# Patient Record
Sex: MALE | Race: WHITE | NOT HISPANIC OR LATINO | Employment: OTHER | URBAN - METROPOLITAN AREA
[De-identification: names, ages, dates, MRNs, and addresses within clinical notes are randomized per-mention and may not be internally consistent; named-entity substitution may affect disease eponyms.]

---

## 2017-01-06 RX ORDER — ERGOCALCIFEROL 1.25 MG/1
50000 CAPSULE ORAL 2 TIMES WEEKLY
Qty: 30 CAPSULE | Refills: 0 | Status: CANCELLED | OUTPATIENT
Start: 2017-01-09

## 2017-01-06 NOTE — TELEPHONE ENCOUNTER
I spoke with patient , I received an automatic refill request on his vitamin D from the pharmacy.  He said he still has some vitamin D that he is taking and is due to have a new level drawn next week hold off on any refills right now to see his new level.

## 2017-01-09 ENCOUNTER — APPOINTMENT (OUTPATIENT)
Dept: CT IMAGING | Facility: HOSPITAL | Age: 77
End: 2017-01-09

## 2017-01-09 ENCOUNTER — HOSPITAL ENCOUNTER (EMERGENCY)
Facility: HOSPITAL | Age: 77
Discharge: HOME | End: 2017-01-09
Attending: EMERGENCY MEDICINE | Admitting: EMERGENCY MEDICINE

## 2017-01-09 VITALS
TEMPERATURE: 97.2 F | DIASTOLIC BLOOD PRESSURE: 98 MMHG | BODY MASS INDEX: 27.92 KG/M2 | WEIGHT: 195 LBS | RESPIRATION RATE: 18 BRPM | OXYGEN SATURATION: 94 % | HEIGHT: 70 IN | SYSTOLIC BLOOD PRESSURE: 192 MMHG | HEART RATE: 88 BPM

## 2017-01-09 DIAGNOSIS — S09.90XA HEAD INJURY, INITIAL ENCOUNTER: Primary | ICD-10-CM

## 2017-01-09 DIAGNOSIS — S02.92XA: ICD-10-CM

## 2017-01-09 PROCEDURE — 70486 CT MAXILLOFACIAL W/O DYE: CPT

## 2017-01-09 PROCEDURE — 70450 CT HEAD/BRAIN W/O DYE: CPT

## 2017-01-09 PROCEDURE — 99284 EMERGENCY DEPT VISIT MOD MDM: CPT

## 2017-01-09 RX ORDER — HYDROCODONE BITARTRATE AND ACETAMINOPHEN 7.5; 325 MG/1; MG/1
1 TABLET ORAL ONCE
Status: COMPLETED | OUTPATIENT
Start: 2017-01-09 | End: 2017-01-09

## 2017-01-09 RX ORDER — HYDROCODONE BITARTRATE AND ACETAMINOPHEN 5; 325 MG/1; MG/1
1 TABLET ORAL EVERY 6 HOURS PRN
Qty: 15 TABLET | Refills: 0 | Status: SHIPPED | OUTPATIENT
Start: 2017-01-09 | End: 2017-01-12

## 2017-01-09 RX ADMIN — HYDROCODONE BITARTRATE AND ACETAMINOPHEN 1 TABLET: 7.5; 325 TABLET ORAL at 19:15

## 2017-01-09 NOTE — DISCHARGE INSTRUCTIONS
You have an appointment at [facility] Wednesday at 8am.  [address]  [phone]

## 2017-01-09 NOTE — ED PROVIDER NOTES
" EMERGENCY DEPARTMENT ENCOUNTER    CHIEF COMPLAINT  Chief Complaint: Fall  History given by: Patient, Spouse, Daughter  History limited by: N/A  Room Number: 19/19  PMD: Adeline Onofre MD      HPI:  Pt reports that while exercising today, he lost his balance and fell, sustaining blow to the nose against the pavement of his driveway. Pt is unsure if he lost consciousness, but denies CP, bilateral hip pain, dyspnea, abd pain, N/V, focal weakness/numbness, and neck pain. Pt is currently on Eliquis due to prior h/o PEs. There are no other complaints at this time.     Pain Location: Nose  Radiation: None  Quality: \"painful\"  Intensity/Severity: Moderate  Duration: Onset earlier today  Onset quality: Pain was gradual  Timing: Intermittent   Progression: Waxing and waning  Aggravating Factors: Nothing  Alleviating Factors: Nothing  Previous Episodes: None  Treatment before arrival: None  Associated Symptoms: None      PAST MEDICAL HISTORY  Active Ambulatory Problems     Diagnosis Date Noted   • Quadriceps weakness 04/08/2016   • ACL tear 04/08/2016   • Knee pain, right 04/08/2016   • S/P CABG x 3 04/18/2016   • Essential hypertension 04/18/2016   • Hyperlipemia 04/18/2016   • Hallux rigidus 07/11/2016   • Fracture, stress, metatarsal 07/11/2016   • Osteopenia determined by x-ray 07/11/2016   • Metatarsal stress fracture with nonunion 08/10/2016   • Left hip pain 08/26/2016   • Bursitis of left hip 08/26/2016   • Pulmonary embolism 10/12/2016   • DALILA (acute kidney injury) 10/12/2016   • Diabetes mellitus type 2, controlled 10/12/2016   • Ascending aorta dilatation 10/12/2016   • Pulmonary nodule, left 10/12/2016   • Right leg DVT 10/13/2016     Resolved Ambulatory Problems     Diagnosis Date Noted   • No Resolved Ambulatory Problems     Past Medical History   Diagnosis Date   • Arthritis    • Asthma    • Brain abscess    • Cardiac disease    • Coronary artery disease    • Diabetes mellitus    • Hearing aid worn    • " Hypertension    • Joint pain          PAST SURGICAL HISTORY  Past Surgical History   Procedure Laterality Date   • Coronary artery bypass graft     • Brain surgery       BRAIN ABCESS 30 YR AGO   • Septoplasty     • Orif foot fracture Right 9/9/2016     Procedure: RIGHT SECOND METATARSAL OPEN REDUCTION INTERNAL FIXATION WITh GRAFT FROM HEEL ;  Surgeon: Man Jenkins MD;  [facility]: [facility] OR [facility];  Service:          FAMILY HISTORY  Family History   Problem Relation Age of Onset   • Heart disease Mother    • Hypertension Mother    • Heart disease Father    • Hypertension Father          SOCIAL HISTORY  Social History     Social History   • Marital status:      Spouse name: N/A   • Number of children: N/A   • Years of education: N/A     Occupational History   • Not on file.     Social History Main Topics   • Smoking status: Former Smoker   • Smokeless tobacco: Never Used      Comment: HISTORY OF 6 YRS USE IN EARLY 20'S   • Alcohol use No   • Drug use: No   • Sexual activity: Defer     Other Topics Concern   • Not on file     Social History Narrative         ALLERGIES  Other        REVIEW OF SYSTEMS  Review of Systems   Constitutional: Negative.    Eyes: Negative for visual disturbance.   Respiratory: Negative for shortness of breath.    Cardiovascular: Negative for chest pain.   Gastrointestinal: Negative for abdominal pain, nausea and vomiting.   Musculoskeletal: Negative for neck pain.   Skin:        Nasal injury   Neurological: Negative for syncope, weakness, numbness and headaches.   Psychiatric/Behavioral: Negative.    All other systems reviewed and are negative.           PHYSICAL EXAM  ED Triage Vitals   Temp Heart Rate Resp BP SpO2   01/09/17 1615 01/09/17 1612 01/09/17 1612 01/09/17 1615 01/09/17 1612   98.5 °F (36.9 °C) 90 20 208/105 95 % WNL      Temp src Heart Rate Source Patient Position BP Location FiO2 (%)   -- 01/09/17 1626 01/09/17 1626 01/09/17 1626 --    Monitor Lying Left arm         Physical Exam   Constitutional: He is oriented to person, place, and time and well-developed, well-nourished, and in no distress.   HENT:   No septal hematoma. There is dried blood from bilateral nares (right > left).    Eyes: EOM are normal.   Neck: Normal range of motion. Neck supple.   Cardiovascular: Normal rate and regular rhythm.    Pulmonary/Chest: Effort normal and breath sounds normal. No respiratory distress. He exhibits no tenderness.   Abdominal: Soft. There is no tenderness.   Neurological: He is alert and oriented to person, place, and time. He has normal sensation and normal strength.   Skin: Skin is warm and dry. Abrasion (to the nose; to the hands bilaterally) noted.   Psychiatric: Affect normal.   Nursing note and vitals reviewed.              RADIOLOGY  CT Head Without Contrast - Negative acute. Interpreted by radiologist. Discussed with radiologist. Independently viewed by me.      CT Facial Bones Without Contrast - Multiple nasal bone fractures (left, right, and nasal septum). There are fractures to the right orbital floor, right orbital wall, and right zygomatic arch. Multiple maxillary sinus fractures. Interpreted by radiologist. Discussed with radiologist. Independently viewed by me.             Ordered the above noted radiological studies. Reviewed by me in PACS.       PROCEDURES  Procedures        PROGRESS AND CONSULTS  ED Course   Comment By Time   6:39 PM  Patient with fall.  Has CT head that is negative but face CT shows multiple facial fractures.  Vision normal and no pressure or bleeding behind right eye.  Discussed with Dr. Gregg.  They will see him at [facility] at 8am on Wednesday.  Will discharge with pain meds.  Hold eliquis tomorrow. Sarthak Willams MD 01/09 4381     4:57 PM: CT Head and CT Facial Bones ordered for further evaluation.     6:00 PM: Rechecked pt. Pt is resting comfortably and appears in no acute distress. Informed pt that his CT Head is  negative acute. CT Facial Bones suggests that pt has multiple nasal bone fractures (nasal septum, left, and right); multiple maxillary sinus fractures; and fractures of the right orbital wall, right zygomatic arch, and right orbital floor. Will discuss further course of care with oculo-plastic surgeon. Pt agrees with plan.     6:03 PM: Placed call to Dr. Massey, oculo-plastic surgeon.     6:10 PM: Discussed case with Dr. Qiu, oculo-plastic surgeon on-call for Dr. Massey. He will f/u with pt in the office at the [facility] ([address]) on 01/11/17.     6:20 PM: Rechecked pt. Informed pt and family of my d/w Dr. Qiu, oculo-plastic surgeon. Pt advised to f/u with him at the [facility] ([address]) on 01/11/17. Will prescribe rx for small amount of pain medicine for discomfort. RTER warnings given. Pt and family agree with plan for discharge.           MEDICAL DECISION MAKING    MDM  Number of Diagnoses or Management Options     Amount and/or Complexity of Data Reviewed  Tests in the radiology section of CPT®: ordered and reviewed (CT Head is negative acute. )  Discussion of test results with the performing providers: yes (CT Head and CT Facial Bones results d/w radiologist.   )  Discuss the patient with other providers: yes (Case d/w Dr. Qiu, oculo-plastic surgeon, who will f/u with pt in the office. )    Patient Progress  Patient progress: stable             DIAGNOSIS  Final diagnoses:   Head injury, initial encounter   Extensive facial fractures, closed, initial encounter         DISPOSITION  Pt discharged.    DISCHARGE    Patient discharged in stable condition.    Reviewed implications of results, diagnosis, meds, responsibility to follow up, warning signs and symptoms of possible worsening, potential complications and reasons to return to ER.    Patient/Family voiced understanding of above instructions.    Discussed plan for discharge, as there  is no emergent indication for admission.  Pt/family is agreeable and understands need for follow up and repeat testing.  Pt is aware that discharge does not mean that nothing is wrong but it indicates no emergency is present that requires admission and they must continue care with follow-up as given below or physician of their choice.     FOLLOW-UP  Dr. Qiu, oculo-plastic surgeon.     Current Discharge Medication List      START taking these medications    Details   HYDROcodone-acetaminophen (NORCO) 5-325 MG per tablet Take 1 tablet by mouth Every 6 (Six) Hours As Needed for moderate pain (4-6).  Qty: 15 tablet, Refills: 0             Latest Documented Vital Signs:  As of 6:47 PM  BP- (!) 190/97 HR- 75 Temp- 98.5 °F (36.9 °C) O2 sat- 95%        --  Documentation assistance provided by laura Schroeder for Dr. Imer MD.  Information recorded by the scribe was done at my direction and has been verified and validated by me.              Sandeep Schroeder  01/09/17 7038       Sarthak Willams MD  01/09/17 2024

## 2017-01-09 NOTE — ED NOTES
Pt c/o a headache and bilateral jaw pain after falling today. Pt denies LOC but family states bystanders said he did. Pt is on eliquis. Pt also has a right sided nose bleed and abrasion to forehead and right hand.     Nae Martell RN  01/09/17 3350

## 2017-01-12 ENCOUNTER — HOSPITAL ENCOUNTER (OUTPATIENT)
Dept: GENERAL RADIOLOGY | Facility: HOSPITAL | Age: 77
Discharge: HOME | End: 2017-01-12
Attending: OPHTHALMOLOGY | Admitting: OPHTHALMOLOGY

## 2017-01-12 ENCOUNTER — APPOINTMENT (OUTPATIENT)
Dept: PREADMISSION TESTING | Facility: HOSPITAL | Age: 77
End: 2017-01-12

## 2017-01-12 VITALS
WEIGHT: 203.7 LBS | SYSTOLIC BLOOD PRESSURE: 138 MMHG | RESPIRATION RATE: 16 BRPM | DIASTOLIC BLOOD PRESSURE: 75 MMHG | HEIGHT: 70 IN | BODY MASS INDEX: 29.16 KG/M2 | OXYGEN SATURATION: 94 % | HEART RATE: 81 BPM | TEMPERATURE: 97.2 F

## 2017-01-12 DIAGNOSIS — M85.80 OSTEOPENIA DETERMINED BY X-RAY: ICD-10-CM

## 2017-01-12 DIAGNOSIS — S02.401G: ICD-10-CM

## 2017-01-12 LAB
25(OH)D3 SERPL-MCNC: 53.3 NG/ML (ref 30–100)
ANION GAP SERPL CALCULATED.3IONS-SCNC: 16.1 MMOL/L
BUN BLD-MCNC: 20 MG/DL (ref 8–23)
BUN/CREAT SERPL: 16.4 (ref 7–25)
CALCIUM SPEC-SCNC: 9.8 MG/DL (ref 8.6–10.5)
CHLORIDE SERPL-SCNC: 100 MMOL/L (ref 98–107)
CO2 SERPL-SCNC: 23.9 MMOL/L (ref 22–29)
CREAT BLD-MCNC: 1.22 MG/DL (ref 0.76–1.27)
DEPRECATED RDW RBC AUTO: 47.7 FL (ref 37–54)
ERYTHROCYTE [DISTWIDTH] IN BLOOD BY AUTOMATED COUNT: 13.1 % (ref 11.5–14.5)
GFR SERPL CREATININE-BSD FRML MDRD: 58 ML/MIN/1.73
GLUCOSE BLD-MCNC: 152 MG/DL (ref 65–99)
HCT VFR BLD AUTO: 43.9 % (ref 40.4–52.2)
HGB BLD-MCNC: 14.2 G/DL (ref 13.7–17.6)
MCH RBC QN AUTO: 32.3 PG (ref 27–32.7)
MCHC RBC AUTO-ENTMCNC: 32.3 G/DL (ref 32.6–36.4)
MCV RBC AUTO: 99.8 FL (ref 79.8–96.2)
PLATELET # BLD AUTO: 205 10*3/MM3 (ref 140–500)
PMV BLD AUTO: 10.6 FL (ref 6–12)
POTASSIUM BLD-SCNC: 3.9 MMOL/L (ref 3.5–5.2)
RBC # BLD AUTO: 4.4 10*6/MM3 (ref 4.6–6)
SODIUM BLD-SCNC: 140 MMOL/L (ref 136–145)
WBC NRBC COR # BLD: 9.46 10*3/MM3 (ref 4.5–10.7)

## 2017-01-12 PROCEDURE — 85027 COMPLETE CBC AUTOMATED: CPT | Performed by: OPHTHALMOLOGY

## 2017-01-12 PROCEDURE — 36415 COLL VENOUS BLD VENIPUNCTURE: CPT

## 2017-01-12 PROCEDURE — 82306 VITAMIN D 25 HYDROXY: CPT | Performed by: OPHTHALMOLOGY

## 2017-01-12 PROCEDURE — 80048 BASIC METABOLIC PNL TOTAL CA: CPT | Performed by: OPHTHALMOLOGY

## 2017-01-12 RX ORDER — HYDROCODONE BITARTRATE AND ACETAMINOPHEN 5; 325 MG/1; MG/1
1 TABLET ORAL EVERY 6 HOURS PRN
Status: ON HOLD | COMMUNITY
End: 2017-03-09 | Stop reason: ALTCHOICE

## 2017-01-12 RX ORDER — ERGOCALCIFEROL 1.25 MG/1
50000 CAPSULE ORAL TAKE AS DIRECTED
COMMUNITY
End: 2017-01-16 | Stop reason: SDUPTHER

## 2017-01-12 NOTE — DISCHARGE INSTRUCTIONS
Take the following medications the morning of surgery with a small sip of water.    NONE.    ARRIVE AT 10:30     General Instructions:  • Do not eat or drink after midnight: includes water, mints, or gum. You may brush your teeth.  • Do not smoke, chew tobacco, or drink alcohol.  • Bring medications in original bottles, any inhalers and if applicable your C-PAP/ BI-PAP machine.  • Bring any papers given to you in the doctor’s office.  • Wear clean comfortable clothes and socks.  • Do not wear contact lenses or make-up.  Bring a case for your glasses if applicable.   • Bring crutches or walker if applicable.  • Leave all other valuables and jewelry at home.    If you were given a blood bank ID arm band remember to bring it with you the day of surgery.    Preventing a Surgical Site Infection:  Shower on the morning of surgery using a fresh bar of anti-bacterial soap (such as Dial) and clean washcloth.  Dry with a clean towel and dress in clean clothing.  For 2 to 3 days before surgery, avoid shaving with a razor near where you will have surgery because the razor can irritate skin and make it easier to develop an infection  Ask your surgeon if you will be receiving antibiotics prior to surgery  Make sure you, your family, and all healthcare providers clean their hands with soap and water or an alcohol based hand  before caring for you or your wound  If at all possible, quit smoking as many days before surgery as you can.    Day of surgery:  Upon arrival, a Pre-op nurse and Anesthesiologist will review your health history, obtain vital signs, and answer questions you may have.  The only belongings needed at this time will be your home medications and if applicable your C-PAP/BI-PAP machine.  If you are staying overnight your family can leave the rest of your belongings in the car and bring them to your room later.  A Pre-op nurse will start an IV and you may receive medication in preparation for surgery,  including something to help you relax.  Your family will be able to see you in the Pre-op area.  While you are in surgery your family should notify the waiting room  if they leave the waiting room area and provide a contact phone number.    Please be aware that surgery does come with discomfort.  We want to make every effort to control your discomfort so please discuss any uncontrolled symptoms with your nurse.   Your doctor will most likely have prescribed pain medications.      If you are going home after surgery you will receive individualized written care instructions before being discharged.  A responsible adult must drive you to and from the hospital on the day of your surgery and stay with you for 24 hours.    If you are staying overnight following surgery, you will be transported to your hospital room following the recovery period.  [facility] has all private rooms.    If you have any questions please call Pre-Admission Testing at [phone].  Deductibles and co-payments are collected on the day of service. Please be prepared to pay the required co-pay, deductible or deposit on the day of service as defined by your plan.

## 2017-01-12 NOTE — MR AVS SNAPSHOT
Eugenio Joe   1/12/2017 7:15 AM   Appointment    Provider:  ANETTE Franks   Department:  [facility] PREADMISSION T   Dept Phone:  [phone]                Your Full Care Plan           To Do List     1/12/2017 8:15 AM     Appointment with GAYLA Reed at [facility] XRAY ([phone])   Bring along any outside films relating to this procedure. Arrive 15 minutes before your scheduled time.   [address] [facility] [address]       1/19/2017 1:30 PM     Appointment with Man Jenkins MD at [facility] ([phone])   Arrive 15 minutes prior to appointment.   [address] [facility] [address]  [facility] [address]       4/10/2017 9:20 AM     Appointment with Jean Ford MD at [facility] CARDIOLOGY ([phone])   Arrive 15 minutes prior to appointment.   [address] [facility]  [facility] [address]            Your Updated Medication List          This list is accurate as of: 1/12/17  7:43 AM.  Always use your most recent med list.                allopurinol 300 MG tablet   Commonly known as:  ZYLOPRIM       apixaban 5 MG tablet tablet   Commonly known as:  ELIQUIS       fluticasone-salmeterol 250-50 MCG/DOSE DISKUS   Commonly known as:  ADVAIR       glipiZIDE 5 MG tablet   Commonly known as:  GLUCOTROL       lisinopril 10 MG tablet   Commonly known as:  PRINIVIL,ZESTRIL       loratadine 10 MG tablet   Commonly known as:  CLARITIN       metFORMIN 500 MG tablet   Commonly known as:  GLUCOPHAGE       MULTI-VITAMIN tablet       NORCO 5-325 MG per tablet   Generic drug:  HYDROcodone-acetaminophen       PROAIR  (90 BASE) MCG/ACT inhaler   Generic drug:  albuterol       simvastatin 40 MG tablet   Commonly known as:  ZOCOR       vitamin D 84860 UNITS capsule capsule   Commonly known as:  ERGOCALCIFEROL               MyChart Signup     Our records indicate that you  "have an active [employer] account.    You can view your After Visit Summary by going to [employer] and logging in with your [facility] username and password.  If you don't have a [facility] username and password but a parent or guardian has access to your record, the parent or guardian should login with their own [facility] username and password and access your record to view the After Visit Summary.    If you have questions, you can email [email or web address] or call [phone] to talk to our [facility] staff.  Remember, [facility] is NOT to be used for urgent needs.  For medical emergencies, dial 911.               Other Info from Your Visit           Allergies     Other Mental Status Change    Oxy drugs      Vital Signs     Blood Pressure Pulse Temperature Respirations Height Weight    138/75 (BP Location: Right arm, Patient Position: Sitting) 81 97.2 °F (36.2 °C) (Oral) 16 70\" (177.8 cm) 203 lb 11.2 oz (92.4 kg)    Oxygen Saturation Body Mass Index Smoking Status             94% 29.23 kg/m2 Former Smoker           Discharge Instructions       Take the following medications the morning of surgery with a small sip of water.    NONE.    ARRIVE AT 10:30     General Instructions:  • Do not eat or drink after midnight: includes water, mints, or gum. You may brush your teeth.  • Do not smoke, chew tobacco, or drink alcohol.  • Bring medications in original bottles, any inhalers and if applicable your C-PAP/ BI-PAP machine.  • Bring any papers given to you in the doctor’s office.  • Wear clean comfortable clothes and socks.  • Do not wear contact lenses or make-up.  Bring a case for your glasses if applicable.   • Bring crutches or walker if applicable.  • Leave all other valuables and jewelry at home.    If you were given a blood bank ID arm band remember to bring it with you the day of surgery.    Preventing a Surgical Site Infection:  Shower on the morning of surgery using a fresh bar of " anti-bacterial soap (such as Dial) and clean washcloth.  Dry with a clean towel and dress in clean clothing.  For 2 to 3 days before surgery, avoid shaving with a razor near where you will have surgery because the razor can irritate skin and make it easier to develop an infection  Ask your surgeon if you will be receiving antibiotics prior to surgery  Make sure you, your family, and all healthcare providers clean their hands with soap and water or an alcohol based hand  before caring for you or your wound  If at all possible, quit smoking as many days before surgery as you can.    Day of surgery:  Upon arrival, a Pre-op nurse and Anesthesiologist will review your health history, obtain vital signs, and answer questions you may have.  The only belongings needed at this time will be your home medications and if applicable your C-PAP/BI-PAP machine.  If you are staying overnight your family can leave the rest of your belongings in the car and bring them to your room later.  A Pre-op nurse will start an IV and you may receive medication in preparation for surgery, including something to help you relax.  Your family will be able to see you in the Pre-op area.  While you are in surgery your family should notify the waiting room  if they leave the waiting room area and provide a contact phone number.    Please be aware that surgery does come with discomfort.  We want to make every effort to control your discomfort so please discuss any uncontrolled symptoms with your nurse.   Your doctor will most likely have prescribed pain medications.      If you are going home after surgery you will receive individualized written care instructions before being discharged.  A responsible adult must drive you to and from the hospital on the day of your surgery and stay with you for 24 hours.    If you are staying overnight following surgery, you will be transported to your hospital room following the recovery period.   [facility] has all private rooms.    If you have any questions please call Pre-Admission Testing at [phone].  Deductibles and co-payments are collected on the day of service. Please be prepared to pay the required co-pay, deductible or deposit on the day of service as defined by your plan.       SYMPTOMS OF A STROKE    Call 911 or have someone take you to the Emergency Department if you have any of the following:    · Sudden numbness or weakness of your face, arm or leg especially on one side of the body  · Sudden confusion, diffiiculty speaking or trouble understanding   · Changes in your vision or loss of sight in one eye  · Sudden severe headache with no known cause  · sudden dizziness, trouble walking, loss of balance or coordination    It is important to seek emergency care right away if you have further stroke symptoms. If you get emergency help quickly, the powerful clot-dissolving medicines can reduce the disabilities caused by a stroke.     For more information:    American Stroke Association  8-004-6-STROKE  www.strokeassociation.org           IF YOU SMOKE OR USE TOBACCO PLEASE READ THE FOLLOWING:    Why is smoking bad for me?  Smoking increases the risk of heart disease, lung disease, vascular disease, stroke, and cancer.     If you smoke, STOP!    If you would like more information on quitting smoking, please visit the [employer] website: [email or web address]   This link will provide additional resources including the QUIT line and the [employer] the [employer] support groups.     For more information:    American Cancer Society  (460) 267-6681    American Heart Association  1-360.703.2332

## 2017-01-13 ENCOUNTER — ANESTHESIA EVENT (OUTPATIENT)
Dept: PERIOP | Facility: HOSPITAL | Age: 77
End: 2017-01-13

## 2017-01-13 ENCOUNTER — HOSPITAL ENCOUNTER (OUTPATIENT)
Facility: HOSPITAL | Age: 77
Setting detail: HOSPITAL OUTPATIENT SURGERY
Discharge: HOME | End: 2017-01-13
Attending: OPHTHALMOLOGY | Admitting: OPHTHALMOLOGY

## 2017-01-13 ENCOUNTER — ANESTHESIA (OUTPATIENT)
Dept: PERIOP | Facility: HOSPITAL | Age: 77
End: 2017-01-13

## 2017-01-13 VITALS
SYSTOLIC BLOOD PRESSURE: 175 MMHG | RESPIRATION RATE: 18 BRPM | WEIGHT: 204 LBS | OXYGEN SATURATION: 94 % | DIASTOLIC BLOOD PRESSURE: 75 MMHG | HEART RATE: 80 BPM | TEMPERATURE: 98 F | BODY MASS INDEX: 29.27 KG/M2

## 2017-01-13 LAB — GLUCOSE BLDC GLUCOMTR-MCNC: 145 MG/DL (ref 70–130)

## 2017-01-13 PROCEDURE — 25010000002 HYDRALAZINE PER 20 MG: Performed by: NURSE ANESTHETIST, CERTIFIED REGISTERED

## 2017-01-13 PROCEDURE — 25010000002 PHENYLEPHRINE PER 1 ML: Performed by: NURSE ANESTHETIST, CERTIFIED REGISTERED

## 2017-01-13 PROCEDURE — 25010000002 FENTANYL CITRATE (PF) 100 MCG/2ML SOLUTION: Performed by: ANESTHESIOLOGY

## 2017-01-13 PROCEDURE — 25010000002 PROPOFOL 10 MG/ML EMULSION: Performed by: NURSE ANESTHETIST, CERTIFIED REGISTERED

## 2017-01-13 PROCEDURE — 25010000002 ONDANSETRON PER 1 MG: Performed by: NURSE ANESTHETIST, CERTIFIED REGISTERED

## 2017-01-13 PROCEDURE — 25010000002 NEOSTIGMINE 10 MG/10ML SOLUTION: Performed by: NURSE ANESTHETIST, CERTIFIED REGISTERED

## 2017-01-13 PROCEDURE — C1713 ANCHOR/SCREW BN/BN,TIS/BN: HCPCS | Performed by: OPHTHALMOLOGY

## 2017-01-13 PROCEDURE — 82962 GLUCOSE BLOOD TEST: CPT

## 2017-01-13 PROCEDURE — 25010000003 CEFAZOLIN IN DEXTROSE 2-4 GM/100ML-% SOLUTION: Performed by: OPHTHALMOLOGY

## 2017-01-13 PROCEDURE — 25010000002 DEXAMETHASONE PER 1 MG: Performed by: NURSE ANESTHETIST, CERTIFIED REGISTERED

## 2017-01-13 PROCEDURE — 25010000002 FENTANYL CITRATE (PF) 100 MCG/2ML SOLUTION: Performed by: NURSE ANESTHETIST, CERTIFIED REGISTERED

## 2017-01-13 DEVICE — SCRW MAXDRIVE MICRO ER 1.8X5MM: Type: IMPLANTABLE DEVICE | Status: FUNCTIONAL

## 2017-01-13 DEVICE — IMPLANTABLE DEVICE: Type: IMPLANTABLE DEVICE | Status: FUNCTIONAL

## 2017-01-13 DEVICE — PLT MIC LEVELONE CRV W/1.5MM/SCRW 10HL 38MM: Type: IMPLANTABLE DEVICE | Status: FUNCTIONAL

## 2017-01-13 DEVICE — SCRW MAXDRIVE DRL FREE MICRO 1.5X4MM: Type: IMPLANTABLE DEVICE | Status: FUNCTIONAL

## 2017-01-13 DEVICE — FOIL ORBT/FLR SUPRAFOIL NLY 0.35MM 4X4CM: Type: IMPLANTABLE DEVICE | Status: FUNCTIONAL

## 2017-01-13 RX ORDER — CHLORHEXIDINE GLUCONATE 0.12 MG/ML
15 RINSE ORAL 3 TIMES DAILY
Qty: 473 ML | Refills: 0 | Status: ON HOLD | OUTPATIENT
Start: 2017-01-13 | End: 2017-03-09 | Stop reason: ALTCHOICE

## 2017-01-13 RX ORDER — FAMOTIDINE 10 MG/ML
20 INJECTION, SOLUTION INTRAVENOUS ONCE
Status: COMPLETED | OUTPATIENT
Start: 2017-01-13 | End: 2017-01-13

## 2017-01-13 RX ORDER — OXYCODONE AND ACETAMINOPHEN 7.5; 325 MG/1; MG/1
1 TABLET ORAL ONCE AS NEEDED
Status: DISCONTINUED | OUTPATIENT
Start: 2017-01-13 | End: 2017-01-13 | Stop reason: HOSPADM

## 2017-01-13 RX ORDER — DIPHENHYDRAMINE HYDROCHLORIDE 50 MG/ML
12.5 INJECTION INTRAMUSCULAR; INTRAVENOUS
Status: DISCONTINUED | OUTPATIENT
Start: 2017-01-13 | End: 2017-01-13 | Stop reason: HOSPADM

## 2017-01-13 RX ORDER — FLUMAZENIL 0.1 MG/ML
0.2 INJECTION INTRAVENOUS AS NEEDED
Status: DISCONTINUED | OUTPATIENT
Start: 2017-01-13 | End: 2017-01-13 | Stop reason: HOSPADM

## 2017-01-13 RX ORDER — MIDAZOLAM HYDROCHLORIDE 1 MG/ML
2 INJECTION INTRAMUSCULAR; INTRAVENOUS
Status: DISCONTINUED | OUTPATIENT
Start: 2017-01-13 | End: 2017-01-13 | Stop reason: HOSPADM

## 2017-01-13 RX ORDER — ONDANSETRON 2 MG/ML
INJECTION INTRAMUSCULAR; INTRAVENOUS AS NEEDED
Status: DISCONTINUED | OUTPATIENT
Start: 2017-01-13 | End: 2017-01-13 | Stop reason: SURG

## 2017-01-13 RX ORDER — FENTANYL CITRATE 50 UG/ML
50 INJECTION, SOLUTION INTRAMUSCULAR; INTRAVENOUS
Status: DISCONTINUED | OUTPATIENT
Start: 2017-01-13 | End: 2017-01-13 | Stop reason: HOSPADM

## 2017-01-13 RX ORDER — ROCURONIUM BROMIDE 10 MG/ML
INJECTION, SOLUTION INTRAVENOUS AS NEEDED
Status: DISCONTINUED | OUTPATIENT
Start: 2017-01-13 | End: 2017-01-13 | Stop reason: SURG

## 2017-01-13 RX ORDER — SODIUM CHLORIDE 9 MG/ML
INJECTION, SOLUTION INTRAVENOUS CONTINUOUS PRN
Status: DISCONTINUED | OUTPATIENT
Start: 2017-01-13 | End: 2017-01-13 | Stop reason: SURG

## 2017-01-13 RX ORDER — GLYCOPYRROLATE 0.2 MG/ML
INJECTION INTRAMUSCULAR; INTRAVENOUS AS NEEDED
Status: DISCONTINUED | OUTPATIENT
Start: 2017-01-13 | End: 2017-01-13 | Stop reason: SURG

## 2017-01-13 RX ORDER — NEOSTIGMINE METHYLSULFATE 1 MG/ML
INJECTION, SOLUTION INTRAVENOUS AS NEEDED
Status: DISCONTINUED | OUTPATIENT
Start: 2017-01-13 | End: 2017-01-13 | Stop reason: SURG

## 2017-01-13 RX ORDER — CEFAZOLIN SODIUM 2 G/100ML
2 INJECTION, SOLUTION INTRAVENOUS ONCE
Status: COMPLETED | OUTPATIENT
Start: 2017-01-13 | End: 2017-01-13

## 2017-01-13 RX ORDER — PROMETHAZINE HYDROCHLORIDE 25 MG/1
12.5 TABLET ORAL ONCE AS NEEDED
Status: DISCONTINUED | OUTPATIENT
Start: 2017-01-13 | End: 2017-01-13 | Stop reason: HOSPADM

## 2017-01-13 RX ORDER — HYDRALAZINE HYDROCHLORIDE 20 MG/ML
5 INJECTION INTRAMUSCULAR; INTRAVENOUS
Status: DISCONTINUED | OUTPATIENT
Start: 2017-01-13 | End: 2017-01-13 | Stop reason: HOSPADM

## 2017-01-13 RX ORDER — ERYTHROMYCIN 5 MG/G
OINTMENT OPHTHALMIC
Qty: 3.5 G | Refills: 1 | Status: SHIPPED | OUTPATIENT
Start: 2017-01-13 | End: 2017-02-08

## 2017-01-13 RX ORDER — HYDROCODONE BITARTRATE AND ACETAMINOPHEN 7.5; 325 MG/1; MG/1
1 TABLET ORAL ONCE AS NEEDED
Status: DISCONTINUED | OUTPATIENT
Start: 2017-01-13 | End: 2017-01-13 | Stop reason: HOSPADM

## 2017-01-13 RX ORDER — NALOXONE HCL 0.4 MG/ML
0.2 VIAL (ML) INJECTION AS NEEDED
Status: DISCONTINUED | OUTPATIENT
Start: 2017-01-13 | End: 2017-01-13 | Stop reason: HOSPADM

## 2017-01-13 RX ORDER — BACITRACIN ZINC 500 [USP'U]/G
OINTMENT TOPICAL AS NEEDED
Status: DISCONTINUED | OUTPATIENT
Start: 2017-01-13 | End: 2017-01-13 | Stop reason: HOSPADM

## 2017-01-13 RX ORDER — FAMOTIDINE 10 MG/ML
INJECTION, SOLUTION INTRAVENOUS
Status: COMPLETED
Start: 2017-01-13 | End: 2017-01-13

## 2017-01-13 RX ORDER — MIDAZOLAM HYDROCHLORIDE 1 MG/ML
1 INJECTION INTRAMUSCULAR; INTRAVENOUS
Status: DISCONTINUED | OUTPATIENT
Start: 2017-01-13 | End: 2017-01-13 | Stop reason: HOSPADM

## 2017-01-13 RX ORDER — PROMETHAZINE HYDROCHLORIDE 25 MG/1
25 TABLET ORAL ONCE AS NEEDED
Status: DISCONTINUED | OUTPATIENT
Start: 2017-01-13 | End: 2017-01-13 | Stop reason: HOSPADM

## 2017-01-13 RX ORDER — HYDROMORPHONE HYDROCHLORIDE 1 MG/ML
0.25 INJECTION, SOLUTION INTRAMUSCULAR; INTRAVENOUS; SUBCUTANEOUS
Status: DISCONTINUED | OUTPATIENT
Start: 2017-01-13 | End: 2017-01-13 | Stop reason: HOSPADM

## 2017-01-13 RX ORDER — SODIUM CHLORIDE, SODIUM LACTATE, POTASSIUM CHLORIDE, CALCIUM CHLORIDE 600; 310; 30; 20 MG/100ML; MG/100ML; MG/100ML; MG/100ML
9 INJECTION, SOLUTION INTRAVENOUS CONTINUOUS
Status: DISCONTINUED | OUTPATIENT
Start: 2017-01-13 | End: 2017-01-13 | Stop reason: HOSPADM

## 2017-01-13 RX ORDER — PROMETHAZINE HYDROCHLORIDE 25 MG/1
25 SUPPOSITORY RECTAL ONCE AS NEEDED
Status: DISCONTINUED | OUTPATIENT
Start: 2017-01-13 | End: 2017-01-13 | Stop reason: HOSPADM

## 2017-01-13 RX ORDER — ERYTHROMYCIN 5 MG/G
OINTMENT OPHTHALMIC AS NEEDED
Status: DISCONTINUED | OUTPATIENT
Start: 2017-01-13 | End: 2017-01-13 | Stop reason: HOSPADM

## 2017-01-13 RX ORDER — LABETALOL HYDROCHLORIDE 5 MG/ML
5 INJECTION, SOLUTION INTRAVENOUS
Status: DISCONTINUED | OUTPATIENT
Start: 2017-01-13 | End: 2017-01-13 | Stop reason: HOSPADM

## 2017-01-13 RX ORDER — MAGNESIUM HYDROXIDE 1200 MG/15ML
LIQUID ORAL AS NEEDED
Status: DISCONTINUED | OUTPATIENT
Start: 2017-01-13 | End: 2017-01-13 | Stop reason: HOSPADM

## 2017-01-13 RX ORDER — DEXAMETHASONE SODIUM PHOSPHATE 10 MG/ML
INJECTION INTRAMUSCULAR; INTRAVENOUS AS NEEDED
Status: DISCONTINUED | OUTPATIENT
Start: 2017-01-13 | End: 2017-01-13 | Stop reason: SURG

## 2017-01-13 RX ORDER — ONDANSETRON 2 MG/ML
4 INJECTION INTRAMUSCULAR; INTRAVENOUS ONCE AS NEEDED
Status: DISCONTINUED | OUTPATIENT
Start: 2017-01-13 | End: 2017-01-13 | Stop reason: HOSPADM

## 2017-01-13 RX ORDER — SODIUM CHLORIDE 0.9 % (FLUSH) 0.9 %
1-10 SYRINGE (ML) INJECTION AS NEEDED
Status: DISCONTINUED | OUTPATIENT
Start: 2017-01-13 | End: 2017-01-13 | Stop reason: HOSPADM

## 2017-01-13 RX ORDER — HYDROCODONE BITARTRATE AND ACETAMINOPHEN 7.5; 325 MG/1; MG/1
1 TABLET ORAL EVERY 6 HOURS PRN
Qty: 15 TABLET | Refills: 0 | Status: SHIPPED | OUTPATIENT
Start: 2017-01-13 | End: 2017-01-21 | Stop reason: HOSPADM

## 2017-01-13 RX ORDER — PROPOFOL 10 MG/ML
VIAL (ML) INTRAVENOUS AS NEEDED
Status: DISCONTINUED | OUTPATIENT
Start: 2017-01-13 | End: 2017-01-13 | Stop reason: SURG

## 2017-01-13 RX ORDER — PROMETHAZINE HYDROCHLORIDE 25 MG/ML
12.5 INJECTION, SOLUTION INTRAMUSCULAR; INTRAVENOUS ONCE AS NEEDED
Status: DISCONTINUED | OUTPATIENT
Start: 2017-01-13 | End: 2017-01-13 | Stop reason: HOSPADM

## 2017-01-13 RX ADMIN — SODIUM CHLORIDE, POTASSIUM CHLORIDE, SODIUM LACTATE AND CALCIUM CHLORIDE: 600; 310; 30; 20 INJECTION, SOLUTION INTRAVENOUS at 12:25

## 2017-01-13 RX ADMIN — PHENYLEPHRINE HYDROCHLORIDE 50 MCG: 10 INJECTION INTRAVENOUS at 14:45

## 2017-01-13 RX ADMIN — SODIUM CHLORIDE, POTASSIUM CHLORIDE, SODIUM LACTATE AND CALCIUM CHLORIDE: 600; 310; 30; 20 INJECTION, SOLUTION INTRAVENOUS at 15:00

## 2017-01-13 RX ADMIN — PHENYLEPHRINE HYDROCHLORIDE 50 MCG: 10 INJECTION INTRAVENOUS at 14:10

## 2017-01-13 RX ADMIN — FENTANYL CITRATE 50 MCG: 50 INJECTION INTRAMUSCULAR; INTRAVENOUS at 16:28

## 2017-01-13 RX ADMIN — NEOSTIGMINE METHYLSULFATE 2 MG: 1 INJECTION INTRAVENOUS at 15:36

## 2017-01-13 RX ADMIN — PROPOFOL 200 MG: 10 INJECTION, EMULSION INTRAVENOUS at 12:34

## 2017-01-13 RX ADMIN — PHENYLEPHRINE HYDROCHLORIDE 100 MCG: 10 INJECTION INTRAVENOUS at 15:00

## 2017-01-13 RX ADMIN — SODIUM CHLORIDE, POTASSIUM CHLORIDE, SODIUM LACTATE AND CALCIUM CHLORIDE: 600; 310; 30; 20 INJECTION, SOLUTION INTRAVENOUS at 12:49

## 2017-01-13 RX ADMIN — SODIUM CHLORIDE: 9 INJECTION, SOLUTION INTRAVENOUS at 15:15

## 2017-01-13 RX ADMIN — GLYCOPYRROLATE 0.2 MG: 0.2 INJECTION INTRAMUSCULAR; INTRAVENOUS at 15:36

## 2017-01-13 RX ADMIN — FAMOTIDINE 20 MG: 10 INJECTION, SOLUTION INTRAVENOUS at 09:59

## 2017-01-13 RX ADMIN — PHENYLEPHRINE HYDROCHLORIDE 200 MCG: 10 INJECTION INTRAVENOUS at 13:13

## 2017-01-13 RX ADMIN — HYDRALAZINE HYDROCHLORIDE 5 MG: 20 INJECTION INTRAMUSCULAR; INTRAVENOUS at 16:28

## 2017-01-13 RX ADMIN — FENTANYL CITRATE 50 MCG: 50 INJECTION INTRAMUSCULAR; INTRAVENOUS at 12:38

## 2017-01-13 RX ADMIN — ROCURONIUM BROMIDE 40 MG: 10 INJECTION INTRAVENOUS at 12:34

## 2017-01-13 RX ADMIN — PHENYLEPHRINE HYDROCHLORIDE 50 MCG: 10 INJECTION INTRAVENOUS at 13:37

## 2017-01-13 RX ADMIN — PHENYLEPHRINE HYDROCHLORIDE 100 MCG: 10 INJECTION INTRAVENOUS at 15:28

## 2017-01-13 RX ADMIN — HYDROCODONE BITARTRATE AND ACETAMINOPHEN 1 TABLET: 7.5; 325 TABLET ORAL at 16:28

## 2017-01-13 RX ADMIN — FENTANYL CITRATE 50 MCG: 50 INJECTION INTRAMUSCULAR; INTRAVENOUS at 12:33

## 2017-01-13 RX ADMIN — PHENYLEPHRINE HYDROCHLORIDE 50 MCG: 10 INJECTION INTRAVENOUS at 13:52

## 2017-01-13 RX ADMIN — CEFAZOLIN SODIUM 2 G: 2 INJECTION, SOLUTION INTRAVENOUS at 12:38

## 2017-01-13 RX ADMIN — DEXAMETHASONE SODIUM PHOSPHATE 6 MG: 10 INJECTION INTRAMUSCULAR; INTRAVENOUS at 12:50

## 2017-01-13 RX ADMIN — FENTANYL CITRATE 50 MCG: 50 INJECTION INTRAMUSCULAR; INTRAVENOUS at 15:39

## 2017-01-13 RX ADMIN — PHENYLEPHRINE HYDROCHLORIDE 200 MCG: 10 INJECTION INTRAVENOUS at 12:53

## 2017-01-13 RX ADMIN — PHENYLEPHRINE HYDROCHLORIDE 100 MCG: 10 INJECTION INTRAVENOUS at 13:00

## 2017-01-13 RX ADMIN — PHENYLEPHRINE HYDROCHLORIDE 100 MCG: 10 INJECTION INTRAVENOUS at 15:19

## 2017-01-13 RX ADMIN — ONDANSETRON 4 MG: 2 INJECTION INTRAMUSCULAR; INTRAVENOUS at 12:50

## 2017-01-13 RX ADMIN — PHENYLEPHRINE HYDROCHLORIDE 50 MCG: 10 INJECTION INTRAVENOUS at 14:48

## 2017-01-13 RX ADMIN — FENTANYL CITRATE 50 MCG: 50 INJECTION INTRAMUSCULAR; INTRAVENOUS at 15:45

## 2017-01-13 NOTE — ANESTHESIA POSTPROCEDURE EVALUATION
Patient: Eugenio Joe    Procedure Summary     Date Anesthesia Start Anesthesia Stop Room / Location    01/13/17 1225 1548  [facility]  /  [facility]       Procedure Diagnosis Surgeon Provider    RT ORBIT FLOOR FRACTURE REPAIR RIGHT [facility] FRACTURE REPAIR, RIGHT NASAL BONE FRACTURE CLOSED REDUCTION (Right Face) No diagnosis on file. MD Marleny Christine MD          Anesthesia Type: general  Last vitals  /74 (01/13/17 1656)    Temp 36.7 °C (98 °F) (01/13/17 1645)    Pulse 85 (01/13/17 1656)   Resp 20 (01/13/17 1656)    SpO2 93 % (01/13/17 1656)      Post Anesthesia Care and Evaluation    Patient location during evaluation: bedside  Patient participation: complete - patient participated  Level of consciousness: awake  Pain score: 1  Pain management: adequate  Airway patency: patent  Anesthetic complications: No anesthetic complications    Cardiovascular status: acceptable  Respiratory status: acceptable  Hydration status: acceptable

## 2017-01-13 NOTE — ANESTHESIA PROCEDURE NOTES
Airway  Urgency: elective    Date/Time: 1/13/2017 12:36 PM  Airway not difficult    General Information and Staff    Patient location during procedure: OR  Anesthesiologist: LUIS RUIZ  CRNA: JURGEN OVERTON    Indications and Patient Condition  Indications for airway management: airway protection    Preoxygenated: yes  Mask difficulty assessment: 1 - vent by mask    Final Airway Details  Final airway type: endotracheal airway      Successful airway: ETT  Cuffed: yes   Successful intubation technique: direct laryngoscopy  Endotracheal tube insertion site: oral  Blade: Doug  Blade size: #4  ETT size: 7.5 mm  Cormack-Lehane Classification: grade I - full view of glottis  Placement verified by: chest auscultation and capnometry   Cuff volume (mL): 5  Measured from: lips  ETT to lips (cm): 21  Number of attempts at approach: 1    Additional Comments  Smooth IV induction. Trachea intubated. Cuff up. Dentition intact without injury.

## 2017-01-16 RX ORDER — ERGOCALCIFEROL 1.25 MG/1
50000 CAPSULE ORAL TAKE AS DIRECTED
Qty: 12 CAPSULE | Refills: 0 | Status: SHIPPED | OUTPATIENT
Start: 2017-01-16 | End: 2017-06-05

## 2017-01-18 ENCOUNTER — APPOINTMENT (OUTPATIENT)
Dept: ULTRASOUND IMAGING | Facility: HOSPITAL | Age: 77
End: 2017-01-18

## 2017-01-18 ENCOUNTER — HOSPITAL ENCOUNTER (INPATIENT)
Facility: HOSPITAL | Age: 77
LOS: 3 days | Discharge: HOME | End: 2017-01-21
Attending: EMERGENCY MEDICINE | Admitting: INTERNAL MEDICINE

## 2017-01-18 ENCOUNTER — APPOINTMENT (OUTPATIENT)
Dept: CARDIOLOGY | Facility: HOSPITAL | Age: 77
End: 2017-01-18
Attending: INTERNAL MEDICINE

## 2017-01-18 ENCOUNTER — APPOINTMENT (OUTPATIENT)
Dept: CT IMAGING | Facility: HOSPITAL | Age: 77
End: 2017-01-18

## 2017-01-18 ENCOUNTER — APPOINTMENT (OUTPATIENT)
Dept: GENERAL RADIOLOGY | Facility: HOSPITAL | Age: 77
End: 2017-01-18

## 2017-01-18 DIAGNOSIS — M85.80 OSTEOPENIA DETERMINED BY X-RAY: ICD-10-CM

## 2017-01-18 DIAGNOSIS — M62.81 QUADRICEPS WEAKNESS: ICD-10-CM

## 2017-01-18 DIAGNOSIS — I10 ESSENTIAL HYPERTENSION: ICD-10-CM

## 2017-01-18 DIAGNOSIS — R91.1 PULMONARY NODULE, LEFT: ICD-10-CM

## 2017-01-18 DIAGNOSIS — E86.0 DEHYDRATION: ICD-10-CM

## 2017-01-18 DIAGNOSIS — M84.374K: ICD-10-CM

## 2017-01-18 DIAGNOSIS — J69.0 ASPIRATION PNEUMONIA OF LEFT LOWER LOBE, UNSPECIFIED ASPIRATION PNEUMONIA TYPE (HCC): ICD-10-CM

## 2017-01-18 DIAGNOSIS — M25.552 LEFT HIP PAIN: ICD-10-CM

## 2017-01-18 DIAGNOSIS — R13.14 PHARYNGOESOPHAGEAL DYSPHAGIA: ICD-10-CM

## 2017-01-18 DIAGNOSIS — M70.72 BURSITIS OF LEFT HIP: ICD-10-CM

## 2017-01-18 DIAGNOSIS — Z95.1 S/P CABG X 3: ICD-10-CM

## 2017-01-18 DIAGNOSIS — E83.52 HYPERCALCEMIA: ICD-10-CM

## 2017-01-18 DIAGNOSIS — N17.9 AKI (ACUTE KIDNEY INJURY) (HCC): ICD-10-CM

## 2017-01-18 DIAGNOSIS — I95.2 HYPOTENSION DUE TO DRUGS: ICD-10-CM

## 2017-01-18 DIAGNOSIS — I77.810 ASCENDING AORTA DILATATION (HCC): ICD-10-CM

## 2017-01-18 DIAGNOSIS — N17.9 ACUTE RENAL FAILURE, UNSPECIFIED ACUTE RENAL FAILURE TYPE (HCC): Primary | ICD-10-CM

## 2017-01-18 PROBLEM — I95.9 HYPOTENSION: Status: ACTIVE | Noted: 2017-01-18

## 2017-01-18 LAB
ALBUMIN SERPL-MCNC: 4.4 G/DL (ref 3.5–5.2)
ALBUMIN/GLOB SERPL: 1.2 G/DL
ALP SERPL-CCNC: 74 U/L (ref 39–117)
ALT SERPL W P-5'-P-CCNC: 15 U/L (ref 1–41)
ANION GAP SERPL CALCULATED.3IONS-SCNC: 17.3 MMOL/L
AST SERPL-CCNC: 16 U/L (ref 1–40)
BASOPHILS # BLD AUTO: 0.01 10*3/MM3 (ref 0–0.2)
BASOPHILS NFR BLD AUTO: 0.1 % (ref 0–1.5)
BILIRUB SERPL-MCNC: 1.1 MG/DL (ref 0.1–1.2)
BILIRUB UR QL STRIP: NEGATIVE
BUN BLD-MCNC: 59 MG/DL (ref 8–23)
BUN/CREAT SERPL: 16.6 (ref 7–25)
CALCIUM SPEC-SCNC: 10.9 MG/DL (ref 8.6–10.5)
CHLORIDE SERPL-SCNC: 96 MMOL/L (ref 98–107)
CK SERPL-CCNC: 37 U/L (ref 20–200)
CK SERPL-CCNC: 60 U/L (ref 20–200)
CLARITY UR: ABNORMAL
CO2 SERPL-SCNC: 23.7 MMOL/L (ref 22–29)
COLOR UR: YELLOW
CREAT BLD-MCNC: 3.56 MG/DL (ref 0.76–1.27)
D-LACTATE SERPL-SCNC: 2.1 MMOL/L (ref 0.5–2)
D-LACTATE SERPL-SCNC: 2.5 MMOL/L (ref 0.5–2)
DEPRECATED RDW RBC AUTO: 46.4 FL (ref 37–54)
EOSINOPHIL # BLD AUTO: 0.04 10*3/MM3 (ref 0–0.7)
EOSINOPHIL NFR BLD AUTO: 0.3 % (ref 0.3–6.2)
ERYTHROCYTE [DISTWIDTH] IN BLOOD BY AUTOMATED COUNT: 13 % (ref 11.5–14.5)
GFR SERPL CREATININE-BSD FRML MDRD: 17 ML/MIN/1.73
GLOBULIN UR ELPH-MCNC: 3.7 GM/DL
GLUCOSE BLD-MCNC: 170 MG/DL (ref 65–99)
GLUCOSE UR STRIP-MCNC: NEGATIVE MG/DL
HCT VFR BLD AUTO: 45.5 % (ref 40.4–52.2)
HGB BLD-MCNC: 15.2 G/DL (ref 13.7–17.6)
HGB UR QL STRIP.AUTO: NEGATIVE
IMM GRANULOCYTES # BLD: 0.03 10*3/MM3 (ref 0–0.03)
IMM GRANULOCYTES NFR BLD: 0.2 % (ref 0–0.5)
INR PPP: 1.11 (ref 0.9–1.1)
KETONES UR QL STRIP: NEGATIVE
LEUKOCYTE ESTERASE UR QL STRIP.AUTO: NEGATIVE
LYMPHOCYTES # BLD AUTO: 1.66 10*3/MM3 (ref 0.9–4.8)
LYMPHOCYTES NFR BLD AUTO: 11.8 % (ref 19.6–45.3)
MAGNESIUM SERPL-MCNC: 1.9 MG/DL (ref 1.6–2.4)
MCH RBC QN AUTO: 32.7 PG (ref 27–32.7)
MCHC RBC AUTO-ENTMCNC: 33.4 G/DL (ref 32.6–36.4)
MCV RBC AUTO: 97.8 FL (ref 79.8–96.2)
MONOCYTES # BLD AUTO: 1.12 10*3/MM3 (ref 0.2–1.2)
MONOCYTES NFR BLD AUTO: 7.9 % (ref 5–12)
NEUTROPHILS # BLD AUTO: 11.25 10*3/MM3 (ref 1.9–8.1)
NEUTROPHILS NFR BLD AUTO: 79.7 % (ref 42.7–76)
NITRITE UR QL STRIP: NEGATIVE
PH UR STRIP.AUTO: <=5 [PH] (ref 5–8)
PLATELET # BLD AUTO: 263 10*3/MM3 (ref 140–500)
PMV BLD AUTO: 11 FL (ref 6–12)
POTASSIUM BLD-SCNC: 3.7 MMOL/L (ref 3.5–5.2)
PROT SERPL-MCNC: 8.1 G/DL (ref 6–8.5)
PROT UR QL STRIP: NEGATIVE
PROTHROMBIN TIME: 13.8 SECONDS (ref 11.7–14.2)
RBC # BLD AUTO: 4.65 10*6/MM3 (ref 4.6–6)
SODIUM BLD-SCNC: 137 MMOL/L (ref 136–145)
SP GR UR STRIP: 1.02 (ref 1–1.03)
TROPONIN T SERPL-MCNC: <0.01 NG/ML (ref 0–0.03)
UROBILINOGEN UR QL STRIP: ABNORMAL
WBC NRBC COR # BLD: 14.11 10*3/MM3 (ref 4.5–10.7)

## 2017-01-18 PROCEDURE — 93005 ELECTROCARDIOGRAM TRACING: CPT | Performed by: EMERGENCY MEDICINE

## 2017-01-18 PROCEDURE — 82570 ASSAY OF URINE CREATININE: CPT | Performed by: INTERNAL MEDICINE

## 2017-01-18 PROCEDURE — 84300 ASSAY OF URINE SODIUM: CPT | Performed by: INTERNAL MEDICINE

## 2017-01-18 PROCEDURE — 93010 ELECTROCARDIOGRAM REPORT: CPT | Performed by: INTERNAL MEDICINE

## 2017-01-18 PROCEDURE — 82550 ASSAY OF CK (CPK): CPT | Performed by: EMERGENCY MEDICINE

## 2017-01-18 PROCEDURE — 81003 URINALYSIS AUTO W/O SCOPE: CPT | Performed by: EMERGENCY MEDICINE

## 2017-01-18 PROCEDURE — 87205 SMEAR GRAM STAIN: CPT | Performed by: INTERNAL MEDICINE

## 2017-01-18 PROCEDURE — 84156 ASSAY OF PROTEIN URINE: CPT | Performed by: INTERNAL MEDICINE

## 2017-01-18 PROCEDURE — 80053 COMPREHEN METABOLIC PANEL: CPT | Performed by: EMERGENCY MEDICINE

## 2017-01-18 PROCEDURE — 99285 EMERGENCY DEPT VISIT HI MDM: CPT

## 2017-01-18 PROCEDURE — 85610 PROTHROMBIN TIME: CPT | Performed by: EMERGENCY MEDICINE

## 2017-01-18 PROCEDURE — 94640 AIRWAY INHALATION TREATMENT: CPT

## 2017-01-18 PROCEDURE — 93306 TTE W/DOPPLER COMPLETE: CPT

## 2017-01-18 PROCEDURE — 83735 ASSAY OF MAGNESIUM: CPT | Performed by: INTERNAL MEDICINE

## 2017-01-18 PROCEDURE — 82436 ASSAY OF URINE CHLORIDE: CPT | Performed by: INTERNAL MEDICINE

## 2017-01-18 PROCEDURE — 25010000002 PIPERACILLIN SOD-TAZOBACTAM PER 1 G: Performed by: EMERGENCY MEDICINE

## 2017-01-18 PROCEDURE — 83605 ASSAY OF LACTIC ACID: CPT | Performed by: EMERGENCY MEDICINE

## 2017-01-18 PROCEDURE — 70450 CT HEAD/BRAIN W/O DYE: CPT

## 2017-01-18 PROCEDURE — 25010000002 ENOXAPARIN PER 10 MG: Performed by: INTERNAL MEDICINE

## 2017-01-18 PROCEDURE — 84484 ASSAY OF TROPONIN QUANT: CPT | Performed by: EMERGENCY MEDICINE

## 2017-01-18 PROCEDURE — 36415 COLL VENOUS BLD VENIPUNCTURE: CPT | Performed by: EMERGENCY MEDICINE

## 2017-01-18 PROCEDURE — 94799 UNLISTED PULMONARY SVC/PX: CPT

## 2017-01-18 PROCEDURE — 87040 BLOOD CULTURE FOR BACTERIA: CPT | Performed by: EMERGENCY MEDICINE

## 2017-01-18 PROCEDURE — 36415 COLL VENOUS BLD VENIPUNCTURE: CPT

## 2017-01-18 PROCEDURE — 82550 ASSAY OF CK (CPK): CPT | Performed by: INTERNAL MEDICINE

## 2017-01-18 PROCEDURE — 71020 HC CHEST PA AND LATERAL: CPT

## 2017-01-18 PROCEDURE — 93306 TTE W/DOPPLER COMPLETE: CPT | Performed by: INTERNAL MEDICINE

## 2017-01-18 PROCEDURE — 85025 COMPLETE CBC W/AUTO DIFF WBC: CPT | Performed by: EMERGENCY MEDICINE

## 2017-01-18 PROCEDURE — 76775 US EXAM ABDO BACK WALL LIM: CPT

## 2017-01-18 RX ORDER — HYDROCODONE BITARTRATE AND ACETAMINOPHEN 7.5; 325 MG/1; MG/1
1 TABLET ORAL EVERY 6 HOURS PRN
Status: DISCONTINUED | OUTPATIENT
Start: 2017-01-18 | End: 2017-01-21 | Stop reason: HOSPADM

## 2017-01-18 RX ORDER — SODIUM CHLORIDE 0.9 % (FLUSH) 0.9 %
1-10 SYRINGE (ML) INJECTION AS NEEDED
Status: DISCONTINUED | OUTPATIENT
Start: 2017-01-18 | End: 2017-01-21 | Stop reason: HOSPADM

## 2017-01-18 RX ORDER — SODIUM CHLORIDE 9 MG/ML
125 INJECTION, SOLUTION INTRAVENOUS CONTINUOUS
Status: DISCONTINUED | OUTPATIENT
Start: 2017-01-18 | End: 2017-01-18

## 2017-01-18 RX ORDER — SODIUM CHLORIDE 0.9 % (FLUSH) 0.9 %
10 SYRINGE (ML) INJECTION AS NEEDED
Status: DISCONTINUED | OUTPATIENT
Start: 2017-01-18 | End: 2017-01-21 | Stop reason: HOSPADM

## 2017-01-18 RX ORDER — ERYTHROMYCIN 5 MG/G
OINTMENT OPHTHALMIC 2 TIMES DAILY
Status: DISCONTINUED | OUTPATIENT
Start: 2017-01-18 | End: 2017-01-21 | Stop reason: HOSPADM

## 2017-01-18 RX ORDER — ERGOCALCIFEROL 1.25 MG/1
50000 CAPSULE ORAL 2 TIMES WEEKLY
Status: DISCONTINUED | OUTPATIENT
Start: 2017-01-19 | End: 2017-01-21 | Stop reason: HOSPADM

## 2017-01-18 RX ORDER — CHLORHEXIDINE GLUCONATE 0.12 MG/ML
15 RINSE ORAL 3 TIMES DAILY
Status: DISCONTINUED | OUTPATIENT
Start: 2017-01-18 | End: 2017-01-21 | Stop reason: HOSPADM

## 2017-01-18 RX ORDER — ACETAMINOPHEN 325 MG/1
650 TABLET ORAL EVERY 4 HOURS PRN
Status: DISCONTINUED | OUTPATIENT
Start: 2017-01-18 | End: 2017-01-21 | Stop reason: HOSPADM

## 2017-01-18 RX ORDER — SODIUM CHLORIDE 9 MG/ML
100 INJECTION, SOLUTION INTRAVENOUS CONTINUOUS
Status: DISCONTINUED | OUTPATIENT
Start: 2017-01-18 | End: 2017-01-20

## 2017-01-18 RX ORDER — BUDESONIDE AND FORMOTEROL FUMARATE DIHYDRATE 160; 4.5 UG/1; UG/1
2 AEROSOL RESPIRATORY (INHALATION)
Status: DISCONTINUED | OUTPATIENT
Start: 2017-01-18 | End: 2017-01-21 | Stop reason: HOSPADM

## 2017-01-18 RX ORDER — ALBUTEROL SULFATE 2.5 MG/3ML
2.5 SOLUTION RESPIRATORY (INHALATION) EVERY 4 HOURS PRN
Status: DISCONTINUED | OUTPATIENT
Start: 2017-01-18 | End: 2017-01-21 | Stop reason: HOSPADM

## 2017-01-18 RX ORDER — ALLOPURINOL 100 MG/1
100 TABLET ORAL DAILY
Status: DISCONTINUED | OUTPATIENT
Start: 2017-01-18 | End: 2017-01-20

## 2017-01-18 RX ADMIN — BUDESONIDE AND FORMOTEROL FUMARATE DIHYDRATE 2 PUFF: 160; 4.5 AEROSOL RESPIRATORY (INHALATION) at 21:12

## 2017-01-18 RX ADMIN — ENOXAPARIN SODIUM 30 MG: 60 INJECTION SUBCUTANEOUS at 20:57

## 2017-01-18 RX ADMIN — ALLOPURINOL 100 MG: 100 TABLET ORAL at 20:58

## 2017-01-18 RX ADMIN — ERYTHROMYCIN: 5 OINTMENT OPHTHALMIC at 20:58

## 2017-01-18 RX ADMIN — SODIUM CHLORIDE 1000 ML: 9 INJECTION, SOLUTION INTRAVENOUS at 11:49

## 2017-01-18 RX ADMIN — SODIUM CHLORIDE 100 ML/HR: 9 INJECTION, SOLUTION INTRAVENOUS at 16:45

## 2017-01-18 RX ADMIN — TAZOBACTAM SODIUM AND PIPERACILLIN SODIUM 4.5 G: 500; 4 INJECTION, SOLUTION INTRAVENOUS at 13:44

## 2017-01-18 RX ADMIN — SODIUM CHLORIDE 2655 ML: 9 INJECTION, SOLUTION INTRAVENOUS at 13:22

## 2017-01-18 RX ADMIN — CHLORHEXIDINE GLUCONATE 15 ML: 1.2 RINSE ORAL at 22:15

## 2017-01-18 NOTE — H&P
CHIEF COMPLAINT:  Weakness and malaise.     HISTORY OF PRESENT ILLNESS:  This is a 76-year-old man whom we saw last year. On 01/09/2017, he sustained facial fractures. He had reconstructive surgery with Dr. Lester 01/13/2017. He has not been doing well since then. He has been in bed and has been extremely weak. He has not been able to eat. He has had very low energy. No fever or chills. Minimal cough. He is not thirsty. No polyuria or polydipsia. No sore throat. No earache. He does not have a headache right now, but his family says he has complained of one. No abdominal pain. He did receive a dose of Milk of Magnesia on 01/14/2017, and had 1 or 2 loose bowel movements every day after that. Blood sugars have been in the 140s. His wife checks them about once a day. No dizziness or lightheadedness. Regarding the fall 01/09/2017, he was seen in the emergency room. He had multiple facial fractures. He does not remember the fall itself. He remembers walking on incline outside and then the  told him the ambulance was on way. There was someone who witnessed the fall, a neighbor. She apparently remarked that it looked like the patient had fainted. No other associated symptoms, or exacerbating or alleviating factors.     PAST MEDICAL HISTORY:  1.  Multiple facial fractures: A right maxillary fracture, right orbital floor fracture, right nasal fracture 01/09/2017.  2.  DVT right leg 10/2016, following foot surgery.  3.  PE 10/2016.  4.  Diabetes mellitus type 2.  5.  Coronary artery disease.  6.  Asthma.  7.   Hypertension.   8.  DJD.   9.  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   10.  Patient was on Eliquis for the PE. This was discontinued with his fall on 01/09/2017.     MEDICATIONS:  1.  Allopurinol 300 mg daily.   2.  Peridex oral solution t.i.d.   3.  Erythromycin eye ointment to the right eye b.i.d.   4.  Advair 250/50; take 1 puff daily.   5.  Glucotrol 5 mg daily.    6.  Norco 7.5 q.6 h. p.r.n.   7.  Prinivil 10 mg b.i.d.   8.  Claritin 10 mg p.r.n.   9.  Metformin 500 mg b.i.d.   10.  Multivitamin daily.   11.  ProAir inhaler 2 puffs q.4 h. p.r.n.   12.  Zocor 40 mg at bedtime.   13.  Vitamin D 50,000 units 2 times a week.     ALLERGIES:  No reported allergies.     SOCIAL HISTORY:  Patient is  and retired. He quit smoking in the 1960s. He has alcohol, less than 1 drink per month. He lives at home with his wife.     FAMILY HISTORY:  Is positive for heart disease and hypertension. No diabetes.     REVIEW OF SYSTEMS:  As noted above. Weight loss of about 15 or 16 pounds, although time frame is not certain. Daughter has noticed some difficulty swallowing, although the wife said she had not noticed that. Poor appetite. Accu-Cheks in the 140s during the day. No polydipsia. No polyuria. No dysuria. No chest pain. No palpitations. No shortness of breath. No wheezing. No PND. No orthopnea. No ankle swelling. No paresthesias. Minimal arthritis aches and pains. He does have some bruising. No constipation. No heartburn. No nausea or vomiting. No fever or chills. One to 2 loose bowel movements since the Milk of Magnesia several days ago. Patient has had poor balance for months. He is currently having hiccups. He is having minimal pain related to his fractures. He has used a little bit of the pain medicine but not much. The rest of the 10-point review of systems is negative.     PHYSICAL EXAMINATION:  GENERAL: No acute distress. He looks about his stated age. He is pleasant, alert, cooperative, and appropriate. Neatly groomed, well-developed. No acute distress.   VITAL SIGNS: Temperature 97.8, pulse 78, respirations 18, blood pressure 147/53. Weight is 195 pounds. In Dr. Onofre's office this morning his blood pressure was 80/50, with a heart rate of 115. Initially in the ER, he was 103/38.   HEENT: He has evidence of the recent facial trauma, with some bruising noted on the right  side of the face. He has a bandage over the nose. He has extensive ecchymoses at the neck, anteriorly. Pupils are equal, round, reactive to light and accommodation. No nystagmus. Lids/conjunctivae without icterus. No ptosis. Oropharynx clear; no thrush; no masses or lesions; dry mucosa. He is missing a few teeth. External ears and nose normal on inspection, and hearing is intact.   NECK: Supple, without lymphadenopathy, thyromegaly, JVD, or bruit. Bruising as noted.   HEART: Is currently regular. No murmur, rub, or gallop. Normal S1, S2.   LUNGS: Breath sounds are decreased. He has a few crackles at the bases. No usage of accessory muscles of respiration.   ABDOMEN: Is soft, nontender. Positive bowel sounds. No hepatosplenomegaly, rebound, guarding, or mass.   EXTREMITIES: No clubbing, cyanosis, or edema. Small area of ecchymoses at the dorsal aspect of the right foot and just over the right knee. No effusions over the upper or lower extremity joints.   SKIN: Changes as noted above. He also has some faint ecchymoses at the left flank.   NEUROLOGIC: Cranial nerves intact. Sensation decreased with the plastic filament distally.     DIAGNOSTIC DATA:  Urinalysis with specific gravity 1.023; cloudy. Lactic acid is 2.5, glucose 170, BUN 59, creatinine 3.6, sodium 137, potassium 3.7, chloride 96, CO2 is 24, calcium high at 10.9. LFTs are normal. CK 60. Troponin T less than 0.01. INR is 1.1. White count 14.1, hemoglobin 15.2, platelets 263,000; 80 segs, 12 lymphs, 8 monos. On 01/12/2017, BUN 20, creatinine 1.2. White count was 9.5. Chest x-ray shows no infiltrate or effusion. Low lung volumes. I have reviewed the plain film. CT of the head shows no acute intracranial process. Recent facial fractures and surgical repair noted. Please see the report for complete details. EKG on my review: Sinus, with a rate of 88. Borderline QT prolongation.     IMPRESSION AND PLAN:  1.  Hypotension and malaise. He does not really have any  symptoms of pneumonia or other infection. He has acute kidney injury with markedly elevated creatinine. IV fluids as ordered. Discontinue metformin and lisinopril. He did have a dose or two of Milk of Magnesia at home. We will not use any more of that. Decrease allopurinol dosage. He received antibiotics in the ER. I will not continue those at this time. Recheck procalcitonin and lactic acid in the a.m. I have ordered an ultrasound of the kidneys. We will check postvoid residual. We will ask Dr. Beal to see him again.    2.  Dysphagia noted per patient's daughter. We will ask speech therapist to evaluate and proceed as indicated.   3.  Facial fractures, status post surgery.   4.  Diabetes mellitus type 2 with neuropathy. We will hold his oral agents for now. Sliding scale as needed.   5.  Syncope, resulting in facial fractures. He has had issues with balance. Echocardiogram has been ordered. We will also check for orthostatics when he is a little bit more mobile. ACE inhibitor has been stopped.   6.  Hypertension as above; will follow.   7.  Coronary artery disease, quiescent.   8.  Asthma, quiescent. Continue with inhaler/mini nebs.     I have discussed above with the patient, his wife, and daughter at bedside. I have discussed with the nurse. I discussed earlier with Dr. Onofre.      Jami Pantoja M.D.  JH:ms  D:   01/18/2017 16:30:50  T:   01/18/2017 17:20:43  Job ID:   [id number]  Document ID:   [id number]  cc:

## 2017-01-18 NOTE — IP AVS SNAPSHOT
AFTER VISIT SUMMARY             Eugenio Joe           About your hospitalization     You were admitted on:  January 18, 2017 You last received care in the:  [address]       Procedures & Surgeries         Medications    If you or your caregiver advised us that you are currently taking a medication and that medication is marked below as “Resume”, this simply indicates that we have reviewed those medications to make sure our new therapy recommendations do not interfere.  If you have concerns about medications other than those new ones which we are prescribing today, please consult the physician who prescribed them (or your primary physician).  Our review of your home medications is not meant to indicate that we are directing their use.             Your Medications      START taking these medications     acetaminophen 325 MG tablet   Take 2 tablets by mouth Every 6 (Six) Hours As Needed for mild pain (1-3).   Commonly known as:  TYLENOL             CHANGE how you take these medications     NORCO 5-325 MG per tablet   Take 1 tablet by mouth Every 6 (Six) Hours As Needed.   Generic drug:  HYDROcodone-acetaminophen   What changed:  Another medication with the same name was removed. Continue taking this medication, and follow the directions you see here.             CONTINUE taking these medications     allopurinol 300 MG tablet   Take 300 mg by mouth daily.   Last time this was given:  1/21/2017  9:12 AM   Commonly known as:  ZYLOPRIM           chlorhexidine 0.12 % solution   Apply 15 mL to the mouth or throat 3 (Three) Times a Day.   Last time this was given:  1/21/2017  9:12 AM   Commonly known as:  PERIDEX           erythromycin 5 MG/GM ophthalmic ointment   Place a 1/2 inch ribbon of ointment on the sutures of right eyelid two times daily   Last time this was given:  1/21/2017  9:13 AM   Commonly known as:  ROMYCIN           fluticasone-salmeterol 250-50 MCG/DOSE DISKUS   Inhale 1 puff  Every Evening. Advair Diskus 250-50 MCG/DOSE Inhalation Aerosol Powder Breath Activated; Patient Sig: Advair Diskus 250-50 MCG/DOSE Inhalation Aerosol Powder Breath Activated INHALE 1 PUFF BID; 60; 0; 15-Oct-2012; Active   Commonly known as:  ADVAIR           MULTIVITAMIN ADULT PO   Take  by mouth Daily.           PROAIR  (90 BASE) MCG/ACT inhaler   2 puffs Every 4 (Four) Hours As Needed.   Generic drug:  albuterol           simvastatin 40 MG tablet   Take 40 mg by mouth every night.   Commonly known as:  ZOCOR           vitamin D 23005 UNITS capsule capsule   Take 1 capsule by mouth Take As Directed. TAKES ON MONDAYS AND THURSDAYS   Last time this was given:  1/19/2017  8:59 AM   Commonly known as:  ERGOCALCIFEROL             STOP taking these medications     glipiZIDE 5 MG tablet   Commonly known as:  GLUCOTROL           lisinopril 10 MG tablet   Commonly known as:  PRINIVIL,ZESTRIL           loratadine 10 MG tablet   Commonly known as:  CLARITIN           metFORMIN 500 MG tablet   Commonly known as:  GLUCOPHAGE                Where to Get Your Medications      Information about where to get these medications is not yet available     ! Ask your nurse or doctor about these medications     acetaminophen 325 MG tablet                  Your Medications      Your Medication List           Morning Noon Evening Bedtime As Needed    acetaminophen 325 MG tablet   Take 2 tablets by mouth Every 6 (Six) Hours As Needed for mild pain (1-3).   Commonly known as:  TYLENOL                            As needed for pain every 6 hours        allopurinol 300 MG tablet   Take 300 mg by mouth daily.   Commonly known as:  ZYLOPRIM                                   chlorhexidine 0.12 % solution   Apply 15 mL to the mouth or throat 3 (Three) Times a Day.   Commonly known as:  PERIDEX                                         erythromycin 5 MG/GM ophthalmic ointment   Place a 1/2 inch ribbon of ointment on the sutures of right eyelid  two times daily   Commonly known as:  ROMYCIN                                   fluticasone-salmeterol 250-50 MCG/DOSE DISKUS   Inhale 1 puff Every Evening. Advair Diskus 250-50 MCG/DOSE Inhalation Aerosol Powder Breath Activated; Patient Sig: Advair Diskus 250-50 MCG/DOSE Inhalation Aerosol Powder Breath Activated INHALE 1 PUFF BID; 60; 0; 15-Oct-2012; Active   Commonly known as:  ADVAIR                                      MULTIVITAMIN ADULT PO   Take  by mouth Daily.                                   NORCO 5-325 MG per tablet   Take 1 tablet by mouth Every 6 (Six) Hours As Needed.   Generic drug:  HYDROcodone-acetaminophen                            As needed for pain every 6 hours       PROAIR  (90 BASE) MCG/ACT inhaler   2 puffs Every 4 (Four) Hours As Needed.   Generic drug:  albuterol                            As needed every 4 hours        simvastatin 40 MG tablet   Take 40 mg by mouth every night.   Commonly known as:  ZOCOR                                   vitamin D 00603 UNITS capsule capsule   Take 1 capsule by mouth Take As Directed. TAKES ON MONDAYS AND THURSDAYS   Commonly known as:  ERGOCALCIFEROL            Twice a week - on Monday and Thursday                                 Instructions for After Discharge        Activity Instructions     Activity as Tolerated                 Diet Instructions     Diet: Consistent Carbohydrate; Honey Thick Liquids       Discharge Diet:  Consistent Carbohydrate   Fluid Consistency:  Honey Thick Liquids             Discharge References/Attachments     HYPOTENSION, EASY-TO-READ (ENGLISH)    DEHYDRATION, ADULT, EASY-TO-READ (ENGLISH)    HYPERCALCEMIA (ENGLISH)    KIDNEY FAILURE, EASY-TO-READ (ENGLISH)    THICKENING LIQUIDS FOR DYSPHAGIA DIET (ENGLISH)       Follow-ups for After Discharge        Referrals and Follow-ups to Schedule     Ambulatory Referral to Physical Therapy Evaluate and treat    As directed    Specialty modality needed?:  Evaluate and treat            Additional information on Labs and Follow-ups:      Given his current renal failure, ACE inhibitor may not be the best blood pressure medication choice  Recommend to hold this at discharge  Follow up with Dr. Beal  in 2-4 weeks -with recheck labs and blood pressure check  HOLD metformin and simvastatin , okay to resume Allopurinol 300mg as same as home dose    Dr. Beal (Kidney MD)   [facility], [facility]   [address] [place] [address]   P: [phone]   F: [phone]      Dr. Walker (GI MD)   Make follow up appt. In 1-2 weeks   [address]  [address]  [place] [address]   P: [phone]   F: [phone]                Scheduled Appointments     Feb 16, 2017  1:20 PM EST   Follow Up with Man Jenkins MD   [facility] (--)    [address]  [facility] [address]   [phone]           Arrive 15 minutes prior to appointment.            Apr 10, 2017  9:20 AM EDT   Follow Up with Jean Ford MD   [facility] (--)    [address]  [facility] [id number] [phone]           Arrive 15 minutes prior to appointment.              [employer] Signup     Our records indicate that you have an active [employer] account.    You can view your After Visit Summary by going to [employer] and logging in with your [employer] username and password.  If you don't have a [employer] username and password but a parent or guardian has access to your record, the parent or guardian should login with their own [employer] username and password and access your record to view the After Visit Summary.    If you have questions, you can email [email or web address] or call [phone] to talk to our [employer] staff.  Remember, [employer] is NOT to be used for urgent needs.  For medical emergencies, dial 911.           Summary of Your Hospitalization         Reason for Hospitalization     Your primary diagnosis was:  Not on File    Your diagnoses also included:  Acute Kidney Failure, Low Blood Pressure, Dehydration, Serum Calcium Elevated, Difficulty Swallowing      Care Providers     Provider Service Role Specialty    Jami Pantoja MD Internal Medicine Attending Provider Internal Medicine    Cristopher Beal MD Nephrology Consulting Physician  Nephrology    Rigoberto Walker MD -- Consulting Physician  Gastroenterology      Your Allergies  Date Reviewed: 1/20/2017    Allergen Reactions    Other Mental Status Change    Oxy drugs         Oxycodone Mental Status Change      Pending Labs     Order Current Status    Blood Culture Preliminary result    Blood Culture Preliminary result      Patient Belongings Returned     Document Return of Belongings Flowsheet     Were the patient bedside belongings sent home?   Yes   Belongings Retrieved from Security & Sent Home   Yes    Belongings Sent to Safe   --   Medications Retrieved from Pharmacy & Sent Home   No (Comment)              More Information      Hypotension  As your heart beats, it forces blood through your body. This force is called blood pressure. If you have hypotension, you have low blood pressure. When your blood pressure is too low, you may not get enough blood to your brain. You may feel weak, feel lightheaded, have a fast heartbeat, or even pass out (faint).  HOME CARE  · Drink enough fluids to keep your pee (urine) clear or pale yellow.  · Take all medicines as told by your doctor.  · Get up slowly after sitting or lying down.  · Wear support stockings as told by your doctor.  · Maintain a healthy diet by including foods such as fruits, vegetables, nuts, whole grains, and lean meats.  GET HELP IF:  · You are throwing up (vomiting) or have watery poop (diarrhea).  · You have a fever for more than 2-3 days.  · You feel more thirsty than usual.  · You feel weak and tired.  GET HELP RIGHT AWAY IF:    · You pass out (faint).  · You have chest pain or a fast or irregular heartbeat.  · You lose feeling in part of your body.  · You cannot move your arms or legs.  · You have trouble speaking.  · You get sweaty or feel lightheaded.  MAKE SURE YOU:   · Understand these instructions.  · Will watch your condition.  · Will get help right away if you are not doing well or get worse.     This information is not intended to replace advice given to you by your health care provider. Make sure you discuss any questions you have with your health care provider.     Document Released: 03/14/2011 Document Revised: 08/20/2014 Document Reviewed: 06/20/2014  [employer] Interactive Patient Education ©2016 Elsevier Inc.          Dehydration, Adult  Dehydration means your body does not have as much fluid or water as it needs. It happens when you take in less fluid than you lose. Your kidneys, brain, and heart will not work properly without the right amount of fluids.   Dehydration can range from mild to severe. It should be treated right away to help prevent it from becoming severe.  HOME CARE  · Drink enough fluid to keep your pee (urine) clear or pale yellow.  · Drink water or fluid slowly by taking small sips. You can also try sucking on ice cubes.  · Have food or drinks that contain electrolytes. Examples include bananas and sports drinks.  · Take over-the-counter and prescription medicines only as told by your doctor.  · Prepare oral rehydration solution (ORS) according to the instructions that came with it. Take sips of ORS every 5 minutes until your pee returns to normal.  · If you are throwing up (vomiting) or have watery poop (diarrhea), keep trying to drink water, ORS, or both.  · If you have watery poop, avoid:  ¨ Drinks with caffeine.  ¨ Fruit juice.  ¨ Milk.  ¨ Carbonated soft drinks.  · Do not take salt tablets. This can lead to having too much sodium in your body (hypernatremia).  GET HELP IF:  · You cannot eat or drink  without throwing up.  · You have had mild watery poop for longer than 24 hours.  · You have a fever.  GET HELP RIGHT AWAY IF:   · You have very strong thirst.  · You have very bad watery poop.  · You have not peed in 6-8 hours, or you have peed only a small amount of very dark pee.  · You have shriveled skin.  · You are dizzy, confused, or both.     This information is not intended to replace advice given to you by your health care provider. Make sure you discuss any questions you have with your health care provider.     Document Released: 10/14/2010 Document Revised: 09/07/2016 Document Reviewed: 05/04/2016  [employer] Interactive Patient Education ©2016 [employer]          Hypercalcemia  Hypercalcemia is having too much calcium in the blood. The body needs calcium to make bones and keep them strong. Calcium also helps the muscles, nerves, brain, and heart work the way they should.  Most of the calcium in the body is in the bones. There is also some calcium in the blood. Hypercalcemia can happen when calcium comes out of the bones, or when the kidneys are not able to remove calcium from the blood. Hypercalcemia can be mild or severe.  CAUSES  There are many possible causes of hypercalcemia. Common causes include:  · Hyperparathyroidism. This is a condition in which the body produces too much parathyroid hormone. There are four parathyroid glands in your neck. These glands produce a chemical messenger (hormone) that helps the body absorb calcium from foods and helps your bones release calcium.  · Certain kinds of cancer, such as lung cancer, breast cancer, or myeloma.  Less common causes of hypercalcemia include:   · Getting too much calcium or vitamin D from your diet.  · Kidney failure.  · Hyperthyroidism.  · Being on bed rest for a long time.  · Certain medicines.  · Infections.  · Sarcoidosis.  RISK FACTORS  This condition is more likely to develop in:  · Women.  · People who are 60 years or older.  · People  who have a family history of hypercalcemia.  SYMPTOMS   Mild hypercalcemia that starts slowly may not cause symptoms. Severe, sudden hypercalcemia is more likely to cause symptoms, such as:  · Loss of appetite.  · Increased thirst and frequent urination.  · Fatigue.  · Nausea and vomiting.  · Headache.  · Abdominal pain.  · Muscle pain, twitching, or weakness.  · Constipation.  · Blood in the urine.  · Pain in the side of the back (flank pain).  · Anxiety, confusion, or depression.  · Irregular heartbeat (arrhythmia).  · Loss of consciousness.  DIAGNOSIS   This condition may be diagnosed based on:   · Your symptoms.  · Blood tests.  · Urine tests.  · X-rays.  · Ultrasound.  · MRI.  · CT scan.  TREATMENT   Treatment for hypercalcemia depends on the cause. Treatment may include:  · Receiving fluids through an IV tube.  · Medicines that keep calcium levels steady after receiving fluids (loop diuretics).  · Medicines that keep calcium in your bones (bisphosphonates).  · Medicines that lower the calcium level in your blood.  · Surgery to remove overactive parathyroid glands.  HOME CARE INSTRUCTIONS  · Take over-the-counter and prescription medicines only as told by your health care provider.  · Follow instructions from your health care provider about eating or drinking restrictions.  · Drink enough fluid to keep your urine clear or pale yellow.  · Stay active. Weight-bearing exercise helps to keep calcium in your bones. Follow instructions from your health care provider about what type and level of exercise is safe for you.  · Keep all follow-up visits as told by your health care provider. This is important.  SEEK MEDICAL CARE IF:  · You have a fever.  · You have flank or abdominal pain that is getting worse.  SEEK IMMEDIATE MEDICAL CARE IF:   · You have severe abdominal or flank pain.  · You have chest pain.  · You have trouble breathing.  · You become very confused and sleepy.  · You lose consciousness.     This  information is not intended to replace advice given to you by your health care provider. Make sure you discuss any questions you have with your health care provider.     Document Released: 03/03/2006 Document Revised: 09/07/2016 Document Reviewed: 05/04/2016  [employer] Interactive Patient Education ©2016 [employer] Inc.          Chronic Kidney Disease  Chronic kidney disease happens when the kidneys are damaged over a long period. The kidneys are two organs that do many important jobs in the body. These jobs include:  · Removing wastes and extra fluids from the blood.  · Making hormones that help to keep the body healthy.  · Making sure that the body has the right amount of fluids and chemicals.  Chronic kidney disease may be caused by many things. The kidney damage occurs slowly. If too much damage occurs, the kidneys may stop working the way that they should. This is dangerous. Treatment can help to slow down the damage and keep it from getting worse.  HOME CARE  · Follow your diet as told by your doctor. You may need to limit the amount of salt (sodium) and protein that you eat each day.  · Take medicines only as told by your doctor. Do not take any new medicines unless your doctor approves it.  · Quit smoking if you smoke. Talk to your doctor about programs that may help you quit smoking.  · Have your blood pressure checked regularly and keep track of the results.  · Start or keep doing an exercise plan.  · Get shots (immunizations) as told by your doctor.  · Take vitamins and minerals as told by your doctor.  · Keep all follow-up visits as told by your doctor. This is important.  GET HELP RIGHT AWAY IF:   · Your symptoms get worse.  · You have new symptoms.  · You have symptoms of end-stage kidney disease. These include:    Headaches.    Skin that is darker or lighter than normal.    Numbness in the hands or feet.    Easy bruising.    Frequent hiccups.    Stopping of menstrual periods in women.  · You have a  fever.  · You are making very little pee (urine).  · You have pain or bleeding when you pee.     This information is not intended to replace advice given to you by your health care provider. Make sure you discuss any questions you have with your health care provider.     Document Released: 03/14/2011 Document Revised: 09/07/2016 Document Reviewed: 08/16/2013  [employer] Interactive Patient Education ©2016 Elsevier Inc.          Thickening Liquids for Dysphagia Diet  If you are on the dysphagia diet, you may need to thicken drinks, soups, foods that melt at room temperature, and other liquids before you drink or eat them. Thickening liquids makes them easier to swallow. It also reduces the risk of liquid traveling to your lungs.  To make a thickened liquid you will need to add a commercial thickening product or a soft food to the liquid until it reaches the consistency it needs to be. Your health care provider or dietitian will explain to you the consistency you need to aim for. Liquid consistencies include:  · Thin. Thin liquids include most drinks (such as water, milk, tea, soda, juice, carbonated drinks), as well as ice cream, sherbet, sorbet, ice pops, and broth-based soups.  · Nectar-like. Nectar-like liquids include maple syrup and creamy soup.  · Honey-like. Honey-like liquids are made to be runny but are thick like honey. They cannot be sipped through a straw.  · Spoon-thick. Spoon-thick liquids are thick, like pudding.  MY PLAN  I should thicken my liquids to a _______________ consistency.  DIET GUIDELINES  · Thicken liquids to the consistency your health care provider recommends.  · Follow your dietitian's or health care provider's recommendation on how to thicken your liquids.  · See your dietitian or health care provider regularly for help with your dietary changes.  HOW CAN I THICKEN MY LIQUIDS?  Liquids can be thickened with a commercial food and beverage thickener or with a soft food.  Commercial Food  and Beverage Thickeners  A food and beverage thickener is a powder or gel that makes a food or beverage thicker. Thickeners are sold at pharmacies, medical supply stores, some grocery stores, and online. They can be added to both hot and cold liquids and do not change the taste of the liquid. Ask your health care provider or dietitian for a complete list of commercial thickeners.  Each thickening product is different. Some need to be blended into a liquid with a  while others can be stirred into a liquid with a fork or spoon. Follow the instructions on the product label.  Soft Foods  Some foods such as soups, casseroles, and gravies can be thickened with soft foods. Soft foods include:  · Baby cereal.  · Gravy powder.  · Mashed potato.  · Pureed baby food.  · Instant potato flakes.  · Powdered sauce mixes (such as cheese mixes).  · Flour.  To use one of these soft food items, stir or mix them into the thin liquid until it reaches the desired thickness. Start with a small amount and adjust soft food and liquid as necessary.  Note: Flour works best with warm liquids, such as broth. To thicken a liquid with flour, make a paste out of flour and water. Cook or warm your liquid and add the paste to it. Stir until the mixture thickens.  WHAT ARE SOME TIPS TO MAKE THICKENING LIQUIDS EASIER?  · Take thickeners with you when eating out or traveling.  · If a liquid gets too thick, add more of the thinner liquid until the desired consistency is reached.  · Consider purchasing pre-made thickened drinks.  · Consider using a thickening product to make your own frozen desserts.     This information is not intended to replace advice given to you by your health care provider. Make sure you discuss any questions you have with your health care provider.     Document Released: 06/18/2013 Document Revised: 12/23/2014 Document Reviewed: 12/01/2014  [employer]vier Interactive Patient Education ©2016 Elsevier Inc.            SYMPTOMS OF A  STROKE    Call 911 or have someone take you to the Emergency Department if you have any of the following:    · Sudden numbness or weakness of your face, arm or leg especially on one side of the body  · Sudden confusion, diffiiculty speaking or trouble understanding   · Changes in your vision or loss of sight in one eye  · Sudden severe headache with no known cause  · sudden dizziness, trouble walking, loss of balance or coordination    It is important to seek emergency care right away if you have further stroke symptoms. If you get emergency help quickly, the powerful clot-dissolving medicines can reduce the disabilities caused by a stroke.     For more information:    American Stroke Association  9-087-2-STROKE  www.strokeassociation.org           IF YOU SMOKE OR USE TOBACCO PLEASE READ THE FOLLOWING:    Why is smoking bad for me?  Smoking increases the risk of heart disease, lung disease, vascular disease, stroke, and cancer.     If you smoke, STOP!    If you would like more information on quitting smoking, please visit the [employer] website: [email or web address]   This link will provide additional resources including the QUIT line and the Beat the Pack support groups.     For more information:    American Cancer Society  ([phone]    American Heart Association  1-818.672.9209               YOU ARE THE MOST IMPORTANT FACTOR IN YOUR RECOVERY.     Follow all instructions carefully.     I have reviewed my discharge instructions with my nurse, including the following information, if applicable:     Information about my illness and diagnosis   Follow up appointments (including lab draws)   Wound Care   Equipment Needs   Medications (new and continuing) along with side effects   Preventative information such as vaccines and smoking cessations   Diet   Pain   I know when to contact my Doctor's office or seek emergency care      I want my nurse to describe the side  effects of my medications: YES NO   If the answer is no, I understand the side effects of my medications: YES NO   My nurse described the side effects of my medications in a way that I could understand: YES NO   I have taken my personal belongings and my own medications with me at discharge: YES NO            I have received this information and my questions have been answered. I have discussed any concerns I see with this plan with the nurse or physician. I understand these instructions.    Signature of Patient or Responsible Person: _____________________________________    Date: _________________  Time: __________________    Signature of Healthcare Provider: _______________________________________  Date: _________________  Time: __________________

## 2017-01-18 NOTE — H&P
Dictated    1. Weakness, hypotension in pcp office  DALILA with creat 3.6. No sx pneumonia. cxr without infiltrate    2. Facial fractures 1/9/17. OR 1/13/17  3. Dysphagia per rajat  4. DM2 with neuropathy  5. Syncope resulting in facial fractures. No w/u done recently  6. HTN  7. CAD  8. Asthma     [id number]

## 2017-01-18 NOTE — IP AVS SNAPSHOT
CHRISTOPHER BROWN   Facility: [facility] ([place])   SA: [facility]    MRN:  [id number]   PT#:     Report:  [id number] - Summary of Care Document           Basic Information     Date Of Birth Sex Race Ethnicity Preferred Language Preferred Written Language    1940 Male White or  Not  or  English English      Allergies as of 1/21/2017  Reviewed On: 1/20/2017 By: JULISA Lr       Noted Reaction Type Reactions    DELETED:  No Known Drug Allergy       Other 09/09/2016    Mental Status Change    Oxy drugs    DELETED:  Oxy [Benzoyl Peroxide] 07/11/2016        Per pt anything with the word oxy in it makes him not well    Oxycodone 01/20/2017    Mental Status Change      Current Medications Are     acetaminophen (TYLENOL) 325 MG tablet Take 2 tablets by mouth Every 6 (Six) Hours As Needed for mild pain (1-3).    allopurinol (ZYLOPRIM) 300 MG tablet Take 300 mg by mouth daily.    chlorhexidine (PERIDEX) 0.12 % solution Apply 15 mL to the mouth or throat 3 (Three) Times a Day.    erythromycin (ROMYCIN) 5 MG/GM ophthalmic ointment Place a 1/2 inch ribbon of ointment on the sutures of right eyelid two times daily    fluticasone-salmeterol (ADVAIR) 250-50 MCG/DOSE DISKUS Inhale 1 puff Every Evening. Advair Diskus 250-50 MCG/DOSE Inhalation Aerosol Powder Breath Activated; Patient Sig: Advair Diskus 250-50 MCG/DOSE Inhalation Aerosol Powder Breath Activated INHALE 1 PUFF BID; 60; 0; 15-Oct-2012; Active    glipiZIDE (GLUCOTROL) 5 MG tablet Take 5 mg by mouth every evening. TK 1 T PO D BEFORE THE EVENING MEAL    HYDROcodone-acetaminophen (NORCO) 5-325 MG per tablet Take 1 tablet by mouth Every 6 (Six) Hours As Needed.    HYDROcodone-acetaminophen (NORCO) 7.5-325 MG per tablet Take 1 tablet by mouth Every 6 (Six) Hours As Needed for moderate pain (4-6) (pain).    lisinopril (PRINIVIL,ZESTRIL) 10 MG tablet Take 10 mg by mouth 2 (two) times a day.    loratadine  (CLARITIN) 10 MG tablet Take 10 mg by mouth Daily As Needed.    metFORMIN (GLUCOPHAGE) 500 MG tablet Take 500 mg by mouth 2 (two) times a day with meals.    Multiple Vitamins-Minerals (MULTIVITAMIN ADULT PO) Take  by mouth Daily.    PROAIR  (90 BASE) MCG/ACT inhaler 2 puffs Every 4 (Four) Hours As Needed.    simvastatin (ZOCOR) 40 MG tablet Take 40 mg by mouth every night.    vitamin D (ERGOCALCIFEROL) 22146 UNITS capsule capsule Take 1 capsule by mouth Take As Directed. TAKES ON MONDAYS AND THURSDAYS      Current Immunizations     No immunizations on file.      Problem List as of 1/21/2017  Date Reviewed: 1/21/2017             Codes Priority Class Noted - Resolved    Quadriceps weakness ICD-10-CM: M62.81  ICD-9-CM: 728.87   4/8/2016 - Present    ACL tear ICD-10-CM: S83.519A  ICD-9-CM: 844.2   4/8/2016 - Present    Knee pain, right ICD-10-CM: M25.561  ICD-9-CM: 719.46   4/8/2016 - Present    S/P CABG x 3 ICD-10-CM: Z95.1  ICD-9-CM: V45.81   4/18/2016 - Present    Overview Signed 4/18/2016 11:54 AM by Jean Ford MD     Description: 9/10:with a LIMA to his LAD, a vein to a ramus, and a vein to the OM-2         Essential hypertension ICD-10-CM: I10  ICD-9-CM: 401.9   4/18/2016 - Present    Hyperlipemia ICD-10-CM: E78.5  ICD-9-CM: 272.4   4/18/2016 - Present    Hallux rigidus ICD-10-CM: M20.20  ICD-9-CM: 735.2   7/11/2016 - Present    Fracture, stress, metatarsal ICD-10-CM: M84.376A  ICD-9-CM: 733.94   7/11/2016 - Present    Osteopenia determined by x-ray ICD-10-CM: M85.80  ICD-9-CM: 733.90   7/11/2016 - Present    Metatarsal stress fracture with nonunion ICD-10-CM: M84.376K  ICD-9-CM: 733.82   8/10/2016 - Present    Left hip pain ICD-10-CM: M25.552  ICD-9-CM: 719.45   8/26/2016 - Present    Bursitis of left hip ICD-10-CM: M70.72  ICD-9-CM: 726.5   8/26/2016 - Present    Pulmonary embolism ICD-10-CM: I26.99  ICD-9-CM: 415.19   10/12/2016 - Present    DALILA (acute kidney injury) ICD-10-CM: N17.9  ICD-9-CM:  584.9   10/12/2016 - Present    Diabetes mellitus type 2, controlled ICD-10-CM: E11.9  ICD-9-CM: 250.00   10/12/2016 - Present    Ascending aorta dilatation ICD-10-CM: I77.810  ICD-9-CM: 447.71   10/12/2016 - Present    Pulmonary nodule, left ICD-10-CM: R91.1  ICD-9-CM: 793.11   10/12/2016 - Present    Right leg DVT ICD-10-CM: I82.401  ICD-9-CM: 453.40   10/13/2016 - Present    Acute renal failure ICD-10-CM: N17.9  ICD-9-CM: 584.9   1/18/2017 - Present    Hypotension ICD-10-CM: I95.9  ICD-9-CM: 458.9   1/18/2017 - Present    Dehydration ICD-10-CM: E86.0  ICD-9-CM: 276.51   1/18/2017 - Present    Hypercalcemia ICD-10-CM: E83.52  ICD-9-CM: 275.42   1/18/2017 - Present    Dysphagia ICD-10-CM: R13.10  ICD-9-CM: 787.20   1/21/2017 - Present      Diagnoses        Codes Comments    Acute renal failure, unspecified acute renal failure type    -  Primary ICD-10-CM: N17.9  ICD-9-CM: 584.9     Aspiration pneumonia of left lower lobe, unspecified aspiration pneumonia type     ICD-10-CM: J69.0  ICD-9-CM: 507.0     Hypotension due to drugs     ICD-10-CM: I95.2  ICD-9-CM: 458.8, E980.5     Dehydration     ICD-10-CM: E86.0  ICD-9-CM: 276.51     Hypercalcemia     ICD-10-CM: E83.52  ICD-9-CM: 275.42     Quadriceps weakness     ICD-10-CM: M62.81  ICD-9-CM: 728.87     S/P CABG x 3     ICD-10-CM: Z95.1  ICD-9-CM: V45.81     Essential hypertension     ICD-10-CM: I10  ICD-9-CM: 401.9     Fracture, stress, metatarsal, right, with nonunion, subsequent encounter     ICD-10-CM: M84.374K  ICD-9-CM: 733.82     Osteopenia determined by x-ray     ICD-10-CM: M85.80  ICD-9-CM: 733.90     Left hip pain     ICD-10-CM: M25.552  ICD-9-CM: 719.45     Bursitis of left hip     ICD-10-CM: M70.72  ICD-9-CM: 726.5     DALILA (acute kidney injury)     ICD-10-CM: N17.9  ICD-9-CM: 584.9     Ascending aorta dilatation     ICD-10-CM: I77.810  ICD-9-CM: 447.71     Pulmonary nodule, left     ICD-10-CM: R91.1  ICD-9-CM: 793.11     Pharyngoesophageal dysphagia      ICD-10-CM: R13.14  ICD-9-CM: 787.24       Lab and Imaging Results     Procedure Component Value Units Date/Time    FL Esophagram Complete [[id number]] Collected:  01/20/17 1635     Updated:  01/21/17 1230    Narrative:       CLINICAL HISTORY: 76-year-old male with dysphagia. Recent orbital  fracture open reduction and internal fixation surgery.     EXAM: BARIUM SWALLOW ESOPHAGRAM DATED 01/20/2017     FINDINGS: The preliminary AP chest radiograph is compared to CT chest  sections and  image of 10/11/2016 and AP and lateral chest  radiographs of 01/18/2017. Calcified lymphadenopathy at the left hilum  is again demonstrated. Sternal wire sutures and CABG markers are again  demonstrated. The heart is within normal limits of caliber. Coarse  markings in both lungs are present and lung volumes are slightly  diminished on the current exam. No segmental or lobar collapse or  consolidation is seen. No pneumothorax is demonstrated. No acute change  in bony thorax is seen with some degenerative changes in the spine again  noted. Aortic calcification and uncoiling are again demonstrated.     The patient ingested gas-forming crystals, water, and both thick and  thin barium liquid mixtures for the current exam. Due to the aspiration  and other findings, the patient was not given a barium tablet at this  time. Rapid sequence imaging of the neck in lateral and AP projection  with the patient standing upright and lateral projection rapid sequence  images with the patient horizontal on the x-ray table in right lateral  decubitus position demonstrate initially, and largely, silent deep  laryngeal penetration followed gradually but essentially immediately by  aspiration of traces of barium into the trachea. The patient was  instructed to clear his throat and cough and then to perform secondary  and tertiary swallows but this was not successful in completely clearing  the barium. The patient did not seem to have significant  perception of  the aspirations and manifest only a trace of coughing other than when  asked specifically to cough. No abnormal prominence of cricopharyngeus  was seen. No Zenker diverticulum is demonstrated. The cervical esophagus  has apparently normal distensibility. Radiographs of thoracic esophagus  also demonstrate normal distensibility with esophagogastric junction  diameter of at least 1.5 cm diameter with surrounding Schatzki ring,  located above a moderate-sized partially reducible sliding hiatal  herniation of the upper stomach. With the patient recumbent, there was  florid gastroesophageal reflux. There appears to be rather marked  thickening of esophageal mucosal folds on some images, suggesting  esophagitis but not excluding other abnormalities of esophageal wall  versus varix. Incidentally noted was prompt emptying of some gastric  barium into the duodenum with prompt propulsion into the jejunum but  with apparently markedly irregular and abnormal mucosal contours of the  second portion of duodenum and some suspected mucosal fold prominence  and irregularity in the remainder of the duodenum that may reflect  significant inflammatory or infiltrative abnormalities of the duodenum.  This could be on the basis of inflammation or on the basis of neoplasm  or other extrinsic or intrinsic abnormality of the small bowel involving  the duodenum. Upper endoscopy is suggested when clinically appropriate.     A total of 3 minutes 10 seconds fluoroscopy was used. A single digital  overhead radiograph and 97 digital fluoroscopic spot image radiographs  were acquired.     CONCLUSION: Aspiration of thick and thin barium whether upright or  horizontal on the x-ray table. There was very little evidence of  perception by the patient of this penetration and aspiration. Thick  esophageal mucosal folds with demonstrated gastroesophageal reflux and  mild stenosis, not entirely specific but possibly due to stricture, at  the  esophagogastric junction, with residual luminal diameter apparently  at least 1.5 cm diameter on some images. Along with abnormalities of  mucosal contour of the esophagus and possibly involving the upper  stomach, there is abnormal contour and mucosal fold irregular prominence  in the duodenum as discussed above. Upper endoscopy is recommended when  clinically appropriate following the recent facial surgery.     This report was finalized on 1/21/2017 12:26 PM by Dr. Chato Harper MD.       POC Glucose Fingerstick [[id number]]  (Abnormal) Collected:  01/21/17 1138    Specimen:  Blood Updated:  01/21/17 1140     Glucose 156 (H) mg/dL     Narrative:       Meter: [id number] : [id number] Usman Shankar    POC Glucose Fingerstick [[id number]]  (Normal) Collected:  01/21/17 0730    Specimen:  Blood Updated:  01/21/17 0731     Glucose 130 mg/dL     Narrative:       Meter: [id number] : [id number] Usman Shankar    Renal Function Panel [[id number]]  (Abnormal) Collected:  01/21/17 0552    Specimen:  Blood Updated:  01/21/17 0645     Glucose 149 (H) mg/dL      BUN 18 mg/dL      Creatinine 1.31 (H) mg/dL      Sodium 142 mmol/L      Potassium 3.5 mmol/L      Chloride 106 mmol/L      CO2 22.7 mmol/L      Calcium 9.1 mg/dL      Albumin 3.00 (L) g/dL      Phosphorus 2.6 mg/dL      Anion Gap 13.3 mmol/L      BUN/Creatinine Ratio 13.7      eGFR Non African Amer 53 (L) mL/min/1.73     Narrative:       The MDRD GFR formula is only valid for adults with stable renal function between ages 18 and 70.    POC Glucose Fingerstick [[id number]]  (Normal) Collected:  01/20/17 2119    Specimen:  Blood Updated:  01/20/17 2120     Glucose 124 mg/dL     Narrative:       RN Notified Meter: [id number] : [id number] Daniele Arnett    POC Glucose Fingerstick [[id number]]  (Normal) Collected:  01/20/17 1704    Specimen:  Blood Updated:  01/20/17 1708     Glucose 126 mg/dL     Narrative:       Meter: [id number] : [id number] [facility]  Culture [[id number]]  (Normal) Collected:  01/18/17 1323    Specimen:  Blood from Arm, Left Updated:  01/20/17 1401     Blood Culture No growth at 2 days     Blood Culture [[id number]]  (Normal) Collected:  01/18/17 1332    Specimen:  Blood from Arm, Right Updated:  01/20/17 1401     Blood Culture No growth at 2 days     Renal Function Panel [[id number]]  (Abnormal) Collected:  01/20/17 0625    Specimen:  Blood Updated:  01/20/17 0729     Glucose 150 (H) mg/dL      BUN 26 (H) mg/dL      Creatinine 1.30 (H) mg/dL      Sodium 140 mmol/L      Potassium 3.8 mmol/L      Chloride 104 mmol/L      CO2 22.0 mmol/L      Calcium 8.9 mg/dL      Albumin 3.10 (L) g/dL      Phosphorus 2.2 (L) mg/dL      Anion Gap 14.0 mmol/L      BUN/Creatinine Ratio 20.0      eGFR Non African Amer 54 (L) mL/min/1.73     Narrative:       The MDRD GFR formula is only valid for adults with stable renal function between ages 18 and 70.    CBC (No Diff) [[id number]]  (Abnormal) Collected:  01/20/17 0625    Specimen:  Blood Updated:  01/20/17 0700     WBC 7.94 10*3/mm3      RBC 3.79 (L) 10*6/mm3      Hemoglobin 12.2 (L) g/dL      Hematocrit 36.5 (L) %      MCV 96.3 (H) fL      MCH 32.2 pg      MCHC 33.4 g/dL      RDW 12.6 %      RDW-SD 44.4 fl      MPV 10.8 fL      Platelets 212 10*3/mm3     Hemoglobin A1c [[id number]]  (Abnormal) Collected:  01/19/17 0720    Specimen:  Blood Updated:  01/19/17 2342     Hemoglobin A1C 6.29 (H) %     Narrative:       Hemoglobin A1C Ranges:    Increased Risk for Diabetes  5.7% to 6.4%  Diabetes                     >= 6.5%  Diabetic Goal                < 7.0%    Procalcitonin [[id number]]  (Normal) Collected:  01/19/17 0720    Specimen:  Blood Updated:  01/19/17 0830     Procalcitonin 0.13 ng/mL     Narrative:       As a Marker for Sepsis (Non-Neonates):   1. <0.5 ng/mL represents a low risk of severe sepsis and/or septic shock.  1. >2 ng/mL represents a high risk of severe sepsis and/or septic shock.    As a Marker for Lower  "Respiratory Tract Infections that require antibiotic therapy:  PCT on Admission     Antibiotic Therapy             6-12 Hrs later  > 0.5                Strongly Recommended            >0.25 - <0.5         Recommended  0.1 - 0.25           Discouraged                   Remeasure/reassess PCT  <0.1                 Strongly Discouraged          Remeasure/reassess PCT      As 28 day mortality risk marker: \"Change in Procalcitonin Result\" (> 80 % or <=80 %) if Day 0 (or Day 1) and Day 4 values are available. Refer to [email or web address]   Change in PCT <=80 %   A decrease of PCT levels below or equal to 80 % defines a positive change in PCT test result representing a higher risk for 28-day all-cause mortality of patients diagnosed with severe sepsis or septic shock.  Change in PCT > 80 %   A decrease of PCT levels of more than 80 % defines a negative change in PCT result representing a lower risk for 28-day all-cause mortality of patients diagnosed with severe sepsis or septic shock.                Renal Function Panel [[id number]]  (Abnormal) Collected:  01/19/17 0720    Specimen:  Blood Updated:  01/19/17 0821     Glucose 134 (H) mg/dL      BUN 42 (H) mg/dL      Creatinine 1.99 (H) mg/dL      Sodium 138 mmol/L      Potassium 3.7 mmol/L      Chloride 102 mmol/L      CO2 18.8 (L) mmol/L      Calcium 9.3 mg/dL      Albumin 3.30 (L) g/dL      Phosphorus 2.5 mg/dL      Anion Gap 17.2 mmol/L      BUN/Creatinine Ratio 21.1      eGFR Non African Amer 33 (L) mL/min/1.73     Narrative:       The MDRD GFR formula is only valid for adults with stable renal function between ages 18 and 70.    Uric Acid [[id number]]  (Normal) Collected:  01/19/17 0720    Specimen:  Blood Updated:  01/19/17 0820     Uric Acid 6.3 mg/dL     CBC (No Diff) [[id number]]  (Abnormal) Collected:  01/19/17 0720    Specimen:  Blood Updated:  01/19/17 0750     WBC 9.03 10*3/mm3      RBC 4.10 (L) 10*6/mm3      Hemoglobin 13.1 (L) g/dL      " Hematocrit 40.4 %      MCV 98.5 (H) fL      MCH 32.0 pg      MCHC 32.4 (L) g/dL      RDW 12.7 %      RDW-SD 46.4 fl      MPV 10.7 fL      Platelets 218 10*3/mm3     Lactic Acid, Plasma [[id number]]  (Normal) Collected:  01/19/17 0720    Specimen:  Blood Updated:  01/19/17 0742     Lactate 1.3 mmol/L     Chloride, Urine, Random [[id number]] Collected:  01/18/17 2229    Specimen:  Urine Updated:  01/19/17 0353     Chloride, Urine 70 mmol/L     Narrative:       Reference intervals for random urine have not been established.  Clinical usage is dependent upon physician's interpretation in combination with other laboratory tests.     Creatinine, Urine, Random [[id number]] Collected:  01/18/17 2229    Specimen:  Urine Updated:  01/19/17 0353     Creatinine, Urine 58.0 mg/dL     Narrative:       Reference intervals for random urine have not been established.  Clinical usage is dependent upon physician's interpretation in combination with other laboratory tests.     Sodium, Urine, Random [[id number]] Collected:  01/18/17 2229    Specimen:  Urine Updated:  01/19/17 0353     Sodium, Urine 93 mmol/L     Narrative:       Reference intervals for random urine have not been established.  Clinical usage is dependent upon physician's interpretation in combination with other laboratory tests.     Protein, Urine, Random [[id number]] Collected:  01/18/17 2229    Specimen:  Urine Updated:  01/19/17 0353     Total Protein, Urine 6.0 mg/dL     Narrative:       Reference intervals for random urine have not been established.  Clinical usage is dependent upon physician's interpretation in combination with other laboratory tests.     Eosinophil Smear [[id number]]  (Normal) Collected:  01/18/17 2228    Specimen:  Urine from Urine, Clean Catch Updated:  01/19/17 0112     Eosinophil Smear 0 % EOS/100 Cells     CK [[id number]]  (Normal) Collected:  01/18/17 2048    Specimen:  Blood Updated:  01/18/17 2147     Creatine Kinase 37 U/L     Magnesium [[id number]]   (Normal) Collected:  01/18/17 1149    Specimen:  Blood Updated:  01/18/17 1756     Magnesium 1.9 mg/dL     Lactate Acid, Reflex [[id number]]  (Abnormal) Collected:  01/18/17 1629    Specimen:  Blood Updated:  01/18/17 1704     Lactate 2.1 (C) mmol/L     CT Head Without Contrast [[id number]] Collected:  01/18/17 1409     Updated:  01/18/17 1554    Narrative:       CT OF THE BRAIN WITHOUT CONTRAST 01/18/2017     HISTORY: Headache, weakness.     Axial images were obtained through the brain without intravenous  contrast.      FINDINGS: There is mild to moderate diffuse atrophy. There is decreased  attenuation of the periventricular white matter bilaterally consistent  with periventricular small vessel ischemic disease. More confluent area  of low density seen in the right superior parietal lobe on image 42  consistent with encephalomalacia possibly from an old infarction or  hemorrhage. There is a carl hole in the left posterior parietal bone.     There is no evidence of acute infarction, hemorrhage, midline shift or  mass effect. Small caliber compression plate is seen in the anterior  right maxillary sinus. There is right maxillary sinusitis and/or  hemorrhage. Anterior right maxillary sinus fracture is seen. These were  better seen on the facial bone CT of 01/09/2017. Tiny amount of fluid is  seen in the dependent portion of the left maxillary sinus.       Impression:       1. No acute intracranial process identified.  2. Evidence of recent facial fractures and surgical repair. Please see  additional dictation for the CT of the facial bones from 01/09/2017     This report was finalized on 1/18/2017 3:51 PM by Dr. Jb Aly MD.       Urinalysis With / Culture If Indicated [[id number]]  (Abnormal) Collected:  01/18/17 1305    Specimen:  Urine from Urine, Clean Catch Updated:  01/18/17 1413     Color, UA Yellow      Appearance, UA Cloudy (A)      pH, UA <=5.0      Specific Gravity, UA 1.023      Glucose, UA Negative       Ketones, UA Negative      Bilirubin, UA Negative      Blood, UA Negative      Protein, UA Negative      Leuk Esterase, UA Negative      Nitrite, UA Negative      Urobilinogen, UA 0.2 E.U./dL     Narrative:       Urine microscopic not indicated.    XR Chest 2 View [[id number]] Collected:  01/18/17 1331     Updated:  01/18/17 1343    Narrative:       XR CHEST 2 VW-     HISTORY: Male who is 76 years-old,  short of breath     TECHNIQUE: Short of breath     COMPARISON: None available     FINDINGS: Heart, mediastinum and pulmonary vasculature are unremarkable.  Sternotomy wires are present. Low lung volume contributes to vascular  crowding. No focal pulmonary consolidation, pleural effusion, or  pneumothorax. No acute osseous process.       Impression:       No focal pulmonary consolidation. Follow-up as clinical  indications persist.     This report was finalized on 1/18/2017 1:40 PM by Dr. Prabhjot Mcarthur MD.       Lactic Acid, Plasma [[id number]]  (Abnormal) Collected:  01/18/17 1227    Specimen:  Blood Updated:  01/18/17 1254     Lactate 2.5 (C) mmol/L     Comprehensive Metabolic Panel [[id number]]  (Abnormal) Collected:  01/18/17 1149    Specimen:  Blood Updated:  01/18/17 1226     Glucose 170 (H) mg/dL      BUN 59 (H) mg/dL      Creatinine 3.56 (H) mg/dL      Sodium 137 mmol/L      Potassium 3.7 mmol/L      Chloride 96 (L) mmol/L      CO2 23.7 mmol/L      Calcium 10.9 (H) mg/dL      Total Protein 8.1 g/dL      Albumin 4.40 g/dL      ALT (SGPT) 15 U/L      AST (SGOT) 16 U/L      Alkaline Phosphatase 74 U/L      Total Bilirubin 1.1 mg/dL      eGFR Non African Amer 17 (L) mL/min/1.73      Globulin 3.7 gm/dL      A/G Ratio 1.2 g/dL      BUN/Creatinine Ratio 16.6      Anion Gap 17.3 mmol/L     Narrative:       The MDRD GFR formula is only valid for adults with stable renal function between ages 18 and 70.    CK [[id number]]  (Normal) Collected:  01/18/17 1149    Specimen:  Blood Updated:  01/18/17 1223     Creatine  "Kinase 60 U/L     Troponin [[id number]]  (Normal) Collected:  01/18/17 1149    Specimen:  Blood Updated:  01/18/17 1223     Troponin T <0.010 ng/mL     Narrative:       Troponin T Reference Ranges:  Less than 0.03 ng/mL:    Negative for AMI  0.03 to 0.09 ng/mL:      Indeterminant for AMI  Greater than 0.09 ng/mL: Positive for AMI    Protime-INR [[id number]]  (Abnormal) Collected:  01/18/17 1149    Specimen:  Blood Updated:  01/18/17 1214     Protime 13.8 Seconds      INR 1.11 (H)     CBC & Differential [[id number]] Collected:  01/18/17 1149    Specimen:  Blood Updated:  01/18/17 1201    Narrative:       The following orders were created for panel order CBC & Differential.  Procedure                               Abnormality         Status                     ---------                               -----------         ------                     CBC Auto Differential[[id number]]         Abnormal            Final result                 Please view results for these tests on the individual orders.    CBC Auto Differential [[id number]]  (Abnormal) Collected:  01/18/17 1149    Specimen:  Blood Updated:  01/18/17 1201     WBC 14.11 (H) 10*3/mm3      RBC 4.65 10*6/mm3      Hemoglobin 15.2 g/dL      Hematocrit 45.5 %      MCV 97.8 (H) fL      MCH 32.7 pg      MCHC 33.4 g/dL      RDW 13.0 %      RDW-SD 46.4 fl      MPV 11.0 fL      Platelets 263 10*3/mm3      Neutrophil % 79.7 (H) %      Lymphocyte % 11.8 (L) %      Monocyte % 7.9 %      Eosinophil % 0.3 %      Basophil % 0.1 %      Immature Grans % 0.2 %      Neutrophils, Absolute 11.25 (H) 10*3/mm3      Lymphocytes, Absolute 1.66 10*3/mm3      Monocytes, Absolute 1.12 10*3/mm3      Eosinophils, Absolute 0.04 10*3/mm3      Basophils, Absolute 0.01 10*3/mm3      Immature Grans, Absolute 0.03 10*3/mm3       Vitals     BP Pulse Temp Resp Ht Wt    132/71 (BP Location: Right arm, Patient Position: Sitting) 77 97.8 °F (36.6 °C) (Oral) 20 70\" (177.8 cm) 195 lb (88.5 kg)    SpO2 BMI          "    93% 27.98 kg/m2       Vitals History      Social History     Category History    Smoking Tobacco Use Former Smoker; Start date: ; Types: Cigarettes    Smokeless Tobacco Use Never Used    Tobacco Comment HISTORY OF 6 YRS USE IN EARLY 20'S    Alcohol Use No    Drug Use No    Sexual Activity Defer    ADL Not Asked      Patient Care Team        Relationship Specialty Notifications Start End    Adeline Onofre MD PCP - General   11/24/15     Adeline Onofre MD PCP - Family Medicine   1/12/16     Jean Ford MD Consulting Physician Cardiology  9/2/16        Instructions for After Discharge        Follow-up Information     Follow up with Rigoberto Walker MD. Schedule an appointment as soon as possible for a visit in 4 week(s).    Specialties:  Gastroenterology, Internal Medicine    Contact information:    [address] [facility]  SE[address]D FLOOR  [place] [address]  [phone]          Follow up with Adeline Onofre MD Follow up in 2 week(s).    Specialty:  Internal Medicine    Why:  bp, dm    Contact information:    [address] [facility] [address]  [place] [address]  [phone]        Activity Instructions     Activity as Tolerated                 Diet Instructions     Diet: Consistent Carbohydrate; Honey Thick Liquids       Discharge Diet:  Consistent Carbohydrate   Fluid Consistency:  Honey Thick Liquids             Referrals and Follow-ups to Schedule     Ambulatory Referral to Physical Therapy Evaluate and treat    As directed    Specialty modality needed?:  Evaluate and treat           Additional information on Labs and Follow-ups:      Given his current renal failure, ACE inhibitor may not be the best blood pressure medication choice  Recommend to hold this at discharge  Follow up with Dr. Beal  in 2-4 weeks -with recheck labs and blood pressure check  HOLD metformin and simvastatin , okay to resume Allopurinol 300mg as same as home dose    Dr. Beal (Kidney MD)   [facility], [address] [place] [address]   P: [phone]   F: [phone]      Dr. Walker ( MD)   Make follow up appt. In 1-2 weeks   [address] [address]   P: [phone]   F: [phone]

## 2017-01-18 NOTE — CONSULTS
[facility]                                                                                             Nephrology Initial Consult Note    Patient Identification:  Name: Eugenio Joe MRN: [id number]  Age: 76 y.o. : 1940  Sex: male  Date:2017    Requesting Physician: As per consult order.  Reason for Consultation: Acute renal failure, chronic kidney disease  Information from:patient/ family/ chart      History of Present Illness: This is a 76 y.o. year old male  who is well-known to me from a prior hospitalization and subsequent outpatient follow-up for chronic kidney disease.  He was initially seen by me in 2016 for acute renal failure.  Creatinine improved from 2.1 to 1.2 by discharge and creatinine is been stable 1.2-1.4 since then.  He was recently here for outpatient surgery after sustaining a fall at home secondary to reported episode of lightheadedness and dizziness.  He sustained multiple facial fractures status post operative repair on 17.  His creatinine was 1.2 during that hospital visit.  Since discharge he states he's not been feeling well, very low oral intake, poor appetite but no nausea, vomiting, diarrhea.  He was seen by his primary care physician earlier today for follow-up and in addition to being found to be ill-appearing he was hypotensive and was referred to the emergency department.  Systolic blood pressures have been 80s to 100s but improved after IV fluid bolus in the emergency department and is currently on IV fluid infusion with normal saline.  He does have chronic hypertension for which she takes lisinopril and he states he's had all of his blood pressure medicines earlier today.  No NSAIDs, he is on hydrocodone for postop pain. 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]   • Hallux rigidus [M20.20]   • Fracture, stress, metatarsal [M84.376A]   • Osteopenia determined by x-ray [M85.80]   • 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]     Overview Note:     Description: 9/10:with a LIMA to his LAD, a vein to a ramus, and a vein to the OM-2     • Essential hypertension [I10]   • Hyperlipemia [E78.5]   • Quadriceps weakness [M62.81]   • ACL tear [S83.519A]   • Knee pain, right [M25.561]       Past Medical History:  Past Medical History   Diagnosis Date   • Arthritis    • Asthma    • Brain abscess      at about age 44   • Bruise of face    • Cardiac disease    • Coronary artery disease      CABG 2012   • Diabetes mellitus    • Hearing aid worn      bilateral   • Hypertension    • Joint pain      or swelling   • Orbit fracture    • Pulmonary embolism      OCT 2016       Past Surgical History:  Past Surgical History   Procedure Laterality Date   • Coronary artery bypass graft     • Brain surgery       BRAIN ABCESS 30 YR AGO   • Septoplasty     • Orif foot fracture Right 9/9/2016     Procedure: RIGHT SECOND METATARSAL OPEN REDUCTION INTERNAL FIXATION WITh GRAFT FROM HEEL ;  Surgeon: Man Jenkins MD;  [place] [facility];  Service:    • Tonsillectomy     • Skin cancer excision     • Orbital fracture open reduction internal fixation Right 1/13/2017     Procedure: RT ORBIT FLOOR FRACTURE REPAIR RIGHT ZMC FRACTURE REPAIR, RIGHT NASAL BONE FRACTURE CLOSED REDUCTION;  Surgeon: Edil Lester MD;  Location: [facility] [place];  Service:         Home Meds:   Prescriptions Prior to Admission   Medication Sig  Dispense Refill Last Dose   • allopurinol (ZYLOPRIM) 300 MG tablet Take 300 mg by mouth daily.   1/12/2017 at Unknown time   • chlorhexidine (PERIDEX) 0.12 % solution Apply 15 mL to the mouth or throat 3 (Three) Times a Day. 473 mL 0    • erythromycin (ROMYCIN) 5 MG/GM ophthalmic ointment Place a 1/2 inch ribbon of ointment on the sutures of right eyelid two times daily 3.5 g 1    • fluticasone-salmeterol (ADVAIR) 250-50 MCG/DOSE DISKUS Inhale 1 puff Every Evening. Advair Diskus 250-50 MCG/DOSE Inhalation Aerosol Powder Breath Activated; Patient Sig: Advair Diskus 250-50 MCG/DOSE Inhalation Aerosol Powder Breath Activated INHALE 1 PUFF BID; 60; 0; 15-Oct-2012; Active   1/13/2017 at Unknown time   • glipiZIDE (GLUCOTROL) 5 MG tablet Take 5 mg by mouth every evening. TK 1 T PO D BEFORE THE EVENING MEAL  12 1/12/2017 at Unknown time   • HYDROcodone-acetaminophen (NORCO) 7.5-325 MG per tablet Take 1 tablet by mouth Every 6 (Six) Hours As Needed for moderate pain (4-6) (pain). 15 tablet 0    • lisinopril (PRINIVIL,ZESTRIL) 10 MG tablet Take 10 mg by mouth 2 (two) times a day.   1/12/2017 at Unknown time   • loratadine (CLARITIN) 10 MG tablet Take 10 mg by mouth Daily As Needed.   1/12/2017 at Unknown time   • metFORMIN (GLUCOPHAGE) 500 MG tablet Take 500 mg by mouth 2 (two) times a day with meals.   1/12/2017 at Unknown time   • Multiple Vitamins-Minerals (MULTIVITAMIN ADULT PO) Take  by mouth Daily.      • PROAIR  (90 BASE) MCG/ACT inhaler 2 puffs Every 4 (Four) Hours As Needed.   1/13/2017 at Unknown time   • simvastatin (ZOCOR) 40 MG tablet Take 40 mg by mouth every night.   1/12/2017 at Unknown time   • vitamin D (ERGOCALCIFEROL) 73982 UNITS capsule capsule Take 1 capsule by mouth Take As Directed. TAKES ON MONDAYS AND THURSDAYS 12 capsule 0    • HYDROcodone-acetaminophen (NORCO) 5-325 MG per tablet Take 1 tablet by mouth Every 6 (Six) Hours As Needed.   1/12/2017 at Unknown time       Current Meds:   Current  Facility-Administered Medications   Medication Dose Route Frequency Provider Last Rate Last Dose   • acetaminophen (TYLENOL) tablet 650 mg  650 mg Oral Q4H PRN Jami Pantoja MD       • albuterol (PROVENTIL) nebulizer solution 2.5 mg  2.5 mg Nebulization Q4H PRN Jami Pantoja MD       • allopurinol (ZYLOPRIM) tablet 100 mg  100 mg Oral Daily Jami Pantoja MD       • budesonide-formoterol (SYMBICORT) 160-4.5 MCG/ACT inhaler 2 puff  2 puff Inhalation BID - RT Jami Pantoja MD       • chlorhexidine (PERIDEX) 0.12 % solution 15 mL  15 mL Mouth/Throat TID Jami Pantoja MD       • enoxaparin (LOVENOX) syringe 30 mg  30 mg Subcutaneous Daily Jami Pantoja MD       • erythromycin (ROMYCIN) ophthalmic ointment   Right Eye BID Jami Pantoja MD       • HYDROcodone-acetaminophen (NORCO) 7.5-325 MG per tablet 1 tablet  1 tablet Oral Q6H PRN Jami Pantoja MD       • sodium chloride 0.9 % flush 1-10 mL  1-10 mL Intravenous PRN Jami Pantoja MD       • sodium chloride 0.9 % flush 10 mL  10 mL Intravenous PRN Casa Moran MD       • sodium chloride 0.9 % infusion  100 mL/hr Intravenous Continuous Jami Pantoja MD       • [START ON 1/19/2017] vitamin D (ERGOCALCIFEROL) capsule 50,000 Units  50,000 Units Oral Once per day on Mon Thu Jami Pantoja MD           Allergies:  Allergies   Allergen Reactions   • Other Mental Status Change     Oxy drugs       Social History:   Social History     Social History   • Marital status:      Spouse name: N/A   • Number of children: N/A   • Years of education: N/A     Social History Main Topics   • Smoking status: Former Smoker     Types: Cigarettes   • Smokeless tobacco: Never Used      Comment: HISTORY OF 6 YRS USE IN EARLY 20'S   • Alcohol use No   • Drug use: No   • Sexual activity: Defer     Other Topics Concern   • None     Social History Narrative        Family History:  Family History   Problem Relation Age of Onset   • Heart disease Mother    • Hypertension  "Mother    • Heart disease Father    • Hypertension Father         Review of Systems: as per HPI, in addition:    General:       + weakness / fatigue,                       No fevers / chills                       no weight loss  HEENT:       no dysphagia / odynophagia, + facial pain  Neck:           normal range of motion, no swelling  Respiratory: no cough or congestion                      No shortness of air                       No wheezing  CV:              No chest pain                       No palpitations  Abdomen/GI: no nausea or vomiting                      No diarrhea or constipation                      No abdominal pain  :             no dysuria or urinary frequency                       No urgency, normal output  Endocrine:   no polyuria or polydipsia,                      No heat or cold intolerance  Skin:           no rashes or skin breakdown   Vascular:   No edema                     No claudication  Psych:        no depression/ anxiety  Neuro:        no focal weakness, no seizures  Musculoskeletal: no joint pain or deformities      Physical Exam:  Vitals:   Temp (24hrs), Av.8 °F (36.6 °C), Min:97.7 °F (36.5 °C), Max:97.9 °F (36.6 °C)    Visit Vitals   • /53 (BP Location: Right arm, Patient Position: Lying)   • Pulse 78   • Temp 97.8 °F (36.6 °C) (Oral)   • Resp 18   • Ht 70\" (177.8 cm)   • Wt 195 lb (88.5 kg)   • SpO2 100%   • BMI 27.98 kg/m2     Intake/Output:     Intake/Output Summary (Last 24 hours) at 17 1646  Last data filed at 17 1514   Gross per 24 hour   Intake   1100 ml   Output    150 ml   Net    950 ml        Wt Readings from Last 1 Encounters:   17 1106 195 lb (88.5 kg)     Exam:    General Appearance:  Awake, alert, oriented x3, no acute distress  Well-appearing   Head and Face:   facial swelling, nasal brace in place    Eyes:  No icterus, pupils equal round and reactive to light, extraocular movements intact , periorbital edema    ENMT: Moist mucosa, " tongue symmetric    Neck: Supple  no jugular venous distention  no thyromegaly   Pulmonary:  Respiratory effort: Normal  Auscultation of lungs: Clear bilaterally  No wheezes  No rhonchi  Good air movement, good expansion   Chest wall:  No tenderness or deformity   Cardiovascular:  Auscultation of the heart: Normal rhythm, no murmurs  No edema of extremities    Abdomen:  Abdomen: soft, non-tender, normal bowel sounds all four quadrants, no masses   Liver and spleen: no hepatosplenomegaly   Musculoskeletal: Digits and nails: normal  Normal range of motion  No joint swelling or gross deformities    Skin: Skin inspection: color normal, no visible rashes or lesions  Skin palpation: texture, turgor normal, no palpable lesions   Lymphatic:  no cervical lymphadenopathy    Psychiatric: Judgement and insight: normal  Orientation to person place and time: normal  Mood and affect: normal       DATA:  Radiology and Labs:  I have personally reviewed pertinent old records, labs, meds and pertinent data from the patient chart.  Discussed with Dr. Jami Pantoja regarding admission  Old records reviewed, office notes reviewed.  When he was seen in November his creatinine was stable at 1.2  The following labs personally reviewed by me   Recent Results (from the past 24 hour(s))   Comprehensive Metabolic Panel    Collection Time: 01/18/17 11:49 AM   Result Value Ref Range    Glucose 170 (H) 65 - 99 mg/dL    BUN 59 (H) 8 - 23 mg/dL    Creatinine 3.56 (H) 0.76 - 1.27 mg/dL    Sodium 137 136 - 145 mmol/L    Potassium 3.7 3.5 - 5.2 mmol/L    Chloride 96 (L) 98 - 107 mmol/L    CO2 23.7 22.0 - 29.0 mmol/L    Calcium 10.9 (H) 8.6 - 10.5 mg/dL    Total Protein 8.1 6.0 - 8.5 g/dL    Albumin 4.40 3.50 - 5.20 g/dL    ALT (SGPT) 15 1 - 41 U/L    AST (SGOT) 16 1 - 40 U/L    Alkaline Phosphatase 74 39 - 117 U/L    Total Bilirubin 1.1 0.1 - 1.2 mg/dL    eGFR Non African Amer 17 (L) >60 mL/min/1.73    Globulin 3.7 gm/dL    A/G Ratio 1.2 g/dL     BUN/Creatinine Ratio 16.6 7.0 - 25.0    Anion Gap 17.3 mmol/L   CK    Collection Time: 01/18/17 11:49 AM   Result Value Ref Range    Creatine Kinase 60 20 - 200 U/L   Troponin    Collection Time: 01/18/17 11:49 AM   Result Value Ref Range    Troponin T <0.010 0.000 - 0.030 ng/mL   Protime-INR    Collection Time: 01/18/17 11:49 AM   Result Value Ref Range    Protime 13.8 11.7 - 14.2 Seconds    INR 1.11 (H) 0.90 - 1.10   CBC Auto Differential    Collection Time: 01/18/17 11:49 AM   Result Value Ref Range    WBC 14.11 (H) 4.50 - 10.70 10*3/mm3    RBC 4.65 4.60 - 6.00 10*6/mm3    Hemoglobin 15.2 13.7 - 17.6 g/dL    Hematocrit 45.5 40.4 - 52.2 %    MCV 97.8 (H) 79.8 - 96.2 fL    MCH 32.7 27.0 - 32.7 pg    MCHC 33.4 32.6 - 36.4 g/dL    RDW 13.0 11.5 - 14.5 %    RDW-SD 46.4 37.0 - 54.0 fl    MPV 11.0 6.0 - 12.0 fL    Platelets 263 140 - 500 10*3/mm3    Neutrophil % 79.7 (H) 42.7 - 76.0 %    Lymphocyte % 11.8 (L) 19.6 - 45.3 %    Monocyte % 7.9 5.0 - 12.0 %    Eosinophil % 0.3 0.3 - 6.2 %    Basophil % 0.1 0.0 - 1.5 %    Immature Grans % 0.2 0.0 - 0.5 %    Neutrophils, Absolute 11.25 (H) 1.90 - 8.10 10*3/mm3    Lymphocytes, Absolute 1.66 0.90 - 4.80 10*3/mm3    Monocytes, Absolute 1.12 0.20 - 1.20 10*3/mm3    Eosinophils, Absolute 0.04 0.00 - 0.70 10*3/mm3    Basophils, Absolute 0.01 0.00 - 0.20 10*3/mm3    Immature Grans, Absolute 0.03 0.00 - 0.03 10*3/mm3   Lactic Acid, Plasma    Collection Time: 01/18/17 12:27 PM   Result Value Ref Range    Lactate 2.5 (C) 0.5 - 2.0 mmol/L   Urinalysis With / Culture If Indicated    Collection Time: 01/18/17  1:05 PM   Result Value Ref Range    Color, UA Yellow Yellow, Straw    Appearance, UA Cloudy (A) Clear    pH, UA <=5.0 5.0 - 8.0    Specific Gravity, UA 1.023 1.005 - 1.030    Glucose, UA Negative Negative    Ketones, UA Negative Negative    Bilirubin, UA Negative Negative    Blood, UA Negative Negative    Protein, UA Negative Negative    Leuk Esterase, UA Negative Negative     Nitrite, UA Negative Negative    Urobilinogen, UA 0.2 E.U./dL 0.2 - 1.0 E.U./dL         All radiology images personally reviewed by me. Results as follows:  Imaging Results (last 72 hours)     Procedure Component Value Units Date/Time    XR Chest 2 View [[id number]] Collected:  01/18/17 1331     Updated:  01/18/17 1343    Narrative:       XR CHEST 2 VW-     HISTORY: Male who is 76 years-old,  short of breath     TECHNIQUE: Short of breath     COMPARISON: None available     FINDINGS: Heart, mediastinum and pulmonary vasculature are unremarkable.  Sternotomy wires are present. Low lung volume contributes to vascular  crowding. No focal pulmonary consolidation, pleural effusion, or  pneumothorax. No acute osseous process.       Impression:       No focal pulmonary consolidation. Follow-up as clinical  indications persist.     This report was finalized on 1/18/2017 1:40 PM by Dr. Prabhjot Mcarthur MD.       CT Head Without Contrast [[id number]] Collected:  01/18/17 1409     Updated:  01/18/17 1554    Narrative:       CT OF THE BRAIN WITHOUT CONTRAST 01/18/2017     HISTORY: Headache, weakness.     Axial images were obtained through the brain without intravenous  contrast.      FINDINGS: There is mild to moderate diffuse atrophy. There is decreased  attenuation of the periventricular white matter bilaterally consistent  with periventricular small vessel ischemic disease. More confluent area  of low density seen in the right superior parietal lobe on image 42  consistent with encephalomalacia possibly from an old infarction or  hemorrhage. There is a carl hole in the left posterior parietal bone.     There is no evidence of acute infarction, hemorrhage, midline shift or  mass effect. Small caliber compression plate is seen in the anterior  right maxillary sinus. There is right maxillary sinusitis and/or  hemorrhage. Anterior right maxillary sinus fracture is seen. These were  better seen on the facial bone CT of 01/09/2017.  Tiny amount of fluid is  seen in the dependent portion of the left maxillary sinus.       Impression:       1. No acute intracranial process identified.  2. Evidence of recent facial fractures and surgical repair. Please see  additional dictation for the CT of the facial bones from 01/09/2017     This report was finalized on 1/18/2017 3:51 PM by Dr. Jb Aly MD.                 Assessment:     New acute renal failure, on chronic kidney disease stage III.   high calcium and high serum albumin in the setting of poor oral intake suggest volume depletion  Hypotension, secondary to dehydration and blood pressure medications  History of hypertension, medicines on hold  Dehydration  Recent fall with multiple facial fractures and subsequent surgical repair   Hypercalcemia, likely related dehydration   Chronic kidney disease stage III  Leukocytosis, may be reactive.  Recheck in a.m. after hydration           Plaagree with current plans for IV fluids.  He did receive some initial fluid resuscitation in the emergency room as well  Check renal ultrasound, post void residual, urine electrolytes  Hold ACE inhibitor and blood pressure medications for now, monitor blood pressure trends   urinalysis was unremarkable    hold metformin, simvastatin  Renally dose allopurinol    Continue to monitor electrolytes and volume closely   Avoid IV contrast and nephrotoxic medications     Thank you very much for the referral.    I appreciate the opportunity to participate in this patient's care.  Please call with any questions or concerns.         Cristopher Beal M.D  [facility]  [phone]  1/18/2017        EMR Dragon/Transcription disclaimer:    Much of this encounter note is an electronic transcription/translation of spoken language to printed text. The electronic translation of spoken language may permit erroneous, or at times, nonsensical words or phrases to be inadvertently transcribed; Although I have reviewed  the note for such errors, some may still exist.

## 2017-01-18 NOTE — ED PROVIDER NOTES
"EMERGENCY DEPARTMENT ENCOUNTER    CHIEF COMPLAINT  Chief Complaint: generalized weakness  History given by: patient and spouse  History limited by: nothing  Room Number: 14/14  PMD: Adeline Onofre MD      HPI:  Pt is a 76 y.o. male who presents complaining generalized weakness, low BP, decreased PO intake, and darker-colored urine since he underwent outpatient surgery for facial fracture by Dr. Lester on 1/13/17 s/p fall four days prior. Pt takes 7.5 mg hydrocodone PRN for pain, with most recent dose 1/2 pill this morning. Pt states he visited PMD earlier today for symptoms, and she attempted to direct admit pt due to PNA (his lungs sounded \"rattly\"), hypotension (systolic in 80s), and dehydration. However, pt states there were no hospital beds available, so Dr. Deal sent pt to ED. Pt denies new SOA, new CP, abdominal pain, dysuria, vision changes, and diplopia. Pt uses walker for ambulation. He states he is weak and fatigued. Has been drinking and eating very little since surgery 5 days ago.     Duration:  Five days  Onset: gradual  Timing: constant  Location: generalized  Radiation: none  Quality: \"weak\"  Intensity/Severity: moderate  Progression: unchanged  Associated Symptoms: hypotension, decreased PO intake, darker-colored urine  Aggravating Factors: recent surgery  Alleviating Factors: none  Previous Episodes: none mentioned  Treatment before arrival: takes 7.5 mg Norco PRN for pain, most recent dose 1/2 pill this morning    PAST MEDICAL HISTORY  Active Ambulatory Problems     Diagnosis Date Noted   • Quadriceps weakness 04/08/2016   • ACL tear 04/08/2016   • Knee pain, right 04/08/2016   • S/P CABG x 3 04/18/2016   • Essential hypertension 04/18/2016   • Hyperlipemia 04/18/2016   • Hallux rigidus 07/11/2016   • Fracture, stress, metatarsal 07/11/2016   • Osteopenia determined by x-ray 07/11/2016   • Metatarsal stress fracture with nonunion 08/10/2016   • Left hip pain 08/26/2016   • Bursitis of left hip " 08/26/2016   • Pulmonary embolism 10/12/2016   • DALILA (acute kidney injury) 10/12/2016   • Diabetes mellitus type 2, controlled 10/12/2016   • Ascending aorta dilatation 10/12/2016   • Pulmonary nodule, left 10/12/2016   • Right leg DVT 10/13/2016     Resolved Ambulatory Problems     Diagnosis Date Noted   • No Resolved Ambulatory Problems     Past Medical History   Diagnosis Date   • Arthritis    • Asthma    • Brain abscess    • Bruise of face    • Cardiac disease    • Coronary artery disease    • Diabetes mellitus    • Hearing aid worn    • Hypertension    • Joint pain    • Orbit fracture        PAST SURGICAL HISTORY  Past Surgical History   Procedure Laterality Date   • Coronary artery bypass graft     • Brain surgery       BRAIN ABCESS 30 YR AGO   • Septoplasty     • Orif foot fracture Right 9/9/2016     Procedure: RIGHT SECOND METATARSAL OPEN REDUCTION INTERNAL FIXATION WITh GRAFT FROM HEEL ;  Surgeon: Man Jenkins MD;  [place] [facility] [place] [facility] [place];  Service:    • Tonsillectomy     • Skin cancer excision     • Orbital fracture open reduction internal fixation Right 1/13/2017     Procedure: RT ORBIT FLOOR FRACTURE REPAIR RIGHT ZMC FRACTURE REPAIR, RIGHT NASAL BONE FRACTURE CLOSED REDUCTION;  Surgeon: Edil Lester MD;  Location[place] [facility] [place];  Service:        FAMILY HISTORY  Family History   Problem Relation Age of Onset   • Heart disease Mother    • Hypertension Mother    • Heart disease Father    • Hypertension Father        SOCIAL HISTORY  Social History     Social History   • Marital status:      Spouse name: N/A   • Number of children: N/A   • Years of education: N/A     Occupational History   • Not on file.     Social History Main Topics   • Smoking status: Former Smoker     Types: Cigarettes   • Smokeless tobacco: Never Used      Comment: HISTORY OF 6 YRS USE IN EARLY 20'S   • Alcohol use No   • Drug use: No   • Sexual activity: Defer     Other Topics Concern   • Not on file      Social History Narrative       ALLERGIES  Other    REVIEW OF SYSTEMS  Review of Systems   Constitutional: Positive for appetite change (decreased PO intake). Negative for activity change and fever.   HENT: Negative for congestion and sore throat.    Eyes: Negative.  Negative for visual disturbance.   Respiratory: Negative for cough and shortness of breath.    Cardiovascular: Negative for chest pain and leg swelling.   Gastrointestinal: Negative for abdominal pain, diarrhea and vomiting.   Endocrine: Negative.    Genitourinary: Negative for decreased urine volume and dysuria.        Darker-colored urine   Musculoskeletal: Negative for neck pain.   Skin: Negative for rash and wound.   Allergic/Immunologic: Negative.    Neurological: Positive for weakness (generalized). Negative for numbness and headaches.   Hematological: Negative.    Psychiatric/Behavioral: Negative.    All other systems reviewed and are negative.      PHYSICAL EXAM  ED Triage Vitals   Temp Heart Rate Resp BP SpO2   01/18/17 1050 01/18/17 1050 -- -- 01/18/17 1050   97.7 °F (36.5 °C) 97   96 %      Temp src Heart Rate Source Patient Position BP Location FiO2 (%)   -- -- -- -- --              Physical Exam   Constitutional: He is oriented to person, place, and time and well-developed, well-nourished, and in no distress.   Appears tired and weak. Not septic or toxic.   HENT:   Head: Normocephalic and atraumatic.   Hard splint over nasal bridge to end of nose s/p surgery. Pt instructed to keep nasal bridge in place until reevaluation.     Bruising and swelling to bilateral infraorbital regions, consistent with typical post-op changes.    Swelling of bilateral maxilla (R>L).    There is a suture in place to superolateral aspect of mucosal surface on the right.    Eyes: EOM are normal. Pupils are equal, round, and reactive to light.   Neck: Normal range of motion. Neck supple.   Cardiovascular: Normal rate, regular rhythm and normal heart sounds.    No  murmur heard.  Pulmonary/Chest: Effort normal. No respiratory distress. He has rales in the right lower field.   BP is 111/59.   Abdominal: Soft. There is no tenderness. There is no rebound and no guarding.   Musculoskeletal: Normal range of motion. He exhibits no edema.   Neurological: He is alert and oriented to person, place, and time. He has normal sensation and normal strength.   Skin: Skin is warm and dry.   Psychiatric: Mood and affect normal.   Nursing note and vitals reviewed.      LAB RESULTS  Lab Results (last 24 hours)     Procedure Component Value Units Date/Time    CBC & Differential [[id number]] Collected:  01/18/17 1149    Specimen:  Blood Updated:  01/18/17 1201    Narrative:       The following orders were created for panel order CBC & Differential.  Procedure                               Abnormality         Status                     ---------                               -----------         ------                     CBC Auto Differential[[id number]]         Abnormal            Final result                 Please view results for these tests on the individual orders.    Comprehensive Metabolic Panel [[id number]]  (Abnormal) Collected:  01/18/17 1149    Specimen:  Blood Updated:  01/18/17 1226     Glucose 170 (H) mg/dL      BUN 59 (H) mg/dL      Creatinine 3.56 (H) mg/dL      Sodium 137 mmol/L      Potassium 3.7 mmol/L      Chloride 96 (L) mmol/L      CO2 23.7 mmol/L      Calcium 10.9 (H) mg/dL      Total Protein 8.1 g/dL      Albumin 4.40 g/dL      ALT (SGPT) 15 U/L      AST (SGOT) 16 U/L      Alkaline Phosphatase 74 U/L      Total Bilirubin 1.1 mg/dL      eGFR Non African Amer 17 (L) mL/min/1.73      Globulin 3.7 gm/dL      A/G Ratio 1.2 g/dL      BUN/Creatinine Ratio 16.6      Anion Gap 17.3 mmol/L     Narrative:       The MDRD GFR formula is only valid for adults with stable renal function between ages 18 and 70.    CK [[id number]]  (Normal) Collected:  01/18/17 1149    Specimen:  Blood Updated:   01/18/17 1223     Creatine Kinase 60 U/L     Troponin [[id number]]  (Normal) Collected:  01/18/17 1149    Specimen:  Blood Updated:  01/18/17 1223     Troponin T <0.010 ng/mL     Narrative:       Troponin T Reference Ranges:  Less than 0.03 ng/mL:    Negative for AMI  0.03 to 0.09 ng/mL:      Indeterminant for AMI  Greater than 0.09 ng/mL: Positive for AMI    Protime-INR [[id number]]  (Abnormal) Collected:  01/18/17 1149    Specimen:  Blood Updated:  01/18/17 1214     Protime 13.8 Seconds      INR 1.11 (H)     CBC Auto Differential [[id number]]  (Abnormal) Collected:  01/18/17 1149    Specimen:  Blood Updated:  01/18/17 1201     WBC 14.11 (H) 10*3/mm3      RBC 4.65 10*6/mm3      Hemoglobin 15.2 g/dL      Hematocrit 45.5 %      MCV 97.8 (H) fL      MCH 32.7 pg      MCHC 33.4 g/dL      RDW 13.0 %      RDW-SD 46.4 fl      MPV 11.0 fL      Platelets 263 10*3/mm3      Neutrophil % 79.7 (H) %      Lymphocyte % 11.8 (L) %      Monocyte % 7.9 %      Eosinophil % 0.3 %      Basophil % 0.1 %      Immature Grans % 0.2 %      Neutrophils, Absolute 11.25 (H) 10*3/mm3      Lymphocytes, Absolute 1.66 10*3/mm3      Monocytes, Absolute 1.12 10*3/mm3      Eosinophils, Absolute 0.04 10*3/mm3      Basophils, Absolute 0.01 10*3/mm3      Immature Grans, Absolute 0.03 10*3/mm3     Lactic Acid, Plasma [[id number]]  (Abnormal) Collected:  01/18/17 1227    Specimen:  Blood Updated:  01/18/17 1254     Lactate 2.5 (C) mmol/L     Urinalysis With / Culture If Indicated [[id number]]  (Abnormal) Collected:  01/18/17 1305    Specimen:  Urine from Urine, Clean Catch Updated:  01/18/17 1413     Color, UA Yellow      Appearance, UA Cloudy (A)      pH, UA <=5.0      Specific Gravity, UA 1.023      Glucose, UA Negative      Ketones, UA Negative      Bilirubin, UA Negative      Blood, UA Negative      Protein, UA Negative      Leuk Esterase, UA Negative      Nitrite, UA Negative      Urobilinogen, UA 0.2 E.U./dL     Narrative:       Urine microscopic not  indicated.    Blood Culture [[id number]] Collected:  01/18/17 1323    Specimen:  Blood from Arm, Left Updated:  01/18/17 1338    Blood Culture [[id number]] Collected:  01/18/17 1332    Specimen:  Blood from Arm, Right Updated:  01/18/17 1338          I ordered the above labs and reviewed the results    RADIOLOGY  CT Head Without Contrast   Preliminary Result   1. No acute intracranial process identified.   2. Evidence of recent facial fractures and surgical repair. Please see   additional dictation for the CT of the facial bones from 01/09/2017              XR Chest 2 View   Final Result   No focal pulmonary consolidation. Follow-up as clinical   indications persist.       This report was finalized on 1/18/2017 1:40 PM by Dr. Prabhjot Mcarthur MD.           CT head - negative  CXR - negative    I ordered the above noted radiological studies. Interpreted by radiologist. Discussed with radiologist. Reviewed by me in PACS.       PROCEDURES  Procedures    EKG  EKG time: 1133   Rhythm/Rate: NSR, 88 BPM  P waves and IA: nml  QRS, axis: narrow QRS, Q waves in lead 3 and aVF  ST and T waves: nonspecific ST/T wave changes   Borderline prolonged QT    Interpreted Contemporaneously by me, independently viewed  Longer QT compared to prior (10/12/16)    PROGRESS AND CONSULTS  ED Course     11:25 AM: Ordered UA, PT-INR, troponin, CK, bloodwork, CXR, and EKG for further evaluation. Placed call to Dr. Onofre    11:38 AM:  Discussed pt's case with Dr. Onofre, who requests CT head and that pt be started on antibiotic for pneumonia from likely aspiration. .    11:39 AM: Placed call to A for admission. Added lactate and CT head to workup    11:41 AM:  Discussed pt's case with Dr. Pantoja, who agrees to admit pt to telemetry. He will watch labs closely.     12:39 PM: Ordered blood culture for further evaluation. Ordered zosyn for infection    1:11 PM:  BP: 151/80 HR: 82 Temp: 97.7 °F (36.5 °C) O2 sat: 96% on RA  Rechecked pt. Pt is  resting comfortably and appears in NAD. Looking and feeling better.  Notified pt that Dr. Onofre requests that pt be started on abx for aspiration pneumonia. D/w pt plan for admission. Pt understands and agrees with the plan, all questions answered.     MEDICAL DECISION MAKING  Results were reviewed/discussed with the patient and they were also made aware of online access. Pt also made aware that some labs, such as cultures, will not be resulted during ER visit and follow up with PMD is necessary.     MDM  Number of Diagnoses or Management Options  Acute renal failure, unspecified acute renal failure type:   Aspiration pneumonia of left lower lobe, unspecified aspiration pneumonia type:      Amount and/or Complexity of Data Reviewed  Clinical lab tests: reviewed and ordered (WBC=14.11, BUN=59, Creatinine=3.56)  Tests in the radiology section of CPT®: reviewed and ordered (CXR, CT head)  Tests in the medicine section of CPT®: reviewed and ordered (See EKG procedure note)  Decide to obtain previous medical records or to obtain history from someone other than the patient: yes  Review and summarize past medical records: yes (On 1/13/17, pt had surgery by Dr. Lester to repair R orbit floor fx, R ZMC fx, and R nasal nasal bone fx. )  Discuss the patient with other providers: yes (Dr. Onofre (PMD), Dr. Pantoja (Hospitalist))  Independent visualization of images, tracings, or specimens: yes           DIAGNOSIS  Final diagnoses:   Acute renal failure, unspecified acute renal failure type   Aspiration pneumonia of left lower lobe, unspecified aspiration pneumonia type       DISPOSITION  ADMISSION    Discussed treatment plan and reason for admission with pt/family and admitting physician.  Pt/family voiced understanding of the plan for admission for further testing/treatment as needed.       Latest Documented Vital Signs:  As of 2:54 PM  BP- 153/72 HR- 80 Temp- 97.9 °F (36.6 °C) (Tympanic) O2 sat- 99%    --  Documentation  assistance provided by laura Perez for Dr. Moran. Information recorded by the scribe was done at my direction and has been verified and validated by me.     Manuel Perez  01/18/17 6081       Casa Moran MD  01/18/17 6721

## 2017-01-18 NOTE — PLAN OF CARE
Problem: Patient Care Overview (Adult)  Goal: Plan of Care Review  Outcome: Ongoing (interventions implemented as appropriate)    01/18/17 1632   Coping/Psychosocial Response Interventions   Plan Of Care Reviewed With patient   Patient Care Overview   Progress no change   Outcome Evaluation   Outcome Summary/Follow up Plan New Admit. High falls risk.        Goal: Adult Individualization and Mutuality  Outcome: Ongoing (interventions implemented as appropriate)  Goal: Discharge Needs Assessment  Outcome: Ongoing (interventions implemented as appropriate)

## 2017-01-19 LAB
ALBUMIN SERPL-MCNC: 3.3 G/DL (ref 3.5–5.2)
ANION GAP SERPL CALCULATED.3IONS-SCNC: 17.2 MMOL/L
BH CV ECHO MEAS - ACS: 1.2 CM
BH CV ECHO MEAS - AI DEC SLOPE: 295 CM/SEC^2
BH CV ECHO MEAS - AI MAX PG: 65.3 MMHG
BH CV ECHO MEAS - AI MAX VEL: 404 CM/SEC
BH CV ECHO MEAS - AI P1/2T: 401.1 MSEC
BH CV ECHO MEAS - AO MAX PG (FULL): 17 MMHG
BH CV ECHO MEAS - AO MEAN PG (FULL): 6 MMHG
BH CV ECHO MEAS - AO MEAN PG: 9 MMHG
BH CV ECHO MEAS - AO ROOT AREA (BSA CORRECTED): 1.6
BH CV ECHO MEAS - AO ROOT AREA: 9.1 CM^2
BH CV ECHO MEAS - AO ROOT DIAM: 3.4 CM
BH CV ECHO MEAS - AO V2 MAX: 204 CM/SEC
BH CV ECHO MEAS - AO V2 MEAN: 142 CM/SEC
BH CV ECHO MEAS - AO V2 VTI: 37.3 CM
BH CV ECHO MEAS - ASC AORTA: 4.1 CM
BH CV ECHO MEAS - AVA(I,A): 2.2 CM^2
BH CV ECHO MEAS - AVA(I,D): 2.2 CM^2
BH CV ECHO MEAS - BSA(HAYCOCK): 2.1 M^2
BH CV ECHO MEAS - BSA: 2.1 M^2
BH CV ECHO MEAS - BZI_BMI: 28 KILOGRAMS/M^2
BH CV ECHO MEAS - BZI_METRIC_HEIGHT: 177.8 CM
BH CV ECHO MEAS - BZI_METRIC_WEIGHT: 88.5 KG
BH CV ECHO MEAS - CONTRAST EF (2CH): 48.6 ML/M^2
BH CV ECHO MEAS - CONTRAST EF 4CH: 56.8 ML/M^2
BH CV ECHO MEAS - EDV(CUBED): 97.3 ML
BH CV ECHO MEAS - EDV(MOD-SP2): 109 ML
BH CV ECHO MEAS - EDV(MOD-SP4): 146 ML
BH CV ECHO MEAS - EDV(TEICH): 97.3 ML
BH CV ECHO MEAS - EF(CUBED): 79.8 %
BH CV ECHO MEAS - EF(MOD-SP2): 48.6 %
BH CV ECHO MEAS - EF(MOD-SP4): 56.8 %
BH CV ECHO MEAS - EF(TEICH): 72.2 %
BH CV ECHO MEAS - ESV(CUBED): 19.7 ML
BH CV ECHO MEAS - ESV(MOD-SP2): 56 ML
BH CV ECHO MEAS - ESV(MOD-SP4): 63 ML
BH CV ECHO MEAS - ESV(TEICH): 27 ML
BH CV ECHO MEAS - FS: 41.3 %
BH CV ECHO MEAS - IVS/LVPW: 1.1
BH CV ECHO MEAS - IVSD: 1 CM
BH CV ECHO MEAS - LA DIMENSION: 5 CM
BH CV ECHO MEAS - LA/AO: 1.5
BH CV ECHO MEAS - LAT PEAK E' VEL: 9 CM/SEC
BH CV ECHO MEAS - LV DIASTOLIC VOL/BSA (35-75): 70.7 ML/M^2
BH CV ECHO MEAS - LV MASS(C)D: 148.1 GRAMS
BH CV ECHO MEAS - LV MASS(C)DI: 71.7 GRAMS/M^2
BH CV ECHO MEAS - LV MEAN PG: 3 MMHG
BH CV ECHO MEAS - LV SYSTOLIC VOL/BSA (12-30): 30.5 ML/M^2
BH CV ECHO MEAS - LV V1 MAX: 121 CM/SEC
BH CV ECHO MEAS - LV V1 MEAN: 79.7 CM/SEC
BH CV ECHO MEAS - LV V1 VTI: 22 CM
BH CV ECHO MEAS - LVIDD: 4.6 CM
BH CV ECHO MEAS - LVIDS: 2.7 CM
BH CV ECHO MEAS - LVLD AP2: 7.8 CM
BH CV ECHO MEAS - LVLD AP4: 7.7 CM
BH CV ECHO MEAS - LVLS AP2: 6.9 CM
BH CV ECHO MEAS - LVLS AP4: 6.5 CM
BH CV ECHO MEAS - LVOT AREA (M): 3.8 CM^2
BH CV ECHO MEAS - LVOT AREA: 3.8 CM^2
BH CV ECHO MEAS - LVOT DIAM: 2.2 CM
BH CV ECHO MEAS - LVPWD: 0.9 CM
BH CV ECHO MEAS - MED PEAK E' VEL: 6 CM/SEC
BH CV ECHO MEAS - MR MAX PG: 101.2 MMHG
BH CV ECHO MEAS - MR MAX VEL: 503 CM/SEC
BH CV ECHO MEAS - MR MEAN PG: 57 MMHG
BH CV ECHO MEAS - MR MEAN VEL: 346 CM/SEC
BH CV ECHO MEAS - MR VTI: 130 CM
BH CV ECHO MEAS - MV A DUR: 0.16 SEC
BH CV ECHO MEAS - MV A MAX VEL: 127 CM/SEC
BH CV ECHO MEAS - MV DEC SLOPE: 217 CM/SEC^2
BH CV ECHO MEAS - MV DEC TIME: 0.26 SEC
BH CV ECHO MEAS - MV E MAX VEL: 77.9 CM/SEC
BH CV ECHO MEAS - MV E/A: 0.61
BH CV ECHO MEAS - MV MEAN PG: 2 MMHG
BH CV ECHO MEAS - MV P1/2T MAX VEL: 74.3 CM/SEC
BH CV ECHO MEAS - MV P1/2T: 100.3 MSEC
BH CV ECHO MEAS - MV V2 MEAN: 67.4 CM/SEC
BH CV ECHO MEAS - MV V2 VTI: 27.5 CM
BH CV ECHO MEAS - MVA P1/2T LCG: 3 CM^2
BH CV ECHO MEAS - MVA(P1/2T): 2.2 CM^2
BH CV ECHO MEAS - MVA(VTI): 3 CM^2
BH CV ECHO MEAS - PA ACC SLOPE: 31.8 CM/SEC^2
BH CV ECHO MEAS - PA ACC TIME: 0.1 SEC
BH CV ECHO MEAS - PA MAX PG (FULL): 1.1 MMHG
BH CV ECHO MEAS - PA MAX PG: 4.3 MMHG
BH CV ECHO MEAS - PA PR(ACCEL): 34.9 MMHG
BH CV ECHO MEAS - PA V2 MAX: 104 CM/SEC
BH CV ECHO MEAS - PULM A REVS DUR: 0.1 SEC
BH CV ECHO MEAS - PULM A REVS VEL: 23.9 CM/SEC
BH CV ECHO MEAS - PULM DIAS VEL: 44.7 CM/SEC
BH CV ECHO MEAS - PULM S/D: 1.1
BH CV ECHO MEAS - PULM SYS VEL: 48.8 CM/SEC
BH CV ECHO MEAS - RAP SYSTOLE: 3 MMHG
BH CV ECHO MEAS - RV MAX PG: 3.3 MMHG
BH CV ECHO MEAS - RV MEAN PG: 2 MMHG
BH CV ECHO MEAS - RV V1 MAX: 90.2 CM/SEC
BH CV ECHO MEAS - RV V1 MEAN: 59.2 CM/SEC
BH CV ECHO MEAS - RV V1 VTI: 15.7 CM
BH CV ECHO MEAS - RVSP: 32 MMHG
BH CV ECHO MEAS - SI(AO): 164 ML/M^2
BH CV ECHO MEAS - SI(CUBED): 37.6 ML/M^2
BH CV ECHO MEAS - SI(LVOT): 40.5 ML/M^2
BH CV ECHO MEAS - SI(MOD-SP2): 25.7 ML/M^2
BH CV ECHO MEAS - SI(MOD-SP4): 40.2 ML/M^2
BH CV ECHO MEAS - SI(TEICH): 34.1 ML/M^2
BH CV ECHO MEAS - SV(AO): 338.7 ML
BH CV ECHO MEAS - SV(CUBED): 77.7 ML
BH CV ECHO MEAS - SV(LVOT): 83.6 ML
BH CV ECHO MEAS - SV(MOD-SP2): 53 ML
BH CV ECHO MEAS - SV(MOD-SP4): 83 ML
BH CV ECHO MEAS - SV(TEICH): 70.3 ML
BH CV ECHO MEAS - TAPSE (>1.6): 1.5 CM2
BH CV ECHO MEAS - TR MAX VEL: 267 CM/SEC
BH CV XLRA - RV BASE: 4.1 CM
BH CV XLRA - TDI S': 12 CM/SEC
BUN BLD-MCNC: 42 MG/DL (ref 8–23)
BUN/CREAT SERPL: 21.1 (ref 7–25)
CALCIUM SPEC-SCNC: 9.3 MG/DL (ref 8.6–10.5)
CHLORIDE SERPL-SCNC: 102 MMOL/L (ref 98–107)
CHLORIDE UR-SCNC: 70 MMOL/L
CO2 SERPL-SCNC: 18.8 MMOL/L (ref 22–29)
CREAT BLD-MCNC: 1.99 MG/DL (ref 0.76–1.27)
CREAT UR-MCNC: 58 MG/DL
D-LACTATE SERPL-SCNC: 1.3 MMOL/L (ref 0.5–2)
DEPRECATED RDW RBC AUTO: 46.4 FL (ref 37–54)
E/E' RATIO: 6
EOSINOPHIL SPEC QL MICRO: 0 % EOS/100 CELLS (ref 0–0)
ERYTHROCYTE [DISTWIDTH] IN BLOOD BY AUTOMATED COUNT: 12.7 % (ref 11.5–14.5)
GFR SERPL CREATININE-BSD FRML MDRD: 33 ML/MIN/1.73
GLUCOSE BLD-MCNC: 134 MG/DL (ref 65–99)
HBA1C MFR BLD: 6.29 % (ref 4.8–5.6)
HCT VFR BLD AUTO: 40.4 % (ref 40.4–52.2)
HGB BLD-MCNC: 13.1 G/DL (ref 13.7–17.6)
LEFT ATRIUM VOLUME INDEX: 34 ML/M2
MCH RBC QN AUTO: 32 PG (ref 27–32.7)
MCHC RBC AUTO-ENTMCNC: 32.4 G/DL (ref 32.6–36.4)
MCV RBC AUTO: 98.5 FL (ref 79.8–96.2)
PHOSPHATE SERPL-MCNC: 2.5 MG/DL (ref 2.5–4.5)
PLATELET # BLD AUTO: 218 10*3/MM3 (ref 140–500)
PMV BLD AUTO: 10.7 FL (ref 6–12)
POTASSIUM BLD-SCNC: 3.7 MMOL/L (ref 3.5–5.2)
PROCALCITONIN SERPL-MCNC: 0.13 NG/ML (ref 0.1–0.25)
PROT UR-MCNC: 6 MG/DL
RBC # BLD AUTO: 4.1 10*6/MM3 (ref 4.6–6)
SODIUM BLD-SCNC: 138 MMOL/L (ref 136–145)
SODIUM UR-SCNC: 93 MMOL/L
URATE SERPL-MCNC: 6.3 MG/DL (ref 3.4–7)
WBC NRBC COR # BLD: 9.03 10*3/MM3 (ref 4.5–10.7)

## 2017-01-19 PROCEDURE — 97110 THERAPEUTIC EXERCISES: CPT

## 2017-01-19 PROCEDURE — 25010000002 ENOXAPARIN PER 10 MG: Performed by: INTERNAL MEDICINE

## 2017-01-19 PROCEDURE — 92610 EVALUATE SWALLOWING FUNCTION: CPT

## 2017-01-19 PROCEDURE — 97162 PT EVAL MOD COMPLEX 30 MIN: CPT

## 2017-01-19 PROCEDURE — 99221 1ST HOSP IP/OBS SF/LOW 40: CPT | Performed by: INTERNAL MEDICINE

## 2017-01-19 PROCEDURE — 83605 ASSAY OF LACTIC ACID: CPT | Performed by: INTERNAL MEDICINE

## 2017-01-19 PROCEDURE — 84145 PROCALCITONIN (PCT): CPT | Performed by: INTERNAL MEDICINE

## 2017-01-19 PROCEDURE — 94640 AIRWAY INHALATION TREATMENT: CPT

## 2017-01-19 PROCEDURE — 84550 ASSAY OF BLOOD/URIC ACID: CPT | Performed by: INTERNAL MEDICINE

## 2017-01-19 PROCEDURE — 83036 HEMOGLOBIN GLYCOSYLATED A1C: CPT | Performed by: INTERNAL MEDICINE

## 2017-01-19 PROCEDURE — 94799 UNLISTED PULMONARY SVC/PX: CPT

## 2017-01-19 PROCEDURE — 85027 COMPLETE CBC AUTOMATED: CPT | Performed by: INTERNAL MEDICINE

## 2017-01-19 PROCEDURE — 80069 RENAL FUNCTION PANEL: CPT | Performed by: INTERNAL MEDICINE

## 2017-01-19 RX ORDER — NICOTINE POLACRILEX 4 MG
15 LOZENGE BUCCAL
Status: DISCONTINUED | OUTPATIENT
Start: 2017-01-19 | End: 2017-01-21 | Stop reason: HOSPADM

## 2017-01-19 RX ORDER — DEXTROSE MONOHYDRATE 25 G/50ML
25 INJECTION, SOLUTION INTRAVENOUS
Status: DISCONTINUED | OUTPATIENT
Start: 2017-01-19 | End: 2017-01-21 | Stop reason: HOSPADM

## 2017-01-19 RX ADMIN — ERGOCALCIFEROL 50000 UNITS: 1.25 CAPSULE ORAL at 08:59

## 2017-01-19 RX ADMIN — ENOXAPARIN SODIUM 30 MG: 60 INJECTION SUBCUTANEOUS at 08:58

## 2017-01-19 RX ADMIN — CHLORHEXIDINE GLUCONATE 15 ML: 1.2 RINSE ORAL at 15:41

## 2017-01-19 RX ADMIN — ERYTHROMYCIN: 5 OINTMENT OPHTHALMIC at 17:30

## 2017-01-19 RX ADMIN — BUDESONIDE AND FORMOTEROL FUMARATE DIHYDRATE 2 PUFF: 160; 4.5 AEROSOL RESPIRATORY (INHALATION) at 21:36

## 2017-01-19 RX ADMIN — CHLORHEXIDINE GLUCONATE 15 ML: 1.2 RINSE ORAL at 20:30

## 2017-01-19 RX ADMIN — SODIUM CHLORIDE 100 ML/HR: 9 INJECTION, SOLUTION INTRAVENOUS at 17:55

## 2017-01-19 RX ADMIN — ERYTHROMYCIN: 5 OINTMENT OPHTHALMIC at 08:59

## 2017-01-19 RX ADMIN — BUDESONIDE AND FORMOTEROL FUMARATE DIHYDRATE 2 PUFF: 160; 4.5 AEROSOL RESPIRATORY (INHALATION) at 08:46

## 2017-01-19 RX ADMIN — ALLOPURINOL 100 MG: 100 TABLET ORAL at 08:59

## 2017-01-19 RX ADMIN — CHLORHEXIDINE GLUCONATE 15 ML: 1.2 RINSE ORAL at 08:59

## 2017-01-19 NOTE — PLAN OF CARE
Problem: Patient Care Overview (Adult)  Goal: Plan of Care Review  Outcome: Ongoing (interventions implemented as appropriate)    01/19/17 9960   Coping/Psychosocial Response Interventions   Plan Of Care Reviewed With patient   Patient Care Overview   Progress progress towards functional goals is fair   Outcome Evaluation   Outcome Summary/Follow up Plan GI consult for swallowing issues/ Strict I and O/ No c/o of pain or SOB       Goal: Adult Individualization and Mutuality  Outcome: Ongoing (interventions implemented as appropriate)  Goal: Discharge Needs Assessment  Outcome: Ongoing (interventions implemented as appropriate)    Problem: Renal Failure/Kidney Injury, Acute (Adult)  Goal: Signs and Symptoms of Listed Potential Problems Will be Absent or Manageable (Renal Failure/Kidney Injury, Acute)  Outcome: Ongoing (interventions implemented as appropriate)

## 2017-01-19 NOTE — PLAN OF CARE
Problem: Patient Care Overview (Adult)  Goal: Plan of Care Review  Outcome: Ongoing (interventions implemented as appropriate)    01/18/17 1955   Coping/Psychosocial Response Interventions   Plan Of Care Reviewed With patient;spouse   Patient Care Overview   Progress no change   Outcome Evaluation   Outcome Summary/Follow up Plan New Admit       Goal: Adult Individualization and Mutuality  Outcome: Ongoing (interventions implemented as appropriate)  Goal: Discharge Needs Assessment  Outcome: Ongoing (interventions implemented as appropriate)

## 2017-01-19 NOTE — PROGRESS NOTES
Acute Care - Physical Therapy Initial Evaluation  [facility]     Patient Name: Eugenio Joe  : 1940  MRN: [id number]  Today's Date: 2017   Onset of Illness/Injury or Date of Surgery Date: 17            Admit Date: 2017     Visit Dx:    ICD-10-CM ICD-9-CM   1. Acute renal failure, unspecified acute renal failure type N17.9 584.9   2. Aspiration pneumonia of left lower lobe, unspecified aspiration pneumonia type J69.0 507.0     Patient Active Problem List   Diagnosis   • Quadriceps weakness   • ACL tear   • Knee pain, right   • S/P CABG x 3   • Essential hypertension   • Hyperlipemia   • Hallux rigidus   • Fracture, stress, metatarsal   • Osteopenia determined by x-ray   • Metatarsal stress fracture with nonunion   • Left hip pain   • Bursitis of left hip   • Pulmonary embolism   • DALILA (acute kidney injury)   • Diabetes mellitus type 2, controlled   • Ascending aorta dilatation   • Pulmonary nodule, left   • Right leg DVT   • Acute renal failure   • Hypotension   • Dehydration   • Hypercalcemia     Past Medical History   Diagnosis Date   • Arthritis    • Asthma    • Brain abscess      at about age 44   • Bruise of face    • Cardiac disease    • Coronary artery disease      CABG    • Diabetes mellitus    • Hearing aid worn      bilateral   • Hypertension    • Joint pain      or swelling   • Orbit fracture    • Pulmonary embolism      OCT 2016     Past Surgical History   Procedure Laterality Date   • Coronary artery bypass graft     • Brain surgery       BRAIN ABCESS 30 YR AGO   • Septoplasty     • Orif foot fracture Right 2016     Procedure: RIGHT SECOND METATARSAL OPEN REDUCTION INTERNAL FIXATION WITh GRAFT FROM HEEL ;  Surgeon: Mna Jenkins MD;  Location: [facility] OR [facility] [place];  Service:    • Tonsillectomy     • Skin cancer excision     • Orbital fracture open reduction internal fixation Right 2017     Procedure: RT ORBIT FLOOR FRACTURE REPAIR RIGHT ZMC FRACTURE REPAIR,  RIGHT NASAL BONE FRACTURE CLOSED REDUCTION;  Surgeon: Edil Lester MD;  Location:  [facility];  Service:           PT ASSESSMENT (last 72 hours)      PT Evaluation       01/19/17 0953 01/18/17 1557    Rehab Evaluation    Document Type evaluation  -AR     Subjective Information agree to therapy  -AR     Patient Effort, Rehab Treatment good  -AR     General Information    Patient Profile Review yes  -AR     Onset of Illness/Injury or Date of Surgery Date 01/18/17  -AR     Pertinent History Of Current Problem h/o foot MD josr recommends not walking barefoot anymore  -AR     Precautions/Limitations fall precautions  -AR     Prior Level of Function min assist:  -AR     Equipment Currently Used at Home walker, rolling  -AR     Barriers to Rehab none identified  -AR     Living Environment    Lives With spouse  -AR spouse  -JW    Living Arrangements house  -AR house  -JW    Home Accessibility stairs to enter home;stairs within home  -AR no concerns  -JW    Number of Stairs to Enter Home 3  -AR     Number of Stairs Within Home 15  -AR     Stair Railings at Home inside, present on left side  -AR none  -JW    Type of Financial/Environmental Concern  none  -JW    Transportation Available  car  -JW    Clinical Impression    Patient/Family Goals Statement DC home  -AR     Criteria for Skilled Therapeutic Interventions Met yes  -AR     Pathology/Pathophysiology Noted (Describe Specifically for Each System) musculoskeletal  -AR     Impairments Found (describe specific impairments) aerobic capacity/endurance;gait, locomotion, and balance  -AR     Rehab Potential good, to achieve stated therapy goals  -AR     Pain Assessment    Pain Assessment No/denies pain  -AR     Cognitive Assessment/Intervention    Current Cognitive/Communication Assessment functional  -AR     Orientation Status oriented x 4  -AR     Follows Commands/Answers Questions 100% of the time  -AR     Personal Safety WNL/WFL  -AR     Personal Safety  Interventions gait belt;fall prevention program maintained;supervised activity  -AR     ROM (Range of Motion)    General ROM --   B LE WFL  -AR     MMT (Manual Muscle Testing)    General MMT Assessment --   B LE 4/5  -AR     Bed Mobility, Assessment/Treatment    Bed Mobility, Assistive Device head of bed elevated  -AR     Bed Mob, Supine to Sit, Darke supervision required  -AR     Bed Mob, Sit to Supine, [place] not tested  -AR     Transfer Assessment/Treatment    Transfers, Sit-Stand [place] contact guard assist  -AR     Transfers, Stand-Sit [place] contact guard assist  -AR     Transfers, Sit-Stand-Sit, Assist Device rolling walker  -AR     Transfer, Comment verbal cues for UE placement  -AR     Gait Assessment/Treatment    Gait, [place] Level contact guard assist;stand by assist  -AR     Gait, Assistive Device rolling walker  -AR     Gait, Distance (Feet) 300  -AR     Gait, Gait Deviations nenita decreased;decreased heel strike;forward flexed posture  -AR     Gait, Safety Issues step length decreased;weight-shifting ability decreased  -AR     Gait, Impairments impaired balance;strength decreased  -AR     Gait, Comment slow shuffling steps.  CGA improved to SBA  -AR     Positioning and Restraints    Pre-Treatment Position in bed  -AR     Post Treatment Position chair  -AR     In Chair sitting;call light within reach;encouraged to call for assist;with family/caregiver  -AR       01/18/17 7158       General Information    Equipment Currently Used at Home walker, rolling;glucometer;respiratory supplies;grab bar  -       User Key  (r) = Recorded By, (t) = Taken By, (c) = Cosigned By    Initials Name Provider Type    LIZ Galindo PT Physical Therapist    ALVERTO Hernandez, RN Registered Nurse          Physical Therapy Education     Title: PT OT SLP Therapies (Done)     Topic: Physical Therapy (Done)     Point: Mobility training (Done)    Learning Progress Summary    Learner Readiness  Method Response Comment Documented by Status   Patient Acceptance E VU  AR 01/19/17 0957 Done   Family Acceptance E VU  AR 01/19/17 0957 Done               Point: Home exercise program (Done)    Learning Progress Summary    Learner Readiness Method Response Comment Documented by Status   Patient Acceptance E VU  AR 01/19/17 0957 Done   Family Acceptance E VU  AR 01/19/17 0957 Done               Point: Body mechanics (Done)    Learning Progress Summary    Learner Readiness Method Response Comment Documented by Status   Patient Acceptance E VU  AR 01/19/17 0957 Done   Family Acceptance E VU  AR 01/19/17 0957 Done               Point: Precautions (Done)    Learning Progress Summary    Learner Readiness Method Response Comment Documented by Status   Patient Acceptance E VU  AR 01/19/17 0957 Done   Family Acceptance E VU  AR 01/19/17 0957 Done                      User Key     Initials Effective Dates Name Provider Type Discipline    AR 06/27/16 -  Ana Luisa Galindo PT Physical Therapist PT                PT Recommendation and Plan  Anticipated Discharge Disposition: home with assist, home with home health  Planned Therapy Interventions: balance training, bed mobility training, gait training, home exercise program, patient/family education, ROM (Range of Motion), stair training, strengthening  PT Frequency: 5 times/wk  Plan of Care Review  Plan Of Care Reviewed With: patient  Progress: no change  Outcome Summary/Follow up Plan: Pt demonstrates impaired strength, balance, gait pattern, and activity tolerance; PT to maximize independence.  Discussed walking/activity recommendations while in hospital.           IP PT Goals       01/19/17 0956          Transfer Training PT LTG    Transfer Training PT LTG, Date Established 01/19/17  -AR      Transfer Training PT LTG, Time to Achieve 1 wk  -AR      Transfer Training PT LTG, Activity Type sit to stand/stand to sit  -AR      Transfer Training PT LTG, [place] Level  conditional independence  -AR      Transfer Training PT LTG, Assist Device walker, rolling  -AR      Gait Training PT LTG    Gait Training Goal PT LTG, Date Established 01/19/17  -AR      Gait Training Goal PT LTG, Time to Achieve 1 wk  -AR      Gait Training Goal PT LTG, [place] Level supervision required  -AR      Gait Training Goal PT LTG, Assist Device walker, rolling  -AR      Gait Training Goal PT LTG, Distance to Achieve 300  -AR      Stair Training PT LTG    Stair Training Goal PT LTG, Date Established 01/19/17  -AR      Stair Training Goal PT LTG, Time to Achieve 1 wk  -AR      Stair Training Goal PT LTG, Number of Steps 3  -AR      Stair Training Goal PT LTG, [place] Level contact guard assist  -AR      Stair Training Goal PT LTG, Assist Device 1 handrail  -AR        User Key  (r) = Recorded By, (t) = Taken By, (c) = Cosigned By    Initials Name Provider Type    LIZ Galindo PT Physical Therapist                Outcome Measures       01/19/17 0900          How much help from another person do you currently need...    Turning from your back to your side while in flat bed without using bedrails? 4  -AR      Moving from lying on back to sitting on the side of a flat bed without bedrails? 4  -AR      Moving to and from a bed to a chair (including a wheelchair)? 3  -AR      Standing up from a chair using your arms (e.g., wheelchair, bedside chair)? 3  -AR      Climbing 3-5 steps with a railing? 3  -AR      To walk in hospital room? 3  -AR      AM-PAC 6 Clicks Score 20  -AR      Functional Assessment    Outcome Measure Options AM-PAC 6 Clicks Basic Mobility (PT)  -AR        User Key  (r) = Recorded By, (t) = Taken By, (c) = Cosigned By    Initials Name Provider Type    LIZ Galindo PT Physical Therapist           Time Calculation:         PT Charges       01/19/17 0957          Time Calculation    Start Time 0929  -AR      Stop Time 0957  -AR      Time Calculation (min) 28 min  -AR       PT Received On 01/19/17  -AR      PT - Next Appointment 01/20/17  -AR      PT Goal Re-Cert Due Date 01/26/17  -AR        User Key  (r) = Recorded By, (t) = Taken By, (c) = Cosigned By    Initials Name Provider Type    LIZ Galindo PT Physical Therapist          Therapy Charges for Today     Code Description Service Date Service Provider Modifiers Qty    31494501115 HC PT EVAL MOD COMPLEXITY 2 1/19/2017 Ana Luisa Galindo, PT GP 1    79334918618 HC PT THER PROC EA 15 MIN 1/19/2017 Ana Luisa Galindo, PT GP 1          PT G-Codes  Outcome Measure Options: AM-PAC 6 Clicks Basic Mobility (PT)      Ana Luisa Galindo PT  1/19/2017

## 2017-01-19 NOTE — PLAN OF CARE
Problem: Patient Care Overview (Adult)  Goal: Plan of Care Review    01/19/17 0956   Coping/Psychosocial Response Interventions   Plan Of Care Reviewed With patient   Outcome Evaluation   Outcome Summary/Follow up Plan Pt demonstrates impaired strength, balance, gait pattern, and activity tolerance; PT to maximize independence. Discussed walking/activity recommendations while in hospital.          Problem: Inpatient Physical Therapy  Goal: Transfer Training Goal 1 LTG- PT    01/19/17 0956   Transfer Training PT LTG   Transfer Training PT LTG, Date Established 01/19/17   Transfer Training PT LTG, Time to Achieve 1 wk   Transfer Training PT LTG, Activity Type sit to stand/stand to sit   Transfer Training PT LTG, [place] Level conditional independence   Transfer Training PT LTG, Assist Device walker, rolling       Goal: Gait Training Goal LTG- PT    01/19/17 0956   Gait Training PT LTG   Gait Training Goal PT LTG, Date Established 01/19/17   Gait Training Goal PT LTG, Time to Achieve 1 wk   Gait Training Goal PT LTG, [place] Level supervision required   Gait Training Goal PT LTG, Assist Device walker, rolling   Gait Training Goal PT LTG, Distance to Achieve 300       Goal: Stair Training Goal LTG- PT    01/19/17 0956   Stair Training PT LTG   Stair Training Goal PT LTG, Date Established 01/19/17   Stair Training Goal PT LTG, Time to Achieve 1 wk   Stair Training Goal PT LTG, Number of Steps 3   Stair Training Goal PT LTG, [place] Level contact guard assist   Stair Training Goal PT LTG, Assist Device 1 handrail

## 2017-01-19 NOTE — PROGRESS NOTES
"   LOS: 1 day   Patient Care Team:  Adeline Onofre MD as PCP - General  Adeline Onofre MD as PCP - Family Medicine  Jean Ford MD as Consulting Physician (Cardiology)    Chief Complaint/ Reason for encounter: Acute renal failure  Chief Complaint   Patient presents with   • Hypotension     Patient seen at PCP office today, sent to ER for pna, hypotension, and dehydration         Subjective     History of Present Illness    Subjective:  Symptoms:  Improved.  No shortness of breath or chest pain.  (No Complaints today  He feels better, appetite improved  No edema or shortness of breath).    Diet:  Adequate intake.  No nausea.    Activity level: Normal.    Pain:  He reports no pain.          History taken from: Patient and chart    Objective     Vital Signs  Temp:  [97.1 °F (36.2 °C)-98.1 °F (36.7 °C)] 97.6 °F (36.4 °C)  Heart Rate:  [78-88] 85  Resp:  [18] 18  BP: (137-149)/(67-76) 137/67       Wt Readings from Last 1 Encounters:   01/19/17 1325 195 lb (88.5 kg)   01/18/17 1604 195 lb (88.5 kg)   01/18/17 1106 195 lb (88.5 kg)       Objective:  General Appearance:  Comfortable, well-appearing, in no acute distress and not in pain.    Vital signs: (most recent): Blood pressure 137/67, pulse 85, temperature 97.6 °F (36.4 °C), temperature source Oral, resp. rate 18, height 70\" (177.8 cm), weight 195 lb (88.5 kg), SpO2 96 %.  Vital signs are normal.  No fever.    Output: Producing urine.    HEENT: Normal HEENT exam.  (Right-sided facial swelling, perorbital edema  Nasal brace in place)    Lungs:  Normal respiratory rate and normal effort.  He is not in respiratory distress.  There are decreased breath sounds.  No wheezes or rales.    Heart: Normal rate.  Regular rhythm.  No murmur.   Abdomen: Abdomen is soft and non-distended.    Extremities: Normal range of motion.  There is no deformity or dependent edema.    Pulses: Distal pulses are intact.    Neurological: Patient is alert and oriented to person, place and " time.    Skin:  Warm and dry.  No rash or cyanosis.             Results Review:    Past Medical History: reviewed and updated  Past Medical History   Diagnosis Date   • Arthritis    • Asthma    • Brain abscess      at about age 44   • Bruise of face    • Cardiac disease    • Coronary artery disease      CABG 2012   • Diabetes mellitus    • Hearing aid worn      bilateral   • Hypertension    • Joint pain      or swelling   • Orbit fracture    • Pulmonary embolism      OCT 2016         Allergies:  Allergies   Allergen Reactions   • Other Mental Status Change     Oxy drugs       Intake/Output:     Intake/Output Summary (Last 24 hours) at 01/19/17 1616  Last data filed at 01/19/17 1550   Gross per 24 hour   Intake   1330 ml   Output   2525 ml   Net  -1195 ml         DATA:  Interval chart, labs and notes reviewed.    Labs:   Recent Results (from the past 24 hour(s))   Lactate Acid, Reflex    Collection Time: 01/18/17  4:29 PM   Result Value Ref Range    Lactate 2.1 (C) 0.5 - 2.0 mmol/L   Adult Transthoracic Echo Complete    Collection Time: 01/18/17  8:46 PM   Result Value Ref Range    BSA 2.1 m^2    IVSd 1.0 cm    LVIDd 4.6 cm    LVIDs 2.7 cm    LVPWd 0.9 cm    IVS/LVPW 1.1     FS 41.3 %    EDV(Teich) 97.3 ml    ESV(Teich) 27.0 ml    EF(Teich) 72.2 %    EDV(cubed) 97.3 ml    ESV(cubed) 19.7 ml    EF(cubed) 79.8 %    LV mass(C)d 148.1 grams    LV mass(C)dI 71.7 grams/m^2    SV(Teich) 70.3 ml    SI(Teich) 34.1 ml/m^2    SV(cubed) 77.7 ml    SI(cubed) 37.6 ml/m^2    Ao root diam 3.4 cm    Ao root area 9.1 cm^2    ACS 1.2 cm    LA dimension 5.0 cm    asc Aorta Diam 4.1 cm    LA/Ao 1.5     LVOT diam 2.2 cm    LVOT area 3.8 cm^2    LVOT area(traced) 3.8 cm^2    LVLd ap4 7.7 cm    EDV(MOD-sp4) 146.0 ml    LVLs ap4 6.5 cm    ESV(MOD-sp4) 63.0 ml    EF(MOD-sp4) 56.8 %    LVLd ap2 7.8 cm    EDV(MOD-sp2) 109.0 ml    LVLs ap2 6.9 cm    ESV(MOD-sp2) 56.0 ml    EF(MOD-sp2) 48.6 %    SV(MOD-sp4) 83.0 ml    SI(MOD-sp4) 40.2 ml/m^2     SV(MOD-sp2) 53.0 ml    SI(MOD-sp2) 25.7 ml/m^2    Ao root area (BSA corrected) 1.6     Ao root area (BSA corrected) 48.6 ml/m^2    CONTRAST EF 4CH 56.8 ml/m^2    LV Diastolic corrected for BSA 70.7 ml/m^2    LV Systolic corrected for BSA 30.5 ml/m^2    MV A dur 0.16 sec    MV E max collin 77.9 cm/sec    MV A max collin 127.0 cm/sec    MV E/A 0.61     MV V2 mean 67.4 cm/sec    MV mean PG 2.0 mmHg    MV V2 VTI 27.5 cm    MVA(VTI) 3.0 cm^2    MV P1/2t max collin 74.3 cm/sec    MV P1/2t 100.3 msec    MVA(P1/2t) 2.2 cm^2    MV dec slope 217.0 cm/sec^2    MV dec time 0.26 sec    Ao V2 mean 142.0 cm/sec    Ao mean PG 9.0 mmHg    Ao mean PG (full) 6.0 mmHg    Ao V2 VTI 37.3 cm    ANAND(I,A) 2.2 cm^2    ANAND(I,D) 2.2 cm^2    AI max collin 404.0 cm/sec    AI max PG 65.3 mmHg    AI dec slope 295.0 cm/sec^2    AI P1/2t 401.1 msec    LV V1 mean PG 3.0 mmHg    LV V1 mean 79.7 cm/sec    LV V1 VTI 22.0 cm    MR max collin 503.0 cm/sec    MR max .2 mmHg    MR mean collin 346.0 cm/sec    MR mean PG 57.0 mmHg    MR .0 cm    SV(Ao) 338.7 ml    SI(Ao) 164.0 ml/m^2    SV(LVOT) 83.6 ml    SI(LVOT) 40.5 ml/m^2    PA V2 max 104.0 cm/sec    PA max PG 4.3 mmHg    PA max PG (full) 1.1 mmHg    PA acc slope 31.8 cm/sec^2    PA acc time 0.1 sec    RV V1 max PG 3.3 mmHg    RV V1 mean PG 2.0 mmHg    RV V1 max 90.2 cm/sec    RV V1 mean 59.2 cm/sec    RV V1 VTI 15.7 cm    TR max collin 267.0 cm/sec    PA pr(Accel) 34.9 mmHg    Pulm Sys Collin 48.8 cm/sec    Pulm Teran Collin 44.7 cm/sec    Pulm S/D 1.1     Pulm A Revs Dur 0.1 sec    Pulm A Revs Collin 23.9 cm/sec    MVA P1/2T LCG 3.0 cm^2     CV ECHO WILL - BZI_BMI 28.0 kilograms/m^2     CV ECHO WILL - BSA(HAYCOCK) 2.1 m^2     CV ECHO WILL - BZI_METRIC_WEIGHT 88.5 kg     CV ECHO WILL - BZI_METRIC_HEIGHT 177.8 cm    TDI S' 12.00 cm/sec    RV Base 4.10 cm    E/E' ratio 6.0     LA Volume Index 34.0 mL/m2    Ao max PG (full) 17.0 mmHg    Ao pk collin 204.0 cm/sec    Lat Peak E' Collin 9.0 cm/sec    LV V1 max 121.0 cm/sec     Med Peak E' Collin 6.00 cm/sec    RAP systole 3 mmHg    RVSP(TR) 32 mmHg    TAPSE (>1.6) 1.50 cm2   CK    Collection Time: 01/18/17  8:48 PM   Result Value Ref Range    Creatine Kinase 37 20 - 200 U/L   Eosinophil Smear    Collection Time: 01/18/17 10:28 PM   Result Value Ref Range    Eosinophil Smear 0 0 - 0 % EOS/100 Cells   Chloride, Urine, Random    Collection Time: 01/18/17 10:29 PM   Result Value Ref Range    Chloride, Urine 70 mmol/L   Creatinine, Urine, Random    Collection Time: 01/18/17 10:29 PM   Result Value Ref Range    Creatinine, Urine 58.0 mg/dL   Sodium, Urine, Random    Collection Time: 01/18/17 10:29 PM   Result Value Ref Range    Sodium, Urine 93 mmol/L   Protein, Urine, Random    Collection Time: 01/18/17 10:29 PM   Result Value Ref Range    Total Protein, Urine 6.0 mg/dL   CBC (No Diff)    Collection Time: 01/19/17  7:20 AM   Result Value Ref Range    WBC 9.03 4.50 - 10.70 10*3/mm3    RBC 4.10 (L) 4.60 - 6.00 10*6/mm3    Hemoglobin 13.1 (L) 13.7 - 17.6 g/dL    Hematocrit 40.4 40.4 - 52.2 %    MCV 98.5 (H) 79.8 - 96.2 fL    MCH 32.0 27.0 - 32.7 pg    MCHC 32.4 (L) 32.6 - 36.4 g/dL    RDW 12.7 11.5 - 14.5 %    RDW-SD 46.4 37.0 - 54.0 fl    MPV 10.7 6.0 - 12.0 fL    Platelets 218 140 - 500 10*3/mm3   Procalcitonin    Collection Time: 01/19/17  7:20 AM   Result Value Ref Range    Procalcitonin 0.13 0.10 - 0.25 ng/mL   Lactic Acid, Plasma    Collection Time: 01/19/17  7:20 AM   Result Value Ref Range    Lactate 1.3 0.5 - 2.0 mmol/L   Uric Acid    Collection Time: 01/19/17  7:20 AM   Result Value Ref Range    Uric Acid 6.3 3.4 - 7.0 mg/dL   Renal Function Panel    Collection Time: 01/19/17  7:20 AM   Result Value Ref Range    Glucose 134 (H) 65 - 99 mg/dL    BUN 42 (H) 8 - 23 mg/dL    Creatinine 1.99 (H) 0.76 - 1.27 mg/dL    Sodium 138 136 - 145 mmol/L    Potassium 3.7 3.5 - 5.2 mmol/L    Chloride 102 98 - 107 mmol/L    CO2 18.8 (L) 22.0 - 29.0 mmol/L    Calcium 9.3 8.6 - 10.5 mg/dL    Albumin 3.30  (L) 3.50 - 5.20 g/dL    Phosphorus 2.5 2.5 - 4.5 mg/dL    Anion Gap 17.2 mmol/L    BUN/Creatinine Ratio 21.1 7.0 - 25.0    eGFR Non African Amer 33 (L) >60 mL/min/1.73       Radiology:       Medications have been reviewed:  Current Facility-Administered Medications   Medication Dose Route Frequency Provider Last Rate Last Dose   • acetaminophen (TYLENOL) tablet 650 mg  650 mg Oral Q4H PRN Jami Pantoja MD       • albuterol (PROVENTIL) nebulizer solution 2.5 mg  2.5 mg Nebulization Q4H PRN Jami Pantoja MD       • allopurinol (ZYLOPRIM) tablet 100 mg  100 mg Oral Daily Jami Pantoja MD   100 mg at 01/19/17 0859   • budesonide-formoterol (SYMBICORT) 160-4.5 MCG/ACT inhaler 2 puff  2 puff Inhalation BID - RT Jami Pantoja MD   2 puff at 01/19/17 0846   • chlorhexidine (PERIDEX) 0.12 % solution 15 mL  15 mL Mouth/Throat TID Jami Pantoja MD   15 mL at 01/19/17 1541   • enoxaparin (LOVENOX) syringe 30 mg  30 mg Subcutaneous Daily Jami Pantoja MD   30 mg at 01/19/17 0858   • erythromycin (ROMYCIN) ophthalmic ointment   Right Eye BID Jami Pantoja MD       • HYDROcodone-acetaminophen (NORCO) 7.5-325 MG per tablet 1 tablet  1 tablet Oral Q6H PRN Jami Pantoja MD       • sodium chloride 0.9 % flush 1-10 mL  1-10 mL Intravenous PRN Jami Pantoja MD       • sodium chloride 0.9 % flush 10 mL  10 mL Intravenous PRN Casa Moran MD       • sodium chloride 0.9 % infusion  100 mL/hr Intravenous Continuous Jami Pantoja  mL/hr at 01/18/17 1645 100 mL/hr at 01/18/17 1645   • vitamin D (ERGOCALCIFEROL) capsule 50,000 Units  50,000 Units Oral Once per day on Mon Thu Jami Pantoja MD   50,000 Units at 01/19/17 0859       Assessment/Plan     Active Problems:    Acute renal failure    Hypotension    Dehydration    Hypercalcemia      Assessment:  (Acute renal failure, on chronic kidney disease stage III. high calcium and high serum albumin in the setting of poor oral intake suggest volume depletion,  improved  Hypotension, secondary to dehydration and blood pressure medications, improved today  History of hypertension, antihypertensive medications on hold  Dehydration, improving with fluids  Recent fall with multiple facial fractures and subsequent surgical repair   Hypercalcemia, likely related dehydration , better today  Chronic kidney disease stage III  Leukocytosis, reactive.  Improved after hydration         Plan:  Renal function continues to improve with IV fluids.  Appetite and oral fluid intake improved  Continue IV fluids until tomorrow morning then can likely discontinue  Continue to hold ACE inhibitor .  Blood pressure has been fairly stable  Renal ultrasound and urine studies unremarkable   hold metformin, simvastatin, Renally dose allopurinol     Continue to monitor electrolytes and volume closely   Avoid IV contrast and nephrotoxic medications    ? Home AM if he continues to improve).             Continue to monitor renal function, electroytes and volume closely   Please call me with any questions or concerns      Cristopher Beal MD   [facility]   [phone]    01/19/17  4:16 PM        EMR Dragon/Transcription disclaimer:    Much of this encounter note is an electronic transcription/translation of spoken language to printed text. The electronic translation of spoken language may permit erroneous, or at times, nonsensical words or phrases to be inadvertently transcribed; Although I have reviewed the note for such errors, some may still exist.

## 2017-01-19 NOTE — CONSULTS
[facility]  Initial Inpatient Consult Note    Referring Provider: Mirian Pantoja    Reason for Consultation: Dysphagia    Subjective     History of present illness:    This is a 76 y.o. year old male with a history of dysphagia for several years that has been increasing in frequency. Approximately 2 times per month or more he has trouble swallowing solids which gets stuck in his throat. He localizes the sensation to the sternal notch. The food rarely passes on its own and he has to forcefully cough the food back out.  The globus sensation generally occurs when eating meat or in the company of others requiring him to talk.  He has been evaluated by speech pathology and determined to have adequate swallowing capabilities. He occasionally has heartburn but is not on acid suppression medications. He denies dysphagia to liquids, odynophagia, weight loss, early satiety, chest pain, abdominal pain, nausea or vomiting. He was recently here for outpatient surgery after sustaining a fall at home secondary to reported episode of lightheadedness and dizziness. He sustained multiple facial fractures status post operative repair on 1/13/17. He has multiple sutures in nose as well as in mouth and long gums.  Since discharge he states he's not been feeling well with very low oral intake and poor appetite.  He has never had upper endoscopic evaluation.  His last colonoscopy was by Dr. Fer Wilkes in 2007 with diverticulosis only.    Past Medical History:  Past Medical History   Diagnosis Date   • Arthritis    • Asthma    • Brain abscess      at about age 44   • Bruise of face    • Cardiac disease    • Coronary artery disease      CABG 2012   • Diabetes mellitus    • Hearing aid worn      bilateral   • Hypertension    • Joint pain      or swelling   • Orbit fracture    • Pulmonary embolism      OCT 2016       Past Surgical History:  Past Surgical History   Procedure Laterality Date   • Coronary artery bypass  graft     • Brain surgery       BRAIN ABCESS 30 YR AGO   • Septoplasty     • Orif foot fracture Right 9/9/2016     Procedure: RIGHT SECOND METATARSAL OPEN REDUCTION INTERNAL FIXATION WITh GRAFT FROM HEEL ;  Surgeon: Man Jenkins MD;  Location: [facility];  Service:    • Tonsillectomy     • Skin cancer excision     • Orbital fracture open reduction internal fixation Right 1/13/2017     Procedure: RT ORBIT FLOOR FRACTURE REPAIR RIGHT ZMC FRACTURE REPAIR, RIGHT NASAL BONE FRACTURE CLOSED REDUCTION;  Surgeon: Edil Lester MD;  Location: [facility];  Service:         Social History:   Social History   Substance Use Topics   • Smoking status: Former Smoker     Types: Cigarettes   • Smokeless tobacco: Never Used      Comment: HISTORY OF 6 YRS USE IN EARLY 20'S   • Alcohol use No        Family History:  Family History   Problem Relation Age of Onset   • Heart disease Mother    • Hypertension Mother    • Heart disease Father    • Hypertension Father        Home Meds:  Prescriptions Prior to Admission   Medication Sig Dispense Refill Last Dose   • allopurinol (ZYLOPRIM) 300 MG tablet Take 300 mg by mouth daily.   1/12/2017 at Unknown time   • chlorhexidine (PERIDEX) 0.12 % solution Apply 15 mL to the mouth or throat 3 (Three) Times a Day. 473 mL 0    • erythromycin (ROMYCIN) 5 MG/GM ophthalmic ointment Place a 1/2 inch ribbon of ointment on the sutures of right eyelid two times daily 3.5 g 1    • fluticasone-salmeterol (ADVAIR) 250-50 MCG/DOSE DISKUS Inhale 1 puff Every Evening. Advair Diskus 250-50 MCG/DOSE Inhalation Aerosol Powder Breath Activated; Patient Sig: Advair Diskus 250-50 MCG/DOSE Inhalation Aerosol Powder Breath Activated INHALE 1 PUFF BID; 60; 0; 15-Oct-2012; Active   1/13/2017 at Unknown time   • glipiZIDE (GLUCOTROL) 5 MG tablet Take 5 mg by mouth every evening. TK 1 T PO D BEFORE THE EVENING MEAL  12 1/12/2017 at Unknown time   • HYDROcodone-acetaminophen (NORCO) 7.5-325 MG per  tablet Take 1 tablet by mouth Every 6 (Six) Hours As Needed for moderate pain (4-6) (pain). 15 tablet 0    • lisinopril (PRINIVIL,ZESTRIL) 10 MG tablet Take 10 mg by mouth 2 (two) times a day.   1/12/2017 at Unknown time   • loratadine (CLARITIN) 10 MG tablet Take 10 mg by mouth Daily As Needed.   1/12/2017 at Unknown time   • metFORMIN (GLUCOPHAGE) 500 MG tablet Take 500 mg by mouth 2 (two) times a day with meals.   1/12/2017 at Unknown time   • Multiple Vitamins-Minerals (MULTIVITAMIN ADULT PO) Take  by mouth Daily.      • PROAIR  (90 BASE) MCG/ACT inhaler 2 puffs Every 4 (Four) Hours As Needed.   1/13/2017 at Unknown time   • simvastatin (ZOCOR) 40 MG tablet Take 40 mg by mouth every night.   1/12/2017 at Unknown time   • vitamin D (ERGOCALCIFEROL) 90180 UNITS capsule capsule Take 1 capsule by mouth Take As Directed. TAKES ON MONDAYS AND THURSDAYS 12 capsule 0    • HYDROcodone-acetaminophen (NORCO) 5-325 MG per tablet Take 1 tablet by mouth Every 6 (Six) Hours As Needed.   1/12/2017 at Unknown time       Current Meds:     allopurinol 100 mg Oral Daily   budesonide-formoterol 2 puff Inhalation BID - RT   chlorhexidine 15 mL Mouth/Throat TID   enoxaparin 30 mg Subcutaneous Daily   erythromycin  Right Eye BID   vitamin D 50,000 Units Oral Once per day on Mon Thu       Allergies:  Allergies   Allergen Reactions   • Other Mental Status Change     Oxy drugs       Review of Systems  All systems were reviewed and negative except for:  Constitution:  positive for fatigue  Gastrointestinal: postitive for  difficulty / pain with swallowing     Objective     Vital Signs  Temp:  [97.1 °F (36.2 °C)-98.1 °F (36.7 °C)] 97.6 °F (36.4 °C)  Heart Rate:  [78-88] 85  Resp:  [18] 18  BP: (137-149)/(67-76) 137/67    Physical Exam:  General Appearance:    Alert, cooperative, in no acute distress   Head:    Normocephalic, facial swelling with nasal brace   Eyes:            Lids and lashes normal, conjunctivae and sclerae normal,  no   icterus   Throat:   No oral lesions, no thrush, oral mucosa moist   Neck:   Bruising under manbible   Lungs:     Clear to auscultation,respirations regular, even and                   unlabored    Heart:    Regular rhythm and normal rate, normal S1 and S2, no            murmur, no gallop, no rub, no click   Chest Wall:    No abnormalities observed   Abdomen:     Normal bowel sounds, no masses, no organomegaly, soft        non-tender, non-distended, no guarding, no rebound                 tenderness   Rectal:     Deferred   Extremities:   no edema, no cyanosis, no redness   Skin:   No bleeding, bruising or rash   Lymph nodes:   No palpable adenopathy   Psychiatric:  Judgement and insight: normal   Orientation to person place and time: normal   Mood and affect: normal     Results Review:   I reviewed the patient's new clinical results.      Results from last 7 days  Lab Units 01/19/17  0720 01/18/17  1149   WBC 10*3/mm3 9.03 14.11*   HEMOGLOBIN g/dL 13.1* 15.2   HEMATOCRIT % 40.4 45.5   PLATELETS 10*3/mm3 218 263         Results from last 7 days  Lab Units 01/19/17  0720 01/18/17  1149   SODIUM mmol/L 138 137   POTASSIUM mmol/L 3.7 3.7   CHLORIDE mmol/L 102 96*   TOTAL CO2 mmol/L 18.8* 23.7   BUN mg/dL 42* 59*   CREATININE mg/dL 1.99* 3.56*   CALCIUM mg/dL 9.3 10.9*   BILIRUBIN mg/dL  --  1.1   ALK PHOS U/L  --  74   ALT (SGPT) U/L  --  15   AST (SGOT) U/L  --  16   GLUCOSE mg/dL 134* 170*         Results from last 7 days  Lab Units 01/18/17  1149   INR  1.11*       No results found for: LIPASE    Radiology:  Imaging Results (last 72 hours)     Procedure Component Value Units Date/Time    XR Chest 2 View [[id number]] Collected:  01/18/17 1331     Updated:  01/18/17 1343    Narrative:       XR CHEST 2 VW-     HISTORY: Male who is 76 years-old,  short of breath     TECHNIQUE: Short of breath     COMPARISON: None available     FINDINGS: Heart, mediastinum and pulmonary vasculature are unremarkable.  Sternotomy wires  are present. Low lung volume contributes to vascular  crowding. No focal pulmonary consolidation, pleural effusion, or  pneumothorax. No acute osseous process.       Impression:       No focal pulmonary consolidation. Follow-up as clinical  indications persist.     This report was finalized on 1/18/2017 1:40 PM by Dr. Prabhjot Mcarthur MD.       CT Head Without Contrast [[id number]] Collected:  01/18/17 1409     Updated:  01/18/17 1554    Narrative:       CT OF THE BRAIN WITHOUT CONTRAST 01/18/2017     HISTORY: Headache, weakness.     Axial images were obtained through the brain without intravenous  contrast.      FINDINGS: There is mild to moderate diffuse atrophy. There is decreased  attenuation of the periventricular white matter bilaterally consistent  with periventricular small vessel ischemic disease. More confluent area  of low density seen in the right superior parietal lobe on image 42  consistent with encephalomalacia possibly from an old infarction or  hemorrhage. There is a carl hole in the left posterior parietal bone.     There is no evidence of acute infarction, hemorrhage, midline shift or  mass effect. Small caliber compression plate is seen in the anterior  right maxillary sinus. There is right maxillary sinusitis and/or  hemorrhage. Anterior right maxillary sinus fracture is seen. These were  better seen on the facial bone CT of 01/09/2017. Tiny amount of fluid is  seen in the dependent portion of the left maxillary sinus.       Impression:       1. No acute intracranial process identified.  2. Evidence of recent facial fractures and surgical repair. Please see  additional dictation for the CT of the facial bones from 01/09/2017     This report was finalized on 1/18/2017 3:51 PM by Dr. Jb Aly MD.             Assessment/Plan     Active Problems:    Acute renal failure    Hypotension    Dehydration    Hypercalcemia      1. Dysphagia with globus sensation - long standing and  intermittent  2. Decreased appetite with dehydration  3. Facial fractures - s/p surgery    Given recent facial injury and multiple sutures in mouth/gums, pt would like to hold on endoscopic evaluation for now.  This sounds very reasonable given chronicity of symptoms.  Instead, we will obtain barium esophagram with tablet, specifically to rule out possible pharyngeal or zenker diverticulum causing his symptoms.      I discussed the patients findings and my recommendations with patient    Rigoberto Walker MD  [facility], [place], operated by [facility]  01/19/17

## 2017-01-19 NOTE — PROGRESS NOTES
Acute Care - Speech Language Pathology   Swallow Initial Evaluation [facility]     Patient Name: Eugenio Joe  : 1940  MRN: [id number]  Today's Date: 2017  Onset of Illness/Injury or Date of Surgery Date: 17            Admit Date: 2017    SPEECH-LANGUAGE PATHOLOGY EVALUATION - SWALLOW  Subjective: The patient was seen on this date for a Clinical Swallow evaluation.  Patient was alert and cooperative.    The patient fell and suffered facial fractures 17, underwent surgery 17.  Went home & developed increased weakness & difficulty eating.  Dx: hypotension & malaise.   Objective: Textures given included thin liquid, nectar thick liquid, puree consistency, mechanical soft consistency and regular consistency.  Assessment: Inconsistent cough noted across trials of liquids &/or solids; per pt & wife, he coughs with or without PO for years.  No change in o2 or voicing across trials. Mastication appeared WFL. Pt's complains that food sticks, mostly meat, and he points to the sternal notch.  Pt reports he has had this issue for years, but has noticed it has started to get worse, occur more often & is embarrassing when with family or friends.  The pt states often times he will have to leave the table & bring up the food in the bathroom.  Suspect esophageal issues rather than oropharyngeal.   SLP Findings:  Patient presents with functional swallow, with possible esophageal component.   Recommendations: Diet Textures: thin liquid, regular consistency food.  Medications should be taken whole with thin liquids.   Recommended Strategies: Upright for PO and small bites and sips. Oral care before breakfast, after all meals and PRN.  Other Recommended Evaluations: Referral to GI based on pt's complaints    Dysphagia therapy is not recommended. Rationale: swallow appears functional.    Visit Dx:     ICD-10-CM ICD-9-CM   1. Acute renal failure, unspecified acute renal failure type N17.9 584.9   2.  Aspiration pneumonia of left lower lobe, unspecified aspiration pneumonia type J69.0 507.0     Patient Active Problem List   Diagnosis   • Quadriceps weakness   • ACL tear   • Knee pain, right   • S/P CABG x 3   • Essential hypertension   • Hyperlipemia   • Hallux rigidus   • Fracture, stress, metatarsal   • Osteopenia determined by x-ray   • Metatarsal stress fracture with nonunion   • Left hip pain   • Bursitis of left hip   • Pulmonary embolism   • DALILA (acute kidney injury)   • Diabetes mellitus type 2, controlled   • Ascending aorta dilatation   • Pulmonary nodule, left   • Right leg DVT   • Acute renal failure   • Hypotension   • Dehydration   • Hypercalcemia     Past Medical History   Diagnosis Date   • Arthritis    • Asthma    • Brain abscess      at about age 44   • Bruise of face    • Cardiac disease    • Coronary artery disease      CABG 2012   • Diabetes mellitus    • Hearing aid worn      bilateral   • Hypertension    • Joint pain      or swelling   • Orbit fracture    • Pulmonary embolism      OCT 2016     Past Surgical History   Procedure Laterality Date   • Coronary artery bypass graft     • Brain surgery       BRAIN ABCESS 30 YR AGO   • Septoplasty     • Orif foot fracture Right 9/9/2016     Procedure: RIGHT SECOND METATARSAL OPEN REDUCTION INTERNAL FIXATION WITh GRAFT FROM HEEL ;  Surgeon: Man Jenkins MD;  Location: [facility];  Service:    • Tonsillectomy     • Skin cancer excision     • Orbital fracture open reduction internal fixation Right 1/13/2017     Procedure: RT ORBIT FLOOR FRACTURE REPAIR RIGHT ZMC FRACTURE REPAIR, RIGHT NASAL BONE FRACTURE CLOSED REDUCTION;  Surgeon: Edil Lester MD;  Location: [facility];  Service:           SWALLOW EVALUATION (last 72 hours)      Swallow Evaluation       01/19/17 1329                Rehab Evaluation    Document Type evaluation  -NB        Symptoms Noted During/After Treatment none  -NB        General Information    Patient  Profile Review yes  -NB        Current Diet Limitations thin liquids;regular solid  -NB        Precautions/Limitations, Vision WFL  -NB        Precautions/Limitations, Hearing WFL  -NB        Prior Level of Function- Communication functional in all spheres  -NB        Prior Level of Function- Swallowing esophageal concerns  -NB        Plans/Goals Discussed With patient and family;agreed upon  -NB        Barriers to Rehab none identified  -NB        Clinical Impression    Patient's Goals For Discharge return home  -NB        SLP Swallowing Diagnosis other (see comments)   WFL  -NB        Criteria for Skilled Therapeutic Interventions Met no problems identified which require skilled intervention  -NB        Therapy Frequency evaluation only  -NB        SLP Diet Recommendation regular textures;thin liquids  -NB        Recommended Feeding/Eating Techniques small sips/bites  -NB        SLP Rec. for Method of Medication Administration meds whole with thin liquid  -NB        Monitor For Signs Of Aspiration gurgly voice;pneumonia  -NB        Anticipated Discharge Disposition home  -NB        Pain Assessment    Pain Assessment No/denies pain  -NB        Cognitive Assessment/Intervention    Current Cognitive/Communication Assessment functional  -NB        Orientation Status oriented x 4  -NB        Follows Commands/Answers Questions 100% of the time  -NB        Oral Motor Structure and Function    Oral Motor Anatomy and Physiology patient demonstrates anatomy that is WNL  -NB        Dentition Assessment present and adequate  -NB        Secretion Management WNL/WFL  -NB        Mucosal Quality moist, healthy  -NB        Velar Elevation WNL (within normal limits)  -NB        Volitional Swallow no difficulties initiating volitional swallow  -NB        Volitional Cough no difficulties initiating volitional cough  -NB        Oral Musculature General Assessment oral labial impairment;other (see comments)   right side droop post  surgery 1/13/17  -NB          User Key  (r) = Recorded By, (t) = Taken By, (c) = Cosigned By    Initials Name Effective Dates    NB Delisa Tony MS CHRISTIAN-SLP 04/13/15 -         EDUCATION  The patient has been educated in the following areas:   Dysphagia (Swallowing Impairment).    SLP Recommendation and Plan  SLP Swallowing Diagnosis: other (see comments) (WFL)  SLP Diet Recommendation: regular textures, thin liquids  Recommended Feeding/Eating Techniques: small sips/bites  SLP Rec. for Method of Medication Administration: meds whole with thin liquid  Monitor For Signs Of Aspiration: gurgly voice, pneumonia     Criteria for Skilled Therapeutic Interventions Met: no problems identified which require skilled intervention  Anticipated Discharge Disposition: home     Therapy Frequency: evaluation only             Plan of Care Review  Plan Of Care Reviewed With: patient, spouse  Progress: no change  Outcome Summary/Follow up Plan: BSE: safe for Regular & thin; based on complaints suggest GI consult.  SLP to s/o           SLP Outcome Measures (last 72 hours)      SLP Outcome Measures       01/19/17 1329          SLP Outcome Measures    Outcome Measure Used? Adult NOMS  -NB      FCM Scores    FCM Chosen Swallowing  -NB      Swallowing FCM Score 7  -NB        User Key  (r) = Recorded By, (t) = Taken By, (c) = Cosigned By    Initials Name Effective Dates    NB Delisa MS CHRISTIAN Jimenez-SLP 04/13/15 -            Time Calculation:         Time Calculation- SLP       01/19/17 1331          Time Calculation- SLP    SLP Received On 01/19/17  -NB        User Key  (r) = Recorded By, (t) = Taken By, (c) = Cosigned By    Initials Name Provider Type    IVAN Jimenez MS CCC-SLP Speech and Language Pathologist          Therapy Charges for Today     Code Description Service Date Service Provider Modifiers Qty    [id number]  ST EVAL ORAL PHARYNG SWALLOW 4 1/19/2017 Delisa Jimenez MS CCC-SLP GN 1               Delisa Jimenez MS  CCC-SLP  1/19/2017

## 2017-01-19 NOTE — PLAN OF CARE
Problem: Renal Failure/Kidney Injury, Acute (Adult)  Goal: Signs and Symptoms of Listed Potential Problems Will be Absent or Manageable (Renal Failure/Kidney Injury, Acute)  Outcome: Ongoing (interventions implemented as appropriate)    01/18/17 2006   Renal Failure/Kidney Injury, Acute   Problems Assessed (Acute Renal Failure/Kidney Injury) infection   Problems Present (Acute Renal Failure/Kidney Injury) situational response

## 2017-01-19 NOTE — PROGRESS NOTES
" LOS: 1 day   Primary Care Physician: Adeline Onofre MD     Subjective  Better but still weak.  Walked a little with physical therapy.  His wife came in during my interview and exam.  She is requesting subacute rehabilitation for the patient.    Vital Signs  Body mass index is 27.98 kg/(m^2).  Temp:  [97.1 °F (36.2 °C)-98.1 °F (36.7 °C)] 97.6 °F (36.4 °C)  Heart Rate:  [78-88] 85  Resp:  [18] 18  BP: (137-149)/(67-76) 137/67      Objective:  General Appearance:  In no acute distress.    Vital signs: (most recent): Blood pressure 137/67, pulse 85, temperature 97.6 °F (36.4 °C), temperature source Oral, resp. rate 18, height 70\" (177.8 cm), weight 195 lb (88.5 kg), SpO2 96 %.    Lungs:  There are decreased breath sounds.  No wheezes, rales or rhonchi.    Heart: Normal rate.  Regular rhythm.  No murmur.   Abdomen: Abdomen is soft and non-distended.  Bowel sounds are normal.   There is no abdominal tenderness.   There is no splenomegaly. There is no hepatomegaly.   Extremities: There is no dependent edema.    Neurological: Patient is alert.          Results Review:    I reviewed the patient's new clinical results.      Results from last 7 days  Lab Units 01/19/17  0720 01/18/17  1149   WBC 10*3/mm3 9.03 14.11*   HEMOGLOBIN g/dL 13.1* 15.2   PLATELETS 10*3/mm3 218 263       Results from last 7 days  Lab Units 01/19/17  0720 01/18/17  1149   SODIUM mmol/L 138 137   POTASSIUM mmol/L 3.7 3.7   CHLORIDE mmol/L 102 96*   TOTAL CO2 mmol/L 18.8* 23.7   BUN mg/dL 42* 59*   CREATININE mg/dL 1.99* 3.56*   CALCIUM mg/dL 9.3 10.9*   GLUCOSE mg/dL 134* 170*       Results from last 7 days  Lab Units 01/18/17  1149   INR  1.11*     Hemoglobin A1C:No results found for: HGBA1C    Glucose Range:No results found for: POCGLU    Medication Review: Yes    Physical Therapy: Noted    Assessment/Plan     Active Hospital Problems (** Indicates Principal Problem)    Diagnosis Date Noted   • Acute renal failure [N17.9] 01/18/2017   • Hypotension " [I95.9] 01/18/2017   • Dehydration [E86.0] 01/18/2017   • Hypercalcemia [E83.52] 01/18/2017      Resolved Hospital Problems    Diagnosis Date Noted Date Resolved   No resolved problems to display.       Assessment & Plan  1.  Acute renal failure secondary to dehydration and medications.  Much better.  Still on IV fluids.  Recheck labs in a.m.  Renal ultrasound without hydronephrosis.  2.  Weakness with recent fall and facial fractures.  Echocardiogram noted.  EF is good.  Consider outpatient Holter monitor and EEG.  Multiple medications have been stopped including metformin lisinopril and Glucotrol.  Blood pressures and blood sugars have been fine.  3.  Diabetes mellitus type 2 off medications  4.  History of hypertension.  Blood pressures okay for now off meds  5.  DVT and PE October 2016 after foot surgery.  Eliquis has been on hold.    Disposition: Possible rehabilitation    Jami Pantoja MD  01/19/17  6:40 PM

## 2017-01-19 NOTE — PLAN OF CARE
Problem: Patient Care Overview (Adult)  Goal: Plan of Care Review    01/19/17 1328   Coping/Psychosocial Response Interventions   Plan Of Care Reviewed With patient;spouse   Patient Care Overview   Progress no change   Outcome Evaluation   Outcome Summary/Follow up Plan BSE: safe for Regular & thin; based on complaints suggest GI consult. SLP to s/o

## 2017-01-20 ENCOUNTER — APPOINTMENT (OUTPATIENT)
Dept: GENERAL RADIOLOGY | Facility: HOSPITAL | Age: 77
End: 2017-01-20

## 2017-01-20 LAB
ALBUMIN SERPL-MCNC: 3.1 G/DL (ref 3.5–5.2)
ANION GAP SERPL CALCULATED.3IONS-SCNC: 14 MMOL/L
BUN BLD-MCNC: 26 MG/DL (ref 8–23)
BUN/CREAT SERPL: 20 (ref 7–25)
CALCIUM SPEC-SCNC: 8.9 MG/DL (ref 8.6–10.5)
CHLORIDE SERPL-SCNC: 104 MMOL/L (ref 98–107)
CO2 SERPL-SCNC: 22 MMOL/L (ref 22–29)
CREAT BLD-MCNC: 1.3 MG/DL (ref 0.76–1.27)
DEPRECATED RDW RBC AUTO: 44.4 FL (ref 37–54)
ERYTHROCYTE [DISTWIDTH] IN BLOOD BY AUTOMATED COUNT: 12.6 % (ref 11.5–14.5)
GFR SERPL CREATININE-BSD FRML MDRD: 54 ML/MIN/1.73
GLUCOSE BLD-MCNC: 150 MG/DL (ref 65–99)
GLUCOSE BLDC GLUCOMTR-MCNC: 124 MG/DL (ref 70–130)
GLUCOSE BLDC GLUCOMTR-MCNC: 126 MG/DL (ref 70–130)
HCT VFR BLD AUTO: 36.5 % (ref 40.4–52.2)
HGB BLD-MCNC: 12.2 G/DL (ref 13.7–17.6)
MCH RBC QN AUTO: 32.2 PG (ref 27–32.7)
MCHC RBC AUTO-ENTMCNC: 33.4 G/DL (ref 32.6–36.4)
MCV RBC AUTO: 96.3 FL (ref 79.8–96.2)
PHOSPHATE SERPL-MCNC: 2.2 MG/DL (ref 2.5–4.5)
PLATELET # BLD AUTO: 212 10*3/MM3 (ref 140–500)
PMV BLD AUTO: 10.8 FL (ref 6–12)
POTASSIUM BLD-SCNC: 3.8 MMOL/L (ref 3.5–5.2)
RBC # BLD AUTO: 3.79 10*6/MM3 (ref 4.6–6)
SODIUM BLD-SCNC: 140 MMOL/L (ref 136–145)
WBC NRBC COR # BLD: 7.94 10*3/MM3 (ref 4.5–10.7)

## 2017-01-20 PROCEDURE — 97110 THERAPEUTIC EXERCISES: CPT

## 2017-01-20 PROCEDURE — 94640 AIRWAY INHALATION TREATMENT: CPT

## 2017-01-20 PROCEDURE — 85027 COMPLETE CBC AUTOMATED: CPT | Performed by: INTERNAL MEDICINE

## 2017-01-20 PROCEDURE — 94799 UNLISTED PULMONARY SVC/PX: CPT

## 2017-01-20 PROCEDURE — 74220 X-RAY XM ESOPHAGUS 1CNTRST: CPT

## 2017-01-20 PROCEDURE — 80069 RENAL FUNCTION PANEL: CPT | Performed by: INTERNAL MEDICINE

## 2017-01-20 PROCEDURE — 99231 SBSQ HOSP IP/OBS SF/LOW 25: CPT | Performed by: INTERNAL MEDICINE

## 2017-01-20 PROCEDURE — 25010000002 ENOXAPARIN PER 10 MG: Performed by: INTERNAL MEDICINE

## 2017-01-20 PROCEDURE — 82962 GLUCOSE BLOOD TEST: CPT

## 2017-01-20 RX ORDER — ALLOPURINOL 300 MG/1
300 TABLET ORAL DAILY
Status: DISCONTINUED | OUTPATIENT
Start: 2017-01-21 | End: 2017-01-21 | Stop reason: HOSPADM

## 2017-01-20 RX ADMIN — ENOXAPARIN SODIUM 30 MG: 60 INJECTION SUBCUTANEOUS at 11:30

## 2017-01-20 RX ADMIN — CHLORHEXIDINE GLUCONATE 15 ML: 1.2 RINSE ORAL at 20:06

## 2017-01-20 RX ADMIN — ALLOPURINOL 100 MG: 100 TABLET ORAL at 11:30

## 2017-01-20 RX ADMIN — ERYTHROMYCIN: 5 OINTMENT OPHTHALMIC at 11:30

## 2017-01-20 RX ADMIN — BUDESONIDE AND FORMOTEROL FUMARATE DIHYDRATE 2 PUFF: 160; 4.5 AEROSOL RESPIRATORY (INHALATION) at 07:12

## 2017-01-20 RX ADMIN — CHLORHEXIDINE GLUCONATE 15 ML: 1.2 RINSE ORAL at 15:02

## 2017-01-20 RX ADMIN — ERYTHROMYCIN 1 APPLICATION: 5 OINTMENT OPHTHALMIC at 17:28

## 2017-01-20 RX ADMIN — CHLORHEXIDINE GLUCONATE 15 ML: 1.2 RINSE ORAL at 11:30

## 2017-01-20 RX ADMIN — BUDESONIDE AND FORMOTEROL FUMARATE DIHYDRATE 2 PUFF: 160; 4.5 AEROSOL RESPIRATORY (INHALATION) at 21:00

## 2017-01-20 NOTE — PROGRESS NOTES
Acute Care - Physical Therapy Treatment Note  [facility]     Patient Name: Eugenio Joe  : 1940  MRN: [id number]  Today's Date: 2017  Onset of Illness/Injury or Date of Surgery Date: 17          Admit Date: 2017    Visit Dx:    ICD-10-CM ICD-9-CM   1. Acute renal failure, unspecified acute renal failure type N17.9 584.9   2. Aspiration pneumonia of left lower lobe, unspecified aspiration pneumonia type J69.0 507.0     Patient Active Problem List   Diagnosis   • Quadriceps weakness   • ACL tear   • Knee pain, right   • S/P CABG x 3   • Essential hypertension   • Hyperlipemia   • Hallux rigidus   • Fracture, stress, metatarsal   • Osteopenia determined by x-ray   • Metatarsal stress fracture with nonunion   • Left hip pain   • Bursitis of left hip   • Pulmonary embolism   • DALILA (acute kidney injury)   • Diabetes mellitus type 2, controlled   • Ascending aorta dilatation   • Pulmonary nodule, left   • Right leg DVT   • Acute renal failure   • Hypotension   • Dehydration   • Hypercalcemia               Adult Rehabilitation Note       17 0950          Rehab Assessment/Intervention    Discipline physical therapy assistant  -DM      Document Type therapy note (daily note)  -DM      Subjective Information agree to therapy  -DM      Patient Effort, Rehab Treatment good  -DM      Precautions/Limitations fall precautions  -DM      Recorded by [DM] Neo Gallegos PTA      Pain Assessment    Pain Assessment No/denies pain  -DM      Recorded by [DM] Neo Gallegos PTA      Bed Mobility, Assessment/Treatment    Bed Mob, Supine to Sit, Heard supervision required  -DM      Bed Mob, Sit to Supine, Heard not tested  -DM      Recorded by [DM] Neo Gallegos PTA      Transfer Assessment/Treatment    Transfers, Sit-Stand [place] contact guard assist  -DM      Transfers, Stand-Sit [place]ard contact guard assist  -DM      Transfers, Sit-Stand-Sit, Assist Device rolling  walker  -DM      Recorded by [ABIGAIL] Neo Gallegos PTA      Gait Assessment/Treatment    Gait, [place] Level contact guard assist  -DM      Gait, Assistive Device rolling walker  -DM      Gait, Distance (Feet) 500  -DM      Gait, Gait Deviations nenita decreased;forward flexed posture;limb motion velocity decreased;step length decreased;stride length decreased  -DM      Gait, Safety Issues weight-shifting ability decreased;step length decreased  -DM      Recorded by [ABIGAIL] Neo Gallegos PTA      Therapy Exercises    Bilateral Lower Extremities 5 reps;AROM:  -DM      Recorded by [ABIGAIL] Neo Gallegos PTA      Positioning and Restraints    Pre-Treatment Position in bed  -DM      Post Treatment Position chair  -DM      In Chair sitting;call light within reach;encouraged to call for assist;with family/caregiver  -DM      Recorded by [ABIGAIL] Neo Gallegos PTA        User Key  (r) = Recorded By, (t) = Taken By, (c) = Cosigned By    Initials Name Effective Dates    DM Neo Gallegos PTA 05/18/15 -                 IP PT Goals       01/19/17 0956          Transfer Training PT LTG    Transfer Training PT LTG, Date Established 01/19/17  -AR      Transfer Training PT LTG, Time to Achieve 1 wk  -AR      Transfer Training PT LTG, Activity Type sit to stand/stand to sit  -AR      Transfer Training PT LTG, [place] Level conditional independence  -AR      Transfer Training PT LTG, Assist Device walker, rolling  -AR      Gait Training PT LTG    Gait Training Goal PT LTG, Date Established 01/19/17  -AR      Gait Training Goal PT LTG, Time to Achieve 1 wk  -AR      Gait Training Goal PT LTG, [place] Level supervision required  -AR      Gait Training Goal PT LTG, Assist Device walker, rolling  -AR      Gait Training Goal PT LTG, Distance to Achieve 300  -AR      Stair Training PT LTG    Stair Training Goal PT LTG, Date Established 01/19/17  -AR      Stair Training Goal PT LTG, Time to Achieve 1 wk  -AR       Stair Training Goal PT LTG, Number of Steps 3  -AR      Stair Training Goal PT LTG, [place] Level contact guard assist  -AR      Stair Training Goal PT LTG, Assist Device 1 handrail  -AR        User Key  (r) = Recorded By, (t) = Taken By, (c) = Cosigned By    Initials Name Provider Type    AR Ana Luisa Galindo PT Physical Therapist          Physical Therapy Education     Title: PT OT SLP Therapies (Done)     Topic: Physical Therapy (Done)     Point: Mobility training (Done)    Learning Progress Summary    Learner Readiness Method Response Comment Documented by Status   Patient Acceptance E,D VU  DM 01/20/17 0953 Done    Acceptance E VU  JW 01/19/17 2056 Done    Acceptance E VU  AR 01/19/17 0957 Done   Family Acceptance E VU  AR 01/19/17 0957 Done               Point: Home exercise program (Done)    Learning Progress Summary    Learner Readiness Method Response Comment Documented by Status   Patient Acceptance E,D VU  DM 01/20/17 0953 Done    Acceptance E VU  JW 01/19/17 2056 Done    Acceptance E VU  AR 01/19/17 0957 Done   Family Acceptance E VU  AR 01/19/17 0957 Done               Point: Body mechanics (Done)    Learning Progress Summary    Learner Readiness Method Response Comment Documented by Status   Patient Acceptance E,D VU  DM 01/20/17 0953 Done    Acceptance E VU  JW 01/19/17 2056 Done    Acceptance E VU  AR 01/19/17 0957 Done   Family Acceptance E VU  AR 01/19/17 0957 Done               Point: Precautions (Done)    Learning Progress Summary    Learner Readiness Method Response Comment Documented by Status   Patient Acceptance E,D VU  DM 01/20/17 0953 Done    Acceptance E VU  JW 01/19/17 2056 Done    Acceptance E VU  AR 01/19/17 0957 Done   Family Acceptance E VU  AR 01/19/17 0957 Done                      User Key     Initials Effective Dates Name Provider Type Discipline    DM 05/18/15 -  Neo Gallegos, PTA Physical Therapy Assistant PT    AR 06/27/16 -  Ana Luisa Galindo PT Physical Therapist PT     JW 07/06/16 -  Cristi Canales, RN Registered Nurse Nurse                    PT Recommendation and Plan  Anticipated Discharge Disposition: home with assist, home with home health  Planned Therapy Interventions: balance training, bed mobility training, gait training, home exercise program, patient/family education, ROM (Range of Motion), stair training, strengthening  PT Frequency: 5 times/wk  Plan of Care Review  Plan Of Care Reviewed With: patient  Progress: improving  Outcome Summary/Follow up Plan: Pt demonstrated improved tolerance of activity with increased gait distance          Outcome Measures       01/20/17 0900 01/19/17 0900       How much help from another person do you currently need...    Turning from your back to your side while in flat bed without using bedrails? 4  -DM 4  -AR     Moving from lying on back to sitting on the side of a flat bed without bedrails? 4  -DM 4  -AR     Moving to and from a bed to a chair (including a wheelchair)? 3  -DM 3  -AR     Standing up from a chair using your arms (e.g., wheelchair, bedside chair)? 3  -DM 3  -AR     Climbing 3-5 steps with a railing? 3  -DM 3  -AR     To walk in hospital room? 3  -DM 3  -AR     AM-PAC 6 Clicks Score 20  -DM 20  -AR     Functional Assessment    Outcome Measure Options AM-PAC 6 Clicks Basic Mobility (PT)  -DM AM-PAC 6 Clicks Basic Mobility (PT)  -AR       User Key  (r) = Recorded By, (t) = Taken By, (c) = Cosigned By    Initials Name Provider Type    ABIGAIL Gallegos PTA Physical Therapy Assistant    AR Ana Luisa Galindo, PT Physical Therapist           Time Calculation:         PT Charges       01/20/17 0953          Time Calculation    Start Time 0938  -DM      Stop Time 0953  -DM      Time Calculation (min) 15 min  -DM      PT Received On 01/20/17  -DM      PT - Next Appointment 01/21/17  -DM        User Key  (r) = Recorded By, (t) = Taken By, (c) = Cosigned By    Initials Name Provider Type    ABIGAIL Gallegos PTA Physical  Therapy Assistant          Therapy Charges for Today     Code Description Service Date Service Provider Modifiers Qty    [id number] HC PT THER PROC EA 15 MIN 1/20/2017 Neo Gallegos, PTA GP 1          PT G-Codes  Outcome Measure Options: AM-PAC 6 Clicks Basic Mobility (PT)    Neo Gallegos, PTA  1/20/2017

## 2017-01-20 NOTE — PLAN OF CARE
Problem: Patient Care Overview (Adult)  Goal: Plan of Care Review    01/20/17 0952   Coping/Psychosocial Response Interventions   Plan Of Care Reviewed With patient   Patient Care Overview   Progress improving   Outcome Evaluation   Outcome Summary/Follow up Plan Pt demonstrated improved tolerance of activity with increased gait distance

## 2017-01-20 NOTE — PROGRESS NOTES
"   LOS: 2 days   Patient Care Team:  Adeline Onofre MD as PCP - General  Adeline Onofre MD as PCP - Family Medicine  Jean Ford MD as Consulting Physician (Cardiology)    Chief Complaint/ Reason for encounter: Acute renal failure  Chief Complaint   Patient presents with   • Hypotension     Patient seen at PCP office today, sent to ER for pna, hypotension, and dehydration         Subjective     Hypotension   Pertinent negatives include no chest pain, fever or nausea.       Subjective:  Symptoms:  Improved.  No shortness of breath or chest pain.  (No Complaints today  He feels better, appetite improved  No edema or shortness of breath  Facial swelling much improved).    Diet:  Adequate intake.  No nausea.    Activity level: Normal.    Pain:  He reports no pain.          History taken from: Patient and chart    Objective     Vital Signs  Temp:  [97.4 °F (36.3 °C)-98.7 °F (37.1 °C)] 97.8 °F (36.6 °C)  Heart Rate:  [68-85] 75  Resp:  [16-18] 18  BP: (138-181)/(70-77) 138/70       Wt Readings from Last 1 Encounters:   01/19/17 1325 195 lb (88.5 kg)   01/18/17 1604 195 lb (88.5 kg)   01/18/17 1106 195 lb (88.5 kg)       Objective:  General Appearance:  Comfortable, well-appearing, in no acute distress and not in pain.    Vital signs: (most recent): Blood pressure 138/70, pulse 75, temperature 97.8 °F (36.6 °C), temperature source Oral, resp. rate 18, height 70\" (177.8 cm), weight 195 lb (88.5 kg), SpO2 97 %.  Vital signs are normal.  No fever.    Output: Producing urine.    HEENT: Normal HEENT exam.  (Right-sided facial swelling, perorbital edema  Nasal brace in place)    Lungs:  Normal respiratory rate and normal effort.  He is not in respiratory distress.  No wheezes, rales or decreased breath sounds.    Heart: Normal rate.  Regular rhythm.  No murmur.   Abdomen: Abdomen is soft and non-distended.    Extremities: Normal range of motion.  There is no deformity or dependent edema.    Pulses: Distal pulses are " intact.    Neurological: Patient is alert and oriented to person, place and time.    Skin:  Warm and dry.  No rash or cyanosis.             Results Review:    Past Medical History: reviewed and updated  Past Medical History   Diagnosis Date   • Arthritis    • Asthma    • Brain abscess      at about age 44   • Bruise of face    • Cardiac disease    • Coronary artery disease      CABG 2012   • Diabetes mellitus    • Hearing aid worn      bilateral   • Hypertension    • Joint pain      or swelling   • Orbit fracture    • Pulmonary embolism      OCT 2016         Allergies:  Allergies   Allergen Reactions   • Other Mental Status Change     Oxy drugs       Intake/Output:     Intake/Output Summary (Last 24 hours) at 01/20/17 1742  Last data filed at 01/20/17 0725   Gross per 24 hour   Intake      0 ml   Output   1400 ml   Net  -1400 ml         DATA:  Interval chart, labs and notes reviewed.    Labs:   Recent Results (from the past 24 hour(s))   Renal Function Panel    Collection Time: 01/20/17  6:25 AM   Result Value Ref Range    Glucose 150 (H) 65 - 99 mg/dL    BUN 26 (H) 8 - 23 mg/dL    Creatinine 1.30 (H) 0.76 - 1.27 mg/dL    Sodium 140 136 - 145 mmol/L    Potassium 3.8 3.5 - 5.2 mmol/L    Chloride 104 98 - 107 mmol/L    CO2 22.0 22.0 - 29.0 mmol/L    Calcium 8.9 8.6 - 10.5 mg/dL    Albumin 3.10 (L) 3.50 - 5.20 g/dL    Phosphorus 2.2 (L) 2.5 - 4.5 mg/dL    Anion Gap 14.0 mmol/L    BUN/Creatinine Ratio 20.0 7.0 - 25.0    eGFR Non African Amer 54 (L) >60 mL/min/1.73   CBC (No Diff)    Collection Time: 01/20/17  6:25 AM   Result Value Ref Range    WBC 7.94 4.50 - 10.70 10*3/mm3    RBC 3.79 (L) 4.60 - 6.00 10*6/mm3    Hemoglobin 12.2 (L) 13.7 - 17.6 g/dL    Hematocrit 36.5 (L) 40.4 - 52.2 %    MCV 96.3 (H) 79.8 - 96.2 fL    MCH 32.2 27.0 - 32.7 pg    MCHC 33.4 32.6 - 36.4 g/dL    RDW 12.6 11.5 - 14.5 %    RDW-SD 44.4 37.0 - 54.0 fl    MPV 10.8 6.0 - 12.0 fL    Platelets 212 140 - 500 10*3/mm3   POC Glucose Fingerstick     Collection Time: 01/20/17  5:04 PM   Result Value Ref Range    Glucose 126 70 - 130 mg/dL       Radiology:       Medications have been reviewed:  Current Facility-Administered Medications   Medication Dose Route Frequency Provider Last Rate Last Dose   • acetaminophen (TYLENOL) tablet 650 mg  650 mg Oral Q4H PRN Jami Pantoja MD       • albuterol (PROVENTIL) nebulizer solution 2.5 mg  2.5 mg Nebulization Q4H PRN Jami Pantoja MD       • allopurinol (ZYLOPRIM) tablet 100 mg  100 mg Oral Daily Jami Pantoja MD   100 mg at 01/20/17 1130   • budesonide-formoterol (SYMBICORT) 160-4.5 MCG/ACT inhaler 2 puff  2 puff Inhalation BID - RT Jami Pantoja MD   2 puff at 01/20/17 0712   • chlorhexidine (PERIDEX) 0.12 % solution 15 mL  15 mL Mouth/Throat TID Jami Pantoja MD   15 mL at 01/20/17 1502   • dextrose (D50W) solution 25 g  25 g Intravenous Q15 Min PRN Jami Pantoja MD       • dextrose (GLUTOSE) oral gel 15 g  15 g Oral Q15 Min PRN Jami Pantoja MD       • enoxaparin (LOVENOX) syringe 30 mg  30 mg Subcutaneous Daily Jami Pantoja MD   30 mg at 01/20/17 1130   • erythromycin (ROMYCIN) ophthalmic ointment   Right Eye BID Jami Pantoja MD   1 application at 01/20/17 1728   • glucagon (human recombinant) (GLUCAGEN DIAGNOSTIC) injection 1 mg  1 mg Subcutaneous Q15 Min PRN Jami Pantoja MD       • HYDROcodone-acetaminophen (NORCO) 7.5-325 MG per tablet 1 tablet  1 tablet Oral Q6H PRN Jami Pantoja MD       • sodium chloride 0.9 % flush 1-10 mL  1-10 mL Intravenous PRN Jami Pantoja MD       • sodium chloride 0.9 % flush 10 mL  10 mL Intravenous PRN Casa Moran MD       • vitamin D (ERGOCALCIFEROL) capsule 50,000 Units  50,000 Units Oral Once per day on Mon Thu Jami Patnoja MD   50,000 Units at 01/19/17 0859       Assessment/Plan     Active Problems:    Acute renal failure    Hypotension    Dehydration    Hypercalcemia      Assessment:  (Acute renal failure, on chronic kidney disease stage III.   Back to baseline   Hypotension, secondary to dehydration and blood pressure medications, improved today  History of hypertension, antihypertensive medications on hold  Dehydration, improving with fluids  Recent fall with multiple facial fractures and subsequent surgical repair   Hypercalcemia, likely related dehydration , better today  Chronic kidney disease stage III  Leukocytosis, reactive.  Improved after hydration         Plan:  Renal function much improved, back to baseline  Continue to encourage oral fluids  Discontinue IV fluids  Given his current renal failure, ACE inhibitor may not be the best blood pressure medication choice  Recommend to hold this at discharge  He can follow-up with me in the office in 2-4 weeks with recheck labs and blood pressure check  Okay to resume metformin and simvastatin and prior allopurinol dose at discharge  Stable for discharge at any time from a renal standpoint).             Continue to monitor renal function, electroytes and volume closely   Please call me with any questions or concerns      Cristopher Beal MD   [facility]   [phone]    01/20/17  5:42 PM        EMR Dragon/Transcription disclaimer:    Much of this encounter note is an electronic transcription/translation of spoken language to printed text. The electronic translation of spoken language may permit erroneous, or at times, nonsensical words or phrases to be inadvertently transcribed; Although I have reviewed the note for such errors, some may still exist.

## 2017-01-20 NOTE — PLAN OF CARE
Problem: Patient Care Overview (Adult)  Goal: Plan of Care Review  Outcome: Ongoing (interventions implemented as appropriate)    01/20/17 7659   Coping/Psychosocial Response Interventions   Plan Of Care Reviewed With patient;spouse   Patient Care Overview   Progress improving   Outcome Evaluation   Outcome Summary/Follow up Plan Pt with improved ambulation ,sitting up in chair with spouse present. no complaints of pain this shift. appetite good voiding without difficulty. surgeon removed nose brace, minimal ecchmosis and edema noted on face.patient awaiting for possible outpatient vs inpatient rehab.       Goal: Discharge Needs Assessment  Outcome: Ongoing (interventions implemented as appropriate)    Problem: Renal Failure/Kidney Injury, Acute (Adult)  Goal: Signs and Symptoms of Listed Potential Problems Will be Absent or Manageable (Renal Failure/Kidney Injury, Acute)  Outcome: Ongoing (interventions implemented as appropriate)

## 2017-01-20 NOTE — PROGRESS NOTES
" LOS: 2 days   Primary Care Physician: Adeline Onofre MD     Subjective  Feels better.  Walked 500 feet and said he could've done a couple more labs.  He feels steady now.  Wife at bedside.  She still concerned about fall risk.    Vital Signs  Body mass index is 27.98 kg/(m^2).  Temp:  [97.4 °F (36.3 °C)-97.6 °F (36.4 °C)] 97.4 °F (36.3 °C)  Heart Rate:  [68-85] 85  Resp:  [16-18] 18  BP: (137-181)/(67-77) 141/77      Objective:  General Appearance:  In no acute distress.    Vital signs: (most recent): Blood pressure 141/77, pulse 85, temperature 97.4 °F (36.3 °C), temperature source Oral, resp. rate 18, height 70\" (177.8 cm), weight 195 lb (88.5 kg), SpO2 97 %.    Lungs:  There are decreased breath sounds.  No wheezes, rales or rhonchi.    Heart: Normal rate.  Regular rhythm.  No murmur.   Abdomen: Abdomen is soft and non-distended.  Bowel sounds are normal.   There is no abdominal tenderness.   There is no splenomegaly. There is no hepatomegaly.   Extremities: There is no dependent edema.    Neurological: Patient is alert.          Results Review:    I reviewed the patient's new clinical results.      Results from last 7 days  Lab Units 01/20/17  0625 01/19/17  0720   WBC 10*3/mm3 7.94 9.03   HEMOGLOBIN g/dL 12.2* 13.1*   PLATELETS 10*3/mm3 212 218       Results from last 7 days  Lab Units 01/20/17  0625 01/19/17  0720   SODIUM mmol/L 140 138   POTASSIUM mmol/L 3.8 3.7   CHLORIDE mmol/L 104 102   TOTAL CO2 mmol/L 22.0 18.8*   BUN mg/dL 26* 42*   CREATININE mg/dL 1.30* 1.99*   CALCIUM mg/dL 8.9 9.3   GLUCOSE mg/dL 150* 134*       Results from last 7 days  Lab Units 01/18/17  1149   INR  1.11*     Hemoglobin A1C:  Lab Results   Component Value Date    HGBA1C 6.29 (H) 01/19/2017       Glucose Range:No results found for: POCGLU    Medication Review: Yes    Physical Therapy:    Assessment/Plan     Active Hospital Problems (** Indicates Principal Problem)    Diagnosis Date Noted   • Acute renal failure [N17.9] " 01/18/2017   • Hypotension [I95.9] 01/18/2017   • Dehydration [E86.0] 01/18/2017   • Hypercalcemia [E83.52] 01/18/2017      Resolved Hospital Problems    Diagnosis Date Noted Date Resolved   No resolved problems to display.       Assessment & Plan  1.  Acute kidney injury, much improved.  DC IV fluids.  2.  Weakness.  Doing fairly well with physical therapy and likely will not qualify for subacute rehabilitation.  Can plan outpatient PT.  Stay off lisinopril, metformin, Glucotrol.  Outpatient follow-up.  Consider Holter monitor.   3.  Diabetes mellitus type 2, off medications  4.  Hypertension.  One elevated reading at 181/73, rest of the systolic measurements have been 130s to 140s with 1 that was 152.  Stay off meds for now.  Outpatient follow-up  5.  PE and DVT after foot surgery October 2016.  Was on Eliquis  6.  Dysphagia.  Barium swallow today.    Disposition: Home soon    Jami Pantoja MD  01/20/17  11:54 AM

## 2017-01-20 NOTE — PROGRESS NOTES
BGA/GI Progress Note   Chief Complaint:  Intermittent dysphagia    Subjective     Interval History: Resting in bed, no co's.  Plans for barium swallow this am for co's.  Holding off on EGD in light of recent facial/oral surgery procedures.    History taken from: patient chart    Review of Systems:    All systems were reviewed and negative except for:  Gastrointestinal: postitive for  difficulty / pain with swallowing    Objective     Vital Signs  Temp:  [97.4 °F (36.3 °C)-97.6 °F (36.4 °C)] 97.4 °F (36.3 °C)  Heart Rate:  [68-85] 68  Resp:  [16-18] 16  BP: (137-181)/(67-76) 181/73  Body mass index is 27.98 kg/(m^2).    Intake/Output Summary (Last 24 hours) at 01/20/17 0813  Last data filed at 01/19/17 2000   Gross per 24 hour   Intake    550 ml   Output   1325 ml   Net   -775 ml          Physical Exam:   General: patient awake, alert and cooperative.  Resting in bed, no distress, brace to nasal bridge, mild facial swelling   Eyes: Normal lids and lashes, no scleral icterus, right eye weeping clear fluid   Neck: supple, normal ROM, no tracheal deviation   Skin: warm and dry, not jaundiced   Cardiovascular: regular rhythm and rate, no murmurs auscultated   Pulm: clear to auscultation bilaterally, regular and unlabored   Abdomen: soft, nontender, nondistended; normal bowel sounds   Rectal: deferred   Extremities: no rash or edema   Neurologic: Normal mood and behavior    All Medications Have Been Reviewed     Results Review:       Results from last 7 days  Lab Units 01/20/17  0625 01/19/17  0720 01/18/17  1149   WBC 10*3/mm3 7.94 9.03 14.11*   HEMOGLOBIN g/dL 12.2* 13.1* 15.2   HEMATOCRIT % 36.5* 40.4 45.5   PLATELETS 10*3/mm3 212 218 263         Results from last 7 days  Lab Units 01/20/17  0625 01/19/17  0720 01/18/17  1149   SODIUM mmol/L 140 138 137   POTASSIUM mmol/L 3.8 3.7 3.7   CHLORIDE mmol/L 104 102 96*   TOTAL CO2 mmol/L 22.0 18.8* 23.7   BUN mg/dL 26* 42* 59*   CREATININE mg/dL 1.30* 1.99* 3.56*    CALCIUM mg/dL 8.9 9.3 10.9*   BILIRUBIN mg/dL  --   --  1.1   ALK PHOS U/L  --   --  74   ALT (SGPT) U/L  --   --  15   AST (SGOT) U/L  --   --  16   GLUCOSE mg/dL 150* 134* 170*         Results from last 7 days  Lab Units 01/18/17  1149   INR  1.11*       RADIOLOGY:    Imaging Results (last 72 hours)     Procedure Component Value Units Date/Time    XR Chest 2 View [[id number]] Collected:  01/18/17 1331     Updated:  01/18/17 1343    Narrative:       XR CHEST 2 VW-     HISTORY: Male who is 76 years-old,  short of breath     TECHNIQUE: Short of breath     COMPARISON: None available     FINDINGS: Heart, mediastinum and pulmonary vasculature are unremarkable.  Sternotomy wires are present. Low lung volume contributes to vascular  crowding. No focal pulmonary consolidation, pleural effusion, or  pneumothorax. No acute osseous process.       Impression:       No focal pulmonary consolidation. Follow-up as clinical  indications persist.     This report was finalized on 1/18/2017 1:40 PM by Dr. Prabhjot Mcarthur MD.       CT Head Without Contrast [[id number]] Collected:  01/18/17 1409     Updated:  01/18/17 1554    Narrative:       CT OF THE BRAIN WITHOUT CONTRAST 01/18/2017     HISTORY: Headache, weakness.     Axial images were obtained through the brain without intravenous  contrast.      FINDINGS: There is mild to moderate diffuse atrophy. There is decreased  attenuation of the periventricular white matter bilaterally consistent  with periventricular small vessel ischemic disease. More confluent area  of low density seen in the right superior parietal lobe on image 42  consistent with encephalomalacia possibly from an old infarction or  hemorrhage. There is a carl hole in the left posterior parietal bone.     There is no evidence of acute infarction, hemorrhage, midline shift or  mass effect. Small caliber compression plate is seen in the anterior  right maxillary sinus. There is right maxillary sinusitis  and/or  hemorrhage. Anterior right maxillary sinus fracture is seen. These were  better seen on the facial bone CT of 01/09/2017. Tiny amount of fluid is  seen in the dependent portion of the left maxillary sinus.       Impression:       1. No acute intracranial process identified.  2. Evidence of recent facial fractures and surgical repair. Please see  additional dictation for the CT of the facial bones from 01/09/2017     This report was finalized on 1/18/2017 3:51 PM by Dr. Jb Aly MD.             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 Dysphagia with globus sensation- long standing and intermittent.  Plan for barium esophagram with tablet today  2. Decreased appetite with dehydration- in light of recent facial fractures/surgery, improving with IVF's  3. Facial fractures - s/p surgery    - Follow up on barium esophagram.  Hold on EGD for now due to recent facial fracture/multiple oral sutures.  OK for discharge from GI [facility] office f/u or outpt EGD based on esophagram findings.  Office number on chart and business card provided to patient.

## 2017-01-20 NOTE — PROGRESS NOTES
Continued Stay Note  [facility]     Patient Name: Eugenio Joe  MRN: [id number]  Today's Date: 1/20/2017    Admit Date: 1/18/2017          Discharge Plan       01/20/17 1253    Case Management/Social Work Plan    Plan home with wife and outpatient PT    Additional Comments Spoke with pt's wife who reports pt is interested in outpatient rehab at [facility]. Spoke with April at [facility] outpatient rehab [phone] she reports she needs the order and demographics faxed to her at [phone]. Call to June ARNOLD to see if MD can order outpatient PT before d/c so this can be set up prior to d/c. NIKA Buchanan               Discharge Codes     None            Pily Byrd

## 2017-01-20 NOTE — PLAN OF CARE
Problem: Patient Care Overview (Adult)  Goal: Plan of Care Review  Outcome: Ongoing (interventions implemented as appropriate)    01/19/17 2054   Coping/Psychosocial Response Interventions   Plan Of Care Reviewed With patient   Patient Care Overview   Progress progress toward functional goals as expected   Outcome Evaluation   Outcome Summary/Follow up Plan STRICT i/o NO C/O OF PAIN RIGHT EYE LOOKING BETTER. pT DESATS WHEN SLEEPING MAY NEED SLEEP STUDY.         Problem: Renal Failure/Kidney Injury, Acute (Adult)  Goal: Signs and Symptoms of Listed Potential Problems Will be Absent or Manageable (Renal Failure/Kidney Injury, Acute)  Outcome: Ongoing (interventions implemented as appropriate)

## 2017-01-20 NOTE — PROGRESS NOTES
Continued Stay Note  [facility]     Patient Name: Eugenio Joe  MRN: [id number]  Today's Date: 1/20/2017    Admit Date: 1/18/2017          Discharge Plan       01/20/17 1403    Case Management/Social Work Plan    Additional Comments Order and demographics faxed to outpatient PT at [facility] ( [phone]).        01/20/17 1253    Case Management/Social Work Plan    Plan home with wife and outpatient PT    Additional Comments Spoke with pt's wife who reports pt is interested in outpatient rehab at [facility]. Spoke with April at [facility] outpatient rehab [phone] she reports she needs the order and demographics faxed to her at [phone]. Call to June ARNOLD to see if MD can order outpatient PT before d/c so this can be set up prior to d/c. NIKA Buchanan               Discharge Codes     None            Joanna Zamora RN

## 2017-01-21 VITALS
HEIGHT: 70 IN | BODY MASS INDEX: 27.92 KG/M2 | RESPIRATION RATE: 20 BRPM | SYSTOLIC BLOOD PRESSURE: 143 MMHG | OXYGEN SATURATION: 93 % | TEMPERATURE: 97.8 F | WEIGHT: 195 LBS | DIASTOLIC BLOOD PRESSURE: 74 MMHG | HEART RATE: 82 BPM

## 2017-01-21 PROBLEM — R13.10 DYSPHAGIA: Status: ACTIVE | Noted: 2017-01-21

## 2017-01-21 LAB
ALBUMIN SERPL-MCNC: 3 G/DL (ref 3.5–5.2)
ANION GAP SERPL CALCULATED.3IONS-SCNC: 13.3 MMOL/L
BUN BLD-MCNC: 18 MG/DL (ref 8–23)
BUN/CREAT SERPL: 13.7 (ref 7–25)
CALCIUM SPEC-SCNC: 9.1 MG/DL (ref 8.6–10.5)
CHLORIDE SERPL-SCNC: 106 MMOL/L (ref 98–107)
CO2 SERPL-SCNC: 22.7 MMOL/L (ref 22–29)
CREAT BLD-MCNC: 1.31 MG/DL (ref 0.76–1.27)
GFR SERPL CREATININE-BSD FRML MDRD: 53 ML/MIN/1.73
GLUCOSE BLD-MCNC: 149 MG/DL (ref 65–99)
GLUCOSE BLDC GLUCOMTR-MCNC: 130 MG/DL (ref 70–130)
GLUCOSE BLDC GLUCOMTR-MCNC: 156 MG/DL (ref 70–130)
PHOSPHATE SERPL-MCNC: 2.6 MG/DL (ref 2.5–4.5)
POTASSIUM BLD-SCNC: 3.5 MMOL/L (ref 3.5–5.2)
SODIUM BLD-SCNC: 142 MMOL/L (ref 136–145)

## 2017-01-21 PROCEDURE — 92610 EVALUATE SWALLOWING FUNCTION: CPT

## 2017-01-21 PROCEDURE — 80069 RENAL FUNCTION PANEL: CPT | Performed by: INTERNAL MEDICINE

## 2017-01-21 PROCEDURE — 25010000002 ENOXAPARIN PER 10 MG: Performed by: INTERNAL MEDICINE

## 2017-01-21 PROCEDURE — 94640 AIRWAY INHALATION TREATMENT: CPT

## 2017-01-21 PROCEDURE — 82962 GLUCOSE BLOOD TEST: CPT

## 2017-01-21 RX ORDER — ACETAMINOPHEN 325 MG/1
650 TABLET ORAL EVERY 6 HOURS PRN
Refills: 0
Start: 2017-01-21 | End: 2019-04-25

## 2017-01-21 RX ADMIN — ALLOPURINOL 300 MG: 300 TABLET ORAL at 09:12

## 2017-01-21 RX ADMIN — CHLORHEXIDINE GLUCONATE 15 ML: 1.2 RINSE ORAL at 09:12

## 2017-01-21 RX ADMIN — BUDESONIDE AND FORMOTEROL FUMARATE DIHYDRATE 2 PUFF: 160; 4.5 AEROSOL RESPIRATORY (INHALATION) at 11:24

## 2017-01-21 RX ADMIN — ENOXAPARIN SODIUM 30 MG: 60 INJECTION SUBCUTANEOUS at 09:12

## 2017-01-21 RX ADMIN — ERYTHROMYCIN: 5 OINTMENT OPHTHALMIC at 09:13

## 2017-01-21 NOTE — PLAN OF CARE
Problem: Fall Risk (Adult)  Goal: Identify Related Risk Factors and Signs and Symptoms  Outcome: Outcome(s) achieved Date Met:  01/21/17 01/21/17 0230   Fall Risk   Fall Risk: Related Risk Factors age-related changes;history of falls;environment unfamiliar   Fall Risk: Signs and Symptoms presence of risk factors

## 2017-01-21 NOTE — PROGRESS NOTES
"   LOS: 3 days   Patient Care Team:  Adeline Onofre MD as PCP - General  Adeline Onofre MD as PCP - Family Medicine  Jean Ford MD as Consulting Physician (Cardiology)    Chief Complaint/ Reason for encounter: Acute renal failure  Chief Complaint   Patient presents with   • Hypotension     Patient seen at PCP office today, sent to ER for pna, hypotension, and dehydration         Subjective     Hypotension   Pertinent negatives include no chest pain, fever or nausea.       Subjective:  Symptoms:  Improved.  No shortness of breath or chest pain.  (No Complaints today  He feels better, appetite improved  No edema or shortness of breath  Facial swelling much improved).    Diet:  Adequate intake.  No nausea.    Activity level: Normal.    Pain:  He reports no pain.          History taken from: Patient and chart    Objective     Vital Signs  Temp:  [97.6 °F (36.4 °C)-98.7 °F (37.1 °C)] 97.8 °F (36.6 °C)  Heart Rate:  [72-78] 77  Resp:  [16-20] 20  BP: (132-148)/(70-76) 132/71       Wt Readings from Last 1 Encounters:   01/19/17 1325 195 lb (88.5 kg)   01/18/17 1604 195 lb (88.5 kg)   01/18/17 1106 195 lb (88.5 kg)       Objective:  General Appearance:  Comfortable, well-appearing, in no acute distress and not in pain.    Vital signs: (most recent): Blood pressure 132/71, pulse 77, temperature 97.8 °F (36.6 °C), temperature source Oral, resp. rate 20, height 70\" (177.8 cm), weight 195 lb (88.5 kg), SpO2 93 %.  Vital signs are normal.  No fever.    Output: Producing urine.    HEENT: Normal HEENT exam.  (Right-sided facial swelling, perorbital edema  Nasal brace in place)    Lungs:  Normal respiratory rate and normal effort.  He is not in respiratory distress.  No wheezes, rales or decreased breath sounds.    Heart: Normal rate.  Regular rhythm.  No murmur.   Abdomen: Abdomen is soft and non-distended.    Extremities: Normal range of motion.  There is no deformity or dependent edema.    Pulses: Distal pulses are " intact.    Neurological: Patient is alert and oriented to person, place and time.    Skin:  Warm and dry.  No rash or cyanosis.             Results Review:    Past Medical History: reviewed and updated  Past Medical History   Diagnosis Date   • Arthritis    • Asthma    • Brain abscess      at about age 44   • Bruise of face    • Cardiac disease    • Coronary artery disease      CABG 2012   • Diabetes mellitus    • Hearing aid worn      bilateral   • Hypertension    • Joint pain      or swelling   • Orbit fracture    • Pulmonary embolism      OCT 2016         Allergies:  Allergies   Allergen Reactions   • Other Mental Status Change     Oxy drugs   • Oxycodone Mental Status Change       Intake/Output:     Intake/Output Summary (Last 24 hours) at 01/21/17 1402  Last data filed at 01/21/17 0530   Gross per 24 hour   Intake      0 ml   Output    550 ml   Net   -550 ml         DATA:  Interval chart, labs and notes reviewed.    Labs:   Recent Results (from the past 24 hour(s))   POC Glucose Fingerstick    Collection Time: 01/20/17  5:04 PM   Result Value Ref Range    Glucose 126 70 - 130 mg/dL   POC Glucose Fingerstick    Collection Time: 01/20/17  9:19 PM   Result Value Ref Range    Glucose 124 70 - 130 mg/dL   Renal Function Panel    Collection Time: 01/21/17  5:52 AM   Result Value Ref Range    Glucose 149 (H) 65 - 99 mg/dL    BUN 18 8 - 23 mg/dL    Creatinine 1.31 (H) 0.76 - 1.27 mg/dL    Sodium 142 136 - 145 mmol/L    Potassium 3.5 3.5 - 5.2 mmol/L    Chloride 106 98 - 107 mmol/L    CO2 22.7 22.0 - 29.0 mmol/L    Calcium 9.1 8.6 - 10.5 mg/dL    Albumin 3.00 (L) 3.50 - 5.20 g/dL    Phosphorus 2.6 2.5 - 4.5 mg/dL    Anion Gap 13.3 mmol/L    BUN/Creatinine Ratio 13.7 7.0 - 25.0    eGFR Non African Amer 53 (L) >60 mL/min/1.73   POC Glucose Fingerstick    Collection Time: 01/21/17  7:30 AM   Result Value Ref Range    Glucose 130 70 - 130 mg/dL   POC Glucose Fingerstick    Collection Time: 01/21/17 11:38 AM   Result Value  Ref Range    Glucose 156 (H) 70 - 130 mg/dL       Radiology:       Medications have been reviewed:  Current Facility-Administered Medications   Medication Dose Route Frequency Provider Last Rate Last Dose   • acetaminophen (TYLENOL) tablet 650 mg  650 mg Oral Q4H PRN Jami Pantoja MD       • albuterol (PROVENTIL) nebulizer solution 2.5 mg  2.5 mg Nebulization Q4H PRN Jami Pantoja MD       • allopurinol (ZYLOPRIM) tablet 300 mg  300 mg Oral Daily Cristopher Beal MD   300 mg at 01/21/17 0912   • budesonide-formoterol (SYMBICORT) 160-4.5 MCG/ACT inhaler 2 puff  2 puff Inhalation BID - RT Jami Pantoja MD   2 puff at 01/21/17 1124   • chlorhexidine (PERIDEX) 0.12 % solution 15 mL  15 mL Mouth/Throat TID Jami Pantoja MD   15 mL at 01/21/17 0912   • dextrose (D50W) solution 25 g  25 g Intravenous Q15 Min PRN Jami Pantoja MD       • dextrose (GLUTOSE) oral gel 15 g  15 g Oral Q15 Min PRN Jami Pantoja MD       • enoxaparin (LOVENOX) syringe 30 mg  30 mg Subcutaneous Daily Jami Pantoja MD   30 mg at 01/21/17 0912   • erythromycin (ROMYCIN) ophthalmic ointment   Right Eye BID Jami Pantoja MD       • glucagon (human recombinant) (GLUCAGEN DIAGNOSTIC) injection 1 mg  1 mg Subcutaneous Q15 Min PRN Jami Pantoja MD       • HYDROcodone-acetaminophen (NORCO) 7.5-325 MG per tablet 1 tablet  1 tablet Oral Q6H PRN Jami Pantoja MD       • sodium chloride 0.9 % flush 1-10 mL  1-10 mL Intravenous PRN Jami Pantoja MD       • sodium chloride 0.9 % flush 10 mL  10 mL Intravenous PRN Casa Moran MD       • vitamin D (ERGOCALCIFEROL) capsule 50,000 Units  50,000 Units Oral Once per day on Mon Thu Jami Pantoja MD   50,000 Units at 01/19/17 0859       Assessment/Plan     Active Problems:    Acute renal failure    Hypotension    Dehydration    Hypercalcemia    Dysphagia      Assessment:  (Acute renal failure, on chronic kidney disease stage III.  Back to baseline   Hypotension, secondary to dehydration  and blood pressure medications, improved today  History of hypertension, antihypertensive medications on hold  Dehydration, improving with fluids  Recent fall with multiple facial fractures and subsequent surgical repair   Hypercalcemia, likely related dehydration , better today  Chronic kidney disease stage III  Leukocytosis, reactive.  Improved after hydration         Plan:  Renal function remains stable today, back to baseline  Continue to encourage oral fluids  Hold ACE inhibitor at discharge  He can follow-up with me in the office in 2-4 weeks with recheck labs and blood pressure check  Okay to resume metformin and simvastatin and prior allopurinol dose at discharge  Stable for discharge at any time from a renal standpoint).             Continue to monitor renal function, electroytes and volume closely   Please call me with any questions or concerns      Cristopher Beal MD   [facility]   [phone]    01/21/17  2:02 PM        EMR Dragon/Transcription disclaimer:    Much of this encounter note is an electronic transcription/translation of spoken language to printed text. The electronic translation of spoken language may permit erroneous, or at times, nonsensical words or phrases to be inadvertently transcribed; Although I have reviewed the note for such errors, some may still exist.

## 2017-01-21 NOTE — PROGRESS NOTES
" LOS: 3 days   Primary Care Physician: Adleine Onofre MD     Subjective  I saw the patient at about 1 PM this afternoon.  He was feeling much better and anxious to go home.  Wife is at bedside.  Barium swallow showed significant dysphagia.  His diet was changed to honey thickened liquids plus dysphagia.  GI would like to do an EGD in the near future.    Vital Signs  Body mass index is 27.98 kg/(m^2).  Temp:  [97.6 °F (36.4 °C)-97.9 °F (36.6 °C)] 97.8 °F (36.6 °C)  Heart Rate:  [72-82] 82  Resp:  [16-20] 20  BP: (132-148)/(70-76) 143/74      Objective:  General Appearance:  In no acute distress.    Vital signs: (most recent): Blood pressure 143/74, pulse 82, temperature 97.8 °F (36.6 °C), temperature source Oral, resp. rate 20, height 70\" (177.8 cm), weight 195 lb (88.5 kg), SpO2 93 %.    Lungs:  There are rales and decreased breath sounds.  No wheezes or rhonchi.    Heart: Normal rate.  Regular rhythm.  No murmur.   Abdomen: Abdomen is soft and non-distended.  There is no abdominal tenderness.   There is no mass.   Extremities: There is no dependent edema.    Neurological: Patient is alert.          Results Review:    I reviewed the patient's new clinical results.      Results from last 7 days  Lab Units 01/20/17  0625 01/19/17  0720   WBC 10*3/mm3 7.94 9.03   HEMOGLOBIN g/dL 12.2* 13.1*   PLATELETS 10*3/mm3 212 218       Results from last 7 days  Lab Units 01/21/17  0552 01/20/17  0625   SODIUM mmol/L 142 140   POTASSIUM mmol/L 3.5 3.8   CHLORIDE mmol/L 106 104   TOTAL CO2 mmol/L 22.7 22.0   BUN mg/dL 18 26*   CREATININE mg/dL 1.31* 1.30*   CALCIUM mg/dL 9.1 8.9   GLUCOSE mg/dL 149* 150*       Results from last 7 days  Lab Units 01/18/17  1149   INR  1.11*     Hemoglobin A1C:  Lab Results   Component Value Date    HGBA1C 6.29 (H) 01/19/2017       Glucose Range:  GLUCOSE   Date/Time Value Ref Range Status   01/21/2017 1138 156 (H) 70 - 130 mg/dL Final   01/21/2017 0730 130 70 - 130 mg/dL Final   01/20/2017 2119 " 124 70 - 130 mg/dL Final   01/20/2017 1704 126 70 - 130 mg/dL Final       Medication Review: Yes    Physical Therapy:    Assessment/Plan     Active Hospital Problems (** Indicates Principal Problem)    Diagnosis Date Noted   • Dysphagia [R13.10] 01/21/2017   • Acute renal failure [N17.9] 01/18/2017   • Hypotension [I95.9] 01/18/2017   • Dehydration [E86.0] 01/18/2017   • Hypercalcemia [E83.52] 01/18/2017      Resolved Hospital Problems    Diagnosis Date Noted Date Resolved   No resolved problems to display.       Assessment & Plan  1.  Acute kidney injury, resolved.  Off IV fluids.  Okay for discharge home.  2.  Severe dysphagia by barium swallow.  Continue with altered diet and honey thickened liquids.  Follow-up with GI for endoscopy in the near future.  3.  Diabetes mellitus type 2.  Sugars are fine off metformin and glipizide.  Discussed with patient and wife.  Call physician if Accu-Cheks consistently greater than 200  4.  Hypertension history.  He has been off blood pressure medications here.  I did not restart any medications.  Stay off lisinopril.  Outpatient follow-up.  5.  PE and DVT after foot surgery October 2016.  Okay to resume Eliquis after EGD next week.  He has completed several months of anticoagulation and the clots were provoked.  Typical duration for PE as 6 months.  I discussed pros and cons with patient and his wife.  6.  Weakness likely secondary to kidney injury, hypotension, hypoglycemia.  Discussed    Disposition: Home with outpatient follow-up  [id number]    Jami Pantoja MD  01/21/17  3:13 PM

## 2017-01-21 NOTE — PLAN OF CARE
Problem: Inpatient SLP  Goal: Dysphagia- Patient will safely consume diet as per recommendation with no signs/symptoms of aspiration    01/21/17 0901   Safely Consume Diet   Safely Consume Diet- SLP, Date Established 01/21/17   Safely Consume Diet- SLP, Time to Achieve by discharge   Safely Consume Diet- SLP, Activity Level Patient will improve closure at entrance to airway for safer swallow

## 2017-01-21 NOTE — PLAN OF CARE
Problem: Patient Care Overview (Adult)  Goal: Plan of Care Review  Outcome: Outcome(s) achieved Date Met:  01/21/17 01/21/17 1330   Outcome Evaluation   Outcome Summary/Follow up Plan D/C to home       Goal: Adult Individualization and Mutuality  Outcome: Outcome(s) achieved Date Met:  01/21/17  Goal: Discharge Needs Assessment  Outcome: Outcome(s) achieved Date Met:  01/21/17  No other needs identified with patient    01/21/17 1330   Discharge Needs Assessment   Concerns To Be Addressed no discharge needs identified   Readmission Within The Last 30 Days no previous admission in last 30 days   Equipment Needed After Discharge none   Current Health   Anticipated Changes Related to Illness none   Self-Care   Equipment Currently Used at Home walker, rolling   Living Environment   Transportation Available family or friend will provide         Problem: Renal Failure/Kidney Injury, Acute (Adult)  Goal: Signs and Symptoms of Listed Potential Problems Will be Absent or Manageable (Renal Failure/Kidney Injury, Acute)  Outcome: Outcome(s) achieved Date Met:  01/21/17 01/21/17 1330   Renal Failure/Kidney Injury, Acute   Problems Assessed (Acute Renal Failure/Kidney Injury) infection   Problems Present (Acute Renal Failure/Kidney Injury) situational response         Problem: Fall Risk (Adult)  Goal: Absence of Falls  Outcome: Outcome(s) achieved Date Met:  01/21/17 01/21/17 1330   Fall Risk (Adult)   Absence of Falls achieves outcome

## 2017-01-21 NOTE — DISCHARGE INSTR - LAB
Given his current renal failure, ACE inhibitor may not be the best blood pressure medication choice  Recommend to hold this at discharge  Follow up with Dr. Beal  in 2-4 weeks -with recheck labs and blood pressure check  HOLD metformin and simvastatin , okay to resume Allopurinol 300mg as same as home dose    Dr. Beal (Kidney MD)   [facility], [facility]   [address] [place] [address]   P: [phone]   F: [phone]      Dr. Walker (GI MD)   Make follow up appt. In 1-2 weeks   [address] [address]   P: [phone]   F: [phone]      appt to be made by patient  Dr. Castellanos and [facility] surgeon   in 6 weeks     Patient has appt with SIR on Monday 1/23/17 at 730 for PT

## 2017-01-21 NOTE — DISCHARGE SUMMARY
DATE OF ADMISSION: 01/18/2017  DATE OF DISCHARGE: 01/21/2017     DISCHARGE DIAGNOSES: Acute kidney injury.     SECONDARY DIAGNOSES:  1.  Dehydration.   2.   Weakness.   3.  History of hypertension with hypotension.    4.  Diabetes mellitus type 2 with hypoglycemia.    5.  Severe dysphagia.   6.  History of pulmonary embolus and deep veinous thrombosis in 10/2016.  7.  Hypercalcemia.   8.  Dehydration.   9.  History of coronary artery disease.    10.  History of facial fractures 01/09/2017     CONSULTANTS:   1.  Rigoberto Walker MD, Gastroenterology  2.  Cristopher Beal MD, Nephrology    PROCEDURES:   1.  CT of the head without contrast on 01/18/2017, which showed no acute intracranial process. Evidence of recent facial fractures and surgical repair noted.    2.  Barium swallow on 01/20/2017, which showed aspiration of thick and thin barium, whether upright or horizontal. Thick esophageal mucosal folds with gastroesophageal reflux and mild stenosis. Abnormalities of mucosal contour of the esophagus and possible upper stomach. Upper endoscopy is suggested. Please see the report for complete details.   3.  Ultrasound of the kidneys on 01/18/2017, which showed no hydronephrosis. Renal cyst. Possible nonobstructive stone, right kidney.   4.  Echocardiogram on 01/18/2017, which showed EF of 57%. Mild aortic regurgitation, mild mitral regurgitation.     HOSPITAL COURSE: The patient arrived from his primary care physician's office through the emergency room. Labs on arrival showed creatinine 3.6. Chest x-ray showed no pneumonia and he had no symptoms of pneumonia. He did receive empiric antibiotics in the ER but these were not continued. He was given IV fluids. Lisinopril, metformin, and Glucotrol were all stopped. Renal ultrasound was done as noted above. The patient was seen by Dr. Beal. Blood sugars and blood pressure have been fine off medication. The patients daughter stated that the patient seemed to have  difficulty swallowing. He was seen by speech therapy. Video swallow study was done, which he passed; however, there were some esophageal spasms noted. GI was consulted. Barium swallow was done. Because of his facial fractures and recent surgery, EGD was not done at this time. The barium swallow was significantly abnormal as detailed above. He will need outpatient followup with GI for endoscopy. The patient's wife tells me this is going to be done within the next week. Echocardiogram was done because of the syncopal episode. The patient has felt good. He has been up and about. He is ready for discharge home today.   He will stay off his blood pressure medication as well as the 2 medications for diabetes. Regarding the Eliquis, he can restart that after the EGD next week. He has been on it for a little over 3 months; however, the typical duration for a PE is 6 months. He will have outpatient physical therapy. Stable condition with good prognosis.     DIAGNOSTIC DATA: Labs from yesterday, white count 7.9, hemoglobin 12.2, platelets 212,000. Sodium 142, potassium 3.5, chloride 106, CO2 of 23, BUN 18, creatinine 1.3, glucose 189. A1c 6.3. Accu-Cheks 156, 130, 124, 126.     DISCHARGE MEDICATIONS:  1.  Tylenol 325 mg 2 p.o. q.6 h. p.r.n.   2.  Norco 5/325, take 1 q.6 h. p.r.n. (home medication).    3.  Allopurinol 300 mg daily.   4.  Peridex mouthwash t.i.d.   5.  Erythromycin ophthalmic ointment right  eye b.i.d.   6.  Advair Diskus 1 puff b.i.d.   7.  Multivitamin daily.   8.  Albuterol inhaler 2 puffs q.4 h. p.r.n.   9.  Zocor 40 mg at bedtime.   10.  Vitamin D 50,000 units daily.     DISCONTINUED MEDICATIONS:   1.  Glipizide.    2.  Lisinopril.    3.  Claritin.    4.  Metformin.     DISCHARGE INSTRUCTIONS:   1.  Diet: No concentrated sweets, dysphagia with honey thickened liquids.   2.  Continue swallow precautions.   3.  Activity as tolerated.   4.  Followup with Dr. Walker in 1-2 weeks.  5.  Follow up with   [place] in 2 weeks for blood pressure and diabetes.    6.  Follow up with Dr. Bray in 1 to 2 weeks.   7.  Follow up with Dr. Ojeda in 2-4 weeks.   8.  Outpatient followup with physical therapy in [place] has already been arranged.     I have discussed above at length with the patient and his wife, who is at the bedside. Discussed with patient's nurse, medical record reviewed. Total time including preparation for discharge was 40 minutes.       Jami Pantoja M.D.  JH:gilma  D:   01/21/2017 15:26:53  T:   01/21/2017 17:31:53  Job ID:   [id number]  Document ID:   [id number]  cc:

## 2017-01-21 NOTE — PROGRESS NOTES
Acute Care - Speech Language Pathology   Swallow Initial Evaluation [facility]     Patient Name: Eugenio Joe  : 1940  MRN: [id number]  Today's Date: 2017  Onset of Illness/Injury or Date of Surgery Date: 17      SPEECH-LANGUAGE PATHOLOGY EVALUATION - SWALLOW  Subjective: The patient was seen on this date for a Clinical Swallow evaluation.  Patient was alert and cooperative.    The patient's history is significant for acute renal failure unspecified.  Recent orbital fracture open reduction and internal fixation surgery.  Pt was previously seen for a swallow study 98.  At that time he was placed on a regular diet with thin liquids.   A barium swallow esophagram was conducted 2017. Results indicated  pt silently aspirated thick and thin barium whether upright or horizontal on the x-ray table.  Objective: Textures given included honey thick liquid and regular consistency.  Assessment: Difficulties were noted with none of the above consistencies.  SLP Findings:  Patient presents with moderate to severe swallow, with esophageal component.   Recommendations: Diet Textures: honey thick liquid, regular consistency food.  Medications should be taken whole or crushed with thickened liquids or puree.  Recommended Strategies: Upright for PO and small bites and sips. Oral care before breakfast, after all meals and PRN.  Other Recommended Evaluations: VFSS    Dysphagia therapy is recommended. Rationale: dysphagia.         Admit Date: 2017    Visit Dx:     ICD-10-CM ICD-9-CM   1. Acute renal failure, unspecified acute renal failure type N17.9 584.9   2. Aspiration pneumonia of left lower lobe, unspecified aspiration pneumonia type J69.0 507.0   3. Hypotension due to drugs I95.2 458.8     E980.5   4. Dehydration E86.0 276.51   5. Hypercalcemia E83.52 275.42   6. Quadriceps weakness M62.81 728.87   7. S/P CABG x 3 Z95.1 V45.81   8. Essential hypertension I10 401.9   9. Fracture, stress,  metatarsal, right, with nonunion, subsequent encounter M84.374K 733.82   10. Osteopenia determined by x-ray M85.80 733.90   11. Left hip pain M25.552 719.45   12. Bursitis of left hip M70.72 726.5   13. DALILA (acute kidney injury) N17.9 584.9   14. Ascending aorta dilatation I77.810 447.71   15. Pulmonary nodule, left R91.1 793.11   16. Pharyngoesophageal dysphagia R13.14 787.24     Patient Active Problem List   Diagnosis   • Quadriceps weakness   • ACL tear   • Knee pain, right   • S/P CABG x 3   • Essential hypertension   • Hyperlipemia   • Hallux rigidus   • Fracture, stress, metatarsal   • Osteopenia determined by x-ray   • Metatarsal stress fracture with nonunion   • Left hip pain   • Bursitis of left hip   • Pulmonary embolism   • DALILA (acute kidney injury)   • Diabetes mellitus type 2, controlled   • Ascending aorta dilatation   • Pulmonary nodule, left   • Right leg DVT   • Acute renal failure   • Hypotension   • Dehydration   • Hypercalcemia     Past Medical History   Diagnosis Date   • Arthritis    • Asthma    • Brain abscess      at about age 44   • Bruise of face    • Cardiac disease    • Coronary artery disease      CABG 2012   • Diabetes mellitus    • Hearing aid worn      bilateral   • Hypertension    • Joint pain      or swelling   • Orbit fracture    • Pulmonary embolism      OCT 2016     Past Surgical History   Procedure Laterality Date   • Coronary artery bypass graft     • Brain surgery       BRAIN ABCESS 30 YR AGO   • Septoplasty     • Orif foot fracture Right 9/9/2016     Procedure: RIGHT SECOND METATARSAL OPEN REDUCTION INTERNAL FIXATION WITh GRAFT FROM HEEL ;  Surgeon: Man Jenkins MD;  [place] [facility] [place] [facility] [place]:    • Tonsillectomy     • Skin cancer excision     • Orbital fracture open reduction internal fixation Right 1/13/2017     Procedure: RT ORBIT FLOOR FRACTURE REPAIR RIGHT ZMC FRACTURE REPAIR, RIGHT NASAL BONE FRACTURE CLOSED REDUCTION;  Surgeon: Edil Lester  MD;  [place]  [facility] [place] [facility] [place]  Service:           SWALLOW EVALUATION (last 72 hours)      Swallow Evaluation       01/21/17 0800 01/19/17 1329             Rehab Evaluation    Document Type evaluation  -LS evaluation  -NB       Symptoms Noted During/After Treatment  none  -NB       General Information    Patient Profile Review yes  -LS yes  -NB       Current Diet Limitations  thin liquids;regular solid  -NB       Precautions/Limitations, Vision WFL with corrective lenses  -LS WFL  -NB       Precautions/Limitations, Hearing WNL  -LS WFL  -NB       Prior Level of Function- Communication  functional in all spheres  -NB       Prior Level of Function- Swallowing  esophageal concerns  -NB       Plans/Goals Discussed With  patient and family;agreed upon  -NB       Barriers to Rehab none identified  -LS none identified  -NB       Clinical Impression    Patient's Goals For Discharge return home  -LS return home  -NB       SLP Swallowing Diagnosis pharyngeal dysfunction;esophageal dysfunction  -LS other (see comments)   WFL  -NB       Rehab Potential/Prognosis, Swallowing good, to achieve stated therapy goals  -LS        Criteria for Skilled Therapeutic Interventions Met skilled criteria for dysphagia intervention met  -LS no problems identified which require skilled intervention  -NB       Therapy Frequency PRN  -LS evaluation only  -NB       Predicted Duration Therapy Interv (days) until discharge  -LS        SLP Diet Recommendation  regular textures;thin liquids  -NB       Recommended Feeding/Eating Techniques  small sips/bites  -NB       SLP Rec. for Method of Medication Administration  meds whole with thin liquid  -NB       Monitor For Signs Of Aspiration  gurgly voice;pneumonia  -NB       Anticipated Discharge Disposition  home  -NB       Pain Assessment    Pain Assessment  No/denies pain  -NB       Cognitive Assessment/Intervention    Current Cognitive/Communication Assessment  functional  -NB       Orientation Status   oriented x 4  -NB       Follows Commands/Answers Questions  100% of the time  -NB       Oral Motor Structure and Function    Oral Motor Anatomy and Physiology  patient demonstrates anatomy that is WNL  -NB       Dentition Assessment  present and adequate  -NB       Secretion Management  WNL/WFL  -NB       Mucosal Quality  moist, healthy  -NB       Velar Elevation  WNL (within normal limits)  -NB       Volitional Swallow  no difficulties initiating volitional swallow  -NB       Volitional Cough  no difficulties initiating volitional cough  -NB       Oral Musculature General Assessment  oral labial impairment;other (see comments)   right side droop post surgery 1/13/17  -NB         User Key  (r) = Recorded By, (t) = Taken By, (c) = Cosigned By    Initials Name Effective Dates    NB Delisa Jimenez, MS CCC-SLP 04/13/15 -     LS Leeroy Jameson, MS CCC-SLP 04/13/15 -         EDUCATION  The patient has been educated in the following areas:   Dysphagia (Swallowing Impairment).    SLP Recommendation and Plan  SLP Swallowing Diagnosis: pharyngeal dysfunction, esophageal dysfunction  SLP Diet Recommendation: regular textures, thin liquids  Recommended Feeding/Eating Techniques: small sips/bites  SLP Rec. for Method of Medication Administration: meds whole with thin liquid  Monitor For Signs Of Aspiration: gurgly voice, pneumonia     Criteria for Skilled Therapeutic Interventions Met: skilled criteria for dysphagia intervention met  Anticipated Discharge Disposition: home  Rehab Potential/Prognosis, Swallowing: good, to achieve stated therapy goals  Therapy Frequency: PRN             Plan of Care Review  Plan Of Care Reviewed With: patient, spouse  Progress: improving  Outcome Summary/Follow up Plan: Pt with improved ambulation ,sitting up in chair with spouse present. no complaints of pain this shift. appetite good voiding without difficulty. surgeon removed nose brace, minimal ecchmosis and edema noted on face.patient awaiting  for possible outpatient vs inpatient rehab.          IP SLP Goals       01/21/17 0901          Safely Consume Diet    Safely Consume Diet- SLP, Date Established 01/21/17  -LS      Safely Consume Diet- SLP, Time to Achieve by discharge  -LS      Safely Consume Diet- SLP, Activity Level Patient will improve closure at entrance to airway for safer swallow  -LS        User Key  (r) = Recorded By, (t) = Taken By, (c) = Cosigned By    Initials Name Provider Type    CHINO Jameson MS CCC-SLP Speech and Language Pathologist             SLP Outcome Measures (last 72 hours)      SLP Outcome Measures       01/21/17 0800 01/19/17 1329       SLP Outcome Measures    Outcome Measure Used? Adult NOMS  -LS Adult NOMS  -NB     FCM Scores    FCM Chosen Swallowing  -LS Swallowing  -NB     Swallowing FCM Score 4  -LS 7  -NB       User Key  (r) = Recorded By, (t) = Taken By, (c) = Cosigned By    Initials Name Effective Dates    NB Delisa Jimenez MS CCC-VILMA 04/13/15 -     MS JOCELYN Rivera 04/13/15 -            Time Calculation:         Time Calculation- SLP       01/21/17 0904          Time Calculation- SLP    SLP Received On 01/21/17  -        User Key  (r) = Recorded By, (t) = Taken By, (c) = Cosigned By    Initials Name Provider Type    CHINO Jameson MS CCC-SLP Speech and Language Pathologist          Therapy Charges for Today     Code Description Service Date Service Provider Modifiers Qty    [id number] HC ST EVAL ORAL PHARYNG SWALLOW 4 1/21/2017 MS JOCELYN Ferrari GN 1               MS JOCELYN Ferrari  1/21/2017

## 2017-01-21 NOTE — PLAN OF CARE
Problem: Fall Risk (Adult)  Intervention: Monitor/Assist with Self Care    01/20/17 1430 01/20/17 1830 01/20/17 1940   Monitor/Assist with Self Care   Ambulation --  --  3-->assistive equipment and person   Transferring --  --  3-->assistive equipment and person   Toileting --  --  3-->assistive equipment and person   Bathing --  --  2-->assistive person   Dressing --  --  2-->assistive person   Eating --  --  0-->independent   Communication --  --  0-->understands/communicates without difficulty   Swallowing (if score 2 or more for any item, consult Rehab Services) 0-->swallows foods/liquids without difficulty --  --    Activity   Activity Type --  activity adjusted per tolerance --    Activity Level of Assistance --  assistance, 1 person --        Intervention: Reduce Risk/Promote Restraint Free Environment    01/21/17 0227   Safety Interventions   Safety/Security Measures bed alarm set   Environmental Safety Modification assistive device/personal items within reach   Restraint Interventions   Safety Promotion/Fall Prevention safety round/check completed;fall prevention program maintained;nonskid shoes/slippers when out of bed;supervised activity       Intervention: Review Medications/Identify Contributors to Fall Risk    01/20/17 1430   Safety Interventions   Medication Review/Management medications reviewed         Goal: Absence of Falls  Outcome: Ongoing (interventions implemented as appropriate)    01/21/17 0230   Fall Risk (Adult)   Absence of Falls making progress toward outcome

## 2017-01-21 NOTE — NURSING NOTE
Dr. Walker called RN and asked her to order speech therapy eval tomorrow due to the results on barium swallow study results , MD states there was possible aspiration noted on xray. MD spoke with wife on phone .

## 2017-01-23 LAB
BACTERIA SPEC AEROBE CULT: NORMAL
BACTERIA SPEC AEROBE CULT: NORMAL

## 2017-02-03 ENCOUNTER — LAB (OUTPATIENT)
Dept: LAB | Facility: HOSPITAL | Age: 77
End: 2017-02-03
Attending: INTERNAL MEDICINE

## 2017-02-03 ENCOUNTER — TRANSCRIBE ORDERS (OUTPATIENT)
Dept: LAB | Facility: HOSPITAL | Age: 77
End: 2017-02-03

## 2017-02-03 ENCOUNTER — TELEPHONE (OUTPATIENT)
Dept: ORTHOPEDIC SURGERY | Facility: CLINIC | Age: 77
End: 2017-02-03

## 2017-02-03 DIAGNOSIS — N18.30 CHRONIC KIDNEY DISEASE, STAGE III (MODERATE) (HCC): ICD-10-CM

## 2017-02-03 DIAGNOSIS — E55.9 VITAMIN D DEFICIENCY: Primary | ICD-10-CM

## 2017-02-03 DIAGNOSIS — N18.30 CHRONIC KIDNEY DISEASE, STAGE III (MODERATE) (HCC): Primary | ICD-10-CM

## 2017-02-03 LAB
ANION GAP SERPL CALCULATED.3IONS-SCNC: 17.4 MMOL/L
BACTERIA UR QL AUTO: ABNORMAL /HPF
BILIRUB UR QL STRIP: NEGATIVE
BUN BLD-MCNC: 34 MG/DL (ref 8–23)
BUN/CREAT SERPL: 18.2 (ref 7–25)
CALCIUM SPEC-SCNC: 10.4 MG/DL (ref 8.6–10.5)
CHLORIDE SERPL-SCNC: 94 MMOL/L (ref 98–107)
CLARITY UR: CLEAR
CO2 SERPL-SCNC: 23.6 MMOL/L (ref 22–29)
COLOR UR: YELLOW
CREAT BLD-MCNC: 1.87 MG/DL (ref 0.76–1.27)
DEPRECATED RDW RBC AUTO: 43.6 FL (ref 37–54)
ERYTHROCYTE [DISTWIDTH] IN BLOOD BY AUTOMATED COUNT: 12.9 % (ref 11.5–14.5)
GFR SERPL CREATININE-BSD FRML MDRD: 35 ML/MIN/1.73
GLUCOSE BLD-MCNC: 185 MG/DL (ref 65–99)
GLUCOSE UR STRIP-MCNC: NEGATIVE MG/DL
HCT VFR BLD AUTO: 44.6 % (ref 40.4–52.2)
HGB BLD-MCNC: 15.8 G/DL (ref 13.7–17.6)
HGB UR QL STRIP.AUTO: NEGATIVE
HYALINE CASTS UR QL AUTO: ABNORMAL /LPF
KETONES UR QL STRIP: NEGATIVE
LEUKOCYTE ESTERASE UR QL STRIP.AUTO: NEGATIVE
MCH RBC QN AUTO: 33.1 PG (ref 27–32.7)
MCHC RBC AUTO-ENTMCNC: 35.4 G/DL (ref 32.6–36.4)
MCV RBC AUTO: 93.3 FL (ref 79.8–96.2)
NITRITE UR QL STRIP: NEGATIVE
PH UR STRIP.AUTO: 5.5 [PH] (ref 5–8)
PLATELET # BLD AUTO: 305 10*3/MM3 (ref 140–500)
PMV BLD AUTO: 11.2 FL (ref 6–12)
POTASSIUM BLD-SCNC: 4.3 MMOL/L (ref 3.5–5.2)
PROT UR QL STRIP: NEGATIVE
RBC # BLD AUTO: 4.78 10*6/MM3 (ref 4.6–6)
RBC # UR: ABNORMAL /HPF
REF LAB TEST METHOD: ABNORMAL
SODIUM BLD-SCNC: 135 MMOL/L (ref 136–145)
SP GR UR STRIP: 1.02 (ref 1–1.03)
SQUAMOUS #/AREA URNS HPF: ABNORMAL /HPF
UROBILINOGEN UR QL STRIP: NORMAL
WBC NRBC COR # BLD: 9.97 10*3/MM3 (ref 4.5–10.7)
WBC UR QL AUTO: ABNORMAL /HPF

## 2017-02-03 PROCEDURE — 80048 BASIC METABOLIC PNL TOTAL CA: CPT

## 2017-02-03 PROCEDURE — 81001 URINALYSIS AUTO W/SCOPE: CPT

## 2017-02-03 PROCEDURE — 36415 COLL VENOUS BLD VENIPUNCTURE: CPT

## 2017-02-03 PROCEDURE — 85027 COMPLETE CBC AUTOMATED: CPT

## 2017-02-03 NOTE — TELEPHONE ENCOUNTER
Spoken with the patient's wife.  D level from January 12 was 53.3.  Patient fell about a week after he had this lab drawn and ended in the hospital with facial fractures and acute renal failure.  Is taken off all this medications however when he was discharged on January 21 was restarted back on the vitamin D.  A new prescription was called into our office on January 16 and she did not start that prescription until he got home from the hospital.  Leave him on the same dose of vitamin D which is the D2 50,000 units twice a week and will recheck his vitamin D level the week of February 27 per Dr. Jenkins

## 2017-02-03 NOTE — TELEPHONE ENCOUNTER
----- Message from Man Jenkins MD sent at 1/12/2017  8:43 AM EST -----   Please change him to D3 50 k weekly and recheck in 6 weeks, thanks    ----- Message -----     From: Lab, Background User     Sent: 1/12/2017   8:41 AM       To: Man Jenkins MD

## 2017-02-03 NOTE — TELEPHONE ENCOUNTER
Call returned to the patient.  Was unable to reach them, but I have left a message on the answering machine for them to contact me at the office.

## 2017-02-08 ENCOUNTER — OFFICE VISIT (OUTPATIENT)
Dept: GASTROENTEROLOGY | Facility: CLINIC | Age: 77
End: 2017-02-08

## 2017-02-08 VITALS
BODY MASS INDEX: 27.06 KG/M2 | SYSTOLIC BLOOD PRESSURE: 126 MMHG | HEIGHT: 70 IN | DIASTOLIC BLOOD PRESSURE: 82 MMHG | WEIGHT: 189 LBS

## 2017-02-08 DIAGNOSIS — R13.14 PHARYNGOESOPHAGEAL DYSPHAGIA: Primary | ICD-10-CM

## 2017-02-08 PROCEDURE — 99213 OFFICE O/P EST LOW 20 MIN: CPT | Performed by: INTERNAL MEDICINE

## 2017-02-08 RX ORDER — SODIUM CHLORIDE 0.9 % (FLUSH) 0.9 %
1-10 SYRINGE (ML) INJECTION AS NEEDED
Status: CANCELLED | OUTPATIENT
Start: 2017-02-08

## 2017-02-08 NOTE — PROGRESS NOTES
Chief Complaint   Patient presents with   • Follow up from hospital     Subjective     HPI     Eugenio Joe is a 76 y.o. male who presents for hospital follow-up.  Eugenio was recently hospitalized after a fall where he suffered some facial injuries.  In the hospital he endorsed some intermittent dysphagia and GI consultation was obtained.  We elected not to immediately proceed with EGD given his facial injuries and instead performed a barium esophagram.  Barium esophagram suggested a possible mild stenosis at the distal esophagus as well as some potential esophagitis and gastritis.  There is also a possible irregularity to the mucosa in the proximal duodenum.  He underwent a speech pathology evaluation which showed no obvious aspiration and no further workup was recommended from their standpoint.    He tells me that overall he is been doing well since hospital discharge.  His facial injuries are healing nicely.  His appetite is starting to return and is had little to no issues with dysphagia over the last week.  He has lost a moderate amount of weight but this is not surprising given his recent issues.    Past Medical History   Diagnosis Date   • Arthritis    • Asthma    • Brain abscess      at about age 44   • Bruise of face    • Cardiac disease    • Coronary artery disease      CABG 2012   • Diabetes mellitus    • Hearing aid worn      bilateral   • Hypertension    • Joint pain      or swelling   • Orbit fracture    • Pulmonary embolism      OCT 2016   • Sinus infection        Social History     Social History   • Marital status:      Spouse name: N/A   • Number of children: N/A   • Years of education: N/A     Social History Main Topics   • Smoking status: Former Smoker     Types: Cigarettes   • Smokeless tobacco: Never Used      Comment: HISTORY OF 6 YRS USE IN EARLY 20'S   • Alcohol use No   • Drug use: No   • Sexual activity: Defer     Other Topics Concern   • None     Social History Narrative          Current Outpatient Prescriptions:   •  acetaminophen (TYLENOL) 325 MG tablet, Take 2 tablets by mouth Every 6 (Six) Hours As Needed for mild pain (1-3)., Disp: , Rfl: 0  •  allopurinol (ZYLOPRIM) 300 MG tablet, Take 300 mg by mouth daily., Disp: , Rfl:   •  chlorhexidine (PERIDEX) 0.12 % solution, Apply 15 mL to the mouth or throat 3 (Three) Times a Day., Disp: 473 mL, Rfl: 0  •  fluticasone-salmeterol (ADVAIR) 250-50 MCG/DOSE DISKUS, Inhale 1 puff Every Evening. Advair Diskus 250-50 MCG/DOSE Inhalation Aerosol Powder Breath Activated; Patient Sig: Advair Diskus 250-50 MCG/DOSE Inhalation Aerosol Powder Breath Activated INHALE 1 PUFF BID; 60; 0; 15-Oct-2012; Active, Disp: , Rfl:   •  HYDROcodone-acetaminophen (NORCO) 5-325 MG per tablet, Take 1 tablet by mouth Every 6 (Six) Hours As Needed., Disp: , Rfl:   •  Multiple Vitamins-Minerals (MULTIVITAMIN ADULT PO), Take  by mouth Daily., Disp: , Rfl:   •  PROAIR  (90 BASE) MCG/ACT inhaler, 2 puffs Every 4 (Four) Hours As Needed., Disp: , Rfl:   •  simvastatin (ZOCOR) 40 MG tablet, Take 40 mg by mouth every night., Disp: , Rfl:   •  vitamin D (ERGOCALCIFEROL) 11847 UNITS capsule capsule, Take 1 capsule by mouth Take As Directed. TAKES ON MONDAYS AND THURSDAYS, Disp: 12 capsule, Rfl: 0    Review of Systems   Constitutional: Positive for fatigue and unexpected weight change.   HENT: Positive for trouble swallowing.    Respiratory: Negative.    Cardiovascular: Negative.    Gastrointestinal: Negative.        Objective   Vitals:    02/08/17 0926   BP: 126/82     Physical Exam   Constitutional: He is oriented to person, place, and time. He appears well-developed and well-nourished.   HENT:   Head: Normocephalic and atraumatic.   Neck: Normal range of motion. Neck supple.   Abdominal: Soft. Bowel sounds are normal. He exhibits no distension and no mass. There is no tenderness. No hernia.   Lymphadenopathy:     He has no cervical adenopathy.   Neurological: He is  alert and oriented to person, place, and time.   Skin: Skin is warm and dry.   Psychiatric: He has a normal mood and affect. His behavior is normal. Judgment and thought content normal.   Vitals reviewed.      Assessment/Plan      Eugenio was seen today for follow up from hospital.    Diagnoses and all orders for this visit:    Pharyngoesophageal dysphagia  -     Case Request; Standing  -     sodium chloride 0.9 % flush 1-10 mL; Infuse 1-10 mL into a venous catheter As Needed for line care.  -     Case Request    Other orders  -     Implement Anesthesia orders day of procedure.; Standing  -     Obtain informed consent; Standing  -     Insert Peripheral IV; Standing  -     Saline Lock & Maintain IV Access; Standing    This gentleman has a abnormal barium esophagram suggesting a possible mild stricture at the distal esophagus and some nonspecific mucosal irregularities in the stomach and small bowel.  This warrants further investigation with EGD.  He does be doing well from his facial injuries and I think he would tolerate the procedure at this point.  Case request has been submitted risk and benefits of the procedure discussed with patient and he is agreeable with proceeding

## 2017-02-16 ENCOUNTER — OFFICE VISIT (OUTPATIENT)
Dept: ORTHOPEDIC SURGERY | Facility: CLINIC | Age: 77
End: 2017-02-16

## 2017-02-16 VITALS — HEIGHT: 70 IN | WEIGHT: 189 LBS | BODY MASS INDEX: 27.06 KG/M2 | TEMPERATURE: 97.9 F

## 2017-02-16 DIAGNOSIS — M84.374K: ICD-10-CM

## 2017-02-16 DIAGNOSIS — Z98.890 S/P FOOT SURGERY, RIGHT: Primary | ICD-10-CM

## 2017-02-16 DIAGNOSIS — I82.591 CHRONIC DEEP VEIN THROMBOSIS (DVT) OF OTHER VEIN OF RIGHT LOWER EXTREMITY (HCC): ICD-10-CM

## 2017-02-16 PROCEDURE — 99213 OFFICE O/P EST LOW 20 MIN: CPT | Performed by: ORTHOPAEDIC SURGERY

## 2017-02-16 PROCEDURE — 73630 X-RAY EXAM OF FOOT: CPT | Performed by: ORTHOPAEDIC SURGERY

## 2017-02-16 RX ORDER — AMOXICILLIN AND CLAVULANATE POTASSIUM 875; 125 MG/1; MG/1
TABLET, FILM COATED ORAL
Refills: 0 | Status: ON HOLD | COMMUNITY
Start: 2017-01-27 | End: 2017-03-09

## 2017-02-16 RX ORDER — LISINOPRIL 10 MG/1
10 TABLET ORAL DAILY
Status: ON HOLD | COMMUNITY
End: 2017-03-09 | Stop reason: ALTCHOICE

## 2017-02-16 RX ORDER — GLIPIZIDE 10 MG/1
10 TABLET ORAL DAILY
Status: ON HOLD | COMMUNITY
End: 2017-03-09 | Stop reason: ALTCHOICE

## 2017-02-16 NOTE — PROGRESS NOTES
"Foot Follow Up      Patient: Eugenio Joe    YOB: 1940 76 y.o. male    Chief Complaints: Foot doing great    History of Present Illness: Is up ORIF right second metatarsal nonunion from 9/9/16.  Since our last visit on 12/8/16 he's been ambulating in  shoes been exercising regularly said he did fall and break some bones in his face but seems be doing well from that.  He continues to take vitamin D 50,000 units twice weekly.  He had his level rechecked on 1/12/17 and is due to have it checked again in a week or so.  Dr. Onofre continues to follow him for his DVT that he had postoperatively  HPI    ROS: No Foot pain  Past Medical History   Diagnosis Date   • Arthritis    • Asthma    • Brain abscess      at about age 44   • Bruise of face    • Cardiac disease    • Coronary artery disease      CABG 2012   • Diabetes mellitus    • Hearing aid worn      bilateral   • Hypertension    • Joint pain      or swelling   • Orbit fracture    • Pulmonary embolism      OCT 2016   • Sinus infection      Physical Exam:   Vitals:    02/16/17 1321   Temp: 97.9 °F (36.6 °C)   TempSrc: Temporal Artery    Weight: 189 lb (85.7 kg)   Height: 70\" (177.8 cm)     Well developed with normal mood.  Well-healed incision right foot with no focal tenderness to palpation nonantalgic gait.  Right calf nontender without sign of DVT.      Radiology: 3 views of the right foot were ordered to evaluate fracture alignment reviewed and compared with x-rays from her last visit.  Hardware remains intact fracture appears to be healed nicely with good callus formation.      Assessment/Plan:  1.  Right second metatarsal nonunion clinically and radiographically seems be doing quite well he'll continue with heel cord stretching exercises use of accommodative shoes.  Anything worsens he'll let me know.  2.  Right lower extremity DVT will continue with management per Dr. Onofre is not demonstrating any signs of it at this time.  3.  Vitamin " D was 53.3 on 1/12/17.  He can have his level rechecked next week will change dosages accordingly and he will probably need to follow-up with Dr. Urban for further monitoring and dosing subsequent to that.  We had a pleasant visit

## 2017-02-21 ENCOUNTER — LAB (OUTPATIENT)
Dept: LAB | Facility: HOSPITAL | Age: 77
End: 2017-02-21

## 2017-02-21 DIAGNOSIS — E55.9 VITAMIN D DEFICIENCY: ICD-10-CM

## 2017-02-21 DIAGNOSIS — N18.30 CHRONIC KIDNEY DISEASE, STAGE III (MODERATE) (HCC): Primary | ICD-10-CM

## 2017-02-21 LAB
25(OH)D3 SERPL-MCNC: 53.7 NG/ML (ref 30–100)
ANION GAP SERPL CALCULATED.3IONS-SCNC: 15.6 MMOL/L
BACTERIA UR QL AUTO: ABNORMAL /HPF
BILIRUB UR QL STRIP: NEGATIVE
BUN BLD-MCNC: 20 MG/DL (ref 8–23)
BUN/CREAT SERPL: 19 (ref 7–25)
CALCIUM SPEC-SCNC: 9.6 MG/DL (ref 8.6–10.5)
CHLORIDE SERPL-SCNC: 105 MMOL/L (ref 98–107)
CLARITY UR: CLEAR
CO2 SERPL-SCNC: 22.4 MMOL/L (ref 22–29)
COLOR UR: YELLOW
CREAT BLD-MCNC: 1.05 MG/DL (ref 0.76–1.27)
DEPRECATED RDW RBC AUTO: 51.8 FL (ref 37–54)
ERYTHROCYTE [DISTWIDTH] IN BLOOD BY AUTOMATED COUNT: 14.6 % (ref 11.5–14.5)
GFR SERPL CREATININE-BSD FRML MDRD: 68 ML/MIN/1.73
GLUCOSE BLD-MCNC: 141 MG/DL (ref 65–99)
GLUCOSE UR STRIP-MCNC: NEGATIVE MG/DL
HCT VFR BLD AUTO: 40 % (ref 40.4–52.2)
HGB BLD-MCNC: 13.4 G/DL (ref 13.7–17.6)
HGB UR QL STRIP.AUTO: NEGATIVE
HYALINE CASTS UR QL AUTO: ABNORMAL /LPF
KETONES UR QL STRIP: NEGATIVE
LEUKOCYTE ESTERASE UR QL STRIP.AUTO: NEGATIVE
MCH RBC QN AUTO: 32.9 PG (ref 27–32.7)
MCHC RBC AUTO-ENTMCNC: 33.5 G/DL (ref 32.6–36.4)
MCV RBC AUTO: 98.3 FL (ref 79.8–96.2)
NITRITE UR QL STRIP: NEGATIVE
PH UR STRIP.AUTO: <=5 [PH] (ref 5–8)
PLATELET # BLD AUTO: 156 10*3/MM3 (ref 140–500)
PMV BLD AUTO: 11.7 FL (ref 6–12)
POTASSIUM BLD-SCNC: 4.6 MMOL/L (ref 3.5–5.2)
PROT UR QL STRIP: NEGATIVE
RBC # BLD AUTO: 4.07 10*6/MM3 (ref 4.6–6)
RBC # UR: ABNORMAL /HPF
REF LAB TEST METHOD: ABNORMAL
SODIUM BLD-SCNC: 143 MMOL/L (ref 136–145)
SP GR UR STRIP: 1.02 (ref 1–1.03)
SQUAMOUS #/AREA URNS HPF: ABNORMAL /HPF
UROBILINOGEN UR QL STRIP: NORMAL
WBC NRBC COR # BLD: 5.88 10*3/MM3 (ref 4.5–10.7)
WBC UR QL AUTO: ABNORMAL /HPF

## 2017-02-21 PROCEDURE — 36415 COLL VENOUS BLD VENIPUNCTURE: CPT

## 2017-02-21 PROCEDURE — 82306 VITAMIN D 25 HYDROXY: CPT

## 2017-02-21 PROCEDURE — 80048 BASIC METABOLIC PNL TOTAL CA: CPT

## 2017-02-21 PROCEDURE — 81001 URINALYSIS AUTO W/SCOPE: CPT

## 2017-02-21 PROCEDURE — 85027 COMPLETE CBC AUTOMATED: CPT

## 2017-02-23 ENCOUNTER — TRANSCRIBE ORDERS (OUTPATIENT)
Dept: ADMINISTRATIVE | Facility: HOSPITAL | Age: 77
End: 2017-02-23

## 2017-02-23 DIAGNOSIS — R55 SYNCOPE AND COLLAPSE: Primary | ICD-10-CM

## 2017-02-24 DIAGNOSIS — R55 SYNCOPE AND COLLAPSE: Primary | ICD-10-CM

## 2017-03-01 ENCOUNTER — TELEPHONE (OUTPATIENT)
Dept: ORTHOPEDIC SURGERY | Facility: CLINIC | Age: 77
End: 2017-03-01

## 2017-03-01 NOTE — TELEPHONE ENCOUNTER
----- Message from Man Jenkins MD sent at 2/22/2017  6:55 AM EST -----  Please have him continue on current dosage and f/u with Dr Onofre on further monitoring and dosing, thanks  ----- Message -----     From: Lab, Background User     Sent: 2/21/2017   5:28 PM       To: Man Jenkins MD

## 2017-03-01 NOTE — TELEPHONE ENCOUNTER
Have spoken with the patient.  His serum vitamin D level from last week was 53.7.  Colon and recommended that he continue the same dose which is 50,000 units once a week and follow-up with Dr. Urban regarding further monitoring and dosing of his vitamin D.

## 2017-03-09 ENCOUNTER — HOSPITAL ENCOUNTER (OUTPATIENT)
Facility: HOSPITAL | Age: 77
Setting detail: HOSPITAL OUTPATIENT SURGERY
Discharge: HOME | End: 2017-03-09
Attending: INTERNAL MEDICINE | Admitting: INTERNAL MEDICINE

## 2017-03-09 ENCOUNTER — ANESTHESIA EVENT (OUTPATIENT)
Dept: GASTROENTEROLOGY | Facility: HOSPITAL | Age: 77
End: 2017-03-09

## 2017-03-09 ENCOUNTER — ANESTHESIA (OUTPATIENT)
Dept: GASTROENTEROLOGY | Facility: HOSPITAL | Age: 77
End: 2017-03-09

## 2017-03-09 VITALS
DIASTOLIC BLOOD PRESSURE: 75 MMHG | OXYGEN SATURATION: 98 % | TEMPERATURE: 98.2 F | WEIGHT: 193.31 LBS | RESPIRATION RATE: 20 BRPM | HEART RATE: 70 BPM | HEIGHT: 70 IN | SYSTOLIC BLOOD PRESSURE: 147 MMHG | BODY MASS INDEX: 27.67 KG/M2

## 2017-03-09 DIAGNOSIS — R13.14 PHARYNGOESOPHAGEAL DYSPHAGIA: ICD-10-CM

## 2017-03-09 LAB — GLUCOSE BLDC GLUCOMTR-MCNC: 166 MG/DL (ref 70–130)

## 2017-03-09 PROCEDURE — 88305 TISSUE EXAM BY PATHOLOGIST: CPT | Performed by: INTERNAL MEDICINE

## 2017-03-09 PROCEDURE — 88312 SPECIAL STAINS GROUP 1: CPT | Performed by: INTERNAL MEDICINE

## 2017-03-09 PROCEDURE — 82962 GLUCOSE BLOOD TEST: CPT

## 2017-03-09 PROCEDURE — 43239 EGD BIOPSY SINGLE/MULTIPLE: CPT | Performed by: INTERNAL MEDICINE

## 2017-03-09 PROCEDURE — 43249 ESOPH EGD DILATION <30 MM: CPT | Performed by: INTERNAL MEDICINE

## 2017-03-09 PROCEDURE — 25010000002 PROPOFOL 10 MG/ML EMULSION: Performed by: ANESTHESIOLOGY

## 2017-03-09 PROCEDURE — C1726 CATH, BAL DIL, NON-VASCULAR: HCPCS | Performed by: INTERNAL MEDICINE

## 2017-03-09 RX ORDER — LIDOCAINE HYDROCHLORIDE 10 MG/ML
0.5 INJECTION, SOLUTION INFILTRATION; PERINEURAL ONCE AS NEEDED
Status: DISCONTINUED | OUTPATIENT
Start: 2017-03-09 | End: 2017-03-09 | Stop reason: HOSPADM

## 2017-03-09 RX ORDER — SODIUM CHLORIDE 0.9 % (FLUSH) 0.9 %
3 SYRINGE (ML) INJECTION AS NEEDED
Status: DISCONTINUED | OUTPATIENT
Start: 2017-03-09 | End: 2017-03-09 | Stop reason: HOSPADM

## 2017-03-09 RX ORDER — LIDOCAINE HYDROCHLORIDE 20 MG/ML
INJECTION, SOLUTION INFILTRATION; PERINEURAL AS NEEDED
Status: DISCONTINUED | OUTPATIENT
Start: 2017-03-09 | End: 2017-03-09 | Stop reason: SURG

## 2017-03-09 RX ORDER — PROPOFOL 10 MG/ML
VIAL (ML) INTRAVENOUS CONTINUOUS PRN
Status: DISCONTINUED | OUTPATIENT
Start: 2017-03-09 | End: 2017-03-09 | Stop reason: SURG

## 2017-03-09 RX ORDER — SODIUM CHLORIDE 0.9 % (FLUSH) 0.9 %
1-10 SYRINGE (ML) INJECTION AS NEEDED
Status: DISCONTINUED | OUTPATIENT
Start: 2017-03-09 | End: 2017-03-09 | Stop reason: HOSPADM

## 2017-03-09 RX ORDER — PROPOFOL 10 MG/ML
VIAL (ML) INTRAVENOUS AS NEEDED
Status: DISCONTINUED | OUTPATIENT
Start: 2017-03-09 | End: 2017-03-09 | Stop reason: SURG

## 2017-03-09 RX ORDER — SODIUM CHLORIDE, SODIUM LACTATE, POTASSIUM CHLORIDE, CALCIUM CHLORIDE 600; 310; 30; 20 MG/100ML; MG/100ML; MG/100ML; MG/100ML
1000 INJECTION, SOLUTION INTRAVENOUS CONTINUOUS PRN
Status: DISCONTINUED | OUTPATIENT
Start: 2017-03-09 | End: 2017-03-09 | Stop reason: HOSPADM

## 2017-03-09 RX ORDER — ESOMEPRAZOLE MAGNESIUM 40 MG/1
40 CAPSULE, DELAYED RELEASE ORAL DAILY
Qty: 30 CAPSULE | Refills: 5 | Status: SHIPPED | OUTPATIENT
Start: 2017-03-09 | End: 2017-06-05 | Stop reason: SDUPTHER

## 2017-03-09 RX ADMIN — PROPOFOL 100 MG: 10 INJECTION, EMULSION INTRAVENOUS at 13:18

## 2017-03-09 RX ADMIN — SODIUM CHLORIDE, POTASSIUM CHLORIDE, SODIUM LACTATE AND CALCIUM CHLORIDE 1000 ML: 600; 310; 30; 20 INJECTION, SOLUTION INTRAVENOUS at 13:09

## 2017-03-09 RX ADMIN — PROPOFOL 180 MCG/KG/MIN: 10 INJECTION, EMULSION INTRAVENOUS at 13:18

## 2017-03-09 RX ADMIN — LIDOCAINE HYDROCHLORIDE 50 MG: 20 INJECTION, SOLUTION INFILTRATION; PERINEURAL at 13:18

## 2017-03-09 NOTE — ANESTHESIA PREPROCEDURE EVALUATION
Anesthesia Evaluation        Airway   Mallampati: III  no difficulty expected  Dental - normal exam     Pulmonary - normal exam   (+) pulmonary embolism, asthma,   Cardiovascular - normal exam    (+) hypertension, CAD,       Neuro/Psych  (+) syncope,    GI/Hepatic/Renal/Endo    (+)  chronic renal disease, diabetes mellitus,     Musculoskeletal     Abdominal    Substance History      OB/GYN          Other                                    Anesthesia Plan    ASA 3     MAC     intravenous induction   Anesthetic plan and risks discussed with patient.

## 2017-03-09 NOTE — ANESTHESIA POSTPROCEDURE EVALUATION
Patient: Eugenio Joe    Procedure Summary     Date Anesthesia Start Anesthesia Stop Room / Location    03/09/17 1316 1354  [facility] /  [facility]       Procedure Diagnosis Surgeon Provider    ESOPHAGOGASTRODUODENOSCOPY with biopsies, balloon dilatation (12mm, 15mm) of Schatzki's ring (N/A Esophagus) Pharyngoesophageal dysphagia  (Pharyngoesophageal dysphagia [R13.14]) MD Chayito Mitchell MD          Anesthesia Type: MAC  Last vitals  /88 (03/09/17 1302)    Temp 36.8 °C (98.2 °F) (03/09/17 1302)    Pulse 75 (03/09/17 1302)   Resp 16 (03/09/17 1302)    SpO2 98 % (03/09/17 1302)      Post Anesthesia Care and Evaluation    Patient location during evaluation: bedside  Patient participation: complete - patient participated  Level of consciousness: awake  Pain management: adequate  Airway patency: patent  Anesthetic complications: No anesthetic complications    Cardiovascular status: acceptable  Respiratory status: acceptable  Hydration status: acceptable

## 2017-03-09 NOTE — DISCHARGE INSTRUCTIONS
For the next 24 hours patient needs to be with a responsible adult.    For 24 hours DO NOT drive, operate machinery, appliances, drink alcohol, make important decisions or sign legal documents.    Start with a light or bland diet and advance to regular diet as tolerated.    Follow recommendations on procedure report provided by your doctor.    Call Dr Walker for problems [phone]. Call for pathology results in one week.    Problems may include but not limited to: large amounts of bleeding, trouble breathing, repeated vomiting, severe unrelieved pain, fever or chills.

## 2017-03-09 NOTE — H&P (VIEW-ONLY)
Chief Complaint   Patient presents with   • Follow up from hospital     Subjective     HPI     Eugenio Joe is a 76 y.o. male who presents for hospital follow-up.  Eugenio was recently hospitalized after a fall where he suffered some facial injuries.  In the hospital he endorsed some intermittent dysphagia and GI consultation was obtained.  We elected not to immediately proceed with EGD given his facial injuries and instead performed a barium esophagram.  Barium esophagram suggested a possible mild stenosis at the distal esophagus as well as some potential esophagitis and gastritis.  There is also a possible irregularity to the mucosa in the proximal duodenum.  He underwent a speech pathology evaluation which showed no obvious aspiration and no further workup was recommended from their standpoint.    He tells me that overall he is been doing well since hospital discharge.  His facial injuries are healing nicely.  His appetite is starting to return and is had little to no issues with dysphagia over the last week.  He has lost a moderate amount of weight but this is not surprising given his recent issues.    Past Medical History   Diagnosis Date   • Arthritis    • Asthma    • Brain abscess      at about age 44   • Bruise of face    • Cardiac disease    • Coronary artery disease      CABG 2012   • Diabetes mellitus    • Hearing aid worn      bilateral   • Hypertension    • Joint pain      or swelling   • Orbit fracture    • Pulmonary embolism      OCT 2016   • Sinus infection        Social History     Social History   • Marital status:      Spouse name: N/A   • Number of children: N/A   • Years of education: N/A     Social History Main Topics   • Smoking status: Former Smoker     Types: Cigarettes   • Smokeless tobacco: Never Used      Comment: HISTORY OF 6 YRS USE IN EARLY 20'S   • Alcohol use No   • Drug use: No   • Sexual activity: Defer     Other Topics Concern   • None     Social History Narrative          Current Outpatient Prescriptions:   •  acetaminophen (TYLENOL) 325 MG tablet, Take 2 tablets by mouth Every 6 (Six) Hours As Needed for mild pain (1-3)., Disp: , Rfl: 0  •  allopurinol (ZYLOPRIM) 300 MG tablet, Take 300 mg by mouth daily., Disp: , Rfl:   •  chlorhexidine (PERIDEX) 0.12 % solution, Apply 15 mL to the mouth or throat 3 (Three) Times a Day., Disp: 473 mL, Rfl: 0  •  fluticasone-salmeterol (ADVAIR) 250-50 MCG/DOSE DISKUS, Inhale 1 puff Every Evening. Advair Diskus 250-50 MCG/DOSE Inhalation Aerosol Powder Breath Activated; Patient Sig: Advair Diskus 250-50 MCG/DOSE Inhalation Aerosol Powder Breath Activated INHALE 1 PUFF BID; 60; 0; 15-Oct-2012; Active, Disp: , Rfl:   •  HYDROcodone-acetaminophen (NORCO) 5-325 MG per tablet, Take 1 tablet by mouth Every 6 (Six) Hours As Needed., Disp: , Rfl:   •  Multiple Vitamins-Minerals (MULTIVITAMIN ADULT PO), Take  by mouth Daily., Disp: , Rfl:   •  PROAIR  (90 BASE) MCG/ACT inhaler, 2 puffs Every 4 (Four) Hours As Needed., Disp: , Rfl:   •  simvastatin (ZOCOR) 40 MG tablet, Take 40 mg by mouth every night., Disp: , Rfl:   •  vitamin D (ERGOCALCIFEROL) 62194 UNITS capsule capsule, Take 1 capsule by mouth Take As Directed. TAKES ON MONDAYS AND THURSDAYS, Disp: 12 capsule, Rfl: 0    Review of Systems   Constitutional: Positive for fatigue and unexpected weight change.   HENT: Positive for trouble swallowing.    Respiratory: Negative.    Cardiovascular: Negative.    Gastrointestinal: Negative.        Objective   Vitals:    02/08/17 0926   BP: 126/82     Physical Exam   Constitutional: He is oriented to person, place, and time. He appears well-developed and well-nourished.   HENT:   Head: Normocephalic and atraumatic.   Neck: Normal range of motion. Neck supple.   Abdominal: Soft. Bowel sounds are normal. He exhibits no distension and no mass. There is no tenderness. No hernia.   Lymphadenopathy:     He has no cervical adenopathy.   Neurological: He is  alert and oriented to person, place, and time.   Skin: Skin is warm and dry.   Psychiatric: He has a normal mood and affect. His behavior is normal. Judgment and thought content normal.   Vitals reviewed.      Assessment/Plan      Eugenio was seen today for follow up from hospital.    Diagnoses and all orders for this visit:    Pharyngoesophageal dysphagia  -     Case Request; Standing  -     sodium chloride 0.9 % flush 1-10 mL; Infuse 1-10 mL into a venous catheter As Needed for line care.  -     Case Request    Other orders  -     Implement Anesthesia orders day of procedure.; Standing  -     Obtain informed consent; Standing  -     Insert Peripheral IV; Standing  -     Saline Lock & Maintain IV Access; Standing    This gentleman has a abnormal barium esophagram suggesting a possible mild stricture at the distal esophagus and some nonspecific mucosal irregularities in the stomach and small bowel.  This warrants further investigation with EGD.  He does be doing well from his facial injuries and I think he would tolerate the procedure at this point.  Case request has been submitted risk and benefits of the procedure discussed with patient and he is agreeable with proceeding

## 2017-03-10 LAB
CYTO UR: NORMAL
LAB AP CASE REPORT: NORMAL
Lab: NORMAL
PATH REPORT.FINAL DX SPEC: NORMAL
PATH REPORT.GROSS SPEC: NORMAL

## 2017-03-15 NOTE — PROGRESS NOTES
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  For this I would recommend a daily PPI such as Nexium 40 mg.  We should plan to repeat EGD in 3 years with regards to his Terrazas's.  Would like him to follow-up in the office in 3 months we will discuss whether we need to repeat EGD sooner with regards to his duodenal stricture.

## 2017-03-23 ENCOUNTER — TELEPHONE (OUTPATIENT)
Dept: GASTROENTEROLOGY | Facility: CLINIC | Age: 77
End: 2017-03-23

## 2017-03-23 NOTE — TELEPHONE ENCOUNTER
Patient called back, advised of Dr. Sage's note. Advised the biopsies of the duodenal stricture showed no evidence of cancer or precancerous changes.  Advised his biopsies of his esophageal stricture did show a small segment of Terrazas's without dyplasia. Advised Dr. Sage recommends he stay on his PPI.  Advised he will need to repeat his EGD in 3 years with regards to his Terrazas's.  F/u appt made for 6/5/17in regards to discuss an earlier EGD secondary to his duodenal stricture.  He verb understanding.

## 2017-03-23 NOTE — TELEPHONE ENCOUNTER
----- Message from Rigoberto Walker MD sent at 3/15/2017  6:38 PM EDT -----  Biopsies of the duodenal stricture showed no evidence of cancer or precancerous changes.  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  For this I would recommend a daily PPI such as Nexium 40 mg.  We should plan to repeat EGD in 3 years with regards to his Terrazas's.  Would like him to follow-up in the office in 3 months we will discuss whether we need to repeat EGD sooner with regards to his duodenal stricture.

## 2017-03-30 ENCOUNTER — TRANSCRIBE ORDERS (OUTPATIENT)
Dept: ADMINISTRATIVE | Facility: HOSPITAL | Age: 77
End: 2017-03-30

## 2017-03-30 DIAGNOSIS — R91.1 PULMONARY NODULE, LEFT: Primary | ICD-10-CM

## 2017-04-07 ENCOUNTER — HOSPITAL ENCOUNTER (OUTPATIENT)
Dept: CT IMAGING | Facility: HOSPITAL | Age: 77
Discharge: HOME | End: 2017-04-07
Attending: INTERNAL MEDICINE | Admitting: INTERNAL MEDICINE

## 2017-04-07 DIAGNOSIS — R91.1 PULMONARY NODULE, LEFT: ICD-10-CM

## 2017-04-07 PROCEDURE — 71250 CT THORAX DX C-: CPT

## 2017-04-10 ENCOUNTER — OFFICE VISIT (OUTPATIENT)
Dept: CARDIOLOGY | Facility: CLINIC | Age: 77
End: 2017-04-10

## 2017-04-10 VITALS
HEART RATE: 93 BPM | OXYGEN SATURATION: 98 % | BODY MASS INDEX: 27.86 KG/M2 | WEIGHT: 194.6 LBS | SYSTOLIC BLOOD PRESSURE: 128 MMHG | DIASTOLIC BLOOD PRESSURE: 80 MMHG | HEIGHT: 70 IN

## 2017-04-10 DIAGNOSIS — R55 SYNCOPE AND COLLAPSE: ICD-10-CM

## 2017-04-10 DIAGNOSIS — I25.10 CORONARY ARTERY DISEASE INVOLVING NATIVE CORONARY ARTERY OF NATIVE HEART WITHOUT ANGINA PECTORIS: ICD-10-CM

## 2017-04-10 DIAGNOSIS — Z95.1 S/P CABG X 3: ICD-10-CM

## 2017-04-10 DIAGNOSIS — I10 ESSENTIAL HYPERTENSION: Primary | ICD-10-CM

## 2017-04-10 PROCEDURE — 99214 OFFICE O/P EST MOD 30 MIN: CPT | Performed by: PHYSICIAN ASSISTANT

## 2017-04-10 PROCEDURE — 93000 ELECTROCARDIOGRAM COMPLETE: CPT | Performed by: PHYSICIAN ASSISTANT

## 2017-04-10 RX ORDER — BLOOD-GLUCOSE METER
KIT MISCELLANEOUS
Refills: 2 | Status: ON HOLD | COMMUNITY
Start: 2017-03-11 | End: 2017-11-21 | Stop reason: SDUPTHER

## 2017-04-10 NOTE — PROGRESS NOTES
Date of Office Visit: 04/10/2017  Encounter Provider: ALON Ureña  Place of Service: [facility]  Patient Name: Eugenio Joe  :1940    Chief Complaint   Patient presents with   • Coronary Artery Disease   :     HPI: Eugenio Joe is a 76 y.o. male, new to me, who presents today for follow-up.  Old records a been obtained and reviewed by me.  He is a patient of Dr. Ford's with a past medical history significant for hypertension, diabetes, and coronary artery disease.  He has a history of coronary artery bypass grafting.  He was last in our office to see Dr. Ford on 2016.  At that visit, he was doing well from a cardiac standpoint.  No changes were made to his medical regimen.  He is here today for his yearly follow-up.  Over the past year, it looks like he has had multiple hospitalizations.  In 2016 he had a pulmonary embolism secondary to a right lower extremity DVT after surgical intervention on that same foot.  He also had acute renal failure that resolved upon discharge.  It looks like at the beginning of 2017 he had a fall with a facial fracture and underwent outpatient surgical repair.     He then presented to the emergency department week later with complaints of generalized weakness, low blood pressure, decreased oral intake of food, and darker colored urine.  On that visit, his creatinine was elevated at 3.6.  It was felt at first that he might have pneumonia, however chest x-ray showed no pneumonia.  He was to intensive and given IV fluids.  He had been on Eliquis for his pulmonary embolism, and this was discontinued.  He was found to have dysphagia, and it was recommended that he have an EGD.  He was ultimately seen by GI and underwent an EGD.  He had an esophageal stricture and biopsies showed evidence of Terrazas's without dysplasia.  During that hospitalization, he did undergo an echocardiogram on 2017 which  showed an EF of 56.8%, normal LV contractility with mild to moderate left atrial dilation, mild aortic regurgitation, mild tricuspid regurgitation, mild mitral regurgitation, and no aortic stenosis.  2 evaluate his syncope, a Zio patch was placed.  Over 2 weeks he had 9 episodes of paroxysmal atrial tachycardia with the longest duration being 6 beats long.  He had no sustained bradycardia or tachycardia arrhythmias, and no atrial fibrillation.  There is nothing to suggest a cause of his syncope.   Since he's been home from the hospital, he's family starting to turn the corner.  He is feeling much better.  He is no longer on any blood pressure medications.  He lost about 35 pounds, and his wife thinks that that the reason why he was able to come off of all of his blood pressure medications.  He denies any chest pain, shortness of breath, palpitations, edema, dizziness, or syncope.  He works at the [employer] during the summer months and gets a lot of exercise.  He is able to do this without any problem.  His granddaughter won two gold medals in the Olympics, and he is very proud.        Past Medical History:   Diagnosis Date   • Arthritis    • Asthma    • Brain abscess     at about age 44   • Bruise of face    • Cardiac disease    • Coronary artery disease     CABG 2012   • Diabetes mellitus    • Hearing aid worn     bilateral   • Hypertension    • Joint pain     or swelling   • Orbit fracture    • Pulmonary embolism     OCT 2016   • Sinus infection        Past Surgical History:   Procedure Laterality Date   • BRAIN SURGERY      BRAIN ABCESS 30 YR AGO   • COLONOSCOPY  2012    Ciciliano- normal per pt, no polyps    • CORONARY ARTERY BYPASS GRAFT     • ENDOSCOPY N/A 3/9/2017    Procedure: ESOPHAGOGASTRODUODENOSCOPY with biopsies, balloon dilatation (12mm, 15mm) of Schatzki's ring;  Surgeon: Rigoberto Walker MD;  Location: [facility] ENDOSCOPY;  Service:    • FACIAL FRACTURE SURGERY      to repair 6 broken bones   • ORBITAL  FRACTURE OPEN REDUCTION INTERNAL FIXATION Right 1/13/2017    Procedure: RT ORBIT FLOOR FRACTURE REPAIR RIGHT [facility]MC FRACTURE REPAIR, RIGHT NASAL BONE FRACTURE CLOSED REDUCTION;  Surgeon: Edil Lester MD;  Location: [facility] OR [facility];  Service:    • ORIF FOOT FRACTURE Right 9/9/2016    Procedure: RIGHT SECOND METATARSAL OPEN REDUCTION INTERNAL FIXATION WITh GRAFT FROM HEEL ;  Surgeon: Man Jenkins MD;  Location: [facility] OR [facility];  Service:    • SEPTOPLASTY     • SKIN CANCER EXCISION     • TONSILLECTOMY         Social History     Social History   • Marital status:      Spouse name: N/A   • Number of children: N/A   • Years of education: N/A     Occupational History   • Not on file.     Social History Main Topics   • Smoking status: Former Smoker     Types: Cigarettes     Quit date: 3/9/1963   • Smokeless tobacco: Never Used      Comment: HISTORY OF 6 YRS USE IN EARLY 20'S   • Alcohol use No      Comment: No caffeine use   • Drug use: No   • Sexual activity: Defer     Other Topics Concern   • Not on file     Social History Narrative       Family History   Problem Relation Age of Onset   • Heart disease Mother    • Hypertension Mother    • Heart disease Father    • Hypertension Father        Review of Systems   Constitution: Negative for chills, fever, malaise/fatigue, weight gain and weight loss.   HENT: Negative for ear pain, headaches, hearing loss, nosebleeds and sore throat.    Eyes: Negative for double vision, pain and visual disturbance.   Cardiovascular: Negative for chest pain, dyspnea on exertion, irregular heartbeat, leg swelling, near-syncope, orthopnea, palpitations, paroxysmal nocturnal dyspnea and syncope.   Respiratory: Negative for cough, shortness of breath, sleep disturbances due to breathing, snoring and wheezing.    Endocrine: Negative for cold intolerance, heat intolerance and polyuria.   Skin: Negative for itching and rash.   Musculoskeletal: Negative for joint pain, joint  swelling and myalgias.   Gastrointestinal: Negative for abdominal pain, diarrhea, melena, nausea and vomiting.   Genitourinary: Negative for frequency, hematuria and hesitancy.   Neurological: Negative for excessive daytime sleepiness, light-headedness, numbness, paresthesias and seizures.   Psychiatric/Behavioral: Negative for altered mental status and depression.   Allergic/Immunologic: Negative.    All other systems reviewed and are negative.      Allergies   Allergen Reactions   • Other Mental Status Change     Oxy drugs   • Oxycodone Mental Status Change         Current Outpatient Prescriptions:   •  acetaminophen (TYLENOL) 325 MG tablet, Take 2 tablets by mouth Every 6 (Six) Hours As Needed for mild pain (1-3)., Disp: , Rfl: 0  •  allopurinol (ZYLOPRIM) 300 MG tablet, Take 300 mg by mouth daily., Disp: , Rfl:   •  esomeprazole (nexIUM) 40 MG capsule, Take 1 capsule by mouth Daily., Disp: 30 capsule, Rfl: 5  •  fluticasone-salmeterol (ADVAIR) 250-50 MCG/DOSE DISKUS, Inhale 1 puff Every Evening. Advair Diskus 250-50 MCG/DOSE Inhalation Aerosol Powder Breath Activated; Patient Sig: Advair Diskus 250-50 MCG/DOSE Inhalation Aerosol Powder Breath Activated INHALE 1 PUFF BID; 60; 0; 15-Oct-2012; Active, Disp: , Rfl:   •  metFORMIN (GLUCOPHAGE) 500 MG tablet, Take 500 mg by mouth Daily With Breakfast., Disp: , Rfl:   •  Multiple Vitamins-Minerals (MULTIVITAMIN ADULT PO), Take  by mouth Daily., Disp: , Rfl:   •  PROAIR  (90 BASE) MCG/ACT inhaler, 2 puffs Every 4 (Four) Hours As Needed., Disp: , Rfl:   •  simvastatin (ZOCOR) 40 MG tablet, Take 40 mg by mouth every night., Disp: , Rfl:   •  vitamin D (ERGOCALCIFEROL) 77248 UNITS capsule capsule, Take 1 capsule by mouth Take As Directed. TAKES ON MONDAYS AND THURSDAYS, Disp: 12 capsule, Rfl: 0  •  FREESTYLE LITE test strip, USE TO TEST ONCE OR TWICE DAILY UTD, Disp: , Rfl: 2     Objective:     Vitals:    04/10/17 0936 04/10/17 0937   BP: 130/80 128/80   BP  "Location: Right arm Left arm   Pulse: 93    SpO2: 98%    Weight: 194 lb 9.6 oz (88.3 kg)    Height: 70\" (177.8 cm)      Body mass index is 27.92 kg/(m^2).    PHYSICAL EXAM:    Physical Exam   Constitutional: He is oriented to person, place, and time. He appears well-developed and well-nourished. No distress.   HENT:   Head: Normocephalic and atraumatic.   Eyes: Pupils are equal, round, and reactive to light.   Neck: No JVD present. No thyromegaly present.   Cardiovascular: Normal rate, regular rhythm, normal heart sounds and intact distal pulses.    No murmur heard.  Pulmonary/Chest: Effort normal and breath sounds normal. No respiratory distress.   Abdominal: Soft. Bowel sounds are normal. He exhibits no distension. There is no splenomegaly or hepatomegaly. There is no tenderness.   Musculoskeletal: Normal range of motion. He exhibits no edema.   Neurological: He is alert and oriented to person, place, and time.   Skin: Skin is warm and dry. He is not diaphoretic. No erythema.   Psychiatric: He has a normal mood and affect. His behavior is normal. Judgment normal.         ECG 12 Lead  Date/Time: 4/10/2017 9:50 AM  Performed by: HASMUKH CHAVES.  Authorized by: HASMUKH CHAVES.   Comparison: compared with previous ECG from 1/18/2017  Similar to previous ECG  Rhythm: sinus rhythm  BPM: 93  Clinical impression: normal ECG  Comments: Indication: Coronary artery disease, status post CABG.              Assessment:       Diagnosis Plan   1. Essential hypertension     2. S/P CABG x 3  ECG 12 Lead   3. Coronary artery disease involving native coronary artery of native heart without angina pectoris  ECG 12 Lead   4. Syncope and collapse       Orders Placed This Encounter   Procedures   • ECG 12 Lead     This order was created via procedure documentation          Plan:       1.  Coronary Artery Disease  Assessment  • The patient has no angina    Plan  • Lifestyle modifications discussed include adhering to a heart healthy " diet, maintenance of a healthy weight, medication compliance, regular exercise and regular monitoring of cholesterol and blood pressure    Subjective - Objective  • There is a history of previous coronary artery bypass graft  • Current antiplatelet therapy includes aspirin 81 mg  • Overall he's doing great from a cardiac standpoint.  I'm not make any changes to his medical regimen today.  We did talk about a heart healthy diet, and there is some room for improvement here.  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    2.  Hypertension.  His blood pressure is well-controlled today at 128/80 and he is no longer on his antihypertensives.  I think that his syncopal episode was most likely secondary to hypotension.  Continue current medical regimen.    3.  Syncope and collapse.  He has had no more syncopal episodes since he stopped his blood pressure medications.  Continue current medical regimen.    He will follow-up with Dr. Ford in 1 year or sooner if needed.    As always, it has been a pleasure to participate in your patient's care.      Sincerely,         Yulissa Lester PA-C

## 2017-05-08 ENCOUNTER — HOSPITAL ENCOUNTER (OUTPATIENT)
Dept: GENERAL RADIOLOGY | Facility: HOSPITAL | Age: 77
Discharge: HOME | End: 2017-05-08
Attending: INTERNAL MEDICINE | Admitting: INTERNAL MEDICINE

## 2017-05-08 DIAGNOSIS — R05.9 COUGH: ICD-10-CM

## 2017-05-08 PROCEDURE — 71020 HC CHEST PA AND LATERAL: CPT

## 2017-05-09 ENCOUNTER — TRANSCRIBE ORDERS (OUTPATIENT)
Dept: ADMINISTRATIVE | Facility: HOSPITAL | Age: 77
End: 2017-05-09

## 2017-05-09 ENCOUNTER — HOSPITAL ENCOUNTER (OUTPATIENT)
Dept: CT IMAGING | Facility: HOSPITAL | Age: 77
Discharge: HOME | End: 2017-05-09
Attending: INTERNAL MEDICINE | Admitting: INTERNAL MEDICINE

## 2017-05-09 DIAGNOSIS — R26.81 UNSTEADY GAIT: Primary | ICD-10-CM

## 2017-05-09 DIAGNOSIS — R79.89 ELEVATED D-DIMER: ICD-10-CM

## 2017-05-09 DIAGNOSIS — R79.89 ELEVATED D-DIMER: Primary | ICD-10-CM

## 2017-05-09 DIAGNOSIS — R26.81 UNSTEADY GAIT: ICD-10-CM

## 2017-05-09 PROCEDURE — 70450 CT HEAD/BRAIN W/O DYE: CPT

## 2017-05-09 PROCEDURE — 0 IOPAMIDOL PER 1 ML: Performed by: INTERNAL MEDICINE

## 2017-05-09 PROCEDURE — 71275 CT ANGIOGRAPHY CHEST: CPT

## 2017-05-09 PROCEDURE — 82565 ASSAY OF CREATININE: CPT

## 2017-05-09 RX ADMIN — IOPAMIDOL 94 ML: 755 INJECTION, SOLUTION INTRAVENOUS at 15:47

## 2017-05-15 ENCOUNTER — APPOINTMENT (OUTPATIENT)
Dept: CT IMAGING | Facility: HOSPITAL | Age: 77
End: 2017-05-15
Attending: INTERNAL MEDICINE

## 2017-05-17 LAB
CREAT BLDA-MCNC: 1.2 MG/DL (ref 0.6–1.3)
CREAT BLDA-MCNC: 1.2 MG/DL (ref 0.6–1.3)
CREAT BLDA-MCNC: 1.3 MG/DL (ref 0.6–1.3)
CREAT BLDA-MCNC: 3.4 MG/DL (ref 0.6–1.3)

## 2017-05-23 ENCOUNTER — LAB (OUTPATIENT)
Dept: LAB | Facility: HOSPITAL | Age: 77
End: 2017-05-23
Attending: INTERNAL MEDICINE

## 2017-05-23 ENCOUNTER — TRANSCRIBE ORDERS (OUTPATIENT)
Dept: ADMINISTRATIVE | Facility: HOSPITAL | Age: 77
End: 2017-05-23

## 2017-05-23 DIAGNOSIS — E55.9 UNSPECIFIED VITAMIN D DEFICIENCY: ICD-10-CM

## 2017-05-23 DIAGNOSIS — N17.9 ACUTE KIDNEY FAILURE, UNSPECIFIED (HCC): ICD-10-CM

## 2017-05-23 DIAGNOSIS — N18.30 CHRONIC KIDNEY DISEASE, STAGE III (MODERATE) (HCC): ICD-10-CM

## 2017-05-23 DIAGNOSIS — N18.30 CHRONIC KIDNEY DISEASE, STAGE III (MODERATE) (HCC): Primary | ICD-10-CM

## 2017-05-23 LAB
25(OH)D3 SERPL-MCNC: 45.8 NG/ML (ref 30–100)
ANION GAP SERPL CALCULATED.3IONS-SCNC: 18.4 MMOL/L
BACTERIA UR QL AUTO: NORMAL /HPF
BILIRUB UR QL STRIP: NEGATIVE
BUN BLD-MCNC: 20 MG/DL (ref 8–23)
BUN/CREAT SERPL: 17.7 (ref 7–25)
CALCIUM SPEC-SCNC: 9.9 MG/DL (ref 8.6–10.5)
CHLORIDE SERPL-SCNC: 101 MMOL/L (ref 98–107)
CLARITY UR: CLEAR
CO2 SERPL-SCNC: 21.6 MMOL/L (ref 22–29)
COLOR UR: YELLOW
CREAT BLD-MCNC: 1.13 MG/DL (ref 0.76–1.27)
DEPRECATED RDW RBC AUTO: 45.1 FL (ref 37–54)
ERYTHROCYTE [DISTWIDTH] IN BLOOD BY AUTOMATED COUNT: 13.1 % (ref 11.5–14.5)
GFR SERPL CREATININE-BSD FRML MDRD: 63 ML/MIN/1.73
GLUCOSE BLD-MCNC: 169 MG/DL (ref 65–99)
GLUCOSE UR STRIP-MCNC: NEGATIVE MG/DL
HCT VFR BLD AUTO: 42.2 % (ref 40.4–52.2)
HGB BLD-MCNC: 14.4 G/DL (ref 13.7–17.6)
HGB UR QL STRIP.AUTO: NEGATIVE
HYALINE CASTS UR QL AUTO: NORMAL /LPF
KETONES UR QL STRIP: ABNORMAL
LEUKOCYTE ESTERASE UR QL STRIP.AUTO: NEGATIVE
MCH RBC QN AUTO: 32.1 PG (ref 27–32.7)
MCHC RBC AUTO-ENTMCNC: 34.1 G/DL (ref 32.6–36.4)
MCV RBC AUTO: 94.2 FL (ref 79.8–96.2)
NITRITE UR QL STRIP: NEGATIVE
PH UR STRIP.AUTO: <=5 [PH] (ref 5–8)
PHOSPHATE SERPL-MCNC: 3 MG/DL (ref 2.5–4.5)
PLATELET # BLD AUTO: 176 10*3/MM3 (ref 140–500)
PMV BLD AUTO: 10.6 FL (ref 6–12)
POTASSIUM BLD-SCNC: 4.1 MMOL/L (ref 3.5–5.2)
PROT UR QL STRIP: ABNORMAL
PTH-INTACT SERPL-MCNC: 40.1 PG/ML (ref 15–65)
RBC # BLD AUTO: 4.48 10*6/MM3 (ref 4.6–6)
RBC # UR: NORMAL /HPF
REF LAB TEST METHOD: NORMAL
SODIUM BLD-SCNC: 141 MMOL/L (ref 136–145)
SP GR UR STRIP: 1.03 (ref 1–1.03)
SQUAMOUS #/AREA URNS HPF: NORMAL /HPF
UROBILINOGEN UR QL STRIP: ABNORMAL
WBC NRBC COR # BLD: 9.75 10*3/MM3 (ref 4.5–10.7)
WBC UR QL AUTO: NORMAL /HPF

## 2017-05-23 PROCEDURE — 36415 COLL VENOUS BLD VENIPUNCTURE: CPT

## 2017-05-23 PROCEDURE — 80048 BASIC METABOLIC PNL TOTAL CA: CPT

## 2017-05-23 PROCEDURE — 85027 COMPLETE CBC AUTOMATED: CPT

## 2017-05-23 PROCEDURE — 83970 ASSAY OF PARATHORMONE: CPT

## 2017-05-23 PROCEDURE — 82306 VITAMIN D 25 HYDROXY: CPT

## 2017-05-23 PROCEDURE — 84100 ASSAY OF PHOSPHORUS: CPT

## 2017-05-23 PROCEDURE — 81001 URINALYSIS AUTO W/SCOPE: CPT

## 2017-06-05 ENCOUNTER — OFFICE VISIT (OUTPATIENT)
Dept: GASTROENTEROLOGY | Facility: CLINIC | Age: 77
End: 2017-06-05

## 2017-06-05 VITALS
TEMPERATURE: 98.2 F | DIASTOLIC BLOOD PRESSURE: 82 MMHG | HEIGHT: 70 IN | BODY MASS INDEX: 28.92 KG/M2 | SYSTOLIC BLOOD PRESSURE: 140 MMHG | WEIGHT: 202 LBS

## 2017-06-05 DIAGNOSIS — K31.5 DUODENAL STRICTURE: ICD-10-CM

## 2017-06-05 DIAGNOSIS — K22.70 BARRETT'S ESOPHAGUS WITHOUT DYSPLASIA: Primary | ICD-10-CM

## 2017-06-05 PROCEDURE — 99212 OFFICE O/P EST SF 10 MIN: CPT | Performed by: INTERNAL MEDICINE

## 2017-06-05 RX ORDER — ESOMEPRAZOLE MAGNESIUM 40 MG/1
40 CAPSULE, DELAYED RELEASE ORAL DAILY
Qty: 30 CAPSULE | Refills: 5 | Status: SHIPPED | OUTPATIENT
Start: 2017-06-05 | End: 2018-03-20 | Stop reason: SDUPTHER

## 2017-06-05 NOTE — PROGRESS NOTES
Chief Complaint   Patient presents with   • Difficulty Swallowing     Subjective     HPI     Eugenio Joe is a 76 y.o. male who presents Today for follow-up.  He underwent EGD in March for some intermittent esophageal dysphagia and was found to have a Schatzki ring with short segment of Terrazas's esophagus.  Biopsies show intestinal metaplasia without dysplasia.  Stricture was dilated with an 18 mm TTS balloon and he has had complete resolution of his dysphagia.  There is also an incidental finding of a benign-appearing stricture in the junction between the second and third portion of the duodenum which was able to be traversed without dilation by the endoscope.  The small bowel distal to the stricture appeared normal and biopsies from the strictured area showed only some laminal fibrosis but no other concerning findings.  The patient is having no symptoms of gastric outlet obstruction including early satiety or nausea and vomiting.  His weight is stable if not up a few pounds since his last clinic visit.  He remains on daily Nexium.    Past Medical History:   Diagnosis Date   • Arthritis    • Asthma    • Brain abscess     at about age 44   • Bruise of face    • Cardiac disease    • Coronary artery disease     CABG 2012   • Diabetes mellitus    • Hearing aid worn     bilateral   • Hypertension    • Joint pain     or swelling   • Orbit fracture    • Pulmonary embolism     OCT 2016   • Sinus infection        Social History     Social History   • Marital status:      Spouse name: N/A   • Number of children: N/A   • Years of education: N/A     Social History Main Topics   • Smoking status: Former Smoker     Types: Cigarettes     Quit date: 3/9/1963   • Smokeless tobacco: Never Used      Comment: HISTORY OF 6 YRS USE IN EARLY 20'S   • Alcohol use No      Comment: No caffeine use   • Drug use: No   • Sexual activity: Defer     Other Topics Concern   • None     Social History Narrative       Current Outpatient  Prescriptions:   •  acetaminophen (TYLENOL) 325 MG tablet, Take 2 tablets by mouth Every 6 (Six) Hours As Needed for mild pain (1-3)., Disp: , Rfl: 0  •  allopurinol (ZYLOPRIM) 300 MG tablet, Take 300 mg by mouth daily., Disp: , Rfl:   •  esomeprazole (nexIUM) 40 MG capsule, Take 1 capsule by mouth Daily., Disp: 30 capsule, Rfl: 5  •  fluticasone-salmeterol (ADVAIR) 250-50 MCG/DOSE DISKUS, Inhale 1 puff Every Evening. Advair Diskus 250-50 MCG/DOSE Inhalation Aerosol Powder Breath Activated; Patient Sig: Advair Diskus 250-50 MCG/DOSE Inhalation Aerosol Powder Breath Activated INHALE 1 PUFF BID; 60; 0; 15-Oct-2012; Active, Disp: , Rfl:   •  FREESTYLE LITE test strip, USE TO TEST ONCE OR TWICE DAILY UTD, Disp: , Rfl: 2  •  metFORMIN (GLUCOPHAGE) 500 MG tablet, Take 500 mg by mouth Daily With Breakfast., Disp: , Rfl:   •  Multiple Vitamins-Minerals (MULTIVITAMIN ADULT PO), Take  by mouth Daily., Disp: , Rfl:   •  PROAIR  (90 BASE) MCG/ACT inhaler, 2 puffs Every 4 (Four) Hours As Needed., Disp: , Rfl:   •  simvastatin (ZOCOR) 40 MG tablet, Take 40 mg by mouth every night., Disp: , Rfl:     Review of Systems   Constitutional: Negative for appetite change and unexpected weight change.   HENT: Negative for trouble swallowing.    Respiratory: Negative.    Cardiovascular: Negative.    Gastrointestinal: Negative.        Objective   Vitals:    06/05/17 1334   BP: 140/82   Temp: 98.2 °F (36.8 °C)       Physical Exam   Constitutional: He is oriented to person, place, and time. He appears well-developed and well-nourished.   HENT:   Head: Normocephalic and atraumatic.   Abdominal: Soft. Bowel sounds are normal. He exhibits no distension and no mass. There is no tenderness. No hernia.   Neurological: He is alert and oriented to person, place, and time.   Skin: Skin is warm and dry.   Psychiatric: He has a normal mood and affect. His behavior is normal. Judgment and thought content normal.   Vitals  reviewed.      Assessment/Plan      Eugenio was seen today for difficulty swallowing.    Diagnoses and all orders for this visit:    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  Schatzki ring (acquired)  Benign duodenal stricture    Plan for surveillance EGD in 1 year  -Advised patient that if he has any recurrence of his dysphagia or any symptoms of gastric outlet obstruction such as nausea vomiting or early satiety that he should call the office and we will arrange for EGD to be done sooner  Continue daily Nexium          Rigoberto Walker M.D.  [facility]  [address] [place] [address]  Office: [phone]

## 2017-06-22 ENCOUNTER — TRANSCRIBE ORDERS (OUTPATIENT)
Dept: ADMINISTRATIVE | Facility: HOSPITAL | Age: 77
End: 2017-06-22

## 2017-06-22 ENCOUNTER — TELEPHONE (OUTPATIENT)
Dept: ORTHOPEDIC SURGERY | Facility: CLINIC | Age: 77
End: 2017-06-22

## 2017-06-22 DIAGNOSIS — R29.898 LEG WEAKNESS, BILATERAL: Primary | ICD-10-CM

## 2017-07-03 ENCOUNTER — HOSPITAL ENCOUNTER (OUTPATIENT)
Dept: MRI IMAGING | Facility: HOSPITAL | Age: 77
Discharge: HOME | End: 2017-07-03
Attending: INTERNAL MEDICINE

## 2017-07-03 ENCOUNTER — HOSPITAL ENCOUNTER (OUTPATIENT)
Dept: MRI IMAGING | Facility: HOSPITAL | Age: 77
Discharge: HOME | End: 2017-07-03
Attending: INTERNAL MEDICINE | Admitting: INTERNAL MEDICINE

## 2017-07-03 DIAGNOSIS — R29.898 LEG WEAKNESS, BILATERAL: ICD-10-CM

## 2017-07-03 PROCEDURE — 72156 MRI NECK SPINE W/O & W/DYE: CPT

## 2017-07-03 PROCEDURE — A9577 INJ MULTIHANCE: HCPCS | Performed by: INTERNAL MEDICINE

## 2017-07-03 PROCEDURE — 72157 MRI CHEST SPINE W/O & W/DYE: CPT

## 2017-07-03 PROCEDURE — 72158 MRI LUMBAR SPINE W/O & W/DYE: CPT

## 2017-07-03 PROCEDURE — 0 GADOBENATE DIMEGLUMINE 529 MG/ML SOLUTION: Performed by: INTERNAL MEDICINE

## 2017-07-03 RX ADMIN — GADOBENATE DIMEGLUMINE 20 ML: 529 INJECTION, SOLUTION INTRAVENOUS at 13:40

## 2017-10-19 ENCOUNTER — TRANSCRIBE ORDERS (OUTPATIENT)
Dept: ADMINISTRATIVE | Facility: HOSPITAL | Age: 77
End: 2017-10-19

## 2017-10-19 DIAGNOSIS — R26.81 UNSTEADY GAIT: Primary | ICD-10-CM

## 2017-10-27 ENCOUNTER — HOSPITAL ENCOUNTER (OUTPATIENT)
Dept: MRI IMAGING | Facility: HOSPITAL | Age: 77
Discharge: HOME | End: 2017-10-27
Attending: INTERNAL MEDICINE | Admitting: INTERNAL MEDICINE

## 2017-10-27 DIAGNOSIS — R26.81 UNSTEADY GAIT: ICD-10-CM

## 2017-10-27 PROCEDURE — 70553 MRI BRAIN STEM W/O & W/DYE: CPT

## 2017-10-27 PROCEDURE — 0 GADOBENATE DIMEGLUMINE 529 MG/ML SOLUTION: Performed by: INTERNAL MEDICINE

## 2017-10-27 PROCEDURE — 82565 ASSAY OF CREATININE: CPT

## 2017-10-27 PROCEDURE — A9577 INJ MULTIHANCE: HCPCS | Performed by: INTERNAL MEDICINE

## 2017-10-27 RX ADMIN — GADOBENATE DIMEGLUMINE 20 ML: 529 INJECTION, SOLUTION INTRAVENOUS at 08:49

## 2017-10-28 LAB — CREAT BLDA-MCNC: 1.2 MG/DL (ref 0.6–1.3)

## 2017-11-01 ENCOUNTER — LAB (OUTPATIENT)
Dept: LAB | Facility: HOSPITAL | Age: 77
End: 2017-11-01
Attending: INTERNAL MEDICINE

## 2017-11-01 ENCOUNTER — TRANSCRIBE ORDERS (OUTPATIENT)
Dept: ADMINISTRATIVE | Facility: HOSPITAL | Age: 77
End: 2017-11-01

## 2017-11-01 DIAGNOSIS — E55.9 AVITAMINOSIS D: ICD-10-CM

## 2017-11-01 DIAGNOSIS — N18.30 CHRONIC KIDNEY DISEASE, STAGE III (MODERATE) (HCC): ICD-10-CM

## 2017-11-01 DIAGNOSIS — N17.9 ACUTE RENAL FAILURE, UNSPECIFIED ACUTE RENAL FAILURE TYPE (HCC): ICD-10-CM

## 2017-11-01 DIAGNOSIS — N18.30 CHRONIC KIDNEY DISEASE, STAGE III (MODERATE) (HCC): Primary | ICD-10-CM

## 2017-11-01 LAB
25(OH)D3 SERPL-MCNC: 39.9 NG/ML (ref 30–100)
ANION GAP SERPL CALCULATED.3IONS-SCNC: 17.2 MMOL/L
BILIRUB UR QL STRIP: NEGATIVE
BUN BLD-MCNC: 20 MG/DL (ref 8–23)
BUN/CREAT SERPL: 17.2 (ref 7–25)
CALCIUM SPEC-SCNC: 9.8 MG/DL (ref 8.6–10.5)
CHLORIDE SERPL-SCNC: 100 MMOL/L (ref 98–107)
CLARITY UR: CLEAR
CO2 SERPL-SCNC: 23.8 MMOL/L (ref 22–29)
COLOR UR: YELLOW
CREAT BLD-MCNC: 1.16 MG/DL (ref 0.76–1.27)
DEPRECATED RDW RBC AUTO: 45.6 FL (ref 37–54)
ERYTHROCYTE [DISTWIDTH] IN BLOOD BY AUTOMATED COUNT: 13.3 % (ref 11.5–14.5)
GFR SERPL CREATININE-BSD FRML MDRD: 61 ML/MIN/1.73
GLUCOSE BLD-MCNC: 249 MG/DL (ref 65–99)
GLUCOSE UR STRIP-MCNC: ABNORMAL MG/DL
HCT VFR BLD AUTO: 38.7 % (ref 40.4–52.2)
HGB BLD-MCNC: 13.2 G/DL (ref 13.7–17.6)
HGB UR QL STRIP.AUTO: NEGATIVE
KETONES UR QL STRIP: NEGATIVE
LEUKOCYTE ESTERASE UR QL STRIP.AUTO: ABNORMAL
MCH RBC QN AUTO: 32.1 PG (ref 27–32.7)
MCHC RBC AUTO-ENTMCNC: 34.1 G/DL (ref 32.6–36.4)
MCV RBC AUTO: 94.2 FL (ref 79.8–96.2)
NITRITE UR QL STRIP: NEGATIVE
PH UR STRIP.AUTO: 5.5 [PH] (ref 5–8)
PHOSPHATE SERPL-MCNC: 3.6 MG/DL (ref 2.5–4.5)
PLATELET # BLD AUTO: 184 10*3/MM3 (ref 140–500)
PMV BLD AUTO: 10.2 FL (ref 6–12)
POTASSIUM BLD-SCNC: 4.4 MMOL/L (ref 3.5–5.2)
PROT UR QL STRIP: NEGATIVE
PTH-INTACT SERPL-MCNC: 39.4 PG/ML (ref 15–65)
RBC # BLD AUTO: 4.11 10*6/MM3 (ref 4.6–6)
SODIUM BLD-SCNC: 141 MMOL/L (ref 136–145)
SP GR UR STRIP: 1.01 (ref 1–1.03)
UROBILINOGEN UR QL STRIP: ABNORMAL
WBC NRBC COR # BLD: 7.25 10*3/MM3 (ref 4.5–10.7)

## 2017-11-01 PROCEDURE — 84100 ASSAY OF PHOSPHORUS: CPT

## 2017-11-01 PROCEDURE — 82306 VITAMIN D 25 HYDROXY: CPT

## 2017-11-01 PROCEDURE — 36415 COLL VENOUS BLD VENIPUNCTURE: CPT

## 2017-11-01 PROCEDURE — 80048 BASIC METABOLIC PNL TOTAL CA: CPT

## 2017-11-01 PROCEDURE — 85027 COMPLETE CBC AUTOMATED: CPT

## 2017-11-01 PROCEDURE — 83970 ASSAY OF PARATHORMONE: CPT

## 2017-11-01 PROCEDURE — 81003 URINALYSIS AUTO W/O SCOPE: CPT

## 2017-11-09 ENCOUNTER — OFFICE VISIT (OUTPATIENT)
Dept: NEUROSURGERY | Facility: CLINIC | Age: 77
End: 2017-11-09

## 2017-11-09 VITALS
DIASTOLIC BLOOD PRESSURE: 87 MMHG | HEART RATE: 104 BPM | HEIGHT: 70 IN | BODY MASS INDEX: 27.06 KG/M2 | WEIGHT: 189 LBS | SYSTOLIC BLOOD PRESSURE: 151 MMHG

## 2017-11-09 DIAGNOSIS — G91.2 NORMAL PRESSURE HYDROCEPHALUS (HCC): Primary | ICD-10-CM

## 2017-11-09 PROCEDURE — 99204 OFFICE O/P NEW MOD 45 MIN: CPT | Performed by: NEUROLOGICAL SURGERY

## 2017-11-09 NOTE — PROGRESS NOTES
Subjective   Patient ID: Eugenio Joe is a 76 y.o. male is being seen for consultation today at the quest of Dr. Adeline Onofre for unsteady gait with balance issues.     Neurologic Problem   The patient's primary symptoms include clumsiness, focal weakness, a loss of balance and weakness. The patient's pertinent negatives include no altered mental status, focal sensory loss, memory loss, near-syncope, slurred speech, syncope or visual change. This is a recurrent problem. The current episode started more than 1 month ago. The neurological problem developed suddenly. The problem has been gradually worsening since onset. There was lower extremity focality noted. Associated symptoms include bladder incontinence and dizziness. Pertinent negatives include no abdominal pain, auditory change, aura, back pain, bowel incontinence, chest pain, confusion, diaphoresis, fatigue, fever, headaches, light-headedness, nausea, neck pain, palpitations, shortness of breath, vertigo or vomiting. Past treatments include medication and walking. The treatment provided no relief.     This patient has been having trouble with his gait now for about a year.  The problem originally began after a fall.  His gait has become more and more unsteady and he has fallen several times.  He also has a history of urinary incontinence which began around the same time as the gait abnormalities. He has no pain.    The following portions of the patient's history were reviewed and updated as appropriate: allergies, current medications, past family history, past medical history, past social history, past surgical history and problem list.    Review of Systems   Constitutional: Negative for diaphoresis, fatigue and fever.   Respiratory: Negative for chest tightness, shortness of breath and stridor.    Cardiovascular: Negative for chest pain, palpitations and near-syncope.   Gastrointestinal: Negative for abdominal pain, bowel incontinence, nausea and  vomiting.   Genitourinary: Positive for bladder incontinence.   Musculoskeletal: Positive for gait problem. Negative for back pain and neck pain.   Neurological: Positive for dizziness, focal weakness, weakness and loss of balance. Negative for vertigo, syncope, light-headedness and headaches.   Psychiatric/Behavioral: Negative for confusion and memory loss.   All other systems reviewed and are negative.      Objective   Physical Exam   Constitutional: He is oriented to person, place, and time. He appears well-developed and well-nourished.   HENT:   Head: Normocephalic and atraumatic.   Eyes: Conjunctivae and EOM are normal. Pupils are equal, round, and reactive to light.   Fundoscopic exam:       The right eye shows no papilledema. The right eye shows venous pulsations.        The left eye shows no papilledema. The left eye shows venous pulsations.   Neck: Carotid bruit is not present.   Neurological: He is oriented to person, place, and time. He has a normal Finger-Nose-Finger Test and a normal Heel to Shin Test.   Reflex Scores:       Tricep reflexes are 2+ on the right side and 2+ on the left side.       Bicep reflexes are 2+ on the right side and 2+ on the left side.       Brachioradialis reflexes are 2+ on the right side and 2+ on the left side.       Patellar reflexes are 2+ on the right side and 2+ on the left side.       Achilles reflexes are 2+ on the right side and 2+ on the left side.  Psychiatric: His speech is normal.     Neurologic Exam     Mental Status   Oriented to person, place, and time.   Registration of memory: Good recent and remote memory.   Attention: normal. Concentration: normal.   Speech: speech is normal   Level of consciousness: alert  Knowledge: consistent with education.     Cranial Nerves     CN II   Visual fields full to confrontation.   Visual acuity: normal    CN III, IV, VI   Pupils are equal, round, and reactive to light.  Extraocular motions are normal.     CN V   Facial  sensation intact.   Right corneal reflex: normal  Left corneal reflex: normal    CN VII   Facial expression full, symmetric.   Right facial weakness: none  Left facial weakness: none    CN VIII   Hearing: intact    CN IX, X   Palate: symmetric    CN XI   Right sternocleidomastoid strength: normal  Left sternocleidomastoid strength: normal    CN XII   Tongue: not atrophic  Tongue deviation: none    Motor Exam   Muscle bulk: normal  Right arm tone: normal  Left arm tone: normal  Right leg tone: normal  Left leg tone: normal    Strength   Strength 5/5 except as noted.     Sensory Exam   Light touch normal.     Gait, Coordination, and Reflexes     Gait  Gait: spastic    Coordination   Finger to nose coordination: normal  Heel to shin coordination: normal    Reflexes   Right brachioradialis: 2+  Left brachioradialis: 2+  Right biceps: 2+  Left biceps: 2+  Right triceps: 2+  Left triceps: 2+  Right patellar: 2+  Left patellar: 2+  Right achilles: 2+  Left achilles: 2+  Right : 2+  Left : 2+      Assessment/Plan   Independent Review of Radiographic Studies:      I reviewed an MRI of his brain.  This does show enlarged ventricles but no evidence of transependymal absorption.  I also reviewed an MRI of his cervical spine in his thoracic spine done this past summer.  None of these show any evidence of spinal cord compression.    Medical Decision Making:      I told the patient and his wife that there is a small chance his symptoms are coming from normal pressure hydrocephalus.  I recommended that we proceed with a large volume tap and a radioisotope cisternogram.  He does wish to proceed.  I explained to them in detail the necessity of observing his gait for the 24 hours after the tap to see if there is any change.    Eugenio was seen today for gait problem.    Diagnoses and all orders for this visit:    Normal pressure hydrocephalus  -     NM Csf Cisternogram; Future      Return for After radiology test.

## 2017-11-14 ENCOUNTER — HOSPITAL ENCOUNTER (OUTPATIENT)
Dept: GENERAL RADIOLOGY | Facility: HOSPITAL | Age: 77
Discharge: HOME | End: 2017-11-14
Attending: NEUROLOGICAL SURGERY | Admitting: NEUROLOGICAL SURGERY

## 2017-11-14 ENCOUNTER — TRANSCRIBE ORDERS (OUTPATIENT)
Dept: ADMINISTRATIVE | Facility: HOSPITAL | Age: 77
End: 2017-11-14

## 2017-11-14 ENCOUNTER — HOSPITAL ENCOUNTER (OUTPATIENT)
Dept: NUCLEAR MEDICINE | Facility: HOSPITAL | Age: 77
Discharge: HOME | End: 2017-11-14
Attending: NEUROLOGICAL SURGERY

## 2017-11-14 VITALS
DIASTOLIC BLOOD PRESSURE: 87 MMHG | SYSTOLIC BLOOD PRESSURE: 179 MMHG | OXYGEN SATURATION: 94 % | HEART RATE: 76 BPM | BODY MASS INDEX: 27.06 KG/M2 | RESPIRATION RATE: 16 BRPM | WEIGHT: 189 LBS | TEMPERATURE: 97.2 F | HEIGHT: 70 IN

## 2017-11-14 VITALS
OXYGEN SATURATION: 97 % | DIASTOLIC BLOOD PRESSURE: 84 MMHG | RESPIRATION RATE: 16 BRPM | HEART RATE: 72 BPM | SYSTOLIC BLOOD PRESSURE: 173 MMHG

## 2017-11-14 DIAGNOSIS — G91.2 NORMAL PRESSURE HYDROCEPHALUS (HCC): ICD-10-CM

## 2017-11-14 DIAGNOSIS — G91.2 NORMAL PRESSURE HYDROCEPHALUS (HCC): Primary | ICD-10-CM

## 2017-11-14 LAB
APPEARANCE FLD: CLEAR
COLOR FLD: COLORLESS
GLUCOSE CSF-MCNC: 99 MG/DL (ref 40–70)
METHOD: NORMAL
NUC CELL # FLD: 2 /MM3
PROT CSF-MCNC: 56 MG/DL (ref 15–45)
RBC # FLD AUTO: 634 /MM3

## 2017-11-14 PROCEDURE — 82945 GLUCOSE OTHER FLUID: CPT | Performed by: NEUROLOGICAL SURGERY

## 2017-11-14 PROCEDURE — 84157 ASSAY OF PROTEIN OTHER: CPT | Performed by: NEUROLOGICAL SURGERY

## 2017-11-14 PROCEDURE — 89050 BODY FLUID CELL COUNT: CPT | Performed by: NEUROLOGICAL SURGERY

## 2017-11-14 PROCEDURE — A9548 IN111 PENTETATE: HCPCS | Performed by: NEUROLOGICAL SURGERY

## 2017-11-14 PROCEDURE — 0 INDIUM IN-111 DTPA PENTETATE SOLUTION: Performed by: NEUROLOGICAL SURGERY

## 2017-11-14 RX ORDER — LIDOCAINE HYDROCHLORIDE 10 MG/ML
10 INJECTION, SOLUTION INFILTRATION; PERINEURAL ONCE
Status: COMPLETED | OUTPATIENT
Start: 2017-11-14 | End: 2017-11-14

## 2017-11-14 RX ORDER — INDIUM IN-111 PENTETATE DISODIUM 1 MCI/ML
0.5 SOLUTION INTRATHECAL
Status: COMPLETED | OUTPATIENT
Start: 2017-11-14 | End: 2017-11-14

## 2017-11-14 RX ADMIN — LIDOCAINE HYDROCHLORIDE 5 ML: 10 INJECTION, SOLUTION INFILTRATION; PERINEURAL at 09:33

## 2017-11-14 RX ADMIN — INDIUM IN-111 PENTETATE DISODIUM 0.5 MILLICURIE: 1 SOLUTION INTRATHECAL at 09:45

## 2017-11-14 NOTE — H&P (VIEW-ONLY)
Subjective   Patient ID: Eugenio Joe is a 76 y.o. male is being seen for consultation today at the quest of Dr. Adeline Onofre for unsteady gait with balance issues.     Neurologic Problem   The patient's primary symptoms include clumsiness, focal weakness, a loss of balance and weakness. The patient's pertinent negatives include no altered mental status, focal sensory loss, memory loss, near-syncope, slurred speech, syncope or visual change. This is a recurrent problem. The current episode started more than 1 month ago. The neurological problem developed suddenly. The problem has been gradually worsening since onset. There was lower extremity focality noted. Associated symptoms include bladder incontinence and dizziness. Pertinent negatives include no abdominal pain, auditory change, aura, back pain, bowel incontinence, chest pain, confusion, diaphoresis, fatigue, fever, headaches, light-headedness, nausea, neck pain, palpitations, shortness of breath, vertigo or vomiting. Past treatments include medication and walking. The treatment provided no relief.     This patient has been having trouble with his gait now for about a year.  The problem originally began after a fall from his sinus..  His gait has become more and more unsteady and he has fallen several time.  She also has a history of urinary, neck began around the same time this visit as well.  He has no pain.    The following portions of the patient's history were reviewed and updated as appropriate: allergies, current medications, past family history, past medical history, past social history, past surgical history and problem list.    Review of Systems   Constitutional: Negative for diaphoresis, fatigue and fever.   Respiratory: Negative for chest tightness, shortness of breath and stridor.    Cardiovascular: Negative for chest pain, palpitations and near-syncope.   Gastrointestinal: Negative for abdominal pain, bowel incontinence, nausea and vomiting.    Genitourinary: Positive for bladder incontinence.   Musculoskeletal: Positive for gait problem. Negative for back pain and neck pain.   Neurological: Positive for dizziness, focal weakness, weakness and loss of balance. Negative for vertigo, syncope, light-headedness and headaches.   Psychiatric/Behavioral: Negative for confusion and memory loss.   All other systems reviewed and are negative.      Objective   Physical Exam   Constitutional: He is oriented to person, place, and time. He appears well-developed and well-nourished.   HENT:   Head: Normocephalic and atraumatic.   Eyes: Conjunctivae and EOM are normal. Pupils are equal, round, and reactive to light.   Fundoscopic exam:       The right eye shows no papilledema. The right eye shows venous pulsations.        The left eye shows no papilledema. The left eye shows venous pulsations.   Neck: Carotid bruit is not present.   Neurological: He is oriented to person, place, and time. He has a normal Finger-Nose-Finger Test and a normal Heel to Shin Test.   Reflex Scores:       Tricep reflexes are 2+ on the right side and 2+ on the left side.       Bicep reflexes are 2+ on the right side and 2+ on the left side.       Brachioradialis reflexes are 2+ on the right side and 2+ on the left side.       Patellar reflexes are 2+ on the right side and 2+ on the left side.       Achilles reflexes are 2+ on the right side and 2+ on the left side.  Psychiatric: His speech is normal.     Neurologic Exam     Mental Status   Oriented to person, place, and time.   Registration of memory: Good recent and remote memory.   Attention: normal. Concentration: normal.   Speech: speech is normal   Level of consciousness: alert  Knowledge: consistent with education.     Cranial Nerves     CN II   Visual fields full to confrontation.   Visual acuity: normal    CN III, IV, VI   Pupils are equal, round, and reactive to light.  Extraocular motions are normal.     CN V   Facial sensation  intact.   Right corneal reflex: normal  Left corneal reflex: normal    CN VII   Facial expression full, symmetric.   Right facial weakness: none  Left facial weakness: none    CN VIII   Hearing: intact    CN IX, X   Palate: symmetric    CN XI   Right sternocleidomastoid strength: normal  Left sternocleidomastoid strength: normal    CN XII   Tongue: not atrophic  Tongue deviation: none    Motor Exam   Muscle bulk: normal  Right arm tone: normal  Left arm tone: normal  Right leg tone: normal  Left leg tone: normal    Strength   Strength 5/5 except as noted.     Sensory Exam   Light touch normal.     Gait, Coordination, and Reflexes     Gait  Gait: spastic    Coordination   Finger to nose coordination: normal  Heel to shin coordination: normal    Reflexes   Right brachioradialis: 2+  Left brachioradialis: 2+  Right biceps: 2+  Left biceps: 2+  Right triceps: 2+  Left triceps: 2+  Right patellar: 2+  Left patellar: 2+  Right achilles: 2+  Left achilles: 2+  Right : 2+  Left : 2+      Assessment/Plan   Independent Review of Radiographic Studies:      I reviewed an MRI of his brain.  This does show enlarged ventricles but no evidence of transependymal absorption.  I also reviewed an MRI of his cervical spine in his thoracic spine done this past summer.  None of these show any evidence of spinal cord compression.    Medical Decision Making:      I told the patient and his wife that there is a small chance his symptoms are coming from normal pressure hydrocephalus.  I recommended that we proceed with a large volume tap and a radioisotope cisternogram.  He does wish to proceed.  I explained to them in detail the necessity of observing his gait for the 24 hours after the tap to see if there is any change.    Eugenio was seen today for gait problem.    Diagnoses and all orders for this visit:    Normal pressure hydrocephalus  -     NM Csf Cisternogram; Future    Return for After radiology test.

## 2017-11-14 NOTE — NURSING NOTE
To car via W/C with Melani POPE/GABY.  Wife is driving him home.  No problems or concerns noted.  Patient is aware to be back at 0630 tomorrow.

## 2017-11-14 NOTE — INTERVAL H&P NOTE
Name: Eugenio Joe ADMIT: 2017   : 1940  PCP: Adeline Onofre MD    MRN: [id number] LOS: 0 days   AGE/SEX: 76 y.o. male  ROOM: Room/bed info not found       Chief complaint primary symptoms include clumsiness, focal weakness, a loss of balance and weakness.    Present Illness or Internal History:  Patient is a 76 y.o. male presents with primary symptoms of clumsiness, focal weakness, a loss of balance and weakness for LP and NM cisternogram.     Past Surgical History:  Past Surgical History:   Procedure Laterality Date   • BRAIN SURGERY      BRAIN ABCESS 30 YR AGO   • CARDIAC SURGERY     • COLONOSCOPY      Ciciliano- normal per pt, no polyps    • CORONARY ARTERY BYPASS GRAFT     • ENDOSCOPY N/A 3/9/2017    Schatzki ring, dilated, LA Grade B esophagitis, HH, acquired duodenal stenosis   • FACIAL FRACTURE SURGERY      to repair 6 broken bones   • ORBITAL FRACTURE OPEN REDUCTION INTERNAL FIXATION Right 2017    Procedure: RT ORBIT FLOOR FRACTURE REPAIR RIGHT ZMC FRACTURE REPAIR, RIGHT NASAL BONE FRACTURE CLOSED REDUCTION;  Surgeon: Edil Lester MD;  Location: [facility];  Service:    • ORIF FOOT FRACTURE Right 2016    Procedure: RIGHT SECOND METATARSAL OPEN REDUCTION INTERNAL FIXATION WITh GRAFT FROM HEEL ;  Surgeon: Man Jenkins MD;  Location: [facility] OR [facility];  Service:    • SEPTOPLASTY     • SKIN CANCER EXCISION     • TONSILLECTOMY         Past Medical History:  Past Medical History:   Diagnosis Date   • Arthritis    • Asthma    • Brain abscess     at about age 44   • Bruise of face    • Cardiac disease    • Coronary artery disease     CABG    • Diabetes mellitus    • Gout several years ago    medication  working   • Hearing aid worn     bilateral   • Hyperlipemia    • Hypertension    • Joint pain     or swelling   • Low back pain several years ago   • Orbit fracture    • Pulmonary embolism     OCT 2016   • Sinus infection        Home  Medications:    (Not in a hospital admission)    Allergies:  Other and Oxycodone    Family History:  Family History   Problem Relation Age of Onset   • Heart disease Mother    • Hypertension Mother    • Stroke Mother    • Heart disease Father    • Hypertension Father        Social History:  Social History   Substance Use Topics   • Smoking status: Former Smoker     Packs/day: 2.00     Years: 5.00     Types: Cigarettes     Start date: 4/1/1959     Quit date: 3/9/1962   • Smokeless tobacco: Never Used      Comment: Quit herson turkey   • Alcohol use No      Comment: No caffeine use        Objective     Physical Exam:    Head normocephalic, without obvious abnormality and atraumatic  Lungs clear to auscultation, respirations regular, respirations even and respirations unlabored  Heart regular rhythm & normal rate  Abdomen normal bowel sounds, soft non-tender, no guarding and no rebound tenderness    Vital Signs  Temp:  [97.2 °F (36.2 °C)] 97.2 °F (36.2 °C)  Heart Rate:  [76] 76  Resp:  [16] 16  BP: (179)/(87) 179/87    Anticipated Surgical Procedure:  primary symptoms of clumsiness, focal weakness, a loss of balance and weakness for LP and NM cisternogram.     The risks, benefits and alternatives of this procedure have been discussed with the patient or responsible party: Yes        Efren Bean MD  11/14/17  8:23 AM

## 2017-11-14 NOTE — DISCHARGE INSTRUCTIONS
EDUCATION /DISCHARGE INSTRUCTION   A lumbar puncture involves insertion of a sterile needle into the spinal canal by the physician   This procedure is performed for several reasons: to detect increased intracranial pressure, the presence of blood in cerebrospinal fluid (CFS), to obtain CSF specimens for laboratory studies, to administer drugs and to relieve pressure by removing CSF.    You will lie on your stomach with a pillow under your stomach.  This opens the vertebral space to allow access to the spinal needle.  The physician will sterilize the area using antiseptic solution and numb the area where the spinal needle will be placed.  If CSF is being removed for specimen, you may be asked to push or beardown to assist with the flow of the CSF. When the procedure is complete, a band aid will be placed over the injection site and you will be taken to the recovery area.    POTENTIAL RISKS OF A LUMBAR PUNCTURE INCLUDE BUT ARE NOT LIMITED TO:  *  Headache    *  Swelling at puncture site  *  Bleeding into the spinal canal *  Temporary difficulty urinating  *  Leakage of CSF   *  Fever  *  Pain caused by nerve irritation    BENEFIT OF PROCEDURE:  This is the only way to obtain spinal fluid or relieve pressure due to increased CSF.  There is no alternative.  THIS EDUCATION INFORMATION WAS REVIEWED PRIOR TO PROCEDURE AND CONSENT. Patient initials__________________Time 0730    FOLLOWING A LUMBAR PUNCTURE:  *  Drink plenty of liquids -  Caffeine is recommended   *  Lie down flat for the next 8 hours.  If you get a headache, lie down flat for 24 hours,        continue to force fluids and call your doctor if the headache persists.  *  Go straight home.  DO NOT DRIVE    CALL YOUR DOCTOR IF YOU HAVE:  *  A severe headache that will not go away  *  Redness, swelling or drainage from the puncture site  *  Neck stiffness, numbness or weakness  *  Any new or severe symptoms    Following the procedure, you should continue to take  all of your medications as directed by your primary physician unless otherwise instructed.  There have been no changes to the medications you provided us (with the following exceptions if applicable):    Resume taking your blood thinner or Aspirin on 11/15/17      YOU ARE THE MOST IMPORTANT FACTOR IN YOUR RECOVERY.  IF YOU HAVE QUESTIONS OR CONCERNS PLEASE CALL THE RADIOLOGY NURSES [phone]

## 2017-11-14 NOTE — NURSING NOTE
Education sheet and consent reviewed and signed.  Waiting for Schedule one to place patient on the IR status board and the xray scehdule.

## 2017-11-14 NOTE — NURSING NOTE
D/C instructions discussed and understood by patient and family. Puncture site dry and intact. No complaints of headache. No distress. Eating lunch. Awaiting final pictures from NM.

## 2017-11-15 ENCOUNTER — HOSPITAL ENCOUNTER (OUTPATIENT)
Facility: HOSPITAL | Age: 77
Setting detail: OBSERVATION
Discharge: HOME | End: 2017-11-17
Attending: INTERNAL MEDICINE | Admitting: INTERNAL MEDICINE

## 2017-11-15 ENCOUNTER — TELEPHONE (OUTPATIENT)
Dept: INTERVENTIONAL RADIOLOGY/VASCULAR | Facility: HOSPITAL | Age: 77
End: 2017-11-15

## 2017-11-15 ENCOUNTER — APPOINTMENT (OUTPATIENT)
Dept: CT IMAGING | Facility: HOSPITAL | Age: 77
End: 2017-11-15

## 2017-11-15 ENCOUNTER — TELEPHONE (OUTPATIENT)
Dept: NEUROSURGERY | Facility: CLINIC | Age: 77
End: 2017-11-15

## 2017-11-15 ENCOUNTER — HOSPITAL ENCOUNTER (OUTPATIENT)
Dept: NUCLEAR MEDICINE | Facility: HOSPITAL | Age: 77
Discharge: HOME | End: 2017-11-15
Attending: NEUROLOGICAL SURGERY

## 2017-11-15 DIAGNOSIS — R26.2 DIFFICULTY WALKING: Primary | ICD-10-CM

## 2017-11-15 PROBLEM — R11.2 NAUSEA & VOMITING: Status: ACTIVE | Noted: 2017-11-15

## 2017-11-15 PROBLEM — Z74.09 IMPAIRED MOBILITY: Chronic | Status: ACTIVE | Noted: 2017-11-15
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
## 2017-11-15 PROBLEM — H91.90 CHANGE IN HEARING: Status: ACTIVE | Noted: 2017-11-15

## 2017-11-15 PROBLEM — H93.233 HYPERACUSIS OF BOTH EARS: Status: ACTIVE | Noted: 2017-11-15

## 2017-11-15 PROBLEM — R51.9 HEADACHE: Status: ACTIVE | Noted: 2017-11-15
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
## 2017-11-15 LAB
ALBUMIN SERPL-MCNC: 3.9 G/DL (ref 3.5–5.2)
ALBUMIN/GLOB SERPL: 1.2 G/DL
ALP SERPL-CCNC: 60 U/L (ref 39–117)
ALT SERPL W P-5'-P-CCNC: 18 U/L (ref 1–41)
ANION GAP SERPL CALCULATED.3IONS-SCNC: 17.8 MMOL/L
APTT PPP: 24.6 SECONDS (ref 22.7–35.4)
AST SERPL-CCNC: 12 U/L (ref 1–40)
BASOPHILS # BLD AUTO: 0.02 10*3/MM3 (ref 0–0.2)
BASOPHILS NFR BLD AUTO: 0.2 % (ref 0–1.5)
BILIRUB SERPL-MCNC: 0.8 MG/DL (ref 0.1–1.2)
BUN BLD-MCNC: 20 MG/DL (ref 8–23)
BUN/CREAT SERPL: 19.6 (ref 7–25)
CALCIUM SPEC-SCNC: 10.1 MG/DL (ref 8.6–10.5)
CHLORIDE SERPL-SCNC: 99 MMOL/L (ref 98–107)
CO2 SERPL-SCNC: 23.2 MMOL/L (ref 22–29)
CREAT BLD-MCNC: 1.02 MG/DL (ref 0.76–1.27)
DEPRECATED RDW RBC AUTO: 45.1 FL (ref 37–54)
EOSINOPHIL # BLD AUTO: 0 10*3/MM3 (ref 0–0.7)
EOSINOPHIL NFR BLD AUTO: 0 % (ref 0.3–6.2)
ERYTHROCYTE [DISTWIDTH] IN BLOOD BY AUTOMATED COUNT: 13.2 % (ref 11.5–14.5)
GFR SERPL CREATININE-BSD FRML MDRD: 71 ML/MIN/1.73
GLOBULIN UR ELPH-MCNC: 3.3 GM/DL
GLUCOSE BLD-MCNC: 265 MG/DL (ref 65–99)
GLUCOSE BLDC GLUCOMTR-MCNC: 216 MG/DL (ref 70–130)
HCT VFR BLD AUTO: 40.8 % (ref 40.4–52.2)
HGB BLD-MCNC: 14.1 G/DL (ref 13.7–17.6)
IMM GRANULOCYTES # BLD: 0 10*3/MM3 (ref 0–0.03)
IMM GRANULOCYTES NFR BLD: 0 % (ref 0–0.5)
INR PPP: 1.09 (ref 0.9–1.1)
LYMPHOCYTES # BLD AUTO: 1 10*3/MM3 (ref 0.9–4.8)
LYMPHOCYTES NFR BLD AUTO: 10.1 % (ref 19.6–45.3)
MCH RBC QN AUTO: 32.5 PG (ref 27–32.7)
MCHC RBC AUTO-ENTMCNC: 34.6 G/DL (ref 32.6–36.4)
MCV RBC AUTO: 94 FL (ref 79.8–96.2)
MONOCYTES # BLD AUTO: 0.27 10*3/MM3 (ref 0.2–1.2)
MONOCYTES NFR BLD AUTO: 2.7 % (ref 5–12)
NEUTROPHILS # BLD AUTO: 8.65 10*3/MM3 (ref 1.9–8.1)
NEUTROPHILS NFR BLD AUTO: 87 % (ref 42.7–76)
PLATELET # BLD AUTO: 183 10*3/MM3 (ref 140–500)
PMV BLD AUTO: 10.6 FL (ref 6–12)
POTASSIUM BLD-SCNC: 4.6 MMOL/L (ref 3.5–5.2)
PROT SERPL-MCNC: 7.2 G/DL (ref 6–8.5)
PROTHROMBIN TIME: 13.7 SECONDS (ref 11.7–14.2)
RBC # BLD AUTO: 4.34 10*6/MM3 (ref 4.6–6)
SODIUM BLD-SCNC: 140 MMOL/L (ref 136–145)
WBC NRBC COR # BLD: 9.94 10*3/MM3 (ref 4.5–10.7)

## 2017-11-15 PROCEDURE — 25810000003 SODIUM CHLORIDE 0.9 % WITH KCL 20 MEQ 20-0.9 MEQ/L-% SOLUTION: Performed by: INTERNAL MEDICINE

## 2017-11-15 PROCEDURE — G0378 HOSPITAL OBSERVATION PER HR: HCPCS

## 2017-11-15 PROCEDURE — 96360 HYDRATION IV INFUSION INIT: CPT

## 2017-11-15 PROCEDURE — 80053 COMPREHEN METABOLIC PANEL: CPT | Performed by: INTERNAL MEDICINE

## 2017-11-15 PROCEDURE — 85025 COMPLETE CBC W/AUTO DIFF WBC: CPT | Performed by: INTERNAL MEDICINE

## 2017-11-15 PROCEDURE — 85610 PROTHROMBIN TIME: CPT | Performed by: INTERNAL MEDICINE

## 2017-11-15 PROCEDURE — 63710000001 INSULIN ASPART PER 5 UNITS: Performed by: INTERNAL MEDICINE

## 2017-11-15 PROCEDURE — 82962 GLUCOSE BLOOD TEST: CPT

## 2017-11-15 PROCEDURE — 85730 THROMBOPLASTIN TIME PARTIAL: CPT | Performed by: INTERNAL MEDICINE

## 2017-11-15 PROCEDURE — 70450 CT HEAD/BRAIN W/O DYE: CPT

## 2017-11-15 PROCEDURE — 96361 HYDRATE IV INFUSION ADD-ON: CPT

## 2017-11-15 RX ORDER — HYDROMORPHONE HYDROCHLORIDE 1 MG/ML
0.25 INJECTION, SOLUTION INTRAMUSCULAR; INTRAVENOUS; SUBCUTANEOUS
Status: DISCONTINUED | OUTPATIENT
Start: 2017-11-15 | End: 2017-11-17 | Stop reason: HOSPADM

## 2017-11-15 RX ORDER — DEXTROSE MONOHYDRATE 25 G/50ML
25 INJECTION, SOLUTION INTRAVENOUS
Status: DISCONTINUED | OUTPATIENT
Start: 2017-11-15 | End: 2017-11-17 | Stop reason: HOSPADM

## 2017-11-15 RX ORDER — NALOXONE HCL 0.4 MG/ML
0.4 VIAL (ML) INJECTION
Status: DISCONTINUED | OUTPATIENT
Start: 2017-11-15 | End: 2017-11-17 | Stop reason: HOSPADM

## 2017-11-15 RX ORDER — ONDANSETRON 2 MG/ML
4 INJECTION INTRAMUSCULAR; INTRAVENOUS EVERY 6 HOURS PRN
Status: DISCONTINUED | OUTPATIENT
Start: 2017-11-15 | End: 2017-11-17 | Stop reason: HOSPADM

## 2017-11-15 RX ORDER — SODIUM CHLORIDE 0.9 % (FLUSH) 0.9 %
1-10 SYRINGE (ML) INJECTION AS NEEDED
Status: DISCONTINUED | OUTPATIENT
Start: 2017-11-15 | End: 2017-11-17 | Stop reason: HOSPADM

## 2017-11-15 RX ORDER — NICOTINE POLACRILEX 4 MG
15 LOZENGE BUCCAL
Status: DISCONTINUED | OUTPATIENT
Start: 2017-11-15 | End: 2017-11-17 | Stop reason: HOSPADM

## 2017-11-15 RX ORDER — ACETAMINOPHEN 325 MG/1
650 TABLET ORAL EVERY 6 HOURS PRN
Status: DISCONTINUED | OUTPATIENT
Start: 2017-11-15 | End: 2017-11-17 | Stop reason: HOSPADM

## 2017-11-15 RX ORDER — BUDESONIDE AND FORMOTEROL FUMARATE DIHYDRATE 160; 4.5 UG/1; UG/1
2 AEROSOL RESPIRATORY (INHALATION)
Status: DISCONTINUED | OUTPATIENT
Start: 2017-11-15 | End: 2017-11-17 | Stop reason: HOSPADM

## 2017-11-15 RX ORDER — BISACODYL 10 MG
10 SUPPOSITORY, RECTAL RECTAL DAILY PRN
Status: DISCONTINUED | OUTPATIENT
Start: 2017-11-15 | End: 2017-11-17 | Stop reason: HOSPADM

## 2017-11-15 RX ORDER — CALCIUM CARBONATE 200(500)MG
1 TABLET,CHEWABLE ORAL 2 TIMES DAILY PRN
Status: DISCONTINUED | OUTPATIENT
Start: 2017-11-15 | End: 2017-11-17 | Stop reason: HOSPADM

## 2017-11-15 RX ORDER — ATORVASTATIN CALCIUM 20 MG/1
20 TABLET, FILM COATED ORAL DAILY
Status: DISCONTINUED | OUTPATIENT
Start: 2017-11-15 | End: 2017-11-17 | Stop reason: HOSPADM

## 2017-11-15 RX ORDER — MULTIPLE VITAMINS W/ MINERALS TAB 9MG-400MCG
1 TAB ORAL DAILY
Status: DISCONTINUED | OUTPATIENT
Start: 2017-11-15 | End: 2017-11-17 | Stop reason: HOSPADM

## 2017-11-15 RX ORDER — SODIUM CHLORIDE AND POTASSIUM CHLORIDE 150; 900 MG/100ML; MG/100ML
75 INJECTION, SOLUTION INTRAVENOUS CONTINUOUS
Status: DISCONTINUED | OUTPATIENT
Start: 2017-11-15 | End: 2017-11-17

## 2017-11-15 RX ORDER — ALBUTEROL SULFATE 2.5 MG/3ML
2.5 SOLUTION RESPIRATORY (INHALATION) EVERY 6 HOURS PRN
Status: DISCONTINUED | OUTPATIENT
Start: 2017-11-15 | End: 2017-11-17 | Stop reason: HOSPADM

## 2017-11-15 RX ORDER — ALLOPURINOL 300 MG/1
300 TABLET ORAL DAILY
Status: DISCONTINUED | OUTPATIENT
Start: 2017-11-15 | End: 2017-11-17 | Stop reason: HOSPADM

## 2017-11-15 RX ADMIN — POTASSIUM CHLORIDE AND SODIUM CHLORIDE 125 ML/HR: 900; 150 INJECTION, SOLUTION INTRAVENOUS at 21:52

## 2017-11-15 RX ADMIN — MULTIPLE VITAMINS W/ MINERALS TAB 1 TABLET: TAB at 22:57

## 2017-11-15 RX ADMIN — ALLOPURINOL 300 MG: 300 TABLET ORAL at 22:57

## 2017-11-15 RX ADMIN — ATORVASTATIN CALCIUM 20 MG: 20 TABLET, FILM COATED ORAL at 22:57

## 2017-11-15 RX ADMIN — INSULIN ASPART 3 UNITS: 100 INJECTION, SOLUTION INTRAVENOUS; SUBCUTANEOUS at 21:52

## 2017-11-15 NOTE — TELEPHONE ENCOUNTER
CHAPO, Called the wife back and she states they called Dr. Kingston jeffers who decided to admit him to [facility] over night to check him out.

## 2017-11-15 NOTE — TELEPHONE ENCOUNTER
Patient's wife called and states that the patient just finished his 3rd day of the Cisternogram and he has some neck stiffness and states that he can't hear very well. When Radiology called to check on him today the wife states that they told her that they did not feel either of these symptoms are related to the test. She wants to know what you think? I did tell her to check with his PCP regarding his hearing.

## 2017-11-16 ENCOUNTER — APPOINTMENT (OUTPATIENT)
Dept: MRI IMAGING | Facility: HOSPITAL | Age: 77
End: 2017-11-16
Attending: PSYCHIATRY & NEUROLOGY

## 2017-11-16 ENCOUNTER — HOSPITAL ENCOUNTER (OUTPATIENT)
Dept: NUCLEAR MEDICINE | Facility: HOSPITAL | Age: 77
Discharge: HOME | End: 2017-11-16
Attending: NEUROLOGICAL SURGERY

## 2017-11-16 LAB
ANION GAP SERPL CALCULATED.3IONS-SCNC: 15.5 MMOL/L
BASOPHILS # BLD AUTO: 0.03 10*3/MM3 (ref 0–0.2)
BASOPHILS NFR BLD AUTO: 0.3 % (ref 0–1.5)
BUN BLD-MCNC: 19 MG/DL (ref 8–23)
BUN/CREAT SERPL: 17.4 (ref 7–25)
CALCIUM SPEC-SCNC: 9.5 MG/DL (ref 8.6–10.5)
CHLORIDE SERPL-SCNC: 103 MMOL/L (ref 98–107)
CO2 SERPL-SCNC: 23.5 MMOL/L (ref 22–29)
CREAT BLD-MCNC: 1.09 MG/DL (ref 0.76–1.27)
CRP SERPL-MCNC: 0.62 MG/DL (ref 0–0.5)
DEPRECATED RDW RBC AUTO: 45.8 FL (ref 37–54)
EOSINOPHIL # BLD AUTO: 0.09 10*3/MM3 (ref 0–0.7)
EOSINOPHIL NFR BLD AUTO: 1 % (ref 0.3–6.2)
ERYTHROCYTE [DISTWIDTH] IN BLOOD BY AUTOMATED COUNT: 13.2 % (ref 11.5–14.5)
ERYTHROCYTE [SEDIMENTATION RATE] IN BLOOD: 16 MM/HR (ref 0–20)
GFR SERPL CREATININE-BSD FRML MDRD: 66 ML/MIN/1.73
GLUCOSE BLD-MCNC: 150 MG/DL (ref 65–99)
GLUCOSE BLDC GLUCOMTR-MCNC: 166 MG/DL (ref 70–130)
GLUCOSE BLDC GLUCOMTR-MCNC: 166 MG/DL (ref 70–130)
GLUCOSE BLDC GLUCOMTR-MCNC: 181 MG/DL (ref 70–130)
GLUCOSE BLDC GLUCOMTR-MCNC: 216 MG/DL (ref 70–130)
HCT VFR BLD AUTO: 39.3 % (ref 40.4–52.2)
HGB BLD-MCNC: 13.1 G/DL (ref 13.7–17.6)
IMM GRANULOCYTES # BLD: 0 10*3/MM3 (ref 0–0.03)
IMM GRANULOCYTES NFR BLD: 0 % (ref 0–0.5)
LYMPHOCYTES # BLD AUTO: 1.95 10*3/MM3 (ref 0.9–4.8)
LYMPHOCYTES NFR BLD AUTO: 21 % (ref 19.6–45.3)
MCH RBC QN AUTO: 32 PG (ref 27–32.7)
MCHC RBC AUTO-ENTMCNC: 33.3 G/DL (ref 32.6–36.4)
MCV RBC AUTO: 96.1 FL (ref 79.8–96.2)
MONOCYTES # BLD AUTO: 0.72 10*3/MM3 (ref 0.2–1.2)
MONOCYTES NFR BLD AUTO: 7.8 % (ref 5–12)
NEUTROPHILS # BLD AUTO: 6.49 10*3/MM3 (ref 1.9–8.1)
NEUTROPHILS NFR BLD AUTO: 69.9 % (ref 42.7–76)
PLATELET # BLD AUTO: 175 10*3/MM3 (ref 140–500)
PMV BLD AUTO: 10.7 FL (ref 6–12)
POTASSIUM BLD-SCNC: 4.4 MMOL/L (ref 3.5–5.2)
RBC # BLD AUTO: 4.09 10*6/MM3 (ref 4.6–6)
SODIUM BLD-SCNC: 142 MMOL/L (ref 136–145)
WBC NRBC COR # BLD: 9.28 10*3/MM3 (ref 4.5–10.7)

## 2017-11-16 PROCEDURE — G0378 HOSPITAL OBSERVATION PER HR: HCPCS

## 2017-11-16 PROCEDURE — 25810000003 SODIUM CHLORIDE 0.9 % WITH KCL 20 MEQ 20-0.9 MEQ/L-% SOLUTION: Performed by: INTERNAL MEDICINE

## 2017-11-16 PROCEDURE — G8987 SELF CARE CURRENT STATUS: HCPCS

## 2017-11-16 PROCEDURE — 63710000001 INSULIN ASPART PER 5 UNITS: Performed by: INTERNAL MEDICINE

## 2017-11-16 PROCEDURE — G8979 MOBILITY GOAL STATUS: HCPCS

## 2017-11-16 PROCEDURE — 85025 COMPLETE CBC W/AUTO DIFF WBC: CPT | Performed by: INTERNAL MEDICINE

## 2017-11-16 PROCEDURE — 82962 GLUCOSE BLOOD TEST: CPT

## 2017-11-16 PROCEDURE — A9577 INJ MULTIHANCE: HCPCS | Performed by: INTERNAL MEDICINE

## 2017-11-16 PROCEDURE — 97165 OT EVAL LOW COMPLEX 30 MIN: CPT

## 2017-11-16 PROCEDURE — 80048 BASIC METABOLIC PNL TOTAL CA: CPT | Performed by: INTERNAL MEDICINE

## 2017-11-16 PROCEDURE — 96361 HYDRATE IV INFUSION ADD-ON: CPT

## 2017-11-16 PROCEDURE — 99204 OFFICE O/P NEW MOD 45 MIN: CPT | Performed by: PSYCHIATRY & NEUROLOGY

## 2017-11-16 PROCEDURE — 97110 THERAPEUTIC EXERCISES: CPT

## 2017-11-16 PROCEDURE — G8989 SELF CARE D/C STATUS: HCPCS

## 2017-11-16 PROCEDURE — 85652 RBC SED RATE AUTOMATED: CPT | Performed by: PSYCHIATRY & NEUROLOGY

## 2017-11-16 PROCEDURE — 94640 AIRWAY INHALATION TREATMENT: CPT

## 2017-11-16 PROCEDURE — 0 GADOBENATE DIMEGLUMINE 529 MG/ML SOLUTION: Performed by: INTERNAL MEDICINE

## 2017-11-16 PROCEDURE — G8988 SELF CARE GOAL STATUS: HCPCS

## 2017-11-16 PROCEDURE — 99213 OFFICE O/P EST LOW 20 MIN: CPT | Performed by: NURSE PRACTITIONER

## 2017-11-16 PROCEDURE — 86140 C-REACTIVE PROTEIN: CPT | Performed by: PSYCHIATRY & NEUROLOGY

## 2017-11-16 PROCEDURE — 97162 PT EVAL MOD COMPLEX 30 MIN: CPT

## 2017-11-16 PROCEDURE — 70553 MRI BRAIN STEM W/O & W/DYE: CPT

## 2017-11-16 PROCEDURE — G8978 MOBILITY CURRENT STATUS: HCPCS

## 2017-11-16 RX ORDER — HYDRALAZINE HYDROCHLORIDE 25 MG/1
25 TABLET, FILM COATED ORAL EVERY 8 HOURS PRN
Status: DISCONTINUED | OUTPATIENT
Start: 2017-11-16 | End: 2017-11-17 | Stop reason: HOSPADM

## 2017-11-16 RX ORDER — LISINOPRIL 20 MG/1
20 TABLET ORAL
Status: DISCONTINUED | OUTPATIENT
Start: 2017-11-16 | End: 2017-11-17 | Stop reason: HOSPADM

## 2017-11-16 RX ADMIN — POTASSIUM CHLORIDE AND SODIUM CHLORIDE 125 ML/HR: 900; 150 INJECTION, SOLUTION INTRAVENOUS at 06:17

## 2017-11-16 RX ADMIN — ACETAMINOPHEN 650 MG: 325 TABLET ORAL at 18:12

## 2017-11-16 RX ADMIN — INSULIN ASPART 2 UNITS: 100 INJECTION, SOLUTION INTRAVENOUS; SUBCUTANEOUS at 21:32

## 2017-11-16 RX ADMIN — INSULIN ASPART 2 UNITS: 100 INJECTION, SOLUTION INTRAVENOUS; SUBCUTANEOUS at 17:53

## 2017-11-16 RX ADMIN — INSULIN ASPART 2 UNITS: 100 INJECTION, SOLUTION INTRAVENOUS; SUBCUTANEOUS at 09:03

## 2017-11-16 RX ADMIN — LISINOPRIL 20 MG: 20 TABLET ORAL at 13:23

## 2017-11-16 RX ADMIN — BUDESONIDE AND FORMOTEROL FUMARATE DIHYDRATE 2 PUFF: 160; 4.5 AEROSOL RESPIRATORY (INHALATION) at 20:26

## 2017-11-16 RX ADMIN — ATORVASTATIN CALCIUM 20 MG: 20 TABLET, FILM COATED ORAL at 09:03

## 2017-11-16 RX ADMIN — GADOBENATE DIMEGLUMINE 16 ML: 529 INJECTION, SOLUTION INTRAVENOUS at 14:05

## 2017-11-16 RX ADMIN — POTASSIUM CHLORIDE AND SODIUM CHLORIDE 75 ML/HR: 900; 150 INJECTION, SOLUTION INTRAVENOUS at 17:58

## 2017-11-16 RX ADMIN — INSULIN ASPART 4 UNITS: 100 INJECTION, SOLUTION INTRAVENOUS; SUBCUTANEOUS at 11:56

## 2017-11-16 RX ADMIN — MULTIPLE VITAMINS W/ MINERALS TAB 1 TABLET: TAB at 09:02

## 2017-11-16 RX ADMIN — ALLOPURINOL 300 MG: 300 TABLET ORAL at 09:03

## 2017-11-17 VITALS
HEIGHT: 70 IN | WEIGHT: 174.3 LBS | BODY MASS INDEX: 24.95 KG/M2 | DIASTOLIC BLOOD PRESSURE: 93 MMHG | RESPIRATION RATE: 18 BRPM | HEART RATE: 71 BPM | OXYGEN SATURATION: 94 % | TEMPERATURE: 97.8 F | SYSTOLIC BLOOD PRESSURE: 179 MMHG

## 2017-11-17 PROBLEM — I25.10 CORONARY ARTERY DISEASE INVOLVING NATIVE CORONARY ARTERY OF NATIVE HEART WITHOUT ANGINA PECTORIS: Status: RESOLVED | Noted: 2017-04-10 | Resolved: 2017-11-17

## 2017-11-17 PROBLEM — H91.90 CHANGE IN HEARING: Status: RESOLVED | Noted: 2017-11-15 | Resolved: 2017-11-17

## 2017-11-17 LAB
ANION GAP SERPL CALCULATED.3IONS-SCNC: 14.1 MMOL/L
BASOPHILS # BLD AUTO: 0.03 10*3/MM3 (ref 0–0.2)
BASOPHILS NFR BLD AUTO: 0.4 % (ref 0–1.5)
BUN BLD-MCNC: 15 MG/DL (ref 8–23)
BUN/CREAT SERPL: 13.6 (ref 7–25)
CALCIUM SPEC-SCNC: 9 MG/DL (ref 8.6–10.5)
CHLORIDE SERPL-SCNC: 106 MMOL/L (ref 98–107)
CO2 SERPL-SCNC: 19.9 MMOL/L (ref 22–29)
CREAT BLD-MCNC: 1.1 MG/DL (ref 0.76–1.27)
DEPRECATED RDW RBC AUTO: 46.7 FL (ref 37–54)
EOSINOPHIL # BLD AUTO: 0.24 10*3/MM3 (ref 0–0.7)
EOSINOPHIL NFR BLD AUTO: 3.6 % (ref 0.3–6.2)
ERYTHROCYTE [DISTWIDTH] IN BLOOD BY AUTOMATED COUNT: 13.5 % (ref 11.5–14.5)
GFR SERPL CREATININE-BSD FRML MDRD: 65 ML/MIN/1.73
GLUCOSE BLD-MCNC: 173 MG/DL (ref 65–99)
GLUCOSE BLDC GLUCOMTR-MCNC: 160 MG/DL (ref 70–130)
GLUCOSE BLDC GLUCOMTR-MCNC: 189 MG/DL (ref 70–130)
HCT VFR BLD AUTO: 39.3 % (ref 40.4–52.2)
HGB BLD-MCNC: 13 G/DL (ref 13.7–17.6)
IMM GRANULOCYTES # BLD: 0.02 10*3/MM3 (ref 0–0.03)
IMM GRANULOCYTES NFR BLD: 0.3 % (ref 0–0.5)
LYMPHOCYTES # BLD AUTO: 2.08 10*3/MM3 (ref 0.9–4.8)
LYMPHOCYTES NFR BLD AUTO: 31 % (ref 19.6–45.3)
MCH RBC QN AUTO: 31.9 PG (ref 27–32.7)
MCHC RBC AUTO-ENTMCNC: 33.1 G/DL (ref 32.6–36.4)
MCV RBC AUTO: 96.6 FL (ref 79.8–96.2)
MONOCYTES # BLD AUTO: 0.54 10*3/MM3 (ref 0.2–1.2)
MONOCYTES NFR BLD AUTO: 8.1 % (ref 5–12)
NEUTROPHILS # BLD AUTO: 3.79 10*3/MM3 (ref 1.9–8.1)
NEUTROPHILS NFR BLD AUTO: 56.6 % (ref 42.7–76)
PLATELET # BLD AUTO: 158 10*3/MM3 (ref 140–500)
PMV BLD AUTO: 10.6 FL (ref 6–12)
POTASSIUM BLD-SCNC: 4 MMOL/L (ref 3.5–5.2)
RBC # BLD AUTO: 4.07 10*6/MM3 (ref 4.6–6)
SODIUM BLD-SCNC: 140 MMOL/L (ref 136–145)
WBC NRBC COR # BLD: 6.7 10*3/MM3 (ref 4.5–10.7)

## 2017-11-17 PROCEDURE — G0378 HOSPITAL OBSERVATION PER HR: HCPCS

## 2017-11-17 PROCEDURE — 25810000003 SODIUM CHLORIDE 0.9 % WITH KCL 20 MEQ 20-0.9 MEQ/L-% SOLUTION: Performed by: INTERNAL MEDICINE

## 2017-11-17 PROCEDURE — 85025 COMPLETE CBC W/AUTO DIFF WBC: CPT | Performed by: INTERNAL MEDICINE

## 2017-11-17 PROCEDURE — 82962 GLUCOSE BLOOD TEST: CPT

## 2017-11-17 PROCEDURE — 96361 HYDRATE IV INFUSION ADD-ON: CPT

## 2017-11-17 PROCEDURE — 94799 UNLISTED PULMONARY SVC/PX: CPT

## 2017-11-17 PROCEDURE — 80048 BASIC METABOLIC PNL TOTAL CA: CPT | Performed by: INTERNAL MEDICINE

## 2017-11-17 PROCEDURE — 99214 OFFICE O/P EST MOD 30 MIN: CPT | Performed by: NURSE PRACTITIONER

## 2017-11-17 PROCEDURE — 63710000001 INSULIN ASPART PER 5 UNITS: Performed by: INTERNAL MEDICINE

## 2017-11-17 RX ORDER — LISINOPRIL 20 MG/1
20 TABLET ORAL
Qty: 30 TABLET | Refills: 0 | Status: ON HOLD | OUTPATIENT
Start: 2017-11-18 | End: 2017-11-22

## 2017-11-17 RX ORDER — HYDRALAZINE HYDROCHLORIDE 25 MG/1
25 TABLET, FILM COATED ORAL EVERY 8 HOURS PRN
Qty: 30 TABLET | Refills: 0 | Status: SHIPPED | OUTPATIENT
Start: 2017-11-17 | End: 2018-04-13

## 2017-11-17 RX ADMIN — MULTIPLE VITAMINS W/ MINERALS TAB 1 TABLET: TAB at 08:26

## 2017-11-17 RX ADMIN — INSULIN ASPART 2 UNITS: 100 INJECTION, SOLUTION INTRAVENOUS; SUBCUTANEOUS at 12:50

## 2017-11-17 RX ADMIN — ACETAMINOPHEN 650 MG: 325 TABLET ORAL at 08:26

## 2017-11-17 RX ADMIN — ALLOPURINOL 300 MG: 300 TABLET ORAL at 08:26

## 2017-11-17 RX ADMIN — ATORVASTATIN CALCIUM 20 MG: 20 TABLET, FILM COATED ORAL at 08:26

## 2017-11-17 RX ADMIN — INSULIN ASPART 2 UNITS: 100 INJECTION, SOLUTION INTRAVENOUS; SUBCUTANEOUS at 08:26

## 2017-11-17 RX ADMIN — BUDESONIDE AND FORMOTEROL FUMARATE DIHYDRATE 2 PUFF: 160; 4.5 AEROSOL RESPIRATORY (INHALATION) at 07:11

## 2017-11-17 RX ADMIN — LISINOPRIL 20 MG: 20 TABLET ORAL at 08:26

## 2017-11-17 RX ADMIN — POTASSIUM CHLORIDE AND SODIUM CHLORIDE 75 ML/HR: 900; 150 INJECTION, SOLUTION INTRAVENOUS at 05:59

## 2017-11-20 ENCOUNTER — APPOINTMENT (OUTPATIENT)
Dept: MRI IMAGING | Facility: HOSPITAL | Age: 77
End: 2017-11-20

## 2017-11-20 ENCOUNTER — APPOINTMENT (OUTPATIENT)
Dept: MRI IMAGING | Facility: HOSPITAL | Age: 77
End: 2017-11-20
Attending: PSYCHIATRY & NEUROLOGY

## 2017-11-20 ENCOUNTER — APPOINTMENT (OUTPATIENT)
Dept: NEUROLOGY | Facility: HOSPITAL | Age: 77
End: 2017-11-20
Attending: PSYCHIATRY & NEUROLOGY

## 2017-11-20 ENCOUNTER — APPOINTMENT (OUTPATIENT)
Dept: CT IMAGING | Facility: HOSPITAL | Age: 77
End: 2017-11-20

## 2017-11-20 ENCOUNTER — APPOINTMENT (OUTPATIENT)
Dept: CARDIOLOGY | Facility: HOSPITAL | Age: 77
End: 2017-11-20
Attending: PSYCHIATRY & NEUROLOGY

## 2017-11-20 ENCOUNTER — HOSPITAL ENCOUNTER (OUTPATIENT)
Facility: HOSPITAL | Age: 77
Setting detail: OBSERVATION
LOS: 1 days | Discharge: HOME | End: 2017-11-22
Attending: EMERGENCY MEDICINE | Admitting: INTERNAL MEDICINE

## 2017-11-20 DIAGNOSIS — G45.9 TRANSIENT CEREBRAL ISCHEMIA, UNSPECIFIED TYPE: Primary | ICD-10-CM

## 2017-11-20 LAB
ALBUMIN SERPL-MCNC: 4.4 G/DL (ref 3.5–5.2)
ALBUMIN/GLOB SERPL: 1.3 G/DL
ALP SERPL-CCNC: 66 U/L (ref 39–117)
ALT SERPL W P-5'-P-CCNC: 21 U/L (ref 1–41)
ANION GAP SERPL CALCULATED.3IONS-SCNC: 14.7 MMOL/L
ASCENDING AORTA: 3.3 CM
AST SERPL-CCNC: 19 U/L (ref 1–40)
BASOPHILS # BLD AUTO: 0.03 10*3/MM3 (ref 0–0.2)
BASOPHILS NFR BLD AUTO: 0.4 % (ref 0–1.5)
BH CV ECHO MEAS - ACS: 1.3 CM
BH CV ECHO MEAS - AI DEC SLOPE: 321.5 CM/SEC^2
BH CV ECHO MEAS - AI MAX PG: 78.9 MMHG
BH CV ECHO MEAS - AI MAX VEL: 444 CM/SEC
BH CV ECHO MEAS - AI P1/2T: 404.5 MSEC
BH CV ECHO MEAS - AO MAX PG (FULL): 5.4 MMHG
BH CV ECHO MEAS - AO MAX PG: 9.1 MMHG
BH CV ECHO MEAS - AO MEAN PG (FULL): 3 MMHG
BH CV ECHO MEAS - AO MEAN PG: 5 MMHG
BH CV ECHO MEAS - AO ROOT AREA (BSA CORRECTED): 1.6
BH CV ECHO MEAS - AO ROOT AREA: 8.6 CM^2
BH CV ECHO MEAS - AO ROOT DIAM: 3.3 CM
BH CV ECHO MEAS - AO V2 MAX: 151 CM/SEC
BH CV ECHO MEAS - AO V2 MEAN: 106 CM/SEC
BH CV ECHO MEAS - AO V2 VTI: 31.9 CM
BH CV ECHO MEAS - ASC AORTA: 3.3 CM
BH CV ECHO MEAS - AVA(I,A): 2.8 CM^2
BH CV ECHO MEAS - AVA(I,D): 2.8 CM^2
BH CV ECHO MEAS - AVA(V,A): 2.4 CM^2
BH CV ECHO MEAS - AVA(V,D): 2.4 CM^2
BH CV ECHO MEAS - BSA(HAYCOCK): 2.1 M^2
BH CV ECHO MEAS - BSA: 2 M^2
BH CV ECHO MEAS - BZI_BMI: 27.1 KILOGRAMS/M^2
BH CV ECHO MEAS - BZI_METRIC_HEIGHT: 177.8 CM
BH CV ECHO MEAS - BZI_METRIC_WEIGHT: 85.7 KG
BH CV ECHO MEAS - CONTRAST EF (2CH): 71 ML/M^2
BH CV ECHO MEAS - CONTRAST EF 4CH: 54.1 ML/M^2
BH CV ECHO MEAS - EDV(CUBED): 91.1 ML
BH CV ECHO MEAS - EDV(MOD-SP2): 124 ML
BH CV ECHO MEAS - EDV(MOD-SP4): 133 ML
BH CV ECHO MEAS - EDV(TEICH): 92.4 ML
BH CV ECHO MEAS - EF(CUBED): 52.9 %
BH CV ECHO MEAS - EF(MOD-SP2): 71 %
BH CV ECHO MEAS - EF(MOD-SP4): 54.1 %
BH CV ECHO MEAS - EF(TEICH): 45 %
BH CV ECHO MEAS - ESV(CUBED): 42.9 ML
BH CV ECHO MEAS - ESV(MOD-SP2): 36 ML
BH CV ECHO MEAS - ESV(MOD-SP4): 61 ML
BH CV ECHO MEAS - ESV(TEICH): 50.9 ML
BH CV ECHO MEAS - FS: 22.2 %
BH CV ECHO MEAS - IVS/LVPW: 1.1
BH CV ECHO MEAS - IVSD: 1.1 CM
BH CV ECHO MEAS - LAT PEAK E' VEL: 7 CM/SEC
BH CV ECHO MEAS - LV DIASTOLIC VOL/BSA (35-75): 65.3 ML/M^2
BH CV ECHO MEAS - LV MASS(C)D: 164 GRAMS
BH CV ECHO MEAS - LV MASS(C)DI: 80.5 GRAMS/M^2
BH CV ECHO MEAS - LV MAX PG: 3.7 MMHG
BH CV ECHO MEAS - LV MEAN PG: 2 MMHG
BH CV ECHO MEAS - LV SYSTOLIC VOL/BSA (12-30): 29.9 ML/M^2
BH CV ECHO MEAS - LV V1 MAX: 96.1 CM/SEC
BH CV ECHO MEAS - LV V1 MEAN: 59 CM/SEC
BH CV ECHO MEAS - LV V1 VTI: 23.3 CM
BH CV ECHO MEAS - LVIDD: 4.5 CM
BH CV ECHO MEAS - LVIDS: 3.5 CM
BH CV ECHO MEAS - LVLD AP2: 8.7 CM
BH CV ECHO MEAS - LVLD AP4: 8.6 CM
BH CV ECHO MEAS - LVLS AP2: 7.3 CM
BH CV ECHO MEAS - LVLS AP4: 7.1 CM
BH CV ECHO MEAS - LVOT AREA (M): 3.8 CM^2
BH CV ECHO MEAS - LVOT AREA: 3.8 CM^2
BH CV ECHO MEAS - LVOT DIAM: 2.2 CM
BH CV ECHO MEAS - LVPWD: 1 CM
BH CV ECHO MEAS - MED PEAK E' VEL: 5 CM/SEC
BH CV ECHO MEAS - MR MAX PG: 135 MMHG
BH CV ECHO MEAS - MR MAX VEL: 581 CM/SEC
BH CV ECHO MEAS - MV A DUR: 0.18 SEC
BH CV ECHO MEAS - MV A MAX VEL: 129 CM/SEC
BH CV ECHO MEAS - MV DEC SLOPE: 205 CM/SEC^2
BH CV ECHO MEAS - MV DEC TIME: 0.28 SEC
BH CV ECHO MEAS - MV E MAX VEL: 73.7 CM/SEC
BH CV ECHO MEAS - MV E/A: 0.57
BH CV ECHO MEAS - MV MAX PG: 6.9 MMHG
BH CV ECHO MEAS - MV MEAN PG: 3 MMHG
BH CV ECHO MEAS - MV P1/2T MAX VEL: 82.4 CM/SEC
BH CV ECHO MEAS - MV P1/2T: 117.7 MSEC
BH CV ECHO MEAS - MV V2 MAX: 131 CM/SEC
BH CV ECHO MEAS - MV V2 MEAN: 73.6 CM/SEC
BH CV ECHO MEAS - MV V2 VTI: 30.3 CM
BH CV ECHO MEAS - MVA P1/2T LCG: 2.7 CM^2
BH CV ECHO MEAS - MVA(P1/2T): 1.9 CM^2
BH CV ECHO MEAS - MVA(VTI): 2.9 CM^2
BH CV ECHO MEAS - PULM A REVS DUR: 0.12 SEC
BH CV ECHO MEAS - PULM A REVS VEL: 25.7 CM/SEC
BH CV ECHO MEAS - PULM DIAS VEL: 31.7 CM/SEC
BH CV ECHO MEAS - PULM S/D: 1.9
BH CV ECHO MEAS - PULM SYS VEL: 61.1 CM/SEC
BH CV ECHO MEAS - RVOT AREA: 3.1 CM^2
BH CV ECHO MEAS - RVOT DIAM: 2 CM
BH CV ECHO MEAS - SI(AO): 133.9 ML/M^2
BH CV ECHO MEAS - SI(CUBED): 23.7 ML/M^2
BH CV ECHO MEAS - SI(LVOT): 43.5 ML/M^2
BH CV ECHO MEAS - SI(MOD-SP2): 43.2 ML/M^2
BH CV ECHO MEAS - SI(MOD-SP4): 35.3 ML/M^2
BH CV ECHO MEAS - SI(TEICH): 20.4 ML/M^2
BH CV ECHO MEAS - SV(AO): 272.8 ML
BH CV ECHO MEAS - SV(CUBED): 48.3 ML
BH CV ECHO MEAS - SV(LVOT): 88.6 ML
BH CV ECHO MEAS - SV(MOD-SP2): 88 ML
BH CV ECHO MEAS - SV(MOD-SP4): 72 ML
BH CV ECHO MEAS - SV(TEICH): 41.6 ML
BH CV ECHO MEAS - TAPSE (>1.6): 1.9 CM2
BH CV VAS BP RIGHT ARM: NORMAL MMHG
BH CV XLRA - TDI S': 9 CM/SEC
BILIRUB SERPL-MCNC: 0.7 MG/DL (ref 0.1–1.2)
BILIRUB UR QL STRIP: NEGATIVE
BUN BLD-MCNC: 17 MG/DL (ref 8–23)
BUN/CREAT SERPL: 15.2 (ref 7–25)
CALCIUM SPEC-SCNC: 10 MG/DL (ref 8.6–10.5)
CHLORIDE SERPL-SCNC: 102 MMOL/L (ref 98–107)
CLARITY UR: CLEAR
CO2 SERPL-SCNC: 22.3 MMOL/L (ref 22–29)
COLOR UR: YELLOW
CREAT BLD-MCNC: 1.12 MG/DL (ref 0.76–1.27)
DEPRECATED RDW RBC AUTO: 48.5 FL (ref 37–54)
E/E' RATIO: 12
EOSINOPHIL # BLD AUTO: 0.14 10*3/MM3 (ref 0–0.7)
EOSINOPHIL NFR BLD AUTO: 1.9 % (ref 0.3–6.2)
ERYTHROCYTE [DISTWIDTH] IN BLOOD BY AUTOMATED COUNT: 13.6 % (ref 11.5–14.5)
ERYTHROCYTE [SEDIMENTATION RATE] IN BLOOD: 25 MM/HR (ref 0–20)
GFR SERPL CREATININE-BSD FRML MDRD: 64 ML/MIN/1.73
GLOBULIN UR ELPH-MCNC: 3.5 GM/DL
GLUCOSE BLD-MCNC: 172 MG/DL (ref 65–99)
GLUCOSE BLDC GLUCOMTR-MCNC: 181 MG/DL (ref 70–130)
GLUCOSE UR STRIP-MCNC: NEGATIVE MG/DL
HCT VFR BLD AUTO: 44.4 % (ref 40.4–52.2)
HGB BLD-MCNC: 14.6 G/DL (ref 13.7–17.6)
HGB UR QL STRIP.AUTO: NEGATIVE
HOLD SPECIMEN: NORMAL
HOLD SPECIMEN: NORMAL
IMM GRANULOCYTES # BLD: 0.04 10*3/MM3 (ref 0–0.03)
IMM GRANULOCYTES NFR BLD: 0.5 % (ref 0–0.5)
INR PPP: 0.98 (ref 0.9–1.1)
KETONES UR QL STRIP: NEGATIVE
LEFT ATRIUM VOLUME INDEX: 60 ML/M2
LEUKOCYTE ESTERASE UR QL STRIP.AUTO: NEGATIVE
LYMPHOCYTES # BLD AUTO: 1.75 10*3/MM3 (ref 0.9–4.8)
LYMPHOCYTES NFR BLD AUTO: 23.3 % (ref 19.6–45.3)
MAGNESIUM SERPL-MCNC: 1.8 MG/DL (ref 1.6–2.4)
MAXIMAL PREDICTED HEART RATE: 143 BPM
MCH RBC QN AUTO: 32.6 PG (ref 27–32.7)
MCHC RBC AUTO-ENTMCNC: 32.9 G/DL (ref 32.6–36.4)
MCV RBC AUTO: 99.1 FL (ref 79.8–96.2)
MONOCYTES # BLD AUTO: 0.61 10*3/MM3 (ref 0.2–1.2)
MONOCYTES NFR BLD AUTO: 8.1 % (ref 5–12)
NEUTROPHILS # BLD AUTO: 4.94 10*3/MM3 (ref 1.9–8.1)
NEUTROPHILS NFR BLD AUTO: 65.8 % (ref 42.7–76)
NITRITE UR QL STRIP: NEGATIVE
PH UR STRIP.AUTO: 6 [PH] (ref 5–8)
PLATELET # BLD AUTO: 102 10*3/MM3 (ref 140–500)
PMV BLD AUTO: 11.1 FL (ref 6–12)
POTASSIUM BLD-SCNC: 4.1 MMOL/L (ref 3.5–5.2)
PROT SERPL-MCNC: 7.9 G/DL (ref 6–8.5)
PROT UR QL STRIP: NEGATIVE
PROTHROMBIN TIME: 12.6 SECONDS (ref 11.7–14.2)
RBC # BLD AUTO: 4.48 10*6/MM3 (ref 4.6–6)
SODIUM BLD-SCNC: 139 MMOL/L (ref 136–145)
SP GR UR STRIP: 1.01 (ref 1–1.03)
STRESS TARGET HR: 122 BPM
TROPONIN T SERPL-MCNC: <0.01 NG/ML (ref 0–0.03)
UROBILINOGEN UR QL STRIP: NORMAL
WBC NRBC COR # BLD: 7.51 10*3/MM3 (ref 4.5–10.7)
WHOLE BLOOD HOLD SPECIMEN: NORMAL
WHOLE BLOOD HOLD SPECIMEN: NORMAL

## 2017-11-20 PROCEDURE — 99223 1ST HOSP IP/OBS HIGH 75: CPT | Performed by: PSYCHIATRY & NEUROLOGY

## 2017-11-20 PROCEDURE — 95816 EEG AWAKE AND DROWSY: CPT

## 2017-11-20 PROCEDURE — 70544 MR ANGIOGRAPHY HEAD W/O DYE: CPT

## 2017-11-20 PROCEDURE — 85652 RBC SED RATE AUTOMATED: CPT | Performed by: PSYCHIATRY & NEUROLOGY

## 2017-11-20 PROCEDURE — 84484 ASSAY OF TROPONIN QUANT: CPT | Performed by: EMERGENCY MEDICINE

## 2017-11-20 PROCEDURE — 85025 COMPLETE CBC W/AUTO DIFF WBC: CPT | Performed by: EMERGENCY MEDICINE

## 2017-11-20 PROCEDURE — 94640 AIRWAY INHALATION TREATMENT: CPT

## 2017-11-20 PROCEDURE — 93303 ECHO TRANSTHORACIC: CPT | Performed by: INTERNAL MEDICINE

## 2017-11-20 PROCEDURE — 96361 HYDRATE IV INFUSION ADD-ON: CPT

## 2017-11-20 PROCEDURE — 81003 URINALYSIS AUTO W/O SCOPE: CPT | Performed by: EMERGENCY MEDICINE

## 2017-11-20 PROCEDURE — 63710000001 INSULIN ASPART PER 5 UNITS: Performed by: INTERNAL MEDICINE

## 2017-11-20 PROCEDURE — 80053 COMPREHEN METABOLIC PANEL: CPT | Performed by: EMERGENCY MEDICINE

## 2017-11-20 PROCEDURE — 0 GADOBENATE DIMEGLUMINE 529 MG/ML SOLUTION: Performed by: INTERNAL MEDICINE

## 2017-11-20 PROCEDURE — 94799 UNLISTED PULMONARY SVC/PX: CPT

## 2017-11-20 PROCEDURE — 82962 GLUCOSE BLOOD TEST: CPT

## 2017-11-20 PROCEDURE — 93005 ELECTROCARDIOGRAM TRACING: CPT | Performed by: EMERGENCY MEDICINE

## 2017-11-20 PROCEDURE — 93010 ELECTROCARDIOGRAM REPORT: CPT | Performed by: INTERNAL MEDICINE

## 2017-11-20 PROCEDURE — 93306 TTE W/DOPPLER COMPLETE: CPT

## 2017-11-20 PROCEDURE — 85610 PROTHROMBIN TIME: CPT | Performed by: EMERGENCY MEDICINE

## 2017-11-20 PROCEDURE — 95816 EEG AWAKE AND DROWSY: CPT | Performed by: PSYCHIATRY & NEUROLOGY

## 2017-11-20 PROCEDURE — 96360 HYDRATION IV INFUSION INIT: CPT

## 2017-11-20 PROCEDURE — 70450 CT HEAD/BRAIN W/O DYE: CPT

## 2017-11-20 PROCEDURE — A9577 INJ MULTIHANCE: HCPCS | Performed by: INTERNAL MEDICINE

## 2017-11-20 PROCEDURE — 25010000002 PERFLUTREN (DEFINITY) 8.476 MG IN SODIUM CHLORIDE 0.9 % 10 ML INJECTION: Performed by: INTERNAL MEDICINE

## 2017-11-20 PROCEDURE — 99284 EMERGENCY DEPT VISIT MOD MDM: CPT

## 2017-11-20 PROCEDURE — 70553 MRI BRAIN STEM W/O & W/DYE: CPT

## 2017-11-20 PROCEDURE — 70549 MR ANGIOGRAPH NECK W/O&W/DYE: CPT

## 2017-11-20 PROCEDURE — 83735 ASSAY OF MAGNESIUM: CPT | Performed by: EMERGENCY MEDICINE

## 2017-11-20 RX ORDER — ASPIRIN 325 MG
325 TABLET ORAL DAILY
Status: DISCONTINUED | OUTPATIENT
Start: 2017-11-20 | End: 2017-11-20

## 2017-11-20 RX ORDER — ONDANSETRON 2 MG/ML
4 INJECTION INTRAMUSCULAR; INTRAVENOUS EVERY 6 HOURS PRN
Status: DISCONTINUED | OUTPATIENT
Start: 2017-11-20 | End: 2017-11-22 | Stop reason: HOSPADM

## 2017-11-20 RX ORDER — ALBUTEROL SULFATE 2.5 MG/3ML
2.5 SOLUTION RESPIRATORY (INHALATION)
Status: DISCONTINUED | OUTPATIENT
Start: 2017-11-20 | End: 2017-11-22 | Stop reason: HOSPADM

## 2017-11-20 RX ORDER — ACETAMINOPHEN 650 MG/1
650 SUPPOSITORY RECTAL EVERY 4 HOURS PRN
Status: DISCONTINUED | OUTPATIENT
Start: 2017-11-20 | End: 2017-11-22 | Stop reason: HOSPADM

## 2017-11-20 RX ORDER — PANTOPRAZOLE SODIUM 40 MG/1
40 TABLET, DELAYED RELEASE ORAL EVERY MORNING
Status: DISCONTINUED | OUTPATIENT
Start: 2017-11-21 | End: 2017-11-22 | Stop reason: HOSPADM

## 2017-11-20 RX ORDER — ACETAMINOPHEN 325 MG/1
650 TABLET ORAL EVERY 4 HOURS PRN
Status: DISCONTINUED | OUTPATIENT
Start: 2017-11-20 | End: 2017-11-22 | Stop reason: HOSPADM

## 2017-11-20 RX ORDER — ASPIRIN 325 MG
325 TABLET ORAL ONCE
Status: DISCONTINUED | OUTPATIENT
Start: 2017-11-20 | End: 2017-11-20

## 2017-11-20 RX ORDER — BUDESONIDE AND FORMOTEROL FUMARATE DIHYDRATE 160; 4.5 UG/1; UG/1
2 AEROSOL RESPIRATORY (INHALATION)
Status: DISCONTINUED | OUTPATIENT
Start: 2017-11-20 | End: 2017-11-22 | Stop reason: HOSPADM

## 2017-11-20 RX ORDER — DEXTROSE MONOHYDRATE 25 G/50ML
25 INJECTION, SOLUTION INTRAVENOUS
Status: DISCONTINUED | OUTPATIENT
Start: 2017-11-20 | End: 2017-11-22 | Stop reason: HOSPADM

## 2017-11-20 RX ORDER — HYDROCHLOROTHIAZIDE 12.5 MG/1
25 CAPSULE, GELATIN COATED ORAL
Status: DISCONTINUED | OUTPATIENT
Start: 2017-11-20 | End: 2017-11-22 | Stop reason: HOSPADM

## 2017-11-20 RX ORDER — ASPIRIN 300 MG/1
325 SUPPOSITORY RECTAL DAILY
Status: DISCONTINUED | OUTPATIENT
Start: 2017-11-20 | End: 2017-11-22 | Stop reason: HOSPADM

## 2017-11-20 RX ORDER — SODIUM CHLORIDE 0.9 % (FLUSH) 0.9 %
10 SYRINGE (ML) INJECTION AS NEEDED
Status: DISCONTINUED | OUTPATIENT
Start: 2017-11-20 | End: 2017-11-22 | Stop reason: HOSPADM

## 2017-11-20 RX ORDER — SODIUM CHLORIDE 0.9 % (FLUSH) 0.9 %
1-10 SYRINGE (ML) INJECTION AS NEEDED
Status: DISCONTINUED | OUTPATIENT
Start: 2017-11-20 | End: 2017-11-22 | Stop reason: HOSPADM

## 2017-11-20 RX ORDER — ALBUTEROL SULFATE 90 UG/1
2 AEROSOL, METERED RESPIRATORY (INHALATION) EVERY 4 HOURS PRN
Status: DISCONTINUED | OUTPATIENT
Start: 2017-11-20 | End: 2017-11-20 | Stop reason: CLARIF

## 2017-11-20 RX ORDER — ACETAMINOPHEN 325 MG/1
650 TABLET ORAL EVERY 6 HOURS PRN
Status: DISCONTINUED | OUTPATIENT
Start: 2017-11-20 | End: 2017-11-22 | Stop reason: HOSPADM

## 2017-11-20 RX ORDER — ASPIRIN 325 MG
325 TABLET ORAL DAILY
Status: DISCONTINUED | OUTPATIENT
Start: 2017-11-20 | End: 2017-11-22 | Stop reason: HOSPADM

## 2017-11-20 RX ORDER — SODIUM CHLORIDE 9 MG/ML
125 INJECTION, SOLUTION INTRAVENOUS CONTINUOUS
Status: DISCONTINUED | OUTPATIENT
Start: 2017-11-20 | End: 2017-11-22 | Stop reason: HOSPADM

## 2017-11-20 RX ORDER — ATORVASTATIN CALCIUM 80 MG/1
80 TABLET, FILM COATED ORAL NIGHTLY
Status: DISCONTINUED | OUTPATIENT
Start: 2017-11-20 | End: 2017-11-22 | Stop reason: HOSPADM

## 2017-11-20 RX ORDER — ATORVASTATIN CALCIUM 80 MG/1
80 TABLET, FILM COATED ORAL NIGHTLY
Status: DISCONTINUED | OUTPATIENT
Start: 2017-11-20 | End: 2017-11-20

## 2017-11-20 RX ORDER — HYDRALAZINE HYDROCHLORIDE 25 MG/1
25 TABLET, FILM COATED ORAL EVERY 8 HOURS PRN
Status: DISCONTINUED | OUTPATIENT
Start: 2017-11-20 | End: 2017-11-22 | Stop reason: HOSPADM

## 2017-11-20 RX ORDER — ASPIRIN 300 MG/1
300 SUPPOSITORY RECTAL DAILY
Status: DISCONTINUED | OUTPATIENT
Start: 2017-11-20 | End: 2017-11-20

## 2017-11-20 RX ORDER — ALLOPURINOL 300 MG/1
300 TABLET ORAL DAILY
Status: DISCONTINUED | OUTPATIENT
Start: 2017-11-20 | End: 2017-11-22 | Stop reason: HOSPADM

## 2017-11-20 RX ORDER — LISINOPRIL 40 MG/1
40 TABLET ORAL
Status: DISCONTINUED | OUTPATIENT
Start: 2017-11-20 | End: 2017-11-22 | Stop reason: HOSPADM

## 2017-11-20 RX ORDER — NICOTINE POLACRILEX 4 MG
15 LOZENGE BUCCAL
Status: DISCONTINUED | OUTPATIENT
Start: 2017-11-20 | End: 2017-11-22 | Stop reason: HOSPADM

## 2017-11-20 RX ADMIN — INSULIN ASPART 2 UNITS: 100 INJECTION, SOLUTION INTRAVENOUS; SUBCUTANEOUS at 23:07

## 2017-11-20 RX ADMIN — ATORVASTATIN CALCIUM 80 MG: 80 TABLET, FILM COATED ORAL at 23:06

## 2017-11-20 RX ADMIN — SODIUM CHLORIDE 125 ML/HR: 9 INJECTION, SOLUTION INTRAVENOUS at 20:57

## 2017-11-20 RX ADMIN — LISINOPRIL 40 MG: 40 TABLET ORAL at 18:54

## 2017-11-20 RX ADMIN — ASPIRIN 325 MG: 325 TABLET ORAL at 20:57

## 2017-11-20 RX ADMIN — HYDROCHLOROTHIAZIDE 25 MG: 12.5 CAPSULE, GELATIN COATED ORAL at 18:54

## 2017-11-20 RX ADMIN — ALLOPURINOL 300 MG: 300 TABLET ORAL at 18:54

## 2017-11-20 RX ADMIN — SODIUM CHLORIDE 125 ML/HR: 9 INJECTION, SOLUTION INTRAVENOUS at 10:10

## 2017-11-20 RX ADMIN — ALBUTEROL SULFATE 2.5 MG: 2.5 SOLUTION RESPIRATORY (INHALATION) at 23:33

## 2017-11-20 RX ADMIN — ALBUTEROL SULFATE 2.5 MG: 2.5 SOLUTION RESPIRATORY (INHALATION) at 19:44

## 2017-11-20 RX ADMIN — PERFLUTREN 3 ML: 6.52 INJECTION, SUSPENSION INTRAVENOUS at 15:14

## 2017-11-20 RX ADMIN — GADOBENATE DIMEGLUMINE 17 ML: 529 INJECTION, SOLUTION INTRAVENOUS at 16:39

## 2017-11-20 NOTE — SIGNIFICANT NOTE
11/20/17 1449   Rehab Treatment   Discipline speech language pathologist   Rehab Evaluation   Evaluation Not Performed patient unavailable for evaluation;other (see comments)  (Off the floor for testing)   Recommendations   SLP - Next Appointment 11/21/17

## 2017-11-20 NOTE — ED PROVIDER NOTES
EMERGENCY DEPARTMENT ENCOUNTER    CHIEF COMPLAINT  Chief Complaint: episode of decreased responsiveness   History given by: patient and spouse   History limited by: nothing    Room Number: BERNARD/BERNARD  PMD: Adeline Onofre MD      HPI:  Pt is a 76 y.o. male who presents complaining of an episode of decreased responsiveness at 0600. Pt checked his BP at 0200 this morning, at which time he was hypertensive (systolic pressure of 185). He took Hydralazine at that time. Pt's spouse then describes an episode of decreased responsiveness at 0600. Pt's eyes were open, but pt would not answer questions or make any attempt at speech. When pt did begin speaking, his speech was aphasic. This lasted for a few seconds. Pt is at baseline now. Pt has also complained of HA. Family has not appreciated focal weakness. Pt was discharged from the hospital 3 days ago and is suspected to have normal pressure hydrocephalus. Pt has a h/o CAD, diabetes, PE, brain abscess, and hypertension.     Onset: 0600   Timing: episode   Location: generalized   Radiation: does not radiate   Intensity/Severity: moderate   Progression: improved   Associated Symptoms: aphasia, HA  Aggravating Factors: none specified   Alleviating Factors: none specified   Previous Episodes: none specified  Treatment before arrival: none specified     PAST MEDICAL HISTORY  Active Ambulatory Problems     Diagnosis Date Noted   • Quadriceps weakness 04/08/2016   • ACL tear 04/08/2016   • Knee pain, right 04/08/2016   • S/P CABG x 3 04/18/2016   • Essential hypertension 04/18/2016   • Hyperlipemia 04/18/2016   • Hallux rigidus 07/11/2016   • Fracture, stress, metatarsal 07/11/2016   • Osteopenia determined by x-ray 07/11/2016   • Metatarsal stress fracture with nonunion 08/10/2016   • Left hip pain 08/26/2016   • Bursitis of left hip 08/26/2016   • Pulmonary embolism 10/12/2016   • DALILA (acute kidney injury) 10/12/2016   • Diabetes mellitus type 2, controlled 10/12/2016   •  Ascending aorta dilatation 10/12/2016   • Pulmonary nodule, left 10/12/2016   • Right leg DVT 10/13/2016   • Acute renal failure 01/18/2017   • Hypotension 01/18/2017   • Dehydration 01/18/2017   • Hypercalcemia 01/18/2017   • Dysphagia 01/21/2017   • Syncope and collapse 02/24/2017   • Normal pressure hydrocephalus 11/09/2017   • Headache 11/15/2017   • Impaired mobility 11/15/2017   • Nausea & vomiting 11/15/2017   • Fall at home 11/15/2017     Resolved Ambulatory Problems     Diagnosis Date Noted   • Coronary artery disease involving native coronary artery of native heart without angina pectoris 04/10/2017   • Change in hearing 11/15/2017     Past Medical History:   Diagnosis Date   • Arthritis    • Asthma    • Brain abscess    • Bruise of face    • Cardiac disease    • Coronary artery disease    • Diabetes mellitus    • Gout several years ago   • Headache 11/15/2017   • Hearing aid worn    • Hyperlipemia    • Hypertension    • Joint pain    • Low back pain several years ago   • Orbit fracture    • Pulmonary embolism    • Sinus infection        PAST SURGICAL HISTORY  Past Surgical History:   Procedure Laterality Date   • BRAIN SURGERY      BRAIN ABCESS 30 YR AGO   • CARDIAC SURGERY     • COLONOSCOPY  2012    Ciciliano- normal per pt, no polyps    • CORONARY ARTERY BYPASS GRAFT     • ENDOSCOPY N/A 3/9/2017    Schatzki ring, dilated, LA Grade B esophagitis, HH, acquired duodenal stenosis   • FACIAL FRACTURE SURGERY      to repair 6 broken bones   • ORBITAL FRACTURE OPEN REDUCTION INTERNAL FIXATION Right 1/13/2017    Procedure: RT ORBIT FLOOR FRACTURE REPAIR RIGHT [facility] FRACTURE REPAIR, RIGHT NASAL BONE FRACTURE CLOSED REDUCTION;  Surgeon: Edil Lester MD;  [place] [facility] [place] [facility] [place]:    • ORIF FOOT FRACTURE Right 9/9/2016    Procedure: RIGHT SECOND METATARSAL OPEN REDUCTION INTERNAL FIXATION WITh GRAFT FROM HEEL ;  Surgeon: Man Jenkins MD;  [place] [facility] [place] [facility] [place]  Service:    •  SEPTOPLASTY     • SKIN CANCER EXCISION     • TONSILLECTOMY         FAMILY HISTORY  Family History   Problem Relation Age of Onset   • Heart disease Mother    • Hypertension Mother    • Stroke Mother    • Heart disease Father    • Hypertension Father        SOCIAL HISTORY  Social History     Social History   • Marital status:      Spouse name: N/A   • Number of children: N/A   • Years of education: N/A     Occupational History   • Not on file.     Social History Main Topics   • Smoking status: Former Smoker     Packs/day: 2.00     Years: 5.00     Types: Cigarettes     Start date: 4/1/1959     Quit date: 3/9/1962   • Smokeless tobacco: Never Used      Comment: Quit herson turkey   • Alcohol use No      Comment: No caffeine use   • Drug use: No   • Sexual activity: Yes     Partners: Female     Birth control/ protection: Surgical     Other Topics Concern   • Not on file     Social History Narrative       ALLERGIES  Other and Oxycodone    REVIEW OF SYSTEMS  Review of Systems   Constitutional: Negative for fever.   Respiratory: Negative for shortness of breath.    Cardiovascular: Negative for chest pain.   Neurological: Positive for speech difficulty (transient aphasia) and headaches. Negative for weakness.   Psychiatric/Behavioral: Positive for confusion.       PHYSICAL EXAM  ED Triage Vitals   Temp Heart Rate Resp BP SpO2   11/20/17 0805 11/20/17 0805 11/20/17 0805 -- 11/20/17 0805   97.4 °F (36.3 °C) 86 16  97 %      Temp src Heart Rate Source Patient Position BP Location FiO2 (%)   11/20/17 0805 11/20/17 0805 -- -- --   Tympanic Monitor          Physical Exam   Constitutional: He is oriented to person, place, and time and well-developed, well-nourished, and in no distress.   HENT:   Head: Normocephalic and atraumatic.   Eyes: EOM are normal. Pupils are equal, round, and reactive to light.   Neck: Normal range of motion. Neck supple.   Cardiovascular: Normal rate, regular rhythm and normal heart sounds.     Pulmonary/Chest: Effort normal and breath sounds normal. No respiratory distress.   Abdominal: Soft. There is no tenderness. There is no rebound and no guarding.   Musculoskeletal: Normal range of motion. He exhibits no edema.   Neurological: He is alert and oriented to person, place, and time. He has normal sensation, normal strength and intact cranial nerves. He displays no weakness. He shows no pronator drift.   Skin: Skin is warm and dry.   Psychiatric: Mood and affect normal.   Nursing note and vitals reviewed.      LAB RESULTS  Lab Results (last 24 hours)     Procedure Component Value Units Date/Time    CBC & Differential [[id number]] Collected:  11/20/17 0921    Specimen:  Blood Updated:  11/20/17 0933    Narrative:       The following orders were created for panel order CBC & Differential.  Procedure                               Abnormality         Status                     ---------                               -----------         ------                     CBC Auto Differential[[id number]]        Abnormal            Final result                 Please view results for these tests on the individual orders.    Magnesium [[id number]]  (Normal) Collected:  11/20/17 0921    Specimen:  Blood Updated:  11/20/17 0955     Magnesium 1.8 mg/dL     CBC Auto Differential [[id number]]  (Abnormal) Collected:  11/20/17 0921    Specimen:  Blood Updated:  11/20/17 0933     WBC 7.51 10*3/mm3      RBC 4.48 (L) 10*6/mm3      Hemoglobin 14.6 g/dL      Hematocrit 44.4 %      MCV 99.1 (H) fL      MCH 32.6 pg      MCHC 32.9 g/dL      RDW 13.6 %      RDW-SD 48.5 fl      MPV 11.1 fL      Platelets 102 (L) 10*3/mm3      Neutrophil % 65.8 %      Lymphocyte % 23.3 %      Monocyte % 8.1 %      Eosinophil % 1.9 %      Basophil % 0.4 %      Immature Grans % 0.5 %      Neutrophils, Absolute 4.94 10*3/mm3      Lymphocytes, Absolute 1.75 10*3/mm3      Monocytes, Absolute 0.61 10*3/mm3      Eosinophils, Absolute 0.14 10*3/mm3      Basophils,  Absolute 0.03 10*3/mm3      Immature Grans, Absolute 0.04 (H) 10*3/mm3     Sedimentation Rate [[id number]]  (Abnormal) Collected:  11/20/17 0922    Specimen:  Blood Updated:  11/20/17 1238     Sed Rate 25 (H) mm/hr     Urinalysis With / Culture If Indicated - Urine, Clean Catch [[id number]]  (Normal) Collected:  11/20/17 0942    Specimen:  Urine from Urine, Clean Catch Updated:  11/20/17 0951     Color, UA Yellow     Appearance, UA Clear     pH, UA 6.0     Specific Gravity, UA 1.006     Glucose, UA Negative     Ketones, UA Negative     Bilirubin, UA Negative     Blood, UA Negative     Protein, UA Negative     Leuk Esterase, UA Negative     Nitrite, UA Negative     Urobilinogen, UA 0.2 E.U./dL    Narrative:       Urine microscopic not indicated.    Comprehensive Metabolic Panel [[id number]]  (Abnormal) Collected:  11/20/17 0955    Specimen:  Blood Updated:  11/20/17 1056     Glucose 172 (H) mg/dL      BUN 17 mg/dL      Creatinine 1.12 mg/dL      Sodium 139 mmol/L      Potassium 4.1 mmol/L      Chloride 102 mmol/L      CO2 22.3 mmol/L      Calcium 10.0 mg/dL      Total Protein 7.9 g/dL      Albumin 4.40 g/dL      ALT (SGPT) 21 U/L      AST (SGOT) 19 U/L      Alkaline Phosphatase 66 U/L      Total Bilirubin 0.7 mg/dL      eGFR Non African Amer 64 mL/min/1.73      Globulin 3.5 gm/dL      A/G Ratio 1.3 g/dL      BUN/Creatinine Ratio 15.2     Anion Gap 14.7 mmol/L     Narrative:       The MDRD GFR formula is only valid for adults with stable renal function between ages 18 and 70.    Protime-INR [[id number]]  (Normal) Collected:  11/20/17 0955    Specimen:  Blood Updated:  11/20/17 1043     Protime 12.6 Seconds      INR 0.98    Troponin [[id number]]  (Normal) Collected:  11/20/17 0955    Specimen:  Blood Updated:  11/20/17 1057     Troponin T <0.010 ng/mL     Narrative:       Troponin T Reference Ranges:  Less than 0.03 ng/mL:    Negative for AMI  0.03 to 0.09 ng/mL:      Indeterminant for AMI  Greater than 0.09 ng/mL: Positive  for AMI          I ordered the above labs and reviewed the results    RADIOLOGY  CT Head Without Contrast         MRI Brain With & Without Contrast    (Results Pending)   MRI Angiogram Head Without Contrast    (Results Pending)   MRI Angiogram Neck With & Without Contrast    (Results Pending)      CT Head is negative acute.     I ordered the above noted radiological studies. Interpreted by radiologist. Discussed with radiologist (Dr. Spencer). Reviewed by me in PACS.       PROCEDURES  Procedures  Interval: baseline  1a. Level Of Consciousness: 0-->Alert: keenly responsive  1b. LOC Questions: 0-->Answers both questions correctly  1c. LOC Commands: 0-->Performs both tasks correctly  2. Best Gaze: 0-->Normal  3. Visual: 0-->No visual loss  4. Facial Palsy: 0-->Normal symmetrical movements  5a. Motor Arm, Left: 0-->No drift: limb holds 90 (or 45) degrees for full 10 secs  5b. Motor Arm, Right: 0-->No drift: limb holds 90 (or 45) degrees for full 10 secs  6a. Motor Leg, Left: 0-->No drift: leg holds 30 degree position for full 5 secs  6b. Motor Leg, Right: 0-->No drift: leg holds 30 degree position for full 5 secs  7. Limb Ataxia: 0-->Absent  8. Sensory: 0-->Normal: no sensory loss  9. Best Language: 0-->No aphasia: normal  10. Dysarthria: 0-->Normal  11. Extinction and Inattention (formerly Neglect): 0-->No abnormality    Total (NIH Stroke Scale): 0    EKG         EKG time: 0953  Rhythm/Rate: SR at 72  P waves and CT: normal  QRS, axis: LAD   ST and T waves: nonspecific ST changes     Interpreted Contemporaneously by me, independently viewed  Unchanged compared to prior.    PROGRESS AND CONSULTS  ED Course   Comment By Time   12:32 PM  Patient has remained stable in the emergency department without any neurologic deficits.  Seen by Dr. Hoskins in the emergency department as well.  I'm concerned the patient might of had either a TIA or even a potential seizure we will admit him to the hospital for further workup. Casa  "MD Dean 11/20 1233     0917: Discussed pt;s case with Dr. Hoskins (neurology) since he knows pt.     0919: Ordered CT Head, labs, and EKG for further evaluation. Ordered fluid drip for hydration.     1145: Dr. Hoskins has been in ED to see pt.     1215: Rechecked pt. Pt is resting comfortably. I explained that pt has likely had a TIA and will be admitted for further evaluation. Pt understands and agrees with this plan, and all questions were addressed.     1230: Discussed pt's case with Dr. Thomas (internal medicine), who agrees to admit pt to telemetry.     MEDICAL DECISION MAKING  Results were reviewed/discussed with the patient and they were also made aware of online access. Pt also made aware that some labs, such as cultures, will not be resulted during ER visit and follow up with PMD is necessary.     MDM  Number of Diagnoses or Management Options  Transient cerebral ischemia, unspecified type:      Amount and/or Complexity of Data Reviewed  Clinical lab tests: ordered and reviewed  Tests in the radiology section of CPT®: ordered and reviewed  Tests in the medicine section of CPT®: ordered and reviewed  Discussion of test results with the performing providers: yes  Decide to obtain previous medical records or to obtain history from someone other than the patient: yes  Review and summarize past medical records: yes (Pt was discharged from hospital on 11/17. Reviewed note.   \"The patient is a 76-year-old with normal pressure hydrocephalus who came to the hospital after experiencing hearing loss, headache after receiving his first lumbar puncture for NPH.  Please see history and physical for further details.  He came in hypertensive as well.  Neurology was consulted and he underwent MRI which showed findings consistent with previous lumbar puncture.  They removed 30 cc of spinal fluid and symptoms were likely secondary to this.  His symptoms have greatly improved.  He has a mild headache and intermittent " "hearing loss but overall much better.  He remains hypertensive and lisinopril was started.  I've also started as needed hydralazine 25 mg if systolic blood pressure greater than 170.  I discussed with the patient and his wife and they're to continue taking this medication at home if his systolic blood pressures were 170.  He is to follow-up with his neurosurgeon Dr. Gonzalez this upcoming week.  Also recommended him to follow up with primary care provider in a couple days for blood pressure check and hospital follow-up.  The patient and his wife for in agreement with the discharge plan today.\")  Discuss the patient with other providers: yes    Patient Progress  Patient progress: stable         DIAGNOSIS  Final diagnoses:   Transient cerebral ischemia, unspecified type       DISPOSITION  ADMISSION    Discussed treatment plan and reason for admission with pt/family and admitting physician.  Pt/family voiced understanding of the plan for admission for further testing/treatment as needed.         Latest Documented Vital Signs:  As of 3:35 PM  BP- (!) 184/89 HR- 71 Temp- 97.4 °F (36.3 °C) (Tympanic) O2 sat- 97%    --  Documentation assistance provided by laura Higginbotham for [facility].  Information recorded by the scribrebecca was done at my direction and has been verified and validated by me.                  Gabby Higginbotham  11/20/17 1231       [facility] Moran MD  11/20/17 1537    "

## 2017-11-20 NOTE — H&P
"    Name: Eugenio Joe ADMIT: 2017   : 1940  PCP: Adeline Onofre MD    MRN: [id number] LOS: 0 days   AGE/SEX: 76 y.o. male  ROOM: [facility]     Chief Complaint   Patient presents with   • Hypertension     pt wife reports high bp \"since leaving the hospital friday\". pt wife reports confusion and \"garbled speech\" this morning which has resolved.    • Altered Mental Status       Subjective   Mr. Joe is a 76 y.o. male who presents to [facility] complaining of  Aphasia      History of Present Illness  The patient is a pleasant 76-year-old male history of normal pressure hydrocephalus who is known to me from previous hospitalization.  He was discharged by me 2 days ago.  He was admitted that time for headache, decreased hearing after receiving large-volume lumbar puncture for his normal pressure hydrocephalus.  She also has a history of diabetes, hypertension.  During that hospitalization he was hypertensive but has been hypertensive for quite some time and his medications have been adjusted.  He has remained hypertensive at home and continues with intermittent headache and loss of hearing at home but currently does not have either one of these issues.  He comes back to the hospital after having an episode earlier this morning of aphasia.  This occurred between 3 AM and 6 AM earlier today and lasted until 7:15 or 7:30 AM.  The patient vaguely remembers the episode.  According to his wife the patient was able to follow commands but not able to speak.  She did not notice any facial asymmetry or focal weakness in his arms or legs.  He is able to follow commands and raise his arms according to his wife.  Symptoms are now completely gone.  He is still hypertensive.  Neurology was consulted and he is undergone CT of the head and is planned for MRI/MRA, 2-D echo and EEG.  His neurosurgeon has also been consulted, Dr. Gonzalez.  He was admitted to our service for further management.  He has " received aspirin in the emergency room and did not be criteria for thrombolytics given resolution of symptoms and time.  More than 3 hours.  Past Medical History:   Diagnosis Date   • Arthritis    • Asthma    • Brain abscess     at about age 44   • Bruise of face    • Cardiac disease    • Coronary artery disease     CABG 2012   • Diabetes mellitus    • Gout several years ago    medication  working   • Headache 11/15/2017   • Hearing aid worn     bilateral   • Hyperlipemia    • Hypertension    • Joint pain     or swelling   • Low back pain several years ago   • Orbit fracture    • Pulmonary embolism     OCT 2016   • Sinus infection      Past Surgical History:   Procedure Laterality Date   • BRAIN SURGERY      BRAIN ABCESS 30 YR AGO   • CARDIAC SURGERY     • COLONOSCOPY  2012    Ciciliano- normal per pt, no polyps    • CORONARY ARTERY BYPASS GRAFT     • ENDOSCOPY N/A 3/9/2017    Schatzki ring, dilated, LA Grade B esophagitis, HH, acquired duodenal stenosis   • FACIAL FRACTURE SURGERY      to repair 6 broken bones   • ORBITAL FRACTURE OPEN REDUCTION INTERNAL FIXATION Right 1/13/2017    Procedure: RT ORBIT FLOOR FRACTURE REPAIR RIGHT [facility]MC FRACTURE REPAIR, RIGHT NASAL BONE FRACTURE CLOSED REDUCTION;  Surgeon: Edil Lester MD;  [place] [facility] [place] [facility] [place]:    • ORIF FOOT FRACTURE Right 9/9/2016    Procedure: RIGHT SECOND METATARSAL OPEN REDUCTION INTERNAL FIXATION WITh GRAFT FROM HEEL ;  Surgeon: Man Jenkins MD;  [place] [facility] [place] [facility] [place]  Service:    • SEPTOPLASTY     • SKIN CANCER EXCISION     • TONSILLECTOMY       Family History   Problem Relation Age of Onset   • Heart disease Mother    • Hypertension Mother    • Stroke Mother    • Heart disease Father    • Hypertension Father      Social History   Substance Use Topics   • Smoking status: Former Smoker     Packs/day: 2.00     Years: 5.00     Types: Cigarettes     Start date: 4/1/1959     Quit date: 3/9/1962   • Smokeless tobacco: Never Used       Comment: Quit herson turkey   • Alcohol use No      Comment: No caffeine use       (Not in a hospital admission)  Allergies:    Allergies   Allergen Reactions   • Other Mental Status Change     Oxy drugs   • Oxycodone Mental Status Change       Review of Systems   Constitutional: Positive for activity change. Negative for appetite change, fatigue, fever and unexpected weight change.   HENT: Positive for hearing loss. Negative for nosebleeds, sore throat and trouble swallowing.    Eyes: Negative for pain and visual disturbance.   Respiratory: Negative for cough, chest tightness and shortness of breath.    Cardiovascular: Negative for chest pain, palpitations and leg swelling.   Gastrointestinal: Negative for abdominal pain, constipation, diarrhea, nausea and vomiting.   Endocrine:        Positive for diabetes but negative for thyroid disease   Genitourinary: Negative for hematuria.   Musculoskeletal: Negative.  Negative for back pain, neck pain and neck stiffness.   Skin: Negative for rash and wound.   Neurological: Positive for speech difficulty and headaches. Negative for dizziness, syncope, facial asymmetry, weakness and light-headedness.   Hematological: Negative for adenopathy. Does not bruise/bleed easily.   Psychiatric/Behavioral: Positive for decreased concentration. Negative for agitation, behavioral problems and confusion.        Objective    Vital Signs  Temp:  [97.4 °F (36.3 °C)] 97.4 °F (36.3 °C)  Heart Rate:  [70-86] 71  Resp:  [16-17] 17  BP: (164-187)/(81-89) 184/89  SpO2:  [95 %-97 %] 97 %  on   ;   O2 Device: room air  Body mass index is 27.12 kg/(m^2).    Physical Exam   Constitutional: He is oriented to person, place, and time. He appears well-developed and well-nourished.   HENT:   Head: Normocephalic and atraumatic.   Mouth/Throat: Oropharynx is clear and moist. No oropharyngeal exudate.   Eyes: Conjunctivae and EOM are normal. Pupils are equal, round, and reactive to light. No scleral icterus.    Neck: Normal range of motion. Neck supple. No JVD present. No thyromegaly present.   Cardiovascular: Normal rate, regular rhythm and normal heart sounds.  Exam reveals no gallop and no friction rub.    No murmur heard.  Pulmonary/Chest: Effort normal and breath sounds normal. No stridor. No respiratory distress.   Abdominal: Soft. Bowel sounds are normal. He exhibits no distension. There is no tenderness. There is no rebound and no guarding.   Musculoskeletal: He exhibits no edema or tenderness.   Lymphadenopathy:     He has no cervical adenopathy.   Neurological: He is alert and oriented to person, place, and time. No cranial nerve deficit.   No focal deficits   Skin: Skin is warm and dry.   Psychiatric: He has a normal mood and affect. His behavior is normal.   Nursing note and vitals reviewed.      Results Review:   I reviewed the patient's new clinical results.    Lab Results (last 24 hours)     Procedure Component Value Units Date/Time    CBC & Differential [[id number]] Collected:  11/20/17 0921    Specimen:  Blood Updated:  11/20/17 0933    Narrative:       The following orders were created for panel order CBC & Differential.  Procedure                               Abnormality         Status                     ---------                               -----------         ------                     CBC Auto Differential[[id number]]        Abnormal            Final result                 Please view results for these tests on the individual orders.    Magnesium [[id number]]  (Normal) Collected:  11/20/17 0921    Specimen:  Blood Updated:  11/20/17 0955     Magnesium 1.8 mg/dL     CBC Auto Differential [[id number]]  (Abnormal) Collected:  11/20/17 0921    Specimen:  Blood Updated:  11/20/17 0933     WBC 7.51 10*3/mm3      RBC 4.48 (L) 10*6/mm3      Hemoglobin 14.6 g/dL      Hematocrit 44.4 %      MCV 99.1 (H) fL      MCH 32.6 pg      MCHC 32.9 g/dL      RDW 13.6 %      RDW-SD 48.5 fl      MPV 11.1 fL      Platelets  102 (L) 10*3/mm3      Neutrophil % 65.8 %      Lymphocyte % 23.3 %      Monocyte % 8.1 %      Eosinophil % 1.9 %      Basophil % 0.4 %      Immature Grans % 0.5 %      Neutrophils, Absolute 4.94 10*3/mm3      Lymphocytes, Absolute 1.75 10*3/mm3      Monocytes, Absolute 0.61 10*3/mm3      Eosinophils, Absolute 0.14 10*3/mm3      Basophils, Absolute 0.03 10*3/mm3      Immature Grans, Absolute 0.04 (H) 10*3/mm3     Sedimentation Rate [[id number]]  (Abnormal) Collected:  11/20/17 0922    Specimen:  Blood Updated:  11/20/17 1238     Sed Rate 25 (H) mm/hr     Urinalysis With / Culture If Indicated - Urine, Clean Catch [[id number]]  (Normal) Collected:  11/20/17 0942    Specimen:  Urine from Urine, Clean Catch Updated:  11/20/17 0951     Color, UA Yellow     Appearance, UA Clear     pH, UA 6.0     Specific Gravity, UA 1.006     Glucose, UA Negative     Ketones, UA Negative     Bilirubin, UA Negative     Blood, UA Negative     Protein, UA Negative     Leuk Esterase, UA Negative     Nitrite, UA Negative     Urobilinogen, UA 0.2 E.U./dL    Narrative:       Urine microscopic not indicated.    Comprehensive Metabolic Panel [[id number]]  (Abnormal) Collected:  11/20/17 0955    Specimen:  Blood Updated:  11/20/17 1056     Glucose 172 (H) mg/dL      BUN 17 mg/dL      Creatinine 1.12 mg/dL      Sodium 139 mmol/L      Potassium 4.1 mmol/L      Chloride 102 mmol/L      CO2 22.3 mmol/L      Calcium 10.0 mg/dL      Total Protein 7.9 g/dL      Albumin 4.40 g/dL      ALT (SGPT) 21 U/L      AST (SGOT) 19 U/L      Alkaline Phosphatase 66 U/L      Total Bilirubin 0.7 mg/dL      eGFR Non African Amer 64 mL/min/1.73      Globulin 3.5 gm/dL      A/G Ratio 1.3 g/dL      BUN/Creatinine Ratio 15.2     Anion Gap 14.7 mmol/L     Narrative:       The MDRD GFR formula is only valid for adults with stable renal function between ages 18 and 70.    Protime-INR [[id number]]  (Normal) Collected:  11/20/17 0955    Specimen:  Blood Updated:  11/20/17 1043      Protime 12.6 Seconds      INR 0.98    Troponin [[id number]]  (Normal) Collected:  11/20/17 0955    Specimen:  Blood Updated:  11/20/17 1057     Troponin T <0.010 ng/mL     Narrative:       Troponin T Reference Ranges:  Less than 0.03 ng/mL:    Negative for AMI  0.03 to 0.09 ng/mL:      Indeterminant for AMI  Greater than 0.09 ng/mL: Positive for AMI            CT Head Without Contrast         MRI Brain With & Without Contrast    (Results Pending)   MRI Angiogram Head Without Contrast    (Results Pending)   MRI Angiogram Neck With & Without Contrast    (Results Pending)     Assessment/Plan   Assessment:     Active Hospital Problems (** Indicates Principal Problem)    Diagnosis Date Noted   • **Transient cerebral ischemia [G45.9] 11/20/2017   • Normal pressure hydrocephalus [G91.2] 11/09/2017   • Diabetes mellitus type 2, controlled [E11.9] 10/12/2016   • Essential hypertension [I10] 04/18/2016      Resolved Hospital Problems    Diagnosis Date Noted Date Resolved   No resolved problems to display.     The patient has been admitted to our service for likely transient ischemic attack versus seizure.  I would favor TIA at this time.  Neurology has already been consulted and he is planned for MRI/MRA, 2-D echo and EEG.  We'll continue high-dose statin and aspirin.  Going to try control his blood pressure will be better by increasing lisinopril 40 mg, starting hydrochlorothiazide and continue his as needed hydralazine.  May need to further adjustments in his antihypertensive regimen.  Will continue holding metformin and place on sliding scale insulin.  Depending on blood glucose we need to add a basal insulin while in the hospital.  His neurosurgeon, Dr. Gonzalez is also been consulted as the patient has appointment with him tomorrow.      The patient's wife is his healthcare surrogate and he is a full code.  ·       I discussed the patients findings and my recommendations with patient, family and nursing staff.          Jermain  Juice Thomas MD  [facility]  11/20/17  2:51 PM

## 2017-11-20 NOTE — CONSULTS
NEUROLOGY CONSULTATION        PATIENT Eugenio Joe    1940   MRN [id number]   ADMIT DATE 2017   LENGTH OF STAY 0 days   ATTENDING Casa Moran MD       HISTORY OF PRESENT ILLNESS:  This is a 76-year-old man known to me from an admission about 6 days ago.  This admission occurred 24-48 hours after a large volume lumbar puncture which was also done as part of a cisternogram to evaluate his possible normal pressure hydrocephalus.He was admitted to the hospital because of bilateral hearing loss and headache.  These occurred about 24 hours after the lumbar puncture.  They were not positional.  However while he was in the hospital the hearing loss resolved.  The headache also improved quite a bit.  He had an MRI done which showed meningeal thickening and enhancement consistent with a low-pressure state.  He tells me that since he left the hospital he's had moderate headaches off and on usually lasting a couple hours coming a couple times a day.  There are bilateral posterior headaches with no nausea vomiting photophobia or phonophobia.  He is also continued to have episodes of bilateral hearing loss often lasting a few hours and not necessarily associated with the headaches.  Neither of these symptoms are positionally related.  Early this morning the patient got up out of bed and went to a chair in the living room and slept the rest of the night there.  His wife checked on him at about 7 AM and he had a blank look on his face though he did seem to look at her.  However he would not speak at all and did not appear to be trying to speak.  After about 15 minutes he then did attempt to speak but it was total gibberish.  The wife says none of the words made any sense.  That point he could follow simple commands.  His cleared in about 1 hour and 15 minutes and e-stim back to his normal self except for a mild headache at this point.  Patient says he remembers the whole event.  Wife notes his blood pressure was  "185 systolic a few times during the night.    CHIEF COMPLAINT: Hypertension (pt wife reports high bp \"since leaving the hospital friday\". pt wife reports confusion and \"garbled speech\" this morning which has resolved. ) and Altered Mental Status      DIAGNOSIS: There are no admission diagnoses documented for this encounter.      PAST MEDICAL HISTORY:   Past Medical History:   Diagnosis Date   • Arthritis    • Asthma    • Brain abscess     at about age 44   • Bruise of face    • Cardiac disease    • Coronary artery disease     CABG 2012   • Diabetes mellitus    • Gout several years ago    medication  working   • Headache 11/15/2017   • Hearing aid worn     bilateral   • Hyperlipemia    • Hypertension    • Joint pain     or swelling   • Low back pain several years ago   • Orbit fracture    • Pulmonary embolism     OCT 2016   • Sinus infection        PAST SURGICAL HISTORY:  Past Surgical History:   Procedure Laterality Date   • BRAIN SURGERY      BRAIN ABCESS 30 YR AGO   • CARDIAC SURGERY     • COLONOSCOPY  2012    Ciciliano- normal per pt, no polyps    • CORONARY ARTERY BYPASS GRAFT     • ENDOSCOPY N/A 3/9/2017    Schatzki ring, dilated, LA Grade B esophagitis, HH, acquired duodenal stenosis   • FACIAL FRACTURE SURGERY      to repair 6 broken bones   • ORBITAL FRACTURE OPEN REDUCTION INTERNAL FIXATION Right 1/13/2017    Procedure: RT ORBIT FLOOR FRACTURE REPAIR RIGHT [facility] FRACTURE REPAIR, RIGHT NASAL BONE FRACTURE CLOSED REDUCTION;  Surgeon: Edil Lester MD;  [place] [facility] [place] [facility] [place]:    • ORIF FOOT FRACTURE Right 9/9/2016    Procedure: RIGHT SECOND METATARSAL OPEN REDUCTION INTERNAL FIXATION WITh GRAFT FROM HEEL ;  Surgeon: Man Jenkins MD;  [place] [facility] [place] [facility] [place]  [place]:    • SEPTOPLASTY     • SKIN CANCER EXCISION     • TONSILLECTOMY         CURRENT MEDICATIONS:  Scheduled Medications:   Infusions:   sodium chloride 125 mL/hr Last Rate: 125 mL/hr (11/20/17 1010)     PRN " "Medications:  •  sodium chloride  •  Insert peripheral IV **AND** sodium chloride    SOCIAL HISTORY:  Social History     Social History   • Marital status:      Spouse name: N/A   • Number of children: N/A   • Years of education: N/A     Social History Main Topics   • Smoking status: Former Smoker     Packs/day: 2.00     Years: 5.00     Types: Cigarettes     Start date: 4/1/1959     Quit date: 3/9/1962   • Smokeless tobacco: Never Used      Comment: Quit herson turkey   • Alcohol use No      Comment: No caffeine use   • Drug use: No   • Sexual activity: Yes     Partners: Female     Birth control/ protection: Surgical     Other Topics Concern   • None     Social History Narrative       FAMILY HISTORY:   I have reviewed this patient's family history and it is not significant to the present illness.      REVIEW OF SYSTEMS: 14 system review performed and all other systems are negative except for Hypertension (pt wife reports high bp \"since leaving the hospital friday\". pt wife reports confusion and \"garbled speech\" this morning which has resolved. ) and Altered Mental Status         PHYSICAL EXAM:  GENERAL: Reveals a man/woman who appears his/her stated age, in no acute distress.  VITAL SIGNS: BP (!) 183/83  Pulse 71  Temp 97.4 °F (36.3 °C) (Tympanic)   Resp 17  Ht 70\" (177.8 cm)  Wt 189 lb (85.7 kg)  SpO2 96%  BMI 27.12 kg/m2  NECK: Carotids are equal without bruits.   HEART: Regular rate and rhythm with no significant murmur or gallop.   EXTREMITIES: Nonedematous. Pulses are intact. There is no rash. There is no hepatosplenomegaly. No respiratory distress. There is no lymphadenopathy. There are no signs of cutaneous embolization.  NEUROLOGIC: Awake, alert and oriented x3. There is no aphasia or dysarthria.  Patient can do simple calculations and remembers two out of three objects in five minutes. Visual fields are full. Disks are flat. Cranial nerves II through XII are intact. Power is normal in all 4 " extremities. Tone is normal. Reflexes are 2 and symmetric in the upper and lower extremities. Toes are downgoing. Sensations intact pinprick and joint position sense in all 4 extremities. Cerebellar function was normal.  Gait was not tested.    DIAGNOSTIC DATA:   Labs reviewed and revealed no significant abnormalities.     Radiology images personally reviewed and revealed no significant abnormalities.  Specifically CT scan of the brain shows small old area of encephalomalacia in the left parietal lobe consistent with his remote history of brain abscess.      IMPRESSION the present episode surely could be a left hemisphere TIA and represent a transient aphasia.  However the blank stare at the beginning of the raises a question of seizure even know the patient claims he was awake whole time.  The patient involved seizure is not always aware when he is unconscious.  Therefore I think this needs to be worked up for both TIA and seizure disorder.    The other issue is how to connect this with his recent spinal tap.  Auditory symptoms with a low pressure headache including hearing loss is rather common.  Be more convinced if he has a positional headache but that doesn't always occur.  The MRI establishes that he has a low-pressure state though doesn't establish that all the symptoms are reflection of this.  Regardless I don't know how that time the transient aphasia/seizure into a low-pressure state.    On the hospital and get an MRI.  I'll address Dr. Kennedy or his nurse practitioner to help us with shunt reprogramming after an MRI.  In addition the MRI will get an MRA of the head and neck and an EEG.  I'll also get an echocardiogram.    Would start ASA and high potency statin while we do the w/u    Thank you for allowing me to see this patient.    Neo Hoskins MD  11/20/2017  12:03 PM

## 2017-11-21 ENCOUNTER — APPOINTMENT (OUTPATIENT)
Dept: CARDIOLOGY | Facility: HOSPITAL | Age: 77
End: 2017-11-21
Attending: INTERNAL MEDICINE

## 2017-11-21 DIAGNOSIS — Z74.09 IMPAIRED MOBILITY: Primary | Chronic | ICD-10-CM

## 2017-11-21 PROBLEM — Q21.12 PFO (PATENT FORAMEN OVALE): Status: ACTIVE | Noted: 2017-11-21

## 2017-11-21 LAB
BH CV LOWER VASCULAR LEFT COMMON FEMORAL AUGMENT: NORMAL
BH CV LOWER VASCULAR LEFT COMMON FEMORAL COMPETENT: NORMAL
BH CV LOWER VASCULAR LEFT COMMON FEMORAL COMPRESS: NORMAL
BH CV LOWER VASCULAR LEFT COMMON FEMORAL PHASIC: NORMAL
BH CV LOWER VASCULAR LEFT COMMON FEMORAL SPONT: NORMAL
BH CV LOWER VASCULAR LEFT DISTAL FEMORAL COMPRESS: NORMAL
BH CV LOWER VASCULAR LEFT GASTRONEMIUS COMPRESS: NORMAL
BH CV LOWER VASCULAR LEFT GREATER SAPH AK COMPRESS: NORMAL
BH CV LOWER VASCULAR LEFT GREATER SAPH BK COMPRESS: NORMAL
BH CV LOWER VASCULAR LEFT LESSER SAPH COMPRESS: NORMAL
BH CV LOWER VASCULAR LEFT MID FEMORAL AUGMENT: NORMAL
BH CV LOWER VASCULAR LEFT MID FEMORAL COMPETENT: NORMAL
BH CV LOWER VASCULAR LEFT MID FEMORAL COMPRESS: NORMAL
BH CV LOWER VASCULAR LEFT MID FEMORAL PHASIC: NORMAL
BH CV LOWER VASCULAR LEFT MID FEMORAL SPONT: NORMAL
BH CV LOWER VASCULAR LEFT PERONEAL COMPRESS: NORMAL
BH CV LOWER VASCULAR LEFT POPLITEAL AUGMENT: NORMAL
BH CV LOWER VASCULAR LEFT POPLITEAL COMPETENT: NORMAL
BH CV LOWER VASCULAR LEFT POPLITEAL COMPRESS: NORMAL
BH CV LOWER VASCULAR LEFT POPLITEAL PHASIC: NORMAL
BH CV LOWER VASCULAR LEFT POPLITEAL SPONT: NORMAL
BH CV LOWER VASCULAR LEFT POSTERIOR TIBIAL COMPRESS: NORMAL
BH CV LOWER VASCULAR LEFT PROXIMAL FEMORAL COMPRESS: NORMAL
BH CV LOWER VASCULAR LEFT SAPHENOFEMORAL JUNCTION AUGMENT: NORMAL
BH CV LOWER VASCULAR LEFT SAPHENOFEMORAL JUNCTION COMPETENT: NORMAL
BH CV LOWER VASCULAR LEFT SAPHENOFEMORAL JUNCTION COMPRESS: NORMAL
BH CV LOWER VASCULAR LEFT SAPHENOFEMORAL JUNCTION PHASIC: NORMAL
BH CV LOWER VASCULAR LEFT SAPHENOFEMORAL JUNCTION SPONT: NORMAL
BH CV LOWER VASCULAR RIGHT COMMON FEMORAL AUGMENT: NORMAL
BH CV LOWER VASCULAR RIGHT COMMON FEMORAL COMPETENT: NORMAL
BH CV LOWER VASCULAR RIGHT COMMON FEMORAL COMPRESS: NORMAL
BH CV LOWER VASCULAR RIGHT COMMON FEMORAL PHASIC: NORMAL
BH CV LOWER VASCULAR RIGHT COMMON FEMORAL SPONT: NORMAL
BH CV LOWER VASCULAR RIGHT DISTAL FEMORAL COMPRESS: NORMAL
BH CV LOWER VASCULAR RIGHT GASTRONEMIUS COMPRESS: NORMAL
BH CV LOWER VASCULAR RIGHT GREATER SAPH AK COMPRESS: NORMAL
BH CV LOWER VASCULAR RIGHT GREATER SAPH BK COMPRESS: NORMAL
BH CV LOWER VASCULAR RIGHT LESSER SAPH COMPRESS: NORMAL
BH CV LOWER VASCULAR RIGHT MID FEMORAL AUGMENT: NORMAL
BH CV LOWER VASCULAR RIGHT MID FEMORAL COMPETENT: NORMAL
BH CV LOWER VASCULAR RIGHT MID FEMORAL COMPRESS: NORMAL
BH CV LOWER VASCULAR RIGHT MID FEMORAL PHASIC: NORMAL
BH CV LOWER VASCULAR RIGHT MID FEMORAL SPONT: NORMAL
BH CV LOWER VASCULAR RIGHT PERONEAL COMPRESS: NORMAL
BH CV LOWER VASCULAR RIGHT POPLITEAL AUGMENT: NORMAL
BH CV LOWER VASCULAR RIGHT POPLITEAL COMPETENT: NORMAL
BH CV LOWER VASCULAR RIGHT POPLITEAL COMPRESS: NORMAL
BH CV LOWER VASCULAR RIGHT POPLITEAL PHASIC: NORMAL
BH CV LOWER VASCULAR RIGHT POPLITEAL SPONT: NORMAL
BH CV LOWER VASCULAR RIGHT POSTERIOR TIBIAL COMPRESS: NORMAL
BH CV LOWER VASCULAR RIGHT PROXIMAL FEMORAL COMPRESS: NORMAL
BH CV LOWER VASCULAR RIGHT SAPHENOFEMORAL JUNCTION AUGMENT: NORMAL
BH CV LOWER VASCULAR RIGHT SAPHENOFEMORAL JUNCTION COMPETENT: NORMAL
BH CV LOWER VASCULAR RIGHT SAPHENOFEMORAL JUNCTION COMPRESS: NORMAL
BH CV LOWER VASCULAR RIGHT SAPHENOFEMORAL JUNCTION PHASIC: NORMAL
BH CV LOWER VASCULAR RIGHT SAPHENOFEMORAL JUNCTION SPONT: NORMAL
CHOLEST SERPL-MCNC: 100 MG/DL (ref 0–200)
GLUCOSE BLDC GLUCOMTR-MCNC: 172 MG/DL (ref 70–130)
GLUCOSE BLDC GLUCOMTR-MCNC: 192 MG/DL (ref 70–130)
GLUCOSE BLDC GLUCOMTR-MCNC: 231 MG/DL (ref 70–130)
GLUCOSE BLDC GLUCOMTR-MCNC: 249 MG/DL (ref 70–130)
HBA1C MFR BLD: 7.35 % (ref 4.8–5.6)
HDLC SERPL-MCNC: 35 MG/DL (ref 40–60)
LDLC SERPL CALC-MCNC: 20 MG/DL (ref 0–100)
LDLC/HDLC SERPL: 0.56 {RATIO}
TRIGL SERPL-MCNC: 227 MG/DL (ref 0–150)
VLDLC SERPL-MCNC: 45.4 MG/DL (ref 5–40)

## 2017-11-21 PROCEDURE — 93970 EXTREMITY STUDY: CPT

## 2017-11-21 PROCEDURE — G8980 MOBILITY D/C STATUS: HCPCS

## 2017-11-21 PROCEDURE — 97110 THERAPEUTIC EXERCISES: CPT

## 2017-11-21 PROCEDURE — 97162 PT EVAL MOD COMPLEX 30 MIN: CPT

## 2017-11-21 PROCEDURE — 83036 HEMOGLOBIN GLYCOSYLATED A1C: CPT | Performed by: INTERNAL MEDICINE

## 2017-11-21 PROCEDURE — 63710000001 INSULIN ASPART PER 5 UNITS: Performed by: INTERNAL MEDICINE

## 2017-11-21 PROCEDURE — G0378 HOSPITAL OBSERVATION PER HR: HCPCS

## 2017-11-21 PROCEDURE — 99213 OFFICE O/P EST LOW 20 MIN: CPT | Performed by: PSYCHIATRY & NEUROLOGY

## 2017-11-21 PROCEDURE — 99213 OFFICE O/P EST LOW 20 MIN: CPT | Performed by: PHYSICIAN ASSISTANT

## 2017-11-21 PROCEDURE — 82962 GLUCOSE BLOOD TEST: CPT

## 2017-11-21 PROCEDURE — 96361 HYDRATE IV INFUSION ADD-ON: CPT

## 2017-11-21 PROCEDURE — 80061 LIPID PANEL: CPT | Performed by: INTERNAL MEDICINE

## 2017-11-21 PROCEDURE — 94799 UNLISTED PULMONARY SVC/PX: CPT

## 2017-11-21 PROCEDURE — G8979 MOBILITY GOAL STATUS: HCPCS

## 2017-11-21 PROCEDURE — G8978 MOBILITY CURRENT STATUS: HCPCS

## 2017-11-21 RX ORDER — ERGOCALCIFEROL (VITAMIN D2) 10 MCG
50 TABLET ORAL DAILY
COMMUNITY
End: 2021-02-12

## 2017-11-21 RX ADMIN — SODIUM CHLORIDE 125 ML/HR: 9 INJECTION, SOLUTION INTRAVENOUS at 16:26

## 2017-11-21 RX ADMIN — ALLOPURINOL 300 MG: 300 TABLET ORAL at 08:24

## 2017-11-21 RX ADMIN — ALBUTEROL SULFATE 2.5 MG: 2.5 SOLUTION RESPIRATORY (INHALATION) at 15:25

## 2017-11-21 RX ADMIN — SODIUM CHLORIDE 125 ML/HR: 9 INJECTION, SOLUTION INTRAVENOUS at 05:19

## 2017-11-21 RX ADMIN — INSULIN ASPART 3 UNITS: 100 INJECTION, SOLUTION INTRAVENOUS; SUBCUTANEOUS at 21:28

## 2017-11-21 RX ADMIN — INSULIN ASPART 2 UNITS: 100 INJECTION, SOLUTION INTRAVENOUS; SUBCUTANEOUS at 12:20

## 2017-11-21 RX ADMIN — HYDROCHLOROTHIAZIDE 25 MG: 12.5 CAPSULE, GELATIN COATED ORAL at 08:24

## 2017-11-21 RX ADMIN — INSULIN ASPART 2 UNITS: 100 INJECTION, SOLUTION INTRAVENOUS; SUBCUTANEOUS at 08:24

## 2017-11-21 RX ADMIN — ALBUTEROL SULFATE 2.5 MG: 2.5 SOLUTION RESPIRATORY (INHALATION) at 03:37

## 2017-11-21 RX ADMIN — ATORVASTATIN CALCIUM 80 MG: 80 TABLET, FILM COATED ORAL at 21:29

## 2017-11-21 RX ADMIN — BUDESONIDE AND FORMOTEROL FUMARATE DIHYDRATE 2 PUFF: 160; 4.5 AEROSOL RESPIRATORY (INHALATION) at 08:12

## 2017-11-21 RX ADMIN — LISINOPRIL 40 MG: 40 TABLET ORAL at 08:24

## 2017-11-21 RX ADMIN — ALBUTEROL SULFATE 2.5 MG: 2.5 SOLUTION RESPIRATORY (INHALATION) at 11:52

## 2017-11-21 RX ADMIN — ALBUTEROL SULFATE 2.5 MG: 2.5 SOLUTION RESPIRATORY (INHALATION) at 08:09

## 2017-11-21 RX ADMIN — PANTOPRAZOLE SODIUM 40 MG: 40 TABLET, DELAYED RELEASE ORAL at 08:26

## 2017-11-21 RX ADMIN — ASPIRIN 325 MG: 325 TABLET ORAL at 08:24

## 2017-11-21 RX ADMIN — INSULIN ASPART 3 UNITS: 100 INJECTION, SOLUTION INTRAVENOUS; SUBCUTANEOUS at 17:32

## 2017-11-21 NOTE — CONSULTS
Inpatient Consult to Neurosurgery  Consult performed by: MAXWELL ZAPIEN  Consult ordered by: PAUL TONY  Reason for consult: hx of NPH  Assessment/Recommendations: Mr. Joe is a 76-year-old male with past medical history of diabetes, coronary artery disease, hypertension, hyperlipidemia, previous pulmonary embolism and DVT.  He was evaluated by our office for normal pressure hydrocephalus and had a large volume lumbar puncture on November 14 after which he thought he did notice some improvement is his gait.  He had a cisternogram that finished on November 16 that did confirm a diagnosis of normal pressure hydrocephalus.  He had some significant headache after the large volume lumbar puncture although that has thankfully resolved.  He was admitted yesterday with transient slurred/garbled speech and confusion.  His wife states that she noticed the symptoms upon waking yesterday at 6 AM but the symptoms had essentially resolved about an hour and a half later.  He had a brain MRI MRA that did not reveal any acute abnormalities or stroke.  He was evaluated by both medicine and neurology for likely TIA.          Patient Care Team:  Adeline Onofre MD as PCP - General  Adeline Onofre MD as PCP - Family Medicine  Jean Ford MD as Consulting Physician (Cardiology)    Chief complaint:NPH    Subjective     HPI Comments: ..Lab             11/20/17                       0921          WBC          7.51          HEMOGLOBIN   14.6          HEMATOCRIT   44.4          PLATELETS    102*              ..Lab             11/20/17                       0955          SODIUM       139           POTASSIUM    4.1           CHLORIDE     102           CO2          22.3          BUN          17            CREATININE   1.12          GLUCOSE      172*          CALCIUM      10.0              I did review the brain MRI and MRA from yesterday.     Altered Mental Status   This is a new problem. The current episode started  yesterday. The problem has been resolved. Associated symptoms include a rash. Pertinent negatives include no fever, headaches, neck pain, numbness, vomiting or weakness.       Review of Systems   Constitutional: Negative for fever.   Gastrointestinal: Negative for vomiting.   Genitourinary: Negative for difficulty urinating.   Musculoskeletal: Positive for gait problem. Negative for neck pain.   Skin: Positive for rash.   Neurological: Negative for weakness, numbness and headaches.   All other systems reviewed and are negative.       Past Medical History:   Diagnosis Date   • Arthritis    • Asthma    • Brain abscess     at about age 44   • Bruise of face    • Cardiac disease    • Coronary artery disease     CABG 2012   • Diabetes mellitus    • Gout several years ago    medication  working   • Headache 11/15/2017   • Hearing aid worn     bilateral   • Hydrocephaly    • Hyperlipemia    • Hypertension    • Joint pain     or swelling   • Low back pain several years ago   • Orbit fracture    • Pulmonary embolism     OCT 2016   • Sinus infection    ,   Past Surgical History:   Procedure Laterality Date   • BRAIN SURGERY      BRAIN ABCESS 30 YR AGO   • CARDIAC SURGERY     • COLONOSCOPY  2012    Ciciliano- normal per pt, no polyps    • CORONARY ARTERY BYPASS GRAFT     • ENDOSCOPY N/A 3/9/2017    Schatzki ring, dilated, LA Grade B esophagitis, HH, acquired duodenal stenosis   • FACIAL FRACTURE SURGERY      to repair 6 broken bones   • ORBITAL FRACTURE OPEN REDUCTION INTERNAL FIXATION Right 1/13/2017    Procedure: RT ORBIT FLOOR FRACTURE REPAIR RIGHT ZMC FRACTURE REPAIR, RIGHT NASAL BONE FRACTURE CLOSED REDUCTION;  Surgeon: Edil Lester MD;  Location: [facility] OR [facility] [place];  Service:    • ORIF FOOT FRACTURE Right 9/9/2016    Procedure: RIGHT SECOND METATARSAL OPEN REDUCTION INTERNAL FIXATION WITh GRAFT FROM HEEL ;  Surgeon: Man Jenkins MD;  Location: [facility] OR [facility] [place] [facility];  Service:    • SEPTOPLASTY     • SKIN CANCER  EXCISION     • TONSILLECTOMY     ,   Family History   Problem Relation Age of Onset   • Heart disease Mother    • Hypertension Mother    • Stroke Mother    • Heart disease Father    • Hypertension Father    ,   Social History   Substance Use Topics   • Smoking status: Former Smoker     Packs/day: 2.00     Years: 5.00     Types: Cigarettes     Start date: 4/1/1959     Quit date: 3/9/1962   • Smokeless tobacco: Never Used      Comment: Quit cold turkey   • Alcohol use No      Comment: No caffeine use   ,   Prescriptions Prior to Admission   Medication Sig Dispense Refill Last Dose   • acetaminophen (TYLENOL) 325 MG tablet Take 2 tablets by mouth Every 6 (Six) Hours As Needed for mild pain (1-3).  0 Taking   • allopurinol (ZYLOPRIM) 300 MG tablet Take 300 mg by mouth daily.   Taking   • esomeprazole (nexIUM) 40 MG capsule Take 1 capsule by mouth Daily. 30 capsule 5 Taking   • fluticasone-salmeterol (ADVAIR) 250-50 MCG/DOSE DISKUS Inhale 1 puff Every Evening. Advair Diskus 250-50 MCG/DOSE Inhalation Aerosol Powder Breath Activated; Patient Sig: Advair Diskus 250-50 MCG/DOSE Inhalation Aerosol Powder Breath Activated INHALE 1 PUFF BID; 60; 0; 15-Oct-2012; Active   Taking   • glucose blood (FREESTYLE LITE) test strip 1 each by Other route See Admin Instructions. Use 1 to 2 times daily as instructed      • hydrALAZINE (APRESOLINE) 25 MG tablet Take 1 tablet by mouth Every 8 (Eight) Hours As Needed (SBP >170). Take for systolic blood pressure > 170 30 tablet 0    • lisinopril (PRINIVIL,ZESTRIL) 20 MG tablet Take 1 tablet by mouth Daily. 30 tablet 0    • metFORMIN (GLUCOPHAGE) 500 MG tablet Take 500 mg by mouth 2 (Two) Times a Day With Meals.      • Multiple Vitamins-Minerals (MULTIVITAMIN ADULT PO) Take 1 tablet by mouth Daily.   Taking   • PROAIR  (90 BASE) MCG/ACT inhaler 2 puffs Every 4 (Four) Hours As Needed.   11/21/2017 at Unknown time   • simvastatin (ZOCOR) 40 MG tablet Take 40 mg by mouth every night.    Taking   • Vitamin D, Cholecalciferol, (CHOLECALCIFEROL) 400 units tablet Take 400 Units by mouth Daily.      , Scheduled Meds:    albuterol 2.5 mg Nebulization Q4H - RT   allopurinol 300 mg Oral Daily   aspirin 300 mg Rectal Daily   Or      aspirin 325 mg Oral Daily   atorvastatin 80 mg Oral Nightly   budesonide-formoterol 2 puff Inhalation BID - RT   hydrochlorothiazide 25 mg Oral Q24H   insulin aspart 0-7 Units Subcutaneous 4x Daily With Meals & Nightly   lisinopril 40 mg Oral Q24H   pantoprazole 40 mg Oral QAM   , Continuous Infusions:    sodium chloride 125 mL/hr Last Rate: 125 mL/hr (11/21/17 0519)   , PRN Meds:  •  acetaminophen **OR** acetaminophen  •  acetaminophen  •  dextrose  •  dextrose  •  glucagon (human recombinant)  •  hydrALAZINE  •  ondansetron  •  sodium chloride  •  sodium chloride  •  Insert peripheral IV **AND** sodium chloride and Allergies:  Other and Oxycodone    Objective      Vital Signs  Temp:  [97.7 °F (36.5 °C)-98.6 °F (37 °C)] 98.4 °F (36.9 °C)  Heart Rate:  [73-94] 80  Resp:  [16-18] 18  BP: (114-182)/(50-93) 137/50    Physical Exam   Constitutional: He is oriented to person, place, and time. He appears well-developed and well-nourished.   HENT:   Head: Normocephalic and atraumatic.   Right Ear: External ear normal.   Left Ear: External ear normal.   Eyes: Conjunctivae and EOM are normal. Pupils are equal, round, and reactive to light. Right eye exhibits no discharge. Left eye exhibits no discharge.   Neck: Normal range of motion. Neck supple. No tracheal deviation present.   Pulmonary/Chest: Effort normal. No stridor. No respiratory distress.   Musculoskeletal: Normal range of motion. He exhibits no edema, tenderness or deformity.   Neurological: He is alert and oriented to person, place, and time. He has normal strength and normal reflexes. He displays no atrophy, no tremor and normal reflexes. No cranial nerve deficit or sensory deficit. He exhibits normal muscle tone. He displays  no seizure activity. Gait abnormal.   No long tract signs   Skin: Skin is warm and dry.   Psychiatric: He has a normal mood and affect. His behavior is normal. Judgment and thought content normal.   Nursing note and vitals reviewed.      Results Review:    I reviewed the patient's new clinical results.  I reviewed the patient's new imaging results and agree with the interpretation.        Assessment/Plan     Principal Problem:    Transient cerebral ischemia  Active Problems:    Essential hypertension    Diabetes mellitus type 2, controlled    Normal pressure hydrocephalus    PFO (patent foramen ovale)      Assessment:  (NPH  ?TIA  HTN, CAD, DM, Hyperlipidemia, PE  ).     Plan:   (I had a very lengthy conversation with the patient and his wife.  I explained that for several reasons Dr. Polanco has decided it would be best to put plans for a  shunt on hold.  One concern is that he had a severe positional headache after the lumbar puncture.  While this can happen his headache with prolonged which raises the concern that he could experience headaches postoperatively once the shunt is in place.  Furthermore, the TIA raises concerns about his operative risks and postoperative risks.    The patient and his wife understand that even in patients with confirmed NPH many patients do not experience striking improvement after shunt placement.  This includes patients who did subjectively noticed improvement after a large volume lumbar tap.  Given our concerns about potential complications during surgery and potential side effects of the shunt I think it makes sense to hold off on any  shunt plans.  I encouraged the patient and his wife to seek a second opinion if they were interested.  I referred them to [facility].  Both the patient and his wife voiced understanding of our concerns and did agree with them.  He really has never had an extensive physical therapy program.  The wife states that he went for a  short period of time but a year ago but never completed a prolonged course of gait therapy or strengthening.  They are both interested in trying an aggressive outpatient physical therapy course.  Our office will male him an order and he plans on using it at [facility].  They will call with any questions or concerns and otherwise follow-up in about 2 months.  They are in agreement that at this point any plans for a  shunt are on hold.  They have not yet decided if they would like to seek a second opinion but understand that they are more than welcome to any time and we would encourage it.     The patient can be discharged any time from our standpoint and will follow-up in 2 months.  Again our office will contact the patient with PT order and follow-up appointment.).       I discussed the patients findings and my recommendations with patient and family    Cary Miller PA-C  11/21/17  3:54 PM    Time: 45min

## 2017-11-21 NOTE — PROGRESS NOTES
Discharge Planning Assessment  [facility]     Patient Name: Eugenio Joe  MRN: [id number]  Today's Date: 11/21/2017    Admit Date: 11/20/2017          Discharge Needs Assessment       11/21/17 0805    Living Environment    Lives With spouse    Living Arrangements house    Home Accessibility stairs to enter home;bed and bath on same level    Number of Stairs to Enter Home 3    Stair Railings at Home inside, present on left side    Type of Financial/Environmental Concern none    Transportation Available car;family or friend will provide    Living Environment    Quality Of Family Relationships supportive    Able to Return to Prior Living Arrangements yes    Discharge Needs Assessment    Equipment Currently Used at Home cane, straight;walker, rolling;grab bar    Discharge Planning Comments Spoke with pt and his wife, Annette at bedside.  Facesheet info confirmed.  Pt lives in single story house with a finished basement with his wife.  He is IADLs and can drive.  Home DME includes a cane, walker and grab bars in the bathrm.  He states he manages his own meds and can assit with household chores.  He has had [facility] in the past, and has been to [facility] for rehab.  Discussed DC options including HH.  They are not sure if anything will be needed, but if HH is needed, they would be OK with using [facility] again.             Discharge Plan       11/21/17 0809    Case Management/Social Work Plan    Plan Plan is to return home with his wife upon DC.  [facility] if needed.         Discharge Placement     Facility/Agency Request Status Selected? Address Phone Number Fax Number    [facility] Pending - Request Sent     [address] [place] [id number] [phone]        Desi Metcalf RN 11/21/2017 08:11    Referral to Jeanette                           Demographic Summary       11/21/17 0804    Referral Information    Arrived From home or self-care    Referral Source admission list    Reason For  Consult discharge planning    Record Reviewed medical record    Primary Care Physician Information    Name Adeline Onofre MD            Functional Status       11/21/17 0805    Functional Status Current    Ambulation 2-->assistive person    Transferring 2-->assistive person    Toileting 2-->assistive person    Bathing 0-->independent    Dressing 0-->independent    Eating 0-->independent    Communication 0-->understands/communicates without difficulty    Swallowing (if score 2 or more for any item, consult Rehab Services) 0-->swallows foods/liquids without difficulty    Functional Status Prior    Ambulation 0-->independent    Transferring 0-->independent    Toileting 0-->independent    Bathing 0-->independent    Dressing 0-->independent    Eating 0-->independent    Communication 0-->understands/communicates without difficulty    Swallowing 0-->swallows foods/liquids without difficulty    IADL    Medications independent    Meal Preparation independent;assistive person    Housekeeping independent;assistive person    Laundry independent;assistive person    Shopping independent;assistive person    Oral Care independent    Cognitive/Perceptual/Developmental    Current Mental Status/Cognitive Functioning no deficits noted             Desi Metcalf RN

## 2017-11-21 NOTE — DISCHARGE PLACEMENT REQUEST
"Christopher Brown (76 y.o. Male)     Date of Birth Social Security Number Address Home Phone MRN    1940  [id number] [address] [phone] [id number]    [employer] Mar[employer] Status          [employer]        Admission Date Admission Type Admitting Provider Attending Provider Department, Room/Bed    [facility] Lucina Esteban Patrick D, MD [address], 518/1    Discharge Date Discharge Disposition Discharge Destination                      Attending Provider: John Verdugo MD     Allergies:  Other, Oxycodone    Isolation:  None   Infection:  None   Code Status:  FULL    Ht:  70\" (177.8 cm)   Wt:  196 lb (88.9 kg)    Admission Cmt:  None   Principal Problem:  Transient cerebral ischemia [G45.9]                 Active Insurance as of 11/20/2017     Primary Coverage     Payor Plan Insurance Group Employer/Plan Group    MEDICARE MEDICARE A & B      Payor Plan Address Payor Plan Phone Number Effective From Effective To    [address] [phone] 12/1/2004     [place] [address]       Subscriber Name Subscriber Birth Date Member ID       CHRISTOPHER BROWN 1940 [id number]           Secondary Coverage     Payor Plan Insurance Group Employer/Plan Group    [employer]      Payor Plan Address Payor Plan Phone Number Effective From Effective To    [address] [address] [phone] 6/1/2013     [place] [address]       Subscriber Name Subscriber Birth Date Member ID       CHRISTOPHER BROWN 1940 [id number]                 Emergency Contacts      (Rel.) Home Phone Work Phone Mobile Phone    Annette Brown (Spouse) [phone] -- [phone]              "

## 2017-11-21 NOTE — THERAPY DISCHARGE NOTE
Acute Care - Physical Therapy Initial Eval/Discharge  [facility]     Patient Name: Eugenio Joe  : 1940  MRN: [id number]  Today's Date: 2017   Onset of Illness/Injury or Date of Surgery Date: (P) 17  Date of Referral to PT: (P) 17  Referring Physician: Soares (P)      Admit Date: 2017    Visit Dx:    ICD-10-CM ICD-9-CM   1. Transient cerebral ischemia, unspecified type G45.9 435.9     Patient Active Problem List   Diagnosis   • Quadriceps weakness   • ACL tear   • Knee pain, right   • S/P CABG x 3   • Essential hypertension   • Hyperlipemia   • Hallux rigidus   • Fracture, stress, metatarsal   • Osteopenia determined by x-ray   • Metatarsal stress fracture with nonunion   • Left hip pain   • Bursitis of left hip   • Pulmonary embolism   • DALILA (acute kidney injury)   • Diabetes mellitus type 2, controlled   • Ascending aorta dilatation   • Pulmonary nodule, left   • Right leg DVT   • Acute renal failure   • Hypotension   • Dehydration   • Hypercalcemia   • Dysphagia   • Syncope and collapse   • Normal pressure hydrocephalus   • Headache   • Impaired mobility   • Nausea & vomiting   • Fall at home   • Transient cerebral ischemia     Past Medical History:   Diagnosis Date   • Arthritis    • Asthma    • Brain abscess     at about age 44   • Bruise of face    • Cardiac disease    • Coronary artery disease     CABG    • Diabetes mellitus    • Gout several years ago    medication  working   • Headache 11/15/2017   • Hearing aid worn     bilateral   • Hydrocephaly    • Hyperlipemia    • Hypertension    • Joint pain     or swelling   • Low back pain several years ago   • Orbit fracture    • Pulmonary embolism     OCT 2016   • Sinus infection      Past Surgical History:   Procedure Laterality Date   • BRAIN SURGERY      BRAIN ABCESS 30 YR AGO   • CARDIAC SURGERY     • COLONOSCOPY  2012    Ciciliano- normal per pt, no polyps    • CORONARY ARTERY BYPASS GRAFT     • ENDOSCOPY N/A  3/9/2017    Schatzki ring, dilated, LA Grade B esophagitis, HH, acquired duodenal stenosis   • FACIAL FRACTURE SURGERY      to repair 6 broken bones   • ORBITAL FRACTURE OPEN REDUCTION INTERNAL FIXATION Right 1/13/2017    Procedure: RT ORBIT FLOOR FRACTURE REPAIR RIGHT ZMC FRACTURE REPAIR, RIGHT NASAL BONE FRACTURE CLOSED REDUCTION;  Surgeon: Edil Lester MD;  [facility] [place];  Service:    • ORIF FOOT FRACTURE Right 9/9/2016    Procedure: RIGHT SECOND METATARSAL OPEN REDUCTION INTERNAL FIXATION WITh GRAFT FROM HEEL ;  Surgeon: Man Jenkins MD;  Location: [facility] [place];  Service:    • SEPTOPLASTY     • SKIN CANCER EXCISION     • TONSILLECTOMY            PT ASSESSMENT (last 72 hours)      PT Evaluation       11/21/17 1103 11/21/17 0945    Rehab Evaluation    Document Type (P)  evaluation  -     Subjective Information (P)  agree to therapy;no complaints  -     Evaluation Not Performed  patient/family declined evaluation   denies need for OT. States baseline function. Spouse and family present and agree no OT indicated at this time. D/C   -SG    Patient Effort, Rehab Treatment (P)  good  -     Symptoms Noted During/After Treatment (P)  none  -     General Information    Patient Profile Review (P)  yes  -MF     Onset of Illness/Injury or Date of Surgery Date (P)  11/20/17  -     Referring Physician (P)  [facility]  -     General Observations (P)  Supine upon entry. Wife at bedside. awake and alert  -     Pertinent History Of Current Problem (P)  admitted for aphasia, AMS. Recent admission for headaches and gait disturbances.  -     Precautions/Limitations (P)  no known precautions/limitations  -     Prior Level of Function (P)  independent:;all household mobility;community mobility  -     Equipment Currently Used at Home (P)  cane, straight;walker, rolling  -     Plans/Goals Discussed With (P)  patient and family  -     Barriers to Rehab (P)  none identified  -      Living Environment    Lives With (P)  spouse  -     Living Arrangements (P)  house  -     Home Accessibility (P)  bed and bath on same level;stairs to enter home  -     Number of Stairs to Enter Home (P)  3  -     Stair Railings at Home (P)  outside, present on left side  -     Clinical Impression    Date of Referral to PT (P)  11/20/17  -     Patient/Family Goals Statement (P)  return home   -     Criteria for Skilled Therapeutic Interventions Met (P)  no;current level of function same as previous level of function  -     Pain Assessment    Pain Assessment (P)  No/denies pain  -     Vision Assessment/Intervention    Visual Impairment (P)  WFL  -     Cognitive Assessment/Intervention    Current Cognitive/Communication Assessment (P)  functional  -     Orientation Status (P)  oriented x 4  -     Follows Commands/Answers Questions (P)  100% of the time  -     Personal Safety (P)  WNL/WFL  -     Personal Safety Interventions (P)  fall prevention program maintained;gait belt;nonskid shoes/slippers when out of bed  -     ROM (Range of Motion)    General ROM (P)  no range of motion deficits identified  -     MMT (Manual Muscle Testing)    General MMT Assessment (P)  no strength deficits identified  -     General MMT Assessment Detail (P)  BUE/BLE 5/5 MMT  -     Bed Mobility, Assessment/Treatment    Bed Mob, Supine to Sit, Bullock (P)  conditional independence  -     Bed Mob, Sit to Supine, Bullock (P)  conditional independence  -     Transfer Assessment/Treatment    Transfers, Sit-Stand Bullock (P)  conditional independence  -     Transfers, Stand-Sit Bullock (P)  conditional independence  -     Transfers, Sit-Stand-Sit, Assist Device (P)  rolling walker  -     Gait Assessment/Treatment    Gait, Bullock Level (P)  contact guard assist  -     Gait, Assistive Device (P)  rolling walker  -     Gait, Distance (Feet) (P)  200  -     Gait, Gait  Pattern Analysis (P)  swing-through gait  -     Gait, Comment (P)  no acute signs of distress, no LOB  -     Motor Skills/Interventions    Additional Documentation (P)  Balance Skills Training (Group)  -     Balance Skills Training    Sitting-Level of Assistance (P)  Independent  -     Sitting-Balance Support (P)  Feet supported  -     Standing-Level of Assistance (P)  Close supervision  -     Static Standing Balance Support (P)  assistive device  -     Therapy Exercises    Bilateral Lower Extremities (P)  AROM:;5 reps;supine;ankle pumps/circles;heel slides;quad sets;sitting;LAQ  -MF     Positioning and Restraints    Pre-Treatment Position (P)  in bed  -     Post Treatment Position (P)  bed  -MF     In Bed (P)  supine;call light within reach;encouraged to call for assist;with family/caregiver;SCD pump applied  -       11/21/17 0805 11/20/17 1800    General Information    Equipment Currently Used at Home cane, straight;walker, rolling;grab bar  -SK     Living Environment    Lives With spouse  -SK spouse  -    Living Arrangements house  -SK house  -KM    Home Accessibility stairs to enter home;bed and bath on same level  -SK stairs to enter home  -    Number of Stairs to Enter Home 3  -SK     Stair Railings at Home inside, present on left side  -SK inside, present on left side  -KM    Type of Financial/Environmental Concern none  -SK none  -KM    Transportation Available car;family or friend will provide  -SK car;family or friend will provide  -      11/20/17 1449       Rehab Evaluation    Evaluation Not Performed patient unavailable for evaluation;other (see comments)   Off the floor for testing  -MT       User Key  (r) = Recorded By, (t) = Taken By, (c) = Cosigned By    Initials Name Provider Type    TOMMIE Pickett, ULICESR Occupational Therapist    MT Melida Goyal MS CCC-SLP Speech and Language Pathologist     June Smith, RN Registered Nurse    SK Desi Metcalf, ENEDELIA Case Manager      Man Greene, PT Student PT Student          Physical Therapy Education     Title: PT OT SLP Therapies (Resolved)     Topic: Physical Therapy (Resolved)     Point: Mobility training (Resolved)    Learning Progress Summary    Learner Readiness Method Response Comment Documented by Status   Patient Acceptance E,D KAYCE   11/21/17 1110 Done   Significant Other Acceptance E,D VU   11/21/17 1110 Done               Point: Home exercise program (Resolved)    Learning Progress Summary    Learner Readiness Method Response Comment Documented by Status   Patient Acceptance E,D VU   11/21/17 1110 Done   Significant Other Acceptance E,D VU   11/21/17 1110 Done               Point: Body mechanics (Resolved)    Learning Progress Summary    Learner Readiness Method Response Comment Documented by Status   Patient Acceptance E,D VU   11/21/17 1110 Done   Significant Other Acceptance E,D VU   11/21/17 1110 Done               Point: Precautions (Resolved)    Learning Progress Summary    Learner Readiness Method Response Comment Documented by Status   Patient Acceptance E,D KAYCE   11/21/17 1110 Done   Significant Other Acceptance E,D VU   11/21/17 1110 Done                      User Key     Initials Effective Dates Name Provider Type Discipline     09/18/17 -  Man Greene, PT Student PT Student PT                PT Recommendation and Plan  Anticipated Discharge Disposition: (P) home with assist  Plan of Care Review  Plan Of Care Reviewed With: (P) patient, spouse  Outcome Summary/Follow up Plan: (P) Pt admitted for aphasia and AMS. Pt does not demonstrate any functional impairments or issues with mobilty at this time. Pt currently at baseline and does not require any skilled acute level PT, will sign off at this time. Recommended home w/ assist following d/c.               Outcome Measures       11/21/17 1112          How much help from another person do you currently need...    Turning from your back to your  side while in flat bed without using bedrails? (P)  4  -MF      Moving from lying on back to sitting on the side of a flat bed without bedrails? (P)  4  -MF      Moving to and from a bed to a chair (including a wheelchair)? (P)  4  -MF      Standing up from a chair using your arms (e.g., wheelchair, bedside chair)? (P)  4  -MF      Climbing 3-5 steps with a railing? (P)  3  -MF      To walk in hospital room? (P)  4  -MF      AM-PAC 6 Clicks Score (P)  23  -MF      Functional Assessment    Outcome Measure Options (P)  AM-PAC 6 Clicks Basic Mobility (PT)  -MF        User Key  (r) = Recorded By, (t) = Taken By, (c) = Cosigned By    Initials Name Provider Type    MATTI Greene, PT Student PT Student           Time Calculation:         PT Charges       11/21/17 1112          Time Calculation    Start Time (P)  1040  -MF      Stop Time (P)  1056  -MF      Time Calculation (min) (P)  16 min  -MF      PT Received On (P)  11/21/17  -MF        User Key  (r) = Recorded By, (t) = Taken By, (c) = Cosigned By    Initials Name Provider Type    MATTI Greene, PT Student PT Student          Therapy Charges for Today     Code Description Service Date Service Provider Modifiers Qty    [id number]  PT THER PROC EA 15 MIN 11/21/2017 Man Greene, PT Student GP 1    [id number]  PT EVAL MOD COMPLEXITY 1 11/21/2017 aMn Greene PT Student GP 1          PT G-Codes  PT Professional Judgement Used?: (P) Yes  Outcome Measure Options: (P) AM-PAC 6 Clicks Basic Mobility (PT)  Functional Limitation: (P) Mobility: Walking and moving around  Mobility: Walking and Moving Around Current Status (): (P) At least 1 percent but less than 20 percent impaired, limited or restricted  Mobility: Walking and Moving Around Goal Status (): (P) At least 1 percent but less than 20 percent impaired, limited or restricted  Mobility: Walking and Moving Around Discharge Status (): (P) At least 1 percent but less than 20 percent  impaired, limited or restricted    PT Discharge Summary  Anticipated Discharge Disposition: (P) home with assist  Reason for Discharge: (P) At baseline function    Man Greene, PT Student  11/21/2017

## 2017-11-21 NOTE — SIGNIFICANT NOTE
11/21/17 1348   Rehab Treatment   Discipline speech language pathologist   Rehab Evaluation   Evaluation Not Performed patient/family declined evaluation  (Deny any difficulites or changes in speech, thinking, or swallowing)

## 2017-11-21 NOTE — NURSING NOTE
Referral received through [facility] stroke screening order set.  Noted that PT and ST have signed off as their services are not indicated.  Does not indicate a need for acute inpatient rehab.  Will sign off

## 2017-11-21 NOTE — PLAN OF CARE
Problem: Patient Care Overview (Adult)  Goal: Plan of Care Review  Outcome: Ongoing (interventions implemented as appropriate)    11/21/17 0139   Coping/Psychosocial Response Interventions   Plan Of Care Reviewed With patient   Patient Care Overview   Progress improving         11/21/17 0139   Coping/Psychosocial Response Interventions   Plan Of Care Reviewed With patient   Patient Care Overview   Progress improving         11/21/17 0139   Coping/Psychosocial Response Interventions   Plan Of Care Reviewed With patient   Patient Care Overview   Progress improving   Outcome Evaluation   Outcome Summary/Follow up Plan NIH=0/no c/o's/ baseline numbness right great toe/Neuro saw/ Neuro sx to see/was here recently for LP to r/o hydrocephalus/for MRI/MRI/EEG and echo/will continue to monitor          Problem: Fall Risk (Adult)  Goal: Identify Related Risk Factors and Signs and Symptoms  Outcome: Outcome(s) achieved Date Met:  11/21/17  Goal: Absence of Falls  Outcome: Ongoing (interventions implemented as appropriate)

## 2017-11-21 NOTE — PLAN OF CARE
Problem: Patient Care Overview (Adult)  Goal: Plan of Care Review    11/21/17 1110   Coping/Psychosocial Response Interventions   Plan Of Care Reviewed With patient;spouse   Outcome Evaluation   Outcome Summary/Follow up Plan Pt admitted for aphasia and AMS. Pt does not demonstrate any functional impairments or issues with mobilty at this time. Pt currently at baseline and does not require any skilled acute level PT, will sign off at this time. Recommended home w/ assist following d/c.

## 2017-11-21 NOTE — PROGRESS NOTES
Name: Eugenio Joe ADMIT: 2017   : 1940  PCP: Adeline Onofre MD    MRN: [id number] LOS: 1 days   AGE/SEX: 76 y.o. male  ROOM: [facility]/   Subjective   Subjective  Symptoms resolved. No Ha CP SOA NVD now. No leg pain or cramping.    Objective   Vital Signs  Temp:  [97.7 °F (36.5 °C)-98.6 °F (37 °C)] 97.9 °F (36.6 °C)  Heart Rate:  [73-94] 94  Resp:  [16-18] 18  BP: (114-182)/(63-93) 153/88  SpO2:  [94 %-95 %] 95 %  on   ;   O2 Device: room air  Body mass index is 28.12 kg/(m^2).    Physical Exam   Constitutional: He appears well-developed and well-nourished. No distress.   HENT:   Head: Normocephalic and atraumatic.   Nose: Nose normal.   Eyes: EOM are normal. Pupils are equal, round, and reactive to light. No scleral icterus.   Neck: Normal range of motion. Neck supple.   Cardiovascular: Normal rate, regular rhythm, normal heart sounds and intact distal pulses.    No murmur heard.  Pulmonary/Chest: Effort normal and breath sounds normal. He has no wheezes.   Abdominal: Soft. Bowel sounds are normal. He exhibits no distension. There is no tenderness.   Musculoskeletal: Normal range of motion.   Neurological: He is alert. No cranial nerve deficit.   Skin: Skin is warm and dry. He is not diaphoretic.   Psychiatric: He has a normal mood and affect. His behavior is normal.   Nursing note and vitals reviewed.      Results Review:       I reviewed the patient's new clinical results. Reviewed imaging, agree with interpretation. Reviewed telemetry, sinus rhythm.    Results from last 7 days  Lab Units 17  0921 11/17/17  0525 11/16/17  0519 11/15/17  2012   WBC 10*3/mm3 7.51 6.70 9.28 9.94   HEMOGLOBIN g/dL 14.6 13.0* 13.1* 14.1   PLATELETS 10*3/mm3 102* 158 175 183     Results from last 7 days  Lab Units 17  0955 17  0517  0519 11/15/17  2012   SODIUM mmol/L 139 140 142 140   POTASSIUM mmol/L 4.1 4.0 4.4 4.6   CHLORIDE mmol/L 102 106 103 99   CO2 mmol/L 22.3 19.9* 23.5 23.2   BUN  mg/dL 17 15 19 20   CREATININE mg/dL 1.12 1.10 1.09 1.02   GLUCOSE mg/dL 172* 173* 150* 265*   Estimated Creatinine Clearance: 62 mL/min (by C-G formula based on Cr of 1.12).  Results from last 7 days  Lab Units 11/20/17  0955 11/20/17  0921 11/17/17  0525 11/16/17  0519 11/15/17  2012   CALCIUM mg/dL 10.0  --  9.0 9.5 10.1   ALBUMIN g/dL 4.40  --   --   --  3.90   MAGNESIUM mg/dL  --  1.8  --   --   --          albuterol 2.5 mg Nebulization Q4H - RT   allopurinol 300 mg Oral Daily   aspirin 300 mg Rectal Daily   Or      aspirin 325 mg Oral Daily   atorvastatin 80 mg Oral Nightly   budesonide-formoterol 2 puff Inhalation BID - RT   hydrochlorothiazide 25 mg Oral Q24H   insulin aspart 0-7 Units Subcutaneous 4x Daily With Meals & Nightly   lisinopril 40 mg Oral Q24H   pantoprazole 40 mg Oral QAM       sodium chloride 125 mL/hr Last Rate: 125 mL/hr (11/21/17 0519)   Diet Regular; Consistent Carbohydrate, Cardiac      Assessment/Plan   Assessment:     Active Hospital Problems (** Indicates Principal Problem)    Diagnosis Date Noted   • **Transient cerebral ischemia [G45.9] 11/20/2017   • PFO (patent foramen ovale) [Q21.1] 11/21/2017   • Normal pressure hydrocephalus [G91.2] 11/09/2017   • Diabetes mellitus type 2, controlled [E11.9] 10/12/2016   • Essential hypertension [I10] 04/18/2016      Resolved Hospital Problems    Diagnosis Date Noted Date Resolved   No resolved problems to display.       Plan:     - TIA: No acute CVA on MRI/A. He did have PFO on Echo and has hx of DVT/PE about 1 year ago (Previously on Eliquis). Will check BLE US to monitor. ASA/Statin. Neurology following.  - NPH: MARIELA consulted.  - DM2: A1c 7.3. Orals held. SSI but only required 2 units so will not start basal.  - HTN: Improved today after medication adustments yesterday. Continue current regimen and monitor.    Disposition  Home/TBD    John Verdugo MD  [facility]  11/21/17  12:15 PM

## 2017-11-21 NOTE — SIGNIFICANT NOTE
11/21/17 0945   Rehab Treatment   Discipline occupational therapist   Rehab Evaluation   Evaluation Not Performed patient/family declined evaluation  (denies need for OT. States baseline function. Spouse and family present and agree no OT indicated at this time. D/C )

## 2017-11-21 NOTE — PROGRESS NOTES
Clinical Pharmacy Services: Medication History    Eugenio Joe is a 76 y.o. male presenting to [facility] for Transient cerebral ischemia, unspecified type [G45.9]  Transient cerebral ischemia, unspecified type [G45.9]    He  has a past medical history of Arthritis; Asthma; Brain abscess; Bruise of face; Cardiac disease; Coronary artery disease; Diabetes mellitus; Gout (several years ago); Headache (11/15/2017); Hearing aid worn; Hydrocephaly; Hyperlipemia; Hypertension; Joint pain; Low back pain (several years ago); Orbit fracture; Pulmonary embolism; and Sinus infection.    Allergies as of 11/20/2017 - [facility]a as Reviewed 11/20/2017   Allergen Reaction Noted   • Other Mental Status Change 09/09/2016   • Oxycodone Mental Status Change 01/20/2017       Medication information was obtained from: Patient and wife  Pharmacy and Phone Number: [facility], [phone]    Prior to Admission Medications       Prescriptions Last Dose Informant Patient Reported? Taking?      acetaminophen (TYLENOL) 325 MG tablet  Self No Yes    Take 2 tablets by mouth Every 6 (Six) Hours As Needed for mild pain (1-3).    allopurinol (ZYLOPRIM) 300 MG tablet  Self Yes Yes    Take 300 mg by mouth daily.    esomeprazole (nexIUM) 40 MG capsule  Self No Yes    Take 1 capsule by mouth Daily.    fluticasone-salmeterol (ADVAIR) 250-50 MCG/DOSE DISKUS  Self Yes Yes    Inhale 1 puff Every Evening. Advair Diskus 250-50 MCG/DOSE Inhalation Aerosol Powder Breath Activated; Patient Sig: Advair Diskus 250-50 MCG/DOSE Inhalation Aerosol Powder Breath Activated INHALE 1 PUFF BID; 60; 0; 15-Oct-2012; Active    hydrALAZINE (APRESOLINE) 25 MG tablet  Self No Yes    Take 1 tablet by mouth Every 8 (Eight) Hours As Needed (SBP >170). Take for systolic blood pressure > 170    lisinopril (PRINIVIL,ZESTRIL) 20 MG tablet  Self No Yes    Take 1 tablet by mouth Daily.    metFORMIN (GLUCOPHAGE) 500 MG tablet  Self Yes Yes    Take 500 mg by mouth 2  (Two) Times a Day With Meals.    Multiple Vitamins-Minerals (MULTIVITAMIN ADULT PO)  Self Yes Yes    Take 1 tablet by mouth Daily.    PROAIR  (90 BASE) MCG/ACT inhaler 11/21/2017 Self Yes Yes    2 puffs Every 4 (Four) Hours As Needed.    simvastatin (ZOCOR) 40 MG tablet  Self Yes Yes    Take 40 mg by mouth every night.    Vitamin D, Cholecalciferol, (CHOLECALCIFEROL) 400 units tablet  Self Yes Yes    Take 400 Units by mouth Daily.    glucose blood (FREESTYLE LITE) test strip   Yes Yes    1 each by Other route See Admin Instructions. Use 1 to 2 times daily as instructed                Medication notes: Patients spouse had a medication list which indicated that metformin was taken twice  a day instead of metformin 500 mg once daily. Patient also stated that he is taking vitamin D over the counter.     This medication list is complete to the best of my knowledge as of 11/21/2017    Please call if questions.    Manuel Cortes  PharmD Candidate     11/21/2017 3:17 PM

## 2017-11-22 VITALS
WEIGHT: 193.8 LBS | SYSTOLIC BLOOD PRESSURE: 156 MMHG | OXYGEN SATURATION: 95 % | HEIGHT: 70 IN | BODY MASS INDEX: 27.75 KG/M2 | HEART RATE: 72 BPM | TEMPERATURE: 97.6 F | DIASTOLIC BLOOD PRESSURE: 91 MMHG | RESPIRATION RATE: 18 BRPM

## 2017-11-22 LAB
ANION GAP SERPL CALCULATED.3IONS-SCNC: 14.3 MMOL/L
BUN BLD-MCNC: 14 MG/DL (ref 8–23)
BUN/CREAT SERPL: 13.2 (ref 7–25)
CALCIUM SPEC-SCNC: 9.3 MG/DL (ref 8.6–10.5)
CHLORIDE SERPL-SCNC: 106 MMOL/L (ref 98–107)
CO2 SERPL-SCNC: 19.7 MMOL/L (ref 22–29)
CREAT BLD-MCNC: 1.06 MG/DL (ref 0.76–1.27)
DEPRECATED RDW RBC AUTO: 46.8 FL (ref 37–54)
ERYTHROCYTE [DISTWIDTH] IN BLOOD BY AUTOMATED COUNT: 13.6 % (ref 11.5–14.5)
GFR SERPL CREATININE-BSD FRML MDRD: 68 ML/MIN/1.73
GLUCOSE BLD-MCNC: 174 MG/DL (ref 65–99)
GLUCOSE BLDC GLUCOMTR-MCNC: 157 MG/DL (ref 70–130)
GLUCOSE BLDC GLUCOMTR-MCNC: 251 MG/DL (ref 70–130)
HCT VFR BLD AUTO: 37.1 % (ref 40.4–52.2)
HGB BLD-MCNC: 12.5 G/DL (ref 13.7–17.6)
MCH RBC QN AUTO: 32.1 PG (ref 27–32.7)
MCHC RBC AUTO-ENTMCNC: 33.7 G/DL (ref 32.6–36.4)
MCV RBC AUTO: 95.4 FL (ref 79.8–96.2)
PLATELET # BLD AUTO: 185 10*3/MM3 (ref 140–500)
PMV BLD AUTO: 10.9 FL (ref 6–12)
POTASSIUM BLD-SCNC: 3.7 MMOL/L (ref 3.5–5.2)
RBC # BLD AUTO: 3.89 10*6/MM3 (ref 4.6–6)
SODIUM BLD-SCNC: 140 MMOL/L (ref 136–145)
WBC NRBC COR # BLD: 7.12 10*3/MM3 (ref 4.5–10.7)

## 2017-11-22 PROCEDURE — 82962 GLUCOSE BLOOD TEST: CPT

## 2017-11-22 PROCEDURE — 63710000001 INSULIN ASPART PER 5 UNITS: Performed by: INTERNAL MEDICINE

## 2017-11-22 PROCEDURE — 85027 COMPLETE CBC AUTOMATED: CPT | Performed by: INTERNAL MEDICINE

## 2017-11-22 PROCEDURE — 94799 UNLISTED PULMONARY SVC/PX: CPT

## 2017-11-22 PROCEDURE — G0378 HOSPITAL OBSERVATION PER HR: HCPCS

## 2017-11-22 PROCEDURE — 80048 BASIC METABOLIC PNL TOTAL CA: CPT | Performed by: INTERNAL MEDICINE

## 2017-11-22 PROCEDURE — 96361 HYDRATE IV INFUSION ADD-ON: CPT

## 2017-11-22 RX ORDER — LISINOPRIL 40 MG/1
40 TABLET ORAL DAILY
Qty: 30 TABLET | Refills: 0 | Status: ON HOLD | OUTPATIENT
Start: 2017-11-22 | End: 2020-01-28

## 2017-11-22 RX ORDER — ASPIRIN 81 MG/1
81 TABLET ORAL DAILY
Qty: 30 TABLET | Refills: 0 | Status: SHIPPED | OUTPATIENT
Start: 2017-11-22 | End: 2019-08-01

## 2017-11-22 RX ORDER — HYDROCHLOROTHIAZIDE 12.5 MG/1
25 CAPSULE, GELATIN COATED ORAL EVERY MORNING
Qty: 30 CAPSULE | Refills: 0 | Status: SHIPPED | OUTPATIENT
Start: 2017-11-22 | End: 2018-04-13

## 2017-11-22 RX ADMIN — ALLOPURINOL 300 MG: 300 TABLET ORAL at 09:32

## 2017-11-22 RX ADMIN — HYDROCHLOROTHIAZIDE 25 MG: 12.5 CAPSULE, GELATIN COATED ORAL at 09:32

## 2017-11-22 RX ADMIN — ALBUTEROL SULFATE 2.5 MG: 2.5 SOLUTION RESPIRATORY (INHALATION) at 08:01

## 2017-11-22 RX ADMIN — ALBUTEROL SULFATE 2.5 MG: 2.5 SOLUTION RESPIRATORY (INHALATION) at 11:03

## 2017-11-22 RX ADMIN — LISINOPRIL 40 MG: 40 TABLET ORAL at 09:32

## 2017-11-22 RX ADMIN — PANTOPRAZOLE SODIUM 40 MG: 40 TABLET, DELAYED RELEASE ORAL at 06:40

## 2017-11-22 RX ADMIN — ASPIRIN 325 MG: 325 TABLET ORAL at 09:32

## 2017-11-22 RX ADMIN — SODIUM CHLORIDE 125 ML/HR: 9 INJECTION, SOLUTION INTRAVENOUS at 08:28

## 2017-11-22 RX ADMIN — INSULIN ASPART 2 UNITS: 100 INJECTION, SOLUTION INTRAVENOUS; SUBCUTANEOUS at 09:36

## 2017-11-22 RX ADMIN — SODIUM CHLORIDE 125 ML/HR: 9 INJECTION, SOLUTION INTRAVENOUS at 00:06

## 2017-11-22 RX ADMIN — INSULIN ASPART 3 UNITS: 100 INJECTION, SOLUTION INTRAVENOUS; SUBCUTANEOUS at 12:15

## 2017-11-22 NOTE — PLAN OF CARE
Problem: Patient Care Overview (Adult)  Goal: Plan of Care Review  Outcome: Outcome(s) achieved Date Met:  11/22/17 11/22/17 9153   Coping/Psychosocial Response Interventions   Plan Of Care Reviewed With patient   Patient Care Overview   Progress improving   Outcome Evaluation   Outcome Summary/Follow up Plan Patient with no signs or symptoms of pain or distress. Vitals signs within normal limits. Patient being discharged home and spouse present to transport home. Will continue to monitor.         Problem: Stroke (Ischemic) (Adult)  Goal: Signs and Symptoms of Listed Potential Problems Will be Absent or Manageable (Stroke)  Outcome: Ongoing (interventions implemented as appropriate)    Problem: Fall Risk (Adult)  Goal: Absence of Falls  Outcome: Ongoing (interventions implemented as appropriate)

## 2017-11-22 NOTE — PLAN OF CARE
Problem: Patient Care Overview (Adult)  Goal: Plan of Care Review  Outcome: Ongoing (interventions implemented as appropriate)    11/21/17 0139 11/22/17 0008 11/22/17 0546   Coping/Psychosocial Response Interventions   Plan Of Care Reviewed With --  patient --    Patient Care Overview   Progress improving --  --    Outcome Evaluation   Outcome Summary/Follow up Plan --  --  Neurology and Neuro surgery signed off. Doppler BLE done 11/21 negative. No complaints. VSS. Possible D/C today. Will continue to monitor.

## 2017-11-22 NOTE — DISCHARGE SUMMARY
Date of Admission: 11/20/2017  Date of Discharge:  11/22/2017  Primary Care Physician: Adeline Onofre MD     Discharge Diagnosis:  Active Hospital Problems (** Indicates Principal Problem)    Diagnosis Date Noted   • **Transient cerebral ischemia [G45.9] 11/20/2017   • PFO (patent foramen ovale) [Q21.1] 11/21/2017   • Normal pressure hydrocephalus [G91.2] 11/09/2017   • Diabetes mellitus type 2, controlled [E11.9] 10/12/2016   • Essential hypertension [I10] 04/18/2016      Resolved Hospital Problems    Diagnosis Date Noted Date Resolved   No resolved problems to display.       Presenting Problem/History of Present Illness:  Transient cerebral ischemia, unspecified type [G45.9]  Transient cerebral ischemia, unspecified type [G45.9]     Mr. Joe is a 76 y.o. male who presents to [facility] complaining of  Aphasia        History of Present Illness  The patient is a pleasant 76-year-old male history of normal pressure hydrocephalus who is known to me from previous hospitalization.  He was discharged by me 2 days ago.  He was admitted that time for headache, decreased hearing after receiving large-volume lumbar puncture for his normal pressure hydrocephalus.  She also has a history of diabetes, hypertension.  During that hospitalization he was hypertensive but has been hypertensive for quite some time and his medications have been adjusted.  He has remained hypertensive at home and continues with intermittent headache and loss of hearing at home but currently does not have either one of these issues.  He comes back to the hospital after having an episode earlier this morning of aphasia.  This occurred between 3 AM and 6 AM earlier today and lasted until 7:15 or 7:30 AM.  The patient vaguely remembers the episode.  According to his wife the patient was able to follow commands but not able to speak.  She did not notice any facial asymmetry or focal weakness in his arms or legs.  He is able to follow  commands and raise his arms according to his wife.  Symptoms are now completely gone.  He is still hypertensive.  Neurology was consulted and he is undergone CT of the head and is planned for MRI/MRA, 2-D echo and EEG.  His neurosurgeon has also been consulted, Dr. Gonzalez.  He was admitted to our service for further management.  He has received aspirin in the emergency room and did not be criteria for thrombolytics given resolution of symptoms and time.  More than 3 hours.    Hospital Course:  The patient is a 76 y.o. male who presented with severe postional headache vs TIA after recent large volume lumbar puncture for normal pressure hydrocephalus. He was admitted, ASA and Statin given, and consultation from Dr Hoskins (Neurology) and Dr Polanco (Neurosurgery) obtained. His symptoms resolved and he underwent stroke and seizure workup. MRI/A did not show acute CVA or critical stenosis. His Echo did show PFO on saline study so BLE Doppler US were obtained. Those were negative. He had an EEG without evidence of seizure activity. PT was consulted and he ambulated near his baseline, home with assist recommended. Due to the possible post-LP headache,  shunt is not planned at this time. He will need follow up with MARI[facility]A to continue monitoring. He did have HTN while here and has tolerated increased Lisinopril dose and addition of HCTZ. He will be discharged on his home statin therapy and ASA 81mg daily.    Exam Today:  Constitutional: He appears well-developed and well-nourished. No distress.   HENT:   Head: Normocephalic and atraumatic.   Nose: Nose normal.   Eyes: EOM are normal. Pupils are equal, round, and reactive to light. No scleral icterus.   Neck: Normal range of motion. Neck supple.   Cardiovascular: Normal rate, regular rhythm, normal heart sounds and intact distal pulses.    No murmur heard.  Pulmonary/Chest: Effort normal and breath sounds normal. He has no wheezes.   Abdominal: Soft. Bowel sounds are normal. He  exhibits no distension. There is no tenderness.   Musculoskeletal: Normal range of motion.   Neurological: He is alert. No cranial nerve deficit.   Skin: Skin is warm and dry. He is not diaphoretic.   Psychiatric: He has a normal mood and affect. His behavior is normal.    Results:  CT Head:  The brain ventricles are symmetrical. There is age-appropriate  atrophy. Beam-hardening artifact limits evaluation somewhat, but there  is no evidence of acute infarction or hemorrhage. There is moderate to  severe small vessel ischemic disease. An area of encephalomalacia is  appreciated involving the left parietal lobe posteriorly and superiorly,  underlying a small craniotomy flap similar to the MRI examination of  11/16/2017. No focal area of decreased attenuation to suggest acute  infarction is identified. There is no evidence of hydrocephalus.  Vascular calcification is noted. Further evaluation could be performed  with a MRI examination of brain as indicated.     MRI EXAMINATION OF THE BRAIN WITH AND WITHOUT CONTRAST: There is no  evidence of restricted diffusion to suggest acute infarction. There is  age-appropriate atrophy. Confluent areas of increased signal intensity  are appreciated on the axial T2 FLAIR sequence involving the  periventricular and deep white matter at the cerebral hemispheres  bilaterally, nonspecific but suggesting small vessel ischemic disease.  There is a small area of encephalomalacia involving left parietal lobe  underlying a small craniotomy flap, unchanged. After contrast  administration there was moderate dural enhancement supratentorially  with the enhancement being slightly more prominent as compared to  11/16/2017. There was no evidence of abnormal intra-axial enhancement.      IMPRESSION:  No evidence of acute infarction. Small vessel ischemic  disease. Encephalomalacia involving the left parietal lobe, unchanged.  There is moderate dural enhancement supratentorially,  uniform,  nonspecific, slightly more prominent as compared to the MRI examination  of 11/16/2017.      MRA OF THE HEAD WITHOUT CONTRAST: There is signal present within the  distal aspect of vertebral arteries bilaterally. The right vertebral  artery is slightly larger than that on the left. The basilar artery is  of normal caliber. The right posterior cerebral artery appears  unremarkable. There is a fetal origin at the left posterior cerebral  artery. The left P1 segment is hypoplastic.      There is signal present within the distal aspect of the internal carotid  arteries bilaterally. The right A1 segment is mildly hypoplastic.  Anterior communicating artery is patent, small. The proximal aspects of  the anterior and middle cerebral arteries appear otherwise unremarkable.      IMPRESSION: No evidence of focal high-grade stenosis or of aneurysm.       MRA OF THE NECK WITH AND WITHOUT CONTRAST: The great vessels are  arranged in a classic configuration. The right vertebral artery is  larger than that of the left. The cervical segments of the vertebral  arteries are of relatively uniform caliber. There is eccentric stenosis,  mild, involving the right internal carotid artery proximally resulting  in a stenosis estimated to be approximately 10% using NASCET criteria.  Stenosis involving the left internal carotid artery proximally is also  appreciated with a suggestion of mild ulceration. Stenosis is less than  10% using NASCET criteria.      IMPRESSION: Atherosclerotic disease involving the carotid bifurcations  bilaterally but with less than 10% stenosis using NASCET criteria.  Irregularity is more prominent on the left where there is a suggestion  of a shallow ulcer.    EEG:  This is a digitally recorded EEG with electrodes applied according to the 10-20 electrode placement system.  Test duration 28 minutes.  Test indication loss of consciousness.  Patient is awake and drowsy     During wakefulness the posterior  dominant rhythm is a well-developed rhythmic medium amplitude sinusoidal9 Hz alpha which attenuates with the eyes open.  Bitemporal activity is also 9-10 Hz rhythmic alpha.  Bifrontal slowing at 2-3 Hz appears superimposed upon the alpha background during periods of drowsiness.  The resting heart rate is 60-70.  The patient is awake and drowsy throughout the recording     The frontal and central fast activity is low amplitude beta and is symmetric.  There are no focal slow waves noted.  Spikes and sharp waves are not seen.       During 3 minutes of hyperventilation there was no significant alteration of the background.  During photic stimulation symmetric photic driving responses were noted at the middle flash frequencies     Interpretation: Normal study during drowsiness    BLE US:  · Normal bilateral lower extremity venous duplex scan    TTE:  · Left ventricular systolic function is normal. Calculated EF = 54.1%. Estimated EF was in agreement with the calculated EF. Normal left ventricular cavity size noted. All left ventricular wall segments contract normally. Left ventricular wall thickness is consistent with mild concentric hypertrophy. Septal wall motion is abnormal, consistent with a post-operative state. Left ventricular diastolic dysfunction is noted (grade I) consistent with impaired relaxation.  · Left atrial cavity size is moderately dilated. There is a very large patent foramen ovale present with right to left shunting  · There is moderate-to-severe thickening of the aortic valve. Mild to moderate aortic valve regurgitation is present. No hemodynamically significant aortic valve stenosis is present.  · Mild-to-moderate mitral valve regurgitation is present    Procedures Performed:         Consults:   Consults     Date and Time Order Name Status Description    11/20/2017 1423 Inpatient Consult to Neurology      11/20/2017 1215 [facility] (on-call MD unless specified) Completed     11/20/2017 1203 Inpatient  Consult to Neurosurgery Completed     11/15/2017 1747 Inpatient Consult to Neurosurgery Completed            Discharge Disposition:  Home or Self Care    Discharge Medications:   Eugenio Joe YEMI   Home Medication Instructions ALEX:[id number]    Printed on:11/22/17 1120   Medication Information                      acetaminophen (TYLENOL) 325 MG tablet  Take 2 tablets by mouth Every 6 (Six) Hours As Needed for mild pain (1-3).             allopurinol (ZYLOPRIM) 300 MG tablet  Take 300 mg by mouth daily.             aspirin 81 MG EC tablet  Take 1 tablet by mouth Daily.             esomeprazole (nexIUM) 40 MG capsule  Take 1 capsule by mouth Daily.             fluticasone-salmeterol (ADVAIR) 250-50 MCG/DOSE DISKUS  Inhale 1 puff Every Evening. Advair Diskus 250-50 MCG/DOSE Inhalation Aerosol Powder Breath Activated; Patient Sig: Advair Diskus 250-50 MCG/DOSE Inhalation Aerosol Powder Breath Activated INHALE 1 PUFF BID; 60; 0; 15-Oct-2012; Active             glucose blood (FREESTYLE LITE) test strip  1 each by Other route See Admin Instructions. Use 1 to 2 times daily as instructed             hydrALAZINE (APRESOLINE) 25 MG tablet  Take 1 tablet by mouth Every 8 (Eight) Hours As Needed (SBP >170). Take for systolic blood pressure > 170             hydrochlorothiazide (MICROZIDE) 12.5 MG capsule  Take 2 capsules by mouth Every Morning.             lisinopril (PRINIVIL,ZESTRIL) 40 MG tablet  Take 1 tablet by mouth Daily.             metFORMIN (GLUCOPHAGE) 500 MG tablet  Take 500 mg by mouth 2 (Two) Times a Day With Meals.             Multiple Vitamins-Minerals (MULTIVITAMIN ADULT PO)  Take 1 tablet by mouth Daily.             PROAIR  (90 BASE) MCG/ACT inhaler  2 puffs Every 4 (Four) Hours As Needed.             simvastatin (ZOCOR) 40 MG tablet  Take 40 mg by mouth every night.             Vitamin D, Cholecalciferol, (CHOLECALCIFEROL) 400 units tablet  Take 400 Units by mouth Daily.                 Discharge  Diet:   Diet Instructions     Diet: Consistent Carbohydrate, Cardiac       Discharge Diet:   Consistent Carbohydrate  Cardiac                    Activity at Discharge:   Activity Instructions     Activity as Tolerated                     Follow-up Appointments:  Future Appointments  Date Time Provider Department Center   1/23/2018 12:15 PM MD [facility] None   4/13/2018 10:20 AM Jean Ford MD [facility] None     Additional Instructions for the Follow-ups that You Need to Schedule     Discharge Follow-up with PCP    As directed    Follow Up Details:  1-2 weeks       Discharge Follow-up with Specialty: Neurosurgery    As directed    Specialty:  Neurosurgery   Follow Up Details:  as scheduled                 Test Results Pending at Discharge:       John Verdugo MD  11/22/17  11:29 AM    Time Spent on Discharge Activities: >30 minutes

## 2017-11-22 NOTE — PROGRESS NOTES
Vascular lab bilateral lower extremity venous doppler complete, prelim negative DVT - ENEDELIA Vines aware

## 2017-12-04 ENCOUNTER — TELEPHONE (OUTPATIENT)
Dept: NEUROSURGERY | Facility: CLINIC | Age: 77
End: 2017-12-04

## 2017-12-04 NOTE — TELEPHONE ENCOUNTER
Patient called and requested to speak with Cary personally. I asked what questions she had and she states that messages get mixed up between people so she wants a personal call from Cary. I finally got one question out of her which is was his Positive or Negative for NPH? She states the patient heard one thing and she heard something different.

## 2018-01-23 ENCOUNTER — OFFICE VISIT (OUTPATIENT)
Dept: NEUROSURGERY | Facility: CLINIC | Age: 78
End: 2018-01-23

## 2018-01-23 VITALS — DIASTOLIC BLOOD PRESSURE: 81 MMHG | SYSTOLIC BLOOD PRESSURE: 159 MMHG | HEART RATE: 81 BPM

## 2018-01-23 DIAGNOSIS — G91.2 NORMAL PRESSURE HYDROCEPHALUS (HCC): Primary | ICD-10-CM

## 2018-01-23 DIAGNOSIS — G44.89 OTHER HEADACHE SYNDROME: ICD-10-CM

## 2018-01-23 PROCEDURE — 99213 OFFICE O/P EST LOW 20 MIN: CPT | Performed by: NEUROLOGICAL SURGERY

## 2018-01-23 NOTE — PROGRESS NOTES
Subjective   Patient ID: Eugenio Joe is a 77 y.o. male is here today for follow-up with a new Cisternogram. He has unsteady gait still. He denies any headaches, dizzziness or blurry vision. He is currently still in physical therapy.    History of Present Illness    This patient returns today.  He still has an unsteady gait.  He had a cisternogram done in November of last year and at that time he had a horrible headache afterwards.  It was so bad that he was admitted to the hospital for several days.  Subsequent to that he has gotten back to his baseline.  His mind still seems to be okay but he does have difficulty with gait disturbance.    The following portions of the patient's history were reviewed and updated as appropriate: allergies, current medications, past family history, past medical history, past social history, past surgical history and problem list.    Review of Systems   Eyes: Negative for visual disturbance.   Respiratory: Negative for chest tightness and shortness of breath.    Cardiovascular: Negative for chest pain.   Musculoskeletal: Positive for gait problem.   Neurological: Negative for dizziness and headaches.   All other systems reviewed and are negative.      Objective   Physical Exam   Constitutional: He is oriented to person, place, and time. He appears well-developed and well-nourished.   Neurological: He is oriented to person, place, and time.     Neurologic Exam     Mental Status   Oriented to person, place, and time.       Assessment/Plan   Independent Review of Radiographic Studies:      Reviewed his cisternogram which was done last November.  This is consistent with normal pressure hydrocephalus.  In addition need to get some improvement in his gait with the large volume tap.    Medical Decision Making:      I told the patient and his family about the imaging.  I told him that he may or may not have normal pressure hydrocephalus but the fact that he developed such a severe headache  after just a large volume tap means that he has an extremely high risk of having severe complications from a  shunt.  Therefore I would not recommend doing a  shunt.  His family seems fairly interested in pursuing this options and so we will give them the names of some other neurosurgeons in order to see if they want to get a second opinion.  His only other option is to continue with therapy.    Eugenio was seen today for follow-up.    Diagnoses and all orders for this visit:    Normal pressure hydrocephalus    Other headache syndrome    Return for 2-3 week post op.

## 2018-02-06 DIAGNOSIS — G91.2 NPH (NORMAL PRESSURE HYDROCEPHALUS) (HCC): Primary | ICD-10-CM

## 2018-02-07 ENCOUNTER — TELEPHONE (OUTPATIENT)
Dept: NEUROSURGERY | Facility: CLINIC | Age: 78
End: 2018-02-07

## 2018-02-07 NOTE — TELEPHONE ENCOUNTER
Patient's wife called and the patient has been doing his exercise at the  and [facility] rehab needs an order in order to evaluate him every tow months.  [facility] phone [phone] and fax [phone].

## 2018-03-20 ENCOUNTER — OFFICE VISIT (OUTPATIENT)
Dept: GASTROENTEROLOGY | Facility: CLINIC | Age: 78
End: 2018-03-20

## 2018-03-20 VITALS
SYSTOLIC BLOOD PRESSURE: 148 MMHG | DIASTOLIC BLOOD PRESSURE: 82 MMHG | HEIGHT: 70 IN | TEMPERATURE: 97.8 F | WEIGHT: 196.85 LBS | BODY MASS INDEX: 28.18 KG/M2

## 2018-03-20 DIAGNOSIS — R13.19 ESOPHAGEAL DYSPHAGIA: Primary | ICD-10-CM

## 2018-03-20 DIAGNOSIS — K22.70 BARRETT'S ESOPHAGUS WITHOUT DYSPLASIA: ICD-10-CM

## 2018-03-20 DIAGNOSIS — K31.5 DUODENAL STRICTURE: ICD-10-CM

## 2018-03-20 PROCEDURE — 99214 OFFICE O/P EST MOD 30 MIN: CPT | Performed by: INTERNAL MEDICINE

## 2018-03-20 RX ORDER — ESOMEPRAZOLE MAGNESIUM 40 MG/1
40 CAPSULE, DELAYED RELEASE ORAL DAILY
Qty: 30 CAPSULE | Refills: 5 | Status: SHIPPED | OUTPATIENT
Start: 2018-03-20 | End: 2019-04-25 | Stop reason: SDUPTHER

## 2018-03-20 RX ORDER — LISINOPRIL 5 MG/1
TABLET ORAL
Refills: 4 | COMMUNITY
Start: 2018-01-29 | End: 2018-04-13

## 2018-03-20 NOTE — PROGRESS NOTES
Chief Complaint   Patient presents with   • Terrazas esophagus   • Difficulty Swallowing   • Hiccups & cough     Subjective     HPI     Eugenio Joe is a 77 y.o. male who presents today for follow up.  Pt has hx of dysphagia.  Over last month pt has had increasing issues with sensation of food sticking in upper chest.  Occuring exclusively with solids. He has been coughing more than usual when he eats.  He has been off Nexium for up to 6 mos for unclear reasons.      EGD early last year with Schatzki ring (dilated) and short segment Terrazas's without dysplasia. There was also incidental finding of benign duodenal stricture.      Past Medical History:   Diagnosis Date   • Arthritis    • Asthma    • Brain abscess     at about age 44   • Bruise of face    • Cardiac disease    • Coronary artery disease     CABG 2012   • Diabetes mellitus    • Gout several years ago    medication  working   • Headache 11/15/2017   • Hearing aid worn     bilateral   • Hydrocephaly    • Hyperlipemia    • Hypertension    • Joint pain     or swelling   • Low back pain several years ago   • Orbit fracture    • Pulmonary embolism     OCT 2016   • Sinus infection        Social History     Social History   • Marital status:      Social History Main Topics   • Smoking status: Former Smoker     Packs/day: 2.00     Years: 5.00     Types: Cigarettes     Start date: 4/1/1959     Quit date: 3/9/1962   • Smokeless tobacco: Never Used      Comment: Quit cold turkey   • Alcohol use No      Comment: No caffeine use   • Drug use: No   • Sexual activity: Yes     Partners: Female     Birth control/ protection: Surgical     Other Topics Concern   • Not on file         Current Outpatient Prescriptions:   •  acetaminophen (TYLENOL) 325 MG tablet, Take 2 tablets by mouth Every 6 (Six) Hours As Needed for mild pain (1-3)., Disp: , Rfl: 0  •  allopurinol (ZYLOPRIM) 300 MG tablet, Take 300 mg by mouth daily., Disp: , Rfl:   •  aspirin 81 MG EC tablet, Take  1 tablet by mouth Daily., Disp: 30 tablet, Rfl: 0  •  fluticasone-salmeterol (ADVAIR) 250-50 MCG/DOSE DISKUS, Inhale 1 puff Every Evening. Advair Diskus 250-50 MCG/DOSE Inhalation Aerosol Powder Breath Activated; Patient Sig: Advair Diskus 250-50 MCG/DOSE Inhalation Aerosol Powder Breath Activated INHALE 1 PUFF BID; 60; 0; 15-Oct-2012; Active, Disp: , Rfl:   •  glucose blood (FREESTYLE LITE) test strip, 1 each by Other route See Admin Instructions. Use 1 to 2 times daily as instructed, Disp: , Rfl:   •  lisinopril (PRINIVIL,ZESTRIL) 5 MG tablet, TK 1 T PO BID, Disp: , Rfl: 4  •  metFORMIN (GLUCOPHAGE) 500 MG tablet, Take 500 mg by mouth 2 (Two) Times a Day With Meals., Disp: , Rfl:   •  Multiple Vitamins-Minerals (MULTIVITAMIN ADULT PO), Take 1 tablet by mouth Daily., Disp: , Rfl:   •  PROAIR  (90 BASE) MCG/ACT inhaler, 2 puffs Every 4 (Four) Hours As Needed., Disp: , Rfl:   •  simvastatin (ZOCOR) 40 MG tablet, Take 40 mg by mouth every night., Disp: , Rfl:   •  Vitamin D, Cholecalciferol, (CHOLECALCIFEROL) 400 units tablet, Take 400 Units by mouth Daily., Disp: , Rfl:   •  esomeprazole (nexIUM) 40 MG capsule, Take 1 capsule by mouth Daily., Disp: 30 capsule, Rfl: 5  •  hydrALAZINE (APRESOLINE) 25 MG tablet, Take 1 tablet by mouth Every 8 (Eight) Hours As Needed (SBP >170). Take for systolic blood pressure > 170, Disp: 30 tablet, Rfl: 0  •  hydrochlorothiazide (MICROZIDE) 12.5 MG capsule, Take 2 capsules by mouth Every Morning., Disp: 30 capsule, Rfl: 0  •  lisinopril (PRINIVIL,ZESTRIL) 40 MG tablet, Take 1 tablet by mouth Daily., Disp: 30 tablet, Rfl: 0    Review of Systems   Constitutional: Negative.    HENT: Positive for trouble swallowing.    Respiratory: Positive for cough.    Gastrointestinal:        Hiccups       Objective   Vitals:    03/20/18 1355   BP: 148/82   Temp: 97.8 °F (36.6 °C)       Physical Exam   Constitutional: He is oriented to person, place, and time. He appears well-developed and  well-nourished.   HENT:   Head: Normocephalic and atraumatic.   Mouth/Throat: Oropharynx is clear and moist.   Neck: Neck supple. Thyromegaly present.   Pulmonary/Chest: Effort normal and breath sounds normal.   Abdominal: Soft. Bowel sounds are normal. He exhibits no distension and no mass. There is no tenderness. No hernia.   Neurological: He is alert and oriented to person, place, and time.   Skin: Skin is warm and dry.   Psychiatric: He has a normal mood and affect. His behavior is normal. Judgment and thought content normal.   Vitals reviewed.      Assessment/Plan   Assessment:     1. Esophageal dysphagia    2. 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 Duodenal stricture      Plan:   Advised pt he should restart his PPI - new Rx for Nexium sent to his pharmacy  Will plan EGD for further evaluation of pts recurrent dysphgia        Rigoberto Walker M.D.  [facility]  [address] [place] [address]  Office: [phone]

## 2018-04-06 ENCOUNTER — ANESTHESIA EVENT (OUTPATIENT)
Dept: GASTROENTEROLOGY | Facility: HOSPITAL | Age: 78
End: 2018-04-06

## 2018-04-06 ENCOUNTER — HOSPITAL ENCOUNTER (OUTPATIENT)
Facility: HOSPITAL | Age: 78
Setting detail: HOSPITAL OUTPATIENT SURGERY
Discharge: HOME | End: 2018-04-06
Attending: INTERNAL MEDICINE | Admitting: INTERNAL MEDICINE

## 2018-04-06 ENCOUNTER — ANESTHESIA (OUTPATIENT)
Dept: GASTROENTEROLOGY | Facility: HOSPITAL | Age: 78
End: 2018-04-06

## 2018-04-06 VITALS
HEIGHT: 70 IN | WEIGHT: 196 LBS | SYSTOLIC BLOOD PRESSURE: 134 MMHG | RESPIRATION RATE: 16 BRPM | DIASTOLIC BLOOD PRESSURE: 71 MMHG | OXYGEN SATURATION: 93 % | TEMPERATURE: 98.2 F | BODY MASS INDEX: 28.06 KG/M2 | HEART RATE: 74 BPM

## 2018-04-06 DIAGNOSIS — R13.19 ESOPHAGEAL DYSPHAGIA: ICD-10-CM

## 2018-04-06 LAB — GLUCOSE BLDC GLUCOMTR-MCNC: 158 MG/DL (ref 70–130)

## 2018-04-06 PROCEDURE — 25010000002 PROPOFOL 10 MG/ML EMULSION: Performed by: ANESTHESIOLOGY

## 2018-04-06 PROCEDURE — 82962 GLUCOSE BLOOD TEST: CPT

## 2018-04-06 PROCEDURE — C1726 CATH, BAL DIL, NON-VASCULAR: HCPCS | Performed by: INTERNAL MEDICINE

## 2018-04-06 PROCEDURE — 43249 ESOPH EGD DILATION <30 MM: CPT | Performed by: INTERNAL MEDICINE

## 2018-04-06 PROCEDURE — 43239 EGD BIOPSY SINGLE/MULTIPLE: CPT | Performed by: INTERNAL MEDICINE

## 2018-04-06 PROCEDURE — 88305 TISSUE EXAM BY PATHOLOGIST: CPT | Performed by: INTERNAL MEDICINE

## 2018-04-06 RX ORDER — SODIUM CHLORIDE, SODIUM LACTATE, POTASSIUM CHLORIDE, CALCIUM CHLORIDE 600; 310; 30; 20 MG/100ML; MG/100ML; MG/100ML; MG/100ML
1000 INJECTION, SOLUTION INTRAVENOUS CONTINUOUS
Status: DISCONTINUED | OUTPATIENT
Start: 2018-04-06 | End: 2018-04-06 | Stop reason: HOSPADM

## 2018-04-06 RX ORDER — LIDOCAINE HYDROCHLORIDE 10 MG/ML
0.5 INJECTION, SOLUTION INFILTRATION; PERINEURAL ONCE AS NEEDED
Status: DISCONTINUED | OUTPATIENT
Start: 2018-04-06 | End: 2018-04-06 | Stop reason: HOSPADM

## 2018-04-06 RX ORDER — SODIUM CHLORIDE 0.9 % (FLUSH) 0.9 %
3 SYRINGE (ML) INJECTION AS NEEDED
Status: DISCONTINUED | OUTPATIENT
Start: 2018-04-06 | End: 2018-04-06 | Stop reason: HOSPADM

## 2018-04-06 RX ORDER — LIDOCAINE HYDROCHLORIDE 20 MG/ML
INJECTION, SOLUTION INFILTRATION; PERINEURAL AS NEEDED
Status: DISCONTINUED | OUTPATIENT
Start: 2018-04-06 | End: 2018-04-06 | Stop reason: SURG

## 2018-04-06 RX ORDER — PROPOFOL 10 MG/ML
VIAL (ML) INTRAVENOUS AS NEEDED
Status: DISCONTINUED | OUTPATIENT
Start: 2018-04-06 | End: 2018-04-06 | Stop reason: SURG

## 2018-04-06 RX ORDER — PROPOFOL 10 MG/ML
VIAL (ML) INTRAVENOUS CONTINUOUS PRN
Status: DISCONTINUED | OUTPATIENT
Start: 2018-04-06 | End: 2018-04-06 | Stop reason: SURG

## 2018-04-06 RX ADMIN — PROPOFOL 140 MCG/KG/MIN: 10 INJECTION, EMULSION INTRAVENOUS at 13:14

## 2018-04-06 RX ADMIN — PROPOFOL 150 MG: 10 INJECTION, EMULSION INTRAVENOUS at 13:14

## 2018-04-06 RX ADMIN — LIDOCAINE HYDROCHLORIDE 50 MG: 20 INJECTION, SOLUTION INFILTRATION; PERINEURAL at 13:14

## 2018-04-06 NOTE — ANESTHESIA PREPROCEDURE EVALUATION
Anesthesia Evaluation     Patient summary reviewed                Airway   Mallampati: II  TM distance: >3 FB  Neck ROM: full  No difficulty expected  Dental - normal exam     Pulmonary    Cardiovascular   Exercise tolerance: good (4-7 METS)    Rhythm: regular  Rate: normal    (+) CABG,       Neuro/Psych  GI/Hepatic/Renal/Endo      Musculoskeletal     Abdominal    Substance History      OB/GYN          Other                        Anesthesia Plan    ASA 3     MAC     intravenous induction   Anesthetic plan and risks discussed with patient.

## 2018-04-06 NOTE — ANESTHESIA POSTPROCEDURE EVALUATION
Patient: Eugenio Joe    Procedure Summary     Date:  04/06/18 Room / Location:   [facility]    Anesthesia Start:  1309 Anesthesia Stop:  1334    Procedure:  ESOPHAGOGASTRODUODENOSCOPY WITH BIOPSY (N/A Esophagus) Diagnosis:       Esophageal dysphagia      (Esophageal dysphagia [R13.10])    Surgeon:  Rigoberto Walker MD Provider:  Eugenio Sharif MD    Anesthesia Type:  MAC ASA Status:  3          Anesthesia Type: MAC  Last vitals  BP   134/71 (04/06/18 1400)   Temp   36.8 °C (98.2 °F) (04/06/18 1242)   Pulse   74 (04/06/18 1400)   Resp   16 (04/06/18 1400)     SpO2   93 % (04/06/18 1400)     Post Anesthesia Care and Evaluation    Patient location during evaluation: bedside  Patient participation: complete - patient participated  Level of consciousness: awake and alert  Pain score: 0  Pain management: adequate  Airway patency: patent  Anesthetic complications: No anesthetic complications  PONV Status: none  Cardiovascular status: acceptable  Respiratory status: acceptable  Hydration status: acceptable  Post Neuraxial Block status: Motor and sensory function returned to baseline

## 2018-04-09 ENCOUNTER — TELEPHONE (OUTPATIENT)
Dept: GASTROENTEROLOGY | Facility: CLINIC | Age: 78
End: 2018-04-09

## 2018-04-09 RX ORDER — FLUCONAZOLE 100 MG/1
TABLET ORAL
Qty: 15 TABLET | Refills: 0 | Status: SHIPPED | OUTPATIENT
Start: 2018-04-09 | End: 2019-04-25

## 2018-04-09 NOTE — H&P (VIEW-ONLY)
Chief Complaint   Patient presents with   • Terrazas esophagus   • Difficulty Swallowing   • Hiccups & cough     Subjective     HPI     Eugenio Joe is a 77 y.o. male who presents today for follow up.  Pt has hx of dysphagia.  Over last month pt has had increasing issues with sensation of food sticking in upper chest.  Occuring exclusively with solids. He has been coughing more than usual when he eats.  He has been off Nexium for up to 6 mos for unclear reasons.      EGD early last year with Schatzki ring (dilated) and short segment Terrazas's without dysplasia. There was also incidental finding of benign duodenal stricture.      Past Medical History:   Diagnosis Date   • Arthritis    • Asthma    • Brain abscess     at about age 44   • Bruise of face    • Cardiac disease    • Coronary artery disease     CABG 2012   • Diabetes mellitus    • Gout several years ago    medication  working   • Headache 11/15/2017   • Hearing aid worn     bilateral   • Hydrocephaly    • Hyperlipemia    • Hypertension    • Joint pain     or swelling   • Low back pain several years ago   • Orbit fracture    • Pulmonary embolism     OCT 2016   • Sinus infection        Social History     Social History   • Marital status:      Social History Main Topics   • Smoking status: Former Smoker     Packs/day: 2.00     Years: 5.00     Types: Cigarettes     Start date: 4/1/1959     Quit date: 3/9/1962   • Smokeless tobacco: Never Used      Comment: Quit cold turkey   • Alcohol use No      Comment: No caffeine use   • Drug use: No   • Sexual activity: Yes     Partners: Female     Birth control/ protection: Surgical     Other Topics Concern   • Not on file         Current Outpatient Prescriptions:   •  acetaminophen (TYLENOL) 325 MG tablet, Take 2 tablets by mouth Every 6 (Six) Hours As Needed for mild pain (1-3)., Disp: , Rfl: 0  •  allopurinol (ZYLOPRIM) 300 MG tablet, Take 300 mg by mouth daily., Disp: , Rfl:   •  aspirin 81 MG EC tablet, Take  1 tablet by mouth Daily., Disp: 30 tablet, Rfl: 0  •  fluticasone-salmeterol (ADVAIR) 250-50 MCG/DOSE DISKUS, Inhale 1 puff Every Evening. Advair Diskus 250-50 MCG/DOSE Inhalation Aerosol Powder Breath Activated; Patient Sig: Advair Diskus 250-50 MCG/DOSE Inhalation Aerosol Powder Breath Activated INHALE 1 PUFF BID; 60; 0; 15-Oct-2012; Active, Disp: , Rfl:   •  glucose blood (FREESTYLE LITE) test strip, 1 each by Other route See Admin Instructions. Use 1 to 2 times daily as instructed, Disp: , Rfl:   •  lisinopril (PRINIVIL,ZESTRIL) 5 MG tablet, TK 1 T PO BID, Disp: , Rfl: 4  •  metFORMIN (GLUCOPHAGE) 500 MG tablet, Take 500 mg by mouth 2 (Two) Times a Day With Meals., Disp: , Rfl:   •  Multiple Vitamins-Minerals (MULTIVITAMIN ADULT PO), Take 1 tablet by mouth Daily., Disp: , Rfl:   •  PROAIR  (90 BASE) MCG/ACT inhaler, 2 puffs Every 4 (Four) Hours As Needed., Disp: , Rfl:   •  simvastatin (ZOCOR) 40 MG tablet, Take 40 mg by mouth every night., Disp: , Rfl:   •  Vitamin D, Cholecalciferol, (CHOLECALCIFEROL) 400 units tablet, Take 400 Units by mouth Daily., Disp: , Rfl:   •  esomeprazole (nexIUM) 40 MG capsule, Take 1 capsule by mouth Daily., Disp: 30 capsule, Rfl: 5  •  hydrALAZINE (APRESOLINE) 25 MG tablet, Take 1 tablet by mouth Every 8 (Eight) Hours As Needed (SBP >170). Take for systolic blood pressure > 170, Disp: 30 tablet, Rfl: 0  •  hydrochlorothiazide (MICROZIDE) 12.5 MG capsule, Take 2 capsules by mouth Every Morning., Disp: 30 capsule, Rfl: 0  •  lisinopril (PRINIVIL,ZESTRIL) 40 MG tablet, Take 1 tablet by mouth Daily., Disp: 30 tablet, Rfl: 0    Review of Systems   Constitutional: Negative.    HENT: Positive for trouble swallowing.    Respiratory: Positive for cough.    Gastrointestinal:        Hiccups       Objective   Vitals:    03/20/18 1355   BP: 148/82   Temp: 97.8 °F (36.6 °C)       Physical Exam   Constitutional: He is oriented to person, place, and time. He appears well-developed and  well-nourished.   HENT:   Head: Normocephalic and atraumatic.   Mouth/Throat: Oropharynx is clear and moist.   Neck: Neck supple. Thyromegaly present.   Pulmonary/Chest: Effort normal and breath sounds normal.   Abdominal: Soft. Bowel sounds are normal. He exhibits no distension and no mass. There is no tenderness. No hernia.   Neurological: He is alert and oriented to person, place, and time.   Skin: Skin is warm and dry.   Psychiatric: He has a normal mood and affect. His behavior is normal. Judgment and thought content normal.   Vitals reviewed.      Assessment/Plan   Assessment:     1. Esophageal dysphagia    2. 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 Duodenal stricture      Plan:   Advised pt he should restart his PPI - new Rx for Nexium sent to his pharmacy  Will plan EGD for further evaluation of pts recurrent dysphgia        Rigoberto Walker M.D.  [facility]  [address] [place] [address]  Office: [phone]

## 2018-04-09 NOTE — TELEPHONE ENCOUNTER
Called pt and advised of Dr Walker's note. Advised will send the script for fluconazole into his pharmacy for him. Advised he will receive a reminder card in the mail when time for his EGD. Pt verb understanding. Appt for f/u made for 5/8 at 2:30 PM.     EGD already in recall for 3/2020.

## 2018-04-09 NOTE — PROGRESS NOTES
Bx did confirm candida in esophagus  Please send rx for Fluconazole - 200mg x 1 dose, then 100mg for 13 days.  Consider recall EGD in 3 years for short segement Barretts  Continue Nexium    Follow up 4 weeks

## 2018-04-09 NOTE — TELEPHONE ENCOUNTER
----- Message from Rigoberto Walker MD sent at 4/9/2018 11:34 AM EDT -----  Bx did confirm candida in esophagus  Please send rx for Fluconazole - 200mg x 1 dose, then 100mg for 13 days.  Consider recall EGD in 3 years for short segement Barretts  Continue Nexium    Follow up 4 weeks

## 2018-04-13 ENCOUNTER — OFFICE VISIT (OUTPATIENT)
Dept: CARDIOLOGY | Facility: CLINIC | Age: 78
End: 2018-04-13

## 2018-04-13 VITALS
HEIGHT: 70 IN | BODY MASS INDEX: 28.35 KG/M2 | WEIGHT: 198 LBS | SYSTOLIC BLOOD PRESSURE: 132 MMHG | DIASTOLIC BLOOD PRESSURE: 80 MMHG | HEART RATE: 74 BPM

## 2018-04-13 DIAGNOSIS — Z95.1 S/P CABG X 3: ICD-10-CM

## 2018-04-13 DIAGNOSIS — E78.49 OTHER HYPERLIPIDEMIA: ICD-10-CM

## 2018-04-13 DIAGNOSIS — I10 ESSENTIAL HYPERTENSION: Primary | ICD-10-CM

## 2018-04-13 PROCEDURE — 93000 ELECTROCARDIOGRAM COMPLETE: CPT | Performed by: INTERNAL MEDICINE

## 2018-04-13 PROCEDURE — 99214 OFFICE O/P EST MOD 30 MIN: CPT | Performed by: INTERNAL MEDICINE

## 2018-04-13 NOTE — PROGRESS NOTES
Date of Office Visit: 2018  Encounter Provider: Jean Ford MD  Place of Service: [facility] CARDIOLOGY  Patient Name: Eugenio Joe  :1940  [id number]    Chief Complaint   Patient presents with   • Coronary Artery Disease   :     HPI: Eugenio Joe is a 77 y.o. male  is here for follow-up he's a gentleman that had a three-vessel CABG in  with a LIMA to the LAD vein to the ramus vein to OM at that time he presented mainly with shortness of breath he's done pretty well from a cardiac standpoint but he's had a bad year he had a PE he had possible hydrocephalus and also possible TIA although it's not clear that that was over the case at the time of his TIA evaluation he had an echo done they said he had a large PFO of which I see almost none but I do see a large intra-atrial septum.-wise doing well have not done a stress test on him in a long time    Past Medical History:   Diagnosis Date   • Arthritis    • Asthma    • Brain abscess     at about age 44   • Bruise of face    • Cancer     PT STATES IN HIS SWEAT GLAND    • Cardiac disease    • Coronary artery disease     CABG    • Diabetes mellitus    • Gout several years ago    medication  working   • Headache 11/15/2017   • Hearing aid worn     bilateral   • History of transfusion    • Hydrocephaly    • Hyperlipemia    • Hypertension    • Joint pain     or swelling   • Low back pain several years ago   • Orbit fracture    • Pulmonary embolism     OCT 2016   • Sinus infection        Past Surgical History:   Procedure Laterality Date   • BRAIN SURGERY      BRAIN ABCESS 30 YR AGO   • CARDIAC SURGERY     • COLONOSCOPY      Ciciliano- normal per pt, no polyps    • CORONARY ARTERY BYPASS GRAFT     • ENDOSCOPY N/A 3/9/2017    Schatzki ring, dilated, LA Grade B esophagitis, HH, acquired duodenal stenosis   • ENDOSCOPY N/A 2018    Procedure: ESOPHAGOGASTRODUODENOSCOPY WITH BIOPSY;  Surgeon: Rigoberto Walker,  MD;  Location: [facility] ENDOSCOPY;  Service: Gastroenterology   • FACIAL FRACTURE SURGERY      to repair 6 broken bones   • ORBITAL FRACTURE OPEN REDUCTION INTERNAL FIXATION Right 1/13/2017    Procedure: RT ORBIT FLOOR FRACTURE REPAIR RIGHT ZMC FRACTURE REPAIR, RIGHT NASAL BONE FRACTURE CLOSED REDUCTION;  Surgeon: Edil Lester MD;  [place] [facility] [place] [facility] [place]:    • ORIF FOOT FRACTURE Right 9/9/2016    Procedure: RIGHT SECOND METATARSAL OPEN REDUCTION INTERNAL FIXATION WITh GRAFT FROM HEEL ;  Surgeon: Man Jenkins MD;  [place] [facility] [place] [facility] [place]  Service:    • SEPTOPLASTY     • SKIN CANCER EXCISION     • TONSILLECTOMY         Social History     Social History   • Marital status:      Spouse name: N/A   • Number of children: N/A   • Years of education: N/A     Occupational History   • Not on file.     Social History Main Topics   • Smoking status: Former Smoker     Packs/day: 2.00     Years: 5.00     Types: Cigarettes     Start date: 4/1/1959     Quit date: 3/9/1962   • Smokeless tobacco: Never Used      Comment: Quit cold turkey   • Alcohol use Yes      Comment: OCC   • Drug use: No   • Sexual activity: Yes     Partners: Female     Birth control/ protection: Surgical     Other Topics Concern   • Not on file     Social History Narrative   • No narrative on file       Family History   Problem Relation Age of Onset   • Heart disease Mother    • Hypertension Mother    • Stroke Mother    • Diverticulitis Mother    • Heart disease Father    • Hypertension Father        Review of Systems   Constitution: Negative for decreased appetite, fever, malaise/fatigue and weight loss.   HENT: Negative for nosebleeds.    Eyes: Negative for double vision.   Cardiovascular: Negative for chest pain, claudication, cyanosis, dyspnea on exertion, irregular heartbeat, leg swelling, near-syncope, orthopnea, palpitations, paroxysmal nocturnal dyspnea and syncope.   Respiratory: Negative for cough, hemoptysis and  shortness of breath.    Hematologic/Lymphatic: Negative for bleeding problem.   Skin: Negative for rash.   Musculoskeletal: Negative for falls and myalgias.   Gastrointestinal: Negative for hematochezia, jaundice, melena, nausea and vomiting.   Genitourinary: Negative for hematuria.   Neurological: Negative for dizziness and seizures.   Psychiatric/Behavioral: Negative for altered mental status and memory loss.       Allergies   Allergen Reactions   • Other Mental Status Change and Hallucinations     Oxy drugs   • Oxycodone Mental Status Change         Current Outpatient Prescriptions:   •  acetaminophen (TYLENOL) 325 MG tablet, Take 2 tablets by mouth Every 6 (Six) Hours As Needed for mild pain (1-3)., Disp: , Rfl: 0  •  allopurinol (ZYLOPRIM) 300 MG tablet, Take 300 mg by mouth daily., Disp: , Rfl:   •  aspirin 81 MG EC tablet, Take 1 tablet by mouth Daily., Disp: 30 tablet, Rfl: 0  •  esomeprazole (nexIUM) 40 MG capsule, Take 1 capsule by mouth Daily., Disp: 30 capsule, Rfl: 5  •  fluconazole (DIFLUCAN) 100 MG tablet, Take 2 tablets by mouth on day one, then 1 tablet by mouth daily on days 2-14., Disp: 15 tablet, Rfl: 0  •  fluticasone-salmeterol (ADVAIR) 250-50 MCG/DOSE DISKUS, Inhale 1 puff Every Evening. Advair Diskus 250-50 MCG/DOSE Inhalation Aerosol Powder Breath Activated; Patient Sig: Advair Diskus 250-50 MCG/DOSE Inhalation Aerosol Powder Breath Activated INHALE 1 PUFF BID; 60; 0; 15-Oct-2012; Active, Disp: , Rfl:   •  glucose blood (FREESTYLE LITE) test strip, 1 each by Other route See Admin Instructions. Use 1 to 2 times daily as instructed, Disp: , Rfl:   •  lisinopril (PRINIVIL,ZESTRIL) 40 MG tablet, Take 1 tablet by mouth Daily., Disp: 30 tablet, Rfl: 0  •  metFORMIN (GLUCOPHAGE) 500 MG tablet, Take 500 mg by mouth 2 (Two) Times a Day With Meals., Disp: , Rfl:   •  Multiple Vitamins-Minerals (MULTIVITAMIN ADULT PO), Take 1 tablet by mouth Daily., Disp: , Rfl:   •  PROAIR  (90 BASE) MCG/ACT  "inhaler, 2 puffs Every 4 (Four) Hours As Needed., Disp: , Rfl:   •  simvastatin (ZOCOR) 40 MG tablet, Take 40 mg by mouth every night., Disp: , Rfl:   •  Vitamin D, Cholecalciferol, (CHOLECALCIFEROL) 400 units tablet, Take 400 Units by mouth Daily., Disp: , Rfl:       Objective:     Vitals:    04/13/18 1011   BP: 132/80   Pulse: 74   Weight: 89.8 kg (198 lb)   Height: 177.8 cm (70\")     Body mass index is 28.41 kg/m².    Physical Exam   Constitutional: He is oriented to person, place, and time. He appears well-developed and well-nourished.   HENT:   Head: Normocephalic.   Eyes: No scleral icterus.   Neck: No JVD present. No thyromegaly present.   Cardiovascular: Normal rate, regular rhythm and normal heart sounds.  Exam reveals no gallop and no friction rub.    No murmur heard.  Pulmonary/Chest: Effort normal and breath sounds normal. He has no wheezes. He has no rales.       Abdominal: Soft. There is no hepatosplenomegaly. There is no tenderness.   Musculoskeletal: Normal range of motion. He exhibits no edema.   Lymphadenopathy:     He has no cervical adenopathy.   Neurological: He is alert and oriented to person, place, and time.   Skin: Skin is warm and dry. No rash noted.   Psychiatric: He has a normal mood and affect.         ECG 12 Lead  Date/Time: 4/13/2018 10:43 AM  Performed by: RAVIN BARNES  Authorized by: RAVIN BARNES   Rhythm: sinus rhythm  Clinical impression: normal ECG             Assessment:       Diagnosis Plan   1. Essential hypertension     2. Other hyperlipidemia     3. S/P CABG x 3            Plan:       I think he's doing well from a cardiac standpoint I doubt it's I clear to me that he had a TIA and it definitely doesn't appear the PFO he doesn't have any tachycardia palpitations I will leave him on his same medicines coronary-wise I think he's fine he is asymptomatic and would have him see Yulissa in a year and see me in 2 honestly he's due to have a stress test but he's had such a bad " year and is asymptomatic we'll let ago until next year and we'll reconsider it unfortunately with his difficulty ambulating at will have to be a stress nuclear    Coronary Artery Disease  Assessment  • The patient has no angina    Plan  • Lifestyle modifications discussed include adhering to a heart healthy diet, maintenance of a healthy weight, medication compliance, regular exercise and regular monitoring of cholesterol and blood pressure    Subjective - Objective  • There is a history of previous coronary artery bypass graft  • Current antiplatelet therapy includes aspirin 81 mg        As always, it has been a pleasure to participate in your patient's care.      Sincerely,       Jean Ford MD

## 2018-05-08 ENCOUNTER — OFFICE VISIT (OUTPATIENT)
Dept: GASTROENTEROLOGY | Facility: CLINIC | Age: 78
End: 2018-05-08

## 2018-05-08 VITALS
BODY MASS INDEX: 28.72 KG/M2 | WEIGHT: 200.6 LBS | HEIGHT: 70 IN | SYSTOLIC BLOOD PRESSURE: 146 MMHG | TEMPERATURE: 97.7 F | DIASTOLIC BLOOD PRESSURE: 78 MMHG

## 2018-05-08 DIAGNOSIS — K31.5 DUODENAL STRICTURE: ICD-10-CM

## 2018-05-08 DIAGNOSIS — R13.19 ESOPHAGEAL DYSPHAGIA: Primary | ICD-10-CM

## 2018-05-08 DIAGNOSIS — K22.70 BARRETT'S ESOPHAGUS WITHOUT DYSPLASIA: ICD-10-CM

## 2018-05-08 DIAGNOSIS — B37.81 ESOPHAGEAL CANDIDIASIS (HCC): ICD-10-CM

## 2018-05-08 DIAGNOSIS — K22.2 LOWER ESOPHAGEAL RING (SCHATZKI): ICD-10-CM

## 2018-05-08 PROCEDURE — 99213 OFFICE O/P EST LOW 20 MIN: CPT | Performed by: INTERNAL MEDICINE

## 2018-05-08 NOTE — PROGRESS NOTES
Chief Complaint   Patient presents with   • Follow-up   • Difficulty Swallowing     Subjective     HPI     Eugenio Joe is a 77 y.o. male who presents today for follow up.  Eugenio underwent recent EGD with dilation of distal Schatzki ring Due to increasing dysphagia.  We performed TTS dilation of the distal Schatzki ring with good effect.  Biopsies from distal esophagus again confirmed Barretts with no dysplasia.  There was also note of white nummular lesions throughout the esophagus with biopsies consistent with Shirley.  Eugenio has completed a two-week course of fluconazole.  He remains on Nexium.  Overall has had significant improvement in his dysphagia.  He reports that he still has some intermittent coughing after eating certain foods.  We did review his previous barium esophagram which showed some silent aspiration.    Past Medical History:   Diagnosis Date   • Arthritis    • Asthma    • Brain abscess     at about age 44   • Bruise of face    • Cancer     PT STATES IN HIS SWEAT GLAND    • Cardiac disease    • Coronary artery disease     CABG 2012   • Diabetes mellitus    • Gout several years ago    medication  working   • Headache 11/15/2017   • Hearing aid worn     bilateral   • History of transfusion    • Hydrocephaly    • Hyperlipemia    • Hypertension    • Joint pain     or swelling   • Low back pain several years ago   • Orbit fracture    • Pulmonary embolism     OCT 2016   • Sinus infection        Social History     Social History   • Marital status:      Social History Main Topics   • Smoking status: Former Smoker     Packs/day: 2.00     Years: 5.00     Types: Cigarettes     Start date: 4/1/1959     Quit date: 3/9/1962   • Smokeless tobacco: Never Used      Comment: Quit cold turkey   • Alcohol use Yes      Comment: OCC   • Drug use: No   • Sexual activity: Yes     Partners: Female     Birth control/ protection: Surgical     Other Topics Concern   • Not on file         Current Outpatient  Prescriptions:   •  acetaminophen (TYLENOL) 325 MG tablet, Take 2 tablets by mouth Every 6 (Six) Hours As Needed for mild pain (1-3)., Disp: , Rfl: 0  •  allopurinol (ZYLOPRIM) 300 MG tablet, Take 300 mg by mouth daily., Disp: , Rfl:   •  aspirin 81 MG EC tablet, Take 1 tablet by mouth Daily., Disp: 30 tablet, Rfl: 0  •  esomeprazole (nexIUM) 40 MG capsule, Take 1 capsule by mouth Daily., Disp: 30 capsule, Rfl: 5  •  fluticasone-salmeterol (ADVAIR) 250-50 MCG/DOSE DISKUS, Inhale 1 puff Every Evening. Advair Diskus 250-50 MCG/DOSE Inhalation Aerosol Powder Breath Activated; Patient Sig: Advair Diskus 250-50 MCG/DOSE Inhalation Aerosol Powder Breath Activated INHALE 1 PUFF BID; 60; 0; 15-Oct-2012; Active, Disp: , Rfl:   •  glucose blood (FREESTYLE LITE) test strip, 1 each by Other route See Admin Instructions. Use 1 to 2 times daily as instructed, Disp: , Rfl:   •  lisinopril (PRINIVIL,ZESTRIL) 40 MG tablet, Take 1 tablet by mouth Daily., Disp: 30 tablet, Rfl: 0  •  metFORMIN (GLUCOPHAGE) 500 MG tablet, Take 500 mg by mouth 2 (Two) Times a Day With Meals., Disp: , Rfl:   •  Multiple Vitamins-Minerals (MULTIVITAMIN ADULT PO), Take 1 tablet by mouth Daily., Disp: , Rfl:   •  PROAIR  (90 BASE) MCG/ACT inhaler, 2 puffs Every 4 (Four) Hours As Needed., Disp: , Rfl:   •  simvastatin (ZOCOR) 40 MG tablet, Take 40 mg by mouth every night., Disp: , Rfl:   •  Vitamin D, Cholecalciferol, (CHOLECALCIFEROL) 400 units tablet, Take 400 Units by mouth Daily., Disp: , Rfl:   •  fluconazole (DIFLUCAN) 100 MG tablet, Take 2 tablets by mouth on day one, then 1 tablet by mouth daily on days 2-14., Disp: 15 tablet, Rfl: 0    Review of Systems   Constitutional: Negative.    HENT: Negative for trouble swallowing.    Respiratory: Positive for cough. Negative for choking.    Cardiovascular: Negative.    Gastrointestinal: Negative.        Objective   Vitals:    05/08/18 1420   BP: 146/78   Temp: 97.7 °F (36.5 °C)       Physical Exam    Constitutional: He is oriented to person, place, and time. He appears well-developed and well-nourished.   HENT:   Head: Normocephalic and atraumatic.   Abdominal: Soft. Bowel sounds are normal. He exhibits no distension and no mass. There is no tenderness. No hernia.   Neurological: He is alert and oriented to person, place, and time.   Skin: Skin is warm and dry.   Psychiatric: He has a normal mood and affect. His behavior is normal. Judgment and thought content normal.   Vitals reviewed.      Assessment/Plan   Assessment:     1. Esophageal dysphagia    2. Lower esophageal ring (Schatzki)    3. 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 Esophageal candidiasis    5. Duodenal stricture      Plan:   Continue nexium for his Terrazas's esophagus  We discussed possible referral to [facility] for ? OP dysphagia, but he wishes to defer for now  He will need repeat EGD for Terrazas's in 3 years, otherwise repeat EGD as necessary for recurrence of dysphagia    Return in about 6 months (around 11/8/2018).        Rigoberto Walker M.D.  [facility]  [address] [place] [address]  Office: [phone]

## 2018-05-18 RX ORDER — ESOMEPRAZOLE MAGNESIUM 40 MG/1
40 CAPSULE, DELAYED RELEASE ORAL
Qty: 90 CAPSULE | Refills: 3 | Status: ON HOLD | OUTPATIENT
Start: 2018-05-18 | End: 2019-07-18

## 2018-05-18 NOTE — TELEPHONE ENCOUNTER
Request received from [employer] for a 90 day supply on pt's esomeprazole.   Medication e-scribed.

## 2018-06-01 NOTE — ED NOTES
DIRECT BED REPORT TO TJ WESLEY  01/18/17 9498    
Pts PCP sent Pt to be evaluated due to low blood pressure and decreased appetite/intake post op. Pt had surgery on 01/13/17 to repair mulitple fractures of the right side of the face.     Nae Martell RN  01/18/17 1129    
normal

## 2018-07-13 ENCOUNTER — TRANSCRIBE ORDERS (OUTPATIENT)
Dept: ADMINISTRATIVE | Facility: HOSPITAL | Age: 78
End: 2018-07-13

## 2018-07-13 DIAGNOSIS — G91.2 NORMAL PRESSURE HYDROCEPHALUS (HCC): Primary | ICD-10-CM

## 2018-11-01 ENCOUNTER — TRANSCRIBE ORDERS (OUTPATIENT)
Dept: ADMINISTRATIVE | Facility: HOSPITAL | Age: 78
End: 2018-11-01

## 2018-11-01 ENCOUNTER — LAB (OUTPATIENT)
Dept: LAB | Facility: HOSPITAL | Age: 78
End: 2018-11-01
Attending: INTERNAL MEDICINE

## 2018-11-01 DIAGNOSIS — N18.2 CHRONIC KIDNEY DISEASE, STAGE II (MILD): ICD-10-CM

## 2018-11-01 DIAGNOSIS — N18.2 CHRONIC KIDNEY DISEASE, STAGE II (MILD): Primary | ICD-10-CM

## 2018-11-01 DIAGNOSIS — I12.9 HYPERTENSIVE NEPHROPATHY: ICD-10-CM

## 2018-11-01 DIAGNOSIS — E55.9 VITAMIN D DEFICIENCY: ICD-10-CM

## 2018-11-01 LAB
25(OH)D3 SERPL-MCNC: 60.3 NG/ML (ref 30–100)
ANION GAP SERPL CALCULATED.3IONS-SCNC: 13.6 MMOL/L
BACTERIA UR QL AUTO: ABNORMAL /HPF
BILIRUB UR QL STRIP: NEGATIVE
BUN BLD-MCNC: 26 MG/DL (ref 8–23)
BUN/CREAT SERPL: 19.7 (ref 7–25)
CALCIUM SPEC-SCNC: 10 MG/DL (ref 8.6–10.5)
CHLORIDE SERPL-SCNC: 102 MMOL/L (ref 98–107)
CLARITY UR: CLEAR
CO2 SERPL-SCNC: 22.4 MMOL/L (ref 22–29)
COLOR UR: YELLOW
CREAT BLD-MCNC: 1.32 MG/DL (ref 0.76–1.27)
DEPRECATED RDW RBC AUTO: 45.7 FL (ref 37–54)
ERYTHROCYTE [DISTWIDTH] IN BLOOD BY AUTOMATED COUNT: 13.2 % (ref 11.5–14.5)
GFR SERPL CREATININE-BSD FRML MDRD: 52 ML/MIN/1.73
GLUCOSE BLD-MCNC: 252 MG/DL (ref 65–99)
GLUCOSE UR STRIP-MCNC: ABNORMAL MG/DL
HCT VFR BLD AUTO: 44.5 % (ref 40.4–52.2)
HGB BLD-MCNC: 14.1 G/DL (ref 13.7–17.6)
HGB UR QL STRIP.AUTO: ABNORMAL
HYALINE CASTS UR QL AUTO: ABNORMAL /LPF
KETONES UR QL STRIP: ABNORMAL
LEUKOCYTE ESTERASE UR QL STRIP.AUTO: ABNORMAL
MCH RBC QN AUTO: 30.3 PG (ref 27–32.7)
MCHC RBC AUTO-ENTMCNC: 31.7 G/DL (ref 32.6–36.4)
MCV RBC AUTO: 95.5 FL (ref 79.8–96.2)
NITRITE UR QL STRIP: NEGATIVE
PH UR STRIP.AUTO: <=5 [PH] (ref 5–8)
PLATELET # BLD AUTO: 215 10*3/MM3 (ref 140–500)
PMV BLD AUTO: 10.9 FL (ref 6–12)
POTASSIUM BLD-SCNC: 5 MMOL/L (ref 3.5–5.2)
PROT UR QL STRIP: ABNORMAL
RBC # BLD AUTO: 4.66 10*6/MM3 (ref 4.6–6)
RBC # UR: ABNORMAL /HPF
REF LAB TEST METHOD: ABNORMAL
SODIUM BLD-SCNC: 138 MMOL/L (ref 136–145)
SP GR UR STRIP: 1.02 (ref 1–1.03)
SQUAMOUS #/AREA URNS HPF: ABNORMAL /HPF
UROBILINOGEN UR QL STRIP: ABNORMAL
WBC NRBC COR # BLD: 8.75 10*3/MM3 (ref 4.5–10.7)
WBC UR QL AUTO: ABNORMAL /HPF

## 2018-11-01 PROCEDURE — 80048 BASIC METABOLIC PNL TOTAL CA: CPT

## 2018-11-01 PROCEDURE — 82306 VITAMIN D 25 HYDROXY: CPT

## 2018-11-01 PROCEDURE — 81001 URINALYSIS AUTO W/SCOPE: CPT

## 2018-11-01 PROCEDURE — 85027 COMPLETE CBC AUTOMATED: CPT

## 2018-11-01 PROCEDURE — 36415 COLL VENOUS BLD VENIPUNCTURE: CPT

## 2018-11-05 ENCOUNTER — OFFICE VISIT (OUTPATIENT)
Dept: GASTROENTEROLOGY | Facility: CLINIC | Age: 78
End: 2018-11-05

## 2018-11-05 VITALS
HEIGHT: 70 IN | BODY MASS INDEX: 26.6 KG/M2 | TEMPERATURE: 97.6 F | SYSTOLIC BLOOD PRESSURE: 136 MMHG | WEIGHT: 185.8 LBS | DIASTOLIC BLOOD PRESSURE: 80 MMHG

## 2018-11-05 DIAGNOSIS — K22.70 BARRETT'S ESOPHAGUS WITHOUT DYSPLASIA: ICD-10-CM

## 2018-11-05 DIAGNOSIS — K31.5 DUODENAL STENOSIS: ICD-10-CM

## 2018-11-05 DIAGNOSIS — R13.19 ESOPHAGEAL DYSPHAGIA: Primary | ICD-10-CM

## 2018-11-05 PROCEDURE — 99213 OFFICE O/P EST LOW 20 MIN: CPT | Performed by: INTERNAL MEDICINE

## 2018-11-05 RX ORDER — MIRABEGRON 25 MG/1
30 TABLET, FILM COATED, EXTENDED RELEASE ORAL DAILY
COMMUNITY
Start: 2018-10-30 | End: 2019-11-19

## 2018-11-13 ENCOUNTER — HOSPITAL ENCOUNTER (OUTPATIENT)
Dept: CT IMAGING | Facility: HOSPITAL | Age: 78
Discharge: HOME | End: 2018-11-13
Attending: INTERNAL MEDICINE | Admitting: INTERNAL MEDICINE

## 2018-11-13 DIAGNOSIS — G91.2 NORMAL PRESSURE HYDROCEPHALUS (HCC): ICD-10-CM

## 2018-11-13 PROCEDURE — 70450 CT HEAD/BRAIN W/O DYE: CPT

## 2018-11-21 ENCOUNTER — TRANSCRIBE ORDERS (OUTPATIENT)
Dept: ADMINISTRATIVE | Facility: HOSPITAL | Age: 78
End: 2018-11-21

## 2018-11-21 DIAGNOSIS — R91.1 PULMONARY NODULE, LEFT: Primary | ICD-10-CM

## 2018-11-27 ENCOUNTER — HOSPITAL ENCOUNTER (OUTPATIENT)
Dept: CT IMAGING | Facility: HOSPITAL | Age: 78
Discharge: HOME | End: 2018-11-27
Attending: INTERNAL MEDICINE | Admitting: INTERNAL MEDICINE

## 2018-11-27 DIAGNOSIS — R91.1 PULMONARY NODULE, LEFT: ICD-10-CM

## 2018-11-27 PROCEDURE — 71250 CT THORAX DX C-: CPT

## 2019-04-11 ENCOUNTER — TRANSCRIBE ORDERS (OUTPATIENT)
Dept: ADMINISTRATIVE | Facility: HOSPITAL | Age: 79
End: 2019-04-11

## 2019-04-11 DIAGNOSIS — R06.00 DYSPNEA, UNSPECIFIED TYPE: Primary | ICD-10-CM

## 2019-04-18 ENCOUNTER — HOSPITAL ENCOUNTER (OUTPATIENT)
Dept: CT IMAGING | Facility: HOSPITAL | Age: 79
Discharge: HOME | End: 2019-04-18
Admitting: INTERNAL MEDICINE

## 2019-04-18 DIAGNOSIS — R06.00 DYSPNEA, UNSPECIFIED TYPE: ICD-10-CM

## 2019-04-18 PROCEDURE — 71250 CT THORAX DX C-: CPT

## 2019-04-18 PROCEDURE — 70490 CT SOFT TISSUE NECK W/O DYE: CPT

## 2019-04-25 ENCOUNTER — OFFICE VISIT (OUTPATIENT)
Dept: CARDIOLOGY | Facility: CLINIC | Age: 79
End: 2019-04-25

## 2019-04-25 VITALS
BODY MASS INDEX: 27.49 KG/M2 | WEIGHT: 192 LBS | DIASTOLIC BLOOD PRESSURE: 80 MMHG | OXYGEN SATURATION: 95 % | SYSTOLIC BLOOD PRESSURE: 146 MMHG | HEART RATE: 85 BPM | HEIGHT: 70 IN

## 2019-04-25 DIAGNOSIS — I25.10 CORONARY ARTERY DISEASE INVOLVING NATIVE CORONARY ARTERY OF NATIVE HEART WITHOUT ANGINA PECTORIS: Primary | ICD-10-CM

## 2019-04-25 PROCEDURE — 99213 OFFICE O/P EST LOW 20 MIN: CPT | Performed by: PHYSICIAN ASSISTANT

## 2019-04-25 PROCEDURE — 93000 ELECTROCARDIOGRAM COMPLETE: CPT | Performed by: PHYSICIAN ASSISTANT

## 2019-04-25 NOTE — PROGRESS NOTES
Date of Office Visit: 2019  Encounter Provider: ALON Steele  Place of Service: [facility] CARDIOLOGY  Patient Name: Eugenio Joe  :1940    Chief Complaint   Patient presents with   • Coronary Artery Disease     1 year follow up   :     HPI: Eugenio Joe is a 78 y.o. male who presents today for follow-up.  Old records have been obtained and reviewed by me.  He is a patient of Dr. Ford's with a past cardiac history significant for coronary artery disease.  He had a CABG x3 in  with a LIMA to the LAD and vein graft to the ramus and OM.  He has done well since that time.  He was last in our office to see Dr. Ford on 2018.  At that visit he was doing well from a cardiac standpoint, however he had endured some other medical issues.  He had a pulmonary embolus, possible hydrocephalus, and possible TIA.  Dr. Ford remarks that they did an echocardiogram at the time of his TIA and read it as a large PFO, however Dr. Ford felt that it was more consistent with a large intra-atrial septum.  Dr. Ford felt that from a cardiac standpoint he was doing well and he did not make any changes.  He is here today for yearly visit.  Dr. Ford's recommendation was to check a stress test sometime this year.   Over the past year he has been doing really well.  It sounds like things have finally quieted down for him.  He says he was diagnosed with normal pressure hydrocephalus.  He states that he saw 2 neurosurgeons, and both of them said that it was not bad enough for them to warrant putting a stent in at this time.  He has been exercising 3 days a week at the [facility].  He can do this without difficulty.  He denies any chest pain, shortness of breath, palpitations, edema, dizziness, or syncope.    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 SWEAT  GLAND    • Cardiac disease    • Coronary artery disease     CABG    • Diabetes mellitus (CMS/HCC)    • Gout several years ago    medication  working   • Headache 11/15/2017   • Hearing aid worn     bilateral   • History of transfusion    • Hydrocephaly    • Hyperlipemia    • Hypertension    • Joint pain     or swelling   • Low back pain several years ago   • Orbit fracture (CMS/HCC)    • Pulmonary embolism (CMS/HCC)     OCT 2016   • Sinus infection        Past Surgical History:   Procedure Laterality Date   • BRAIN SURGERY      BRAIN ABCESS 30 YR AGO   • CARDIAC SURGERY     • COLONOSCOPY      Ciciliano- normal per pt, no polyps    • CORONARY ARTERY BYPASS GRAFT     • ENDOSCOPY N/A 3/9/2017    Schatzki ring, dilated, LA Grade B esophagitis, HH, acquired duodenal stenosis   • ENDOSCOPY N/A 2018    candida, HH, torts, Schatzki ring, duodenal stenosis, dilatation perforemed, chronic inflammation   • FACIAL FRACTURE SURGERY      to repair 6 broken bones   • ORBITAL FRACTURE OPEN REDUCTION INTERNAL FIXATION Right 2017    Procedure: RT ORBIT FLOOR FRACTURE REPAIR RIGHT ZMC FRACTURE REPAIR, RIGHT NASAL BONE FRACTURE CLOSED REDUCTION;  Surgeon: Edil Lester MD;  [place] [facility] [place] [facility]  Service:    • ORIF FOOT FRACTURE Right 2016    Procedure: RIGHT SECOND METATARSAL OPEN REDUCTION INTERNAL FIXATION WITh GRAFT FROM HEEL ;  Surgeon: Man Jenkins MD;  [place] [facility] [place] [facility]  Service:    • SEPTOPLASTY     • SKIN CANCER EXCISION     • TONSILLECTOMY         Social History     Socioeconomic History   • Marital status:      Spouse name: Not on file   • Number of children: Not on file   • Years of education: Not on file   • Highest education level: Not on file   Tobacco Use   • Smoking status: Former Smoker     Packs/day: 2.00     Years: 5.00     Pack years: 10.00     Types: Cigarettes     Start date: 1959     Last attempt to quit: 3/9/1962     Years since quittin.1   •  Smokeless tobacco: Never Used   • Tobacco comment: Quit cold turkey   Substance and Sexual Activity   • Alcohol use: Yes     Alcohol/week: 1.2 oz     Types: 1 Cans of beer, 1 Shots of liquor per week     Comment: OCC   • Drug use: No   • Sexual activity: Yes     Partners: Female     Birth control/protection: Surgical       Family History   Problem Relation Age of Onset   • Heart disease Mother    • Hypertension Mother    • Stroke Mother    • Diverticulitis Mother    • Heart disease Father    • Hypertension Father        Review of Systems   Constitution: Negative for chills, fever and malaise/fatigue.   Cardiovascular: Negative for chest pain, dyspnea on exertion, leg swelling, near-syncope, orthopnea, palpitations, paroxysmal nocturnal dyspnea and syncope.   Respiratory: Negative for cough and shortness of breath.    Musculoskeletal: Negative for joint pain, joint swelling and myalgias.   Gastrointestinal: Negative for abdominal pain, diarrhea, melena, nausea and vomiting.   Genitourinary: Negative for frequency and hematuria.   Neurological: Negative for light-headedness, numbness, paresthesias and seizures.   Allergic/Immunologic: Negative.    All other systems reviewed and are negative.      Allergies   Allergen Reactions   • Other Mental Status Change and Hallucinations     Oxy drugs   • Oxycodone Mental Status Change         Current Outpatient Medications:   •  allopurinol (ZYLOPRIM) 300 MG tablet, Take 300 mg by mouth daily., Disp: , Rfl:   •  aspirin 81 MG EC tablet, Take 1 tablet by mouth Daily., Disp: 30 tablet, Rfl: 0  •  esomeprazole (nexIUM) 40 MG capsule, Take 1 capsule by mouth Every Morning Before Breakfast., Disp: 90 capsule, Rfl: 3  •  fluticasone-salmeterol (ADVAIR) 250-50 MCG/DOSE DISKUS, Inhale 1 puff Every Evening. Advair Diskus 250-50 MCG/DOSE Inhalation Aerosol Powder Breath Activated; Patient Sig: Advair Diskus 250-50 MCG/DOSE Inhalation Aerosol Powder Breath Activated INHALE 1 PUFF BID; 60;  "0; 15-Oct-2012; Active, Disp: , Rfl:   •  glucose blood (FREESTYLE LITE) test strip, 1 each by Other route See Admin Instructions. Use 1 to 2 times daily as instructed, Disp: , Rfl:   •  lisinopril (PRINIVIL,ZESTRIL) 40 MG tablet, Take 1 tablet by mouth Daily., Disp: 30 tablet, Rfl: 0  •  metFORMIN (GLUCOPHAGE) 500 MG tablet, Take 500 mg by mouth 2 (Two) Times a Day With Meals., Disp: , Rfl:   •  Multiple Vitamins-Minerals (MULTIVITAMIN ADULT PO), Take 1 tablet by mouth Daily., Disp: , Rfl:   •  MYRBETRIQ 25 MG tablet sustained-release 24 hour 24 hr tablet, , Disp: , Rfl:   •  PROAIR  (90 BASE) MCG/ACT inhaler, 2 puffs Every 4 (Four) Hours As Needed., Disp: , Rfl:   •  simvastatin (ZOCOR) 40 MG tablet, Take 40 mg by mouth every night., Disp: , Rfl:   •  Vitamin D, Cholecalciferol, (CHOLECALCIFEROL) 400 units tablet, Take 400 Units by mouth Daily., Disp: , Rfl:       Objective:     Vitals:    04/25/19 1257 04/25/19 1308   BP: 140/80 146/80   BP Location: Right arm Left arm   Pulse: 85    SpO2: 95%    Weight: 87.1 kg (192 lb)    Height: 177.8 cm (70\")      Body mass index is 27.55 kg/m².    PHYSICAL EXAM:    Physical Exam   Constitutional: He is oriented to person, place, and time. He appears well-developed and well-nourished. No distress.   HENT:   Head: Normocephalic and atraumatic.   Eyes: Pupils are equal, round, and reactive to light.   Neck: No JVD present. No thyromegaly present.   Cardiovascular: Normal rate, regular rhythm, normal heart sounds and intact distal pulses.   No murmur heard.  Pulmonary/Chest: Effort normal and breath sounds normal. No respiratory distress.   Abdominal: Soft. Bowel sounds are normal. He exhibits no distension. There is no splenomegaly or hepatomegaly. There is no tenderness.   Musculoskeletal: Normal range of motion. He exhibits no edema.   Neurological: He is alert and oriented to person, place, and time.   Skin: Skin is warm and dry. He is not diaphoretic. No erythema. "   Psychiatric: He has a normal mood and affect. His behavior is normal. Judgment normal.         ECG 12 Lead  Date/Time: 4/25/2019 1:16 PM  Performed by: Yulissa Stallings PA  Authorized by: Yulissa Stallings PA   Comparison: compared with previous ECG from 4/13/2018  Similar to previous ECG  Rhythm: sinus rhythm  BPM: 83    Clinical impression: normal ECG  Comments: Indication: Coronary disease              Assessment:       Diagnosis Plan   1. Coronary artery disease involving native coronary artery of native heart without angina pectoris  Stress Test With Myocardial Perfusion One Day    ECG 12 Lead     Orders Placed This Encounter   Procedures   • Stress Test With Myocardial Perfusion One Day     Standing Status:   Future     Standing Expiration Date:   4/24/2020     Order Specific Question:   Rest/stress, rest only or stress only?     Answer:   Rest/Stress     Order Specific Question:   What stress agent will be used?     Answer:   Regadenoson (Lexiscan)     Order Specific Question:   Difficulty walking criteria?     Answer:   Poor Exercise Tolerance     Order Specific Question:   Reason for exam?     Answer:   Known Coronary Artery Disease     Order Specific Question:   Known CAD specification?     Answer:   Other   • ECG 12 Lead     This order was created via procedure documentation          Plan:       Overall he stable and doing really well.  I think that finally things are starting to level out for him.  He is able to exercise 3 times a week without difficulty.  Per Dr. Ford's recommendations, I am going to check a nuclear stress test sometime in the next week or so and he will follow-up with Dr. Ford in 1 year or sooner if needed.    Coronary Artery Disease  Assessment  • The patient has no angina    Plan  • Lifestyle modifications discussed include regular exercise    Subjective - Objective  • There is a history of previous coronary artery bypass graft  • Current antiplatelet therapy includes aspirin  81 mg        As always, it has been a pleasure to participate in your patient's care.      Sincerely,         Yulissa Stallings PA-C

## 2019-04-29 ENCOUNTER — HOSPITAL ENCOUNTER (OUTPATIENT)
Dept: CARDIOLOGY | Facility: HOSPITAL | Age: 79
Discharge: HOME | End: 2019-04-29
Admitting: PHYSICIAN ASSISTANT

## 2019-04-29 ENCOUNTER — TELEPHONE (OUTPATIENT)
Dept: CARDIOLOGY | Facility: CLINIC | Age: 79
End: 2019-04-29

## 2019-04-29 VITALS — BODY MASS INDEX: 26.48 KG/M2 | WEIGHT: 185 LBS | HEIGHT: 70 IN

## 2019-04-29 DIAGNOSIS — I25.10 CORONARY ARTERY DISEASE INVOLVING NATIVE CORONARY ARTERY OF NATIVE HEART WITHOUT ANGINA PECTORIS: ICD-10-CM

## 2019-04-29 LAB
BH CV NUCLEAR PRIOR STUDY: 3
BH CV STRESS BP STAGE 1: NORMAL
BH CV STRESS COMMENTS STAGE 1: NORMAL
BH CV STRESS DOSE REGADENOSON STAGE 1: 0.4
BH CV STRESS DURATION MIN STAGE 1: 0
BH CV STRESS DURATION SEC STAGE 1: 10
BH CV STRESS HR STAGE 1: 103
BH CV STRESS PROTOCOL 1: NORMAL
BH CV STRESS RECOVERY BP: NORMAL MMHG
BH CV STRESS RECOVERY HR: 95 BPM
BH CV STRESS STAGE 1: 1
LV EF NUC BP: 64 %
MAXIMAL PREDICTED HEART RATE: 141 BPM
PERCENT MAX PREDICTED HR: 73.05 %
STRESS BASELINE BP: NORMAL MMHG
STRESS BASELINE HR: 78 BPM
STRESS PERCENT HR: 86 %
STRESS POST EXERCISE DUR SEC: 10 SEC
STRESS POST PEAK BP: NORMAL MMHG
STRESS POST PEAK HR: 103 BPM
STRESS TARGET HR: 120 BPM

## 2019-04-29 PROCEDURE — 93016 CV STRESS TEST SUPVJ ONLY: CPT | Performed by: INTERNAL MEDICINE

## 2019-04-29 PROCEDURE — 0 RUBIDIUM CHLORIDE: Performed by: PHYSICIAN ASSISTANT

## 2019-04-29 PROCEDURE — 78492 MYOCRD IMG PET MLT RST&STRS: CPT | Performed by: INTERNAL MEDICINE

## 2019-04-29 PROCEDURE — A9555 RB82 RUBIDIUM: HCPCS | Performed by: PHYSICIAN ASSISTANT

## 2019-04-29 PROCEDURE — 93018 CV STRESS TEST I&R ONLY: CPT | Performed by: INTERNAL MEDICINE

## 2019-04-29 PROCEDURE — 93017 CV STRESS TEST TRACING ONLY: CPT

## 2019-04-29 PROCEDURE — 78492 MYOCRD IMG PET MLT RST&STRS: CPT

## 2019-04-29 PROCEDURE — 25010000002 REGADENOSON 0.4 MG/5ML SOLUTION: Performed by: PHYSICIAN ASSISTANT

## 2019-04-29 RX ADMIN — RUBIDIUM CHLORIDE RB-82 1 DOSE: 150 INJECTION, SOLUTION INTRAVENOUS at 11:44

## 2019-04-29 RX ADMIN — REGADENOSON 0.4 MG: 0.08 INJECTION, SOLUTION INTRAVENOUS at 11:44

## 2019-04-29 RX ADMIN — RUBIDIUM CHLORIDE RB-82 1 DOSE: 150 INJECTION, SOLUTION INTRAVENOUS at 11:35

## 2019-06-22 ENCOUNTER — APPOINTMENT (OUTPATIENT)
Dept: CT IMAGING | Facility: HOSPITAL | Age: 79
End: 2019-06-22

## 2019-06-22 ENCOUNTER — HOSPITAL ENCOUNTER (OUTPATIENT)
Facility: HOSPITAL | Age: 79
Setting detail: OBSERVATION
Discharge: HOME | End: 2019-06-24
Attending: EMERGENCY MEDICINE | Admitting: HOSPITALIST

## 2019-06-22 DIAGNOSIS — R47.01 APHASIA: ICD-10-CM

## 2019-06-22 DIAGNOSIS — G45.9 TIA (TRANSIENT ISCHEMIC ATTACK): Primary | ICD-10-CM

## 2019-06-22 LAB
ALBUMIN SERPL-MCNC: 4.3 G/DL (ref 3.5–5.2)
ALBUMIN/GLOB SERPL: 1.6 G/DL
ALP SERPL-CCNC: 52 U/L (ref 39–117)
ALT SERPL W P-5'-P-CCNC: 14 U/L (ref 1–41)
ANION GAP SERPL CALCULATED.3IONS-SCNC: 14.7 MMOL/L
APTT PPP: 23.6 SECONDS (ref 22.7–35.4)
AST SERPL-CCNC: 11 U/L (ref 1–40)
BASOPHILS # BLD AUTO: 0.05 10*3/MM3 (ref 0–0.2)
BASOPHILS NFR BLD AUTO: 0.7 % (ref 0–1.5)
BILIRUB SERPL-MCNC: 0.3 MG/DL (ref 0.2–1.2)
BUN BLD-MCNC: 20 MG/DL (ref 8–23)
BUN/CREAT SERPL: 18.2 (ref 7–25)
CALCIUM SPEC-SCNC: 9.5 MG/DL (ref 8.6–10.5)
CHLORIDE SERPL-SCNC: 101 MMOL/L (ref 98–107)
CO2 SERPL-SCNC: 21.3 MMOL/L (ref 22–29)
CREAT BLD-MCNC: 1.1 MG/DL (ref 0.76–1.27)
DEPRECATED RDW RBC AUTO: 46.8 FL (ref 37–54)
EOSINOPHIL # BLD AUTO: 0.29 10*3/MM3 (ref 0–0.4)
EOSINOPHIL NFR BLD AUTO: 4.2 % (ref 0.3–6.2)
ERYTHROCYTE [DISTWIDTH] IN BLOOD BY AUTOMATED COUNT: 13.4 % (ref 12.3–15.4)
GFR SERPL CREATININE-BSD FRML MDRD: 65 ML/MIN/1.73
GLOBULIN UR ELPH-MCNC: 2.7 GM/DL
GLUCOSE BLD-MCNC: 222 MG/DL (ref 65–99)
GLUCOSE BLDC GLUCOMTR-MCNC: 212 MG/DL (ref 70–130)
HCT VFR BLD AUTO: 39.2 % (ref 37.5–51)
HGB BLD-MCNC: 13.1 G/DL (ref 13–17.7)
IMM GRANULOCYTES # BLD AUTO: 0.02 10*3/MM3 (ref 0–0.05)
IMM GRANULOCYTES NFR BLD AUTO: 0.3 % (ref 0–0.5)
INR PPP: 0.96 (ref 0.9–1.1)
LYMPHOCYTES # BLD AUTO: 2.37 10*3/MM3 (ref 0.7–3.1)
LYMPHOCYTES NFR BLD AUTO: 34.1 % (ref 19.6–45.3)
MCH RBC QN AUTO: 31.7 PG (ref 26.6–33)
MCHC RBC AUTO-ENTMCNC: 33.4 G/DL (ref 31.5–35.7)
MCV RBC AUTO: 94.9 FL (ref 79–97)
MONOCYTES # BLD AUTO: 0.54 10*3/MM3 (ref 0.1–0.9)
MONOCYTES NFR BLD AUTO: 7.8 % (ref 5–12)
NEUTROPHILS # BLD AUTO: 3.67 10*3/MM3 (ref 1.7–7)
NEUTROPHILS NFR BLD AUTO: 52.9 % (ref 42.7–76)
NRBC BLD AUTO-RTO: 0 /100 WBC (ref 0–0.2)
PLATELET # BLD AUTO: 199 10*3/MM3 (ref 140–450)
PMV BLD AUTO: 10.7 FL (ref 6–12)
POTASSIUM BLD-SCNC: 4.2 MMOL/L (ref 3.5–5.2)
PROT SERPL-MCNC: 7 G/DL (ref 6–8.5)
PROTHROMBIN TIME: 12.5 SECONDS (ref 11.7–14.2)
RBC # BLD AUTO: 4.13 10*6/MM3 (ref 4.14–5.8)
SODIUM BLD-SCNC: 137 MMOL/L (ref 136–145)
TROPONIN T SERPL-MCNC: <0.01 NG/ML (ref 0–0.03)
WBC NRBC COR # BLD: 6.94 10*3/MM3 (ref 3.4–10.8)

## 2019-06-22 PROCEDURE — 93005 ELECTROCARDIOGRAM TRACING: CPT | Performed by: EMERGENCY MEDICINE

## 2019-06-22 PROCEDURE — 80053 COMPREHEN METABOLIC PANEL: CPT | Performed by: EMERGENCY MEDICINE

## 2019-06-22 PROCEDURE — 93010 ELECTROCARDIOGRAM REPORT: CPT | Performed by: INTERNAL MEDICINE

## 2019-06-22 PROCEDURE — 84484 ASSAY OF TROPONIN QUANT: CPT | Performed by: EMERGENCY MEDICINE

## 2019-06-22 PROCEDURE — 85610 PROTHROMBIN TIME: CPT | Performed by: EMERGENCY MEDICINE

## 2019-06-22 PROCEDURE — 82962 GLUCOSE BLOOD TEST: CPT

## 2019-06-22 PROCEDURE — 70450 CT HEAD/BRAIN W/O DYE: CPT

## 2019-06-22 PROCEDURE — 84443 ASSAY THYROID STIM HORMONE: CPT | Performed by: PSYCHIATRY & NEUROLOGY

## 2019-06-22 PROCEDURE — 85025 COMPLETE CBC W/AUTO DIFF WBC: CPT | Performed by: EMERGENCY MEDICINE

## 2019-06-22 PROCEDURE — 85730 THROMBOPLASTIN TIME PARTIAL: CPT | Performed by: EMERGENCY MEDICINE

## 2019-06-22 PROCEDURE — 99285 EMERGENCY DEPT VISIT HI MDM: CPT

## 2019-06-22 RX ORDER — SODIUM CHLORIDE 0.9 % (FLUSH) 0.9 %
10 SYRINGE (ML) INJECTION AS NEEDED
Status: DISCONTINUED | OUTPATIENT
Start: 2019-06-22 | End: 2019-06-24 | Stop reason: HOSPADM

## 2019-06-23 ENCOUNTER — APPOINTMENT (OUTPATIENT)
Dept: MRI IMAGING | Facility: HOSPITAL | Age: 79
End: 2019-06-23

## 2019-06-23 ENCOUNTER — APPOINTMENT (OUTPATIENT)
Dept: CARDIOLOGY | Facility: HOSPITAL | Age: 79
End: 2019-06-23

## 2019-06-23 PROBLEM — G45.9 TIA (TRANSIENT ISCHEMIC ATTACK): Status: ACTIVE | Noted: 2019-06-23

## 2019-06-23 LAB
AORTIC DIMENSIONLESS INDEX: 0.8 (DI)
BH CV ECHO MEAS - AI DEC SLOPE: 333 CM/SEC^2
BH CV ECHO MEAS - AI MAX PG: 66.9 MMHG
BH CV ECHO MEAS - AI MAX VEL: 409 CM/SEC
BH CV ECHO MEAS - AI P1/2T: 359.7 MSEC
BH CV ECHO MEAS - AO MAX PG (FULL): 2 MMHG
BH CV ECHO MEAS - AO MAX PG: 5.1 MMHG
BH CV ECHO MEAS - AO MEAN PG (FULL): 1 MMHG
BH CV ECHO MEAS - AO MEAN PG: 3 MMHG
BH CV ECHO MEAS - AO ROOT AREA (BSA CORRECTED): 1.4
BH CV ECHO MEAS - AO ROOT AREA: 6.6 CM^2
BH CV ECHO MEAS - AO ROOT DIAM: 2.9 CM
BH CV ECHO MEAS - AO V2 MAX: 113 CM/SEC
BH CV ECHO MEAS - AO V2 MEAN: 80.9 CM/SEC
BH CV ECHO MEAS - AO V2 VTI: 25.2 CM
BH CV ECHO MEAS - ASC AORTA: 3.4 CM
BH CV ECHO MEAS - AVA(I,A): 3.7 CM^2
BH CV ECHO MEAS - AVA(I,D): 3.7 CM^2
BH CV ECHO MEAS - AVA(V,A): 3.5 CM^2
BH CV ECHO MEAS - AVA(V,D): 3.5 CM^2
BH CV ECHO MEAS - BSA(HAYCOCK): 2.1 M^2
BH CV ECHO MEAS - BSA: 2.1 M^2
BH CV ECHO MEAS - BZI_BMI: 27.8 KILOGRAMS/M^2
BH CV ECHO MEAS - BZI_METRIC_HEIGHT: 177.8 CM
BH CV ECHO MEAS - BZI_METRIC_WEIGHT: 88 KG
BH CV ECHO MEAS - EDV(CUBED): 117.6 ML
BH CV ECHO MEAS - EDV(MOD-SP2): 111 ML
BH CV ECHO MEAS - EDV(MOD-SP4): 115 ML
BH CV ECHO MEAS - EDV(TEICH): 112.8 ML
BH CV ECHO MEAS - EF(CUBED): 79.3 %
BH CV ECHO MEAS - EF(MOD-BP): 51 %
BH CV ECHO MEAS - EF(MOD-SP2): 52.3 %
BH CV ECHO MEAS - EF(MOD-SP4): 51.3 %
BH CV ECHO MEAS - EF(TEICH): 71.4 %
BH CV ECHO MEAS - ESV(CUBED): 24.4 ML
BH CV ECHO MEAS - ESV(MOD-SP2): 53 ML
BH CV ECHO MEAS - ESV(MOD-SP4): 56 ML
BH CV ECHO MEAS - ESV(TEICH): 32.2 ML
BH CV ECHO MEAS - FS: 40.8 %
BH CV ECHO MEAS - IVS/LVPW: 0.91
BH CV ECHO MEAS - IVSD: 1 CM
BH CV ECHO MEAS - LAT PEAK E' VEL: 6 CM/SEC
BH CV ECHO MEAS - LV DIASTOLIC VOL/BSA (35-75): 55.8 ML/M^2
BH CV ECHO MEAS - LV MASS(C)D: 188.1 GRAMS
BH CV ECHO MEAS - LV MASS(C)DI: 91.3 GRAMS/M^2
BH CV ECHO MEAS - LV MAX PG: 3.1 MMHG
BH CV ECHO MEAS - LV MEAN PG: 2 MMHG
BH CV ECHO MEAS - LV SYSTOLIC VOL/BSA (12-30): 27.2 ML/M^2
BH CV ECHO MEAS - LV V1 MAX: 87.9 CM/SEC
BH CV ECHO MEAS - LV V1 MEAN: 57.6 CM/SEC
BH CV ECHO MEAS - LV V1 VTI: 20.8 CM
BH CV ECHO MEAS - LVIDD: 4.9 CM
BH CV ECHO MEAS - LVIDS: 2.9 CM
BH CV ECHO MEAS - LVLD AP2: 8.2 CM
BH CV ECHO MEAS - LVLD AP4: 7.9 CM
BH CV ECHO MEAS - LVLS AP2: 6.9 CM
BH CV ECHO MEAS - LVLS AP4: 6.3 CM
BH CV ECHO MEAS - LVOT AREA (M): 4.5 CM^2
BH CV ECHO MEAS - LVOT AREA: 4.5 CM^2
BH CV ECHO MEAS - LVOT DIAM: 2.4 CM
BH CV ECHO MEAS - LVPWD: 1.1 CM
BH CV ECHO MEAS - MED PEAK E' VEL: 5 CM/SEC
BH CV ECHO MEAS - MV A DUR: 0.12 SEC
BH CV ECHO MEAS - MV A MAX VEL: 106 CM/SEC
BH CV ECHO MEAS - MV DEC SLOPE: 284 CM/SEC^2
BH CV ECHO MEAS - MV DEC TIME: 243 SEC
BH CV ECHO MEAS - MV E MAX VEL: 77.6 CM/SEC
BH CV ECHO MEAS - MV E/A: 0.73
BH CV ECHO MEAS - MV MAX PG: 6.4 MMHG
BH CV ECHO MEAS - MV MEAN PG: 2 MMHG
BH CV ECHO MEAS - MV P1/2T MAX VEL: 88.2 CM/SEC
BH CV ECHO MEAS - MV P1/2T: 91 MSEC
BH CV ECHO MEAS - MV V2 MAX: 126 CM/SEC
BH CV ECHO MEAS - MV V2 MEAN: 73.2 CM/SEC
BH CV ECHO MEAS - MV V2 VTI: 33.9 CM
BH CV ECHO MEAS - MVA P1/2T LCG: 2.5 CM^2
BH CV ECHO MEAS - MVA(P1/2T): 2.4 CM^2
BH CV ECHO MEAS - MVA(VTI): 2.8 CM^2
BH CV ECHO MEAS - PA ACC TIME: 0.08 SEC
BH CV ECHO MEAS - PA PR(ACCEL): 42.6 MMHG
BH CV ECHO MEAS - RAP SYSTOLE: 3 MMHG
BH CV ECHO MEAS - RV MAX PG: 2.2 MMHG
BH CV ECHO MEAS - RV MEAN PG: 1 MMHG
BH CV ECHO MEAS - RV V1 MAX: 73.9 CM/SEC
BH CV ECHO MEAS - RV V1 MEAN: 51.6 CM/SEC
BH CV ECHO MEAS - RV V1 VTI: 16.1 CM
BH CV ECHO MEAS - RVSP: 19 MMHG
BH CV ECHO MEAS - SI(AO): 80.8 ML/M^2
BH CV ECHO MEAS - SI(CUBED): 45.3 ML/M^2
BH CV ECHO MEAS - SI(LVOT): 45.7 ML/M^2
BH CV ECHO MEAS - SI(MOD-SP2): 28.1 ML/M^2
BH CV ECHO MEAS - SI(MOD-SP4): 28.6 ML/M^2
BH CV ECHO MEAS - SI(TEICH): 39.1 ML/M^2
BH CV ECHO MEAS - SV(AO): 166.5 ML
BH CV ECHO MEAS - SV(CUBED): 93.3 ML
BH CV ECHO MEAS - SV(LVOT): 94.1 ML
BH CV ECHO MEAS - SV(MOD-SP2): 58 ML
BH CV ECHO MEAS - SV(MOD-SP4): 59 ML
BH CV ECHO MEAS - SV(TEICH): 80.6 ML
BH CV ECHO MEAS - TAPSE (>1.6): 1.7 CM2
BH CV ECHO MEAS - TR MAX VEL: 196 CM/SEC
BH CV ECHO MEASUREMENTS AVERAGE E/E' RATIO: 14.11
BH CV XLRA - RV BASE: 3.2 CM
BH CV XLRA - TDI S': 8.4 CM/SEC
FOLATE SERPL-MCNC: >20 NG/ML (ref 4.78–24.2)
GLUCOSE BLDC GLUCOMTR-MCNC: 171 MG/DL (ref 70–130)
GLUCOSE BLDC GLUCOMTR-MCNC: 236 MG/DL (ref 70–130)
GLUCOSE BLDC GLUCOMTR-MCNC: 245 MG/DL (ref 70–130)
HCYS SERPL-MCNC: 8.6 UMOL/L (ref 0–15)
LEFT ATRIUM VOLUME INDEX: 30 ML/M2
LV EF 2D ECHO EST: 51 %
TSH SERPL DL<=0.05 MIU/L-ACNC: 2.41 MIU/ML (ref 0.27–4.2)
VIT B12 BLD-MCNC: 580 PG/ML (ref 211–946)

## 2019-06-23 PROCEDURE — G0378 HOSPITAL OBSERVATION PER HR: HCPCS

## 2019-06-23 PROCEDURE — 99214 OFFICE O/P EST MOD 30 MIN: CPT | Performed by: PSYCHIATRY & NEUROLOGY

## 2019-06-23 PROCEDURE — 82746 ASSAY OF FOLIC ACID SERUM: CPT | Performed by: PSYCHIATRY & NEUROLOGY

## 2019-06-23 PROCEDURE — 70553 MRI BRAIN STEM W/O & W/DYE: CPT

## 2019-06-23 PROCEDURE — 82607 VITAMIN B-12: CPT | Performed by: PSYCHIATRY & NEUROLOGY

## 2019-06-23 PROCEDURE — 96360 HYDRATION IV INFUSION INIT: CPT

## 2019-06-23 PROCEDURE — 97161 PT EVAL LOW COMPLEX 20 MIN: CPT | Performed by: PHYSICAL THERAPIST

## 2019-06-23 PROCEDURE — A9577 INJ MULTIHANCE: HCPCS | Performed by: HOSPITALIST

## 2019-06-23 PROCEDURE — 94799 UNLISTED PULMONARY SVC/PX: CPT

## 2019-06-23 PROCEDURE — 96361 HYDRATE IV INFUSION ADD-ON: CPT

## 2019-06-23 PROCEDURE — 94640 AIRWAY INHALATION TREATMENT: CPT

## 2019-06-23 PROCEDURE — 82962 GLUCOSE BLOOD TEST: CPT

## 2019-06-23 PROCEDURE — 70544 MR ANGIOGRAPHY HEAD W/O DYE: CPT

## 2019-06-23 PROCEDURE — 83090 ASSAY OF HOMOCYSTEINE: CPT | Performed by: PSYCHIATRY & NEUROLOGY

## 2019-06-23 PROCEDURE — 63710000001 INSULIN LISPRO (HUMAN) PER 5 UNITS: Performed by: INTERNAL MEDICINE

## 2019-06-23 PROCEDURE — 93306 TTE W/DOPPLER COMPLETE: CPT

## 2019-06-23 PROCEDURE — 86592 SYPHILIS TEST NON-TREP QUAL: CPT | Performed by: PSYCHIATRY & NEUROLOGY

## 2019-06-23 PROCEDURE — 70549 MR ANGIOGRAPH NECK W/O&W/DYE: CPT

## 2019-06-23 PROCEDURE — 93306 TTE W/DOPPLER COMPLETE: CPT | Performed by: INTERNAL MEDICINE

## 2019-06-23 PROCEDURE — 0 GADOBENATE DIMEGLUMINE 529 MG/ML SOLUTION: Performed by: HOSPITALIST

## 2019-06-23 RX ORDER — OXYBUTYNIN CHLORIDE 5 MG/1
5 TABLET, EXTENDED RELEASE ORAL DAILY
Status: DISCONTINUED | OUTPATIENT
Start: 2019-06-23 | End: 2019-06-24 | Stop reason: HOSPADM

## 2019-06-23 RX ORDER — SODIUM CHLORIDE 9 MG/ML
75 INJECTION, SOLUTION INTRAVENOUS CONTINUOUS
Status: DISCONTINUED | OUTPATIENT
Start: 2019-06-23 | End: 2019-06-23

## 2019-06-23 RX ORDER — ALLOPURINOL 300 MG/1
300 TABLET ORAL DAILY
Status: DISCONTINUED | OUTPATIENT
Start: 2019-06-23 | End: 2019-06-24 | Stop reason: HOSPADM

## 2019-06-23 RX ORDER — DEXTROSE MONOHYDRATE 25 G/50ML
25 INJECTION, SOLUTION INTRAVENOUS
Status: DISCONTINUED | OUTPATIENT
Start: 2019-06-23 | End: 2019-06-24 | Stop reason: HOSPADM

## 2019-06-23 RX ORDER — OMEGA-3S/DHA/EPA/FISH OIL/D3 300MG-1000
400 CAPSULE ORAL DAILY
Status: DISCONTINUED | OUTPATIENT
Start: 2019-06-23 | End: 2019-06-24 | Stop reason: HOSPADM

## 2019-06-23 RX ORDER — SODIUM CHLORIDE 0.9 % (FLUSH) 0.9 %
3-10 SYRINGE (ML) INJECTION AS NEEDED
Status: DISCONTINUED | OUTPATIENT
Start: 2019-06-23 | End: 2019-06-24 | Stop reason: HOSPADM

## 2019-06-23 RX ORDER — ALBUTEROL SULFATE 2.5 MG/3ML
2.5 SOLUTION RESPIRATORY (INHALATION) EVERY 6 HOURS PRN
Status: DISCONTINUED | OUTPATIENT
Start: 2019-06-23 | End: 2019-06-24 | Stop reason: HOSPADM

## 2019-06-23 RX ORDER — PANTOPRAZOLE SODIUM 40 MG/1
40 TABLET, DELAYED RELEASE ORAL EVERY MORNING
Status: DISCONTINUED | OUTPATIENT
Start: 2019-06-23 | End: 2019-06-24 | Stop reason: HOSPADM

## 2019-06-23 RX ORDER — ASPIRIN 81 MG/1
81 TABLET ORAL DAILY
Status: DISCONTINUED | OUTPATIENT
Start: 2019-06-23 | End: 2019-06-24 | Stop reason: HOSPADM

## 2019-06-23 RX ORDER — CLOPIDOGREL BISULFATE 75 MG/1
300 TABLET ORAL ONCE
Status: COMPLETED | OUTPATIENT
Start: 2019-06-23 | End: 2019-06-23

## 2019-06-23 RX ORDER — SODIUM CHLORIDE 0.9 % (FLUSH) 0.9 %
3 SYRINGE (ML) INJECTION EVERY 12 HOURS SCHEDULED
Status: DISCONTINUED | OUTPATIENT
Start: 2019-06-23 | End: 2019-06-24 | Stop reason: HOSPADM

## 2019-06-23 RX ORDER — BUDESONIDE AND FORMOTEROL FUMARATE DIHYDRATE 160; 4.5 UG/1; UG/1
2 AEROSOL RESPIRATORY (INHALATION)
Status: DISCONTINUED | OUTPATIENT
Start: 2019-06-23 | End: 2019-06-24 | Stop reason: HOSPADM

## 2019-06-23 RX ORDER — CLOPIDOGREL BISULFATE 75 MG/1
75 TABLET ORAL DAILY
Status: DISCONTINUED | OUTPATIENT
Start: 2019-06-24 | End: 2019-06-24 | Stop reason: HOSPADM

## 2019-06-23 RX ORDER — LISINOPRIL 10 MG/1
10 TABLET ORAL 2 TIMES DAILY
Status: DISCONTINUED | OUTPATIENT
Start: 2019-06-23 | End: 2019-06-24 | Stop reason: HOSPADM

## 2019-06-23 RX ORDER — ONDANSETRON 2 MG/ML
4 INJECTION INTRAMUSCULAR; INTRAVENOUS EVERY 6 HOURS PRN
Status: DISCONTINUED | OUTPATIENT
Start: 2019-06-23 | End: 2019-06-24 | Stop reason: HOSPADM

## 2019-06-23 RX ORDER — MULTIPLE VITAMINS W/ MINERALS TAB 9MG-400MCG
1 TAB ORAL DAILY
Status: DISCONTINUED | OUTPATIENT
Start: 2019-06-23 | End: 2019-06-24 | Stop reason: HOSPADM

## 2019-06-23 RX ORDER — ATORVASTATIN CALCIUM 20 MG/1
20 TABLET, FILM COATED ORAL DAILY
Status: DISCONTINUED | OUTPATIENT
Start: 2019-06-23 | End: 2019-06-24

## 2019-06-23 RX ORDER — NICOTINE POLACRILEX 4 MG
15 LOZENGE BUCCAL
Status: DISCONTINUED | OUTPATIENT
Start: 2019-06-23 | End: 2019-06-24 | Stop reason: HOSPADM

## 2019-06-23 RX ORDER — NITROGLYCERIN 0.4 MG/1
0.4 TABLET SUBLINGUAL
Status: DISCONTINUED | OUTPATIENT
Start: 2019-06-23 | End: 2019-06-24 | Stop reason: HOSPADM

## 2019-06-23 RX ADMIN — LISINOPRIL 10 MG: 10 TABLET ORAL at 21:37

## 2019-06-23 RX ADMIN — CHOLECALCIFEROL TAB 10 MCG (400 UNIT) 400 UNITS: 10 TAB at 09:46

## 2019-06-23 RX ADMIN — ATORVASTATIN CALCIUM 20 MG: 20 TABLET, FILM COATED ORAL at 09:46

## 2019-06-23 RX ADMIN — GADOBENATE DIMEGLUMINE 18 ML: 529 INJECTION, SOLUTION INTRAVENOUS at 16:56

## 2019-06-23 RX ADMIN — CLOPIDOGREL 300 MG: 75 TABLET, FILM COATED ORAL at 18:23

## 2019-06-23 RX ADMIN — OXYBUTYNIN CHLORIDE 5 MG: 5 TABLET, EXTENDED RELEASE ORAL at 09:46

## 2019-06-23 RX ADMIN — ALLOPURINOL 300 MG: 300 TABLET ORAL at 09:46

## 2019-06-23 RX ADMIN — BUDESONIDE AND FORMOTEROL FUMARATE DIHYDRATE 2 PUFF: 160; 4.5 AEROSOL RESPIRATORY (INHALATION) at 11:46

## 2019-06-23 RX ADMIN — SODIUM CHLORIDE, PRESERVATIVE FREE 3 ML: 5 INJECTION INTRAVENOUS at 09:47

## 2019-06-23 RX ADMIN — METFORMIN HYDROCHLORIDE 500 MG: 500 TABLET ORAL at 18:23

## 2019-06-23 RX ADMIN — MULTIPLE VITAMINS W/ MINERALS TAB 1 TABLET: TAB at 09:45

## 2019-06-23 RX ADMIN — BUDESONIDE AND FORMOTEROL FUMARATE DIHYDRATE 2 PUFF: 160; 4.5 AEROSOL RESPIRATORY (INHALATION) at 21:45

## 2019-06-23 RX ADMIN — LISINOPRIL 10 MG: 10 TABLET ORAL at 09:46

## 2019-06-23 RX ADMIN — METFORMIN HYDROCHLORIDE 500 MG: 500 TABLET ORAL at 09:45

## 2019-06-23 RX ADMIN — ASPIRIN 81 MG: 81 TABLET, COATED ORAL at 09:47

## 2019-06-23 RX ADMIN — PANTOPRAZOLE SODIUM 40 MG: 40 TABLET, DELAYED RELEASE ORAL at 09:45

## 2019-06-23 RX ADMIN — SODIUM CHLORIDE, PRESERVATIVE FREE 3 ML: 5 INJECTION INTRAVENOUS at 21:38

## 2019-06-23 RX ADMIN — INSULIN LISPRO 2 UNITS: 100 INJECTION, SOLUTION INTRAVENOUS; SUBCUTANEOUS at 09:46

## 2019-06-23 RX ADMIN — SODIUM CHLORIDE 75 ML/HR: 9 INJECTION, SOLUTION INTRAVENOUS at 06:44

## 2019-06-23 RX ADMIN — INSULIN LISPRO 4 UNITS: 100 INJECTION, SOLUTION INTRAVENOUS; SUBCUTANEOUS at 21:37

## 2019-06-23 RX ADMIN — INSULIN LISPRO 4 UNITS: 100 INJECTION, SOLUTION INTRAVENOUS; SUBCUTANEOUS at 11:59

## 2019-06-23 NOTE — THERAPY EVALUATION
Acute Care - Physical Therapy Initial Evaluation  [facility]     Patient Name: Eugenio Joe  : 1940  MRN: [id number]  Today's Date: 2019   Onset of Illness/Injury or Date of Surgery: 19  Date of Referral to PT: 19  Referring Physician: Sarai      Admit Date: 2019    Visit Dx:     ICD-10-CM ICD-9-CM   1. TIA (transient ischemic attack) G45.9 435.9   2. Aphasia R47.01 784.3     Patient Active Problem List   Diagnosis   • Quadriceps weakness   • ACL tear   • Knee pain, right   • S/P CABG x 3   • Essential hypertension   • Hyperlipemia   • Hallux rigidus   • Fracture, stress, metatarsal   • Osteopenia determined by x-ray   • Metatarsal stress fracture with nonunion   • Left hip pain   • Bursitis of left hip   • Pulmonary embolism (CMS/HCC)   • DALILA (acute kidney injury) (CMS/HCC)   • Diabetes mellitus type 2, controlled (CMS/HCC)   • Ascending aorta dilatation (CMS/HCC)   • Pulmonary nodule, left   • Right leg DVT (CMS/HCC)   • Acute renal failure (CMS/HCC)   • Hypotension   • Dehydration   • Hypercalcemia   • Dysphagia   • Syncope and collapse   • Normal pressure hydrocephalus   • Headache   • Impaired mobility   • Nausea & vomiting   • Fall at home   • Transient cerebral ischemia   • PFO (patent foramen ovale)   • Esophageal dysphagia   • TIA (transient ischemic attack)     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 SWEAT GLAND    • Cardiac disease    • Coronary artery disease     CABG    • Diabetes mellitus (CMS/HCC)    • Gout several years ago    medication  working   • Headache 11/15/2017   • Hearing aid worn     bilateral   • History of transfusion    • Hydrocephaly    • Hyperlipemia    • Hypertension    • Joint pain     or swelling   • Low back pain several years ago   • Orbit fracture (CMS/HCC)    • Pulmonary embolism (CMS/HCC)     OCT 2016   • Sinus  infection      Past Surgical History:   Procedure Laterality Date   • BRAIN SURGERY      BRAIN ABCESS 30 YR AGO   • CARDIAC SURGERY     • COLONOSCOPY  2012    Ciciliano- normal per pt, no polyps    • CORONARY ARTERY BYPASS GRAFT     • ENDOSCOPY N/A 3/9/2017    Schatzki ring, dilated, LA Grade B esophagitis, HH, acquired duodenal stenosis   • ENDOSCOPY N/A 4/6/2018    candida, HH, torts, Schatzki ring, duodenal stenosis, dilatation perforemed, chronic inflammation   • FACIAL FRACTURE SURGERY      to repair 6 broken bones   • ORBITAL FRACTURE OPEN REDUCTION INTERNAL FIXATION Right 1/13/2017    Procedure: RT ORBIT FLOOR FRACTURE REPAIR RIGHT [facility]MC FRACTURE REPAIR, RIGHT NASAL BONE FRACTURE CLOSED REDUCTION;  Surgeon: Edil Lester MD;  [facility]: [facility];  Service:    • ORIF FOOT FRACTURE Right 9/9/2016    Procedure: RIGHT SECOND METATARSAL OPEN REDUCTION INTERNAL FIXATION WITh GRAFT FROM HEEL ;  Surgeon: Man Jenkins MD;  Location:  [facility];  Service:    • SEPTOPLASTY     • SKIN CANCER EXCISION     • TONSILLECTOMY          PT ASSESSMENT (last 12 hours)      Physical Therapy Evaluation     Row Name 06/23/19 1200          PT Evaluation Time/Intention    Subjective Information  no complaints  -ZK     Document Type  evaluation  -ZK     Mode of Treatment  physical therapy  -ZK     Total Evaluation Minutes, Physical Therapy  20  -ZK     Patient Effort  good  -ZK     Symptoms Noted During/After Treatment  none  -ZK     Comment  pending  MRI and still mod wwHTN so limited amb to bedside today (and no IV pole)  -ZK     Row Name 06/23/19 1200          General Information    Patient Profile Reviewed?  yes  -ZK     Onset of Illness/Injury or Date of Surgery  06/22/19  -ZK     Referring Physician  Sarai  -ZK     Patient Observations  alert;cooperative;agree to therapy  -ZK     Patient/Family Observations  spouse joined after eval  -ZK     General Observations of Patient  up in bed. Last BP noted to be  190/95  -ZK     Prior Level of Function  independent:  -ZK     Equipment Currently Used at Home  cane, straight tall standing walker  -ZK     Pertinent History of Current Functional Problem  c/o  inability to speak last night around 7-8 after going out to lunch and a movie with family. To ED an hour later. CT neg. pending MRI later today. States no issue with BP lately.   -ZK     Existing Precautions/Restrictions  fall  -ZK     Limitations/Impairments  -- current hx NPH but not able to undergo shunt surgery  -ZK     Barriers to Rehab  previous functional deficit fell over his dog last year with dx of NPH. Recent rehab pt  -ZK     Row Name 06/23/19 1200          Relationship/Environment    Lives With  spouse  -ZK     Row Name 06/23/19 1200          Cognitive Assessment/Intervention- PT/OT    Orientation Status (Cognition)  oriented x 4  -ZK     Row Name 06/23/19 1200          Bed Mobility Assessment/Treatment    Bed Mobility Assessment/Treatment  bed mobility (all) activities  -ZK     [place] Level (Bed Mobility)  independent  -ZK     Row Name 06/23/19 1200          Transfer Assessment/Treatment    Transfer Assessment/Treatment  sit-stand transfer  -ZK     Sit-Stand [place] (Transfers)  independent  -ZK     Row Name 06/23/19 1200          Sit-Stand Transfer    Assistive Device (Sit-Stand Transfers)  walker, front-wheeled  -ZK     Row Name 06/23/19 1200          Gait/Stairs Assessment/Training    Gait/Stairs Assessment/Training  gait/ambulation independence  -ZK     [place] Level (Gait)  conditional independence  -ZK     Assistive Device (Gait)  walker, front-wheeled  -ZK     Distance in Feet (Gait)  10  -ZK     Pattern (Gait)  step-through  -ZK     Right Sided Gait Deviations  weight shift ability decreased hx fx mets of R foot and ACL [place]  -ZK     Row Name 06/23/19 1200          General ROM    GENERAL ROM COMMENTS  WFL  -ZK     Row Name 06/23/19 1200          MMT (Manual Muscle  Testing)    Rt Lower Ext  Rt Knee Extension  -ZK     General MMT Comments  4/5  -ZK     Row Name 06/23/19 1200          MMT Right Lower Ext    Rt Knee Extension MMT, Gross Movement  (4-/5) good minus  -ZK     Row Name 06/23/19 1200          Vision Assessment/Intervention    Visual Impairment/Limitations  WNL  -ZK     Row Name 06/23/19 1200          Pain Assessment    Additional Documentation  Pain Scale: Word Pre/Post-Treatment (Group)  -ZK     Row Name 06/23/19 1200          Pain Scale: Numbers Pre/Post-Treatment    Pain Location - Side  Right  -ZK     Pain Location - Orientation  anterior  -ZK     Pain Location  foot  -ZK     Row Name 06/23/19 1200          Pain Scale: Word Pre/Post-Treatment    Pain: Word Scale, Pretreatment  2 - mild pain  -ZK     Pain: Word Scale, Post-Treatment  2 - mild pain  -ZK     Row Name 06/23/19 1200          Physical Therapy Clinical Impression    Date of Referral to PT  06/22/19  -ZK     PT Diagnosis (PT Clinical Impression)  poss TIA. 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 but pt not aware of prior stroke  -ZK     Prognosis (PT Clinical Impression)  good  -ZK     Functional Level at Time of Evaluation (PT Clinical Impression)  good  -ZK     Criteria for Skilled Interventions Met (PT Clinical Impression)  yes;treatment indicated  -ZK     Pathology/Pathophysiology Noted (Describe Specifically for Each System)  musculoskeletal;cardiovascular HTN  -ZK     Impairments Found (describe specific impairments)  gait, locomotion, and balance  -ZK     Predicted Duration of Therapy (PT)  3 days  -ZK     Row Name 06/23/19 1200          Vital Signs    Pre Systolic BP Rehab  190  -ZK     Pre Treatment Diastolic BP  95  -ZK     Intra Systolic BP Rehab  176  -ZK     Intra Treatment Diastolic BP  84  -ZK     Posttreatment Resp Rate (breaths/min)  81  -ZK     Post SpO2 (%)  94  -ZK     Row Name 06/23/19 1200          Physical Therapy Goals    Gait Training Goal Selection (PT)  gait training, PT goal 1  -ZK      Row Name 06/23/19 1200          Gait Training Goal 1 (PT)    Activity/Assistive Device (Gait Training Goal 1, PT)  cane, straight  -ZK     [place] Level (Gait Training Goal 1, PT)  independent  -ZK     Distance (Gait Goal 1, PT)  250  -ZK     Time Frame (Gait Training Goal 1, PT)  2 - 3 days  -ZK     Barriers (Gait Training Goal 1, PT)  prior foot knee injuries  -ZK     Row Name 06/23/19 1200          Positioning and Restraints    Pre-Treatment Position  in bed  -ZK     Post Treatment Position  bed  -ZK     In Bed  notified nsg;encouraged to call for assist;call light within reach;with family/caregiver  -ZK       User Key  (r) = Recorded By, (t) = Taken By, (c) = Cosigned By    Initials Name Provider Type    ZK Kimura, Zorre Zeno, PT Physical Therapist        Physical Therapy Education     Title: PT OT SLP Therapies (Done)     Topic: Physical Therapy (Done)     Point: Mobility training (Done)     Learning Progress Summary           Patient Acceptance, E, VU by BURAK at 6/23/2019 12:57 PM    Comment:  progress amb post MRI   Family Acceptance, E, VU by BURAK at 6/23/2019 12:57 PM    Comment:  progress amb post MRI                   Point: Home exercise program (Done)     Learning Progress Summary           Patient Acceptance, E, VU by BURAK at 6/23/2019 12:57 PM    Comment:  progress amb post MRI   Family Acceptance, E, VU by BURAK at 6/23/2019 12:57 PM    Comment:  progress amb post MRI                   Point: Body mechanics (Done)     Learning Progress Summary           Patient Acceptance, E, VU by BURAK at 6/23/2019 12:57 PM    Comment:  progress amb post MRI   Family Acceptance, E, VU by BURAK at 6/23/2019 12:57 PM    Comment:  progress amb post MRI                   Point: Precautions (Done)     Learning Progress Summary           Patient Acceptance, E, VU by BURAK at 6/23/2019 12:57 PM    Comment:  progress amb post MRI   Family Acceptance, E, VU by BURAK at 6/23/2019 12:57 PM    Comment:  progress amb post MRI                                User Key     Initials Effective Dates Name Provider Type Discipline    BURAK 04/23/19 -  Kimura, Zorre Zeno, COLEEN Physical Therapist PT              PT Recommendation and Plan  Anticipated Discharge Disposition (PT): home with assist  Planned Therapy Interventions (PT Eval): gait training  Therapy Frequency (PT Clinical Impression): daily  Outcome Summary/Treatment Plan (PT)  Anticipated Discharge Disposition (PT): home with assist  Plan of Care Reviewed With: patient, spouse  Progress: improving  Outcome Summary: PT eval. BP improved from 190/95 to 176/84 prior to rx. Good balance with walker. Will progress amb on 6-24-19 pending MRI. Aware of current dx of NPH.   Outcome Measures     Row Name 06/23/19 1200             How much help from another person do you currently need...    Turning from your back to your side while in flat bed without using bedrails?  4  -ZK      Moving from lying on back to sitting on the side of a flat bed without bedrails?  4  -ZK      Moving to and from a bed to a chair (including a wheelchair)?  4  -ZK      Standing up from a chair using your arms (e.g., wheelchair, bedside chair)?  4  -ZK      Climbing 3-5 steps with a railing?  3  -ZK      To walk in hospital room?  3  -ZK      AM-PAC 6 Clicks Score  22  -ZK         Functional Assessment    Outcome Measure Options  AM-PAC 6 Clicks Basic Mobility (PT)  -ZK        User Key  (r) = Recorded By, (t) = Taken By, (c) = Cosigned By    Initials Name Provider Type    CARLK Kimura, Zorre Zeno, COLEEN Physical Therapist         Time Calculation:   PT Charges     Row Name 06/23/19 1258             Time Calculation    Start Time  1145  -ZK      Stop Time  1205  -ZK      Time Calculation (min)  20 min  -ZK      PT Received On  06/23/19  -BURAK      PT - Next Appointment  06/24/19  -BURAK      PT Goal Re-Cert Due Date  07/07/19  -ZK        User Key  (r) = Recorded By, (t) = Taken By, (c) = Cosigned By    Initials Name Provider Type    ZK Kimura, Zorre Zeno,  PT Physical Therapist        Therapy Charges for Today     Code Description Service Date Service Provider Modifiers Qty    [id number] HC PT EVAL LOW COMPLEXITY 1 6/23/2019 Kimura, Zorre Zeno, PT GP 1          PT G-Codes  Outcome Measure Options: AM-PAC 6 Clicks Basic Mobility (PT)  AM-PAC 6 Clicks Score: 22      Zorre Zeno Kimura, PT  6/23/2019

## 2019-06-23 NOTE — ED NOTES
Pt has hx of NPH with some baseline confusion per family, tonight he has difficulty getting his words out and did not know how to work the remote which is new for him. No facial droop noted, no slurred speech. No unilateral weakness.     Irene Mclain, RN  06/22/19 2120

## 2019-06-23 NOTE — CONSULTS
Neurology Note    Patient:  Eugenio Joe    YOB: 1940    REFERRING PHYSICIAN:  Steven Freeman MD    CHIEF COMPLAINT:    Difficulty with speech    HISTORY OF PRESENT ILLNESS:   The patient is a 78 y.o. 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 He is now back to baseline. MRI of the brain in 2017 showed at least moderate chronic small vessel changes, no acute infarct, MRA of head and neck unremarkable. He has some forgetfulness, bladder concerns and difficulty with gait that raised the question of NPH in 2017, cisternogram positive. He has had two neurosurgical opinions about a shunt, both recommended against it given operative risks. His symptoms have been fairly stable. EEG was negative for seizure focus in 2017. BP in /90, NSR, , BP trending down.    Past Medical History:  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 SWEAT GLAND    • Cardiac disease    • Coronary artery disease     CABG 2012   • Diabetes mellitus (CMS/HCC)    • Gout several years ago    medication  working   • Headache 11/15/2017   • Hearing aid worn     bilateral   • History of transfusion    • Hydrocephaly    • Hyperlipemia    • Hypertension    • Joint pain     or swelling   • Low back pain several years ago   • Orbit fracture (CMS/HCC)    • Pulmonary embolism (CMS/HCC)     OCT 2016   • Sinus infection        Past Surgical History:  Past Surgical History:   Procedure Laterality Date   • BRAIN SURGERY      BRAIN ABCESS 30 YR AGO   • CARDIAC SURGERY     • COLONOSCOPY  2012    Ciciliano- normal per pt, no polyps    • CORONARY ARTERY BYPASS GRAFT     • ENDOSCOPY N/A 3/9/2017    Schatzki ring,  dilated, LA Grade B esophagitis, HH, acquired duodenal stenosis   • ENDOSCOPY N/A 2018    candida, HH, torts, Schatzki ring, duodenal stenosis, dilatation perforemed, chronic inflammation   • FACIAL FRACTURE SURGERY      to repair 6 broken bones   • ORBITAL FRACTURE OPEN REDUCTION INTERNAL FIXATION Right 2017    Procedure: RT ORBIT FLOOR FRACTURE REPAIR RIGHT ZMC FRACTURE REPAIR, RIGHT NASAL BONE FRACTURE CLOSED REDUCTION;  Surgeon: Edil Lester MD;  [place] [facility] [place] [facility] [place]  Service:    • ORIF FOOT FRACTURE Right 2016    Procedure: RIGHT SECOND METATARSAL OPEN REDUCTION INTERNAL FIXATION WITh GRAFT FROM HEEL ;  Surgeon: Man Jenkins MD;  Location[place] [facility] [place] [facility] [place];  Service:    • SEPTOPLASTY     • SKIN CANCER EXCISION     • TONSILLECTOMY         Social History:   Social History     Socioeconomic History   • Marital status:      Spouse name: Not on file   • Number of children: Not on file   • Years of education: Not on file   • Highest education level: Not on file   Tobacco Use   • Smoking status: Former Smoker     Packs/day: 2.00     Years: 5.00     Pack years: 10.00     Types: Cigarettes     Start date: 1959     Last attempt to quit: 3/9/1962     Years since quittin.3   • Smokeless tobacco: Never Used   • Tobacco comment: Quit cold turkey   Substance and Sexual Activity   • Alcohol use: Yes     Alcohol/week: 1.2 oz     Types: 1 Cans of beer, 1 Shots of liquor per week     Comment: OCC   • Drug use: No   • Sexual activity: Yes     Partners: Female     Birth control/protection: Surgical        Family History:   Family History   Problem Relation Age of Onset   • Heart disease Mother    • Hypertension Mother    • Stroke Mother    • Diverticulitis Mother    • Heart disease Father    • Hypertension Father        Medications Prior to Admission:    Prior to Admission medications    Medication Sig Start Date End Date Taking? Authorizing Provider   allopurinol (ZYLOPRIM) 300 MG  tablet Take 300 mg by mouth daily. 6/4/15  Yes Rogerio Cross MD   aspirin 81 MG EC tablet Take 1 tablet by mouth Daily. 11/22/17  Yes John Verdugo MD   esomeprazole (nexIUM) 40 MG capsule Take 1 capsule by mouth Every Morning Before Breakfast. 5/18/18  Yes Rigoberto Walker MD   fluticasone-salmeterol (ADVAIR) 250-50 MCG/DOSE DISKUS Inhale 1 puff Every Evening. Advair Diskus 250-50 MCG/DOSE Inhalation Aerosol Powder Breath Activated; Patient Sig: Advair Diskus 250-50 MCG/DOSE Inhalation Aerosol Powder Breath Activated INHALE 1 PUFF BID; 60; 0; 15-Oct-2012; Active 10/15/12  Yes Rogerio Cross MD   glucose blood (FREESTYLE LITE) test strip 1 each by Other route See Admin Instructions. Use 1 to 2 times daily as instructed   Yes Rogerio Cross MD   lisinopril (PRINIVIL,ZESTRIL) 40 MG tablet Take 1 tablet by mouth Daily.  Patient taking differently: Take 10 mg by mouth 2 (Two) Times a Day. 11/22/17  Yes John Verdugo MD   metFORMIN (GLUCOPHAGE) 500 MG tablet Take 500 mg by mouth 2 (Two) Times a Day With Meals.   Yes Rogerio Cross MD   Multiple Vitamins-Minerals (MULTIVITAMIN ADULT PO) Take 1 tablet by mouth Daily.   Yes Rogerio Cross MD   MYRBETRIQ 25 MG tablet sustained-release 24 hour 24 hr tablet  10/30/18  Yes Rogerio Cross MD   PROAIR  (90 BASE) MCG/ACT inhaler 2 puffs Every 4 (Four) Hours As Needed. 8/8/16  Yes Rogerio Cross MD   simvastatin (ZOCOR) 40 MG tablet Take 40 mg by mouth every night. 4/22/13  Yes Rogerio Cross MD   Vitamin D, Cholecalciferol, (CHOLECALCIFEROL) 400 units tablet Take 400 Units by mouth Daily.   Yes Rogerio Cross MD       Allergies:  Other and Oxycodone      Review of system  Review of Systems   Genitourinary: Positive for enuresis.   Musculoskeletal: Positive for gait problem.   Neurological: Positive for speech difficulty.   Psychiatric/Behavioral: Positive for confusion.   All other systems  reviewed and are negative.      Vitals:    06/23/19 1147   BP:    Pulse: 81   Resp: 16   Temp:    SpO2: 93%       Physical exam  Physical Exam   Constitutional: He is oriented to person, place, and time. He appears well-developed and well-nourished.   Cardiovascular: Normal rate and regular rhythm.   Pulmonary/Chest: Effort normal.   Neurological: He is alert and oriented to person, place, and time. He has normal reflexes. He displays normal reflexes. No sensory deficit. He exhibits normal muscle tone. Coordination normal.   Somewhat flattened affect, mild facial asymmetry with right lower facial droop, no tremor or rigidity. Speech clear, able to name and repeat.   Psychiatric: He has a normal mood and affect. His behavior is normal. Thought content normal.         Lab Results   Component Value Date    WBC 6.94 06/22/2019    HGB 13.1 06/22/2019    HCT 39.2 06/22/2019    MCV 94.9 06/22/2019     06/22/2019     Lab Results   Component Value Date    GLUCOSE 222 (H) 06/22/2019    BUN 20 06/22/2019    CREATININE 1.10 06/22/2019    EGFRIFNONA 65 06/22/2019    EGFRIFAFRI  09/02/2016      Comment:      <15 Indicative of kidney failure.    BCR 18.2 06/22/2019    CO2 21.3 (L) 06/22/2019    CALCIUM 9.5 06/22/2019    ALBUMIN 4.30 06/22/2019    AST 11 06/22/2019    ALT 14 06/22/2019     ECG 12 Lead   Order: [id number]   Status:  Preliminary result   Visible to patient:  No (Not Released)   Next appt:  None      Narrative     HEART RATE= 78  bpm  RR Interval= 768  ms  [place] Interval= 199  ms  P Horizontal Axis= -18  deg  P Front Axis= 12  deg  QRSD Interval= 95  ms  QT Interval= 386  ms  QRS Axis= -3  deg  T Wave Axis= 63  deg  - BORDERLINE ECG -  Sinus rhythm  LVH by voltage  Electronically Signed By:   Date and Time of Study: 2019-06-22 22:39:56           Echocardiogram   Order# [id number]   Reading physician: Betty Roldan MD Ordering physician: Steven Freeman MD Study date: 6/23/19   Patient Information     Patient Name  Eugenio  YEMI Joe MRN  [id number] Sex  Male  (Age)  1940 (78 y.o.)   Admission Information     Admission Date/Time Discharge Date/Time Room/Bed   19  2212  S519/1   Sedation Narrator Report     Sedation Narrator Report   Interpretation Summary     · Left ventricular systolic function is normal. Calculated EF = 51.0%. Estimated EF was in agreement with the calculated EF. Estimated EF = 51%. Normal left ventricular cavity size and wall thickness noted. All left ventricular wall segments contract normally. Left ventricular diastolic dysfunction is noted (grade I a w/high LAP) consistent with impaired relaxation.  · Left atrial volume is borderline increased. Saline test results are negative.  · The aortic valve is abnormal in structure. There is severe calcification of the aortic valve.No aortic valve regurgitation is present.  · Severe MAC is present. Trace mitral valve regurgitation is present. No significant mitral valve stenosis is present.  · Physiologic tricuspid valve regurgitation is present. Estimated right ventricular systolic pressure from tricuspid regurgitation is normal (<35 mmHg).        EEG   Order: [id number]   Status:  Final result   Visible to patient:  Yes (MyChart)   Details     Reading Physician Reading Date Result Priority   Arash Koroma MD 2017 STAT      Narrative & Impression     Indication: Seizure  This is a digitally recorded EEG with electrodes applied according to the 10-20 electrode placement system.  Test duration 28 minutes.  Test indication loss of consciousness.  Patient is awake and drowsy     During wakefulness the posterior dominant rhythm is a well-developed rhythmic medium amplitude sinusoidal9 Hz alpha which attenuates with the eyes open.  Bitemporal activity is also 9-10 Hz rhythmic alpha.  Bifrontal slowing at 2-3 Hz appears superimposed upon the alpha background during periods of drowsiness.  The resting heart rate is 60-70.  The patient is awake and drowsy  throughout the recording     The frontal and central fast activity is low amplitude beta and is symmetric.  There are no focal slow waves noted.  Spikes and sharp waves are not seen.       During 3 minutes of hyperventilation there was no significant alteration of the background.  During photic stimulation symmetric photic driving responses were noted at the middle flash frequencies     Interpretation: Normal study during drowsiness             Last Resulted: 11/20/17 17:53               Radiological Studies:  Ct Head Without Contrast    Result Date: 6/22/2019  CT OF THE HEAD WITHOUT CONTRAST  HISTORY: Confusion  COMPARISON: 11/13/2018  TECHNIQUE: Axial CT imaging was obtained from the vertex of the skull and skull base. No IV contrast was administered.  FINDINGS: No acute intracranial hemorrhage is identified. There is diffuse atrophy. There is periventricular deep white matter microangiopathic disease. There is no midline shift or mass effect. 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 Area of encephalomalacia is seen within the left parietal lobe near the vertex. This is adjacent to a calvarial defect within this area.      No acute intracranial process is identified. Postsurgical changes are seen involving the anterior aspect of the right maxillary sinus. Paranasal sinuses and mastoid air cells appear clear.  Radiation dose reduction techniques were utilized, including automated exposure control and exposure modulation based on body size.  This report was finalized on 6/22/2019 11:35 PM by Dr. Estefani Joshi M.D.        During this visit the following were done:  Labs Reviewed [x]    Labs Ordered []    Radiology Reports Reviewed [x]    Radiology Ordered []    EKG, echo, and/or stress test reviewed [x]    EEG results reviewed  []    EEG reviewed and interpreted per myself   []    Discussed case with neurointerventionalist or neuroradiologist []    Referring Provider Records Reviewed []    ER Records Reviewed []     Hospital Records Reviewed []    History Obtained From Family []    Radiological images view and Interpreted per myself [x]    Case Discussed with referring provider []     Decision to obtain and request outside records  []        Assessment and Plan     TIA, favor threatened small vessel thrombosis secondary to HTN,recurrent symptoms argue against embolism. Patient has known small vessel disease of brain. 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   - Observation on telemetry.   - Continue ASA and statin.   - Plavix loading dose.   - F/U pending MRI/MRA results.   - b12, folate, TSH, RPR.   - BP control, SBP under 140, DBP under 90.   - ST, OT, PT.    Thanks,          Electronically signed by Andrzej Mena MD on 6/23/2019 at 12:54 PM

## 2019-06-23 NOTE — PLAN OF CARE
Problem: Patient Care Overview  Goal: Plan of Care Review  Outcome: Ongoing (interventions implemented as appropriate)   06/23/19 1241   Coping/Psychosocial   Plan of Care Reviewed With patient;spouse   Plan of Care Review   Progress improving   OTHER   Outcome Summary Admitted from ED, patient experienced expressive aphasia at home, prompted family to bring to hospital, upon admission aphasia resolved, NIH - 0, awaiting orders from physician, no complaints, placed SCDs, spouse at bedside, bp elevated, will continue to monitor.     Goal: Individualization and Mutuality  Outcome: Ongoing (interventions implemented as appropriate)    Goal: Discharge Needs Assessment  Outcome: Ongoing (interventions implemented as appropriate)      Problem: Fall Risk (Adult)  Goal: Identify Related Risk Factors and Signs and Symptoms  Outcome: Ongoing (interventions implemented as appropriate)    Goal: Absence of Fall  Outcome: Ongoing (interventions implemented as appropriate)      Problem: Stroke (Ischemic) (Adult)  Goal: Signs and Symptoms of Listed Potential Problems Will be Absent, Minimized or Managed (Stroke)  Outcome: Ongoing (interventions implemented as appropriate)

## 2019-06-23 NOTE — H&P
HISTORY AND PHYSICAL   [facility]        Patient Identification:  Name: Eugenio Joe  Age: 78 y.o.  Sex: male  :  1940  MRN: [id number]                     Primary Care Physician: Adeline Onofre MD    Chief Complaint:  Speech problems    History of Present Illness:        The patient is a 78-year-old white male with history of arthritis, asthma, heart disease, previous CABG, diabetes, gout, headaches, hypertension, hyperlipidemia and low back pain is admitted with having a transient episode of difficulty with speech.  It lasted about 2 hours.  He did not have any headache.  He has not had any nausea or vomiting.  He denies any chest pain or shortness of air.  He has not had any fever.  The patient was admitted for further evaluation treatment of possible TIA.    Past Medical History:  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 SWEAT GLAND    • Cardiac disease    • Coronary artery disease     CABG    • Diabetes mellitus (CMS/HCC)    • Gout several years ago    medication  working   • Headache 11/15/2017   • Hearing aid worn     bilateral   • History of transfusion    • Hydrocephaly    • Hyperlipemia    • Hypertension    • Joint pain     or swelling   • Low back pain several years ago   • Orbit fracture (CMS/HCC)    • Pulmonary embolism (CMS/HCC)     OCT 2016   • Sinus infection      Past Surgical History:  Past Surgical History:   Procedure Laterality Date   • BRAIN SURGERY      BRAIN ABCESS 30 YR AGO   • CARDIAC SURGERY     • COLONOSCOPY      Ciciliano- normal per pt, no polyps    • CORONARY ARTERY BYPASS GRAFT     • ENDOSCOPY N/A 3/9/2017    Schatzki ring, dilated, LA Grade B esophagitis, HH, acquired duodenal stenosis   • ENDOSCOPY N/A 2018    candida, HH, torts, Schatzki ring, duodenal stenosis, dilatation perforemed, chronic inflammation   • FACIAL  FRACTURE SURGERY      to repair 6 broken bones   • ORBITAL FRACTURE OPEN REDUCTION INTERNAL FIXATION Right 1/13/2017    Procedure: RT ORBIT FLOOR FRACTURE REPAIR RIGHT [facility] FRACTURE REPAIR, RIGHT NASAL BONE FRACTURE CLOSED REDUCTION;  Surgeon: Edil Lester MD;  Location: [facility];  Service:    • ORIF FOOT FRACTURE Right 9/9/2016    Procedure: RIGHT SECOND METATARSAL OPEN REDUCTION INTERNAL FIXATION WITh GRAFT FROM HEEL ;  Surgeon: Man Jenkins MD;  Location: [facility];  Service:    • SEPTOPLASTY     • SKIN CANCER EXCISION     • TONSILLECTOMY        Home Meds:  Medications Prior to Admission   Medication Sig Dispense Refill Last Dose   • allopurinol (ZYLOPRIM) 300 MG tablet Take 300 mg by mouth daily.   Taking   • aspirin 81 MG EC tablet Take 1 tablet by mouth Daily. 30 tablet 0 Taking   • esomeprazole (nexIUM) 40 MG capsule Take 1 capsule by mouth Every Morning Before Breakfast. 90 capsule 3 Taking   • fluticasone-salmeterol (ADVAIR) 250-50 MCG/DOSE DISKUS Inhale 1 puff Every Evening. Advair Diskus 250-50 MCG/DOSE Inhalation Aerosol Powder Breath Activated; Patient Sig: Advair Diskus 250-50 MCG/DOSE Inhalation Aerosol Powder Breath Activated INHALE 1 PUFF BID; 60; 0; 15-Oct-2012; Active   Taking   • glucose blood (FREESTYLE LITE) test strip 1 each by Other route See Admin Instructions. Use 1 to 2 times daily as instructed   Taking   • lisinopril (PRINIVIL,ZESTRIL) 40 MG tablet Take 1 tablet by mouth Daily. (Patient taking differently: Take 10 mg by mouth 2 (Two) Times a Day.) 30 tablet 0 Taking   • metFORMIN (GLUCOPHAGE) 500 MG tablet Take 500 mg by mouth 2 (Two) Times a Day With Meals.   Taking   • Multiple Vitamins-Minerals (MULTIVITAMIN ADULT PO) Take 1 tablet by mouth Daily.   Taking   • MYRBETRIQ 25 MG tablet sustained-release 24 hour 24 hr tablet    Taking   • PROAIR  (90 BASE) MCG/ACT inhaler 2 puffs Every 4 (Four) Hours As Needed.   Taking   • simvastatin (ZOCOR) 40 MG  tablet Take 40 mg by mouth every night.   Taking   • Vitamin D, Cholecalciferol, (CHOLECALCIFEROL) 400 units tablet Take 400 Units by mouth Daily.   Taking     Current meds    Current Facility-Administered Medications:   •  albuterol (PROVENTIL) nebulizer solution 0.083% 2.5 mg/3mL, 2.5 mg, Nebulization, Q6H PRN, Steven Freeman MD  •  allopurinol (ZYLOPRIM) tablet 300 mg, 300 mg, Oral, Daily, Steven Freeman MD, 300 mg at 06/23/19 0946  •  aspirin EC tablet 81 mg, 81 mg, Oral, Daily, Steven Freeman MD, 81 mg at 06/23/19 0947  •  atorvastatin (LIPITOR) tablet 20 mg, 20 mg, Oral, Daily, Steven Freeman MD, 20 mg at 06/23/19 0946  •  budesonide-formoterol (SYMBICORT) 160-4.5 MCG/ACT inhaler 2 puff, 2 puff, Inhalation, BID - RT, Steven Freeman MD, 2 puff at 06/23/19 1146  •  cholecalciferol (VITAMIN D3) tablet 400 Units, 400 Units, Oral, Daily, Steven Freeman MD, 400 Units at 06/23/19 0946  •  dextrose (D50W) 25 g/ 50mL Intravenous Solution 25 g, 25 g, Intravenous, Q15 Min PRN, Steven Freeman MD  •  dextrose (GLUTOSE) oral gel 15 g, 15 g, Oral, Q15 Min PRN, Steven Freeman MD  •  glucagon (human recombinant) (GLUCAGEN DIAGNOSTIC) injection 1 mg, 1 mg, Subcutaneous, PRN, Steven Freeman MD  •  insulin lispro (humaLOG) injection 0-9 Units, 0-9 Units, Subcutaneous, 4x Daily With Meals & Nightly, Steven Freeman MD, 4 Units at 06/23/19 1159  •  lisinopril (PRINIVIL,ZESTRIL) tablet 10 mg, 10 mg, Oral, BID, Steven Freeman MD, 10 mg at 06/23/19 0946  •  metFORMIN (GLUCOPHAGE) tablet 500 mg, 500 mg, Oral, BID With Meals, Steven Freeman MD, 500 mg at 06/23/19 0945  •  multivitamin with minerals 1 tablet, 1 tablet, Oral, Daily, Steven Freeman MD, 1 tablet at 06/23/19 0945  •  niCARdipine (CARDENE) 25 mg/250 mL (0.1 mg/mL) NS infusion kit, 5-15 mg/hr, Intravenous, Titrated, Steven Freeman MD  •  nitroglycerin (NITROSTAT) SL tablet 0.4 mg, 0.4 mg, Sublingual, Q5 Min PRN, Steven Freeman MD  •  ondansetron (ZOFRAN) injection 4 mg, 4 mg, Intravenous, Q6H  PRN, Steven Freeman MD  •  oxybutynin XL (DITROPAN-XL) 24 hr tablet 5 mg, 5 mg, Oral, Daily, Steven Freeman MD, 5 mg at 19 0946  •  pantoprazole (PROTONIX) EC tablet 40 mg, 40 mg, Oral, QAM, Steven Freeman MD, 40 mg at 19 0945  •  [COMPLETED] Insert peripheral IV, , , Once **AND** sodium chloride 0.9 % flush 10 mL, 10 mL, Intravenous, PRN, René Lawrence MD  •  sodium chloride 0.9 % flush 3 mL, 3 mL, Intravenous, Q12H, Steven Freeman MD, 3 mL at 19 0947  •  sodium chloride 0.9 % flush 3-10 mL, 3-10 mL, Intravenous, PRN, Steven Freeman MD  •  sodium chloride 0.9 % infusion, 75 mL/hr, Intravenous, Continuous, Steven Freeman MD, Last Rate: 75 mL/hr at 19 1012, 75 mL/hr at 19 1012  Allergies:  Allergies   Allergen Reactions   • Other Mental Status Change and Hallucinations     Oxy drugs   • Oxycodone Mental Status Change     Immunizations:    There is no immunization history on file for this patient.  Social History:   Social History     Social History Narrative   • Not on file     Social History     Socioeconomic History   • Marital status:      Spouse name: Not on file   • Number of children: Not on file   • Years of education: Not on file   • Highest education level: Not on file   Tobacco Use   • Smoking status: Former Smoker     Packs/day: 2.00     Years: 5.00     Pack years: 10.00     Types: Cigarettes     Start date: 1959     Last attempt to quit: 3/9/1962     Years since quittin.3   • Smokeless tobacco: Never Used   • Tobacco comment: Quit cold turkey   Substance and Sexual Activity   • Alcohol use: Yes     Alcohol/week: 1.2 oz     Types: 1 Cans of beer, 1 Shots of liquor per week     Comment: OCC   • Drug use: No   • Sexual activity: Yes     Partners: Female     Birth control/protection: Surgical       Family History:  Family History   Problem Relation Age of Onset   • Heart disease Mother    • Hypertension Mother    • Stroke Mother    • Diverticulitis Mother    •  "Heart disease Father    • Hypertension Father         Review of Systems  See history of present illness and past medical history.  Patient denies headache, dizziness, syncope, falls, trauma, change in vision, change in hearing, change in taste, changes in weight, changes in appetite, focal weakness, numbness, or paresthesia.  Patient denies chest pain, palpitations, dyspnea, orthopnea, PND, cough, sinus pressure, rhinorrhea, epistaxis, hemoptysis, nausea, vomiting, hematemesis, diarrhea, constipation or hematchezia.  Denies cold or heat intolerance, polydipsia, polyuria, polyphagia.  Denies missing any routine medications. Remainder of ROS is negative.    Objective:  tMax 24 hrs: Temp (24hrs), Av.9 °F (36.6 °C), Min:97.5 °F (36.4 °C), Max:98.9 °F (37.2 °C)    Vitals Ranges:   Temp:  [97.5 °F (36.4 °C)-98.9 °F (37.2 °C)] 97.7 °F (36.5 °C)  Heart Rate:  [71-83] 76  Resp:  [16-17] 16  BP: (175-210)/(76-95) 175/76      Exam:  /76 (BP Location: Right arm, Patient Position: Lying)   Pulse 76   Temp 97.7 °F (36.5 °C) (Oral)   Resp 16   Ht 177.8 cm (70\")   Wt 88.4 kg (194 lb 14.2 oz)   SpO2 93%   BMI 27.96 kg/m²     General Appearance:    Alert, cooperative, no distress, appears stated age   Head:    Normocephalic, without obvious abnormality, atraumatic   Eyes:    PERRL, conjunctiva/corneas clear, EOM's intact, both eyes   Ears:    Normal external ear canals, both ears   Nose:   Nares normal, septum midline, mucosa normal, no drainage    or sinus tenderness   Throat:   Lips, mucosa, and tongue normal   Neck:   Supple, symmetrical, trachea midline, no adenopathy;     thyroid:  no enlargement/tenderness/nodules; no carotid    bruit or JVD   Back:     Symmetric, no curvature, ROM normal, no CVA tenderness   Lungs:     Clear to auscultation bilaterally, respirations unlabored   Chest Wall:    No tenderness or deformity    Heart:    Regular rate and rhythm, S1 and S2 normal, no murmur, rub   or gallop "   Abdomen:     Soft, non-tender, bowel sounds active all four quadrants,     no masses, no hepatomegaly, no splenomegaly   Extremities:   Extremities normal, atraumatic, no cyanosis or edema   Pulses:   2+ and symmetric all extremities   Skin:   Skin color, texture, turgor normal, no rashes or lesions   Lymph nodes:   Cervical, supraclavicular, and axillary nodes normal   Neurologic:   CNII-XII intact, normal strength, sensation intact throughout      .    Data Review:  Lab Results (last 72 hours)     Procedure Component Value Units Date/Time    POC Glucose Once [[id number]]  (Abnormal) Collected:  06/23/19 1117    Specimen:  Blood Updated:  06/23/19 1120     Glucose 245 mg/dL     POC Glucose Once [[id number]]  (Abnormal) Collected:  06/23/19 0716    Specimen:  Blood Updated:  06/23/19 0717     Glucose 171 mg/dL     Protime-INR [[id number]]  (Normal) Collected:  06/22/19 2237    Specimen:  Blood Updated:  06/22/19 2316     Protime 12.5 Seconds      INR 0.96    aPTT [[id number]]  (Normal) Collected:  06/22/19 2237    Specimen:  Blood Updated:  06/22/19 2316     PTT 23.6 seconds     Comprehensive Metabolic Panel [[id number]]  (Abnormal) Collected:  06/22/19 2237    Specimen:  Blood Updated:  06/22/19 2314     Glucose 222 mg/dL      BUN 20 mg/dL      Creatinine 1.10 mg/dL      Sodium 137 mmol/L      Potassium 4.2 mmol/L      Chloride 101 mmol/L      CO2 21.3 mmol/L      Calcium 9.5 mg/dL      Total Protein 7.0 g/dL      Albumin 4.30 g/dL      ALT (SGPT) 14 U/L      AST (SGOT) 11 U/L      Alkaline Phosphatase 52 U/L      Total Bilirubin 0.3 mg/dL      eGFR Non African Amer 65 mL/min/1.73      Globulin 2.7 gm/dL      A/G Ratio 1.6 g/dL      BUN/Creatinine Ratio 18.2     Anion Gap 14.7 mmol/L     Narrative:       GFR Normal >60  Chronic Kidney Disease <60  Kidney Failure <15    Troponin [[id number]]  (Normal) Collected:  06/22/19 2237    Specimen:  Blood Updated:  06/22/19 2314     Troponin T <0.010 ng/mL     Narrative:        Troponin T Reference Range:  <= 0.03 ng/mL-   Negative for AMI  >0.03 ng/mL-     Abnormal for myocardial necrosis.  Clinicians would have to utilize clinical acumen, EKG, Troponin and serial changes to determine if it is an Acute Myocardial Infarction or myocardial injury due to an underlying chronic condition.     CBC & Differential [[id number]] Collected:  06/22/19 2237    Specimen:  Blood Updated:  06/22/19 2306    Narrative:       The following orders were created for panel order CBC & Differential.  Procedure                               Abnormality         Status                     ---------                               -----------         ------                     CBC Auto Differential[[id number]]        Abnormal            Final result                 Please view results for these tests on the individual orders.    CBC Auto Differential [[id number]]  (Abnormal) Collected:  06/22/19 2237    Specimen:  Blood Updated:  06/22/19 2306     WBC 6.94 10*3/mm3      RBC 4.13 10*6/mm3      Hemoglobin 13.1 g/dL      Hematocrit 39.2 %      MCV 94.9 fL      MCH 31.7 pg      MCHC 33.4 g/dL      RDW 13.4 %      RDW-SD 46.8 fl      MPV 10.7 fL      Platelets 199 10*3/mm3      Neutrophil % 52.9 %      Lymphocyte % 34.1 %      Monocyte % 7.8 %      Eosinophil % 4.2 %      Basophil % 0.7 %      Immature Grans % 0.3 %      Neutrophils, Absolute 3.67 10*3/mm3      Lymphocytes, Absolute 2.37 10*3/mm3      Monocytes, Absolute 0.54 10*3/mm3      Eosinophils, Absolute 0.29 10*3/mm3      Basophils, Absolute 0.05 10*3/mm3      Immature Grans, Absolute 0.02 10*3/mm3      nRBC 0.0 /100 WBC     POC Glucose Once [[id number]]  (Abnormal) Collected:  06/22/19 2125    Specimen:  Blood Updated:  06/22/19 2126     Glucose 212 mg/dL                    Imaging Results (all)     Procedure Component Value Units Date/Time    CT Head Without Contrast [[id number]] Collected:  06/22/19 2332     Updated:  06/22/19 2338    Narrative:       CT OF THE HEAD  WITHOUT CONTRAST     HISTORY: Confusion     COMPARISON: 11/13/2018     TECHNIQUE: Axial CT imaging was obtained from the vertex of the skull  and skull base. No IV contrast was administered.     FINDINGS:  No acute intracranial hemorrhage is identified. There is diffuse  atrophy. There is periventricular deep white matter microangiopathic  disease. There is no midline shift or mass effect. 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 Area of encephalomalacia is seen within the left  parietal lobe near the vertex. This is adjacent to a calvarial defect  within this area.       Impression:       No acute intracranial process is identified. Postsurgical changes are  seen involving the anterior aspect of the right maxillary sinus.  Paranasal sinuses and mastoid air cells appear clear.     Radiation dose reduction techniques were utilized, including automated  exposure control and exposure modulation based on body size.     This report was finalized on 6/22/2019 11:35 PM by Dr. Estefani Joshi M.D.           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 SWEAT GLAND    • Cardiac disease    • Coronary artery disease     CABG 2012   • Diabetes mellitus (CMS/HCC)    • Gout several years ago    medication  working   • Headache 11/15/2017   • Hearing aid worn     bilateral   • History of transfusion    • Hydrocephaly    • Hyperlipemia    • Hypertension    • Joint pain     or swelling   • Low back pain several years ago   • Orbit fracture (CMS/HCC)    • Pulmonary embolism (CMS/HCC)     OCT 2016   • Sinus infection        Assessment:  Active Hospital Problems    Diagnosis  POA   • **TIA (transient ischemic attack) [G45.9]  Yes   • PFO (patent foramen ovale) [Q21.1]  Not Applicable   • Normal pressure hydrocephalus [G91.2]  Yes   • Hypotension [I95.9]  Yes   • Right leg DVT (CMS/HCC) [I82.401]  Yes   • Pulmonary  embolism (CMS/HCC) [I26.99]  Yes   • Hyperlipemia [E78.5]  Yes   • 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]  Not Applicable      Resolved Hospital Problems   No resolved problems to display.       Plan:  The patient admitted to the hospital get MRI brain and MRA of head and neck.  Will consult neurology get PT OT and speech therapy evaluation.  He has been started on aspirin and statin.    Nilson Moon MD  6/23/2019  2:22 PM

## 2019-06-23 NOTE — ED PROVIDER NOTES
EMERGENCY DEPARTMENT ENCOUNTER    CHIEF COMPLAINT  Chief Complaint: Speech difficulty    History given by: patient, family  History limited by: n/a   Room Number: S519/1  PMD: Adeline Onofre MD      HPI:  Pt is a 78 y.o. male who presents for evaluation of difficulty speaking. Pt states that at 19:00, his family was asking him questions, and he understood what they were asking and what he wanted to answer, but he was unable to form the words. He states that these sx have now resolved. He denies vision changes, focal weakness, or numbness. 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 Pt does report intermittent urinary incontinent and difficulty walking, but states that these are at baseline.    Duration:  Occurred 3.5 hours ago  Onset: gradual  Timing: constant   Location: neuro  Radiation: n/a   Quality: aphasia   Intensity/Severity: moderat  Progression: resolved   Associated Symptoms: none  Aggravating Factors: none  Alleviating Factors: none  Previous Episodes: hx of NPH  Treatment before arrival: none    PAST MEDICAL HISTORY  Active Ambulatory Problems     Diagnosis Date Noted   • Quadriceps weakness 04/08/2016   • ACL tear 04/08/2016   • Knee pain, right 04/08/2016   • S/P CABG x 3 04/18/2016   • Essential hypertension 04/18/2016   • Hyperlipemia 04/18/2016   • Hallux rigidus 07/11/2016   • Fracture, stress, metatarsal 07/11/2016   • Osteopenia determined by x-ray 07/11/2016   • Metatarsal stress fracture with nonunion 08/10/2016   • Left hip pain 08/26/2016   • Bursitis of left hip 08/26/2016   • Pulmonary embolism (CMS/[facility]) 10/12/2016   • DALILA (acute kidney injury) (CMS/[facility]) 10/12/2016   • Diabetes mellitus type 2, controlled (CMS/[facility]) 10/12/2016   • Ascending aorta dilatation (CMS/[facility]) 10/12/2016   • Pulmonary nodule, left 10/12/2016   • Right leg DVT (CMS/[facility]) 10/13/2016   • Acute renal failure (CMS/[facility]) 01/18/2017   • Hypotension 01/18/2017   • Dehydration 01/18/2017   • Hypercalcemia 01/18/2017   •  Dysphagia 01/21/2017   • Syncope and collapse 02/24/2017   • Normal pressure hydrocephalus 11/09/2017   • Headache 11/15/2017   • Impaired mobility 11/15/2017   • Nausea & vomiting 11/15/2017   • Fall at home 11/15/2017   • Transient cerebral ischemia 11/20/2017   • PFO (patent foramen ovale) 11/21/2017   • Esophageal dysphagia 03/20/2018     Resolved Ambulatory Problems     Diagnosis Date Noted   • Coronary artery disease involving native coronary artery of native heart without angina pectoris 04/10/2017   • Change in hearing 11/15/2017     Past Medical History:   Diagnosis Date   • Arthritis    • Asthma    • Brain abscess    • Bruise of face    • Cancer (CMS/HCC)    • Cardiac disease    • Coronary artery disease    • Diabetes mellitus (CMS/HCC)    • Gout several years ago   • Headache 11/15/2017   • Hearing aid worn    • History of transfusion    • Hydrocephaly    • Hyperlipemia    • Hypertension    • Joint pain    • Low back pain several years ago   • Orbit fracture (CMS/HCC)    • Pulmonary embolism (CMS/HCC)    • Sinus infection        PAST SURGICAL HISTORY  Past Surgical History:   Procedure Laterality Date   • BRAIN SURGERY      BRAIN ABCESS 30 YR AGO   • CARDIAC SURGERY     • COLONOSCOPY  2012    Ciciliano- normal per pt, no polyps    • CORONARY ARTERY BYPASS GRAFT     • ENDOSCOPY N/A 3/9/2017    Schatzki ring, dilated, LA Grade B esophagitis, HH, acquired duodenal stenosis   • ENDOSCOPY N/A 4/6/2018    candida, HH, torts, Schatzki ring, duodenal stenosis, dilatation perforemed, chronic inflammation   • FACIAL FRACTURE SURGERY      to repair 6 broken bones   • ORBITAL FRACTURE OPEN REDUCTION INTERNAL FIXATION Right 1/13/2017    Procedure: RT ORBIT FLOOR FRACTURE REPAIR RIGHT [facility] FRACTURE REPAIR, RIGHT NASAL BONE FRACTURE CLOSED REDUCTION;  Surgeon: Edil Lester MD;  Location: [facility] OR [facility];  Service:    • ORIF FOOT FRACTURE Right 9/9/2016    Procedure: RIGHT SECOND METATARSAL OPEN REDUCTION INTERNAL  FIXATION WITh GRAFT FROM HEEL ;  Surgeon: Man Jenkins MD;  Location: [facility] OR [facility] [place];  Service:    • SEPTOPLASTY     • SKIN CANCER EXCISION     • TONSILLECTOMY         FAMILY HISTORY  Family History   Problem Relation Age of Onset   • Heart disease Mother    • Hypertension Mother    • Stroke Mother    • Diverticulitis Mother    • Heart disease Father    • Hypertension Father        SOCIAL HISTORY  Social History     Socioeconomic History   • Marital status:      Spouse name: Not on file   • Number of children: Not on file   • Years of education: Not on file   • Highest education level: Not on file   Tobacco Use   • Smoking status: Former Smoker     Packs/day: 2.00     Years: 5.00     Pack years: 10.00     Types: Cigarettes     Start date: 1959     Last attempt to quit: 3/9/1962     Years since quittin.3   • Smokeless tobacco: Never Used   • Tobacco comment: Quit cold turkey   Substance and Sexual Activity   • Alcohol use: Yes     Alcohol/week: 1.2 oz     Types: 1 Cans of beer, 1 Shots of liquor per week     Comment: OCC   • Drug use: No   • Sexual activity: Yes     Partners: Female     Birth control/protection: Surgical       ALLERGIES  Other and Oxycodone    REVIEW OF SYSTEMS  Review of Systems   Constitutional: Negative for activity change, appetite change and fever.   HENT: Negative for congestion and sore throat.    Eyes: Negative.    Respiratory: Negative for cough and shortness of breath.    Cardiovascular: Negative for chest pain and leg swelling.   Gastrointestinal: Negative for abdominal pain, diarrhea and vomiting.   Endocrine: Negative.    Genitourinary: Negative for decreased urine volume and dysuria.   Musculoskeletal: Negative for neck pain.   Skin: Negative for rash and wound.   Allergic/Immunologic: Negative.    Neurological: Positive for speech difficulty. Negative for weakness, numbness and headaches.   Hematological: Negative.    Psychiatric/Behavioral: Negative.    All  other systems reviewed and are negative.      PHYSICAL EXAM  ED Triage Vitals   Temp Heart Rate Resp BP SpO2   06/22/19 2123 06/22/19 2123 06/22/19 2123 06/22/19 2221 06/22/19 2123   98.9 °F (37.2 °C) 83 16 (!) 209/88 95 %      Temp src Heart Rate Source Patient Position BP Location FiO2 (%)   06/22/19 2123 06/22/19 2123 06/22/19 2221 06/22/19 2221 --   Tympanic Monitor Lying Right arm        Physical Exam   Constitutional: He is oriented to person, place, and time. No distress.   HENT:   Head: Normocephalic and atraumatic.   Eyes: EOM are normal. Pupils are equal, round, and reactive to light.   Neck: Normal range of motion. Neck supple.   Cardiovascular: Normal rate, regular rhythm and normal heart sounds.   Pulmonary/Chest: Effort normal and breath sounds normal. No respiratory distress.   Abdominal: Soft. There is no tenderness. There is no rebound and no guarding.   Musculoskeletal: Normal range of motion. He exhibits no edema.   Neurological: He is alert and oriented to person, place, and time. He has normal sensation and normal strength.   NIHSS=0   Skin: Skin is warm and dry.   Psychiatric: Mood and affect normal.   Nursing note and vitals reviewed.      LAB RESULTS  Lab Results (last 24 hours)     Procedure Component Value Units Date/Time    POC Glucose Once [[id number]]  (Abnormal) Collected:  06/22/19 2125    Specimen:  Blood Updated:  06/22/19 2126     Glucose 212 mg/dL     CBC & Differential [[id number]] Collected:  06/22/19 2237    Specimen:  Blood Updated:  06/22/19 2306    Narrative:       The following orders were created for panel order CBC & Differential.  Procedure                               Abnormality         Status                     ---------                               -----------         ------                     CBC Auto Differential[[id number]]        Abnormal            Final result                 Please view results for these tests on the individual orders.    Comprehensive  Metabolic Panel [[id number]]  (Abnormal) Collected:  06/22/19 2237    Specimen:  Blood Updated:  06/22/19 2314     Glucose 222 mg/dL      BUN 20 mg/dL      Creatinine 1.10 mg/dL      Sodium 137 mmol/L      Potassium 4.2 mmol/L      Chloride 101 mmol/L      CO2 21.3 mmol/L      Calcium 9.5 mg/dL      Total Protein 7.0 g/dL      Albumin 4.30 g/dL      ALT (SGPT) 14 U/L      AST (SGOT) 11 U/L      Alkaline Phosphatase 52 U/L      Total Bilirubin 0.3 mg/dL      eGFR Non African Amer 65 mL/min/1.73      Globulin 2.7 gm/dL      A/G Ratio 1.6 g/dL      BUN/Creatinine Ratio 18.2     Anion Gap 14.7 mmol/L     Narrative:       GFR Normal >60  Chronic Kidney Disease <60  Kidney Failure <15    Troponin [[id number]]  (Normal) Collected:  06/22/19 2237    Specimen:  Blood Updated:  06/22/19 2314     Troponin T <0.010 ng/mL     Narrative:       Troponin T Reference Range:  <= 0.03 ng/mL-   Negative for AMI  >0.03 ng/mL-     Abnormal for myocardial necrosis.  Clinicians would have to utilize clinical acumen, EKG, Troponin and serial changes to determine if it is an Acute Myocardial Infarction or myocardial injury due to an underlying chronic condition.     Protime-INR [[id number]]  (Normal) Collected:  06/22/19 2237    Specimen:  Blood Updated:  06/22/19 2316     Protime 12.5 Seconds      INR 0.96    aPTT [[id number]]  (Normal) Collected:  06/22/19 2237    Specimen:  Blood Updated:  06/22/19 2316     PTT 23.6 seconds     CBC Auto Differential [[id number]]  (Abnormal) Collected:  06/22/19 2237    Specimen:  Blood Updated:  06/22/19 2306     WBC 6.94 10*3/mm3      RBC 4.13 10*6/mm3      Hemoglobin 13.1 g/dL      Hematocrit 39.2 %      MCV 94.9 fL      MCH 31.7 pg      MCHC 33.4 g/dL      RDW 13.4 %      RDW-SD 46.8 fl      MPV 10.7 fL      Platelets 199 10*3/mm3      Neutrophil % 52.9 %      Lymphocyte % 34.1 %      Monocyte % 7.8 %      Eosinophil % 4.2 %      Basophil % 0.7 %      Immature Grans % 0.3 %      Neutrophils, Absolute 3.67  10*3/mm3      Lymphocytes, Absolute 2.37 10*3/mm3      Monocytes, Absolute 0.54 10*3/mm3      Eosinophils, Absolute 0.29 10*3/mm3      Basophils, Absolute 0.05 10*3/mm3      Immature Grans, Absolute 0.02 10*3/mm3      nRBC 0.0 /100 WBC     TSH [[id number]]  (Normal) Collected:  06/22/19 2237    Specimen:  Blood Updated:  06/23/19 1834     TSH 2.410 mIU/mL     POC Glucose Once [[id number]]  (Abnormal) Collected:  06/23/19 0716    Specimen:  Blood Updated:  06/23/19 0717     Glucose 171 mg/dL     POC Glucose Once [[id number]]  (Abnormal) Collected:  06/23/19 1117    Specimen:  Blood Updated:  06/23/19 1120     Glucose 245 mg/dL     Vitamin B12 [[id number]] Collected:  06/23/19 1847    Specimen:  Blood Updated:  06/23/19 1855    Folate [[id number]] Collected:  06/23/19 1847    Specimen:  Blood Updated:  06/23/19 1855    Homocysteine [[id number]] Collected:  06/23/19 1847    Specimen:  Blood Updated:  06/23/19 1855          I ordered the above labs and reviewed the results    RADIOLOGY  CT Head Without Contrast   Final Result   No acute intracranial process is identified. Postsurgical changes are   seen involving the anterior aspect of the right maxillary sinus.   Paranasal sinuses and mastoid air cells appear clear.       Radiation dose reduction techniques were utilized, including automated   exposure control and exposure modulation based on body size.       This report was finalized on 6/22/2019 11:35 PM by Dr. Estefani Joshi M.D.          MRI Brain With & Without Contrast    (Results Pending)   MRI Angiogram Head Without Contrast    (Results Pending)   MRI Angiogram Neck With & Without Contrast    (Results Pending)        I ordered the above noted radiological studies. Interpreted by radiologist. Reviewed by me in PACS.       PROCEDURES  Procedures  EKG          EKG time: 22:39  Rhythm/Rate: NSR 79  P waves and TN: nml  QRS, axis: nml   ST and T waves: nml     Interpreted Contemporaneously by me, independently  viewed  Unchanged compared to prior 11/20/17      PROGRESS AND CONSULTS       22:27  BP- (!) 210/90 HR- 83 Temp- 98.9 °F (37.2 °C) (Tympanic) O2 sat- 96%  Informed pt of the plan for labs and CT head. Pt understands and agrees with the plan, all questions answered.    22:36  CT head, CMP, CBC, PT/INR, APTT, troponin, and EKG ordered.     23:44  Call placed to [facility] for admission.    00:13  Discussed pt's case with Dr Freeman ([facility]), who agrees to admit the pt     00:19  BP- (!) 186/83 HR- 75 Temp- 98.9 °F (37.2 °C) (Tympanic) O2 sat- 93%  Rechecked the patient who is in NAD and is resting comfortably. Informed pt that the workup in the ED shows NAD, but plan for admission for further TIA workup. Pt understands and agrees with the plan, all questions answered.    MEDICAL DECISION MAKING  Results were reviewed/discussed with the patient and they were also made aware of online access. Pt also made aware that some labs, such as cultures, will not be resulted during ER visit and follow up with PMD is necessary.     MDM  Number of Diagnoses or Management Options  Aphasia:   TIA (transient ischemic attack):      Amount and/or Complexity of Data Reviewed  Clinical lab tests: ordered and reviewed (Troponin is <0.010)  Tests in the radiology section of CPT®: ordered and reviewed (CT head shows NAD)  Tests in the medicine section of CPT®: ordered and reviewed (See EKG procedure note. )  Decide to obtain previous medical records or to obtain history from someone other than the patient: yes  Review and summarize past medical records: yes (Pt was admitted 11/20/19 s/t AMS and TIA. )  Discuss the patient with other providers: yes (Dr Freeman ([facility]))    Patient Progress  Patient progress: stable          DIAGNOSIS  Final diagnoses:   TIA (transient ischemic attack)   Aphasia       DISPOSITION  ADMISSION    Discussed treatment plan and reason for admission with pt/family and admitting physician.  Pt/family voiced understanding of the plan  for admission for further testing/treatment as needed.     Latest Documented Vital Signs:  As of 7:25 PM  BP- (!) 184/88 HR- 75 Temp- 98 °F (36.7 °C) (Oral) O2 sat- 93%    --  Documentation assistance provided by laura Merino for Dr Lawrence.  Information recorded by the scribe was done at my direction and has been verified and validated by me.        Joanne Merino  06/23/19 0020       René Lawrence MD  06/23/19 1922

## 2019-06-23 NOTE — PLAN OF CARE
Problem: Patient Care Overview  Goal: Plan of Care Review  Outcome: Ongoing (interventions implemented as appropriate)   06/23/19 0045   Coping/Psychosocial   Plan of Care Reviewed With patient;spouse   Plan of Care Review   Progress improving   OTHER   Outcome Summary PT eval. BP improved from 190/95 to 176/84 prior to rx. Good balance with walker. Will progress amb on 6-24-19 pending MRI. Aware of current dx of NPH.

## 2019-06-24 VITALS
WEIGHT: 199.74 LBS | OXYGEN SATURATION: 93 % | SYSTOLIC BLOOD PRESSURE: 155 MMHG | HEART RATE: 78 BPM | BODY MASS INDEX: 28.59 KG/M2 | DIASTOLIC BLOOD PRESSURE: 65 MMHG | HEIGHT: 70 IN | TEMPERATURE: 97.7 F | RESPIRATION RATE: 16 BRPM

## 2019-06-24 LAB
ALBUMIN SERPL-MCNC: 3.8 G/DL (ref 3.5–5.2)
ALBUMIN/GLOB SERPL: 1.3 G/DL
ALP SERPL-CCNC: 47 U/L (ref 39–117)
ALT SERPL W P-5'-P-CCNC: 15 U/L (ref 1–41)
ANION GAP SERPL CALCULATED.3IONS-SCNC: 12.6 MMOL/L
AST SERPL-CCNC: 19 U/L (ref 1–40)
BILIRUB SERPL-MCNC: 0.6 MG/DL (ref 0.2–1.2)
BUN BLD-MCNC: 14 MG/DL (ref 8–23)
BUN/CREAT SERPL: 12.6 (ref 7–25)
CALCIUM SPEC-SCNC: 9.7 MG/DL (ref 8.6–10.5)
CHLORIDE SERPL-SCNC: 108 MMOL/L (ref 98–107)
CHOLEST SERPL-MCNC: 115 MG/DL (ref 0–200)
CO2 SERPL-SCNC: 21.4 MMOL/L (ref 22–29)
CREAT BLD-MCNC: 1.11 MG/DL (ref 0.76–1.27)
DEPRECATED RDW RBC AUTO: 46 FL (ref 37–54)
ERYTHROCYTE [DISTWIDTH] IN BLOOD BY AUTOMATED COUNT: 13.3 % (ref 12.3–15.4)
GFR SERPL CREATININE-BSD FRML MDRD: 64 ML/MIN/1.73
GLOBULIN UR ELPH-MCNC: 2.9 GM/DL
GLUCOSE BLD-MCNC: 183 MG/DL (ref 65–99)
GLUCOSE BLDC GLUCOMTR-MCNC: 158 MG/DL (ref 70–130)
GLUCOSE BLDC GLUCOMTR-MCNC: 215 MG/DL (ref 70–130)
HBA1C MFR BLD: 7.8 % (ref 4.8–5.6)
HCT VFR BLD AUTO: 38.6 % (ref 37.5–51)
HDLC SERPL-MCNC: 36 MG/DL (ref 40–60)
HGB BLD-MCNC: 12.8 G/DL (ref 13–17.7)
LDLC SERPL CALC-MCNC: 30 MG/DL (ref 0–100)
LDLC/HDLC SERPL: 0.83 {RATIO}
MCH RBC QN AUTO: 31.4 PG (ref 26.6–33)
MCHC RBC AUTO-ENTMCNC: 33.2 G/DL (ref 31.5–35.7)
MCV RBC AUTO: 94.6 FL (ref 79–97)
PLATELET # BLD AUTO: 185 10*3/MM3 (ref 140–450)
PMV BLD AUTO: 11 FL (ref 6–12)
POTASSIUM BLD-SCNC: 4.4 MMOL/L (ref 3.5–5.2)
PROT SERPL-MCNC: 6.7 G/DL (ref 6–8.5)
RBC # BLD AUTO: 4.08 10*6/MM3 (ref 4.14–5.8)
RPR SER QL: NORMAL
SODIUM BLD-SCNC: 142 MMOL/L (ref 136–145)
TRIGL SERPL-MCNC: 245 MG/DL (ref 0–150)
VLDLC SERPL-MCNC: 49 MG/DL (ref 5–40)
WBC NRBC COR # BLD: 7.47 10*3/MM3 (ref 3.4–10.8)

## 2019-06-24 PROCEDURE — 94799 UNLISTED PULMONARY SVC/PX: CPT

## 2019-06-24 PROCEDURE — G0378 HOSPITAL OBSERVATION PER HR: HCPCS

## 2019-06-24 PROCEDURE — 97535 SELF CARE MNGMENT TRAINING: CPT

## 2019-06-24 PROCEDURE — 85027 COMPLETE CBC AUTOMATED: CPT | Performed by: INTERNAL MEDICINE

## 2019-06-24 PROCEDURE — 80061 LIPID PANEL: CPT | Performed by: INTERNAL MEDICINE

## 2019-06-24 PROCEDURE — 80053 COMPREHEN METABOLIC PANEL: CPT | Performed by: INTERNAL MEDICINE

## 2019-06-24 PROCEDURE — 83036 HEMOGLOBIN GLYCOSYLATED A1C: CPT | Performed by: INTERNAL MEDICINE

## 2019-06-24 PROCEDURE — 97165 OT EVAL LOW COMPLEX 30 MIN: CPT

## 2019-06-24 PROCEDURE — 92610 EVALUATE SWALLOWING FUNCTION: CPT

## 2019-06-24 PROCEDURE — 97110 THERAPEUTIC EXERCISES: CPT

## 2019-06-24 PROCEDURE — 82962 GLUCOSE BLOOD TEST: CPT

## 2019-06-24 PROCEDURE — 63710000001 INSULIN LISPRO (HUMAN) PER 5 UNITS: Performed by: INTERNAL MEDICINE

## 2019-06-24 PROCEDURE — 99214 OFFICE O/P EST MOD 30 MIN: CPT | Performed by: NURSE PRACTITIONER

## 2019-06-24 RX ORDER — ATORVASTATIN CALCIUM 20 MG/1
40 TABLET, FILM COATED ORAL DAILY
Status: DISCONTINUED | OUTPATIENT
Start: 2019-06-25 | End: 2019-06-24 | Stop reason: HOSPADM

## 2019-06-24 RX ORDER — CLOPIDOGREL BISULFATE 75 MG/1
75 TABLET ORAL DAILY
Qty: 30 TABLET | Refills: 0 | Status: SHIPPED | OUTPATIENT
Start: 2019-06-25 | End: 2019-08-01

## 2019-06-24 RX ADMIN — PANTOPRAZOLE SODIUM 40 MG: 40 TABLET, DELAYED RELEASE ORAL at 06:26

## 2019-06-24 RX ADMIN — INSULIN LISPRO 4 UNITS: 100 INJECTION, SOLUTION INTRAVENOUS; SUBCUTANEOUS at 11:57

## 2019-06-24 RX ADMIN — ATORVASTATIN CALCIUM 20 MG: 20 TABLET, FILM COATED ORAL at 08:22

## 2019-06-24 RX ADMIN — ALLOPURINOL 300 MG: 300 TABLET ORAL at 08:22

## 2019-06-24 RX ADMIN — OXYBUTYNIN CHLORIDE 5 MG: 5 TABLET, EXTENDED RELEASE ORAL at 08:22

## 2019-06-24 RX ADMIN — BUDESONIDE AND FORMOTEROL FUMARATE DIHYDRATE 2 PUFF: 160; 4.5 AEROSOL RESPIRATORY (INHALATION) at 08:27

## 2019-06-24 RX ADMIN — LISINOPRIL 10 MG: 10 TABLET ORAL at 08:21

## 2019-06-24 RX ADMIN — INSULIN LISPRO 2 UNITS: 100 INJECTION, SOLUTION INTRAVENOUS; SUBCUTANEOUS at 08:22

## 2019-06-24 RX ADMIN — MULTIPLE VITAMINS W/ MINERALS TAB 1 TABLET: TAB at 08:22

## 2019-06-24 RX ADMIN — METFORMIN HYDROCHLORIDE 500 MG: 500 TABLET ORAL at 17:06

## 2019-06-24 RX ADMIN — SODIUM CHLORIDE, PRESERVATIVE FREE 3 ML: 5 INJECTION INTRAVENOUS at 08:22

## 2019-06-24 RX ADMIN — CHOLECALCIFEROL TAB 10 MCG (400 UNIT) 400 UNITS: 10 TAB at 08:22

## 2019-06-24 RX ADMIN — CLOPIDOGREL 75 MG: 75 TABLET, FILM COATED ORAL at 08:22

## 2019-06-24 RX ADMIN — METFORMIN HYDROCHLORIDE 500 MG: 500 TABLET ORAL at 08:21

## 2019-06-24 RX ADMIN — ASPIRIN 81 MG: 81 TABLET, COATED ORAL at 08:22

## 2019-06-24 NOTE — DISCHARGE SUMMARY
PHYSICIAN DISCHARGE SUMMARY                                                                        [facility]    Patient Identification:  Name: Eugenio Joe  Age: 78 y.o.  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          PMHX:   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 SWEAT GLAND    • Cardiac disease    • Coronary artery disease     CABG    • Diabetes mellitus (CMS/HCC)    • Gout several years ago    medication  working   • Headache 11/15/2017   • Hearing aid worn     bilateral   • History of transfusion    • Hydrocephaly    • Hyperlipemia    • Hypertension    • Joint pain     or swelling   • Low back pain several years ago   • Orbit fracture (CMS/HCC)    • Pulmonary embolism (CMS/HCC)     OCT 2016   • Sinus infection      PSHX:   Past Surgical History:   Procedure Laterality Date   • BRAIN SURGERY      BRAIN ABCESS 30 YR AGO   • CARDIAC SURGERY     • COLONOSCOPY      Ciciliano- normal per pt, no polyps    • CORONARY ARTERY BYPASS GRAFT     • ENDOSCOPY N/A 3/9/2017    Schatzki ring, dilated, LA Grade B esophagitis, HH, acquired duodenal stenosis   • ENDOSCOPY N/A 2018    candida, HH, torts,  Schatzki ring, duodenal stenosis, dilatation perforemed, chronic inflammation   • FACIAL FRACTURE SURGERY      to repair 6 broken bones   • ORBITAL FRACTURE OPEN REDUCTION INTERNAL FIXATION Right 1/13/2017    Procedure: RT ORBIT FLOOR FRACTURE REPAIR RIGHT ZMC FRACTURE REPAIR, RIGHT NASAL BONE FRACTURE CLOSED REDUCTION;  Surgeon: Edil Lester MD;  Location: [facility];  Service:    • ORIF FOOT FRACTURE Right 9/9/2016    Procedure: RIGHT SECOND METATARSAL OPEN REDUCTION INTERNAL FIXATION WITh GRAFT FROM HEEL ;  Surgeon: Man Jenkins MD;  Location: [facility];  Service:    • SEPTOPLASTY     • SKIN CANCER EXCISION     • TONSILLECTOMY         Hospital Course: Eugenio Joe  is a 78-year-old white male with history of arthritis, asthma, heart disease, previous CABG, diabetes, gout, headaches, hypertension, hyperlipidemia and low back pain is admitted with having a transient episode of difficulty with speech. It lasted about 2 hours. He did not have any headache. He has not had any nausea or vomiting. He denies any chest pain or shortness of air. He has not had any fever. The patient was admitted for further evaluation treatment of possible TIA.         The patient was admitted to the hospital and had MRI brain and MRA of head and neck.  He did have small area probably consistent with acute stroke.  Also MRI showed some old strokes.  Neurology recommended he take aspirin 81 mg and Plavix 75 mg daily for 30 days and then go to a full aspirin 325 mg daily.  He will continue on a statin.  He will follow-up with neurology in 3 months and also follow-up with his primary care in 1 week.    Consults:     Consults     Date and Time Order Name Status Description    6/23/2019 0602 Inpatient Neurology Consult Stroke Completed     6/22/2019 4451 [facility] (on-call MD unless specified) Completed         Results from last 7 days   Lab Units 06/24/19  0656   WBC 10*3/mm3 7.47   HEMOGLOBIN g/dL 12.8*   HEMATOCRIT %  38.6   PLATELETS 10*3/mm3 185     Results from last 7 days   Lab Units 06/24/19  0656   SODIUM mmol/L 142   POTASSIUM mmol/L 4.4   CHLORIDE mmol/L 108*   CO2 mmol/L 21.4*   BUN mg/dL 14   CREATININE mg/dL 1.11   GLUCOSE mg/dL 183*   CALCIUM mg/dL 9.7     Significant Diagnostic Studies:   Lab Results   Component Value Date    WBC 7.47 06/24/2019    HGB 12.8 (L) 06/24/2019    HCT 38.6 06/24/2019     06/24/2019     Lab Results   Component Value Date     06/24/2019    K 4.4 06/24/2019     (H) 06/24/2019    CO2 21.4 (L) 06/24/2019    BUN 14 06/24/2019    CREATININE 1.11 06/24/2019    GLUCOSE 183 (H) 06/24/2019     Lab Results   Component Value Date    CALCIUM 9.7 06/24/2019     Lab Results   Component Value Date    AST 19 06/24/2019    ALT 15 06/24/2019    ALKPHOS 47 06/24/2019     Lab Results   Component Value Date    INR 0.96 06/22/2019     No results found for: COLORU, CLARITYU, SPECGRAV, PHUR, PROTEINUR, GLUCOSEU, KETONESU, BLOODU, NITRITE, LEUKOCYTESUR, BILIRUBINUR, UROBILINOGEN, RBCUA, WBCUA, BACTERIA, UACOMMENT  Lab Results   Component Value Date    TROPONINT <0.010 06/22/2019     No components found for: HGBA1C;2  No components found for: TSH;2  Imaging Results (all)     Procedure Component Value Units Date/Time    MRI Brain With & Without Contrast [[id number]] Collected:  06/23/19 1928     Updated:  06/23/19 2011    Narrative:       MRI OF THE BRAIN WITH AND WITHOUT CONTRAST, MRA OF THE HEAD WITHOUT  CONTRAST, MRA OF THE NECK WITH AND WITHOUT CONTRAST     CLINICAL HISTORY: Confusion. History of craniotomy 33 years ago.     MRI of the brain was obtained with sagittal pre and postgadolinium T1,  axial pre and postgadolinium T1, coronal postgadolinium T1, axial post  gadolinium 3D SPGR, axial FLAIR, axial T2, axial diffusion, and axial  susceptibility weighted images.     Comparison is made to previous brain MRI dated 11/20/2017.     FINDINGS: There is a tiny focus of diffusion-weighted  hyperintensity  within the medial portion of the left occipital lobe which measures up  to 3 mm in diameter. A small subacute infarct at this site cannot be  excluded. There are moderate changes of chronic small vessel ischemic  phenomena. Additionally, there are findings compatible with a chronic  infarct within the superior aspect of the left parietal lobe within the  left MCA distribution which measures up to 3.5 x 1.9 cm in greatest  axial dimensions. This chronic infarct was also evident on the prior  examination. Additionally, there is a new small chronic infarct within  the right middle frontal gyrus within the right MCA distribution  measuring up to 5 mm in diameter. A new chronic infarct is seen within  the left posterior central gyrus measuring up to approximately 4 mm in  diameter. There is a tiny chronic infarct noted within the inferior  aspect of the left occipital lobe within the left PCA distribution  measuring up to 5 mm in diameter which was seen on the prior exam. There  are subcentimeter chronic infarcts are noted within the cerebellar  hemispheres which were also noted on the prior study.     The ventricular system is larger in size when compared to the prior  study of 10/28/2010 although I suspect that the findings are due to  exvacuodilatation represent hydrocephalus although correlation is  suggested.     The major intracranial flow related signal voids are unremarkable. No  abnormal areas of susceptibility artifact are noted. No abnormal areas  of contrast enhancement are seen. The midline intracranial anatomy is  unremarkable.     Mild mucosal thickening is seen within the ethmoid air cells.       Impression:       There is a 3 mm focus of diffusion-weighted hyperintensity  within the medial portion of the left occipital lobe or a small subacute  infarct cannot be excluded. These findings were discussed with Jelly Le RN, on 06/23/2018 at approximately 7:00 PM. She was  instructed  notify the physician caring for this patient with this result     There are multiple chronic infarcts identified within the brain  parenchyma and these have been discussed in detail above. The largest of  these is seen within the superior aspect of the left parietal lobe and  is within the left MCA distribution. This chronic infarct was evident on  the prior study. Additionally, there is a tiny chronic infarct within  the inferior aspect of the left occipital lobe and multiple chronic  infarcts are noted within the cerebellar hemispheres. These chronic  infarcts within the inferior portion of the left occipital lobe and the  cerebellar hemispheres were evident on the prior study. However, there  are multiple areas of new foci of chronic infarction within the brain  parenchyma and these have been discussed in detail above. These are  located within the right middle frontal gyrus and the left posterior  central gyrus.      Moderate changes of chronic small vessel ischemic phenomena are noted  and these findings have progressed in the interval since the prior exam.     The ventricular system is enlarged although I suspect that the findings  are due to ex vacuo dilatation rather than hydrocephalus although  correlation with clinical data is suggested.     MRA of the [place] was obtained with 3D time-of-flight imaging  technique. Maximum intensity projected images were obtained.     FINDINGS:     There is normal flow-related enhancement within the vertebral arteries,  basilar artery, and posterior cerebral arteries. The internal carotids,  middle cerebral, and the anterior cerebrals are within normal limits.     IMPRESSION: Unremarkable MRA of the [place].     MRA of the neck was obtained with 3D time-of-flight imaging technique.  Additionally, there is a contrast enhanced MRA of the neck. Maximum  intensity projection reconstructed images were obtained.     FINDINGS: There is a classic  configuration of the aortic arch. There is  a mild-to-moderate degree of stenosis of the proximal aspect of the left  subclavian artery. The vertebral artery origins are not well evaluated  and there may be up to mild-to-moderate degree of stenosis involving the  origin of the left vertebral artery. There is atherosclerotic  irregularity within both proximal internal carotids although there is no  significant NASCET stenosis.     IMPRESSION: There is atherosclerotic change appreciated within both  proximal internal carotids although there is no significant NASCET  stenosis.     The origins of the vertebral arteries are not well evaluated although  there could be up to mild-to-moderate degree of stenosis within the  origin of the left vertebral artery. There is a mild-to-moderate degree  of stenosis involving the proximal aspect of the left subclavian artery.     The findings were discussed with the nurse caring for this patient on  06/23/2019 at approximately 7 PM.     This report was finalized on 6/23/2019 8:08 PM by Dr. Bernard Membreno M.D.       MRI Angiogram Head Without Contrast [[id number]] Collected:  06/23/19 1928     Updated:  06/23/19 2011    Narrative:       MRI OF THE BRAIN WITH AND WITHOUT CONTRAST, MRA OF THE HEAD WITHOUT  CONTRAST, MRA OF THE NECK WITH AND WITHOUT CONTRAST     CLINICAL HISTORY: Confusion. History of craniotomy 33 years ago.     MRI of the brain was obtained with sagittal pre and postgadolinium T1,  axial pre and postgadolinium T1, coronal postgadolinium T1, axial post  gadolinium 3D SPGR, axial FLAIR, axial T2, axial diffusion, and axial  susceptibility weighted images.     Comparison is made to previous brain MRI dated 11/20/2017.     FINDINGS: There is a tiny focus of diffusion-weighted hyperintensity  within the medial portion of the left occipital lobe which measures up  to 3 mm in diameter. A small subacute infarct at this site cannot be  excluded. There are moderate changes of  chronic small vessel ischemic  phenomena. Additionally, there are findings compatible with a chronic  infarct within the superior aspect of the left parietal lobe within the  left MCA distribution which measures up to 3.5 x 1.9 cm in greatest  axial dimensions. This chronic infarct was also evident on the prior  examination. Additionally, there is a new small chronic infarct within  the right middle frontal gyrus within the right MCA distribution  measuring up to 5 mm in diameter. A new chronic infarct is seen within  the left posterior central gyrus measuring up to approximately 4 mm in  diameter. There is a tiny chronic infarct noted within the inferior  aspect of the left occipital lobe within the left PCA distribution  measuring up to 5 mm in diameter which was seen on the prior exam. There  are subcentimeter chronic infarcts are noted within the cerebellar  hemispheres which were also noted on the prior study.     The ventricular system is larger in size when compared to the prior  study of 10/28/2010 although I suspect that the findings are due to  exvacuodilatation represent hydrocephalus although correlation is  suggested.     The major intracranial flow related signal voids are unremarkable. No  abnormal areas of susceptibility artifact are noted. No abnormal areas  of contrast enhancement are seen. The midline intracranial anatomy is  unremarkable.     Mild mucosal thickening is seen within the ethmoid air cells.       Impression:       There is a 3 mm focus of diffusion-weighted hyperintensity  within the medial portion of the left occipital lobe or a small subacute  infarct cannot be excluded. These findings were discussed with Jelly Le RN, on 06/23/2018 at approximately 7:00 PM. She was instructed  notify the physician caring for this patient with this result     There are multiple chronic infarcts identified within the brain  parenchyma and these have been discussed in detail above. The  largest of  these is seen within the superior aspect of the left parietal lobe and  is within the left MCA distribution. This chronic infarct was evident on  the prior study. Additionally, there is a tiny chronic infarct within  the inferior aspect of the left occipital lobe and multiple chronic  infarcts are noted within the cerebellar hemispheres. These chronic  infarcts within the inferior portion of the left occipital lobe and the  cerebellar hemispheres were evident on the prior study. However, there  are multiple areas of new foci of chronic infarction within the brain  parenchyma and these have been discussed in detail above. These are  located within the right middle frontal gyrus and the left posterior  central gyrus.      Moderate changes of chronic small vessel ischemic phenomena are noted  and these findings have progressed in the interval since the prior exam.     The ventricular system is enlarged although I suspect that the findings  are due to ex vacuo dilatation rather than hydrocephalus although  correlation with clinical data is suggested.     MRA of the [place] was obtained with 3D time-of-flight imaging  technique. Maximum intensity projected images were obtained.     FINDINGS:     There is normal flow-related enhancement within the vertebral arteries,  basilar artery, and posterior cerebral arteries. The internal carotids,  middle cerebral, and the anterior cerebrals are within normal limits.     IMPRESSION: Unremarkable MRA of the [place].     MRA of the neck was obtained with 3D time-of-flight imaging technique.  Additionally, there is a contrast enhanced MRA of the neck. Maximum  intensity projection reconstructed images were obtained.     FINDINGS: There is a classic configuration of the aortic arch. There is  a mild-to-moderate degree of stenosis of the proximal aspect of the left  subclavian artery. The vertebral artery origins are not well evaluated  and there may be up  to mild-to-moderate degree of stenosis involving the  origin of the left vertebral artery. There is atherosclerotic  irregularity within both proximal internal carotids although there is no  significant NASCET stenosis.     IMPRESSION: There is atherosclerotic change appreciated within both  proximal internal carotids although there is no significant NASCET  stenosis.     The origins of the vertebral arteries are not well evaluated although  there could be up to mild-to-moderate degree of stenosis within the  origin of the left vertebral artery. There is a mild-to-moderate degree  of stenosis involving the proximal aspect of the left subclavian artery.     The findings were discussed with the nurse caring for this patient on  06/23/2019 at approximately 7 PM.     This report was finalized on 6/23/2019 8:08 PM by Dr. Bernard Membreno M.D.       MRI Angiogram Neck With & Without Contrast [[id number]] Collected:  06/23/19 1928     Updated:  06/23/19 2011    Narrative:       MRI OF THE BRAIN WITH AND WITHOUT CONTRAST, MRA OF THE HEAD WITHOUT  CONTRAST, MRA OF THE NECK WITH AND WITHOUT CONTRAST     CLINICAL HISTORY: Confusion. History of craniotomy 33 years ago.     MRI of the brain was obtained with sagittal pre and postgadolinium T1,  axial pre and postgadolinium T1, coronal postgadolinium T1, axial post  gadolinium 3D SPGR, axial FLAIR, axial T2, axial diffusion, and axial  susceptibility weighted images.     Comparison is made to previous brain MRI dated 11/20/2017.     FINDINGS: There is a tiny focus of diffusion-weighted hyperintensity  within the medial portion of the left occipital lobe which measures up  to 3 mm in diameter. A small subacute infarct at this site cannot be  excluded. There are moderate changes of chronic small vessel ischemic  phenomena. Additionally, there are findings compatible with a chronic  infarct within the superior aspect of the left parietal lobe within the  left MCA distribution which  measures up to 3.5 x 1.9 cm in greatest  axial dimensions. This chronic infarct was also evident on the prior  examination. Additionally, there is a new small chronic infarct within  the right middle frontal gyrus within the right MCA distribution  measuring up to 5 mm in diameter. A new chronic infarct is seen within  the left posterior central gyrus measuring up to approximately 4 mm in  diameter. There is a tiny chronic infarct noted within the inferior  aspect of the left occipital lobe within the left PCA distribution  measuring up to 5 mm in diameter which was seen on the prior exam. There  are subcentimeter chronic infarcts are noted within the cerebellar  hemispheres which were also noted on the prior study.     The ventricular system is larger in size when compared to the prior  study of 10/28/2010 although I suspect that the findings are due to  exvacuodilatation represent hydrocephalus although correlation is  suggested.     The major intracranial flow related signal voids are unremarkable. No  abnormal areas of susceptibility artifact are noted. No abnormal areas  of contrast enhancement are seen. The midline intracranial anatomy is  unremarkable.     Mild mucosal thickening is seen within the ethmoid air cells.       Impression:       There is a 3 mm focus of diffusion-weighted hyperintensity  within the medial portion of the left occipital lobe or a small subacute  infarct cannot be excluded. These findings were discussed with Jelly Le RN, on 06/23/2018 at approximately 7:00 PM. She was instructed  notify the physician caring for this patient with this result     There are multiple chronic infarcts identified within the brain  parenchyma and these have been discussed in detail above. The largest of  these is seen within the superior aspect of the left parietal lobe and  is within the left MCA distribution. This chronic infarct was evident on  the prior study. Additionally, there is a tiny  chronic infarct within  the inferior aspect of the left occipital lobe and multiple chronic  infarcts are noted within the cerebellar hemispheres. These chronic  infarcts within the inferior portion of the left occipital lobe and the  cerebellar hemispheres were evident on the prior study. However, there  are multiple areas of new foci of chronic infarction within the brain  parenchyma and these have been discussed in detail above. These are  located within the right middle frontal gyrus and the left posterior  central gyrus.      Moderate changes of chronic small vessel ischemic phenomena are noted  and these findings have progressed in the interval since the prior exam.     The ventricular system is enlarged although I suspect that the findings  are due to ex vacuo dilatation rather than hydrocephalus although  correlation with clinical data is suggested.     MRA of the [place] was obtained with 3D time-of-flight imaging  technique. Maximum intensity projected images were obtained.     FINDINGS:     There is normal flow-related enhancement within the vertebral arteries,  basilar artery, and posterior cerebral arteries. The internal carotids,  middle cerebral, and the anterior cerebrals are within normal limits.     IMPRESSION: Unremarkable MRA of the [place].     MRA of the neck was obtained with 3D time-of-flight imaging technique.  Additionally, there is a contrast enhanced MRA of the neck. Maximum  intensity projection reconstructed images were obtained.     FINDINGS: There is a classic configuration of the aortic arch. There is  a mild-to-moderate degree of stenosis of the proximal aspect of the left  subclavian artery. The vertebral artery origins are not well evaluated  and there may be up to mild-to-moderate degree of stenosis involving the  origin of the left vertebral artery. There is atherosclerotic  irregularity within both proximal internal carotids although there is no  significant  NASCET stenosis.     IMPRESSION: There is atherosclerotic change appreciated within both  proximal internal carotids although there is no significant NASCET  stenosis.     The origins of the vertebral arteries are not well evaluated although  there could be up to mild-to-moderate degree of stenosis within the  origin of the left vertebral artery. There is a mild-to-moderate degree  of stenosis involving the proximal aspect of the left subclavian artery.     The findings were discussed with the nurse caring for this patient on  06/23/2019 at approximately 7 PM.     This report was finalized on 6/23/2019 8:08 PM by Dr. Bernard Membreno M.D.       CT Head Without Contrast [[id number]] Collected:  06/22/19 2332     Updated:  06/22/19 2338    Narrative:       CT OF THE HEAD WITHOUT CONTRAST     HISTORY: Confusion     COMPARISON: 11/13/2018     TECHNIQUE: Axial CT imaging was obtained from the vertex of the skull  and skull base. No IV contrast was administered.     FINDINGS:  No acute intracranial hemorrhage is identified. There is diffuse  atrophy. There is periventricular deep white matter microangiopathic  disease. There is no midline shift or mass effect. 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 Area of encephalomalacia is seen within the left  parietal lobe near the vertex. This is adjacent to a calvarial defect  within this area.       Impression:       No acute intracranial process is identified. Postsurgical changes are  seen involving the anterior aspect of the right maxillary sinus.  Paranasal sinuses and mastoid air cells appear clear.     Radiation dose reduction techniques were utilized, including automated  exposure control and exposure modulation based on body size.     This report was finalized on 6/22/2019 11:35 PM by Dr. Estefani Joshi M.D.           Lab Results (last 7 days)     Procedure Component Value Units Date/Time    POC Glucose Once [[id number]]  (Abnormal) Collected:  06/24/19 1102    Specimen:   Blood Updated:  06/24/19 1112     Glucose 215 mg/dL     Comprehensive Metabolic Panel [[id number]]  (Abnormal) Collected:  06/24/19 0656    Specimen:  Blood Updated:  06/24/19 0802     Glucose 183 mg/dL      BUN 14 mg/dL      Creatinine 1.11 mg/dL      Sodium 142 mmol/L      Potassium 4.4 mmol/L      Chloride 108 mmol/L      CO2 21.4 mmol/L      Calcium 9.7 mg/dL      Total Protein 6.7 g/dL      Albumin 3.80 g/dL      ALT (SGPT) 15 U/L      AST (SGOT) 19 U/L      Comment: Specimen hemolyzed.  Results may be affected.        Alkaline Phosphatase 47 U/L      Total Bilirubin 0.6 mg/dL      eGFR Non African Amer 64 mL/min/1.73      Globulin 2.9 gm/dL      A/G Ratio 1.3 g/dL      BUN/Creatinine Ratio 12.6     Anion Gap 12.6 mmol/L     Narrative:       GFR Normal >60  Chronic Kidney Disease <60  Kidney Failure <15    Lipid Panel [[id number]]  (Abnormal) Collected:  06/24/19 0656    Specimen:  Blood Updated:  06/24/19 0800     Total Cholesterol 115 mg/dL      Triglycerides 245 mg/dL      HDL Cholesterol 36 mg/dL      LDL Cholesterol  30 mg/dL      VLDL Cholesterol 49 mg/dL      LDL/HDL Ratio 0.83    Narrative:       Cholesterol Reference Ranges  (U.S[place] Department of Health and Human Services ATP III Classifications)    Desirable          <200 mg/dL  Borderline High    200-239 mg/dL  High Risk          >240 mg/dL      Triglyceride Reference Ranges  (U.S. Department of Health and Human Services ATP III Classifications)    Normal           <150 mg/dL  Borderline High  150-199 mg/dL  High             200-499 mg/dL  Very High        >500 mg/dL    HDL Reference Ranges  (U.S. Department of Health and Human Services ATP III Classifcations)    Low     <40 mg/dl (major risk factor for CHD)  High    >60 mg/dl ('negative' risk factor for CHD)        LDL Reference Ranges  (U.S. Department of Health and Human Services ATP III Classifcations)    Optimal          <100 mg/dL  Near Optimal     100-129 mg/dL  Borderline High  130-159  mg/dL  High             160-189 mg/dL  Very High        >189 mg/dL    Hemoglobin A1c [[id number]]  (Abnormal) Collected:  06/24/19 0656    Specimen:  Blood Updated:  06/24/19 0747     Hemoglobin A1C 7.80 %     Narrative:       Hemoglobin A1C Ranges:    Increased Risk for Diabetes  5.7% to 6.4%  Diabetes                     >= 6.5%  Diabetic Goal                < 7.0%    CBC (No Diff) [[id number]]  (Abnormal) Collected:  06/24/19 0656    Specimen:  Blood Updated:  06/24/19 0741     WBC 7.47 10*3/mm3      RBC 4.08 10*6/mm3      Hemoglobin 12.8 g/dL      Hematocrit 38.6 %      MCV 94.6 fL      MCH 31.4 pg      MCHC 33.2 g/dL      RDW 13.3 %      RDW-SD 46.0 fl      MPV 11.0 fL      Platelets 185 10*3/mm3     POC Glucose Once [[id number]]  (Abnormal) Collected:  06/24/19 0608    Specimen:  Blood Updated:  06/24/19 0610     Glucose 158 mg/dL     RPR [[id number]]  (Normal) Collected:  06/23/19 1847    Specimen:  Blood Updated:  06/24/19 0143     RPR Non-Reactive    POC Glucose Once [[id number]]  (Abnormal) Collected:  06/23/19 2120    Specimen:  Blood Updated:  06/23/19 2124     Glucose 236 mg/dL     Homocysteine [[id number]]  (Normal) Collected:  06/23/19 1847    Specimen:  Blood Updated:  06/23/19 2011     Homocysteine, Plasma (Quant) 8.6 umol/L     Folate [[id number]]  (Normal) Collected:  06/23/19 1847    Specimen:  Blood Updated:  06/23/19 1942     Folate >20.00 ng/mL     Vitamin B12 [[id number]]  (Normal) Collected:  06/23/19 1847    Specimen:  Blood Updated:  06/23/19 1942     Vitamin B-12 580 pg/mL     TSH [[id number]]  (Normal) Collected:  06/22/19 2237    Specimen:  Blood Updated:  06/23/19 1834     TSH 2.410 mIU/mL     POC Glucose Once [[id number]]  (Abnormal) Collected:  06/23/19 1117    Specimen:  Blood Updated:  06/23/19 1120     Glucose 245 mg/dL     POC Glucose Once [[id number]]  (Abnormal) Collected:  06/23/19 0716    Specimen:  Blood Updated:  06/23/19 0717     Glucose 171 mg/dL     Protime-INR [[id number]]   (Normal) Collected:  06/22/19 2237    Specimen:  Blood Updated:  06/22/19 2316     Protime 12.5 Seconds      INR 0.96    aPTT [[id number]]  (Normal) Collected:  06/22/19 2237    Specimen:  Blood Updated:  06/22/19 2316     PTT 23.6 seconds     Comprehensive Metabolic Panel [[id number]]  (Abnormal) Collected:  06/22/19 2237    Specimen:  Blood Updated:  06/22/19 2314     Glucose 222 mg/dL      BUN 20 mg/dL      Creatinine 1.10 mg/dL      Sodium 137 mmol/L      Potassium 4.2 mmol/L      Chloride 101 mmol/L      CO2 21.3 mmol/L      Calcium 9.5 mg/dL      Total Protein 7.0 g/dL      Albumin 4.30 g/dL      ALT (SGPT) 14 U/L      AST (SGOT) 11 U/L      Alkaline Phosphatase 52 U/L      Total Bilirubin 0.3 mg/dL      eGFR Non African Amer 65 mL/min/1.73      Globulin 2.7 gm/dL      A/G Ratio 1.6 g/dL      BUN/Creatinine Ratio 18.2     Anion Gap 14.7 mmol/L     Narrative:       GFR Normal >60  Chronic Kidney Disease <60  Kidney Failure <15    Troponin [[id number]]  (Normal) Collected:  06/22/19 2237    Specimen:  Blood Updated:  06/22/19 2314     Troponin T <0.010 ng/mL     Narrative:       Troponin T Reference Range:  <= 0.03 ng/mL-   Negative for AMI  >0.03 ng/mL-     Abnormal for myocardial necrosis.  Clinicians would have to utilize clinical acumen, EKG, Troponin and serial changes to determine if it is an Acute Myocardial Infarction or myocardial injury due to an underlying chronic condition.     CBC & Differential [[id number]] Collected:  06/22/19 2237    Specimen:  Blood Updated:  06/22/19 2306    Narrative:       The following orders were created for panel order CBC & Differential.  Procedure                               Abnormality         Status                     ---------                               -----------         ------                     CBC Auto Differential[[id number]]        Abnormal            Final result                 Please view results for these tests on the individual orders.    CBC Auto  "Differential [[id number]]  (Abnormal) Collected:  06/22/19 2237    Specimen:  Blood Updated:  06/22/19 2306     WBC 6.94 10*3/mm3      RBC 4.13 10*6/mm3      Hemoglobin 13.1 g/dL      Hematocrit 39.2 %      MCV 94.9 fL      MCH 31.7 pg      MCHC 33.4 g/dL      RDW 13.4 %      RDW-SD 46.8 fl      MPV 10.7 fL      Platelets 199 10*3/mm3      Neutrophil % 52.9 %      Lymphocyte % 34.1 %      Monocyte % 7.8 %      Eosinophil % 4.2 %      Basophil % 0.7 %      Immature Grans % 0.3 %      Neutrophils, Absolute 3.67 10*3/mm3      Lymphocytes, Absolute 2.37 10*3/mm3      Monocytes, Absolute 0.54 10*3/mm3      Eosinophils, Absolute 0.29 10*3/mm3      Basophils, Absolute 0.05 10*3/mm3      Immature Grans, Absolute 0.02 10*3/mm3      nRBC 0.0 /100 WBC     POC Glucose Once [[id number]]  (Abnormal) Collected:  06/22/19 2125    Specimen:  Blood Updated:  06/22/19 2126     Glucose 212 mg/dL         /65 (BP Location: Right arm, Patient Position: Lying)   Pulse 78   Temp 97.7 °F (36.5 °C) (Oral)   Resp 16   Ht 177.8 cm (70\")   Wt 90.6 kg (199 lb 11.8 oz)   SpO2 93%   BMI 28.66 kg/m²     Discharge Exam:  General Appearance:    Alert, cooperative, no distress                          Head:    Normocephalic, without obvious abnormality, atraumatic                          Eyes:                            Throat:   Lips, tongue, gums normal                          Neck:   Supple, symmetrical, trachea midline, no JVD                        Lungs:     Clear to auscultation bilaterally, respirations unlabored                Chest Wall:    No tenderness or deformity                        Heart:    Regular rate and rhythm, S1 and S2 normal, no murmur,no  Rub or gallop                  Abdomen:     Soft, non-tender, bowel sounds active, no masses, no organomegaly                  Extremities:   Extremities normal, atraumatic, no cyanosis or edema                             Skin:   Skin is warm and dry,  no rashes or palpable " lesions                  Neurologic:   no focal deficits noted     Disposition:  Home    Patient Instructions:      Discharge Medications      New Medications      Instructions Start Date   clopidogrel 75 MG tablet  Commonly known as:  PLAVIX   75 mg, Oral, Daily   Start Date:  6/25/2019        Changes to Medications      Instructions Start Date   lisinopril 40 MG tablet  Commonly known as:  CASIMIRO NIXSTRIL  What changed:    · how much to take  · when to take this   40 mg, Oral, Daily         Continue These Medications      Instructions Start Date   allopurinol 300 MG tablet  Commonly known as:  ZYLOPRIM   300 mg, Oral, Daily      aspirin 81 MG EC tablet   81 mg, Oral, Daily      esomeprazole 40 MG capsule  Commonly known as:  nexIUM   40 mg, Oral, Every Morning Before Breakfast      fluticasone-salmeterol 250-50 MCG/DOSE DISKUS  Commonly known as:  ADVAIR   1 puff, Inhalation, Every Evening, Advair Diskus 250-50 MCG/DOSE Inhalation Aerosol Powder Breath Activated; Patient Sig: Advair Diskus 250-50 MCG/DOSE Inhalation Aerosol Powder Breath Activated INHALE 1 PUFF BID; 60; 0; 15-Oct-2012; Active      FREESTYLE LITE test strip  Generic drug:  glucose blood   1 each, Other, See Admin Instructions, Use 1 to 2 times daily as instructed       metFORMIN 500 MG tablet  Commonly known as:  GLUCOPHAGE   500 mg, Oral, 2 Times Daily With Meals      MULTIVITAMIN ADULT PO   1 tablet, Oral, Daily      MYRBETRIQ 25 MG tablet sustained-release 24 hour 24 hr tablet  Generic drug:  Mirabegron ER   No dose, route, or frequency recorded.      PROAIR  (90 Base) MCG/ACT inhaler  Generic drug:  albuterol sulfate HFA   2 puffs, Every 4 Hours PRN      simvastatin 40 MG tablet  Commonly known as:  ZOCOR   40 mg, Oral, Nightly      Vitamin D (Cholecalciferol) 400 units tablet  Commonly known as:  CHOLECALCIFEROL   400 Units, Oral, Daily           No future appointments.  Follow-up Information     Janet Clayton APRN Follow up in 3  month(s).    Specialties:  Emergency Medicine, Neurology, Nurse Practitioner  Contact information:  [address]  [facility] [address]  [place] [id number]  [phone]             Adeline Onofre MD Follow up in 1 week(s).    Specialty:  Internal Medicine  Contact information:  [address]  [facility] [address]  [place] [id number]  [phone]                 Discharge Order (From admission, onward)    Start     Ordered    06/24/19 1612  Discharge patient  Once     Expected Discharge Date:  06/24/19    Discharge Disposition:  Home or Self Care    Physician of Record for Attribution - Please select from Treatment Team:  BELLO MOON [[id number]]    Review needed by CMO to determine Physician of Record:  No       Question Answer Comment   Physician of Record for Attribution - Please select from Treatment Team BELLO MOON    Review needed by CMO to determine Physician of Record No        06/24/19 1620          Total time spent discharging patient including evaluation,post hospitalization follow up,  medication and post hospitalization instructions and education total time exceeds 30 minutes.    Signed:  Bello Moon MD  6/24/2019  4:21 PM

## 2019-06-24 NOTE — PLAN OF CARE
Problem: Patient Care Overview  Goal: Plan of Care Review  Outcome: Ongoing (interventions implemented as appropriate)   06/24/19 8498   Coping/Psychosocial   Plan of Care Reviewed With patient   Plan of Care Review   Progress improving   OTHER   Outcome Summary incr amb dist, pt has rwx (upright style) if he feels more steady with it and no one to assist; plans home w/fam at DC, possibly today per pt and dtr n law

## 2019-06-24 NOTE — THERAPY DISCHARGE NOTE
Acute Care - Occupational Therapy Initial Eval/Discharge  [facility]     Patient Name: Eugenio Joe  : 1940  MRN: [id number]  Today's Date: 2019  Onset of Illness/Injury or Date of Surgery: 19     Referring Physician: Sarai      Admit Date: 2019       ICD-10-CM ICD-9-CM   1. TIA (transient ischemic attack) G45.9 435.9   2. Aphasia R47.01 784.3     Patient Active Problem List   Diagnosis   • Quadriceps weakness   • ACL tear   • Knee pain, right   • S/P CABG x 3   • Essential hypertension   • Hyperlipemia   • Hallux rigidus   • Fracture, stress, metatarsal   • Osteopenia determined by x-ray   • Metatarsal stress fracture with nonunion   • Left hip pain   • Bursitis of left hip   • Pulmonary embolism (CMS/HCC)   • DALLIA (acute kidney injury) (CMS/HCC)   • Diabetes mellitus type 2, controlled (CMS/HCC)   • Ascending aorta dilatation (CMS/HCC)   • Pulmonary nodule, left   • Right leg DVT (CMS/HCC)   • Acute renal failure (CMS/HCC)   • Hypotension   • Dehydration   • Hypercalcemia   • Dysphagia   • Syncope and collapse   • Normal pressure hydrocephalus   • Headache   • Impaired mobility   • Nausea & vomiting   • Fall at home   • Transient cerebral ischemia   • PFO (patent foramen ovale)   • Esophageal dysphagia   • TIA (transient ischemic attack)     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 SWEAT GLAND    • Cardiac disease    • Coronary artery disease     CABG    • Diabetes mellitus (CMS/HCC)    • Gout several years ago    medication  working   • Headache 11/15/2017   • Hearing aid worn     bilateral   • History of transfusion    • Hydrocephaly    • Hyperlipemia    • Hypertension    • Joint pain     or swelling   • Low back pain several years ago   • Orbit fracture (CMS/HCC)    • Pulmonary embolism (CMS/HCC)     OCT 2016   • Sinus infection      Past Surgical History:    Procedure Laterality Date   • BRAIN SURGERY      BRAIN ABCESS 30 YR AGO   • CARDIAC SURGERY     • COLONOSCOPY  2012    Ciciliano- normal per pt, no polyps    • CORONARY ARTERY BYPASS GRAFT     • ENDOSCOPY N/A 3/9/2017    Schatzki ring, dilated, LA Grade B esophagitis, HH, acquired duodenal stenosis   • ENDOSCOPY N/A 4/6/2018    candida, HH, torts, Schatzki ring, duodenal stenosis, dilatation perforemed, chronic inflammation   • FACIAL FRACTURE SURGERY      to repair 6 broken bones   • ORBITAL FRACTURE OPEN REDUCTION INTERNAL FIXATION Right 1/13/2017    Procedure: RT ORBIT FLOOR FRACTURE REPAIR RIGHT [facility]MC FRACTURE REPAIR, RIGHT NASAL BONE FRACTURE CLOSED REDUCTION;  Surgeon: Edil Lester MD;  Location:  [facility];  Service:    • ORIF FOOT FRACTURE Right 9/9/2016    Procedure: RIGHT SECOND METATARSAL OPEN REDUCTION INTERNAL FIXATION WITh GRAFT FROM HEEL ;  Surgeon: Man Jenkins MD;  Location: [facility] OR [facility] – [place], [place];  Service:    • SEPTOPLASTY     • SKIN CANCER EXCISION     • TONSILLECTOMY            OT ASSESSMENT FLOWSHEET (last 12 hours)      Occupational Therapy Evaluation     Row Name 06/24/19 0937 06/24/19 0900                OT Evaluation Time/Intention    Subjective Information  no complaints  -LE  --       Document Type  evaluation;discharge treatment  -LE  --       Mode of Treatment  individual therapy;occupational therapy  -LE  --       Patient Effort  good  -LE  --       Symptoms Noted During/After Treatment  none  -LE  --          General Information    Patient Profile Reviewed?  yes  -LE  --       Patient Observations  alert;cooperative  -LE  --       Patient/Family Observations  fowlers in bed.  dght in law present  -LE  --       Prior Level of Function  independent:;gait;transfer;ADL's goes to gym   -LE  --       Equipment Currently Used at Home  cane, straight;grab bar;raised toilet;shower chair;walker, rolling UP walker  -LE  --  (Pended)   -LE       Pertinent History of Current  Functional Problem  diagnosed with normal pressure hydrocephalis.  has been testing for essential tremor and Parkinsons both negative testing.  not candidate for shunt word finding difficulty  -LE  --       Existing Precautions/Restrictions  fall  -LE  --  (Pended)   -LE          Relationship/Environment    Lives With  spouse  -LE  --          Cognitive Assessment/Intervention- PT/OT    Orientation Status (Cognition)  oriented x 4  -LE  --       Follows Commands (Cognition)  follows one step commands;over 90% accuracy  -LE  --       Cognitive Assessment/Intervention Comment  family states mild confusion some days.  pt passed drivers evaluation.     -LE  --          Bed Mobility Assessment/Treatment    Supine-Sit [place] (Bed Mobility)  conditional independence  -LE  --       Sit-Supine [place] (Bed Mobility)  conditional independence  -LE  --          Functional Mobility    Functional Mobility- Ind. Level  supervision required  -LE  --       Functional Mobility- Device  straight cane  -LE  --       Functional Mobility-Distance (Feet)  -- 25 feet to bathroom and to chair  -LE  --       Functional Mobility- Safety Issues  -- mild unsteady, family and pt states baseline  -LE  --       Functional Mobility- Comment  -- ed on benefit of using walker to increase stability.   -LE  --          Transfer Assessment/Treatment    Transfer Assessment/Treatment  sit-stand transfer;stand-sit transfer;toilet transfer;shower transfer  -LE  --          Sit-Stand Transfer    Sit-Stand [place] (Transfers)  conditional independence  -LE  --       Assistive Device (Sit-Stand Transfers)  cane, straight  -LE  --          Stand-Sit Transfer    Stand-Sit [place] (Transfers)  conditional independence  -LE  --       Assistive Device (Stand-Sit Transfers)  cane, straight  -LE  --          Toilet Transfer    Type (Toilet Transfer)  stand pivot/stand step  -LE  --       [place] Level (Toilet Transfer)  supervision  -LE  --        Assistive Device (Toilet Transfer)  grab bars/safety frame;cane, straight  -LE  --          Shower Transfer    Type (Shower Transfer)  lateral  -LE  --       [place] Level (Shower Transfer)  supervision  -LE  --       Assistive Device (Shower Transfer)  grab bars/tub rail;cane, straight  -LE  --          ADL Assessment/Intervention    BADL Assessment/Intervention  lower body dressing;grooming;feeding;toileting  -LE  --          Lower Body Dressing Assessment/Training    Lower Body Dressing [place] Level  doff;don;socks;conditional independence  -LE  --       Lower Body Dressing Position  edge of bed sitting  -LE  --          Grooming Assessment/Training    [place] Level (Grooming)  wash face, hands;conditional independence  -LE  --       Grooming Position  sink side;supported standing  -LE  --          Self-Feeding Assessment/Training    [place] Level (Feeding)  -- denies assist or difficulty  -LE  --          Toileting Assessment/Training    [place] Level (Toileting)  -- denies difficulty  -LE  --          General ROM    GENERAL ROM COMMENTS  -- B UE 7/8 AROM  -LE  --          MMT (Manual Muscle Testing)    General MMT Comments  -- B Ue 4/5 no focal weakness  -LE  --          Sensory Assessment/Intervention    Sensory General Assessment  -- denies numb/tingle  -LE  --          Positioning and Restraints    Pre-Treatment Position  in bed  -LE  --       Post Treatment Position  chair  -LE  --       In Chair  sitting;call light within reach;encouraged to call for assist;notified nsg;with family/caregiver  -LE  --          Pain Assessment    Additional Documentation  Pain Scale: Numbers Pre/Post-Treatment (Group)  -LE  --          Pain Scale: Numbers Pre/Post-Treatment    Pain Scale: Numbers, Pretreatment  0/10 - no pain  -LE  --          Coping    Observed Emotional State  accepting;cooperative  -LE  --          Plan of Care Review    Plan of Care Reviewed With  patient dght in law  -LE   --          Clinical Impression (OT)    OT Diagnosis  need for assist with personal care  -LE  --       Patient/Family Goals Statement (OT Eval)  return home  -LE  --       Criteria for Skilled Therapeutic Interventions Met (OT Eval)  no;current level of function same as previous level of function  -LE  --       Therapy Frequency (OT Eval)  evaluation only  -LE  --       Care Plan Review (OT)  evaluation/treatment results reviewed;care plan/treatment goals reviewed  -LE  --       Care Plan Review, Other Participant (OT Eval)  family  -LE  --       Anticipated Discharge Disposition (OT)  home with assist  -LE  --          Vital Signs    Pre Systolic BP Rehab  172  -LE  --       Pre Treatment Diastolic BP  81  -LE  --       Posttreatment Resp Rate (breaths/min)  82  -LE  --       O2 Delivery Pre Treatment  room air  -LE  --       O2 Delivery Intra Treatment  room air  -LE  --       O2 Delivery Post Treatment  room air  -LE  --       Pre Patient Position  Supine  -LE  --       Intra Patient Position  Standing  -LE  --       Post Patient Position  Sitting  -LE  --       Activity Duration  -- No SOA or fatigue with walking in room  -LE  --          Patient Education Goal (OT)    Activity (Patient Education Goal, OT)  Review home safety, benefit of bathroom DME and SBA recommendations with Pt and family.   -LE  --       [place]/Cues/Accuracy (Memory Goal 2, OT)  verbalizes understanding;demonstrates adequately  -LE  --       Time Frame (Patient Education Goal, OT)  1 day  -LE  --       Progress/Outcome (Patient Education Goal, OT)  goal met  -LE  --         User Key  (r) = Recorded By, (t) = Taken By, (c) = Cosigned By    Initials Name Effective Dates    Chayito Cho, OTR 06/08/18 -           Occupational Therapy Education     Title: PT OT SLP Therapies (Done)     Topic: Occupational Therapy (Done)     Point: ADL training (Done)     Description: Instruct learner(s) on proper safety adaptation and remediation  techniques during self care or transfers.   Instruct in proper use of assistive devices.    Learning Progress Summary           Patient ALBA Stringer,D, DU,VU by HUNTER at 6/24/2019  9:35 AM    Comment:  Ed role of OT.  review home safey, benefit  of using shower chair.  review home safety   Family ALBA Stringer,D, DU,VU by HUNTER at 6/24/2019  9:35 AM    Comment:  Ed role of OT.  review home safey, benefit  of using shower chair.  review home safety                   Point: Precautions (Done)     Description: Instruct learner(s) on prescribed precautions during self-care and functional transfers.    Learning Progress Summary           Patient ALBA Stringer,D, DU,VU by HUNTER at 6/24/2019  9:35 AM    Comment:  Ed role of OT.  review home safey, benefit  of using shower chair.  review home safety   Family ALBA Stringer,D, DU,VU by HUNTER at 6/24/2019  9:35 AM    Comment:  Ed role of OT.  review home safey, benefit  of using shower chair.  review home safety                   Point: Body mechanics (Done)     Description: Instruct learner(s) on proper positioning and spine alignment during self-care, functional mobility activities and/or exercises.    Learning Progress Summary           Patient ALBA Stringer,D, DU,VU by HUNTER at 6/24/2019  9:35 AM    Comment:  Ed role of OT.  review home safey, benefit  of using shower chair.  review home safety   Family ALBA Stringer,D, DU,VU by HUNTER at 6/24/2019  9:35 AM    Comment:  Ed role of OT.  review home safey, benefit  of using shower chair.  review home safety                               User Key     Initials Effective Dates Name Provider Type Discipline    HUNTER 06/08/18 -  Chayito Boyer OTR Occupational Therapist OT                OT Recommendation and Plan  Outcome Summary/Treatment Plan (OT)  Anticipated Discharge Disposition (OT): home with assist  Therapy Frequency (OT Eval): evaluation only  Plan of Care Review  Plan of Care Reviewed With: patient(dght in law)  Plan of Care Reviewed With: patient(dght in law)  Outcome  Summary: Pt is SBA to get OOB, ambulate to bathroom, ADL tasks and functional UE.   Pt and dght in law present and state pt with h/o mild unsteadiness and feel pt is at baseline for mobility.  Review home safety and recommend cont with SBA for mobility while in hospital.  Pt and family deny further skilled OT needs.      Rehab Goal Summary     Row Name 06/24/19 0937             Patient Education Goal (OT)    Activity (Patient Education Goal, OT)  Review home safety, benefit of bathroom DME and SBA recommendations with Pt and family.   -LE      [place]/Cues/Accuracy (Memory Goal 2, OT)  verbalizes understanding;demonstrates adequately  -LE      Time Frame (Patient Education Goal, OT)  1 day  -LE      Progress/Outcome (Patient Education Goal, OT)  goal met  -LE        User Key  (r) = Recorded By, (t) = Taken By, (c) = Cosigned By    Initials Name Provider Type Discipline    Chayito Cho, OTR Occupational Therapist OT          Outcome Measures     Row Name 06/24/19 0933 06/24/19 0900 06/23/19 1200       How much help from another person do you currently need...    Turning from your back to your side while in flat bed without using bedrails?  --  4  -JM  4  -ZK    Moving from lying on back to sitting on the side of a flat bed without bedrails?  --  3  -JM  4  -ZK    Moving to and from a bed to a chair (including a wheelchair)?  --  3  -JM  4  -ZK    Standing up from a chair using your arms (e.g., wheelchair, bedside chair)?  --  3  -JM  4  -ZK    Climbing 3-5 steps with a railing?  --  3  -JM  3  -ZK    To walk in hospital room?  --  3  -JM  3  -ZK    AM-PAC 6 Clicks Score  --  19  -JM  22  -ZK       How much help from another is currently needed...    Putting on and taking off regular lower body clothing?  3  -LE  --  --    Bathing (including washing, rinsing, and drying)  3  -LE  --  --    Toileting (which includes using toilet bed pan or urinal)  3  -LE  --  --    Putting on and taking off regular upper body  clothing  3  -LE  --  --    Taking care of personal grooming (such as brushing teeth)  3  -LE  --  --    Eating meals  4  -LE  --  --    Score  19  -LE  --  --       Modified [place] Scale    Modified [place] Scale  1 - No significant disability despite symptoms.  Able to carry out all usual duties and activities.  -LE  --  --       Functional Assessment    Outcome Measure Options  --  AM-PAC 6 Clicks Daily Activity (OT);Modified [place]  -LE  AM-PAC 6 Clicks Basic Mobility (PT)  -ZK      User Key  (r) = Recorded By, (t) = Taken By, (c) = Cosigned By    Initials Name Provider Type    Chayito Cho OTR Occupational Therapist    ZK Kimura, Zorre Zeno, PT Physical Therapist    Jaylin Nails, PTA Physical Therapy Assistant          Time Calculation:   Time Calculation- OT     Row Name 06/24/19 0948             Time Calculation- OT    OT Start Time  0914  -LE      OT Stop Time  0935  -LE      OT Time Calculation (min)  21 min  -LE      Total Timed Code Minutes- OT  10 minute(s)  -LE      OT Received On  06/24/19  -LE        User Key  (r) = Recorded By, (t) = Taken By, (c) = Cosigned By    Initials Name Provider Type    Chayito Cho, OTR Occupational Therapist        Therapy Suggested Charges     Code   Minutes Charges    None           Therapy Charges for Today     Code Description Service Date Service Provider Modifiers Qty    [id number]  OT EVAL LOW COMPLEXITY 2 6/24/2019 Chayito Boyer OTR GO 1    [id number]  OT SELF CARE/MGMT/TRAIN EA 15 MIN 6/24/2019 Chayito Boyer OTR GO 1               OT Discharge Summary  Anticipated Discharge Disposition (OT): home with assist    ARLENE Khan  6/24/2019

## 2019-06-24 NOTE — PLAN OF CARE
Problem: Patient Care Overview  Goal: Plan of Care Review  Outcome: Ongoing (interventions implemented as appropriate)   06/24/19 0994   Coping/Psychosocial   Plan of Care Reviewed With patient;spouse;family   OTHER   Outcome Summary Bedside Swallow eval completed. Pt had inconsistent throat clearing and appeared impulsive with eating. Recommend continue on a regular diet; meds whole with thin or pureed; upright for meals and 30 min after; slow rate; small bites/sips. ST will follow for diet tolerance and need for instrumental.

## 2019-06-24 NOTE — PROGRESS NOTES
"DOS: 2019  NAME: Eugenio Joe   : 1940  PCP: Adeline Onofre MD  Chief Complaint   Patient presents with   • Altered Mental Status     Stroke  Patient seen in follow-up today; new to me        Subjective: No events overnight. Patient reports that he is back to his neurological baseline.     Wife at bedside reviewed imaging findings; all questions answered.     Objective:  Vital signs: /65 (BP Location: Right arm, Patient Position: Lying)   Pulse 78   Temp 97.7 °F (36.5 °C) (Oral)   Resp 16   Ht 177.8 cm (70\")   Wt 90.6 kg (199 lb 11.8 oz)   SpO2 93%   BMI 28.66 kg/m²       HEENT: Normocephalic, atraumatic   COR: RRR  Resp: Even and unlabored  Extremities: Equal pulses, non distal embolization    Neurological:   MS: AO. Language normal. No neglect. Higher integrative function normal  CN: II-XII normal  Motor: 5/5, normal tone  Reflexes: toes downgoing   Sensory: Intact  Coordination: Normal    Laboratory results:  Lab Results   Component Value Date    GLUCOSE 183 (H) 2019    CALCIUM 9.7 2019     2019    K 4.4 2019    CO2 21.4 (L) 2019     (H) 2019    BUN 14 2019    CREATININE 1.11 2019    EGFRIFAFRI  2016      Comment:      <15 Indicative of kidney failure.    EGFRIFNONA 64 2019    BCR 12.6 2019    ANIONGAP 12.6 2019     Lab Results   Component Value Date    WBC 7.47 2019    HGB 12.8 (L) 2019    HCT 38.6 2019    MCV 94.6 2019     2019     Lab Results   Component Value Date    CHOL 115 2019    CHOL 100 2017     Lab Results   Component Value Date    HDL 36 (L) 2019    HDL 35 (L) 2017     Lab Results   Component Value Date    LDL 30 2019    LDL 20 2017     Lab Results   Component Value Date    TRIG 245 (H) 2019    TRIG 227 (H) 2017     Lab Results   Component Value Date    TSH 2.410 2019     Lab Results   Component " Value Date    BAQUKSGH73 580 06/23/2019     Lab Results   Component Value Date    HGBA1C 7.80 (H) 06/24/2019     Lab Results   Component Value Date    CHOL 115 06/24/2019    CHOL 100 11/21/2017     Lab Results   Component Value Date    TRIG 245 (H) 06/24/2019    TRIG 227 (H) 11/21/2017     Lab Results   Component Value Date    HDL 36 (L) 06/24/2019    HDL 35 (L) 11/21/2017     Lab Results   Component Value Date    LDL 30 06/24/2019    LDL 20 11/21/2017   RPR nonreactive  Homocystine normal  Folate normal  B12 normal    Review and interpretation of imaging:  Interpretation Summary     · Left ventricular systolic function is normal. Calculated EF = 51.0%. Estimated EF was in agreement with the calculated EF. Estimated EF = 51%. Normal left ventricular cavity size and wall thickness noted. All left ventricular wall segments contract normally. Left ventricular diastolic dysfunction is noted (grade I a w/high LAP) consistent with impaired relaxation.  · Left atrial volume is borderline increased. Saline test results are negative.  · The aortic valve is abnormal in structure. There is severe calcification of the aortic valve.No aortic valve regurgitation is present.  · Severe MAC is present. Trace mitral valve regurgitation is present. No significant mitral valve stenosis is present.  · Physiologic tricuspid valve regurgitation is present. Estimated right ventricular systolic pressure from tricuspid regurgitation is normal (<35 mmHg).     MRI OF THE BRAIN WITH AND WITHOUT CONTRAST, MRA OF THE HEAD WITHOUT  CONTRAST, MRA OF THE NECK WITH AND WITHOUT CONTRAST     CLINICAL HISTORY: Confusion. History of craniotomy 33 years ago.     MRI of the brain was obtained with sagittal pre and postgadolinium T1,  axial pre and postgadolinium T1, coronal postgadolinium T1, axial post  gadolinium 3D SPGR, axial FLAIR, axial T2, axial diffusion, and axial  susceptibility weighted images.     Comparison is made to previous brain MRI dated  11/20/2017.     FINDINGS: There is a tiny focus of diffusion-weighted hyperintensity  within the medial portion of the left occipital lobe which measures up  to 3 mm in diameter. A small subacute infarct at this site cannot be  excluded. There are moderate changes of chronic small vessel ischemic  phenomena. Additionally, there are findings compatible with a chronic  infarct within the superior aspect of the left parietal lobe within the  left MCA distribution which measures up to 3.5 x 1.9 cm in greatest  axial dimensions. This chronic infarct was also evident on the prior  examination. Additionally, there is a new small chronic infarct within  the right middle frontal gyrus within the right MCA distribution  measuring up to 5 mm in diameter. A new chronic infarct is seen within  the left posterior central gyrus measuring up to approximately 4 mm in  diameter. There is a tiny chronic infarct noted within the inferior  aspect of the left occipital lobe within the left PCA distribution  measuring up to 5 mm in diameter which was seen on the prior exam. There  are subcentimeter chronic infarcts are noted within the cerebellar  hemispheres which were also noted on the prior study.     The ventricular system is larger in size when compared to the prior  study of 10/28/2010 although I suspect that the findings are due to  exvacuodilatation represent hydrocephalus although correlation is  suggested.     The major intracranial flow related signal voids are unremarkable. No  abnormal areas of susceptibility artifact are noted. No abnormal areas  of contrast enhancement are seen. The midline intracranial anatomy is  unremarkable.     Mild mucosal thickening is seen within the ethmoid air cells.     IMPRESSION:  There is a 3 mm focus of diffusion-weighted hyperintensity  within the medial portion of the left occipital lobe or a small subacute  infarct cannot be excluded. These findings were discussed with Jelly Le  RN, on 06/23/2018 at approximately 7:00 PM. She was instructed  notify the physician caring for this patient with this result     There are multiple chronic infarcts identified within the brain  parenchyma and these have been discussed in detail above. The largest of  these is seen within the superior aspect of the left parietal lobe and  is within the left MCA distribution. This chronic infarct was evident on  the prior study. Additionally, there is a tiny chronic infarct within  the inferior aspect of the left occipital lobe and multiple chronic  infarcts are noted within the cerebellar hemispheres. These chronic  infarcts within the inferior portion of the left occipital lobe and the  cerebellar hemispheres were evident on the prior study. However, there  are multiple areas of new foci of chronic infarction within the brain  parenchyma and these have been discussed in detail above. These are  located within the right middle frontal gyrus and the left posterior  central gyrus.      Moderate changes of chronic small vessel ischemic phenomena are noted  and these findings have progressed in the interval since the prior exam.     The ventricular system is enlarged although I suspect that the findings  are due to ex vacuo dilatation rather than hydrocephalus although  correlation with clinical data is suggested.     MRA of the [place] was obtained with 3D time-of-flight imaging  technique. Maximum intensity projected images were obtained.     FINDINGS:     There is normal flow-related enhancement within the vertebral arteries,  basilar artery, and posterior cerebral arteries. The internal carotids,  middle cerebral, and the anterior cerebrals are within normal limits.     IMPRESSION: Unremarkable MRA of the [place].     MRA of the neck was obtained with 3D time-of-flight imaging technique.  Additionally, there is a contrast enhanced MRA of the neck. Maximum  intensity projection reconstructed images  were obtained.     FINDINGS: There is a classic configuration of the aortic arch. There is  a mild-to-moderate degree of stenosis of the proximal aspect of the left  subclavian artery. The vertebral artery origins are not well evaluated  and there may be up to mild-to-moderate degree of stenosis involving the  origin of the left vertebral artery. There is atherosclerotic  irregularity within both proximal internal carotids although there is no  significant NASCET stenosis.     IMPRESSION: There is atherosclerotic change appreciated within both  proximal internal carotids although there is no significant NASCET  stenosis.     The origins of the vertebral arteries are not well evaluated although  there could be up to mild-to-moderate degree of stenosis within the  origin of the left vertebral artery. There is a mild-to-moderate degree  of stenosis involving the proximal aspect of the left subclavian artery.     The findings were discussed with the nurse caring for this patient on  06/23/2019 at approximately 7 PM.     This report was finalized on 6/23/2019 8:08 PM by Dr. Bernard Membreno M.D.     CT OF THE HEAD WITHOUT CONTRAST     HISTORY: Confusion     COMPARISON: 11/13/2018     TECHNIQUE: Axial CT imaging was obtained from the vertex of the skull  and skull base. No IV contrast was administered.     FINDINGS:  No acute intracranial hemorrhage is identified. There is diffuse  atrophy. There is periventricular deep white matter microangiopathic  disease. There is no midline shift or mass effect. 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 Area of encephalomalacia is seen within the left  parietal lobe near the vertex. This is adjacent to a calvarial defect  within this area.     IMPRESSION:  No acute intracranial process is identified. Postsurgical changes are  seen involving the anterior aspect of the right maxillary sinus.  Paranasal sinuses and mastoid air cells appear clear.     Radiation dose reduction techniques were  utilized, including automated  exposure control and exposure modulation based on body size.     This report was finalized on 6/22/2019 11:35 PM by Dr. Estefani Joshi M.D.         Impression:This is a 79 yo male w/ DM-II, hyperlipidemia, HTN, CAD and NPH who presented to [facility] on [facility]2019 due to complaint of garbled/nonfluent speech and confusion which lasted for several minutes without focal deficits.  He returned to neurological baseline prior to neurology exam.  Initial CT of the head in the emergency room was negative for acute findings.  MRI of the brain revealed hyperintensity in the left occipital lobe concerning for subacute infarct.  Etiology thought to be secondary to SMVD; uncontrolled HTN specifically.  Evidence of multiple chronic infarcts noted.  Moderate small vessel ischemia seen.  MRA ([place]) unremarkable.  Atherosclerotic changes noted within both ICAs (mild to moderate).  TTE revealed ejection fraction 51%.  LV normal.  LA borderline increased.  No evidence of PFO reported.  Labs RPR, homocystine, folate and B12 all within normal limits.  Recommendations below. No further neurology workup indicated. We will sign off and see again upon request.      Plan:  ASA 81mg daily (on PTA) after 1 month increase to 325mg daily (once plavix discontinued)   Plavix 75mg daily (300mg load given 06/23) x 1 month then discontinue   Lipitor 40mg daily x 1 month then decrease 10mg daily  (LDL 30)   Neurochecks  BP control  Stroke Education  YANE/SCDs  PT/OT/ST  Follow-up in stroke clinic in 3 months; sooner if needed     Case reviewed w/ Dr. Ba and he agrees with plan above.

## 2019-06-24 NOTE — THERAPY EVALUATION
Acute Care - Speech Language Pathology   Swallow Initial Evaluation [facility]     Patient Name: Eugenio Joe  : 1940  MRN: [id number]  Today's Date: 2019  Onset of Illness/Injury or Date of Surgery: 19     Referring Physician: Sarai      Admit Date: 2019    Visit Dx:     ICD-10-CM ICD-9-CM   1. TIA (transient ischemic attack) G45.9 435.9   2. Aphasia R47.01 784.3     Patient Active Problem List   Diagnosis   • Quadriceps weakness   • ACL tear   • Knee pain, right   • S/P CABG x 3   • Essential hypertension   • Hyperlipemia   • Hallux rigidus   • Fracture, stress, metatarsal   • Osteopenia determined by x-ray   • Metatarsal stress fracture with nonunion   • Left hip pain   • Bursitis of left hip   • Pulmonary embolism (CMS/HCC)   • DALILA (acute kidney injury) (CMS/HCC)   • Diabetes mellitus type 2, controlled (CMS/HCC)   • Ascending aorta dilatation (CMS/HCC)   • Pulmonary nodule, left   • Right leg DVT (CMS/HCC)   • Acute renal failure (CMS/HCC)   • Hypotension   • Dehydration   • Hypercalcemia   • Dysphagia   • Syncope and collapse   • Normal pressure hydrocephalus   • Headache   • Impaired mobility   • Nausea & vomiting   • Fall at home   • Transient cerebral ischemia   • PFO (patent foramen ovale)   • Esophageal dysphagia   • TIA (transient ischemic attack)     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 SWEAT GLAND    • Cardiac disease    • Coronary artery disease     CABG    • Diabetes mellitus (CMS/HCC)    • Gout several years ago    medication  working   • Headache 11/15/2017   • Hearing aid worn     bilateral   • History of transfusion    • Hydrocephaly    • Hyperlipemia    • Hypertension    • Joint pain     or swelling   • Low back pain several years ago   • Orbit fracture (CMS/HCC)    • Pulmonary embolism (CMS/HCC)     OCT 2016   • Sinus infection       Past Surgical History:   Procedure Laterality Date   • BRAIN SURGERY      BRAIN ABCESS 30 YR AGO   • CARDIAC SURGERY     • COLONOSCOPY  2012    Ciciliano- normal per pt, no polyps    • CORONARY ARTERY BYPASS GRAFT     • ENDOSCOPY N/A 3/9/2017    Schatzki ring, dilated, LA Grade B esophagitis, HH, acquired duodenal stenosis   • ENDOSCOPY N/A 4/6/2018    candida, HH, torts, Schatzki ring, duodenal stenosis, dilatation perforemed, chronic inflammation   • FACIAL FRACTURE SURGERY      to repair 6 broken bones   • ORBITAL FRACTURE OPEN REDUCTION INTERNAL FIXATION Right 1/13/2017    Procedure: RT ORBIT FLOOR FRACTURE REPAIR RIGHT ZMC FRACTURE REPAIR, RIGHT NASAL BONE FRACTURE CLOSED REDUCTION;  Surgeon: Edil Lester MD;  Location: [facility];  Service:    • ORIF FOOT FRACTURE Right 9/9/2016    Procedure: RIGHT SECOND METATARSAL OPEN REDUCTION INTERNAL FIXATION WITh GRAFT FROM HEEL ;  Surgeon: Man Jenkins MD;  [facility] [place];  Service:    • SEPTOPLASTY     • SKIN CANCER EXCISION     • TONSILLECTOMY          SWALLOW EVALUATION (last 72 hours)      SLP Adult Swallow Evaluation     Row Name 06/24/19 1300                   Rehab Evaluation    Document Type  evaluation  -AW        Subjective Information  no complaints  -AW        Patient Observations  alert;cooperative;agree to therapy  -AW        Patient/Family Observations  Pt positioned upright in bed, wife and dauther present.  -AW        Patient Effort  good  -AW        Symptoms Noted During/After Treatment  none  -AW           General Information    Patient Profile Reviewed  yes  -AW        Pertinent History Of Current Problem  Pt admitted after an episode of difficulty speaking (now resolved). MRI showed multiple chronic infarcts.  -AW        Current Method of Nutrition  regular textures;thin liquids  -AW        Precautions/Limitations, Vision  WFL;for purposes of eval  -AW        Precautions/Limitations, Hearing  WFL;for purposes of  eval  -AW        Prior Level of Function-Communication  WFL  -AW        Prior Level of Function-Swallowing  no diet consistency restrictions  -AW        Plans/Goals Discussed with  patient;spouse/S.O.;family;agreed upon  -AW        Barriers to Rehab  none identified  -AW        Patient's Goals for Discharge  return home  -AW        Family Goals for Discharge  family did not state  -AW           Pain Assessment    Additional Documentation  Pain Scale: Numbers Pre/Post-Treatment (Group)  -AW           Pain Scale: Numbers Pre/Post-Treatment    Pain Scale: Numbers, Pretreatment  0/10 - no pain  -AW        Pain Scale: Numbers, Post-Treatment  0/10 - no pain  -AW           Oral Motor and Function    Dentition Assessment  natural, present and adequate  -AW        Secretion Management  WNL/WFL  -AW        Mucosal Quality  moist, healthy  -AW        Volitional Swallow  delayed  -AW        Volitional Cough  non-productive  -AW           Oral Musculature and Cranial Nerve Assessment    Oral Motor General Assessment  WFL  -AW           General Eating/Swallowing Observations    Respiratory Support Currently in Use  room air  -AW        Eating/Swallowing Skills  self-fed  -AW        Positioning During Eating  upright in bed  -AW        Utensils Used  spoon;cup;straw  -AW        Consistencies Trialed  regular textures;soft textures;pureed;thin liquids mixed  -AW           Clinical Swallow Eval    Oral Prep Phase  WFL  -AW        Oral Transit  WFL  -AW        Oral Residue  WFL  -AW        Pharyngeal Phase  suspected pharyngeal impairment  -AW        Clinical Swallow Evaluation Summary  Pt tolerated cup and straw sips of thin. Mastication and bolus control was functional for pureed, soft, mixed, and regular consistencies. Pt was impulsive with bite siza and rate. Inconsistent delayed throat clearing was noted during eval with no change in vocal quality. Family reported this is normal and that occasionally pt gets choked. Pt has not  had PNA and lungs are clear at this time. Discussed importance of eating slowly and taking smaller bites. Feel pt is at his baseline status. Recommend continue regular diet. ST will follow for diet tolerance.  -AW           Clinical Impression    SLP Swallowing Diagnosis  functional oral phase;suspected pharyngeal dysfunction  -AW        Functional Impact  risk of aspiration/pneumonia  -AW        Rehab Potential/Prognosis, Swallowing  good, to achieve stated therapy goals  -AW        Swallow Criteria for Skilled Therapeutic Interventions Met  demonstrates skilled criteria  -AW           Recommendations    Therapy Frequency (Swallow)  PRN  -AW        Predicted Duration Therapy Intervention (Days)  until discharge  -AW        SLP Diet Recommendation  regular textures;thin liquids  -AW        Recommended Precautions and Strategies  upright posture during/after eating;small bites of food and sips of liquid  -AW        SLP Rec. for Method of Medication Administration  meds whole;with thin liquids;with pudding or applesauce;as tolerated  -AW        Monitor for Signs of Aspiration  yes;notify SLP if any concerns  -AW        Anticipated Dischage Disposition  home with assist  -AW           Swallow Goals (SLP)    Oral Nutrition/Hydration Goal Selection (SLP)  oral nutrition/hydration, SLP goal 1  -AW           Oral Nutrition/Hydration Goal 1 (SLP)    Oral Nutrition/Hydration Goal 1, SLP  Pt will safely tolerate a regular diet with no s/s of aspiration.  -AW        Time Frame (Oral Nutrition/Hydration Goal 1, SLP)  by discharge  -AW          User Key  (r) = Recorded By, (t) = Taken By, (c) = Cosigned By    Initials Name Effective Dates    Yamilex Nunez, MS CCC-SLP 06/08/18 -           EDUCATION  The patient has been educated in the following areas:   Dysphagia (Swallowing Impairment) Oral Care/Hydration.    SLP Recommendation and Plan  SLP Swallowing Diagnosis: functional oral phase, suspected pharyngeal dysfunction  SLP  Diet Recommendation: regular textures, thin liquids  Recommended Precautions and Strategies: upright posture during/after eating, small bites of food and sips of liquid  SLP Rec. for Method of Medication Administration: meds whole, with thin liquids, with pudding or applesauce, as tolerated     Monitor for Signs of Aspiration: yes, notify SLP if any concerns     Swallow Criteria for Skilled Therapeutic Interventions Met: demonstrates skilled criteria  Anticipated Dischage Disposition: home with assist  Rehab Potential/Prognosis, Swallowing: good, to achieve stated therapy goals  Therapy Frequency (Swallow): PRN  Predicted Duration Therapy Intervention (Days): until discharge       Plan of Care Reviewed With: patient, spouse, family  Plan of Care Review  Plan of Care Reviewed With: patient, spouse, family  Outcome Summary: Bedside Swallow eval completed. Pt had inconsistent throat clearing and appeared impulsive with eating. Recommend continue on a regular diet; meds whole with thin or pureed; upright for meals and 30 min after; slow rate; small bites/sips. ST will follow for diet tolerance and need for instrumental.    SLP GOALS     Row Name 06/24/19 1300             Oral Nutrition/Hydration Goal 1 (SLP)    Oral Nutrition/Hydration Goal 1, SLP  Pt will safely tolerate a regular diet with no s/s of aspiration.  -AW      Time Frame (Oral Nutrition/Hydration Goal 1, SLP)  by discharge  -AW        User Key  (r) = Recorded By, (t) = Taken By, (c) = Cosigned By    Initials Name Provider Type    Yamilex Nunez MS CCC-SLP Speech and Language Pathologist           SLP Outcome Measures (last 72 hours)      SLP Outcome Measures     Row Name 06/24/19 1200             SLP Outcome Measures    Outcome Measure Used?  Adult NOMS  -AW         Adult FCM Scores    FCM Chosen  Swallowing  -AW      Swallowing FCM Score  7  -AW        User Key  (r) = Recorded By, (t) = Taken By, (c) = Cosigned By    Initials Name Effective Dates    AW  Yamilex Jones, MS CCC-SLP 06/08/18 -            Time Calculation:   Time Calculation- SLP     Row Name 06/24/19 1320             Time Calculation- SLP    SLP Start Time  1000  -AW      SLP Received On  06/24/19  -AW        User Key  (r) = Recorded By, (t) = Taken By, (c) = Cosigned By    Initials Name Provider Type    Yamilex Nunez, MS CCC-SLP Speech and Language Pathologist          Therapy Charges for Today     Code Description Service Date Service Provider Modifiers Qty    [id number] HC ST EVAL ORAL PHARYNG SWALLOW 3 6/24/2019 Yamilex Jones, MS CCC-SLP GN 1               Yamilex Jones MS CCC-SLP  6/24/2019

## 2019-06-24 NOTE — THERAPY TREATMENT NOTE
Acute Care - Physical Therapy Treatment Note  [facility]     Patient Name: Eugenio Joe  : 1940  MRN: [id number]  Today's Date: 2019  Onset of Illness/Injury or Date of Surgery: 19  Date of Referral to PT: 19  Referring Physician: Sarai    Admit Date: 2019    Visit Dx:    ICD-10-CM ICD-9-CM   1. TIA (transient ischemic attack) G45.9 435.9   2. Aphasia R47.01 784.3     Patient Active Problem List   Diagnosis   • Quadriceps weakness   • ACL tear   • Knee pain, right   • S/P CABG x 3   • Essential hypertension   • Hyperlipemia   • Hallux rigidus   • Fracture, stress, metatarsal   • Osteopenia determined by x-ray   • Metatarsal stress fracture with nonunion   • Left hip pain   • Bursitis of left hip   • Pulmonary embolism (CMS/HCC)   • DALILA (acute kidney injury) (CMS/[facility])   • Diabetes mellitus type 2, controlled (CMS/HCC)   • Ascending aorta dilatation (CMS/HCC)   • Pulmonary nodule, left   • Right leg DVT (CMS/HCC)   • Acute renal failure (CMS/HCC)   • Hypotension   • Dehydration   • Hypercalcemia   • Dysphagia   • Syncope and collapse   • Normal pressure hydrocephalus   • Headache   • Impaired mobility   • Nausea & vomiting   • Fall at home   • Transient cerebral ischemia   • PFO (patent foramen ovale)   • Esophageal dysphagia   • TIA (transient ischemic attack)       Therapy Treatment    Rehabilitation Treatment Summary     Row Name 1916             Treatment Time/Intention    Discipline  physical therapy assistant  -LESLYE      Document Type  therapy note (daily note)  -LESLYE      Subjective Information  no complaints  -      Care Plan Review  patient/other agree to care plan  -      Care Plan Review, Other Participant(s)  family  -LESLYE      Existing Precautions/Restrictions  fall  -      Recorded by [LESLYE] Jaylin Lawrence PTA 19 1548      Row Name 19 0977             Bed Mobility Assessment/Treatment    Scooting/Bridging [place] (Bed Mobility)  contact guard   -JM      Supine-Sit [place] (Bed Mobility)  supervision  -JM      Sit-Supine [place] (Bed Mobility)  independent  -JM      Bed Mobility, Safety Issues  decreased use of legs for bridging/pushing;decreased use of arms for pushing/pulling  -JM      Comment (Bed Mobility)  pt having difficulty scooting to EOB  -JM      Recorded by [JM] Jaylin Lawrence, \[facility] [place] [facility]\"" 06/24/19 0924      Row Name 06/24/19 0916             Sit-Stand Transfer    Sit-Stand [place] (Transfers)  contact guard  -      Assistive Device (Sit-Stand Transfers)  cane, straight  -JM      Recorded by [JM] Jaylin Lawrence, \[facility] [place] [facility]\"" 06/24/19 0924      Row Name 06/24/19 0916             Gait/Stairs Assessment/Training    [place] Level (Gait)  contact guard  -      Assistive Device (Gait)  cane, straight  -JM      Distance in Feet (Gait)  100  -JM      Bilateral Gait Deviations  weight shift ability decreased  -JM      Comment (Gait/Stairs)  R HUNTER has had ligament and foot issues in past- fam states that is his normal gt  -JM      Recorded by [JM] Jaylin Lawrence, \[facility] [place] [facility]\"" 06/24/19 0924      Row Name 06/24/19 0916             Therapeutic Exercise    Lower Extremity (Therapeutic Exercise)  LAQ (long arc quad), bilateral;marching while seated  -JM      Lower Extremity Range of Motion (Therapeutic Exercise)  ankle dorsiflexion/plantar flexion, bilateral  -JM      Sets/Reps (Therapeutic Exercise)  15 reps  -JM      Recorded by [JM] Jaylin Lawrence, \[facility] [place] [facility]\"" 06/24/19 0924      Row Name 06/24/19 0916             Positioning and Restraints    Pre-Treatment Position  in bed  -JM      In Bed  fowlers;call light within reach;encouraged to call for assist;exit alarm on;with family/caregiver  -JM      Recorded by [JM] Jaylin Lawrence, \[facility] [place] [facility]\"" 06/24/19 0924      Row Name 06/24/19 0916             Pain Scale: Word Pre/Post-Treatment    Pain: Word Scale, Pretreatment  0 - no pain  -      Pain: Word Scale, Post-Treatment  0 - no pain  -JM      Recorded by [JM] Howard  LUNA Mosqueda 06/24/19 0924        User Key  (r) = Recorded By, (t) = Taken By, (c) = Cosigned By    Initials Name Effective Dates Discipline    Jaylin Nails PTA 03/07/18 -  PT                   Physical Therapy Education     Title: PT OT SLP Therapies (Done)     Topic: Physical Therapy (Done)     Point: Mobility training (Done)     Learning Progress Summary           Patient Eager, E,TB, VU by LESLYE at 6/24/2019  9:26 AM    Acceptance, E, VU by BURAK at 6/23/2019 12:57 PM    Comment:  progress amb post MRI   Family Acceptance, E, VU by BURAK at 6/23/2019 12:57 PM    Comment:  progress amb post MRI                   Point: Home exercise program (Done)     Learning Progress Summary           Patient Eager, E,TB, VU by LESLYE at 6/24/2019  9:26 AM    Acceptance, E, VU by BURAK at 6/23/2019 12:57 PM    Comment:  progress amb post MRI   Family Acceptance, E, VU by BURAK at 6/23/2019 12:57 PM    Comment:  progress amb post MRI                   Point: Body mechanics (Done)     Learning Progress Summary           Patient Eager, E,TB, VU by LESLYE at 6/24/2019  9:26 AM    Acceptance, E, VU by BURAK at 6/23/2019 12:57 PM    Comment:  progress amb post MRI   Family Acceptance, E, VU by BURAK at 6/23/2019 12:57 PM    Comment:  progress amb post MRI                   Point: Precautions (Done)     Learning Progress Summary           Patient Eager, E,TB, VU by LESLYE at 6/24/2019  9:26 AM    Acceptance, E, VU by BURAK at 6/23/2019 12:57 PM    Comment:  progress amb post MRI   Family Acceptance, E, VU by BURAK at 6/23/2019 12:57 PM    Comment:  progress amb post MRI                               User Key     Initials Effective Dates Name Provider Type Discipline    BURAK 04/23/19 -  Kimura, Zorre Zeno, PT Physical Therapist PT    LESLYE 03/07/18 -  Jaylin Lawrence PTA Physical Therapy Assistant PT                PT Recommendation and Plan     Plan of Care Reviewed With: patient  Progress: improving  Outcome Summary: incr amb dist, pt has rwx (upright style) if he  feels more steady with it and no one to assist; plans home w/fam at [place], possibly today per pt and dtr n law  Outcome Measures     Row Name 06/24/19 0900 06/23/19 1200          How much help from another person do you currently need...    Turning from your back to your side while in flat bed without using bedrails?  4  -JM  4  -ZK     Moving from lying on back to sitting on the side of a flat bed without bedrails?  3  -JM  4  -ZK     Moving to and from a bed to a chair (including a wheelchair)?  3  -JM  4  -ZK     Standing up from a chair using your arms (e.g., wheelchair, bedside chair)?  3  -  4  -ZK     Climbing 3-5 steps with a railing?  3  -  3  -ZK     To walk in hospital room?  3  -  3  -ZK     AM-PAC 6 Clicks Score  19  -  22  -ZK        Functional Assessment    Outcome Measure Options  --  AM-PAC 6 Clicks Basic Mobility (PT)  -ZK       User Key  (r) = Recorded By, (t) = Taken By, (c) = Cosigned By    Initials Name Provider Type    ZK Kimura, Zorre Zeno, PT Physical Therapist    Jaylin Nails PTA Physical Therapy Assistant         Time Calculation:   PT Charges     Row Name 06/24/19 0928             Time Calculation    Start Time  0830  -      Stop Time  0855  -      Time Calculation (min)  25 min  -      PT Received On  06/24/19  -      PT - Next Appointment  06/25/19  -        User Key  (r) = Recorded By, (t) = Taken By, (c) = Cosigned By    Initials Name Provider Type    Jaylin Nails PTA Physical Therapy Assistant        Therapy Charges for Today     Code Description Service Date Service Provider Modifiers Qty    [id number] HC PT THER PROC EA 15 MIN 6/24/2019 Jaylin Lawrence PTA GP 2          PT G-Codes  Outcome Measure Options: AM-PAC 6 Clicks Basic Mobility (PT)  AM-PAC 6 Clicks Score: 19    Jaylin Lawrence PTA  6/24/2019

## 2019-06-24 NOTE — PLAN OF CARE
Problem: Patient Care Overview  Goal: Plan of Care Review  Outcome: Ongoing (interventions implemented as appropriate)      Problem: Fall Risk (Adult)  Goal: Identify Related Risk Factors and Signs and Symptoms  Outcome: Ongoing (interventions implemented as appropriate)    Goal: Absence of Fall  Outcome: Ongoing (interventions implemented as appropriate)      Problem: Stroke (Ischemic) (Adult)  Goal: Signs and Symptoms of Listed Potential Problems Will be Absent, Minimized or Managed (Stroke)  Outcome: Ongoing (interventions implemented as appropriate)

## 2019-06-24 NOTE — PLAN OF CARE
Problem: Patient Care Overview  Goal: Plan of Care Review  Outcome: Ongoing (interventions implemented as appropriate)   06/24/19 1747   Coping/Psychosocial   Plan of Care Reviewed With patient   OTHER   Outcome Summary Pt is SBA to get OOB, ambulate to bathroom, ADL tasks and functional UE. Pt and dght in law present and state pt with h/o mild unsteadiness and feel pt is at baseline for mobility. Review home safety and recommend cont with SBA for mobility while in hospital. Pt and family deny further skilled OT needs.

## 2019-06-24 NOTE — NURSING NOTE
Received referral through stroke order set. Made note of plans for D/C home. Will sign off. ThanksJanessa RN rehab admission nurse [phone]

## 2019-06-24 NOTE — PLAN OF CARE
Problem: Patient Care Overview  Goal: Plan of Care Review   06/24/19 0557   OTHER   Outcome Summary VSS. AOx4, NIH=0, no change to neuro status observed since initial assess. Patient observed ambulating with standby assistance and cane. Episode of incontinence X2 overnight. No complaints of pain. No s/s distress observed. AM labs pending. Will cont to monitor.

## 2019-06-24 NOTE — CONSULTS
"Diabetes Education  Assessment/Teaching    Patient Name:  Eugenio Joe  YOB: 1940  MRN: [id number]  Admit Date:  6/22/2019      Assessment Date:  6/24/2019    Most Recent Value   General Information    Referral From:  -- [A1C 7.8%]   Height  177.8 cm (70\")   Height Method  Stated   Weight  90.6 kg (199 lb 11.8 oz)   Weight Method  Bed scale   Pregnancy Assessment   Diabetes History   What type of diabetes do you have?  Type 2   Length of Diabetes Diagnosis  10 + years   Current DM knowledge  good   Have you had diabetes education/teaching in the past?  yes   Do you test your blood sugar at home?  yes   Have you had low blood sugar? (<70mg/dl)  no   Have you had high blood sugar? (>140mg/dl)  yes   How often do you have high blood sugar?  rare   How would you rate your diabetes control?  good   Have You Felt Down, Depressed or Hopeless?  no   Have You Felt Little Interest or Pleasure in Doing Things?  no   What makes it difficult for you to take care of your diabetes or yourself?  -- [I like to eat sweets!]   Education Preferences   What areas of diabetes would you like to learn about?  avoiding high blood sugar, testing my blood sugar at home   Nutrition Information   Assessment Topics   Healthy Eating - Assessment  Needs education   Being Active - Assessment  N/A- unable to assess   Taking Medication - Assessment  Competent   Problem Solving - Assessment  Competent   Reducing Risk - Assessment  N/A-unable to assess   Healthy Coping - Assessment  N/A-unable to assess   Monitoring - Assessment  Competent   DM Goals   Healthy Eating - Goal  0-7 days from discharge [decrease or eliminate orange juice]   Monitoring - Goal  0-7 days from discharge [test 2-3 times day and record and reportt oMD]            Most Recent Value   DM Education Needs   Meter  Has own   Meter Type  Other (comment)   Frequency of Testing  Daily   Medication  Oral [metformin 500 BID]   Healthy Eating  Reviewed meal plan "   Physical Activity  Walking   Physical Activity Frequency  Rarely, Occasionally, Discussed exercise importance   Healthy Coping  Appropriate   Discharge Plan  Home   Motivation  Moderate   Teaching Method  Discussion, Explanation, Teach back   Patient Response  Verbalized understanding            Other Comments:  Discussed with pt and his daughter in law how he manages his diabetes. He states he sees Dr Mago Gomez regularly,he tests his BG couple times week and watches what he eats most of the time. We discussed his A1C 7.8% and how he is doing a lot of good managing things.Encoruarged him to drink less  Or no juice and decrease sweets. Also to test 2-3 times day when he gets home to monitor after this hospital stay. He was going to discuss with Dr Gomez that he has been having some incontinent loose stools ,so to maybe DC his metformin? His wife was actually seeing dr Gomez today.        Electronically signed by:  Regina Amaral RN  06/24/19 1:27 PM

## 2019-06-25 ENCOUNTER — READMISSION MANAGEMENT (OUTPATIENT)
Dept: CALL CENTER | Facility: HOSPITAL | Age: 79
End: 2019-06-25

## 2019-06-25 NOTE — OUTREACH NOTE
Prep Survey      Responses   Facility patient discharged from?  [facility]   Is patient eligible?  Yes   Discharge diagnosis  TIA (transient ischemic attack   Does the patient have one of the following disease processes/diagnoses(primary or secondary)?  Stroke (TIA)   Does the patient have Home health ordered?  No   Is there a DME ordered?  No   Medication alerts for this patient  Plavix    Prep survey completed?  Yes          Rosa Isela Mendes RN

## 2019-06-27 ENCOUNTER — READMISSION MANAGEMENT (OUTPATIENT)
Dept: CALL CENTER | Facility: HOSPITAL | Age: 79
End: 2019-06-27

## 2019-06-27 NOTE — OUTREACH NOTE
Stroke Week 1 Survey      Responses   Facility patient discharged from?  [facility]   Does the patient have one of the following disease processes/diagnoses(primary or secondary)?  Stroke (TIA)   Is there a successful TCM telephone encounter documented?  No   Week 1 attempt successful?  Yes   Call start time  1733   Call end time  1738   Discharge diagnosis  TIA (transient ischemic attack)   Is patient permission given to speak with other caregiver?  Yes   List who call center can speak with  spouse, Annette   Person spoke with today (if not patient) and relationship  Both patient and spouse   Medication alerts for this patient  Plavix    Meds reviewed with patient/caregiver?  Yes   Is the patient having any side effects they believe may be caused by any medication additions or changes?  No   Does the patient have all medications ordered at discharge?  Yes   Is the patient taking all medications as directed (includes completed medication regime)?  Yes   Does the patient have a primary care provider?   Yes   Does the patient have an appointment with their PCP within 7 days of discharge?  Yes   Comments regarding PCP  Adeline Onofre MD    Has the patient kept scheduled appointments due by today?  N/A   Has home health visited the patient within 72 hours of discharge?  N/A   Psychosocial issues?  No   Does the patient require any assistance with activities of daily living such as eating, bathing, dressing, walking, etc.?  No   Does the patient have any residual symptoms from stroke/TIA?  No   Does the patient understand the diet ordered at discharge?  Yes   Did the patient receive a copy of their discharge instructions?  Yes   Nursing interventions  Reviewed instructions with patient   What is the patient's perception of their health status since discharge?  Returned to baseline/stable   Nursing interventions  Nurse provided patient education   Is the patient able to teach back FAST for Stroke?  Yes   Is the  patient/caregiver able to teach back the risk factors for a stroke?  High Cholesterol, High blood pressure-goal below 120/80, Carotid or other artery disease, History of TIAs   Is the patient/caregiver able to teach back signs and symptoms related to disease process for when to call PCP?  Yes   Is the patient/caregiver able to teach back signs and symptoms related to disease process for when to call 911?  Yes   Is the patient/caregiver able to teach back the hierarchy of who to call/visit for symptoms/problems? PCP, Specialist, Home health nurse, Urgent Care, ED, 911  Yes   Week 1 call completed?  Yes          Mimi Roberts RN

## 2019-07-03 ENCOUNTER — TRANSCRIBE ORDERS (OUTPATIENT)
Dept: ADMINISTRATIVE | Facility: HOSPITAL | Age: 79
End: 2019-07-03

## 2019-07-03 DIAGNOSIS — I63.9 ACUTE CVA (CEREBROVASCULAR ACCIDENT) (HCC): Primary | ICD-10-CM

## 2019-07-08 ENCOUNTER — HOSPITAL ENCOUNTER (OUTPATIENT)
Dept: CARDIOLOGY | Facility: HOSPITAL | Age: 79
Discharge: HOME | End: 2019-07-08
Admitting: INTERNAL MEDICINE

## 2019-07-08 DIAGNOSIS — I63.9 ACUTE CVA (CEREBROVASCULAR ACCIDENT) (HCC): ICD-10-CM

## 2019-07-08 DIAGNOSIS — R55 SYNCOPE, UNSPECIFIED SYNCOPE TYPE: ICD-10-CM

## 2019-07-08 PROCEDURE — 0296T HC EXT ECG > 48HR TO 21 DAY RCRD W/CONECT INTL RCRD: CPT

## 2019-07-10 ENCOUNTER — TELEPHONE (OUTPATIENT)
Dept: NEUROLOGY | Facility: CLINIC | Age: 79
End: 2019-07-10

## 2019-07-10 NOTE — TELEPHONE ENCOUNTER
Two Week Stroke Phone Call  Spoke with the patient  · Admission Date: 6/22/19  · Discharge Date: 6/24/19  · Discharge Destination: Home  · Meds reviewed with patient/caregiver?    [x]Yes [] No   o Antiplatelet: Aspirin, Plavix  - Understands purpose     [x]  Yes     []  No     - Understands how to take      [x]  Yes     []  No    Reminded patient Aspirin 81mg and Plavix 75mg x1 month, then stop the Plavix and increase Aspirin to 325mg  o Cholesterol Reducing: Simvastatin  - Understands purpose     [x]  Yes     []  No    - Understands how to take      [x]  Yes     []  No   · Is the patient taking all medication as directed?   [x]  Yes  []  No  · Discussed personal risk factors   [x]  Yes []  No    o High blood pressure   - Bp goal <130/80  o Diabetes   o High cholesterol   - Review desired LDL goal <70  • Discussed signs and symptoms of stroke and when patient to call 911?   [x]  Yes []  No  o Sudden weakness or numbness of the face, arm, or leg especially on one side of the body  o Sudden confusion, trouble speaking or understanding  o Sudden trouble seeing in one or both eyes   o Sudden trouble walking, dizziness, loss of balance or coordination  o Sudden severe headaches with no known cause    Notified Patient that if any of these symptoms occur to call 911  · Does the patient have any new signs or symptoms of a stroke?   []  Yes     [x]  No  · Does the patient have an appointment with PCP?  [x]  Yes     []  No  · Does the patient have 3 month Stroke Clinic appointment?  Office will call to schedule   · Is the patient currently in therapy, outpatient, or home health?  []  Yes     [x]  No     Needs a referral?      []  Yes     [x]  No   Does the patient have increasing stiffness in your arms, hands, or legs?    []  Yes     [x]  No   Is this interfering with activities of daily living?   []  Yes     [x]  No  Patient Satisfaction   · How would you rate your satisfaction with the instructions provided about your  specific risk factors for stroke?   []Poor  [] Fair    [x] Good [] Very Good  [] Excellent   · How would you rate your satisfaction with the instructions provided on the warnings signs and symptoms of stroke?   []Poor  [] Fair   [] Good [] Very Good  [x] Excellent   · How well did we explain the importance of calling 911 to activate the emergency medical system for new signs and symptoms of stroke?    []Poor  [] Fair   [] Good [] Very Good  [x] Excellent   · Would you recommend the stroke center to your friends and family?   []Definitely Would Not  [] Probably Would Not  [] Neutral   []  Probably Would [x] Definitely Would

## 2019-07-18 ENCOUNTER — HOSPITAL ENCOUNTER (OUTPATIENT)
Facility: HOSPITAL | Age: 79
Discharge: HOME | End: 2019-07-20
Attending: EMERGENCY MEDICINE | Admitting: HOSPITALIST

## 2019-07-18 ENCOUNTER — APPOINTMENT (OUTPATIENT)
Dept: CT IMAGING | Facility: HOSPITAL | Age: 79
End: 2019-07-18

## 2019-07-18 ENCOUNTER — ANESTHESIA (OUTPATIENT)
Dept: PERIOP | Facility: HOSPITAL | Age: 79
End: 2019-07-18

## 2019-07-18 ENCOUNTER — ANESTHESIA EVENT (OUTPATIENT)
Dept: PERIOP | Facility: HOSPITAL | Age: 79
End: 2019-07-18

## 2019-07-18 DIAGNOSIS — K35.30 ACUTE APPENDICITIS WITH LOCALIZED PERITONITIS, WITHOUT PERFORATION OR ABSCESS, UNSPECIFIED WHETHER GANGRENE PRESENT: Primary | ICD-10-CM

## 2019-07-18 DIAGNOSIS — Z74.09 IMPAIRED MOBILITY: Chronic | ICD-10-CM

## 2019-07-18 PROBLEM — Z86.73 HISTORY OF CVA (CEREBROVASCULAR ACCIDENT): Status: ACTIVE | Noted: 2019-07-18

## 2019-07-18 PROBLEM — R10.31 RIGHT LOWER QUADRANT ABDOMINAL PAIN: Status: ACTIVE | Noted: 2019-07-18

## 2019-07-18 LAB
ALBUMIN SERPL-MCNC: 4.2 G/DL (ref 3.5–5.2)
ALBUMIN/GLOB SERPL: 1.3 G/DL
ALP SERPL-CCNC: 55 U/L (ref 39–117)
ALT SERPL W P-5'-P-CCNC: 15 U/L (ref 1–41)
ANION GAP SERPL CALCULATED.3IONS-SCNC: 13.9 MMOL/L (ref 5–15)
AST SERPL-CCNC: 14 U/L (ref 1–40)
BASOPHILS # BLD AUTO: 0.04 10*3/MM3 (ref 0–0.2)
BASOPHILS NFR BLD AUTO: 0.3 % (ref 0–1.5)
BILIRUB SERPL-MCNC: 1.2 MG/DL (ref 0.2–1.2)
BILIRUB UR QL STRIP: NEGATIVE
BUN BLD-MCNC: 15 MG/DL (ref 8–23)
BUN/CREAT SERPL: 14.7 (ref 7–25)
CALCIUM SPEC-SCNC: 10.2 MG/DL (ref 8.6–10.5)
CHLORIDE SERPL-SCNC: 95 MMOL/L (ref 98–107)
CLARITY UR: CLEAR
CO2 SERPL-SCNC: 23.1 MMOL/L (ref 22–29)
COLOR UR: YELLOW
CREAT BLD-MCNC: 1.02 MG/DL (ref 0.76–1.27)
DEPRECATED RDW RBC AUTO: 47.1 FL (ref 37–54)
EOSINOPHIL # BLD AUTO: 0.05 10*3/MM3 (ref 0–0.4)
EOSINOPHIL NFR BLD AUTO: 0.3 % (ref 0.3–6.2)
ERYTHROCYTE [DISTWIDTH] IN BLOOD BY AUTOMATED COUNT: 13.6 % (ref 12.3–15.4)
GFR SERPL CREATININE-BSD FRML MDRD: 70 ML/MIN/1.73
GLOBULIN UR ELPH-MCNC: 3.2 GM/DL
GLUCOSE BLD-MCNC: 180 MG/DL (ref 65–99)
GLUCOSE BLDC GLUCOMTR-MCNC: 157 MG/DL (ref 70–130)
GLUCOSE BLDC GLUCOMTR-MCNC: 207 MG/DL (ref 70–130)
GLUCOSE BLDC GLUCOMTR-MCNC: 246 MG/DL (ref 70–130)
GLUCOSE UR STRIP-MCNC: NEGATIVE MG/DL
HCT VFR BLD AUTO: 42.2 % (ref 37.5–51)
HGB BLD-MCNC: 14 G/DL (ref 13–17.7)
HGB UR QL STRIP.AUTO: NEGATIVE
IMM GRANULOCYTES # BLD AUTO: 0.06 10*3/MM3 (ref 0–0.05)
IMM GRANULOCYTES NFR BLD AUTO: 0.4 % (ref 0–0.5)
KETONES UR QL STRIP: NEGATIVE
LEUKOCYTE ESTERASE UR QL STRIP.AUTO: NEGATIVE
LIPASE SERPL-CCNC: 13 U/L (ref 13–60)
LYMPHOCYTES # BLD AUTO: 2.14 10*3/MM3 (ref 0.7–3.1)
LYMPHOCYTES NFR BLD AUTO: 14.3 % (ref 19.6–45.3)
MCH RBC QN AUTO: 31.3 PG (ref 26.6–33)
MCHC RBC AUTO-ENTMCNC: 33.2 G/DL (ref 31.5–35.7)
MCV RBC AUTO: 94.4 FL (ref 79–97)
MONOCYTES # BLD AUTO: 0.98 10*3/MM3 (ref 0.1–0.9)
MONOCYTES NFR BLD AUTO: 6.5 % (ref 5–12)
NEUTROPHILS # BLD AUTO: 11.71 10*3/MM3 (ref 1.7–7)
NEUTROPHILS NFR BLD AUTO: 78.2 % (ref 42.7–76)
NITRITE UR QL STRIP: NEGATIVE
NRBC BLD AUTO-RTO: 0 /100 WBC (ref 0–0.2)
PH UR STRIP.AUTO: <=5 [PH] (ref 5–8)
PLATELET # BLD AUTO: 190 10*3/MM3 (ref 140–450)
PMV BLD AUTO: 10 FL (ref 6–12)
POTASSIUM BLD-SCNC: 4.5 MMOL/L (ref 3.5–5.2)
PROT SERPL-MCNC: 7.4 G/DL (ref 6–8.5)
PROT UR QL STRIP: NEGATIVE
RBC # BLD AUTO: 4.47 10*6/MM3 (ref 4.14–5.8)
SODIUM BLD-SCNC: 132 MMOL/L (ref 136–145)
SP GR UR STRIP: 1.01 (ref 1–1.03)
UROBILINOGEN UR QL STRIP: NORMAL
WBC NRBC COR # BLD: 14.98 10*3/MM3 (ref 3.4–10.8)

## 2019-07-18 PROCEDURE — G0378 HOSPITAL OBSERVATION PER HR: HCPCS

## 2019-07-18 PROCEDURE — 94799 UNLISTED PULMONARY SVC/PX: CPT

## 2019-07-18 PROCEDURE — 25010000002 PROPOFOL 10 MG/ML EMULSION: Performed by: ANESTHESIOLOGY

## 2019-07-18 PROCEDURE — 63710000001 ALLOPURINOL 300 MG TABLET: Performed by: HOSPITALIST

## 2019-07-18 PROCEDURE — 74177 CT ABD & PELVIS W/CONTRAST: CPT

## 2019-07-18 PROCEDURE — 63710000001 BUDESONIDE-FORMOTEROL 160-4.5 MCG/ACT AEROSOL 6 G INHALER: Performed by: HOSPITALIST

## 2019-07-18 PROCEDURE — A9270 NON-COVERED ITEM OR SERVICE: HCPCS | Performed by: HOSPITALIST

## 2019-07-18 PROCEDURE — 25010000002 HYDRALAZINE PER 20 MG: Performed by: ANESTHESIOLOGY

## 2019-07-18 PROCEDURE — 94640 AIRWAY INHALATION TREATMENT: CPT

## 2019-07-18 PROCEDURE — 82962 GLUCOSE BLOOD TEST: CPT

## 2019-07-18 PROCEDURE — 44970 LAPAROSCOPY APPENDECTOMY: CPT | Performed by: SURGERY

## 2019-07-18 PROCEDURE — 96361 HYDRATE IV INFUSION ADD-ON: CPT

## 2019-07-18 PROCEDURE — 81003 URINALYSIS AUTO W/O SCOPE: CPT | Performed by: EMERGENCY MEDICINE

## 2019-07-18 PROCEDURE — 63710000001 ASPIRIN 81 MG TABLET DELAYED-RELEASE: Performed by: HOSPITALIST

## 2019-07-18 PROCEDURE — 99203 OFFICE O/P NEW LOW 30 MIN: CPT | Performed by: SURGERY

## 2019-07-18 PROCEDURE — 25010000002 PHENYLEPHRINE PER 1 ML: Performed by: ANESTHESIOLOGY

## 2019-07-18 PROCEDURE — 85025 COMPLETE CBC W/AUTO DIFF WBC: CPT | Performed by: EMERGENCY MEDICINE

## 2019-07-18 PROCEDURE — 63710000001 LISINOPRIL 20 MG TABLET: Performed by: HOSPITALIST

## 2019-07-18 PROCEDURE — 25010000002 PIPERACILLIN SOD-TAZOBACTAM PER 1 G: Performed by: EMERGENCY MEDICINE

## 2019-07-18 PROCEDURE — 88304 TISSUE EXAM BY PATHOLOGIST: CPT | Performed by: SURGERY

## 2019-07-18 PROCEDURE — 25010000002 IOPAMIDOL 61 % SOLUTION: Performed by: EMERGENCY MEDICINE

## 2019-07-18 PROCEDURE — 63710000001 ACETAMINOPHEN 325 MG TABLET: Performed by: HOSPITALIST

## 2019-07-18 PROCEDURE — 25010000002 ONDANSETRON PER 1 MG: Performed by: NURSE ANESTHETIST, CERTIFIED REGISTERED

## 2019-07-18 PROCEDURE — 96365 THER/PROPH/DIAG IV INF INIT: CPT

## 2019-07-18 PROCEDURE — 25010000002 FENTANYL CITRATE (PF) 100 MCG/2ML SOLUTION: Performed by: ANESTHESIOLOGY

## 2019-07-18 PROCEDURE — 80053 COMPREHEN METABOLIC PANEL: CPT | Performed by: EMERGENCY MEDICINE

## 2019-07-18 PROCEDURE — 99284 EMERGENCY DEPT VISIT MOD MDM: CPT

## 2019-07-18 PROCEDURE — 83690 ASSAY OF LIPASE: CPT | Performed by: EMERGENCY MEDICINE

## 2019-07-18 PROCEDURE — 63710000001 ATORVASTATIN 20 MG TABLET: Performed by: HOSPITALIST

## 2019-07-18 PROCEDURE — 25010000002 MIDAZOLAM PER 1 MG: Performed by: ANESTHESIOLOGY

## 2019-07-18 DEVICE — ETS45 RELOAD STANDARD 45MM
Type: IMPLANTABLE DEVICE | Site: ABDOMEN | Status: FUNCTIONAL
Brand: ENDOPATH

## 2019-07-18 DEVICE — CLIP LIGAT VASC HORIZON TI MD/LG GRN 6CT: Type: IMPLANTABLE DEVICE | Site: ABDOMEN | Status: FUNCTIONAL

## 2019-07-18 RX ORDER — SODIUM CHLORIDE 0.9 % (FLUSH) 0.9 %
10 SYRINGE (ML) INJECTION AS NEEDED
Status: DISCONTINUED | OUTPATIENT
Start: 2019-07-18 | End: 2019-07-20 | Stop reason: HOSPADM

## 2019-07-18 RX ORDER — ALLOPURINOL 300 MG/1
300 TABLET ORAL DAILY
Status: DISCONTINUED | OUTPATIENT
Start: 2019-07-18 | End: 2019-07-20 | Stop reason: HOSPADM

## 2019-07-18 RX ORDER — BUPIVACAINE HYDROCHLORIDE AND EPINEPHRINE 5; 5 MG/ML; UG/ML
INJECTION, SOLUTION PERINEURAL AS NEEDED
Status: DISCONTINUED | OUTPATIENT
Start: 2019-07-18 | End: 2019-07-18 | Stop reason: HOSPADM

## 2019-07-18 RX ORDER — SODIUM CHLORIDE, SODIUM LACTATE, POTASSIUM CHLORIDE, CALCIUM CHLORIDE 600; 310; 30; 20 MG/100ML; MG/100ML; MG/100ML; MG/100ML
9 INJECTION, SOLUTION INTRAVENOUS CONTINUOUS
Status: DISCONTINUED | OUTPATIENT
Start: 2019-07-18 | End: 2019-07-18

## 2019-07-18 RX ORDER — ONDANSETRON 4 MG/1
4 TABLET, FILM COATED ORAL EVERY 6 HOURS PRN
Status: DISCONTINUED | OUTPATIENT
Start: 2019-07-18 | End: 2019-07-20 | Stop reason: HOSPADM

## 2019-07-18 RX ORDER — ACETAMINOPHEN 325 MG/1
650 TABLET ORAL EVERY 4 HOURS PRN
Status: DISCONTINUED | OUTPATIENT
Start: 2019-07-18 | End: 2019-07-20 | Stop reason: HOSPADM

## 2019-07-18 RX ORDER — DIPHENHYDRAMINE HYDROCHLORIDE 50 MG/ML
6.25 INJECTION INTRAMUSCULAR; INTRAVENOUS
Status: DISCONTINUED | OUTPATIENT
Start: 2019-07-18 | End: 2019-07-18 | Stop reason: HOSPADM

## 2019-07-18 RX ORDER — MIDAZOLAM HYDROCHLORIDE 1 MG/ML
0.5 INJECTION INTRAMUSCULAR; INTRAVENOUS
Status: DISCONTINUED | OUTPATIENT
Start: 2019-07-18 | End: 2019-07-18 | Stop reason: HOSPADM

## 2019-07-18 RX ORDER — RANITIDINE 150 MG/1
150 TABLET ORAL 2 TIMES DAILY
COMMUNITY
End: 2019-09-30

## 2019-07-18 RX ORDER — HYDROMORPHONE HYDROCHLORIDE 1 MG/ML
0.5 INJECTION, SOLUTION INTRAMUSCULAR; INTRAVENOUS; SUBCUTANEOUS
Status: DISCONTINUED | OUTPATIENT
Start: 2019-07-18 | End: 2019-07-18 | Stop reason: HOSPADM

## 2019-07-18 RX ORDER — NALOXONE HCL 0.4 MG/ML
0.4 VIAL (ML) INJECTION AS NEEDED
Status: DISCONTINUED | OUTPATIENT
Start: 2019-07-18 | End: 2019-07-18 | Stop reason: HOSPADM

## 2019-07-18 RX ORDER — BUDESONIDE AND FORMOTEROL FUMARATE DIHYDRATE 160; 4.5 UG/1; UG/1
2 AEROSOL RESPIRATORY (INHALATION)
Status: DISCONTINUED | OUTPATIENT
Start: 2019-07-18 | End: 2019-07-20 | Stop reason: HOSPADM

## 2019-07-18 RX ORDER — PROMETHAZINE HYDROCHLORIDE 25 MG/1
25 SUPPOSITORY RECTAL ONCE AS NEEDED
Status: DISCONTINUED | OUTPATIENT
Start: 2019-07-18 | End: 2019-07-18 | Stop reason: HOSPADM

## 2019-07-18 RX ORDER — LIDOCAINE HYDROCHLORIDE 20 MG/ML
INJECTION, SOLUTION INFILTRATION; PERINEURAL AS NEEDED
Status: DISCONTINUED | OUTPATIENT
Start: 2019-07-18 | End: 2019-07-18 | Stop reason: SURG

## 2019-07-18 RX ORDER — ONDANSETRON 2 MG/ML
4 INJECTION INTRAMUSCULAR; INTRAVENOUS ONCE AS NEEDED
Status: DISCONTINUED | OUTPATIENT
Start: 2019-07-18 | End: 2019-07-18 | Stop reason: HOSPADM

## 2019-07-18 RX ORDER — MIDAZOLAM HYDROCHLORIDE 1 MG/ML
1 INJECTION INTRAMUSCULAR; INTRAVENOUS
Status: DISCONTINUED | OUTPATIENT
Start: 2019-07-18 | End: 2019-07-18 | Stop reason: HOSPADM

## 2019-07-18 RX ORDER — MAGNESIUM HYDROXIDE 1200 MG/15ML
LIQUID ORAL AS NEEDED
Status: DISCONTINUED | OUTPATIENT
Start: 2019-07-18 | End: 2019-07-18 | Stop reason: HOSPADM

## 2019-07-18 RX ORDER — SODIUM CHLORIDE 0.9 % (FLUSH) 0.9 %
3 SYRINGE (ML) INJECTION EVERY 12 HOURS SCHEDULED
Status: DISCONTINUED | OUTPATIENT
Start: 2019-07-18 | End: 2019-07-18 | Stop reason: HOSPADM

## 2019-07-18 RX ORDER — ROCURONIUM BROMIDE 10 MG/ML
INJECTION, SOLUTION INTRAVENOUS AS NEEDED
Status: DISCONTINUED | OUTPATIENT
Start: 2019-07-18 | End: 2019-07-18 | Stop reason: SURG

## 2019-07-18 RX ORDER — SODIUM CHLORIDE 0.9 % (FLUSH) 0.9 %
3-10 SYRINGE (ML) INJECTION AS NEEDED
Status: DISCONTINUED | OUTPATIENT
Start: 2019-07-18 | End: 2019-07-20 | Stop reason: HOSPADM

## 2019-07-18 RX ORDER — PROPOFOL 10 MG/ML
VIAL (ML) INTRAVENOUS AS NEEDED
Status: DISCONTINUED | OUTPATIENT
Start: 2019-07-18 | End: 2019-07-18 | Stop reason: SURG

## 2019-07-18 RX ORDER — PROMETHAZINE HYDROCHLORIDE 25 MG/ML
6.25 INJECTION, SOLUTION INTRAMUSCULAR; INTRAVENOUS ONCE AS NEEDED
Status: DISCONTINUED | OUTPATIENT
Start: 2019-07-18 | End: 2019-07-18 | Stop reason: HOSPADM

## 2019-07-18 RX ORDER — ATORVASTATIN CALCIUM 20 MG/1
40 TABLET, FILM COATED ORAL NIGHTLY
Status: DISCONTINUED | OUTPATIENT
Start: 2019-07-18 | End: 2019-07-20 | Stop reason: HOSPADM

## 2019-07-18 RX ORDER — SODIUM CHLORIDE 9 MG/ML
100 INJECTION, SOLUTION INTRAVENOUS CONTINUOUS
Status: DISCONTINUED | OUTPATIENT
Start: 2019-07-18 | End: 2019-07-19

## 2019-07-18 RX ORDER — ALBUTEROL SULFATE 90 UG/1
2 AEROSOL, METERED RESPIRATORY (INHALATION) EVERY 4 HOURS PRN
Status: DISCONTINUED | OUTPATIENT
Start: 2019-07-18 | End: 2019-07-18 | Stop reason: CLARIF

## 2019-07-18 RX ORDER — FENTANYL CITRATE 50 UG/ML
25 INJECTION, SOLUTION INTRAMUSCULAR; INTRAVENOUS
Status: DISCONTINUED | OUTPATIENT
Start: 2019-07-18 | End: 2019-07-18 | Stop reason: HOSPADM

## 2019-07-18 RX ORDER — FLUMAZENIL 0.1 MG/ML
0.2 INJECTION INTRAVENOUS AS NEEDED
Status: DISCONTINUED | OUTPATIENT
Start: 2019-07-18 | End: 2019-07-18 | Stop reason: HOSPADM

## 2019-07-18 RX ORDER — HYDROMORPHONE HYDROCHLORIDE 1 MG/ML
0.25 INJECTION, SOLUTION INTRAMUSCULAR; INTRAVENOUS; SUBCUTANEOUS
Status: DISCONTINUED | OUTPATIENT
Start: 2019-07-18 | End: 2019-07-18 | Stop reason: HOSPADM

## 2019-07-18 RX ORDER — ONDANSETRON 2 MG/ML
4 INJECTION INTRAMUSCULAR; INTRAVENOUS EVERY 6 HOURS PRN
Status: DISCONTINUED | OUTPATIENT
Start: 2019-07-18 | End: 2019-07-20 | Stop reason: HOSPADM

## 2019-07-18 RX ORDER — ONDANSETRON 2 MG/ML
INJECTION INTRAMUSCULAR; INTRAVENOUS AS NEEDED
Status: DISCONTINUED | OUTPATIENT
Start: 2019-07-18 | End: 2019-07-18 | Stop reason: SURG

## 2019-07-18 RX ORDER — PROMETHAZINE HYDROCHLORIDE 25 MG/ML
12.5 INJECTION, SOLUTION INTRAMUSCULAR; INTRAVENOUS ONCE AS NEEDED
Status: DISCONTINUED | OUTPATIENT
Start: 2019-07-18 | End: 2019-07-18 | Stop reason: HOSPADM

## 2019-07-18 RX ORDER — MORPHINE SULFATE 2 MG/ML
2 INJECTION, SOLUTION INTRAMUSCULAR; INTRAVENOUS EVERY 4 HOURS PRN
Status: DISCONTINUED | OUTPATIENT
Start: 2019-07-18 | End: 2019-07-20 | Stop reason: HOSPADM

## 2019-07-18 RX ORDER — FAMOTIDINE 10 MG/ML
20 INJECTION, SOLUTION INTRAVENOUS ONCE
Status: COMPLETED | OUTPATIENT
Start: 2019-07-18 | End: 2019-07-18

## 2019-07-18 RX ORDER — HYDRALAZINE HYDROCHLORIDE 20 MG/ML
5 INJECTION INTRAMUSCULAR; INTRAVENOUS
Status: DISCONTINUED | OUTPATIENT
Start: 2019-07-18 | End: 2019-07-18 | Stop reason: HOSPADM

## 2019-07-18 RX ORDER — NICOTINE POLACRILEX 4 MG
15 LOZENGE BUCCAL
Status: DISCONTINUED | OUTPATIENT
Start: 2019-07-18 | End: 2019-07-20 | Stop reason: HOSPADM

## 2019-07-18 RX ORDER — DEXTROSE MONOHYDRATE 25 G/50ML
25 INJECTION, SOLUTION INTRAVENOUS
Status: DISCONTINUED | OUTPATIENT
Start: 2019-07-18 | End: 2019-07-20 | Stop reason: HOSPADM

## 2019-07-18 RX ORDER — FENTANYL CITRATE 50 UG/ML
INJECTION, SOLUTION INTRAMUSCULAR; INTRAVENOUS AS NEEDED
Status: DISCONTINUED | OUTPATIENT
Start: 2019-07-18 | End: 2019-07-18 | Stop reason: SURG

## 2019-07-18 RX ORDER — CLOPIDOGREL BISULFATE 75 MG/1
75 TABLET ORAL DAILY
Status: DISCONTINUED | OUTPATIENT
Start: 2019-07-18 | End: 2019-07-20 | Stop reason: HOSPADM

## 2019-07-18 RX ORDER — ALBUTEROL SULFATE 2.5 MG/3ML
2.5 SOLUTION RESPIRATORY (INHALATION) EVERY 4 HOURS PRN
Status: DISCONTINUED | OUTPATIENT
Start: 2019-07-18 | End: 2019-07-20 | Stop reason: HOSPADM

## 2019-07-18 RX ORDER — HYDROCODONE BITARTRATE AND ACETAMINOPHEN 5; 325 MG/1; MG/1
1 TABLET ORAL ONCE AS NEEDED
Status: DISCONTINUED | OUTPATIENT
Start: 2019-07-18 | End: 2019-07-18 | Stop reason: HOSPADM

## 2019-07-18 RX ORDER — ASPIRIN 81 MG/1
81 TABLET ORAL DAILY
Status: DISCONTINUED | OUTPATIENT
Start: 2019-07-18 | End: 2019-07-20 | Stop reason: HOSPADM

## 2019-07-18 RX ORDER — EPHEDRINE SULFATE 50 MG/ML
5 INJECTION, SOLUTION INTRAVENOUS ONCE AS NEEDED
Status: DISCONTINUED | OUTPATIENT
Start: 2019-07-18 | End: 2019-07-18 | Stop reason: HOSPADM

## 2019-07-18 RX ORDER — OXYCODONE HYDROCHLORIDE AND ACETAMINOPHEN 5; 325 MG/1; MG/1
1 TABLET ORAL ONCE AS NEEDED
Status: DISCONTINUED | OUTPATIENT
Start: 2019-07-18 | End: 2019-07-18 | Stop reason: HOSPADM

## 2019-07-18 RX ORDER — SODIUM CHLORIDE 0.9 % (FLUSH) 0.9 %
3-10 SYRINGE (ML) INJECTION AS NEEDED
Status: DISCONTINUED | OUTPATIENT
Start: 2019-07-18 | End: 2019-07-18 | Stop reason: HOSPADM

## 2019-07-18 RX ORDER — HYDROCODONE BITARTRATE AND ACETAMINOPHEN 5; 325 MG/1; MG/1
1 TABLET ORAL EVERY 6 HOURS PRN
Status: DISCONTINUED | OUTPATIENT
Start: 2019-07-18 | End: 2019-07-20 | Stop reason: HOSPADM

## 2019-07-18 RX ORDER — PROMETHAZINE HYDROCHLORIDE 25 MG/1
25 TABLET ORAL ONCE AS NEEDED
Status: DISCONTINUED | OUTPATIENT
Start: 2019-07-18 | End: 2019-07-18 | Stop reason: HOSPADM

## 2019-07-18 RX ORDER — FAMOTIDINE 10 MG/ML
20 INJECTION, SOLUTION INTRAVENOUS EVERY 12 HOURS SCHEDULED
Status: DISCONTINUED | OUTPATIENT
Start: 2019-07-18 | End: 2019-07-20

## 2019-07-18 RX ORDER — LISINOPRIL 20 MG/1
10 TABLET ORAL 2 TIMES DAILY
Status: DISCONTINUED | OUTPATIENT
Start: 2019-07-18 | End: 2019-07-20 | Stop reason: HOSPADM

## 2019-07-18 RX ORDER — SODIUM CHLORIDE 0.9 % (FLUSH) 0.9 %
3 SYRINGE (ML) INJECTION EVERY 12 HOURS SCHEDULED
Status: DISCONTINUED | OUTPATIENT
Start: 2019-07-18 | End: 2019-07-20 | Stop reason: HOSPADM

## 2019-07-18 RX ADMIN — FAMOTIDINE 20 MG: 10 INJECTION INTRAVENOUS at 20:14

## 2019-07-18 RX ADMIN — ONDANSETRON 4 MG: 2 INJECTION INTRAMUSCULAR; INTRAVENOUS at 16:30

## 2019-07-18 RX ADMIN — ROCURONIUM BROMIDE 35 MG: 10 INJECTION INTRAVENOUS at 15:54

## 2019-07-18 RX ADMIN — SODIUM CHLORIDE, POTASSIUM CHLORIDE, SODIUM LACTATE AND CALCIUM CHLORIDE 1000 ML: 600; 310; 30; 20 INJECTION, SOLUTION INTRAVENOUS at 11:13

## 2019-07-18 RX ADMIN — ALLOPURINOL 300 MG: 300 TABLET ORAL at 20:00

## 2019-07-18 RX ADMIN — LISINOPRIL 10 MG: 20 TABLET ORAL at 20:01

## 2019-07-18 RX ADMIN — MIDAZOLAM 0.5 MG: 1 INJECTION INTRAMUSCULAR; INTRAVENOUS at 15:20

## 2019-07-18 RX ADMIN — ACETAMINOPHEN 650 MG: 325 TABLET, FILM COATED ORAL at 21:48

## 2019-07-18 RX ADMIN — SUGAMMADEX 2 MG: 100 INJECTION, SOLUTION INTRAVENOUS at 16:30

## 2019-07-18 RX ADMIN — SODIUM CHLORIDE, POTASSIUM CHLORIDE, SODIUM LACTATE AND CALCIUM CHLORIDE 9 ML/HR: 600; 310; 30; 20 INJECTION, SOLUTION INTRAVENOUS at 15:12

## 2019-07-18 RX ADMIN — FENTANYL CITRATE 25 MCG: 50 INJECTION INTRAMUSCULAR; INTRAVENOUS at 16:40

## 2019-07-18 RX ADMIN — TAZOBACTAM SODIUM AND PIPERACILLIN SODIUM 3.38 G: 375; 3 INJECTION, SOLUTION INTRAVENOUS at 13:08

## 2019-07-18 RX ADMIN — HYDRALAZINE HYDROCHLORIDE 5 MG: 20 INJECTION INTRAMUSCULAR; INTRAVENOUS at 17:00

## 2019-07-18 RX ADMIN — ATORVASTATIN CALCIUM 40 MG: 20 TABLET, FILM COATED ORAL at 20:01

## 2019-07-18 RX ADMIN — PROPOFOL 2 MG: 10 INJECTION, EMULSION INTRAVENOUS at 15:39

## 2019-07-18 RX ADMIN — FENTANYL CITRATE 25 MCG: 50 INJECTION INTRAMUSCULAR; INTRAVENOUS at 16:13

## 2019-07-18 RX ADMIN — LIDOCAINE HYDROCHLORIDE 40 MG: 20 INJECTION, SOLUTION INFILTRATION; PERINEURAL at 15:54

## 2019-07-18 RX ADMIN — FENTANYL CITRATE 100 MCG: 50 INJECTION INTRAMUSCULAR; INTRAVENOUS at 15:54

## 2019-07-18 RX ADMIN — BUDESONIDE AND FORMOTEROL FUMARATE DIHYDRATE 2 PUFF: 160; 4.5 AEROSOL RESPIRATORY (INHALATION) at 20:22

## 2019-07-18 RX ADMIN — PHENYLEPHRINE HYDROCHLORIDE 100 MCG: 10 INJECTION INTRAVENOUS at 16:03

## 2019-07-18 RX ADMIN — ASPIRIN 81 MG: 81 TABLET, COATED ORAL at 20:00

## 2019-07-18 RX ADMIN — IOPAMIDOL 85 ML: 612 INJECTION, SOLUTION INTRAVENOUS at 12:25

## 2019-07-18 RX ADMIN — FAMOTIDINE 20 MG: 10 INJECTION INTRAVENOUS at 15:20

## 2019-07-18 RX ADMIN — PROPOFOL 200 MG: 10 INJECTION, EMULSION INTRAVENOUS at 15:54

## 2019-07-18 RX ADMIN — SODIUM CHLORIDE 100 ML/HR: 9 INJECTION, SOLUTION INTRAVENOUS at 18:26

## 2019-07-18 NOTE — ANESTHESIA POSTPROCEDURE EVALUATION
"Patient: Eugenio Joe    Procedure Summary     Date:  07/18/19 Room / Location:  [facility] OR  / [facility] MAIN OR    Anesthesia Start:  1539 Anesthesia Stop:  1653    Procedure:  APPENDECTOMY LAPAROSCOPIC (N/A Abdomen) Diagnosis:       Acute appendicitis with localized peritonitis, without perforation or abscess, unspecified whether gangrene present      (Acute appendicitis with localized peritonitis, without perforation or abscess, unspecified whether gangrene present [K35.30])    Surgeon:  Luis Carlos Rick Jr., MD Provider:  Cristopher Priest MD    Anesthesia Type:  general ASA Status:  4 - Emergent          Anesthesia Type: general  Last vitals  BP   (!) 182/71 (07/18/19 1700)   Temp   36.7 °C (98.1 °F) (07/18/19 1646)   Pulse   78 (07/18/19 1700)   Resp   16 (07/18/19 1700)     SpO2   96 % (07/18/19 1700)     Post Anesthesia Care and Evaluation    Patient location during evaluation: bedside  Patient participation: complete - patient participated  Level of consciousness: awake and alert  Pain management: adequate  Airway patency: patent  Anesthetic complications: No anesthetic complications    Cardiovascular status: acceptable  Respiratory status: acceptable  Hydration status: acceptable    Comments: BP (!) 182/71   Pulse 78   Temp 36.7 °C (98.1 °F) (Oral)   Resp 16   Ht 177.8 cm (70\")   SpO2 96%   BMI 28.66 kg/m²       "

## 2019-07-18 NOTE — CONSULTS
Surgery (on-call MD unless specified)  Consult performed by: Luis Carlos Rick Jr., MD  Consult ordered by: Jean Gutierres MD          Patient Care Team:  Adeline Onofre MD as PCP - General  Adeline Onofre MD as PCP - Family Medicine  Adeline Onofre MD as PCP - Claims Attributed  Jean Ford MD as Consulting Physician (Cardiology)    Chief complaint: Abdominal pain    Subjective     History of Present Illness     The patient is a pleasant 78-year-old male who presented to the emergency room with a 3-day history of abdominal pain.  He states that he developed vague pain in his lower abdomen 3 days ago that has slowly worsened and migrated to the right lower quadrant.  Last night the pain was very severe and this prompted a presentation to the emergency room this morning.  He has not had any nausea or vomiting but he has had diarrhea.  There is been no constipation.  CT scan of the abdomen and pelvis shows acute appendicitis.    The patient has a history of coronary artery disease and had a CABG x3 vessels in 2016.  He is on aspirin and Plavix.    Review of Systems   Constitutional: Negative for activity change, appetite change, fatigue and fever.   HENT: Negative for trouble swallowing and voice change.    Respiratory: Negative for chest tightness and shortness of breath.    Cardiovascular: Negative for chest pain and palpitations.   Gastrointestinal: Positive for abdominal pain and diarrhea. Negative for blood in stool, constipation, nausea and vomiting.   Endocrine: Negative for cold intolerance and heat intolerance.   Genitourinary: Negative for dysuria and flank pain.   Neurological: Negative for dizziness and light-headedness.   Hematological: Negative for adenopathy. Does not bruise/bleed easily.   Psychiatric/Behavioral: Negative for agitation and confusion.        Past Medical History:   Diagnosis Date   • Arthritis    • Asthma    • Brain abscess     at about age 44   • Bruise of face    •  Cancer (CMS/HCC)     PT STATES IN HIS SWEAT GLAND    • Cardiac disease    • Coronary artery disease     CABG 2012   • Diabetes mellitus (CMS/HCC)    • Gout several years ago    medication  working   • Headache 11/15/2017   • Hearing aid worn     bilateral   • History of transfusion    • Hydrocephaly    • Hyperlipemia    • Hypertension    • Joint pain     or swelling   • Low back pain several years ago   • Orbit fracture (CMS/HCC)    • Pulmonary embolism (CMS/HCC)     OCT 2016   • Sinus infection    ,   Past Surgical History:   Procedure Laterality Date   • BRAIN SURGERY      BRAIN ABCESS 30 YR AGO   • CARDIAC SURGERY     • COLONOSCOPY  2012    Ciciliano- normal per pt, no polyps    • CORONARY ARTERY BYPASS GRAFT     • ENDOSCOPY N/A 3/9/2017    Schatzki ring, dilated, LA Grade B esophagitis, HH, acquired duodenal stenosis   • ENDOSCOPY N/A 4/6/2018    candida, HH, torts, Schatzki ring, duodenal stenosis, dilatation perforemed, chronic inflammation   • FACIAL FRACTURE SURGERY      to repair 6 broken bones   • ORBITAL FRACTURE OPEN REDUCTION INTERNAL FIXATION Right 1/13/2017    Procedure: RT ORBIT FLOOR FRACTURE REPAIR RIGHT ZMC FRACTURE REPAIR, RIGHT NASAL BONE FRACTURE CLOSED REDUCTION;  Surgeon: Edil Lester MD;  Location: [facility] [place];  Service:    • ORIF FOOT FRACTURE Right 9/9/2016    Procedure: RIGHT SECOND METATARSAL OPEN REDUCTION INTERNAL FIXATION WITh GRAFT FROM HEEL ;  Surgeon: Man Jenkins MD;  Location: [facility] [place];  Service:    • SEPTOPLASTY     • SKIN CANCER EXCISION     • TONSILLECTOMY     ,   Family History   Problem Relation Age of Onset   • Heart disease Mother    • Hypertension Mother    • Stroke Mother    • Diverticulitis Mother    • Heart disease Father    • Hypertension Father    ,   Social History     Tobacco Use   • Smoking status: Former Smoker     Packs/day: 2.00     Years: 5.00     Pack years: 10.00     Types: Cigarettes     Start date: 4/1/1959     Last attempt to  quit: 3/9/1962     Years since quittin.3   • Smokeless tobacco: Never Used   • Tobacco comment: Quit cold turkey   Substance Use Topics   • Alcohol use: Yes     Alcohol/week: 1.2 oz     Types: 1 Cans of beer, 1 Shots of liquor per week     Comment: OCC   • Drug use: No    and Allergies:  Other and Oxycodone    Objective      Vital Signs  Temp:  [97.4 °F (36.3 °C)] 97.4 °F (36.3 °C)  Heart Rate:  [76-85] 78  Resp:  [16] 16  BP: (143-165)/(83-91) 165/91    Physical Exam   Constitutional: He is oriented to person, place, and time. He appears well-developed and well-nourished.  Non-toxic appearance. No distress.   HENT:   Head: Normocephalic and atraumatic.   Eyes: EOM are normal. No scleral icterus. Right eye exhibits normal extraocular motion. Left eye exhibits normal extraocular motion.   Neck: Normal range of motion. Neck supple. No JVD present. No tracheal deviation present.   Cardiovascular: Normal rate and regular rhythm.   Pulmonary/Chest: Effort normal and breath sounds normal. No respiratory distress. He exhibits no tenderness.   Abdominal: Soft. Normal appearance and bowel sounds are normal. He exhibits no distension. There is no hepatosplenomegaly. There is tenderness (Moderately tender) in the right lower quadrant. There is no rebound and no guarding. No hernia.   Lymphadenopathy:        Right: No supraclavicular adenopathy present.        Left: No supraclavicular adenopathy present.   Neurological: He is alert and oriented to person, place, and time.   Skin: Skin is warm and dry.   Psychiatric: He has a normal mood and affect. His behavior is normal. Judgment and thought content normal.       Results Review:    I reviewed the patient's new clinical results.        Assessment/Plan       Acute appendicitis with localized peritonitis, without perforation or abscess      Assessment & Plan     1.  Acute appendicitis: Plan laparoscopic appendectomy.  This was discussed at length with the patient.  He  understands that he is at increased risk for bleeding complications related to the aspirin and Plavix.  He is scheduled for a laparoscopic appendectomy today.  The patient understands the indications, alternatives, risks, and benefits of the procedure and wishes to proceed.    I discussed the patients findings and my recommendations with patient    Luis Carlos Rick Jr., MD  07/18/19  1:56 PM

## 2019-07-18 NOTE — PLAN OF CARE
Problem: Patient Care Overview  Goal: Plan of Care Review  Outcome: Ongoing (interventions implemented as appropriate)   07/18/19 2207   Coping/Psychosocial   Plan of Care Reviewed With patient   Plan of Care Review   Progress no change   OTHER   Outcome Summary Pt admitted to [facility] after lap appy. Lap sites x3 with dermabond IGLESIA, no drainage. Pain is tolerable, no nausea. Family at bsd. Pt tolerating liquids. Will monitor.

## 2019-07-18 NOTE — H&P
HISTORY AND PHYSICAL   [facility]        Patient Identification:  Name: Eugenio Joe  Age: 78 y.o.  Sex: male  :  1940  MRN: [id number]                     Primary Care Physician: Adeline Onofre MD    Chief Complaint: Abdominal pain    History of Present Illness:   Mr Joe is a pleasant 78-year-old male who actually has had abdominal pain for the last 3 days.  He states is more of a dull achy pain and is not related to any type of oral intake or positional.  He is noted loose stools over the last couple days and has had decreased appetite but otherwise denies any significant problems with nausea vomiting.  He also specifically denies any problems with fever chills or night sweats.  He claims he was here just a month prior was placed on aspirin and Plavix due to a TIA.  I did review the previous record as well as MRI imaging and there was concern over a small area that could have been a discrete infarct.  As a result he was maintained on aspirin and Plavix and a statin.  Surgery has already evaluated this patient and upon walking out of the ER room the transport was already here to take him to the OR.  He as well as his daughter present at bedside both understand that I have counseled him on increased risk for GI bleeding and postoperative complications secondary to aspirin and Plavix and also given the fact that he had a fairly recent possible stroke will further increased risk for postoperative complications.  They understand and accept these risk.  We further discussed CODE STATUS and he is a conditional DNR.  [facility] was asked to admit per surgical recommendations due to past history of diabetes mellitus hypertension as well as what was stated above.    Past Medical History:  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 SWEAT GLAND    • Cardiac disease    • Coronary  artery disease     CABG 2012   • Diabetes mellitus (CMS/HCC)    • Gout several years ago    medication  working   • Headache 11/15/2017   • Hearing aid worn     bilateral   • History of transfusion    • Hydrocephaly    • Hyperlipemia    • Hypertension    • Joint pain     or swelling   • Low back pain several years ago   • Orbit fracture (CMS/HCC)    • Pulmonary embolism (CMS/HCC)     OCT 2016   • Sinus infection      Past Surgical History:  Past Surgical History:   Procedure Laterality Date   • BRAIN SURGERY      BRAIN ABCESS 30 YR AGO   • CARDIAC SURGERY     • COLONOSCOPY  2012    Ciciliano- normal per pt, no polyps    • CORONARY ARTERY BYPASS GRAFT     • ENDOSCOPY N/A 3/9/2017    Schatzki ring, dilated, LA Grade B esophagitis, HH, acquired duodenal stenosis   • ENDOSCOPY N/A 4/6/2018    candida, HH, torts, Schatzki ring, duodenal stenosis, dilatation perforemed, chronic inflammation   • FACIAL FRACTURE SURGERY      to repair 6 broken bones   • ORBITAL FRACTURE OPEN REDUCTION INTERNAL FIXATION Right 1/13/2017    Procedure: RT ORBIT FLOOR FRACTURE REPAIR RIGHT ZMC FRACTURE REPAIR, RIGHT NASAL BONE FRACTURE CLOSED REDUCTION;  Surgeon: Edil Lester MD;  [place] [facility] [place] [facility] [place]:    • ORIF FOOT FRACTURE Right 9/9/2016    Procedure: RIGHT SECOND METATARSAL OPEN REDUCTION INTERNAL FIXATION WITh GRAFT FROM HEEL ;  Surgeon: Man Jenkins MD;  [place] [facility] [place] [facility] [place]  Service:    • SEPTOPLASTY     • SKIN CANCER EXCISION     • TONSILLECTOMY        Home Meds:  Medications Prior to Admission   Medication Sig Dispense Refill Last Dose   • allopurinol (ZYLOPRIM) 300 MG tablet Take 300 mg by mouth daily.   7/17/2019 at Unknown time   • aspirin 81 MG EC tablet Take 1 tablet by mouth Daily. 30 tablet 0 7/17/2019 at Unknown time   • clopidogrel (PLAVIX) 75 MG tablet Take 1 tablet by mouth Daily. 30 tablet 0 7/17/2019 at Unknown time   • esomeprazole (nexIUM) 40 MG capsule Take 1 capsule by mouth Every  Morning Before Breakfast. 90 capsule 3 7/17/2019 at Unknown time   • lisinopril (PRINIVIL,ZESTRIL) 40 MG tablet Take 1 tablet by mouth Daily. (Patient taking differently: Take 10 mg by mouth 2 (Two) Times a Day.) 30 tablet 0 7/17/2019 at Unknown time   • metFORMIN (GLUCOPHAGE) 500 MG tablet Take 500 mg by mouth 2 (Two) Times a Day With Meals.   7/17/2019 at Unknown time   • Multiple Vitamins-Minerals (MULTIVITAMIN ADULT PO) Take 1 tablet by mouth Daily.   7/17/2019 at Unknown time   • MYRBETRIQ 25 MG tablet sustained-release 24 hour 24 hr tablet    7/17/2019 at Unknown time   • PROAIR  (90 BASE) MCG/ACT inhaler 2 puffs Every 4 (Four) Hours As Needed.   7/17/2019 at Unknown time   • simvastatin (ZOCOR) 40 MG tablet Take 40 mg by mouth every night.   7/17/2019 at Unknown time   • Vitamin D, Cholecalciferol, (CHOLECALCIFEROL) 400 units tablet Take 400 Units by mouth Daily.   7/17/2019 at Unknown time   • fluticasone-salmeterol (ADVAIR) 250-50 MCG/DOSE DISKUS Inhale 1 puff Every Evening. Advair Diskus 250-50 MCG/DOSE Inhalation Aerosol Powder Breath Activated; Patient Sig: Advair Diskus 250-50 MCG/DOSE Inhalation Aerosol Powder Breath Activated INHALE 1 PUFF BID; 60; 0; 15-Oct-2012; Active   Taking   • glucose blood (FREESTYLE LITE) test strip 1 each by Other route See Admin Instructions. Use 1 to 2 times daily as instructed   Taking       Allergies:  Allergies   Allergen Reactions   • Other Mental Status Change and Hallucinations     Oxy drugs   • Oxycodone Mental Status Change     Immunizations:    There is no immunization history on file for this patient.  Social History:   Social History     Social History Narrative   • Not on file     Social History     Socioeconomic History   • Marital status:      Spouse name: Not on file   • Number of children: Not on file   • Years of education: Not on file   • Highest education level: Not on file   Tobacco Use   • Smoking status: Former Smoker     Packs/day: 2.00  "    Years: 5.00     Pack years: 10.00     Types: Cigarettes     Start date: 1959     Last attempt to quit: 3/9/1962     Years since quittin.3   • Smokeless tobacco: Never Used   • Tobacco comment: Quit cold turkey   Substance and Sexual Activity   • Alcohol use: Yes     Alcohol/week: 1.2 oz     Types: 1 Cans of beer, 1 Shots of liquor per week     Comment: OCC   • Drug use: No   • Sexual activity: Yes     Partners: Female     Birth control/protection: Surgical       Family History:  Family History   Problem Relation Age of Onset   • Heart disease Mother    • Hypertension Mother    • Stroke Mother    • Diverticulitis Mother    • Heart disease Father    • Hypertension Father         Review of Systems  See history of present illness and past medical history.  Patient denies recent fever chills night sweats.  Denies nausea vomiting.  Denies any focal changes to his vision smell taste or sound syncope or any focal loss of function.  Denies any problems with chest pain palpitations cough shortness of breath.  Admits to abdominal pain greatest in his right lower quadrant.  Admits to some loose stools but denies any blood in his stools or problems with nosebleeds.  Denies missing any routine medications. Remainder of ROS is negative.    Objective:  tMax 24 hrs: Temp (24hrs), Av.4 °F (36.3 °C), Min:97.4 °F (36.3 °C), Max:97.4 °F (36.3 °C)    Vitals Ranges:   Temp:  [97.4 °F (36.3 °C)] 97.4 °F (36.3 °C)  Heart Rate:  [76-85] 78  Resp:  [16] 16  BP: (143-165)/(83-91) 165/91      Exam:  /91 (Patient Position: Sitting)   Pulse 78   Temp 97.4 °F (36.3 °C) (Tympanic)   Resp 16   Ht 177.8 cm (70\")   SpO2 99%   BMI 28.66 kg/m²     General Appearance:    Alert, cooperative, no distress, Aox3, not toxic appearing, I was surprised to see that this patient was able to lay in bed with his arms behind his head and comfortably converse with me and he has not received IV pain medication to this point   Head:    " Normocephalic, without obvious abnormality, atraumatic   Eyes:    PERRL, conjunctiva/corneas clear, EOM's intact, both eyes   Ears:    Normal external ear canals, both ears   Nose:   Nares normal, septum midline, mucosa normal, no drainage    or sinus tenderness   Throat:   Lips, mucosa, and tongue normal   Neck:   Supple, no JVD       Lungs:     Clear to auscultation bilaterally, respirations unlabored   Chest Wall:    ZIO    Heart:    Regular rate and rhythm, S1 and S2 normal   Abdomen:     Soft, positive bowel sounds, he does have quite exquisite right lower quadrant tenderness palpation with rebound and mild guarding   Extremities:  Moving all, no cyanosis or edema   Pulses:   2+ and symmetric all extremities   Skin:   Skin color, texture, turgor normal       Neurologic:   CNII-XII intact, normal strength, fluent speech, no focal deficits      .    Data Review:  Labs in chart were reviewed.             Imaging Results (all)     Procedure Component Value Units Date/Time    CT Abdomen Pelvis With Contrast [[id number]] Collected:  07/18/19 1305     Updated:  07/18/19 1305    Narrative:       CT OF THE ABDOMEN AND PELVIS WITH CONTRAST 07/18/2019     HISTORY: Right-sided abdominal pain.     Spiral images were obtained from the lung bases to the symphysis pubis  after intravenous contrast. There appears to some oral contrast in the  colon.     There are granulomatous calcifications in the liver and spleen. Multiple  calcified gallstones are seen. No gallbladder wall thickening is seen.  The pancreas and adrenals appear unremarkable. Nonobstructing right  renal stone is seen. There are bilateral renal cysts.     The appendix is mild to moderately enlarged with surrounding  inflammatory changes.     No abscess or free air is seen. There is colonic diverticulosis. Urinary  bladder is unremarkable. There is some prostate gland calcification.       Impression:       1. Findings consistent with acute appendicitis.  2.  Cholelithiasis.  3. Renal cysts and nonobstructing right renal stone.  4. Colonic diverticulosis.     Radiation dose reduction techniques were utilized, including automated  exposure control and exposure modulation based on body size.                  Assessment:    Acute appendicitis with localized peritonitis, without perforation or abscess    Essential hypertension    Diabetes mellitus type 2, controlled (CMS/HCC)    Right lower quadrant abdominal pain    History of CVA (cerebrovascular accident)      Plan:    Acute appendicitis/RLQ abdominal pain-surgery taken to the OR emergently   -Need resumption of aspirin and Plavix once okay from surgical perspective   -Further Zosyn need per surgery   -Dietary advancement per surgery    CODE STATUS conditional DNR    Past history of CVA with recent TIA on aspirin and Plavix with ZIO Patch still in place   -Surgery aware -patient and family member at bedside increased risk for stroke as well as postoperative bleeding and they understand and accept these risk    DM2 -low-dose sliding scale every 6 until diet is advanced and then ultimately will resume metformin when stable    HTN -n.p.o. so we will have to hold for now and reinstate pending BP trends as well as surgical advancement of diet    SCDs for DVT prophylaxis    Further recommendations to follow his clinical course unfolds      Preston Pastrana MD  7/18/2019  2:41 PM

## 2019-07-18 NOTE — OP NOTE
Surgeon: Luis Carlos Rick Jr., M.D.    Assistant: None    Pre-Operative Diagnosis:     Acute appendicitis with localized peritonitis, without perforation or abscess, unspecified whether gangrene present [K35.30]    Post-Operative Diagnosis:    Post-Op Diagnosis Codes:     * Acute appendicitis with localized peritonitis, without perforation or abscess, unspecified whether gangrene present [K35.30]    Procedure Performed:     Laparoscopic appendectomy    Indications:     The patient is a pleasant 78-year-old male who presented to the emergency room with a 2-day history of abdominal pain that is much worse over the past 1 day.  CT scan of the abdomen pelvis shows acute appendicitis.  The patient presents for laparoscopic appendectomy.  The patient understands the indications, alternatives, risks, and benefits of the procedure and wishes to proceed.    Procedure:     The patient was taken to the operating room where he was placed in the supine position on the operating table.  Monitors were placed and he underwent general endotracheal anesthesia and was appropriately monitored throughout the case by the anesthesia personnel.  The abdomen was prepped and draped in the standard surgical fashion.  Each incision was infiltrated with half percent Marcaine with epinephrine prior to making incision.  A supraumbilical incision was made with a scalpel carried through the skin into the subcutaneous tissue.  The abdominal wall was then elevated with skin hooks and a Verees needle was placed through the incision into the abdomen without difficulty.  The abdomen was then insufflated with low pressures encountered initially.  Once fully insufflated, the Veress needle was removed and a 5 mm port was placed through the same incision, again with traction applied to the abdominal wall using skin hooks.  The laparoscope was then inserted to the port and the area of the Veress needle and port insertion was inspected with no injury present.   Attention was turned to the left lower quadrant.  A 5 mm port was placed in the left lower quadrant by making a skin incision with a scalpel carried through the skin into the subcutaneous tissue and inserting the port through the incision into the abdomen with direct visualization intra-abdominal using a laparoscope.  A 12 mm port was placed in the right upper quadrant the same fashion.  Attention was then turned to the right lower quadrant.  The appendix was in a posterior position and difficult to identify initially.  The patient was placed in a Trendelenburg position and the small bowel was reflected out of the way allowing visualization of the appendix.  It was densely adherent to the retroperitoneum but was elevated bluntly.  The mesoappendix was then divided with the harmonic scalpel.  The base of the appendix was divided with a KATERIN stapling device.  The appendix was then placed in an Endo Catch bag and removed from the right upper quadrant port site.  The appendiceal bed was then carefully inspected and noted to be hemostatic.  The abdomen was deflated and the ports are removed.  All skin incisions were closed with a 4-0 Monocryl in a subcuticular fashion followed by Mastisol and Steri-Strips.  The sponge, needle, and instrument count were correct at the end the case.  The patient tolerated the procedure well and was transferred to the recovery room in stable condition.    Estimated Blood Loss:      minimal    Specimens:       Order Name Source Comment Collection Info Order Time   TISSUE PATHOLOGY EXAM Large Intestine, Appendix  Collected By: Luis Carlos Rick Jr., MD 7/18/2019  4:24 PM       Luis Carlos Rick Jr., M.D.

## 2019-07-18 NOTE — ANESTHESIA PROCEDURE NOTES
Airway  Urgency: emergent    Date/Time: 7/18/2019 3:39 PM  Airway not difficult    General Information and Staff    Patient location during procedure: OR  Anesthesiologist: Frank Hernandez MD    Consent for Airway (if performed for an anesthetic, see related documentation for consents)  Patient identity confirmed: verbally with patient  Consent: Verbal consent obtained.  Consent given by: patient      Indications and Patient Condition  Indications for airway management: airway protection    Preoxygenated: yes  Mask difficulty assessment: 1 - vent by mask    Final Airway Details  Final airway type: endotracheal airway      Successful airway: ETT  Cuffed: yes   Successful intubation technique: direct laryngoscopy  Endotracheal tube insertion site: oral  Blade: Doug  Blade size: 3  ETT size (mm): 7.5  Cormack-Lehane Classification: grade I - full view of glottis  Placement verified by: chest auscultation and capnometry   Measured from: teeth  Number of attempts at approach: 1

## 2019-07-18 NOTE — ED PROVIDER NOTES
" EMERGENCY DEPARTMENT ENCOUNTER    CHIEF COMPLAINT  Chief Complaint: abdominal pain  History given by: patient, family  History limited by: none  Room Number: [facility] OR/MAIN OR  PMD: Adeline Onofre MD      HPI:  Pt is a 78 y.o. male with Hx of DM, who presents complaining of generalized abd pain, with associated \"watery\" diarrhea, for the past 3 days. The pain is currently improved. Last episode of diarrhea was yesterday. Confirms decreased appetite. Recent ill contact with family who had similar Sx. Denies fever, chills, nausea, vomiting. There are no other complaints. Pt is currently on 81 mg ASA and Plavix, but reports he has not taken his dose today.     PAST MEDICAL HISTORY  Active Ambulatory Problems     Diagnosis Date Noted   • Quadriceps weakness 04/08/2016   • ACL tear 04/08/2016   • Knee pain, right 04/08/2016   • S/P CABG x 3 04/18/2016   • Essential hypertension 04/18/2016   • Hyperlipemia 04/18/2016   • Hallux rigidus 07/11/2016   • Fracture, stress, metatarsal 07/11/2016   • Osteopenia determined by x-ray 07/11/2016   • Metatarsal stress fracture with nonunion 08/10/2016   • Left hip pain 08/26/2016   • Bursitis of left hip 08/26/2016   • Pulmonary embolism (CMS/Formerly [facility]) 10/12/2016   • DALILA (acute kidney injury) (CMS/Formerly [facility]) 10/12/2016   • Diabetes mellitus type 2, controlled (CMS/Formerly [facility]) 10/12/2016   • Ascending aorta dilatation (CMS/Formerly [facility]) 10/12/2016   • Pulmonary nodule, left 10/12/2016   • Right leg DVT (CMS/Formerly [facility]) 10/13/2016   • Acute renal failure (CMS/Formerly [facility]) 01/18/2017   • Hypotension 01/18/2017   • Dehydration 01/18/2017   • Hypercalcemia 01/18/2017   • Dysphagia 01/21/2017   • Syncope and collapse 02/24/2017   • Normal pressure hydrocephalus 11/09/2017   • Headache 11/15/2017   • Impaired mobility 11/15/2017   • Nausea & vomiting 11/15/2017   • Fall at home 11/15/2017   • Transient cerebral ischemia 11/20/2017   • PFO (patent foramen ovale) 11/21/2017   • Esophageal dysphagia 03/20/2018   • TIA (transient " ischemic attack) 06/23/2019     Resolved Ambulatory Problems     Diagnosis Date Noted   • Coronary artery disease involving native coronary artery of native heart without angina pectoris 04/10/2017   • Change in hearing 11/15/2017     Past Medical History:   Diagnosis Date   • Arthritis    • Asthma    • Brain abscess    • Bruise of face    • Cancer (CMS/HCC)    • Cardiac disease    • Coronary artery disease    • Diabetes mellitus (CMS/HCC)    • Gout several years ago   • Headache 11/15/2017   • Hearing aid worn    • History of transfusion    • Hydrocephaly    • Hyperlipemia    • Hypertension    • Joint pain    • Low back pain several years ago   • Orbit fracture (CMS/HCC)    • Pulmonary embolism (CMS/HCC)    • Sinus infection        PAST SURGICAL HISTORY  Past Surgical History:   Procedure Laterality Date   • BRAIN SURGERY      BRAIN ABCESS 30 YR AGO   • CARDIAC SURGERY     • COLONOSCOPY  2012    Ciciliano- normal per pt, no polyps    • CORONARY ARTERY BYPASS GRAFT     • ENDOSCOPY N/A 3/9/2017    Schatzki ring, dilated, LA Grade B esophagitis, HH, acquired duodenal stenosis   • ENDOSCOPY N/A 4/6/2018    candida, HH, torts, Schatzki ring, duodenal stenosis, dilatation perforemed, chronic inflammation   • FACIAL FRACTURE SURGERY      to repair 6 broken bones   • ORBITAL FRACTURE OPEN REDUCTION INTERNAL FIXATION Right 1/13/2017    Procedure: RT ORBIT FLOOR FRACTURE REPAIR RIGHT ZMC FRACTURE REPAIR, RIGHT NASAL BONE FRACTURE CLOSED REDUCTION;  Surgeon: Edil Lester MD;  Location: [facility] OR [facility];  Service:    • ORIF FOOT FRACTURE Right 9/9/2016    Procedure: RIGHT SECOND METATARSAL OPEN REDUCTION INTERNAL FIXATION WITh GRAFT FROM HEEL ;  Surgeon: Man Jenkins MD;  Location: [facility] OR [facility];  Service:    • SEPTOPLASTY     • SKIN CANCER EXCISION     • TONSILLECTOMY         FAMILY HISTORY  Family History   Problem Relation Age of Onset   • Heart disease Mother    • Hypertension Mother    • Stroke Mother   "  • Diverticulitis Mother    • Heart disease Father    • Hypertension Father        SOCIAL HISTORY  Social History     Socioeconomic History   • Marital status:      Spouse name: Not on file   • Number of children: Not on file   • Years of education: Not on file   • Highest education level: Not on file   Tobacco Use   • Smoking status: Former Smoker     Packs/day: 2.00     Years: 5.00     Pack years: 10.00     Types: Cigarettes     Start date: 1959     Last attempt to quit: 3/9/1962     Years since quittin.3   • Smokeless tobacco: Never Used   • Tobacco comment: Quit cold turkey   Substance and Sexual Activity   • Alcohol use: Yes     Alcohol/week: 1.2 oz     Types: 1 Cans of beer, 1 Shots of liquor per week     Comment: OCC   • Drug use: No   • Sexual activity: Yes     Partners: Female     Birth control/protection: Surgical       ALLERGIES  Other and Oxycodone    REVIEW OF SYSTEMS  Review of Systems   Constitutional: Positive for appetite change (decreased). Negative for activity change and fever.   HENT: Negative for congestion and sore throat.    Eyes: Negative.    Respiratory: Negative for cough and shortness of breath.    Cardiovascular: Negative for chest pain and leg swelling.   Gastrointestinal: Positive for abdominal pain (generalized) and diarrhea (\"watery\"). Negative for vomiting.   Endocrine: Negative.    Genitourinary: Negative for decreased urine volume and dysuria.   Musculoskeletal: Negative for neck pain.   Skin: Negative for rash and wound.   Allergic/Immunologic: Negative.    Neurological: Negative for weakness, numbness and headaches.   Hematological: Negative.    Psychiatric/Behavioral: Negative.    All other systems reviewed and are negative.      PHYSICAL EXAM  ED Triage Vitals [19 1019]   Temp Heart Rate Resp BP SpO2   97.4 °F (36.3 °C) 85 16 -- 99 %      Temp src Heart Rate Source Patient Position BP Location FiO2 (%)   Tympanic Monitor -- -- --       Physical Exam "   Constitutional: He is oriented to person, place, and time. No distress.   HENT:   Head: Normocephalic and atraumatic.   Mouth/Throat: Mucous membranes are dry.   Eyes: EOM are normal. Pupils are equal, round, and reactive to light.   Neck: Normal range of motion. Neck supple.   Cardiovascular: Normal rate, regular rhythm and normal heart sounds.   Pulmonary/Chest: Effort normal and breath sounds normal. No respiratory distress.   Abdominal: Soft. There is tenderness in the right upper quadrant and right lower quadrant. There is no rebound and no guarding.   Musculoskeletal: Normal range of motion. He exhibits no edema.   Neurological: He is alert and oriented to person, place, and time. He has normal sensation and normal strength.   Skin: Skin is warm and dry.   Psychiatric: Mood and affect normal.   Nursing note and vitals reviewed.      LAB RESULTS  Lab Results (last 24 hours)     Procedure Component Value Units Date/Time    CBC & Differential [[id number]] Collected:  07/18/19 1111    Specimen:  Blood Updated:  07/18/19 1126    Narrative:       The following orders were created for panel order CBC & Differential.  Procedure                               Abnormality         Status                     ---------                               -----------         ------                     CBC Auto Differential[[id number]]        Abnormal            Final result                 Please view results for these tests on the individual orders.    Comprehensive Metabolic Panel [[id number]]  (Abnormal) Collected:  07/18/19 1111    Specimen:  Blood Updated:  07/18/19 1143     Glucose 180 mg/dL      BUN 15 mg/dL      Creatinine 1.02 mg/dL      Sodium 132 mmol/L      Potassium 4.5 mmol/L      Chloride 95 mmol/L      CO2 23.1 mmol/L      Calcium 10.2 mg/dL      Total Protein 7.4 g/dL      Albumin 4.20 g/dL      ALT (SGPT) 15 U/L      AST (SGOT) 14 U/L      Alkaline Phosphatase 55 U/L      Total Bilirubin 1.2 mg/dL      eGFR Non   Amer 70 mL/min/1.73      Globulin 3.2 gm/dL      A/G Ratio 1.3 g/dL      BUN/Creatinine Ratio 14.7     Anion Gap 13.9 mmol/L     Narrative:       GFR Normal >60  Chronic Kidney Disease <60  Kidney Failure <15    Lipase [[id number]]  (Normal) Collected:  07/18/19 1111    Specimen:  Blood Updated:  07/18/19 1143     Lipase 13 U/L     CBC Auto Differential [[id number]]  (Abnormal) Collected:  07/18/19 1111    Specimen:  Blood Updated:  07/18/19 1126     WBC 14.98 10*3/mm3      RBC 4.47 10*6/mm3      Hemoglobin 14.0 g/dL      Hematocrit 42.2 %      MCV 94.4 fL      MCH 31.3 pg      MCHC 33.2 g/dL      RDW 13.6 %      RDW-SD 47.1 fl      MPV 10.0 fL      Platelets 190 10*3/mm3      Neutrophil % 78.2 %      Lymphocyte % 14.3 %      Monocyte % 6.5 %      Eosinophil % 0.3 %      Basophil % 0.3 %      Immature Grans % 0.4 %      Neutrophils, Absolute 11.71 10*3/mm3      Lymphocytes, Absolute 2.14 10*3/mm3      Monocytes, Absolute 0.98 10*3/mm3      Eosinophils, Absolute 0.05 10*3/mm3      Basophils, Absolute 0.04 10*3/mm3      Immature Grans, Absolute 0.06 10*3/mm3      nRBC 0.0 /100 WBC     Urinalysis With Microscopic If Indicated (No Culture) - Urine, Clean Catch [[id number]]  (Normal) Collected:  07/18/19 1113    Specimen:  Urine, Clean Catch Updated:  07/18/19 1132     Color, UA Yellow     Appearance, UA Clear     pH, UA <=5.0     Specific Gravity, UA 1.011     Glucose, UA Negative     Ketones, UA Negative     Bilirubin, UA Negative     Blood, UA Negative     Protein, UA Negative     Leuk Esterase, UA Negative     Nitrite, UA Negative     Urobilinogen, UA 0.2 E.U./dL    Narrative:       Urine microscopic not indicated.    POC Glucose Once [[id number]]  (Abnormal) Collected:  07/18/19 1500    Specimen:  Blood Updated:  07/18/19 1501     Glucose 157 mg/dL           I ordered the above labs and reviewed the results    RADIOLOGY  CT Abdomen Pelvis With Contrast   Preliminary Result   1. Findings consistent with acute  appendicitis.   2. Cholelithiasis.   3. Renal cysts and nonobstructing right renal stone.   4. Colonic diverticulosis.       Radiation dose reduction techniques were utilized, including automated   exposure control and exposure modulation based on body size.                   I ordered the above noted radiological studies. Interpreted by radiologist. Discussed with radiologist (Dr. Diego). Reviewed by me in PACS.       PROCEDURES  Procedures        PROGRESS AND CONSULTS     1104- Ordered CT abd, LR bolus, and labs.     1252- Placed call to Surgery. Pt to be kept NPO. Dose of Zosyn ordered.     1253- Rechecked pt. Pt is resting comfortably. Pt reports he pain is improved at this time. On re-exam he is  in the RLQ. Notified pt of his Ct abd which shows acute appendicitis. Discussed the plan to contact Surgery. Pt understands and agrees with the plan, all questions answered.    1311- Discussed pt with Dr. Rick (Surgeon). He states he will take the pt to the OR, but asks that medicine admit the pt.     1312- Placed call to [facility].     1338- Discussed pt with Dr. Pastrana ([facility]). He agrees to admit the pt.     MEDICAL DECISION MAKING  Results were reviewed/discussed with the patient and they were also made aware of online access. Pt also made aware that some labs, such as cultures, will not be resulted during ER visit and follow up with PMD is necessary.     MDM  Number of Diagnoses or Management Options  Acute appendicitis with localized peritonitis, without perforation or abscess, unspecified whether gangrene present:   Diagnosis management comments: Patient was found to have acute appendicitis.  There was no evidence of perforation or abscess.  White blood cell count was elevated.  Patient was given IV fluids and IV Zosyn.  Case was discussed with Dr. Rick and Dr. Pastrana, both of whom came to see the patient in the ER.  Patient will be taken to the OR for an appendectomy.       Amount and/or Complexity  of Data Reviewed  Clinical lab tests: ordered and reviewed (WBC 14.98)  Tests in the radiology section of CPT®: ordered and reviewed (CT abd- acute appendicitis)  Discussion of test results with the performing providers: yes (Dr. Diego (Radiologist))  Decide to obtain previous medical records or to obtain history from someone other than the patient: yes  Review and summarize past medical records: yes (Pt was admitted last month for acute CVA. He was placed on ASA and Plavix. Pt had an upper endoscopy done on 4/2018 which showed a hiatal hernia and a Schatzki ring which was dilated with some mild stenosis of the second portion of the duodenum. )  Discuss the patient with other providers: yes (Dr. Rick (Surgeon)   Dr. Pastrana ([facility]))  Independent visualization of images, tracings, or specimens: yes    Patient Progress  Patient progress: stable         DIAGNOSIS  Final diagnoses:   Acute appendicitis with localized peritonitis, without perforation or abscess, unspecified whether gangrene present       DISPOSITION  ADMISSION    Discussed treatment plan and reason for admission with pt/family and admitting physician.  Pt/family voiced understanding of the plan for admission for further testing/treatment as needed.       Latest Documented Vital Signs:  As of 3:40 PM  BP- 158/76 HR- 80 Temp- 98.3 °F (36.8 °C) (Oral) O2 sat- 97%    --  Documentation assistance provided by laura Rabago for Dr. Bhavik MD.  Information recorded by the scribrebecca was done at my direction and has been verified and validated by me.     Chato Rabago  07/18/19 9122       Jean Gutierres MD  07/18/19 0410

## 2019-07-18 NOTE — PROGRESS NOTES
Discharge Planning Assessment  [facility]     Patient Name: Eugenio Joe  MRN: [id number]  Today's Date: 7/18/2019    Admit Date: 7/18/2019    Discharge Needs Assessment     Row Name 07/18/19 2789       Living Environment    Lives With  spouse    Name(s) of Who Lives With Patient  Annette    Current Living Arrangements  home/apartment/condo    Duration at Residence  19 years    Primary Care Provided by  self    Provides Primary Care For  no one    Family Caregiver if Needed  child(cayla), adult    Family Caregiver Names  Jose- son; Annette- spouse    Quality of Family Relationships  helpful;involved;supportive    Able to Return to Prior Arrangements  yes       Resource/Environmental Concerns    Resource/Environmental Concerns  home accessibility    Home Accessibility Concerns  stairs to enter home pt states that he has no difficulty getting up or down the stairs in his home. However, they do have basement stairs. Pt did mention that he had a chair lift installed for his basement stairs    Transportation Concerns  car, none       Transition Planning    Patient/Family Anticipates Transition to  home with family    Patient/Family Anticipated Services at Transition  none    Transportation Anticipated  car, drives self pt still driving independently; but family can help for d/c transportation       Discharge Needs Assessment    Readmission Within the Last 30 Days  no previous admission in last 30 days    Concerns to be Addressed  no discharge needs identified;denies needs/concerns at this time    Equipment Currently Used at Home  other (see comments);walker, standard;walker, rolling;shower chair pt reports that he has 2 walkers, a shower chair, and a chair lift to his basement    Anticipated Changes Related to Illness  none    Equipment Needed After Discharge  none    Offered/Gave Vendor List  no pt denies additional needs at this        Discharge Plan     Row Name 07/18/19 8378       Plan    Plan  Pt plans to d/c to  his home where he shares he residence with his wife. Pt reports having 2 walkers (rolling and standard), as well as a shower chair and chair lift (to his basement) at home. Pt denies need for HH or increased level of care (such SNF) at this time. Pt's family member is currently at bedside and in agreement with d/c plan     Patient/Family in Agreement with Plan  yes    Plan Comments  Pt plans to d/c to his home where he shares he residence with his wife. Pt reports having 2 walkers (rolling and standard), as well as a shower chair and chair lift (to his basement) at home. Pt denies need for HH or increased level of care (such SNF) at this time. Pt's family member is currently at bedside and in agreement with d/c plan    Final Discharge Disposition Code  01 - home or self-care        Destination      No service coordination in this encounter.      Durable Medical Equipment      No service coordination in this encounter.      Dialysis/Infusion      No service coordination in this encounter.      Home Medical Care      No service coordination in this encounter.      Therapy      No service coordination in this encounter.      Community Resources      No service coordination in this encounter.          Demographic Summary     Row Name 07/18/19 5400       General Information    Admission Type  observation    Arrived From  home    Referral Source  emergency department    Reason for Consult  discharge planning    Preferred Language  English     Used During This Interaction  no    General Information Comments  Entered room and introduced self to pt/explained role. Verified information on facesheet is correct        Functional Status     Row Name 07/18/19 9968       Functional Status    Usual Activity Tolerance  good    Current Activity Tolerance  good       Functional Status, IADL    Medications  independent    Meal Preparation  independent    Housekeeping  assistive person family assists    Laundry  independent     Shopping  independent       Mental Status    General Appearance WDL  WDL       Mental Status Summary    Recent Changes in Mental Status/Cognitive Functioning  no changes       Employment/    Employment Status  retired        Psychosocial    No documentation.       Abuse/Neglect    No documentation.       Legal    No documentation.       Substance Abuse    No documentation.       Patient Forms    No documentation.           Becky Lorenz RN

## 2019-07-18 NOTE — ANESTHESIA PREPROCEDURE EVALUATION
Anesthesia Evaluation     no history of anesthetic complications:               Airway   Mallampati: II  Neck ROM: full  no difficulty expected  Dental - normal exam     Pulmonary - normal exam   (+) pulmonary embolism, asthma,   (-) COPD, sleep apnea, not a smoker    PE comment: nonlabored  Cardiovascular - normal exam    Rhythm: regular  Rate: normal    (+) hypertension, CAD, CABG (3v 5-6yrs ago), PVD (ascending aortic dilation), DVT, hyperlipidemia,   (-) valvular problems/murmurs, past MI, dysrhythmias, angina    ROS comment: PFO per chart--pt denies knowledge of this dx;  H/o PTE    Neuro/Psych  (+) TIA (3wks ago, speech difficulty x3hrs, no residual), CVA, headaches, syncope,     (-) seizures  GI/Hepatic/Renal/Endo    (+)   renal disease (DALILA-resolved), diabetes mellitus type 2 well controlled,   (-) GERD, liver disease, hypothyroidism, hyperthyroidism    ROS Comment: Acute appendicitis    Musculoskeletal     (+) arthralgias,   Abdominal    Substance History      OB/GYN          Other   (+) arthritis   history of cancer                    Anesthesia Plan    ASA 4 - emergent     general     intravenous induction   Anesthetic plan, all risks, benefits, and alternatives have been provided, discussed and informed consent has been obtained with: patient.

## 2019-07-19 LAB
DEPRECATED RDW RBC AUTO: 45.5 FL (ref 37–54)
ERYTHROCYTE [DISTWIDTH] IN BLOOD BY AUTOMATED COUNT: 13.2 % (ref 12.3–15.4)
GLUCOSE BLDC GLUCOMTR-MCNC: 157 MG/DL (ref 70–130)
GLUCOSE BLDC GLUCOMTR-MCNC: 169 MG/DL (ref 70–130)
GLUCOSE BLDC GLUCOMTR-MCNC: 208 MG/DL (ref 70–130)
GLUCOSE BLDC GLUCOMTR-MCNC: 253 MG/DL (ref 70–130)
HCT VFR BLD AUTO: 36.8 % (ref 37.5–51)
HGB BLD-MCNC: 12.1 G/DL (ref 13–17.7)
MCH RBC QN AUTO: 31.1 PG (ref 26.6–33)
MCHC RBC AUTO-ENTMCNC: 32.9 G/DL (ref 31.5–35.7)
MCV RBC AUTO: 94.6 FL (ref 79–97)
PLATELET # BLD AUTO: 186 10*3/MM3 (ref 140–450)
PMV BLD AUTO: 10.6 FL (ref 6–12)
RBC # BLD AUTO: 3.89 10*6/MM3 (ref 4.14–5.8)
WBC NRBC COR # BLD: 11.92 10*3/MM3 (ref 3.4–10.8)

## 2019-07-19 PROCEDURE — 85027 COMPLETE CBC AUTOMATED: CPT | Performed by: SURGERY

## 2019-07-19 PROCEDURE — A9270 NON-COVERED ITEM OR SERVICE: HCPCS | Performed by: HOSPITALIST

## 2019-07-19 PROCEDURE — 63710000001 ATORVASTATIN 20 MG TABLET: Performed by: HOSPITALIST

## 2019-07-19 PROCEDURE — G0378 HOSPITAL OBSERVATION PER HR: HCPCS

## 2019-07-19 PROCEDURE — 63710000001 ACETAMINOPHEN 325 MG TABLET: Performed by: HOSPITALIST

## 2019-07-19 PROCEDURE — 63710000001 ALLOPURINOL 300 MG TABLET: Performed by: HOSPITALIST

## 2019-07-19 PROCEDURE — 63710000001 ASPIRIN 81 MG TABLET DELAYED-RELEASE: Performed by: HOSPITALIST

## 2019-07-19 PROCEDURE — 82962 GLUCOSE BLOOD TEST: CPT

## 2019-07-19 PROCEDURE — A9270 NON-COVERED ITEM OR SERVICE: HCPCS | Performed by: SURGERY

## 2019-07-19 PROCEDURE — 63710000001 LISINOPRIL 20 MG TABLET: Performed by: HOSPITALIST

## 2019-07-19 PROCEDURE — 94799 UNLISTED PULMONARY SVC/PX: CPT

## 2019-07-19 PROCEDURE — 99024 POSTOP FOLLOW-UP VISIT: CPT | Performed by: SURGERY

## 2019-07-19 PROCEDURE — 63710000001 CLOPIDOGREL 75 MG TABLET: Performed by: SURGERY

## 2019-07-19 RX ADMIN — SODIUM CHLORIDE, PRESERVATIVE FREE 3 ML: 5 INJECTION INTRAVENOUS at 21:36

## 2019-07-19 RX ADMIN — FAMOTIDINE 20 MG: 10 INJECTION INTRAVENOUS at 21:40

## 2019-07-19 RX ADMIN — ATORVASTATIN CALCIUM 40 MG: 20 TABLET, FILM COATED ORAL at 21:32

## 2019-07-19 RX ADMIN — ALLOPURINOL 300 MG: 300 TABLET ORAL at 08:24

## 2019-07-19 RX ADMIN — ACETAMINOPHEN 650 MG: 325 TABLET, FILM COATED ORAL at 21:40

## 2019-07-19 RX ADMIN — LISINOPRIL 10 MG: 20 TABLET ORAL at 21:32

## 2019-07-19 RX ADMIN — SODIUM CHLORIDE, PRESERVATIVE FREE 3 ML: 5 INJECTION INTRAVENOUS at 08:19

## 2019-07-19 RX ADMIN — CLOPIDOGREL 75 MG: 75 TABLET, FILM COATED ORAL at 08:24

## 2019-07-19 RX ADMIN — ASPIRIN 81 MG: 81 TABLET, COATED ORAL at 08:24

## 2019-07-19 RX ADMIN — FAMOTIDINE 20 MG: 10 INJECTION INTRAVENOUS at 08:29

## 2019-07-19 RX ADMIN — ACETAMINOPHEN 650 MG: 325 TABLET, FILM COATED ORAL at 13:02

## 2019-07-19 RX ADMIN — LISINOPRIL 10 MG: 20 TABLET ORAL at 08:24

## 2019-07-19 RX ADMIN — BUDESONIDE AND FORMOTEROL FUMARATE DIHYDRATE 2 PUFF: 160; 4.5 AEROSOL RESPIRATORY (INHALATION) at 08:30

## 2019-07-19 RX ADMIN — ACETAMINOPHEN 650 MG: 325 TABLET, FILM COATED ORAL at 08:23

## 2019-07-19 RX ADMIN — ACETAMINOPHEN 650 MG: 325 TABLET, FILM COATED ORAL at 02:47

## 2019-07-19 RX ADMIN — BUDESONIDE AND FORMOTEROL FUMARATE DIHYDRATE 2 PUFF: 160; 4.5 AEROSOL RESPIRATORY (INHALATION) at 22:25

## 2019-07-19 RX ADMIN — ACETAMINOPHEN 650 MG: 325 TABLET, FILM COATED ORAL at 17:55

## 2019-07-19 NOTE — PLAN OF CARE
Problem: Patient Care Overview  Goal: Plan of Care Review  Outcome: Ongoing (interventions implemented as appropriate)   07/19/19 0804   Coping/Psychosocial   Plan of Care Reviewed With patient;spouse   Plan of Care Review   Progress improving   OTHER   Outcome Summary Pt did well overnight. Lap sites c/d/i. Tylenol given x2 for pain with good results. Voiding well, using urinal and wearing brief. Pt is incontinent at home as well. Will monitor.     Goal: Individualization and Mutuality  Outcome: Ongoing (interventions implemented as appropriate)      Problem: Fall Risk (Adult)  Goal: Identify Related Risk Factors and Signs and Symptoms  Outcome: Outcome(s) achieved Date Met: 07/19/19    Goal: Absence of Fall  Outcome: Ongoing (interventions implemented as appropriate)      Problem: Appendectomy (Adult)  Goal: Signs and Symptoms of Listed Potential Problems Will be Absent, Minimized or Managed (Appendectomy)  Outcome: Ongoing (interventions implemented as appropriate)    Goal: Anesthesia/Sedation Recovery  Outcome: Ongoing (interventions implemented as appropriate)      Problem: Skin Injury Risk (Adult)  Goal: Identify Related Risk Factors and Signs and Symptoms  Outcome: Outcome(s) achieved Date Met: 07/19/19    Goal: Skin Health and Integrity  Outcome: Ongoing (interventions implemented as appropriate)

## 2019-07-19 NOTE — PLAN OF CARE
Problem: Patient Care Overview  Goal: Plan of Care Review  Outcome: Ongoing (interventions implemented as appropriate)   07/19/19 3904   Coping/Psychosocial   Plan of Care Reviewed With patient;family   Plan of Care Review   Progress improving   OTHER   Outcome Summary Tylenol given x4 for pain with relief. Pt walked to bathroom several times and sat up in the chair, up with assist with walker. Lap sites remain IGLESIA and C/D/I. Will monitor. Family at d.

## 2019-07-19 NOTE — PROGRESS NOTES
"    DAILY PROGRESS NOTE  [facility]    Patient Identification:  Name: Eugenio Joe  Age: 78 y.o.  Sex: male  :  1940  MRN: [id number]         Primary Care Physician: Adeline Onofre MD    Subjective:  Interval History: As far postoperatively he is doing quite well.  He denies any nausea or vomiting.  He is not tolerated oral intake as he is getting ready to eat his breakfast.  Denies chest pain shortness of breath or altered mentation.  Discussed pain management as he wishes to stick with Tylenol because in the past oxycodone has made him altered in mentation.  He is also trying out hydrocodone and tramadol will be avoided secondary to past histories and I do not want to lower seizure threshold.    Objective: Family member x2 at bedside.    Scheduled Meds:  allopurinol 300 mg Oral Daily   aspirin 81 mg Oral Daily   atorvastatin 40 mg Oral Nightly   budesonide-formoterol 2 puff Inhalation BID - RT   clopidogrel 75 mg Oral Daily   famotidine 20 mg Intravenous Q12H   insulin lispro 0-7 Units Subcutaneous Q6H   lisinopril 10 mg Oral BID   sodium chloride 3 mL Intravenous Q12H     Continuous Infusions:  sodium chloride 100 mL/hr Last Rate: 100 mL/hr (19 0715)       Vital signs in last 24 hours:  Temp:  [97.2 °F (36.2 °C)-98.3 °F (36.8 °C)] 97.2 °F (36.2 °C)  Heart Rate:  [74-94] 77  Resp:  [16-20] 16  BP: (125-182)/(67-91) 125/67    Intake/Output:    Intake/Output Summary (Last 24 hours) at 2019 0843  Last data filed at 2019 0715  Gross per 24 hour   Intake 3081.67 ml   Output 350 ml   Net 2731.67 ml       Exam:  /67 (BP Location: Right arm, Patient Position: Lying)   Pulse 77   Temp 97.2 °F (36.2 °C) (Oral)   Resp 16   Ht 177.8 cm (70\")   Wt 85 kg (187 lb 6.4 oz)   SpO2 94%   BMI 26.89 kg/m²     General Appearance:    Alert, cooperative, no distress, AAOx3, another day he looks quite well clinically                         Throat:   Lips, tongue, gums normal; oral " mucosa pink and moist                           Neck:   No JVD                         Lungs:    Clear to auscultation bilaterally, respirations unlabored                          Heart:    Regular rate and rhythm, S1 and S2 normal                  Abdomen:     Soft, non-tender, bowel sounds hypoactive, laparoscopic scars clean and dry no drain is in place                 extremities:   No cyanosis or edema                          Data Review:  Labs in chart were reviewed.    Assessment:  Active Hospital Problems    Diagnosis  POA   • **Acute appendicitis with localized peritonitis, without perforation or abscess [K35.30]  Yes   • Right lower quadrant abdominal pain [R10.31]  Unknown   • History of CVA (cerebrovascular accident) [Z86.73]  Not Applicable   • Diabetes mellitus type 2, controlled (CMS/[facility]) [E11.9]  Yes   • Essential hypertension [I10]  Yes      Resolved Hospital Problems   No resolved problems to display.       Plan:    POD 1 laparoscopic appendectomy for localized acute appendicitis   -No further antibiotics utilized per surgery and they are okay with disposition from their standpoint if tolerates oral intake    Tylenol for abdominal pain with Norco trial for breakthrough as patient is sensitive to narcotic especially Oxy based.  Avoid tramadol to not decrease seizure threshold    DM2 -range diet no concentrated sweets and continue with sliding scale    HTN -stable without signs of postoperative hypotension.  Parameters written      Disposition -given his extensive past histories, I would prefer to watch this patient overnight and ensure that he can tolerate diet throughout the day as well as adequate pain control and Hgb monitoring with plans to discharge in a.m.     Preston Pastrana MD  7/19/2019  8:43 AM

## 2019-07-19 NOTE — PROGRESS NOTES
Chief Complaint:    S/P Laparoscopic appendectomy, POD 1    Subjective:    The patient is feeling well with only expected postop abdominal pain.  He has not had any nausea or vomiting.    Objective:    Temp:  [97.2 °F (36.2 °C)-98.3 °F (36.8 °C)] 97.2 °F (36.2 °C)  Heart Rate:  [74-94] 82  Resp:  [16-20] 18  BP: (125-182)/(67-91) 125/67    Physical Exam   Constitutional: He is cooperative. He does not appear ill. No distress.   Abdominal: Soft. There is generalized tenderness (Appropriate postop tenderness).   Incisions: Intact with no evidence of infection.   Neurological: He is alert.       Results:    Labs are pending.    Impression/Plan:    The patient is POD 1 from a laparoscopic appendectomy.  He is recovering well from a general surgical standpoint.  If his H/H is stable and he is able to tolerate a diet, he is ready for discharge to home from a surgical standpoint.    Luis Carlos Rick Jr., M.D.

## 2019-07-20 VITALS
DIASTOLIC BLOOD PRESSURE: 74 MMHG | SYSTOLIC BLOOD PRESSURE: 148 MMHG | TEMPERATURE: 97.4 F | OXYGEN SATURATION: 95 % | BODY MASS INDEX: 26.83 KG/M2 | WEIGHT: 187.4 LBS | HEART RATE: 79 BPM | RESPIRATION RATE: 20 BRPM | HEIGHT: 70 IN

## 2019-07-20 LAB
GLUCOSE BLDC GLUCOMTR-MCNC: 173 MG/DL (ref 70–130)
GLUCOSE BLDC GLUCOMTR-MCNC: 214 MG/DL (ref 70–130)

## 2019-07-20 PROCEDURE — A9270 NON-COVERED ITEM OR SERVICE: HCPCS | Performed by: HOSPITALIST

## 2019-07-20 PROCEDURE — A9270 NON-COVERED ITEM OR SERVICE: HCPCS | Performed by: SURGERY

## 2019-07-20 PROCEDURE — 94799 UNLISTED PULMONARY SVC/PX: CPT

## 2019-07-20 PROCEDURE — 63710000001 ASPIRIN 81 MG TABLET DELAYED-RELEASE: Performed by: HOSPITALIST

## 2019-07-20 PROCEDURE — G0378 HOSPITAL OBSERVATION PER HR: HCPCS

## 2019-07-20 PROCEDURE — 63710000001 CLOPIDOGREL 75 MG TABLET: Performed by: SURGERY

## 2019-07-20 PROCEDURE — 63710000001 LISINOPRIL 20 MG TABLET: Performed by: HOSPITALIST

## 2019-07-20 PROCEDURE — 63710000001 ALLOPURINOL 300 MG TABLET: Performed by: HOSPITALIST

## 2019-07-20 PROCEDURE — 97162 PT EVAL MOD COMPLEX 30 MIN: CPT | Performed by: PHYSICAL THERAPIST

## 2019-07-20 PROCEDURE — 82962 GLUCOSE BLOOD TEST: CPT

## 2019-07-20 PROCEDURE — 97116 GAIT TRAINING THERAPY: CPT | Performed by: PHYSICAL THERAPIST

## 2019-07-20 PROCEDURE — 63710000001 INSULIN LISPRO (HUMAN) PER 5 UNITS: Performed by: HOSPITALIST

## 2019-07-20 RX ORDER — ACETAMINOPHEN 325 MG/1
650 TABLET ORAL EVERY 4 HOURS PRN
Start: 2019-07-20 | End: 2021-02-08

## 2019-07-20 RX ORDER — FAMOTIDINE 20 MG/1
20 TABLET, FILM COATED ORAL
Status: DISCONTINUED | OUTPATIENT
Start: 2019-07-20 | End: 2019-07-20 | Stop reason: HOSPADM

## 2019-07-20 RX ADMIN — BUDESONIDE AND FORMOTEROL FUMARATE DIHYDRATE 2 PUFF: 160; 4.5 AEROSOL RESPIRATORY (INHALATION) at 10:12

## 2019-07-20 RX ADMIN — SODIUM CHLORIDE, PRESERVATIVE FREE 3 ML: 5 INJECTION INTRAVENOUS at 08:06

## 2019-07-20 RX ADMIN — FAMOTIDINE 20 MG: 10 INJECTION INTRAVENOUS at 08:01

## 2019-07-20 RX ADMIN — CLOPIDOGREL 75 MG: 75 TABLET, FILM COATED ORAL at 08:01

## 2019-07-20 RX ADMIN — INSULIN LISPRO 2 UNITS: 100 INJECTION, SOLUTION INTRAVENOUS; SUBCUTANEOUS at 08:01

## 2019-07-20 RX ADMIN — INSULIN LISPRO 3 UNITS: 100 INJECTION, SOLUTION INTRAVENOUS; SUBCUTANEOUS at 12:16

## 2019-07-20 RX ADMIN — LISINOPRIL 10 MG: 20 TABLET ORAL at 08:01

## 2019-07-20 RX ADMIN — ALLOPURINOL 300 MG: 300 TABLET ORAL at 08:01

## 2019-07-20 RX ADMIN — ASPIRIN 81 MG: 81 TABLET, COATED ORAL at 08:01

## 2019-07-20 NOTE — PLAN OF CARE
Problem: Patient Care Overview  Goal: Plan of Care Review   07/20/19 0135   Coping/Psychosocial   Plan of Care Reviewed With patient;spouse   Plan of Care Review   Progress improving   OTHER   Outcome Summary He ambulated 200 feet, level surface, using a front wheeled walker with CGA-1. He also ascended and descended 5 steps, rail on L when ascending, with CGA-1. He is a good candidate for home health PT to ensure safety and home and accelerate his return to community ambulation.

## 2019-07-20 NOTE — DISCHARGE PLACEMENT REQUEST
"Christopher Brown (78 y.o. Male)     Date of Birth Social Security Number Address Home Phone MRN    1940  [address] [place] [address] [phone] [id number]    [employer] Marital Status          [employer]        Admission Date Admission Type Admitting Provider Attending Provider Department, Room/Bed    7/18/19 Emergency Preston Pastrana MD Masden, Troy Andrew, MD [address], P489/1    Discharge Date Discharge Disposition Discharge Destination         Home or Self Care              Attending Provider:  Preston Pastrana MD    Allergies:  Other, Oxycodone    Isolation:  None   Infection:  None   Code Status:  No CPR    Ht:  177.8 cm (70\")   Wt:  85 kg (187 lb 6.4 oz)    Admission Cmt:  None   Principal Problem:  Acute appendicitis with localized peritonitis, without perforation or abscess [K35.30]                 Active Insurance as of 7/18/2019     Primary Coverage     Payor Plan Insurance Group Employer/Plan Group    MEDICARE MEDICARE A & B      Payor Plan Address Payor Plan Phone Number Payor Plan Fax Number Effective Dates    [address] [phone]  12/1/2004 - None Entered    [facility] [address]       Subscriber Name Subscriber Birth Date Member ID       CHRISTOPHER BROWN 1940 [id number]           Secondary Coverage     Payor Plan Insurance Group Employer/Plan Group    [employer] PLAN F     Payor Plan Address Payor Plan Phone Number Payor Plan Fax Number Effective Dates    [address] [phone]  6/1/2013 - None Entered    [facility] [id number]       Subscriber Name Subscriber Birth Date Member ID       CHRISTOPHER BROWN 1940 [id number]                 Emergency Contacts      (Rel.) Home Phone Work Phone Mobile Phone    Annette Brown (Spouse) [phone] -- [phone]    Jose Brown (Son) [phone] -- [phone]    Francisca Brown (Other) -- -- [phone]    "

## 2019-07-20 NOTE — PLAN OF CARE
Problem: Patient Care Overview  Goal: Plan of Care Review  Outcome: Ongoing (interventions implemented as appropriate)   07/20/19 0407   Coping/Psychosocial   Plan of Care Reviewed With patient   Plan of Care Review   Progress improving   OTHER   Outcome Summary pt recieved tylenol for pain x1. tolerated well. possibly discharged today, will continue to monitor and treat per MD     Goal: Individualization and Mutuality  Outcome: Ongoing (interventions implemented as appropriate)   07/20/19 0407   Individualization   Patient Specific Goals (Include Timeframe) to feel better and be discharged by 07/20/19   Patient Specific Interventions PRN pain meds, ambulation        Problem: Fall Risk (Adult)  Goal: Absence of Fall  Outcome: Ongoing (interventions implemented as appropriate)      Problem: Skin Injury Risk (Adult)  Goal: Skin Health and Integrity  Outcome: Ongoing (interventions implemented as appropriate)

## 2019-07-20 NOTE — THERAPY DISCHARGE NOTE
Acute Care - Physical Therapy Initial Eval/Discharge  [facility]     Patient Name: Eugenio Joe  : 1940  MRN: [id number]  Today's Date: 2019   Onset of Illness/Injury or Date of Surgery: 19  Date of Referral to PT: 19  Referring Physician: FEI Pastrana M.D,      Admit Date: 2019    Visit Dx:    ICD-10-CM ICD-9-CM   1. Acute appendicitis with localized peritonitis, without perforation or abscess, unspecified whether gangrene present K35.30 540.0   2. Impaired mobility Z74.09 799.89     Patient Active Problem List   Diagnosis   • Quadriceps weakness   • ACL tear   • Knee pain, right   • S/P CABG x 3   • Essential hypertension   • Hyperlipemia   • Hallux rigidus   • Fracture, stress, metatarsal   • Osteopenia determined by x-ray   • Metatarsal stress fracture with nonunion   • Left hip pain   • Bursitis of left hip   • Pulmonary embolism (CMS/HCC)   • DALILA (acute kidney injury) (CMS/HCC)   • Diabetes mellitus type 2, controlled (CMS/HCC)   • Ascending aorta dilatation (CMS/HCC)   • Pulmonary nodule, left   • Right leg DVT (CMS/HCC)   • Acute renal failure (CMS/HCC)   • Hypotension   • Dehydration   • Hypercalcemia   • Dysphagia   • Syncope and collapse   • Normal pressure hydrocephalus   • Headache   • Impaired mobility   • Nausea & vomiting   • Fall at home   • Transient cerebral ischemia   • PFO (patent foramen ovale)   • Esophageal dysphagia   • TIA (transient ischemic attack)   • Acute appendicitis with localized peritonitis, without perforation or abscess   • Right lower quadrant abdominal pain   • History of CVA (cerebrovascular accident)     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 SWEAT GLAND    • Cardiac disease    • Coronary artery disease     CABG    • Diabetes mellitus (CMS/HCC)    • Gout several years ago    medication  working   • Headache  11/15/2017   • Hearing aid worn     bilateral   • History of transfusion    • Hydrocephaly    • Hyperlipemia    • Hypertension    • Joint pain     or swelling   • Low back pain several years ago   • Orbit fracture (CMS/HCC)    • Pulmonary embolism (CMS/HCC)     OCT 2016   • Sinus infection      Past Surgical History:   Procedure Laterality Date   • BRAIN SURGERY      BRAIN ABCESS 30 YR AGO   • CARDIAC SURGERY     • COLONOSCOPY  2012    Ciciliano- normal per pt, no polyps    • CORONARY ARTERY BYPASS GRAFT     • ENDOSCOPY N/A 3/9/2017    Schatzki ring, dilated, LA Grade B esophagitis, HH, acquired duodenal stenosis   • ENDOSCOPY N/A 4/6/2018    candida, HH, torts, Schatzki ring, duodenal stenosis, dilatation perforemed, chronic inflammation   • FACIAL FRACTURE SURGERY      to repair 6 broken bones   • ORBITAL FRACTURE OPEN REDUCTION INTERNAL FIXATION Right 1/13/2017    Procedure: RT ORBIT FLOOR FRACTURE REPAIR RIGHT ZMC FRACTURE REPAIR, RIGHT NASAL BONE FRACTURE CLOSED REDUCTION;  Surgeon: Edil Lester MD;  [place] [facility] [place] [facility]  Service:    • ORIF FOOT FRACTURE Right 9/9/2016    Procedure: RIGHT SECOND METATARSAL OPEN REDUCTION INTERNAL FIXATION WITh GRAFT FROM HEEL ;  Surgeon: Man Jenkins MD;  [place] [facility] [place] [facility]  Service:    • SEPTOPLASTY     • SKIN CANCER EXCISION     • TONSILLECTOMY            PT ASSESSMENT (last 12 hours)      Physical Therapy Evaluation     Row Name 07/20/19 1400          PT Evaluation Time/Intention    Subjective Information  no complaints  -MP     Document Type  evaluation  -MP     Mode of Treatment  physical therapy  -MP     Total Evaluation Minutes, Physical Therapy  40  -MP     Patient Effort  excellent  -MP     Row Name 07/20/19 1400          General Information    Patient Profile Reviewed?  yes  -MP     Onset of Illness/Injury or Date of Surgery  07/18/19  -MP     Referring Physician  FEI Pastrana M.D,  -MP     Patient Observations  alert;cooperative;agree to  therapy  -MP     Patient/Family Observations  Eugenio was in bed.  His wife and daughter were present.  -MP     Prior Level of Function  independent:;community mobility  -MP     Equipment Currently Used at Home  cane, straight;walker, rolling  -MP     Row Name 07/20/19 1400          Relationship/Environment    Primary Source of Support/Comfort  spouse  -MP     Lives With  spouse  -MP     Row Name 07/20/19 1400          Resource/Environmental Concerns    Current Living Arrangements  home/apartment/condo  -MP     Row Name 07/20/19 1400          Living Environment    Living Arrangements  house  -MP     Home Accessibility  stairs to enter home  -MP     Row Name 07/20/19 1400          Home Main Entrance    Number of Stairs, Main Entrance  four  -MP     Stair Railings, Main Entrance  railings safe and in good condition  -MP     Row Name 07/20/19 1400          Cognitive Assessment/Intervention- PT/OT    Orientation Status (Cognition)  oriented x 4  -MP     Follows Commands (Cognition)  WNL  -MP     Row Name 07/20/19 1400          Bed Mobility Assessment/Treatment    Bed Mobility Assessment/Treatment  bed mobility (all) activities  -MP     [place] Level (Bed Mobility)  independent  -MP     Assistive Device (Bed Mobility)  bed rails  -MP     Row Name 07/20/19 1400          Transfer Assessment/Treatment    Transfer Assessment/Treatment  sit-stand transfer;stand-sit transfer  -MP     Maintains Weight-bearing Status (Transfers)  able to maintain  -MP     Sit-Stand [place] (Transfers)  supervision  -MP     Stand-Sit [place] (Transfers)  verbal cues  -MP     Row Name 07/20/19 1400          Sit-Stand Transfer    Assistive Device (Sit-Stand Transfers)  walker, front-wheeled  -MP     Row Name 07/20/19 1400          Stand-Sit Transfer    Assistive Device (Stand-Sit Transfers)  walker, front-wheeled  -MP     Row Name 07/20/19 1400          Gait/Stairs Assessment/Training    82216 - Gait Training Minutes   15  -MP      Assistive Device (Gait)  walker, front-wheeled  -MP     Distance in Feet (Gait)  200  -MP     Pattern (Gait)  step-through  -MP     Negotiation (Stairs)  number of steps  -MP     Handrail Location (Stairs)  left side (ascending)  -MP     Number of Steps (Stairs)  5  -MP     Ascending Technique (Stairs)  step-to-step  -MP     Descending Technique (Stairs)  step-to-step  -MP     Row Name 07/20/19 1400          General ROM    GENERAL ROM COMMENTS  All extremities were WFL  -MP     Row Name 07/20/19 1400          MMT (Manual Muscle Testing)    General MMT Comments  All extremities and  were 4+/5  -MP     Row Name             Wound 07/18/19 1617 Other (See comments) abdomen incision    Wound - Properties Group Date first assessed: 07/18/19  -MB Time first assessed: 1617  -MB Side: Other (See comments)  -MB Location: abdomen  -MB Type: incision  -MB    Row Name 07/20/19 1400          Physical Therapy Clinical Impression    Date of Referral to PT  07/20/19  -MP     PT Diagnosis (PT Clinical Impression)  Difficulty waking, generalized weakness  -MP     Prognosis (PT Clinical Impression)  good  -MP     Functional Level at Time of Evaluation (PT Clinical Impression)  Requires contact guard for ambulation on stiars and level surface.  On level surface needed front wheeled walker.  -MP     Patient/Family Goals Statement (PT Clinical Impression)  Return home  -MP     Criteria for Skilled Interventions Met (PT Clinical Impression)  no He is ready for d/c to home, but will need home health PT  -MP     Rehab Potential (PT Clinical Summary)  good, to achieve stated therapy goals  -MP     Predicted Duration of Therapy (PT)  Good to go home.  -MP     Row Name 07/20/19 1400          Positioning and Restraints    Pre-Treatment Position  in bed  -MP     Post Treatment Position  chair  -MP     In Chair  notified nsg;sitting;call light within reach;encouraged to call for assist;with family/caregiver  -MP       User Key  (r) = Recorded  By, (t) = Taken By, (c) = Cosigned By    Initials Name Provider Type    Vickie Parker, RN Registered Nurse    Casa Trevino, PT Physical Therapist          Physical Therapy Education     Title: PT OT SLP Therapies (Done)     Topic: Physical Therapy (Done)     Point: Mobility training (Done)     Learning Progress Summary           Patient Eager, E, VU by  at 7/20/2019  2:41 PM   Family Eager, E, VU by  at 7/20/2019  2:41 PM                   Point: Home exercise program (Done)     Learning Progress Summary           Patient Eager, E, VU by  at 7/20/2019  2:41 PM   Family Eager, E, VU by  at 7/20/2019  2:41 PM                   Point: Body mechanics (Done)     Learning Progress Summary           Patient Eager, E, VU by  at 7/20/2019  2:41 PM   Family Eager, E, VU by  at 7/20/2019  2:41 PM                   Point: Precautions (Done)     Learning Progress Summary           Patient Eager, E, VU by  at 7/20/2019  2:41 PM   Family Eager, E, VU by  at 7/20/2019  2:41 PM                               User Key     Initials Effective Dates Name Provider Type Discipline     06/22/16 -  aCsa Dow, PT Physical Therapist PT                PT Recommendation and Plan  Anticipated Discharge Disposition (PT): home  Therapy Frequency (PT Clinical Impression): daily  Outcome Summary/Treatment Plan (PT)  Anticipated Discharge Disposition (PT): home  Plan of Care Reviewed With: patient, spouse  Progress: improving  Outcome Summary: He ambulated 200 feet, level surface, using a front wheeled walker with CGA-1.  He also ascended and descended 5 steps, rail on L when ascending, with CGA-1.  He is a good candidate for home health PT to ensure safety and home and accelerate his return to community ambulation.    Outcome Measures     Row Name 07/20/19 1400             How much help from another person do you currently need...    Turning from your back to your side while in flat bed without using bedrails?  4  -MP       Moving from lying on back to sitting on the side of a flat bed without bedrails?  4  -MP      Moving to and from a bed to a chair (including a wheelchair)?  3  -MP      Standing up from a chair using your arms (e.g., wheelchair, bedside chair)?  4  -MP      Climbing 3-5 steps with a railing?  3  -MP      To walk in hospital room?  3  -MP      AM-PAC 6 Clicks Score (PT)  21  -MP         Functional Assessment    Outcome Measure Options  AM-PAC 6 Clicks Basic Mobility (PT)  -MP        User Key  (r) = Recorded By, (t) = Taken By, (c) = Cosigned By    Initials Name Provider Type    Casa Trevino PT Physical Therapist           Time Calculation:   PT Charges     Row Name 07/20/19 1446 07/20/19 1400          Time Calculation    Start Time  1400  -MP  --     Stop Time  1440  -MP  --     Time Calculation (min)  40 min  -MP  --        Timed Charges    15336 - Gait Training Minutes   --  15  -MP       User Key  (r) = Recorded By, (t) = Taken By, (c) = Cosigned By    Initials Name Provider Type    Casa Trevino PT Physical Therapist        Therapy Charges for Today     Code Description Service Date Service Provider Modifiers Qty    34841072370 HC GAIT TRAINING EA 15 MIN 7/20/2019 Casa Dow, PT GP 1    47548867834 HC PT EVAL MOD COMPLEXITY 2 7/20/2019 Casa Dow, PT GP 1          PT G-Codes  Outcome Measure Options: AM-PAC 6 Clicks Basic Mobility (PT)  AM-PAC 6 Clicks Score (PT): 21    PT Discharge Summary  Anticipated Discharge Disposition (PT): home  Reason for Discharge: Discharge from facility  Outcomes Achieved: Discharge from facility occurred on same date as evluation  Discharge Destination: Home with home health    Casa Dow PT  7/20/2019

## 2019-07-20 NOTE — PROGRESS NOTES
Continued Stay Note  [facility]     Patient Name: Eugenio Joe  MRN: [id number]  Today's Date: 7/20/2019    Admit Date: 7/18/2019    Discharge Plan     Row Name 07/20/19 1527       Plan    Plan  Home with family and [facility].........[facility]     Patient/Family in Agreement with Plan  yes    Plan Comments  Call from EN[facility]EL[facility] Smith stating patient to DC home and will need HH. S/W Wife who states patient had HH 10 years ago with [facility] and they would like to use the same company again. Call to [facility] with referral and states they can accept. [facility] will follow at home. No further needs identified. Pt. to DC home today with family and [facility] to follow...........[facility]     Final Discharge Disposition Code  06 - home with home health care        Discharge Codes    No documentation.       Expected Discharge Date and Time     Expected Discharge Date Expected Discharge Time    Jul 20, 2019             Viri Waite, ENEDELIA

## 2019-07-20 NOTE — DISCHARGE SUMMARY
[facility]    Date of Discharge:  7/20/2019    PCP: Adeline Onofre MD    Discharge Diagnosis:   Active Hospital Problems    Diagnosis  POA   • **Acute appendicitis with localized peritonitis, without perforation or abscess [K35.30]  Yes   • Right lower quadrant abdominal pain [R10.31]  Unknown   • History of CVA (cerebrovascular accident) [Z86.73]  Not Applicable   • Diabetes mellitus type 2, controlled (CMS/HCC) [E11.9]  Yes   • Essential hypertension [I10]  Yes      Resolved Hospital Problems   No resolved problems to display.     Procedures Performed  Procedure(s):  APPENDECTOMY LAPAROSCOPIC     Consults     Date and Time Order Name Status Description    7/18/2019 1311 [facility] (on-call MD unless specified) Details Completed     7/18/2019 1252 Surgery (on-call MD unless specified) Completed     6/23/2019 0602 Inpatient Neurology Consult Stroke Completed     6/22/2019 2344 [facility] (on-call MD unless specified) Completed         Hospital Course  Please see history and physical for details. Patient is a 78 y.o. male who was initially admitted by myself and was an absolute pleasure to take care of on this admission.  He came in with the complaints of abdominal pain and CT showed aspects of acute appendicitis.  Patient came to us on ASA/Plavix and ultimately this was an emergent procedure so these medications were not held.  Patient has not had any postoperative complications in regards to bleeding.  He has tolerated advancement of diet.  Surgery was okay with discharge as of yesterday but I did keep him an additional night to ensure there are no aspects of pain as this patient does not like to take narcotics and I applaud him for this.  He is able to maintain his pain control with oral Tylenol.  He has tolerated advancement to a regular diet but is not passing gas or having any bowel movements just yet.  Clinically he looks fantastic and his vital signs are stable as  well as afebrile.  His family is very involved and I discussed the case with him on a daily basis and all involved are comfortable with disposition.  I asked that physical therapy evaluate this patient since he is walker dependent to ensure that he is safe at home but I do not anticipate any need for additional home health need.  All questions answered to the patient and family and everyone involved is amenable to the above plan for disposition today.  Further activity levels as well as wound care will be per surgical recommendations and patient can follow-up with PCP as needed.       Addendum -notified by RN that after physical therapy evaluated they suggested home health to patient and family.  They were previously improved with his progress over the last 24 hours and I reassured them that that will further improve but also went ahead and ordered home health for physical therapy    Condition on Discharge: Improved.     Temp:  [97.1 °F (36.2 °C)-99.1 °F (37.3 °C)] 97.4 °F (36.3 °C)  Heart Rate:  [77-98] 83  Resp:  [16-20] 20  BP: (131-179)/(70-85) 166/78  Body mass index is 26.89 kg/m².    Physical Exam   Constitutional: He is oriented to person, place, and time. He appears well-developed and well-nourished.   HENT:   Head: Normocephalic.   Eyes: Conjunctivae are normal.   Neck: No JVD present.   Cardiovascular: Normal rate and regular rhythm.   Pulmonary/Chest: Effort normal and breath sounds normal.   Abdominal: Soft. Bowel sounds are normal. He exhibits no distension. There is no rebound and no guarding.   Laparoscopic surgical scars are clean and dry.  No significant tenderness to palpation noted other than normal postoperative excepted amounts.   Musculoskeletal: He exhibits no edema.   Neurological: He is alert and oriented to person, place, and time.        Discharge Medications      New Medications      Instructions Start Date   acetaminophen 325 MG tablet  Commonly known as:  TYLENOL   650 mg, Oral, Every  4 Hours PRN         Changes to Medications      Instructions Start Date   lisinopril 40 MG tablet  Commonly known as:  PRINIVIL,ZESTRIL  What changed:    · how much to take  · when to take this   40 mg, Oral, Daily         Continue These Medications      Instructions Start Date   allopurinol 300 MG tablet  Commonly known as:  ZYLOPRIM   300 mg, Oral, Daily      aspirin 81 MG EC tablet   81 mg, Oral, Daily      clopidogrel 75 MG tablet  Commonly known as:  PLAVIX   75 mg, Oral, Daily      fluticasone-salmeterol 250-50 MCG/DOSE DISKUS  Commonly known as:  ADVAIR   1 puff, Inhalation, Every Evening, Advair Diskus 250-50 MCG/DOSE Inhalation Aerosol Powder Breath Activated; Patient Sig: Advair Diskus 250-50 MCG/DOSE Inhalation Aerosol Powder Breath Activated INHALE 1 PUFF BID; 60; 0; 15-Oct-2012; Active      FREESTYLE LITE test strip  Generic drug:  glucose blood   1 each, Other, See Admin Instructions, Use 1 to 2 times daily as instructed       metFORMIN 500 MG tablet  Commonly known as:  GLUCOPHAGE   500 mg, Oral, 2 Times Daily With Meals      MULTIVITAMIN ADULT PO   1 tablet, Oral, Daily      MYRBETRIQ 25 MG tablet sustained-release 24 hour 24 hr tablet  Generic drug:  Mirabegron ER   30 mg      PROAIR  (90 Base) MCG/ACT inhaler  Generic drug:  albuterol sulfate HFA   2 puffs, Every 4 Hours PRN      raNITIdine 150 MG tablet  Commonly known as:  ZANTAC   150 mg, Oral, 2 Times Daily      simvastatin 40 MG tablet  Commonly known as:  ZOCOR   40 mg, Oral, Nightly      Vitamin D (Cholecalciferol) 400 units tablet  Commonly known as:  CHOLECALCIFEROL   400 Units, Oral, Daily              Additional Instructions for the Follow-ups that You Need to Schedule     Discharge Follow-up with PCP   As directed       Currently Documented PCP:    Adeline Onofre MD    PCP Phone Number:    [phone]     Follow Up Details:  PCP as needed -general surgery per the recommendations           Follow-up Information     Prabhjot  Luis Carlos Pathak MD. Schedule an appointment as soon as possible for a visit in 2 week(s).    Specialty:  General Surgery  Contact information:  [address] [facility] [address]  [place] [id number]  [phone]             Adeline Onofre MD .    Specialty:  Internal Medicine  Why:  PCP as needed -general surgery per the recommendations  Contact information:  [address]  [facility] [address]  [place] [id number]  [phone]                 Test Results Pending at Discharge   Order Current Status    Tissue Pathology Exam In process         Preston Pastrana MD  07/20/19  1:44 PM    Discharge time spent greater than 30 minutes.

## 2019-07-22 LAB
CYTO UR: NORMAL
LAB AP CASE REPORT: NORMAL
PATH REPORT.FINAL DX SPEC: NORMAL
PATH REPORT.GROSS SPEC: NORMAL

## 2019-07-22 NOTE — PROGRESS NOTES
Case Management Discharge Note    Final Note: Discharged to home with [facility] health on 7/20/19. NEFTALY Berumen RN, CCP.     Destination      No service has been selected for the patient.      Durable Medical Equipment      No service has been selected for the patient.      Dialysis/Infusion      No service has been selected for the patient.      Home Medical Care - Selection Complete      Service Provider Request Status Selected Services Address Phone Number Fax Number    [facility] Selected Home Health Services [address] [place] [address] [phone]      Therapy      No service has been selected for the patient.      Community Resources      No service has been selected for the patient.             Final Discharge Disposition Code: 06 - home with home health care

## 2019-07-22 NOTE — PROGRESS NOTES
Discharge Planning Assessment  [facility]     Patient Name: Eugenio Joe  MRN: [id number]  Today's Date: 7/22/2019    Admit Date: 7/18/2019    Discharge Needs Assessment    No documentation.       Discharge Plan     Row Name 07/22/19 1547       Plan    Final Discharge Disposition Code  06 - home with home health care    Final Note  Discharged to home with [facility] home health on 7/20/19. NEFTALY Berumen RN, CCP.         Destination      No service coordination in this encounter.      Durable Medical Equipment      No service coordination in this encounter.      Dialysis/Infusion      No service coordination in this encounter.      Home Medical Care - Selection Complete      Service Provider Request Status Selected Services Address Phone Number Fax Number    [facility] Selected Home Health Services [address] [place] [address] [phone]      Therapy      No service coordination in this encounter.      Community Resources      No service coordination in this encounter.        Expected Discharge Date and Time     Expected Discharge Date Expected Discharge Time    Jul 20, 2019         Demographic Summary    No documentation.       Functional Status    No documentation.       Psychosocial    No documentation.       Abuse/Neglect    No documentation.       Legal    No documentation.       Substance Abuse    No documentation.       Patient Forms    No documentation.           Yadira Berumen RN

## 2019-07-31 PROCEDURE — 0298T HOLTER MONITOR - 72 HOUR UP TO 21 DAY: CPT | Performed by: INTERNAL MEDICINE

## 2019-08-01 ENCOUNTER — OFFICE VISIT (OUTPATIENT)
Dept: SURGERY | Facility: CLINIC | Age: 79
End: 2019-08-01

## 2019-08-01 VITALS — HEART RATE: 76 BPM | OXYGEN SATURATION: 98 %

## 2019-08-01 DIAGNOSIS — Z48.89 POSTOPERATIVE VISIT: Primary | ICD-10-CM

## 2019-08-01 PROCEDURE — 99024 POSTOP FOLLOW-UP VISIT: CPT | Performed by: SURGERY

## 2019-08-06 NOTE — PROGRESS NOTES
Subjective   Eugenio Joe is a 78 y.o. male who returns to the office after undergoing a laparoscopic appendectomy on 7/18/2019.     History of Present Illness     The patient is recovering well with no significant postop symptoms.  He is having no abdominal pain.  He has a good appetite and normal bowel function.  His energy level is good.      Review of Systems   Constitutional: Negative for activity change, appetite change, fatigue and fever.   Respiratory: Negative for chest tightness and shortness of breath.    Cardiovascular: Negative for chest pain and palpitations.   Gastrointestinal: Negative for abdominal pain, constipation, diarrhea and nausea.   Skin: Negative for rash and wound.   Psychiatric/Behavioral: Negative for agitation and confusion.       Objective   Physical Exam   Constitutional:  Non-toxic appearance. He does not appear ill. No distress.   Pulmonary/Chest: Effort normal. No respiratory distress.   Abdominal: Soft. Normal appearance. There is no tenderness.   Neurological: He is alert.   Skin:   Incision: intact with no evidence of infection.   Psychiatric: He has a normal mood and affect. His behavior is normal.       Assessment/Plan   The encounter diagnosis was Postoperative visit.    The patient is recovering well from his laparoscopic appendectomy.  At this point he has no limitations.  He will follow-up on an as-needed basis.

## 2019-08-15 ENCOUNTER — HOSPITAL ENCOUNTER (EMERGENCY)
Facility: HOSPITAL | Age: 79
Discharge: HOME | End: 2019-08-15
Attending: EMERGENCY MEDICINE | Admitting: EMERGENCY MEDICINE

## 2019-08-15 ENCOUNTER — APPOINTMENT (OUTPATIENT)
Dept: CT IMAGING | Facility: HOSPITAL | Age: 79
End: 2019-08-15

## 2019-08-15 VITALS
HEIGHT: 69 IN | HEART RATE: 84 BPM | OXYGEN SATURATION: 99 % | BODY MASS INDEX: 26.07 KG/M2 | WEIGHT: 176 LBS | DIASTOLIC BLOOD PRESSURE: 76 MMHG | SYSTOLIC BLOOD PRESSURE: 155 MMHG | TEMPERATURE: 97.8 F | RESPIRATION RATE: 18 BRPM

## 2019-08-15 DIAGNOSIS — R50.9 FEVER IN ADULT: Primary | ICD-10-CM

## 2019-08-15 DIAGNOSIS — M25.551 RIGHT HIP PAIN: ICD-10-CM

## 2019-08-15 DIAGNOSIS — N39.0 COMPLICATED UTI (URINARY TRACT INFECTION): ICD-10-CM

## 2019-08-15 LAB
ALBUMIN SERPL-MCNC: 4 G/DL (ref 3.5–5.2)
ALBUMIN/GLOB SERPL: 1.3 G/DL
ALP SERPL-CCNC: 63 U/L (ref 39–117)
ALT SERPL W P-5'-P-CCNC: 12 U/L (ref 1–41)
ANION GAP SERPL CALCULATED.3IONS-SCNC: 13.4 MMOL/L (ref 5–15)
AST SERPL-CCNC: 14 U/L (ref 1–40)
BACTERIA UR QL AUTO: ABNORMAL /HPF
BASOPHILS # BLD AUTO: 0.04 10*3/MM3 (ref 0–0.2)
BASOPHILS NFR BLD AUTO: 0.3 % (ref 0–1.5)
BILIRUB SERPL-MCNC: 0.5 MG/DL (ref 0.2–1.2)
BILIRUB UR QL STRIP: NEGATIVE
BUN BLD-MCNC: 17 MG/DL (ref 8–23)
BUN/CREAT SERPL: 13.7 (ref 7–25)
CALCIUM SPEC-SCNC: 9.7 MG/DL (ref 8.6–10.5)
CHLORIDE SERPL-SCNC: 101 MMOL/L (ref 98–107)
CLARITY UR: CLEAR
CO2 SERPL-SCNC: 22.6 MMOL/L (ref 22–29)
COLOR UR: YELLOW
CREAT BLD-MCNC: 1.24 MG/DL (ref 0.76–1.27)
D-LACTATE SERPL-SCNC: 1.6 MMOL/L (ref 0.5–2)
DEPRECATED RDW RBC AUTO: 50 FL (ref 37–54)
EOSINOPHIL # BLD AUTO: 0.16 10*3/MM3 (ref 0–0.4)
EOSINOPHIL NFR BLD AUTO: 1.3 % (ref 0.3–6.2)
ERYTHROCYTE [DISTWIDTH] IN BLOOD BY AUTOMATED COUNT: 13.9 % (ref 12.3–15.4)
GFR SERPL CREATININE-BSD FRML MDRD: 56 ML/MIN/1.73
GLOBULIN UR ELPH-MCNC: 3.1 GM/DL
GLUCOSE BLD-MCNC: 180 MG/DL (ref 65–99)
GLUCOSE UR STRIP-MCNC: ABNORMAL MG/DL
HCT VFR BLD AUTO: 38.2 % (ref 37.5–51)
HGB BLD-MCNC: 12.1 G/DL (ref 13–17.7)
HGB UR QL STRIP.AUTO: ABNORMAL
HOLD SPECIMEN: NORMAL
HOLD SPECIMEN: NORMAL
HYALINE CASTS UR QL AUTO: ABNORMAL /LPF
IMM GRANULOCYTES # BLD AUTO: 0.13 10*3/MM3 (ref 0–0.05)
IMM GRANULOCYTES NFR BLD AUTO: 1 % (ref 0–0.5)
KETONES UR QL STRIP: NEGATIVE
LEUKOCYTE ESTERASE UR QL STRIP.AUTO: ABNORMAL
LYMPHOCYTES # BLD AUTO: 1.36 10*3/MM3 (ref 0.7–3.1)
LYMPHOCYTES NFR BLD AUTO: 10.7 % (ref 19.6–45.3)
MCH RBC QN AUTO: 30.9 PG (ref 26.6–33)
MCHC RBC AUTO-ENTMCNC: 31.7 G/DL (ref 31.5–35.7)
MCV RBC AUTO: 97.7 FL (ref 79–97)
MONOCYTES # BLD AUTO: 0.77 10*3/MM3 (ref 0.1–0.9)
MONOCYTES NFR BLD AUTO: 6 % (ref 5–12)
NEUTROPHILS # BLD AUTO: 10.27 10*3/MM3 (ref 1.7–7)
NEUTROPHILS NFR BLD AUTO: 80.7 % (ref 42.7–76)
NITRITE UR QL STRIP: POSITIVE
NRBC BLD AUTO-RTO: 0 /100 WBC (ref 0–0.2)
PH UR STRIP.AUTO: <=5 [PH] (ref 5–8)
PLATELET # BLD AUTO: 161 10*3/MM3 (ref 140–450)
PMV BLD AUTO: 10.4 FL (ref 6–12)
POTASSIUM BLD-SCNC: 4.3 MMOL/L (ref 3.5–5.2)
PROCALCITONIN SERPL-MCNC: 0.5 NG/ML (ref 0.1–0.25)
PROT SERPL-MCNC: 7.1 G/DL (ref 6–8.5)
PROT UR QL STRIP: ABNORMAL
RBC # BLD AUTO: 3.91 10*6/MM3 (ref 4.14–5.8)
RBC # UR: ABNORMAL /HPF
REF LAB TEST METHOD: ABNORMAL
SODIUM BLD-SCNC: 137 MMOL/L (ref 136–145)
SP GR UR STRIP: 1.02 (ref 1–1.03)
SQUAMOUS #/AREA URNS HPF: ABNORMAL /HPF
UROBILINOGEN UR QL STRIP: ABNORMAL
WBC NRBC COR # BLD: 12.73 10*3/MM3 (ref 3.4–10.8)
WBC UR QL AUTO: ABNORMAL /HPF
WHOLE BLOOD HOLD SPECIMEN: NORMAL
WHOLE BLOOD HOLD SPECIMEN: NORMAL

## 2019-08-15 PROCEDURE — 81001 URINALYSIS AUTO W/SCOPE: CPT | Performed by: NURSE PRACTITIONER

## 2019-08-15 PROCEDURE — 80053 COMPREHEN METABOLIC PANEL: CPT | Performed by: NURSE PRACTITIONER

## 2019-08-15 PROCEDURE — 99284 EMERGENCY DEPT VISIT MOD MDM: CPT

## 2019-08-15 PROCEDURE — 25010000002 CEFTRIAXONE PER 250 MG: Performed by: NURSE PRACTITIONER

## 2019-08-15 PROCEDURE — 87040 BLOOD CULTURE FOR BACTERIA: CPT | Performed by: NURSE PRACTITIONER

## 2019-08-15 PROCEDURE — 74176 CT ABD & PELVIS W/O CONTRAST: CPT

## 2019-08-15 PROCEDURE — 96365 THER/PROPH/DIAG IV INF INIT: CPT

## 2019-08-15 PROCEDURE — 85025 COMPLETE CBC W/AUTO DIFF WBC: CPT | Performed by: NURSE PRACTITIONER

## 2019-08-15 PROCEDURE — 83605 ASSAY OF LACTIC ACID: CPT | Performed by: NURSE PRACTITIONER

## 2019-08-15 PROCEDURE — 84145 PROCALCITONIN (PCT): CPT | Performed by: NURSE PRACTITIONER

## 2019-08-15 RX ORDER — CEPHALEXIN 500 MG/1
500 CAPSULE ORAL 2 TIMES DAILY
Qty: 14 CAPSULE | Refills: 0 | Status: SHIPPED | OUTPATIENT
Start: 2019-08-15 | End: 2019-08-22

## 2019-08-15 RX ORDER — ACETAMINOPHEN 500 MG
1000 TABLET ORAL ONCE
Status: COMPLETED | OUTPATIENT
Start: 2019-08-15 | End: 2019-08-15

## 2019-08-15 RX ORDER — SODIUM CHLORIDE 0.9 % (FLUSH) 0.9 %
10 SYRINGE (ML) INJECTION AS NEEDED
Status: DISCONTINUED | OUTPATIENT
Start: 2019-08-15 | End: 2019-08-15 | Stop reason: HOSPADM

## 2019-08-15 RX ORDER — CEFTRIAXONE SODIUM 1 G/50ML
1 INJECTION, SOLUTION INTRAVENOUS ONCE
Status: COMPLETED | OUTPATIENT
Start: 2019-08-15 | End: 2019-08-15

## 2019-08-15 RX ADMIN — ACETAMINOPHEN 1000 MG: 500 TABLET, FILM COATED ORAL at 12:47

## 2019-08-15 RX ADMIN — CEFTRIAXONE SODIUM 1 G: 1 INJECTION, SOLUTION INTRAVENOUS at 16:15

## 2019-08-15 NOTE — ED PROVIDER NOTES
EMERGENCY DEPARTMENT ENCOUNTER    CHIEF COMPLAINT  Chief Complaint: R hip pain  History given by: Patient and wife  History limited by: Nothing  Room Number: 39/39  PMD: Adeline Onofre MD      HPI:  Pt is a 78 y.o. male who presents complaining of R hip pain that began yesterday after a mechanical fall. Pt is also complaining of generalized weakness, pale pallor, abdominal pain, a sore on L buttock and fever (subjective 102). Pt denies sore throat, dysuria, cough, congestion, constipation, nausea, and vomiting. Pt reports he is unable to bear weight on the hip. Per wife, the abdominal pain and fever began 2 days ago and the fall happened yesterday after she noticed the pt was weak. Pt reports he had an appendectomy 1 month ago. 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 Pt denies currently being on a blood thinner but reports he was on Plavix from June-July because of a TIA.     Duration:  1 day  Onset: Sudden  Timing: Constant  Location: R hip  Radiation: None  Quality: Pain  Intensity/Severity: Moderate  Progression: Unchanged  Associated Symptoms: Abdominal pain, fever, generalized weakness, pale pallor, sore on buttock  Aggravating Factors: Bearing weight on R hip/leg  Alleviating Factors: None  Previous Episodes: None  Treatment before arrival: None    PAST MEDICAL HISTORY  Active Ambulatory Problems     Diagnosis Date Noted   • Quadriceps weakness 04/08/2016   • ACL tear 04/08/2016   • Knee pain, right 04/08/2016   • S/P CABG x 3 04/18/2016   • Essential hypertension 04/18/2016   • Hyperlipemia 04/18/2016   • Hallux rigidus 07/11/2016   • Fracture, stress, metatarsal 07/11/2016   • Osteopenia determined by x-ray 07/11/2016   • Metatarsal stress fracture with nonunion 08/10/2016   • Left hip pain 08/26/2016   • Bursitis of left hip 08/26/2016   • Pulmonary embolism (CMS/[facility]) 10/12/2016   • DALILA (acute kidney injury) (CMS/[facility]) 10/12/2016   • Diabetes mellitus type 2, controlled (CMS/[facility]) 10/12/2016    • Ascending aorta dilatation (CMS/HCC) 10/12/2016   • Pulmonary nodule, left 10/12/2016   • Right leg DVT (CMS/HCC) 10/13/2016   • Acute renal failure (CMS/HCC) 01/18/2017   • Hypotension 01/18/2017   • Dehydration 01/18/2017   • Hypercalcemia 01/18/2017   • Dysphagia 01/21/2017   • Syncope and collapse 02/24/2017   • Normal pressure hydrocephalus 11/09/2017   • Headache 11/15/2017   • Impaired mobility 11/15/2017   • Nausea & vomiting 11/15/2017   • Fall at home 11/15/2017   • Transient cerebral ischemia 11/20/2017   • PFO (patent foramen ovale) 11/21/2017   • Esophageal dysphagia 03/20/2018   • TIA (transient ischemic attack) 06/23/2019   • Acute appendicitis with localized peritonitis, without perforation or abscess 07/18/2019   • Right lower quadrant abdominal pain 07/18/2019   • History of CVA (cerebrovascular accident) 07/18/2019     Resolved Ambulatory Problems     Diagnosis Date Noted   • Coronary artery disease involving native coronary artery of native heart without angina pectoris 04/10/2017   • Change in hearing 11/15/2017     Past Medical History:   Diagnosis Date   • Arthritis    • Asthma    • Brain abscess    • Bruise of face    • Cancer (CMS/HCC)    • Cardiac disease    • Coronary artery disease    • Diabetes mellitus (CMS/HCC)    • Gout several years ago   • Headache 11/15/2017   • Hearing aid worn    • History of transfusion    • Hydrocephaly    • Hyperlipemia    • Hypertension    • Joint pain    • Low back pain several years ago   • Orbit fracture (CMS/HCC)    • Pulmonary embolism (CMS/HCC)    • Sinus infection        PAST SURGICAL HISTORY  Past Surgical History:   Procedure Laterality Date   • APPENDECTOMY N/A 7/18/2019    Procedure: APPENDECTOMY LAPAROSCOPIC;  Surgeon: Luis Carlos Rick Jr., MD;  Location: [facility] OR;  Service: General   • BRAIN SURGERY      BRAIN ABCESS 30 YR AGO   • CARDIAC SURGERY     • COLONOSCOPY  2012    Ciciliano- normal per pt, no polyps    • CORONARY ARTERY BYPASS  GRAFT     • ENDOSCOPY N/A 3/9/2017    Schatzki ring, dilated, LA Grade B esophagitis, HH, acquired duodenal stenosis   • ENDOSCOPY N/A 2018    candida, HH, torts, Schatzki ring, duodenal stenosis, dilatation perforemed, chronic inflammation   • FACIAL FRACTURE SURGERY      to repair 6 broken bones   • ORBITAL FRACTURE OPEN REDUCTION INTERNAL FIXATION Right 2017    Procedure: RT ORBIT FLOOR FRACTURE REPAIR RIGHT ZMC FRACTURE REPAIR, RIGHT NASAL BONE FRACTURE CLOSED REDUCTION;  Surgeon: Edil Lester MD;  Location: [facility] OR [facility];  Service:    • ORIF FOOT FRACTURE Right 2016    Procedure: RIGHT SECOND METATARSAL OPEN REDUCTION INTERNAL FIXATION WITh GRAFT FROM HEEL ;  Surgeon: Man Jenkins MD;  Location: [facility] OR [facility];  Service:    • SEPTOPLASTY     • SKIN CANCER EXCISION     • TONSILLECTOMY         FAMILY HISTORY  Family History   Problem Relation Age of Onset   • Heart disease Mother    • Hypertension Mother    • Stroke Mother    • Diverticulitis Mother    • Heart disease Father    • Hypertension Father        SOCIAL HISTORY  Social History     Socioeconomic History   • Marital status:      Spouse name: Not on file   • Number of children: Not on file   • Years of education: Not on file   • Highest education level: Not on file   Tobacco Use   • Smoking status: Former Smoker     Packs/day: 2.00     Years: 5.00     Pack years: 10.00     Types: Cigarettes     Start date: 1959     Last attempt to quit: 3/9/1962     Years since quittin.4   • Smokeless tobacco: Never Used   • Tobacco comment: Quit cold turkey   Substance and Sexual Activity   • Alcohol use: Yes     Alcohol/week: 1.2 oz     Types: 1 Cans of beer, 1 Shots of liquor per week     Comment: RARELY    • Drug use: No   • Sexual activity: Yes     Partners: Female     Birth control/protection: Surgical       ALLERGIES  Other and Oxycodone    REVIEW OF SYSTEMS  Review of Systems   Constitutional: Positive for fever  (subjective 102). Negative for activity change and appetite change.   HENT: Negative for congestion and sore throat.    Eyes: Negative.    Respiratory: Negative for cough and shortness of breath.    Cardiovascular: Negative for chest pain and leg swelling.   Gastrointestinal: Positive for abdominal pain. Negative for diarrhea and vomiting.   Endocrine: Negative.    Genitourinary: Negative for decreased urine volume and dysuria.   Musculoskeletal: Negative for neck pain.        R hip pain   Skin: Positive for pallor (pale). Negative for rash and wound.        Sore on L buttock   Allergic/Immunologic: Negative.    Neurological: Positive for weakness (generalized). Negative for numbness and headaches.   Hematological: Negative.    Psychiatric/Behavioral: Negative.    All other systems reviewed and are negative.      PHYSICAL EXAM  ED Triage Vitals [08/15/19 1131]   Temp Heart Rate Resp BP SpO2   97.8 °F (36.6 °C) 84 18 152/72 99 %      Temp src Heart Rate Source Patient Position BP Location FiO2 (%)   Oral -- -- -- --       Physical Exam   Constitutional: He is oriented to person, place, and time. No distress.   HENT:   Head: Normocephalic and atraumatic.   Eyes: EOM are normal. Pupils are equal, round, and reactive to light.   Neck: Normal range of motion. Neck supple.   Cardiovascular: Normal rate, regular rhythm and normal heart sounds.   Pulmonary/Chest: Effort normal and breath sounds normal. No respiratory distress.   Abdominal: Soft. There is no tenderness. There is no rebound and no guarding.   Musculoskeletal: Normal range of motion. He exhibits no edema.        Right hip: He exhibits tenderness (to R hip and pelvis).   Erythema and skin breakdown to coccyx, erythema is blanchable and there is no enderation   Neurological: He is alert and oriented to person, place, and time. He has normal sensation and normal strength.   Skin: Skin is warm and dry.   Psychiatric: Mood and affect normal.   Nursing note and  vitals reviewed.      LAB RESULTS  Lab Results (last 24 hours)     Procedure Component Value Units Date/Time    CBC & Differential [[id number]] Collected:  08/15/19 1300    Specimen:  Blood Updated:  08/15/19 1314    Narrative:       The following orders were created for panel order CBC & Differential.  Procedure                               Abnormality         Status                     ---------                               -----------         ------                     CBC Auto Differential[[id number]]        Abnormal            Final result                 Please view results for these tests on the individual orders.    Comprehensive Metabolic Panel [[id number]]  (Abnormal) Collected:  08/15/19 1300    Specimen:  Blood Updated:  08/15/19 1341     Glucose 180 mg/dL      BUN 17 mg/dL      Creatinine 1.24 mg/dL      Sodium 137 mmol/L      Potassium 4.3 mmol/L      Chloride 101 mmol/L      CO2 22.6 mmol/L      Calcium 9.7 mg/dL      Total Protein 7.1 g/dL      Albumin 4.00 g/dL      ALT (SGPT) 12 U/L      AST (SGOT) 14 U/L      Alkaline Phosphatase 63 U/L      Total Bilirubin 0.5 mg/dL      eGFR Non African Amer 56 mL/min/1.73      Globulin 3.1 gm/dL      A/G Ratio 1.3 g/dL      BUN/Creatinine Ratio 13.7     Anion Gap 13.4 mmol/L     Narrative:       GFR Normal >60  Chronic Kidney Disease <60  Kidney Failure <15    CBC Auto Differential [[id number]]  (Abnormal) Collected:  08/15/19 1300    Specimen:  Blood Updated:  08/15/19 1314     WBC 12.73 10*3/mm3      RBC 3.91 10*6/mm3      Hemoglobin 12.1 g/dL      Hematocrit 38.2 %      MCV 97.7 fL      MCH 30.9 pg      MCHC 31.7 g/dL      RDW 13.9 %      RDW-SD 50.0 fl      MPV 10.4 fL      Platelets 161 10*3/mm3      Neutrophil % 80.7 %      Lymphocyte % 10.7 %      Monocyte % 6.0 %      Eosinophil % 1.3 %      Basophil % 0.3 %      Immature Grans % 1.0 %      Neutrophils, Absolute 10.27 10*3/mm3      Lymphocytes, Absolute 1.36 10*3/mm3      Monocytes, Absolute 0.77 10*3/mm3  "     Eosinophils, Absolute 0.16 10*3/mm3      Basophils, Absolute 0.04 10*3/mm3      Immature Grans, Absolute 0.13 10*3/mm3      nRBC 0.0 /100 WBC     Lactic Acid, Plasma [[id number]]  (Normal) Collected:  08/15/19 1300    Specimen:  Blood Updated:  08/15/19 1324     Lactate 1.6 mmol/L     Procalcitonin [[id number]]  (Abnormal) Collected:  08/15/19 1300    Specimen:  Blood Updated:  08/15/19 1348     Procalcitonin 0.50 ng/mL     Narrative:       As a Marker for Sepsis (Non-Neonates):   1. <0.5 ng/mL represents a low risk of severe sepsis and/or septic shock.  1. >2 ng/mL represents a high risk of severe sepsis and/or septic shock.    As a Marker for Lower Respiratory Tract Infections that require antibiotic therapy:  PCT on Admission     Antibiotic Therapy             6-12 Hrs later  > 0.5                Strongly Recommended            >0.25 - <0.5         Recommended  0.1 - 0.25           Discouraged                   Remeasure/reassess PCT  <0.1                 Strongly Discouraged          Remeasure/reassess PCT      As 28 day mortality risk marker: \"Change in Procalcitonin Result\" (> 80 % or <=80 %) if Day 0 (or Day 1) and Day 4 values are available. Refer to http://www.[employer]-pct-calculator.com/   Change in PCT <=80 %   A decrease of PCT levels below or equal to 80 % defines a positive change in PCT test result representing a higher risk for 28-day all-cause mortality of patients diagnosed with severe sepsis or septic shock.  Change in PCT > 80 %   A decrease of PCT levels of more than 80 % defines a negative change in PCT result representing a lower risk for 28-day all-cause mortality of patients diagnosed with severe sepsis or septic shock.                Urinalysis With Microscopic If Indicated (No Culture) - Urine, Catheter [[id number]]  (Abnormal) Collected:  08/15/19 1508    Specimen:  Urine, Catheter Updated:  08/15/19 1527     Color, UA Yellow     Appearance, UA Clear     pH, UA <=5.0     Specific Gravity, " UA 1.018     Glucose,  mg/dL (Trace)     Ketones, UA Negative     Bilirubin, UA Negative     Blood, UA Small (1+)     Protein, UA 30 mg/dL (1+)     Leuk Esterase, UA Small (1+)     Nitrite, UA Positive     Urobilinogen, UA 0.2 E.U./dL    Urinalysis, Microscopic Only - Urine, Catheter [[id number]]  (Abnormal) Collected:  08/15/19 1508    Specimen:  Urine, Catheter Updated:  08/15/19 1527     RBC, UA 6-12 /HPF      WBC, UA 13-20 /HPF      Bacteria, UA 4+ /HPF      Squamous Epithelial Cells, UA 0-2 /HPF      Hyaline Casts, UA None Seen /LPF      Methodology Automated Microscopy          I ordered the above labs and reviewed the results    RADIOLOGY  CT Abdomen Pelvis Without Contrast   Final Result   Cholelithiasis. Small nonobstructing right renal calculus.   Moderately extensive colonic diverticulosis without evidence of   diverticulitis. Otherwise unremarkable CT scan of the abdomen and   pelvis. No acute process is identified.       This report was finalized on 8/15/2019 2:12 PM by Dr. Jermain Bravo M.D.               I ordered the above noted radiological studies. Interpreted by radiologist.  Reviewed by me in PACS.       PROCEDURES  Procedures      PROGRESS AND CONSULTS   1401: Discussed pt with  who agrees with plan of care and is at bedside    1537: Rechecked pt who is resting comfortably and in NAD. Informed pt of UA which showed UTI. Discussed plan to give IV abx before leaving ED and discharge with abx to treat infection. Discussed skin break down on coccyx and to avoid direct pressure until it heals. Discussed plan to discharge. RTER pre-cautions given. Pt understands and agrees with the plan, all questions answered.      MEDICAL DECISION MAKING  Results were reviewed/discussed with the patient and they were also made aware of online access. Pt also made aware that some labs, such as cultures, will not be resulted during ER visit and follow up with PMD is necessary.     MDM  Number of  Diagnoses or Management Options     Amount and/or Complexity of Data Reviewed  Clinical lab tests: ordered and reviewed (UA consistent with UTI)  Tests in the radiology section of CPT®: ordered and reviewed (CT A/P negative acute)  Decide to obtain previous medical records or to obtain history from someone other than the patient: yes (Epic)  Obtain history from someone other than the patient: yes (Wife)  Review and summarize past medical records: yes (On 7/18/19 pt had an acute appendectomy with peritonitis )  Independent visualization of images, tracings, or specimens: yes    Patient Progress  Patient progress: stable         DIAGNOSIS  Final diagnoses:   Fever in adult   Complicated UTI (urinary tract infection)   Right hip pain       DISPOSITION  DISCHARGE    Patient discharged in stable condition.    Reviewed implications of results, diagnosis, meds, responsibility to follow up, warning signs and symptoms of possible worsening, potential complications and reasons to return to ER, including.    Patient/Family voiced understanding of above instructions.    Discussed plan for discharge, as there is no emergent indication for admission. Patient referred to primary care provider for BP management due to today's BP. Pt/family is agreeable and understands need for follow up and repeat testing.  Pt is aware that discharge does not mean that nothing is wrong but it indicates no emergency is present that requires admission and they must continue care with follow-up as given below or physician of their choice.     FOLLOW-UP  Adeline Onofre MD  [address] [place] [address]  [phone]    Call            Medication List      Changed    lisinopril 40 MG tablet  Commonly known as:  PRINIVIL,ZESTRIL  Take 1 tablet by mouth Daily.  What changed:    how much to take  when to take this              Latest Documented Vital Signs:  As of 3:39 PM  BP- 152/77 HR- 84 Temp- 97.8 °F (36.6 °C) (Oral) O2 sat-  98%    --  Documentation assistance provided by laura Bose for Jaylin Campo.  Information recorded by the yvroseibrebecca was done at my direction and has been verified and validated by me.                  Eleanor Bose  08/15/19 1539       Jaylin Campo APRN  08/15/19 1956

## 2019-08-15 NOTE — ED PROVIDER NOTES
Pt presents to the ED c/o right hip pain that began yesterday after a mechanical fall. Pt also c/o abd pain, fever (Tmax 102), and generalized weakness for several days. Pt states he fell yesterday due to his weakness. Pt denies CP, SOA, N/V/D, and urinary sx. Pt had an appendectomy on 7/18/19 with Dr. Rick.     On exam,  Pt in NAD, A&Ox3  Heart RRR  Lungs CTAB  Abd-soft, non tender  Extremities-no pain with movement of right hip, normal ROM    Plan-Obtain labs and CT abd pelvis    2:12 PM  Dr. Bravo, radiolgy, reports CT abd pelvis shows gallstone but is otherwise unremarkable. No pelvic fracture seen.       Attestation:  The KIM and I have discussed this patient's history, physical exam, and treatment plan.  I have reviewed the documentation and personally had a face to face interaction with the patient. I affirm the documentation and agree with the treatment and plan.  The attached note describes my personal findings.      Documentation assistance provided by laura Tse for Dr. Willams. Information recorded by the scribe was done at my direction and has been verified and validated by me.       Kelsea Tse  08/15/19 2546       Sarthak Willams MD  08/15/19 2000

## 2019-08-15 NOTE — ED NOTES
Patient to er from home with c/o fell yesterday patient c/o right hip pain.      Jean Corona.  08/15/19 113

## 2019-08-15 NOTE — ED NOTES
"Patient had appendectomy at [facility] on July 17th or 18th, and started running a fever this past Tuesday night as high as 102 F. Patient denies abdominal pain or any other symptoms at this time, but did complain to wife of abd pain \"several days before the fever.\" Took Tylenol 650 mg today around 0300. Afebrile at this time.     Brenda Do, RN  08/15/19 6358    "

## 2019-08-20 LAB
BACTERIA SPEC AEROBE CULT: NORMAL
BACTERIA SPEC AEROBE CULT: NORMAL

## 2019-08-30 ENCOUNTER — HOSPITAL ENCOUNTER (OUTPATIENT)
Dept: GENERAL RADIOLOGY | Facility: HOSPITAL | Age: 79
Discharge: HOME | End: 2019-08-30
Admitting: INTERNAL MEDICINE

## 2019-08-30 DIAGNOSIS — J18.9 UNRESOLVED PNEUMONIA: ICD-10-CM

## 2019-08-30 PROCEDURE — 71046 X-RAY EXAM CHEST 2 VIEWS: CPT

## 2019-09-30 ENCOUNTER — OFFICE VISIT (OUTPATIENT)
Dept: GASTROENTEROLOGY | Facility: CLINIC | Age: 79
End: 2019-09-30

## 2019-09-30 VITALS
DIASTOLIC BLOOD PRESSURE: 82 MMHG | HEIGHT: 70 IN | BODY MASS INDEX: 24.68 KG/M2 | TEMPERATURE: 97.7 F | WEIGHT: 172.4 LBS | SYSTOLIC BLOOD PRESSURE: 132 MMHG

## 2019-09-30 DIAGNOSIS — R13.19 ESOPHAGEAL DYSPHAGIA: Primary | ICD-10-CM

## 2019-09-30 PROCEDURE — 99214 OFFICE O/P EST MOD 30 MIN: CPT | Performed by: INTERNAL MEDICINE

## 2019-09-30 RX ORDER — AMLODIPINE BESYLATE 5 MG/1
5 TABLET ORAL DAILY
Refills: 0 | COMMUNITY
Start: 2019-07-01 | End: 2019-11-19

## 2019-09-30 RX ORDER — ESOMEPRAZOLE MAGNESIUM 40 MG/1
40 CAPSULE, DELAYED RELEASE ORAL DAILY
Qty: 90 CAPSULE | Refills: 3 | Status: SHIPPED | OUTPATIENT
Start: 2019-09-30 | End: 2020-05-15

## 2019-10-01 ENCOUNTER — TELEPHONE (OUTPATIENT)
Dept: NEUROLOGY | Facility: CLINIC | Age: 79
End: 2019-10-01

## 2019-10-01 NOTE — TELEPHONE ENCOUNTER
----- Message from JULISA Meraz sent at 10/1/2019  1:29 PM EDT -----  Contact: PT  If surgery required we recommend he be off of anticoagulants/antiplatelets for shortest amount of time prior to and post procedure d/t increased risk for stroke reoccurrence.           ----- Message -----  From: Kait Tariq MA  Sent: 10/1/2019  11:23 AM  To: JULISA Meraz    He's having a procedure done next week.  What about going off before procedure?    Thank you  ----- Message -----  From: Janet Clayton APRN  Sent: 10/1/2019  10:58 AM  To: Kait Tariq MA    I prefer 325mg as he had his stroke while on 81mg hich makes us thin this dosage is ineffective.   ----- Message -----  From: Kait Tariq MA  Sent: 10/1/2019   9:38 AM  To: JULISA Meraz    Pt on asa 325. Is to have a procedure done.  He wants to know if he can go back on 81 mg.  What would you have him do?    Thank you  ----- Message -----  From: Leif Alarcon  Sent: 10/1/2019   9:30 AM  To: Kait Tariq MA    Per pt Janet took him off his low dose aspirin 81 mg and put him on 325 mg, he's having a procedure next week and wants to know if it's ok to go back on the low dose.  Please call him at [phone].    Thanks,   Lorie

## 2019-10-02 NOTE — TELEPHONE ENCOUNTER
Gave pt's wife instructions for holding asa for procedure.  Instructed wife to have facility call with any questions.

## 2019-10-08 RX ORDER — ASPIRIN 81 MG/1
81 TABLET ORAL DAILY
COMMUNITY
End: 2019-11-04 | Stop reason: DRUGHIGH

## 2019-10-09 ENCOUNTER — HOSPITAL ENCOUNTER (OUTPATIENT)
Facility: HOSPITAL | Age: 79
Setting detail: HOSPITAL OUTPATIENT SURGERY
Discharge: HOME | End: 2019-10-09
Attending: INTERNAL MEDICINE | Admitting: INTERNAL MEDICINE

## 2019-10-09 ENCOUNTER — ANESTHESIA EVENT (OUTPATIENT)
Dept: GASTROENTEROLOGY | Facility: HOSPITAL | Age: 79
End: 2019-10-09

## 2019-10-09 ENCOUNTER — ANESTHESIA (OUTPATIENT)
Dept: GASTROENTEROLOGY | Facility: HOSPITAL | Age: 79
End: 2019-10-09

## 2019-10-09 VITALS
HEIGHT: 70 IN | RESPIRATION RATE: 16 BRPM | SYSTOLIC BLOOD PRESSURE: 142 MMHG | DIASTOLIC BLOOD PRESSURE: 75 MMHG | OXYGEN SATURATION: 96 % | HEART RATE: 73 BPM | BODY MASS INDEX: 26.73 KG/M2 | WEIGHT: 186.7 LBS

## 2019-10-09 DIAGNOSIS — R13.19 ESOPHAGEAL DYSPHAGIA: ICD-10-CM

## 2019-10-09 LAB — GLUCOSE BLDC GLUCOMTR-MCNC: 123 MG/DL (ref 70–130)

## 2019-10-09 PROCEDURE — 43239 EGD BIOPSY SINGLE/MULTIPLE: CPT | Performed by: INTERNAL MEDICINE

## 2019-10-09 PROCEDURE — 88305 TISSUE EXAM BY PATHOLOGIST: CPT | Performed by: INTERNAL MEDICINE

## 2019-10-09 PROCEDURE — 82962 GLUCOSE BLOOD TEST: CPT

## 2019-10-09 PROCEDURE — 25010000002 PROPOFOL 10 MG/ML EMULSION: Performed by: ANESTHESIOLOGY

## 2019-10-09 RX ORDER — LIDOCAINE HYDROCHLORIDE 20 MG/ML
INJECTION, SOLUTION INFILTRATION; PERINEURAL AS NEEDED
Status: DISCONTINUED | OUTPATIENT
Start: 2019-10-09 | End: 2019-10-09 | Stop reason: SURG

## 2019-10-09 RX ORDER — LIDOCAINE HYDROCHLORIDE 10 MG/ML
0.5 INJECTION, SOLUTION INFILTRATION; PERINEURAL ONCE AS NEEDED
Status: DISCONTINUED | OUTPATIENT
Start: 2019-10-09 | End: 2019-10-09 | Stop reason: HOSPADM

## 2019-10-09 RX ORDER — PROPOFOL 10 MG/ML
VIAL (ML) INTRAVENOUS AS NEEDED
Status: DISCONTINUED | OUTPATIENT
Start: 2019-10-09 | End: 2019-10-09 | Stop reason: SURG

## 2019-10-09 RX ORDER — PROPOFOL 10 MG/ML
VIAL (ML) INTRAVENOUS CONTINUOUS PRN
Status: DISCONTINUED | OUTPATIENT
Start: 2019-10-09 | End: 2019-10-09 | Stop reason: SURG

## 2019-10-09 RX ORDER — SODIUM CHLORIDE 0.9 % (FLUSH) 0.9 %
10 SYRINGE (ML) INJECTION AS NEEDED
Status: DISCONTINUED | OUTPATIENT
Start: 2019-10-09 | End: 2019-10-09 | Stop reason: HOSPADM

## 2019-10-09 RX ORDER — SODIUM CHLORIDE, SODIUM LACTATE, POTASSIUM CHLORIDE, CALCIUM CHLORIDE 600; 310; 30; 20 MG/100ML; MG/100ML; MG/100ML; MG/100ML
1000 INJECTION, SOLUTION INTRAVENOUS CONTINUOUS
Status: DISCONTINUED | OUTPATIENT
Start: 2019-10-09 | End: 2019-10-09 | Stop reason: HOSPADM

## 2019-10-09 RX ADMIN — PROPOFOL 125 MG: 10 INJECTION, EMULSION INTRAVENOUS at 14:57

## 2019-10-09 RX ADMIN — SODIUM CHLORIDE, POTASSIUM CHLORIDE, SODIUM LACTATE AND CALCIUM CHLORIDE 1000 ML: 600; 310; 30; 20 INJECTION, SOLUTION INTRAVENOUS at 13:30

## 2019-10-09 RX ADMIN — LIDOCAINE HYDROCHLORIDE 50 MG: 20 INJECTION, SOLUTION INFILTRATION; PERINEURAL at 14:57

## 2019-10-09 RX ADMIN — PROPOFOL 140 MCG/KG/MIN: 10 INJECTION, EMULSION INTRAVENOUS at 14:57

## 2019-10-09 NOTE — ANESTHESIA POSTPROCEDURE EVALUATION
Patient: Eugenio Joe    Procedure Summary     Date:  10/09/19 Room / Location:  [facility] ENDOSCOPY 10 /  [facility]    Anesthesia Start:  1453 Anesthesia Stop:  1509    Procedure:  ESOPHAGOGASTRODUODENOSCOPY WITH BIOPSIES (N/A Esophagus) Diagnosis:       Esophageal dysphagia      (Esophageal dysphagia [R13.10])    Surgeon:  Rigoberto Walker MD Provider:  Eugenio Sharif MD    Anesthesia Type:  MAC ASA Status:  3          Anesthesia Type: MAC  Last vitals  BP   101/55 (10/09/19 1511)   Temp       Pulse   73 (10/09/19 1511)   Resp   13 (10/09/19 1511)     SpO2   95 % (10/09/19 1511)     Post Anesthesia Care and Evaluation    Patient location during evaluation: bedside  Patient participation: complete - patient participated  Level of consciousness: awake and alert  Pain score: 0  Pain management: adequate  Airway patency: patent  Anesthetic complications: No anesthetic complications  PONV Status: none  Cardiovascular status: acceptable  Respiratory status: acceptable  Hydration status: acceptable  Post Neuraxial Block status: Motor and sensory function returned to baseline

## 2019-10-09 NOTE — DISCHARGE INSTRUCTIONS
For the next 24 hours patient needs to be with a responsible adult.    For 24 hours DO NOT drive, operate machinery, appliances, drink alcohol, make important decisions or sign legal documents.    Start with a light or bland diet if you are feeling sick to your stomach otherwise advance to regular diet as tolerated.    Follow recommendations on procedure report if provided by your doctor.    Call Dr Barboza for problems [phone]    Problems may include but not limited to: large amounts of bleeding, trouble breathing, repeated vomiting, severe unrelieved pain, fever or chills.

## 2019-10-09 NOTE — ANESTHESIA PREPROCEDURE EVALUATION
Anesthesia Evaluation     Patient summary reviewed   NPO Solid Status: > 8 hours  NPO Liquid Status: > 8 hours           Airway   Mallampati: III  TM distance: >3 FB  Neck ROM: full  No difficulty expected  Dental - normal exam     Pulmonary     breath sounds clear to auscultation  Cardiovascular   Exercise tolerance: good (4-7 METS)    Rhythm: regular  Rate: normal    (+) CABG,       Neuro/Psych  GI/Hepatic/Renal/Endo      Musculoskeletal     Abdominal    Substance History      OB/GYN          Other                        Anesthesia Plan    ASA 3     MAC     intravenous induction   Anesthetic plan, all risks, benefits, and alternatives have been provided, discussed and informed consent has been obtained with: patient and spouse/significant other.

## 2019-10-10 ENCOUNTER — OFFICE VISIT (OUTPATIENT)
Dept: NEUROLOGY | Facility: CLINIC | Age: 79
End: 2019-10-10

## 2019-10-10 VITALS
BODY MASS INDEX: 26.63 KG/M2 | OXYGEN SATURATION: 97 % | WEIGHT: 186 LBS | HEIGHT: 70 IN | SYSTOLIC BLOOD PRESSURE: 130 MMHG | DIASTOLIC BLOOD PRESSURE: 82 MMHG | HEART RATE: 91 BPM

## 2019-10-10 DIAGNOSIS — Z86.73 HISTORY OF CVA (CEREBROVASCULAR ACCIDENT): Primary | ICD-10-CM

## 2019-10-10 DIAGNOSIS — Z91.89 AT RISK FOR OBSTRUCTIVE SLEEP APNEA: ICD-10-CM

## 2019-10-10 DIAGNOSIS — Z74.09 IMPAIRED MOBILITY: Chronic | ICD-10-CM
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
## 2019-10-10 DIAGNOSIS — Z79.899 ON STATIN THERAPY: ICD-10-CM
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
## 2019-10-10 PROCEDURE — 99213 OFFICE O/P EST LOW 20 MIN: CPT | Performed by: NURSE PRACTITIONER

## 2019-10-23 ENCOUNTER — TELEPHONE (OUTPATIENT)
Dept: GASTROENTEROLOGY | Facility: CLINIC | Age: 79
End: 2019-10-23

## 2019-10-23 RX ORDER — FLUCONAZOLE 100 MG/1
TABLET ORAL
Qty: 15 TABLET | Refills: 0 | Status: SHIPPED | OUTPATIENT
Start: 2019-10-23 | End: 2019-11-19

## 2019-10-23 NOTE — TELEPHONE ENCOUNTER
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

## 2019-10-23 NOTE — TELEPHONE ENCOUNTER
Called pt and advised of the note from Dr Walker. Advised will call the med to his pharmacy and send the request to [facility] pathology lab for his tissue blocks and slides to be sent out the specialty lab for a second opinion. He verb understanding and will call back tomorrow to make the 4 week follow-up appt.     fluconazole e-scribed.   Request for second opinion faxed to [facility] pathology lab.

## 2019-10-23 NOTE — PROGRESS NOTES
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

## 2019-11-04 PROBLEM — Z79.899 ON STATIN THERAPY: Status: ACTIVE | Noted: 2019-11-04

## 2019-11-04 RX ORDER — ASPIRIN 325 MG
325 TABLET ORAL DAILY
Qty: 100 TABLET | Refills: 3 | Status: SHIPPED | OUTPATIENT
Start: 2019-11-04 | End: 2020-09-08 | Stop reason: HOSPADM

## 2019-11-05 ENCOUNTER — TELEPHONE (OUTPATIENT)
Dept: CARDIOLOGY | Facility: CLINIC | Age: 79
End: 2019-11-05

## 2019-11-05 ENCOUNTER — TELEPHONE (OUTPATIENT)
Dept: NEUROLOGY | Facility: CLINIC | Age: 79
End: 2019-11-05

## 2019-11-05 NOTE — TELEPHONE ENCOUNTER
The pt left a message stating his neurologist has requested the results of his holter monitor from July, and to fax to attn: demarcus at [phone], faxed over the [email or web address]

## 2019-11-05 NOTE — TELEPHONE ENCOUNTER
----- Message from JULISA Meraz sent at 11/4/2019  7:11 PM EST -----  Recommend OP follow-up w/ Cardiology for abnormal holter.

## 2019-11-05 NOTE — TELEPHONE ENCOUNTER
Spoke with wife about need for cardiology f/u regarding abnormal holter.  Wife agrees to call cardiologist to make appt.  Wife sts you wrote orders for blood work for them to take to PCP.  Wife sts they [PCP]  were unable to read orders.  Were you wanting the lipid, AST/ALT along with the platelet response you ordered yesterday?

## 2019-11-06 ENCOUNTER — TELEPHONE (OUTPATIENT)
Dept: GASTROENTEROLOGY | Facility: CLINIC | Age: 79
End: 2019-11-06

## 2019-11-06 NOTE — TELEPHONE ENCOUNTER
----- Message from Allison Mackey sent at 11/6/2019 11:22 AM EST -----  Regarding: call  Contact: [phone]  Melani at [facility] called asking for Abbi please call back at [phone]

## 2019-11-09 ENCOUNTER — RESULTS ENCOUNTER (OUTPATIENT)
Dept: NEUROLOGY | Facility: CLINIC | Age: 79
End: 2019-11-09

## 2019-11-09 DIAGNOSIS — Z86.73 HISTORY OF CVA (CEREBROVASCULAR ACCIDENT): ICD-10-CM

## 2019-11-11 ENCOUNTER — TELEPHONE (OUTPATIENT)
Dept: NEUROLOGY | Facility: CLINIC | Age: 79
End: 2019-11-11

## 2019-11-11 NOTE — TELEPHONE ENCOUNTER
Patient's wife calling in reference to labs that was ordered; she was wanting to verify that Janet did not order anything else.  I only see where she ordered the blood draw for aspirin response.    Please advise.  Thanks!    Annette Joe () [phone] (H

## 2019-11-15 LAB
CYTO UR: NORMAL
LAB AP CASE REPORT: NORMAL
PATH REPORT.ADDENDUM SPEC: NORMAL
PATH REPORT.FINAL DX SPEC: NORMAL
PATH REPORT.GROSS SPEC: NORMAL

## 2019-11-19 ENCOUNTER — APPOINTMENT (OUTPATIENT)
Dept: LAB | Facility: HOSPITAL | Age: 79
End: 2019-11-19

## 2019-11-19 ENCOUNTER — TELEPHONE (OUTPATIENT)
Dept: NEUROLOGY | Facility: CLINIC | Age: 79
End: 2019-11-19

## 2019-11-19 ENCOUNTER — OFFICE VISIT (OUTPATIENT)
Dept: NEUROLOGY | Facility: CLINIC | Age: 79
End: 2019-11-19

## 2019-11-19 VITALS
SYSTOLIC BLOOD PRESSURE: 146 MMHG | HEART RATE: 86 BPM | HEIGHT: 70 IN | WEIGHT: 186 LBS | OXYGEN SATURATION: 98 % | BODY MASS INDEX: 26.63 KG/M2 | DIASTOLIC BLOOD PRESSURE: 82 MMHG

## 2019-11-19 DIAGNOSIS — G47.33 OBSTRUCTIVE SLEEP APNEA: Primary | ICD-10-CM

## 2019-11-19 LAB — ASA PLATELET INHIBITION: 406 ARU

## 2019-11-19 PROCEDURE — 99214 OFFICE O/P EST MOD 30 MIN: CPT | Performed by: PSYCHIATRY & NEUROLOGY

## 2019-11-19 PROCEDURE — 85576 BLOOD PLATELET AGGREGATION: CPT | Performed by: NURSE PRACTITIONER

## 2019-11-19 PROCEDURE — 36415 COLL VENOUS BLD VENIPUNCTURE: CPT | Performed by: NURSE PRACTITIONER

## 2019-11-19 RX ORDER — SULFAMETHOXAZOLE AND TRIMETHOPRIM 800; 160 MG/1; MG/1
1 TABLET ORAL 2 TIMES DAILY
Status: ON HOLD | COMMUNITY
End: 2020-01-28

## 2019-11-19 NOTE — PROGRESS NOTES
Subjective:     Patient ID: Eugenio Joe is a 78 y.o. male.    Mr. Joe is a 78 year old right handed male who presents today as a new patient to me for the evaluation of sleep apnea.  I reviewed the patient's records.  The patient was last seen by Janet Partida on November 4, 2019.  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  The patient reported frequent wakening and daily naps.  Sleep apnea referral was placed.  I reviewed the patient's labs.  CMP on August 15 showed a glucose of 180, and CBC showed elevated white count 12.7 and hemoglobin of 12.1.  I reviewed the patient's imaging.  He had an MRI of the brain done on June 23, 2019.  It showed a 3 mm focus diffusion-weighted hyperintensity within the medial portion of the left occipital lobe.  There also multiple chronic infarcts.  The largest is within the superior aspect the left parietal lobe and is within the left MCA distribution.  There are moderate changes of chronic small vessel ischemic phenomenon.  The ventricular system is enlarged.    The patient does not have any particular concerns about his sleep.  He falls asleep okay and stays asleep except for nocturia.  He has lost 30 to 40 pounds in the past 2 years secondary to health issues.  Before he lost the weight he had bad snoring but rarely snores now.  He does nap for release an hour most days.    Gasping for air at night? no  Witnessed pauses in breathing? Not in the last few years  Night sweats? no  AM HA? no  Dry mouth? no  Memory? Wife does not think that memory is good, but patient thinks that it is ok  Mood? Usually pretty good  Nocturia? 1-2/night  Unrefreshing sleep? No, is a morning person  Nocturnal GERD? rare  FHx of MERCEDES? no  CHF? no  Lung dz? asthma  Stroke or MI? Has had stroke, no MI  DM? yes  HTN? yes  HLD? On medication  Liver dz? no    ESS:  1.  Sitting and reading? 1  2. Watching TV? 2  3. Sitting inactive in a public place? 0  4. As a passenger in a car for an hour without a break? 0  5. Lying down to rest in the afternoon when able? 3  6. Sitting and talking to someone? 0  7. Sitting quietly after lunch without EtOH? 1  8. In a car while stopped for a few minutes in traffic? 0 (not currently driving)  Total: 7        The following portions of the patient's history were reviewed and updated as appropriate: allergies, current medications, past family history, past medical history, past social history, past surgical history and problem list.    Review of Systems   Constitutional: Negative for activity change, appetite change and fatigue.   HENT: Negative for ear pain, facial swelling and trouble swallowing.    Eyes: Negative for photophobia, pain and visual disturbance.   Cardiovascular: Negative for chest pain, palpitations and leg swelling.   Musculoskeletal: Positive for gait problem. Negative for back pain and neck pain.   Allergic/Immunologic: Negative for environmental allergies, food allergies and immunocompromised state.   Neurological: Negative for dizziness, tremors, seizures, syncope, facial asymmetry, speech difficulty, weakness, light-headedness, numbness and headaches.   Hematological: Negative for adenopathy. Does not bruise/bleed easily.   Psychiatric/Behavioral: Negative for agitation, behavioral problems, confusion, decreased concentration, dysphoric mood, hallucinations, self-injury, sleep disturbance and suicidal ideas. The patient is not nervous/anxious and is not hyperactive.     I reviewed the ROS documented by the MA.  All other systems negative.      Objective:    Neurologic Exam    Physical Exam   Constitutional:  Vital signs reviewed.  No apparent distress.  Well groomed.  Eyes:  No injection, no icterus.    Ears, Nose, Mouth, Throat:  Mallampati grade III, neck circumference is 48 cm, retrognathia, overjet  Respiratory:  Normal  effort.  Clear to auscultation bilaterally.  Cardiovascular:  Regular rate and rhythm.  No murmurs.  No carotid bruits.   Musculoskeletal: Unsteady gait.  Muscle tone and bulk normal.  Strength is 5/5 in the bilateral upper and lower extremities proximally and distally.  Skin:  No rashes.  Warm, dry, and intact.  Psychiatric:  Good mood.  Normal affect.  Neurologic:  The patient is alert and oriented (off on the date by 1 day). Attention/concentration is within normal limits.  Speech is fluent without dysarthria.  The patient is able to follow complex commands without difficulty. Fund of knowledge normal.  Pupils equally round and reactive to light. Extraocular movements intact.  Facial sensation intact.  Smile symmetric. Palate elevates symmetrically.  SCM and trapezius are 5/5 bilaterally.  Tongue is midline.  Hearing is decreased to finger rub bilaterally.  Immediate memory intact.  Recalled 1 out of 3 words after 4 minutes.  Gave to false positives.  Patient is hyporeflexic throughout.  Only has 1+ patellar bilaterally.  He also has a left hand tremor.      Assessment/Plan:  Mr. Joe is a 78-year-old right-handed male with a history of diabetes, coronary disease status post CABG, DVT and PE, gout, hyperlipidemia, hypertension, migraines, brain abscess 30 years ago, and multiple strokes and TIAs who presents today for sleep evaluation.    1.  Sleep disordered breathing-although the patient lost weight and his snoring is improved he is at high risk for sleep disordered breathing such as obstructive sleep apnea.  I recommend further evaluation with a home sleep study.  We discussed the diagnosis of sleep apnea as well as the consequences of untreated sleep apnea.  The patient is agreeable to the plan.     Problems Addressed this Visit     None      Visit Diagnoses     Obstructive sleep apnea    -  Primary    Relevant Orders    Home Sleep Study

## 2019-11-24 NOTE — PROGRESS NOTES
Chief Complaint   Patient presents with   • Dysphagia/Terrazas's esophagus     Subjective     HPI     Eugenio Joe is a 77 y.o. male who presents today for f/u.  Hx of short segement Terrazas's in setting of distal Schatzki ring.  Last EGD APril of this year, TTS balloon dilation with complete resolution of some associated esophageal dysphagia.   Also has benign acquired duodenal stenosis, asymptomatic from this regard.    Overall doing very well from GI standpoint. No dysphagia.  Occasional coughing if he eats something very spicy.  Weight stable.  Remains on daily Nexium.      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 SWEAT GLAND    • Cardiac disease    • Coronary artery disease     CABG 2012   • Diabetes mellitus (CMS/HCC)    • Gout several years ago    medication  working   • Headache 11/15/2017   • Hearing aid worn     bilateral   • History of transfusion    • Hydrocephaly    • Hyperlipemia    • Hypertension    • Joint pain     or swelling   • Low back pain several years ago   • Orbit fracture (CMS/HCC)    • Pulmonary embolism (CMS/HCC)     OCT 2016   • Sinus infection        Social History     Social History   • Marital status:      Social History Main Topics   • Smoking status: Former Smoker     Packs/day: 2.00     Years: 5.00     Types: Cigarettes     Start date: 4/1/1959     Quit date: 3/9/1962   • Smokeless tobacco: Never Used      Comment: Quit cold turkey   • Alcohol use Yes      Comment: OCC   • Drug use: No   • Sexual activity: Yes     Partners: Female     Birth control/ protection: Surgical     Other Topics Concern   • Not on file         Current Outpatient Prescriptions:   •  acetaminophen (TYLENOL) 325 MG tablet, Take 2 tablets by mouth Every 6 (Six) Hours As Needed for mild pain (1-3)., Disp: , Rfl: 0  •  allopurinol (ZYLOPRIM) 300 MG tablet, Take 300 mg by mouth  had D&C on friday, now pain to legs, right thigh feels numb, hands feel heavy, pain to whole body daily., Disp: , Rfl:   •  aspirin 81 MG EC tablet, Take 1 tablet by mouth Daily., Disp: 30 tablet, Rfl: 0  •  esomeprazole (nexIUM) 40 MG capsule, Take 1 capsule by mouth Daily., Disp: 30 capsule, Rfl: 5  •  esomeprazole (nexIUM) 40 MG capsule, Take 1 capsule by mouth Every Morning Before Breakfast., Disp: 90 capsule, Rfl: 3  •  fluticasone-salmeterol (ADVAIR) 250-50 MCG/DOSE DISKUS, Inhale 1 puff Every Evening. Advair Diskus 250-50 MCG/DOSE Inhalation Aerosol Powder Breath Activated; Patient Sig: Advair Diskus 250-50 MCG/DOSE Inhalation Aerosol Powder Breath Activated INHALE 1 PUFF BID; 60; 0; 15-Oct-2012; Active, Disp: , Rfl:   •  glucose blood (FREESTYLE LITE) test strip, 1 each by Other route See Admin Instructions. Use 1 to 2 times daily as instructed, Disp: , Rfl:   •  lisinopril (PRINIVIL,ZESTRIL) 40 MG tablet, Take 1 tablet by mouth Daily., Disp: 30 tablet, Rfl: 0  •  metFORMIN (GLUCOPHAGE) 500 MG tablet, Take 500 mg by mouth 2 (Two) Times a Day With Meals., Disp: , Rfl:   •  Multiple Vitamins-Minerals (MULTIVITAMIN ADULT PO), Take 1 tablet by mouth Daily., Disp: , Rfl:   •  MYRBETRIQ 25 MG tablet sustained-release 24 hour 24 hr tablet, , Disp: , Rfl:   •  PROAIR  (90 BASE) MCG/ACT inhaler, 2 puffs Every 4 (Four) Hours As Needed., Disp: , Rfl:   •  simvastatin (ZOCOR) 40 MG tablet, Take 40 mg by mouth every night., Disp: , Rfl:   •  Vitamin D, Cholecalciferol, (CHOLECALCIFEROL) 400 units tablet, Take 400 Units by mouth Daily., Disp: , Rfl:   •  fluconazole (DIFLUCAN) 100 MG tablet, Take 2 tablets by mouth on day one, then 1 tablet by mouth daily on days 2-14., Disp: 15 tablet, Rfl: 0    Review of Systems   Constitutional: Negative.    Gastrointestinal: Negative.        Objective   Vitals:    11/05/18 1339   BP: 136/80   Temp: 97.6 °F (36.4 °C)       Physical Exam   Constitutional: He is oriented to person, place, and time. He appears well-developed and well-nourished.   HENT:   Head: Normocephalic and  atraumatic.   Abdominal: Soft. Bowel sounds are normal. He exhibits no distension and no mass. There is no tenderness. No hernia.   Neurological: He is alert and oriented to person, place, and time.   Skin: Skin is warm and dry.   Psychiatric: He has a normal mood and affect. His behavior is normal. Judgment and thought content normal.   Vitals reviewed.      Assessment/Plan   Assessment:     1. Esophageal dysphagia    2. 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 Duodenal stenosis    4.      CKD    Plan:   Gi sx well controlled currently.  Plan repeat EGD 3yrs for surveillance of SSBE, sooner if any recurrence of dysphagia.  Continue Nexium.  Slight elevation of Cr from last BMP but not far off baseline.          Rigoberto Walker M.D.  [facility]  [address] [place] [address]  Office: [phone]

## 2019-11-26 ENCOUNTER — OFFICE VISIT (OUTPATIENT)
Dept: GASTROENTEROLOGY | Facility: CLINIC | Age: 79
End: 2019-11-26

## 2019-11-26 ENCOUNTER — OFFICE VISIT (OUTPATIENT)
Dept: NEUROLOGY | Facility: CLINIC | Age: 79
End: 2019-11-26

## 2019-11-26 ENCOUNTER — TELEPHONE (OUTPATIENT)
Dept: GASTROENTEROLOGY | Facility: CLINIC | Age: 79
End: 2019-11-26

## 2019-11-26 VITALS
HEIGHT: 70 IN | SYSTOLIC BLOOD PRESSURE: 118 MMHG | BODY MASS INDEX: 25.28 KG/M2 | WEIGHT: 176.6 LBS | TEMPERATURE: 97.4 F | DIASTOLIC BLOOD PRESSURE: 66 MMHG

## 2019-11-26 VITALS
HEART RATE: 86 BPM | DIASTOLIC BLOOD PRESSURE: 60 MMHG | BODY MASS INDEX: 25.28 KG/M2 | SYSTOLIC BLOOD PRESSURE: 122 MMHG | HEIGHT: 70 IN | WEIGHT: 176.6 LBS | OXYGEN SATURATION: 95 %

## 2019-11-26 DIAGNOSIS — K22.70 BARRETT'S ESOPHAGUS WITHOUT DYSPLASIA: Primary | ICD-10-CM

## 2019-11-26 DIAGNOSIS — G60.9 IDIOPATHIC PERIPHERAL NEUROPATHY: Primary | ICD-10-CM

## 2019-11-26 PROCEDURE — 99214 OFFICE O/P EST MOD 30 MIN: CPT | Performed by: INTERNAL MEDICINE

## 2019-11-26 PROCEDURE — 99215 OFFICE O/P EST HI 40 MIN: CPT | Performed by: PSYCHIATRY & NEUROLOGY

## 2019-11-26 NOTE — PROGRESS NOTES
"CC: Gait disturbance    HPI:  Eugenio Joe is a  78 y.o.  right-handed white male who was sent for my opinion by Janet Clayton NP regarding his slowly progressive gait disturbance.  Although this is the first time I have seen this patient he is being seen as a follow-up as per protocol.  The patient was hospitalized in June and found to have a tiny acute left occipital infarct.  Vascular studies showed atherosclerotic changes without clear-cut hemodynamically significant stenoses.  The origins of the vertebral arteries were however somewhat obscured.  He has no history of atrial fibrillation.  He is on blood pressure and diabetes medications.  He quit smoking in his early 20s and he takes aspirin 325 and simvastatin 40 mg daily for stroke prevention.    The patient has had a progressive gait disturbance which dates back several years.  He was seen by Dr. Polanco and evaluated in 2017 for NPH.  I reviewed previous MRI scans as well as most recent MRI scan done 6/23/2019.  He has mild diffuse atrophy.  The ventricles are larger than expected but the temporal tips are not clearly as ballooned as expected.  The cerebral aqueduct is not significantly enlarged but there is flow void.  Some turbulent flow is seen in the third ventricle.  There is notable chronic white matter changes which are moderate in severity.  There is mild cortical atrophy which looks consistent with age.  A radionucleotide cisternogram was obtained which I also reviewed demonstrating ventricular reflux within 4 hours with persistent tracer at 48 hours consistent with NPH.  A lumbar puncture was obtained and the patient's gait was perhaps a bit better for a couple of days and then 3 days out he had sudden hearing loss in 2 to 3 days later he had a TIA or stroke.  Shunting was not undertaken.  He sought a second opinion at the [facility] in [place] and they had the same opinion.  She states the term \"possible NPH\" was given.  They did not " recommend shunting.    The patient's balance has been slowly worsening and he has fallen a few times.  When he falls he cannot get up.  He says it went on his feet he feels a little weak but not really dizzy.  He has some tingling in the feet but not really numbness and no particular symptom in the hands other than some tremor.  The left hand tremor is worse on the right and its mostly postural.  Does not seem to be at rest.  His gait is described as slow wide-based and unsteady without festination or magnetic appearance.  Additional symptoms include short-term memory loss but good distant memory.  He cannot recall details of computer programs he rode at age 17 but admits to short-term memory problems and his wife agrees.  He does not have substantial neck or back pain    He was seen by Dr. Berumen regarding possible sleep apnea.  He did not want to pursue further investigation.      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 SWEAT GLAND    • Cardiac disease    • Coronary artery disease     CABG 2012   • Diabetes mellitus (CMS/HCC)    • Difficulty in swallowing    • Difficulty walking    • Gout several years ago    medication  working   • Hearing aid worn     bilateral   • Hx of appendectomy    • Hydrocephaly (CMS/HCC)    • Hyperlipemia    • Hypertension    • Joint pain     or swelling   • Low back pain several years ago   • Orbit fracture    • Pulmonary embolism (CMS/HCC)     OCT 2016   • Rash     ARM-    • TIA (transient ischemic attack)          Past Surgical History:   Procedure Laterality Date   • APPENDECTOMY N/A 7/18/2019    Procedure: APPENDECTOMY LAPAROSCOPIC;  Surgeon: Luis Carlos Rick Jr., MD;  Location: [facility];  Service: General   • BRAIN SURGERY      BRAIN ABCESS 30 YR AGO   • CARDIAC SURGERY     • COLONOSCOPY  2012    Ciciliano- normal per pt, no polyps    • CORONARY ARTERY  BYPASS GRAFT     • ENDOSCOPY N/A 3/9/2017    Schatzki ring, dilated, LA Grade B esophagitis, HH, acquired duodenal stenosis   • ENDOSCOPY N/A 4/6/2018    candida, HH, torts, Schatzki ring, duodenal stenosis, dilatation perforemed, chronic inflammation   • ENDOSCOPY N/A 10/9/2019    White nummular lesions in esophageal mucosa, mild Schatzki ring, acquired duodenal stenosis, Path: Terrazas's, candida   • FACIAL FRACTURE SURGERY      to repair 6 broken bones   • ORBITAL FRACTURE OPEN REDUCTION INTERNAL FIXATION Right 1/13/2017    Procedure: RT ORBIT FLOOR FRACTURE REPAIR RIGHT ZMC FRACTURE REPAIR, RIGHT NASAL BONE FRACTURE CLOSED REDUCTION;  Surgeon: Edil Lester MD;  Location: [facility] OR [facility] [place] [facility];  Service:    • ORIF FOOT FRACTURE Right 9/9/2016    Procedure: RIGHT SECOND METATARSAL OPEN REDUCTION INTERNAL FIXATION WITh GRAFT FROM HEEL ;  Surgeon: Man Jenkins MD;  Location: [facility];  Service:    • SEPTOPLASTY     • SKIN CANCER EXCISION     • TONSILLECTOMY             Current Outpatient Medications:   •  allopurinol (ZYLOPRIM) 300 MG tablet, Take 300 mg by mouth daily., Disp: , Rfl:   •  aspirin (DRU ASPIRIN) 325 MG tablet, Take 1 tablet by mouth Daily., Disp: 100 tablet, Rfl: 3  •  esomeprazole (nexIUM) 40 MG capsule, Take 1 capsule by mouth Daily., Disp: 90 capsule, Rfl: 3  •  lisinopril (PRINIVIL,ZESTRIL) 40 MG tablet, Take 1 tablet by mouth Daily. (Patient taking differently: Take 10 mg by mouth 2 (Two) Times a Day.), Disp: 30 tablet, Rfl: 0  •  metFORMIN (GLUCOPHAGE) 500 MG tablet, Take 500 mg by mouth 2 (Two) Times a Day With Meals., Disp: , Rfl:   •  Multiple Vitamins-Minerals (MULTIVITAMIN ADULT PO), Take 1 tablet by mouth Daily., Disp: , Rfl:   •  PROAIR  (90 BASE) MCG/ACT inhaler, 2 puffs Every 4 (Four) Hours As Needed., Disp: , Rfl:   •  simvastatin (ZOCOR) 40 MG tablet, Take 40 mg by mouth every night., Disp: , Rfl:   •  sulfamethoxazole-trimethoprim (BACTRIM DS,SEPTRA DS)  800-160 MG per tablet, Take 1 tablet by mouth 2 (Two) Times a Day. Pt taking for month., Disp: , Rfl:   •  Vitamin D, Cholecalciferol, (CHOLECALCIFEROL) 400 units tablet, Take 400 Units by mouth Daily., Disp: , Rfl:   •  acetaminophen (TYLENOL) 325 MG tablet, Take 2 tablets by mouth Every 4 (Four) Hours As Needed for Mild Pain ., Disp: , Rfl:   •  glucose blood (FREESTYLE LITE) test strip, 1 each by Other route See Admin Instructions. Use 1 to 2 times daily as instructed, Disp: , Rfl:       Family History   Problem Relation Age of Onset   • Heart disease Mother    • Hypertension Mother    • Stroke Mother    • Diverticulitis Mother    • Heart disease Father    • Hypertension Father    • Malig Hyperthermia Neg Hx          Social History     Socioeconomic History   • Marital status:      Spouse name: Not on file   • Number of children: 2   • Years of education: 16   • Highest education level: Not on file   Occupational History   • Occupation: retired   Tobacco Use   • Smoking status: Former Smoker     Packs/day: 3.00     Years: 5.00     Pack years: 15.00     Types: Cigarettes     Start date:      Last attempt to quit:      Years since quittin.9   • Smokeless tobacco: Never Used   • Tobacco comment: Quit cold turkey   Substance and Sexual Activity   • Alcohol use: Yes     Alcohol/week: 1.2 oz     Types: 1 Cans of beer, 1 Shots of liquor per week     Comment: RARELY    • Drug use: No   • Sexual activity: Yes     Partners: Female     Birth control/protection: Surgical         Allergies   Allergen Reactions   • Other Mental Status Change and Hallucinations     Oxy drugs   • Oxycodone Mental Status Change         Pain Scale: 0/10        ROS:  Review of Systems   Constitutional: Positive for appetite change. Negative for activity change and fatigue.   Eyes: Positive for itching. Negative for pain and redness.   Respiratory: Negative for cough, choking and shortness of breath.    Cardiovascular: Negative  "for chest pain and leg swelling.   Gastrointestinal: Negative for abdominal pain, nausea and vomiting.   Endocrine: Negative for cold intolerance and heat intolerance.   Musculoskeletal: Negative for arthralgias, back pain and joint swelling.   Allergic/Immunologic: Negative for environmental allergies and food allergies.   Neurological: Positive for tremors and weakness. Negative for dizziness, seizures, syncope, facial asymmetry, speech difficulty, light-headedness, numbness and headaches.   Psychiatric/Behavioral: Negative for agitation, behavioral problems, confusion, decreased concentration, dysphoric mood, hallucinations, self-injury and sleep disturbance. The patient is not nervous/anxious and is not hyperactive.          I have reviewed and agree with the above ROS completed by the medical assistant.      Physical Exam:  Vitals:    11/26/19 1611   BP: 122/60   Pulse: 86   SpO2: 95%   Weight: 80.1 kg (176 lb 9.6 oz)   Height: 177.8 cm (70\")     Orthostatic BP: Supine blood pressure 130/80; standing blood pressure 100/60 in the left arm    Body mass index is 25.34 kg/m².    Physical Exam  General: Overweight white male no acute distress  HEENT: Normocephalic no evidence of trauma.  Discs flat.  No AV nicking.  Throat negative  Neck: Supple.  Pitched forward.  No cervical bruits.  No thyromegaly.  Heart: Regular rate and rhythm no murmurs  Extremities: No pedal edema.  Radial pulses were strong and simultaneous      Neurological Exam:   Mental Status: Awake, alert, oriented to person, place and time.  Conversant without evidence of an affective disorder, thought disorder, delusions or hallucinations.  Attention span and concentration are normal.  HCF: No aphasia, apraxia or dysarthria.  Recent and remote memory intact.  Knowledge of recent events intact.  CN: I:   II: Visual fields full without left inattention   III, IV, VI: Eye movements intact without nystagmus or ptosis.  Pupils equal  round and reactive to " light.   V,VII: Light touch and pinprick intact all 3 divisions of V.  Facial muscles symmetrical.   VIII: Hearing intact to finger rub   IX,X: Soft palate elevates symmetrically   XI: Sternomastoid and trapezius are strong.   XII: Tongue midline without atrophy or fasciculations  Motor: Normal tone and bulk in the upper and lower extremities   Power testing: Mild weakness of intrinsic hand muscles and toe flexion and extension with all other muscles tested in the arms and legs being 5/5.  Reflexes: Upper extremities: Absent        Lower extremities: Trace knee jerks.  Absent ankle jerks        Toe signs: Withdrawal  Sensory: Light touch: Some reduction in the hands but not in the feet        Pinprick: Diffusely intact in the arms with minor reduction in the toes        Vibration: Reduced at the ankles intact at the great toes        Position:    Cerebellar: Finger-to-nose: Normal           Rapid movement: Normal           Heel-to-shin: Normal  Gait and Station: He walks to get up.  He has a parkinsonian gestalt with head tilted forward.  He is wide-based and his steps are a little shortened.  He is unsteady on his feet.    Results:      Lab Results   Component Value Date    GLUCOSE 180 (H) 08/15/2019    BUN 17 08/15/2019    CREATININE 1.24 08/15/2019    EGFRIFNONA 56 (L) 08/15/2019    EGFRIFAFRI  09/02/2016      Comment:      <15 Indicative of kidney failure.    BCR 13.7 08/15/2019    CO2 22.6 08/15/2019    CALCIUM 9.7 08/15/2019    ALBUMIN 4.00 08/15/2019    AST 14 08/15/2019    ALT 12 08/15/2019       Lab Results   Component Value Date    WBC 12.73 (H) 08/15/2019    HGB 12.1 (L) 08/15/2019    HCT 38.2 08/15/2019    MCV 97.7 (H) 08/15/2019     08/15/2019         .  Lab Results   Component Value Date    RPR Non-Reactive 06/23/2019         Lab Results   Component Value Date    TSH 2.410 06/22/2019         Lab Results   Component Value Date    LEIVUFJA12 580 06/23/2019         Lab Results   Component Value  Date    FOLATE >20.00 06/23/2019         Lab Results   Component Value Date    HGBA1C 7.80 (H) 06/24/2019         Lab Results   Component Value Date    GLUCOSE 180 (H) 08/15/2019    BUN 17 08/15/2019    CREATININE 1.24 08/15/2019    EGFRIFNONA 56 (L) 08/15/2019    EGFRIFAFRI  09/02/2016      Comment:      <15 Indicative of kidney failure.    BCR 13.7 08/15/2019    K 4.3 08/15/2019    CO2 22.6 08/15/2019    CALCIUM 9.7 08/15/2019    ALBUMIN 4.00 08/15/2019    AST 14 08/15/2019    ALT 12 08/15/2019         Lab Results   Component Value Date    WBC 12.73 (H) 08/15/2019    HGB 12.1 (L) 08/15/2019    HCT 38.2 08/15/2019    MCV 97.7 (H) 08/15/2019     08/15/2019       MRI brain reviewed x2  Reviewed notes of Janet Clayton NP      Assessment:   1.  Balance disturbance-multifactorial.  Although his presentation and MRI are not classic for NPH his cisternogram was.  In addition he has evidence of peripheral neuropathy most likely due to diabetes and he does have some mild cogwheeling in the left arm with head tipped forward posture consistent with parkinsonism the patient's wife indicates he had been tried on Parkinson medication before which did not help any.  2.  Cerebrovascular disease with punctate left occipital infarct identified at hospitalization in June        Plan:  1.  I had a lengthy discussion with patient and his wife.  Neurosurgery has already determined their feelings about shunt.  He has additional issues which increase his gait disturbance.  2.  Check labs for other treatable causes of peripheral neuropathy including a sed rate, heavy metals, copper level, SPEP and IEP.  We discussed an EMG but I do not think it would change treatment at this point and so we will hold on it for now.  I do not find evidence of a myelopathy on exam.  I looked at his previous MRI scans of the cervical, thoracic and lumbar spine which show some degenerative disc disease without significant cord compression or cauda  equina compression from 2017  3.  Continue risk factor control and aspirin 325 mg daily  4.  To follow-up after labs.                          Dictated utilizing Dragon dictation.

## 2019-11-26 NOTE — PROGRESS NOTES
Chief Complaint   Patient presents with   • Difficulty Swallowing     Subjective     HPI     Eugenio Joe is a 78 y.o. male who presents today for follow up.    Recent EGD for dysphagia.  Found to have candida esophagitis.  Also with distal schatzki ring biopsied for disruption.  Pathology from that area returned equivocal for Barretts with LGD.  2nd opinion from Gi pathologist was the same.      Overall Eugenio reports resolution of his dysphagia.  He has completed course of Fluconazole.      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 SWEAT GLAND    • Cardiac disease    • Coronary artery disease     CABG    • Diabetes mellitus (CMS/HCC)    • Difficulty in swallowing    • Difficulty walking    • Gout several years ago    medication  working   • Hearing aid worn     bilateral   • Hx of appendectomy    • Hydrocephaly (CMS/HCC)    • Hyperlipemia    • Hypertension    • Joint pain     or swelling   • Low back pain several years ago   • Orbit fracture    • Pulmonary embolism (CMS/HCC)     OCT 2016   • Rash     ARM-    • TIA (transient ischemic attack)        Social History     Socioeconomic History   • Marital status:      Spouse name: Not on file   • Number of children: 2   • Years of education: 16   • Highest education level: Not on file   Occupational History   • Occupation: retired   Tobacco Use   • Smoking status: Former Smoker     Packs/day: 3.00     Years: 5.00     Pack years: 15.00     Types: Cigarettes     Start date:      Last attempt to quit:      Years since quittin.9   • Smokeless tobacco: Never Used   • Tobacco comment: Quit cold turkey   Substance and Sexual Activity   • Alcohol use: Yes     Alcohol/week: 1.2 oz     Types: 1 Cans of beer, 1 Shots of liquor per week     Comment: RARELY    • Drug use: No   • Sexual activity: Yes     Partners: Female     Birth  control/protection: Surgical         Current Outpatient Medications:   •  acetaminophen (TYLENOL) 325 MG tablet, Take 2 tablets by mouth Every 4 (Four) Hours As Needed for Mild Pain ., Disp: , Rfl:   •  allopurinol (ZYLOPRIM) 300 MG tablet, Take 300 mg by mouth daily., Disp: , Rfl:   •  aspirin (DRU ASPIRIN) 325 MG tablet, Take 1 tablet by mouth Daily., Disp: 100 tablet, Rfl: 3  •  esomeprazole (nexIUM) 40 MG capsule, Take 1 capsule by mouth Daily., Disp: 90 capsule, Rfl: 3  •  glucose blood (FREESTYLE LITE) test strip, 1 each by Other route See Admin Instructions. Use 1 to 2 times daily as instructed, Disp: , Rfl:   •  lisinopril (PRINIVIL,ZESTRIL) 40 MG tablet, Take 1 tablet by mouth Daily. (Patient taking differently: Take 10 mg by mouth 2 (Two) Times a Day.), Disp: 30 tablet, Rfl: 0  •  metFORMIN (GLUCOPHAGE) 500 MG tablet, Take 500 mg by mouth 2 (Two) Times a Day With Meals., Disp: , Rfl:   •  Multiple Vitamins-Minerals (MULTIVITAMIN ADULT PO), Take 1 tablet by mouth Daily., Disp: , Rfl:   •  PROAIR  (90 BASE) MCG/ACT inhaler, 2 puffs Every 4 (Four) Hours As Needed., Disp: , Rfl:   •  simvastatin (ZOCOR) 40 MG tablet, Take 40 mg by mouth every night., Disp: , Rfl:   •  sulfamethoxazole-trimethoprim (BACTRIM DS,SEPTRA DS) 800-160 MG per tablet, Take 1 tablet by mouth 2 (Two) Times a Day. Pt taking for month., Disp: , Rfl:   •  Vitamin D, Cholecalciferol, (CHOLECALCIFEROL) 400 units tablet, Take 400 Units by mouth Daily., Disp: , Rfl:     Review of Systems   Constitutional: Negative.    Gastrointestinal: Negative.        Objective   Vitals:    11/26/19 1400   BP: 118/66   Temp: 97.4 °F (36.3 °C)       Physical Exam   Constitutional: He is oriented to person, place, and time. He appears well-developed and well-nourished.   HENT:   Head: Normocephalic and atraumatic.   Abdominal: Soft. Bowel sounds are normal. He exhibits no distension and no mass. There is no tenderness. No hernia.   Neurological: He is  alert and oriented to person, place, and time.   Skin: Skin is warm and dry.   Psychiatric: He has a normal mood and affect. His behavior is normal. Judgment and thought content normal.   Vitals reviewed.      Assessment/Plan   Assessment:   Terrazas's esophagus - indeterminate for dysplasia  Candida esophagitis    Plan:   I discussed the results of the recent biopsies in detail today with Eugenio and his wife.  Due to the question of possible low-grade dysplasia I recommend that we repeat his EGD in the next few weeks and rebiopsy the distal esophagus.  If these repeat biopsies indeed confirm Terrazas's with low-grade dysplasia then I would refer him onto Dr. Hansen to consider RFA of this area.  He should otherwise continue with PPI therapy.        Rigoberto Walker M.D.  [facility]  [address] [place] [address]  Office: [phone]

## 2019-11-26 NOTE — TELEPHONE ENCOUNTER
----- Message from Rigoberto Walker MD sent at 11/25/2019 11:09 AM EST -----  Even the 2nd opinion regarding ? Low grade dysplasia was indeterminate  Recommend we repeat EGD in 4 weeks to rebiopsy the area

## 2019-12-19 ENCOUNTER — HOSPITAL ENCOUNTER (OUTPATIENT)
Dept: SLEEP MEDICINE | Facility: HOSPITAL | Age: 79
Discharge: HOME | End: 2019-12-19
Admitting: PSYCHIATRY & NEUROLOGY

## 2019-12-19 DIAGNOSIS — G47.33 OBSTRUCTIVE SLEEP APNEA: ICD-10-CM

## 2019-12-19 PROCEDURE — 95806 SLEEP STUDY UNATT&RESP EFFT: CPT

## 2020-01-02 ENCOUNTER — LAB (OUTPATIENT)
Dept: LAB | Facility: HOSPITAL | Age: 80
End: 2020-01-02

## 2020-01-02 DIAGNOSIS — G60.9 IDIOPATHIC PERIPHERAL NEUROPATHY: ICD-10-CM

## 2020-01-02 LAB — ERYTHROCYTE [SEDIMENTATION RATE] IN BLOOD: 19 MM/HR (ref 0–20)

## 2020-01-02 PROCEDURE — 84155 ASSAY OF PROTEIN SERUM: CPT | Performed by: PSYCHIATRY & NEUROLOGY

## 2020-01-02 PROCEDURE — 82595 ASSAY OF CRYOGLOBULIN: CPT

## 2020-01-02 PROCEDURE — 85652 RBC SED RATE AUTOMATED: CPT | Performed by: PSYCHIATRY & NEUROLOGY

## 2020-01-02 PROCEDURE — 82784 ASSAY IGA/IGD/IGG/IGM EACH: CPT

## 2020-01-02 PROCEDURE — 83825 ASSAY OF MERCURY: CPT | Performed by: PSYCHIATRY & NEUROLOGY

## 2020-01-02 PROCEDURE — 84165 PROTEIN E-PHORESIS SERUM: CPT | Performed by: PSYCHIATRY & NEUROLOGY

## 2020-01-02 PROCEDURE — 86334 IMMUNOFIX E-PHORESIS SERUM: CPT

## 2020-01-02 PROCEDURE — 82525 ASSAY OF COPPER: CPT

## 2020-01-02 PROCEDURE — 82175 ASSAY OF ARSENIC: CPT | Performed by: PSYCHIATRY & NEUROLOGY

## 2020-01-02 PROCEDURE — 36415 COLL VENOUS BLD VENIPUNCTURE: CPT | Performed by: PSYCHIATRY & NEUROLOGY

## 2020-01-02 PROCEDURE — 83655 ASSAY OF LEAD: CPT | Performed by: PSYCHIATRY & NEUROLOGY

## 2020-01-03 LAB
ALBUMIN SERPL-MCNC: 3.5 G/DL (ref 2.9–4.4)
ALBUMIN/GLOB SERPL: 1.2 {RATIO} (ref 0.7–1.7)
ALPHA1 GLOB FLD ELPH-MCNC: 0.2 G/DL (ref 0–0.4)
ALPHA2 GLOB SERPL ELPH-MCNC: 0.9 G/DL (ref 0.4–1)
ARSENIC BLD-MCNC: 8 UG/L (ref 2–23)
B-GLOBULIN SERPL ELPH-MCNC: 1.1 G/DL (ref 0.7–1.3)
GAMMA GLOB SERPL ELPH-MCNC: 0.8 G/DL (ref 0.4–1.8)
GLOBULIN SER CALC-MCNC: 3 G/DL (ref 2.2–3.9)
IGA SERPL-MCNC: 251 MG/DL (ref 61–437)
IGG SERPL-MCNC: 836 MG/DL (ref 700–1600)
IGM SERPL-MCNC: 36 MG/DL (ref 15–143)
LEAD BLD-MCNC: NORMAL UG/DL (ref 0–4)
Lab: NORMAL
M-SPIKE: NORMAL G/DL
MERCURY BLD-MCNC: NORMAL UG/L (ref 0–14.9)
PROT PATTERN SERPL ELPH-IMP: NORMAL
PROT PATTERN SERPL IFE-IMP: NORMAL
PROT SERPL-MCNC: 6.5 G/DL (ref 6–8.5)

## 2020-01-04 LAB — COPPER SERPL-MCNC: 94 UG/DL (ref 72–166)

## 2020-01-06 ENCOUNTER — OFFICE VISIT (OUTPATIENT)
Dept: NEUROLOGY | Facility: CLINIC | Age: 80
End: 2020-01-06

## 2020-01-06 VITALS
OXYGEN SATURATION: 97 % | BODY MASS INDEX: 25.34 KG/M2 | HEART RATE: 80 BPM | SYSTOLIC BLOOD PRESSURE: 142 MMHG | DIASTOLIC BLOOD PRESSURE: 74 MMHG | HEIGHT: 70 IN

## 2020-01-06 DIAGNOSIS — I69.30 SEQUELAE, POST-STROKE: ICD-10-CM

## 2020-01-06 DIAGNOSIS — G25.2 POSTURAL TREMOR: ICD-10-CM

## 2020-01-06 DIAGNOSIS — E11.42 DIABETIC PERIPHERAL NEUROPATHY (HCC): ICD-10-CM

## 2020-01-06 DIAGNOSIS — G91.2 NPH (NORMAL PRESSURE HYDROCEPHALUS) (HCC): Primary | ICD-10-CM

## 2020-01-06 LAB — CRYOGLOB SER QL 1D COLD INC: NORMAL

## 2020-01-06 PROCEDURE — 99214 OFFICE O/P EST MOD 30 MIN: CPT | Performed by: PSYCHIATRY & NEUROLOGY

## 2020-01-06 RX ORDER — LISINOPRIL 10 MG/1
10 TABLET ORAL 2 TIMES DAILY
COMMUNITY
Start: 2019-12-03 | End: 2021-05-11 | Stop reason: SDUPTHER

## 2020-01-06 NOTE — PROGRESS NOTES
CC: NPH, diabetic neuropathy, early Parkinson's disease    HPI:  Eugenio Joe is a  79 y.o.  right-handed white male who I am seeing in follow-up with diagnoses NPH, diabetic neuropathy and early Parkinson's disease.  I saw him last 11/26/2019.  I obtained further lab tests for neuropathy at that time.  Sed rate was 19, copper level 94, heavy metal screen negative, SPEP and IEP were negative for an MGUS, cryoglobulins negative.  His history is complex and is taken from the last note.    In June he was found to have a tiny acute left occipital infarct.  Vascular studies showed atherosclerotic changes without clear-cut hemodynamically significant stenoses.  The origins of the vertebral arteries were however somewhat obscured.  He has no history of atrial fibrillation.  He is on blood pressure and diabetes medications.  He quit smoking in his early 20s and he takes aspirin 325 and simvastatin 40 mg daily for stroke prevention.     The patient has had a progressive gait disturbance which dates back several years.  He was seen by Dr. Polanco and evaluated in 2017 for NPH.  I reviewed previous MRI scans as well as most recent MRI scan done 6/23/2019.  He has mild diffuse atrophy.  The ventricles are larger than expected but the temporal tips are not clearly as ballooned as expected.  The cerebral aqueduct is not significantly enlarged but there is flow void.  Some turbulent flow is seen in the third ventricle.  There is notable chronic white matter changes which are moderate in severity.  There is mild cortical atrophy which looks consistent with age.  A radionucleotide cisternogram was obtained which I also reviewed demonstrating ventricular reflux within 4 hours with persistent tracer at 48 hours consistent with NPH.  A lumbar puncture was obtained and the patient's gait was perhaps a bit better for a couple of days and then 3 days out he had sudden transient hearing loss and 2 to 3 days later he had a TIA or stroke.   "Shunting was not undertaken.  He sought a second opinion at the [facility] in [place] and they had the same opinion.  She states the term \"possible NPH\" was given.  They did not recommend shunting.    Since I last saw him he has had fewer issues with falls but he did have a bout of orthostasis.  According to the patient and his wife it is not clear when his lisinopril dosage was escalated 40 mg but they think it might of been related to being in the hospital.  His standing blood pressure apparently was low and the dosage was reduced to 10 mg daily and he has not had troubles since.    The patient denies any particular worsening overall of function.  We discussed his 40 pound weight loss since about 2015 January related to significant loss of appetite at times.  He has had at least a couple of bouts of that according to his wife.    They are the parents of Maryann RentersQTatyanaBitave Lab and grandparents of Olympic gold medalist swimmer Verónica Stafford.        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 SWEAT GLAND    • Cardiac disease    • Coronary artery disease     CABG 2012   • Diabetes mellitus (CMS/HCC)    • Difficulty in swallowing    • Difficulty walking    • Gout several years ago    medication  working   • Hearing aid worn     bilateral   • Hx of appendectomy    • Hydrocephaly (CMS/HCC)    • Hyperlipemia    • Hypertension    • Joint pain     or swelling   • Low back pain several years ago   • Orbit fracture    • Pulmonary embolism (CMS/HCC)     OCT 2016   • Rash     ARM-    • TIA (transient ischemic attack)          Past Surgical History:   Procedure Laterality Date   • APPENDECTOMY N/A 7/18/2019    Procedure: APPENDECTOMY LAPAROSCOPIC;  Surgeon: Luis Carlos Rick Jr., MD;  Location: [facility];  Service: General   • BRAIN SURGERY      BRAIN ABCESS 30 YR AGO   • CARDIAC SURGERY     • " COLONOSCOPY  2012    Ciciliano- normal per pt, no polyps    • CORONARY ARTERY BYPASS GRAFT     • ENDOSCOPY N/A 3/9/2017    Schatzki ring, dilated, LA Grade B esophagitis, HH, acquired duodenal stenosis   • ENDOSCOPY N/A 4/6/2018    candida, HH, torts, Schatzki ring, duodenal stenosis, dilatation perforemed, chronic inflammation   • ENDOSCOPY N/A 10/9/2019    White nummular lesions in esophageal mucosa, mild Schatzki ring, acquired duodenal stenosis, Path: Terrazas's, candida   • FACIAL FRACTURE SURGERY      to repair 6 broken bones   • ORBITAL FRACTURE OPEN REDUCTION INTERNAL FIXATION Right 1/13/2017    Procedure: RT ORBIT FLOOR FRACTURE REPAIR RIGHT ZMC FRACTURE REPAIR, RIGHT NASAL BONE FRACTURE CLOSED REDUCTION;  Surgeon: Edil Lester MD;  Location[place] [facility] [place] [facility];  Service:    • ORIF FOOT FRACTURE Right 9/9/2016    Procedure: RIGHT SECOND METATARSAL OPEN REDUCTION INTERNAL FIXATION WITh GRAFT FROM HEEL ;  Surgeon: Man Jenkins MD;  Location: [facility] OR [facility];  Service:    • SEPTOPLASTY     • SKIN CANCER EXCISION     • TONSILLECTOMY             Current Outpatient Medications:   •  acetaminophen (TYLENOL) 325 MG tablet, Take 2 tablets by mouth Every 4 (Four) Hours As Needed for Mild Pain ., Disp: , Rfl:   •  allopurinol (ZYLOPRIM) 300 MG tablet, Take 300 mg by mouth daily., Disp: , Rfl:   •  aspirin (DRU ASPIRIN) 325 MG tablet, Take 1 tablet by mouth Daily., Disp: 100 tablet, Rfl: 3  •  esomeprazole (nexIUM) 40 MG capsule, Take 1 capsule by mouth Daily., Disp: 90 capsule, Rfl: 3  •  lisinopril (PRINIVIL,ZESTRIL) 10 MG tablet, Take 10 mg by mouth 2 (Two) Times a Day., Disp: , Rfl:   •  metFORMIN (GLUCOPHAGE) 500 MG tablet, Take 500 mg by mouth 2 (Two) Times a Day With Meals., Disp: , Rfl:   •  Multiple Vitamins-Minerals (MULTIVITAMIN ADULT PO), Take 1 tablet by mouth Daily., Disp: , Rfl:   •  PROAIR  (90 BASE) MCG/ACT inhaler, 2 puffs Every 4 (Four) Hours As Needed., Disp: , Rfl:   •   simvastatin (ZOCOR) 40 MG tablet, Take 40 mg by mouth every night., Disp: , Rfl:   •  Vitamin D, Cholecalciferol, (CHOLECALCIFEROL) 400 units tablet, Take 400 Units by mouth Daily., Disp: , Rfl:   •  glucose blood (FREESTYLE LITE) test strip, 1 each by Other route See Admin Instructions. Use 1 to 2 times daily as instructed, Disp: , Rfl:   •  lisinopril (PRINIVIL,ZESTRIL) 40 MG tablet, Take 1 tablet by mouth Daily. (Patient taking differently: Take 10 mg by mouth 2 (Two) Times a Day.), Disp: 30 tablet, Rfl: 0  •  sulfamethoxazole-trimethoprim (BACTRIM DS,SEPTRA DS) 800-160 MG per tablet, Take 1 tablet by mouth 2 (Two) Times a Day. Pt taking for month., Disp: , Rfl:       Family History   Problem Relation Age of Onset   • Heart disease Mother    • Hypertension Mother    • Stroke Mother    • Diverticulitis Mother    • Heart disease Father    • Hypertension Father    • Malig Hyperthermia Neg Hx          Social History     Socioeconomic History   • Marital status:      Spouse name: Not on file   • Number of children: 2   • Years of education: 16   • Highest education level: Not on file   Occupational History   • Occupation: retired   Tobacco Use   • Smoking status: Former Smoker     Packs/day: 3.00     Years: 5.00     Pack years: 15.00     Types: Cigarettes     Start date:      Last attempt to quit:      Years since quittin.0   • Smokeless tobacco: Never Used   • Tobacco comment: Quit cold turkey   Substance and Sexual Activity   • Alcohol use: Yes     Alcohol/week: 2.0 standard drinks     Types: 1 Cans of beer, 1 Shots of liquor per week     Comment: RARELY    • Drug use: No   • Sexual activity: Yes     Partners: Female     Birth control/protection: Surgical         Allergies   Allergen Reactions   • Other Mental Status Change and Hallucinations     Oxy drugs   • Oxycodone Mental Status Change         Pain Scale: 0/10        ROS:  Review of Systems   Constitutional: Negative for activity change,  "appetite change and fatigue.   Eyes: Negative for pain, redness and itching.   Respiratory: Positive for cough, choking and shortness of breath.    Cardiovascular: Negative for chest pain and leg swelling.   Gastrointestinal: Negative for abdominal pain, nausea and vomiting.   Allergic/Immunologic: Negative for environmental allergies and food allergies.   Neurological: Positive for tremors and weakness. Negative for dizziness, seizures, syncope, facial asymmetry, speech difficulty, light-headedness, numbness and headaches.   Psychiatric/Behavioral: Negative for agitation, behavioral problems, confusion, decreased concentration, dysphoric mood, hallucinations, self-injury, sleep disturbance and suicidal ideas. The patient is not nervous/anxious and is not hyperactive.          I have reviewed and agree with the above ROS completed by the medical assistant.      Physical Exam:  Vitals:    01/06/20 1627   BP: 142/74   Pulse: 80   SpO2: 97%   Height: 177.8 cm (70\")     Orthostatic BP:    Body mass index is 25.34 kg/m².    Physical Exam  General: Well-developed white male no acute distress  HEENT: Normocephalic no evidence of trauma.  No masked face.  Throat negative  Neck: Supple  Heart: Regular rate and rhythm  Extremities: No pedal edema      Neurological Exam:   Mental Status: Awake, alert, oriented to person, place and time.  Conversant without evidence of an affective disorder, thought disorder, delusions or hallucinations.  Attention span and concentration are normal.  HCF: No aphasia, apraxia or dysarthria.  Recent and remote memory intact.  Knowledge of recent events intact.  CN: I:   II: Visual fields full without left inattention   III, IV, VI: Eye movements intact without nystagmus or ptosis.  Pupils equal  round and reactive to light.   V,VII: Light touch and pinprick intact all 3 divisions of V.  Facial muscles symmetrical.   VIII: Hearing intact to finger rub with hearing aids in   IX,X: Soft palate " elevates symmetrically   XI: Sternomastoid and trapezius are strong.   XII: Tongue midline without atrophy or fasciculations  Motor: Minimal/intermittent cogwheeling both upper extremities.  Normal tone at the knees.  Normal bulk in the upper and lower extremities   Power testing: Mild weakness of intrinsic hand muscles with good strength in other muscles tested in the upper extremities.  Ankle and toe dorsiflexion is weak bilaterally  Reflexes: Upper extremities: Symmetric        Lower extremities: Symmetric        Toe signs:  Sensory: Light touch: Minor reduction in the hands        Pinprick:        Vibration:        Position:    Cerebellar: Finger-to-nose: Minor postural tremor without any bradykinesia           Rapid movement: Normal           Heel-to-shin:  Gait and Station: Ambulates using his platform walker.  Step length is perhaps only minimally reduced.    Results:      Lab Results   Component Value Date    GLUCOSE 180 (H) 08/15/2019    BUN 17 08/15/2019    CREATININE 1.24 08/15/2019    EGFRIFNONA 56 (L) 08/15/2019    EGFRIFAFRI  09/02/2016      Comment:      <15 Indicative of kidney failure.    BCR 13.7 08/15/2019    CO2 22.6 08/15/2019    CALCIUM 9.7 08/15/2019    PROTENTOTREF 6.5 01/02/2020    ALBUMIN 3.5 01/02/2020    LABIL2 1.2 01/02/2020    AST 14 08/15/2019    ALT 12 08/15/2019       Lab Results   Component Value Date    WBC 12.73 (H) 08/15/2019    HGB 12.1 (L) 08/15/2019    HCT 38.2 08/15/2019    MCV 97.7 (H) 08/15/2019     08/15/2019         .  Lab Results   Component Value Date    RPR Non-Reactive 06/23/2019         Lab Results   Component Value Date    TSH 2.410 06/22/2019         Lab Results   Component Value Date    ISUFMTZG14 580 06/23/2019         Lab Results   Component Value Date    FOLATE >20.00 06/23/2019         Lab Results   Component Value Date    HGBA1C 7.80 (H) 06/24/2019         Lab Results   Component Value Date    GLUCOSE 180 (H) 08/15/2019    BUN 17 08/15/2019     CREATININE 1.24 08/15/2019    EGFRIFNONA 56 (L) 08/15/2019    EGFRIFAFRI  09/02/2016      Comment:      <15 Indicative of kidney failure.    BCR 13.7 08/15/2019    K 4.3 08/15/2019    CO2 22.6 08/15/2019    CALCIUM 9.7 08/15/2019    PROTENTOTREF 6.5 01/02/2020    ALBUMIN 3.5 01/02/2020    LABIL2 1.2 01/02/2020    AST 14 08/15/2019    ALT 12 08/15/2019         Lab Results   Component Value Date    WBC 12.73 (H) 08/15/2019    HGB 12.1 (L) 08/15/2019    HCT 38.2 08/15/2019    MCV 97.7 (H) 08/15/2019     08/15/2019             Assessment:   1.  NPH-not deemed a candidate for  shunt  2.  Diabetic neuropathy-remainder of treatable cause evaluation negative  3.  Tiny occipital stroke-asymptomatic.  To continue aspirin and risk factor control  4.  Mild Parkinson's disease-doubt that he would recognize any improvement on medicine at this point.          Plan:  1.  At this point I do not think he would benefit from Parkinson medications.  We discussed pathophysiology of Parkinson's disease and treatment.  2.  No further evaluation needed at this time.  3.  Follow-up in 1 year  4.  Encouraged to continue activities as long as they are safe from an exercise perspective        >50% of this 25-minute follow-up was spent counseling the patient and his wife regarding his multiple neurologic diagnoses which affect balance                Dictated utilizing Dragon dictation.

## 2020-01-08 ENCOUNTER — ANESTHESIA EVENT (OUTPATIENT)
Dept: GASTROENTEROLOGY | Facility: HOSPITAL | Age: 80
End: 2020-01-08

## 2020-01-08 ENCOUNTER — HOSPITAL ENCOUNTER (OUTPATIENT)
Facility: HOSPITAL | Age: 80
Setting detail: HOSPITAL OUTPATIENT SURGERY
Discharge: HOME | End: 2020-01-08
Attending: INTERNAL MEDICINE | Admitting: INTERNAL MEDICINE

## 2020-01-08 ENCOUNTER — ANESTHESIA (OUTPATIENT)
Dept: GASTROENTEROLOGY | Facility: HOSPITAL | Age: 80
End: 2020-01-08

## 2020-01-08 VITALS
RESPIRATION RATE: 20 BRPM | OXYGEN SATURATION: 97 % | BODY MASS INDEX: 27.06 KG/M2 | WEIGHT: 189 LBS | TEMPERATURE: 98.4 F | HEART RATE: 65 BPM | DIASTOLIC BLOOD PRESSURE: 85 MMHG | HEIGHT: 70 IN | SYSTOLIC BLOOD PRESSURE: 165 MMHG

## 2020-01-08 DIAGNOSIS — K22.70 BARRETT'S ESOPHAGUS WITHOUT DYSPLASIA: ICD-10-CM

## 2020-01-08 LAB — GLUCOSE BLDC GLUCOMTR-MCNC: 143 MG/DL (ref 70–130)

## 2020-01-08 PROCEDURE — 82962 GLUCOSE BLOOD TEST: CPT

## 2020-01-08 PROCEDURE — 88305 TISSUE EXAM BY PATHOLOGIST: CPT | Performed by: INTERNAL MEDICINE

## 2020-01-08 PROCEDURE — 43239 EGD BIOPSY SINGLE/MULTIPLE: CPT | Performed by: INTERNAL MEDICINE

## 2020-01-08 PROCEDURE — 25010000002 PROPOFOL 10 MG/ML EMULSION: Performed by: ANESTHESIOLOGY

## 2020-01-08 RX ORDER — PROPOFOL 10 MG/ML
VIAL (ML) INTRAVENOUS AS NEEDED
Status: DISCONTINUED | OUTPATIENT
Start: 2020-01-08 | End: 2020-01-08 | Stop reason: SURG

## 2020-01-08 RX ORDER — SODIUM CHLORIDE 9 MG/ML
30 INJECTION, SOLUTION INTRAVENOUS CONTINUOUS PRN
Status: DISCONTINUED | OUTPATIENT
Start: 2020-01-08 | End: 2020-01-08 | Stop reason: HOSPADM

## 2020-01-08 RX ORDER — LIDOCAINE HYDROCHLORIDE 20 MG/ML
INJECTION, SOLUTION INFILTRATION; PERINEURAL AS NEEDED
Status: DISCONTINUED | OUTPATIENT
Start: 2020-01-08 | End: 2020-01-08 | Stop reason: SURG

## 2020-01-08 RX ORDER — SODIUM CHLORIDE 0.9 % (FLUSH) 0.9 %
10 SYRINGE (ML) INJECTION AS NEEDED
Status: DISCONTINUED | OUTPATIENT
Start: 2020-01-08 | End: 2020-01-08 | Stop reason: HOSPADM

## 2020-01-08 RX ADMIN — SODIUM CHLORIDE 30 ML/HR: 9 INJECTION, SOLUTION INTRAVENOUS at 12:20

## 2020-01-08 RX ADMIN — PROPOFOL 70 MG: 10 INJECTION, EMULSION INTRAVENOUS at 13:03

## 2020-01-08 RX ADMIN — LIDOCAINE HYDROCHLORIDE 60 MG: 20 INJECTION, SOLUTION INFILTRATION; PERINEURAL at 13:03

## 2020-01-08 RX ADMIN — PROPOFOL 140 MCG/KG/MIN: 10 INJECTION, EMULSION INTRAVENOUS at 13:03

## 2020-01-08 NOTE — H&P
[facility]  Pre Procedure History & Physical    Chief Complaint:   Barretts    Subjective     HPI:   Eugenio Joe is a 78 y.o. male who presents today for follow up.     Recent EGD for dysphagia.  Found to have candida esophagitis.  Also with distal schatzki ring biopsied for disruption.  Pathology from that area returned equivocal for Barretts with LGD.  2nd opinion from Gi pathologist was the same.      Overall Eugenio reports resolution of his dysphagia.  He has completed course of Fluconazole.      Past Medical History:   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 SWEAT GLAND    • Cardiac disease    • Coronary artery disease     CABG 2012   • Diabetes mellitus (CMS/HCC)    • Difficulty in swallowing    • Difficulty walking    • Gout several years ago    medication  working   • Hearing aid worn     bilateral   • Hx of appendectomy    • Hydrocephaly (CMS/HCC)    • Hyperlipemia    • Hypertension    • Joint pain     or swelling   • Low back pain several years ago   • Orbit fracture    • Pulmonary embolism (CMS/HCC)     OCT 2016   • Rash     ARM-    • TIA (transient ischemic attack)        Past Surgical History:  Past Surgical History:   Procedure Laterality Date   • APPENDECTOMY N/A 7/18/2019    Procedure: APPENDECTOMY LAPAROSCOPIC;  Surgeon: Luis Carlos Rick Jr., MD;  Location: [facility] OR;  Service: General   • BRAIN SURGERY      BRAIN ABCESS 30 YR AGO   • CARDIAC SURGERY     • COLONOSCOPY  2012    Ciciliano- normal per pt, no polyps    • CORONARY ARTERY BYPASS GRAFT     • ENDOSCOPY N/A 3/9/2017    Schatzki ring, dilated, LA Grade B esophagitis, HH, acquired duodenal stenosis   • ENDOSCOPY N/A 4/6/2018    candida, HH, torts, Schatzki ring, duodenal stenosis, dilatation perforemed, chronic inflammation   • ENDOSCOPY N/A 10/9/2019    White nummular lesions in  esophageal mucosa, mild Schatzki ring, acquired duodenal stenosis, Path: Terrazas's, candida   • FACIAL FRACTURE SURGERY      to repair 6 broken bones   • ORBITAL FRACTURE OPEN REDUCTION INTERNAL FIXATION Right 1/13/2017    Procedure: RT ORBIT FLOOR FRACTURE REPAIR RIGHT ZMC FRACTURE REPAIR, RIGHT NASAL BONE FRACTURE CLOSED REDUCTION;  Surgeon: Edil Lester MD;  Location: [facility];  Service:    • ORIF FOOT FRACTURE Right 9/9/2016    Procedure: RIGHT SECOND METATARSAL OPEN REDUCTION INTERNAL FIXATION WITh GRAFT FROM HEEL ;  Surgeon: Man Jenkins MD;  [facility];  Service:    • SEPTOPLASTY     • SKIN CANCER EXCISION     • TONSILLECTOMY         Family History:  Family History   Problem Relation Age of Onset   • Heart disease Mother    • Hypertension Mother    • Stroke Mother    • Diverticulitis Mother    • Heart disease Father    • Hypertension Father    • Malig Hyperthermia Neg Hx        Social History:   reports that he quit smoking about 59 years ago. His smoking use included cigarettes. He started smoking about 63 years ago. He has a 15.00 pack-year smoking history. He has never used smokeless tobacco. He reports that he drinks about 2.0 standard drinks of alcohol per week. He reports that he does not use drugs.    Medications:   Medications Prior to Admission   Medication Sig Dispense Refill Last Dose   • acetaminophen (TYLENOL) 325 MG tablet Take 2 tablets by mouth Every 4 (Four) Hours As Needed for Mild Pain .   1/7/2020 at Unknown time   • allopurinol (ZYLOPRIM) 300 MG tablet Take 300 mg by mouth daily.   1/7/2020 at Unknown time   • aspirin (RDU ASPIRIN) 325 MG tablet Take 1 tablet by mouth Daily. 100 tablet 3 1/7/2020 at Unknown time   • esomeprazole (nexIUM) 40 MG capsule Take 1 capsule by mouth Daily. 90 capsule 3 1/7/2020 at Unknown time   • lisinopril (PRINIVIL,ZESTRIL) 10 MG tablet Take 10 mg by mouth 2 (Two) Times a Day.   1/7/2020 at Unknown time   • lisinopril  "(PRINIVIL,ZESTRIL) 40 MG tablet Take 1 tablet by mouth Daily. (Patient taking differently: Take 10 mg by mouth 2 (Two) Times a Day.) 30 tablet 0 1/7/2020 at Unknown time   • metFORMIN (GLUCOPHAGE) 500 MG tablet Take 500 mg by mouth 2 (Two) Times a Day With Meals.   1/7/2020 at Unknown time   • Multiple Vitamins-Minerals (MULTIVITAMIN ADULT PO) Take 1 tablet by mouth Daily.   1/7/2020 at Unknown time   • PROAIR  (90 BASE) MCG/ACT inhaler 2 puffs Every 4 (Four) Hours As Needed.   1/7/2020 at Unknown time   • simvastatin (ZOCOR) 40 MG tablet Take 40 mg by mouth every night.   1/7/2020 at Unknown time   • Vitamin D, Cholecalciferol, (CHOLECALCIFEROL) 400 units tablet Take 400 Units by mouth Daily.   1/7/2020 at Unknown time   • glucose blood (FREESTYLE LITE) test strip 1 each by Other route See Admin Instructions. Use 1 to 2 times daily as instructed   Taking   • sulfamethoxazole-trimethoprim (BACTRIM DS,SEPTRA DS) 800-160 MG per tablet Take 1 tablet by mouth 2 (Two) Times a Day. Pt taking for month.   More than a month at Unknown time       Allergies:  Other and Oxycodone    ROS:    Pertinent items are noted in HPI     Objective     Blood pressure (!) 189/80, pulse 70, temperature 98.4 °F (36.9 °C), temperature source Oral, resp. rate 20, height 177.8 cm (70\"), weight 85.7 kg (189 lb), SpO2 96 %.    Physical Exam   Constitutional: Pt is oriented to person, place, and time and well-developed, well-nourished, and in no distress.   Mouth/Throat: Oropharynx is clear and moist.   Neck: Normal range of motion.   Cardiovascular: Normal rate, regular rhythm and normal heart sounds.    Pulmonary/Chest: Effort normal and breath sounds normal.   Abdominal: Soft. Nontender  Skin: Skin is warm and dry.   Psychiatric: Mood, memory, affect and judgment normal.     Assessment/Plan     Diagnosis:  Barretts with possible LGD    Anticipated Surgical Procedure:  EGD    The risks, benefits, and alternatives of this procedure have " been discussed with the patient or the responsible party- the patient understands and agrees to proceed.

## 2020-01-08 NOTE — ANESTHESIA PREPROCEDURE EVALUATION
Anesthesia Evaluation                  Airway   Mallampati: II  Dental      Pulmonary - normal exam   (+) a smoker Former, asthma,  (-) sleep apnea    ROS comment: Negative patient screen for MERCEDES    Cardiovascular - normal exam    (+) hypertension, CAD, CABG, hyperlipidemia,       Neuro/Psych  (+) TIA, syncope,     GI/Hepatic/Renal/Endo    (+)   renal disease CRI, diabetes mellitus type 2,     Musculoskeletal     Abdominal    Substance History      OB/GYN          Other                        Anesthesia Plan    ASA 3     MAC       Anesthetic plan, all risks, benefits, and alternatives have been provided, discussed and informed consent has been obtained with: patient.

## 2020-01-08 NOTE — ANESTHESIA POSTPROCEDURE EVALUATION
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 [K22.70])    Surgeon:  Rigoberto Walker MD Provider:  Viktor Parmar MD    Anesthesia Type:  MAC ASA Status:  3          Anesthesia Type: MAC    Vitals  Vitals Value Taken Time   /85 1/8/2020  1:32 PM   Temp     Pulse 65 1/8/2020  1:32 PM   Resp 20 1/8/2020  1:32 PM   SpO2 97 % 1/8/2020  1:32 PM           Post Anesthesia Care and Evaluation    Patient location during evaluation: bedside  Patient participation: complete - patient participated  Level of consciousness: awake  Pain score: 1  Pain management: adequate  Airway patency: patent  Anesthetic complications: No anesthetic complications  PONV Status: controlled  Cardiovascular status: acceptable  Respiratory status: acceptable  Hydration status: acceptable    Comments: --------------------            01/08/20               1332     --------------------   BP:       165/85     Pulse:      65       Resp:       20       Temp:                SpO2:      97%      --------------------

## 2020-01-28 ENCOUNTER — HOSPITAL ENCOUNTER (OUTPATIENT)
Dept: CT IMAGING | Facility: HOSPITAL | Age: 80
Setting detail: OBSERVATION
Discharge: HOME | End: 2020-01-30
Attending: HOSPITALIST | Admitting: INTERNAL MEDICINE

## 2020-01-28 ENCOUNTER — TRANSCRIBE ORDERS (OUTPATIENT)
Dept: ADMINISTRATIVE | Facility: HOSPITAL | Age: 80
End: 2020-01-28

## 2020-01-28 DIAGNOSIS — R10.9 ACUTE ABDOMINAL PAIN: ICD-10-CM

## 2020-01-28 DIAGNOSIS — R10.9 ACUTE ABDOMINAL PAIN: Primary | ICD-10-CM

## 2020-01-28 PROBLEM — Z90.49 HX OF APPENDECTOMY: Status: ACTIVE | Noted: 2020-01-28

## 2020-01-28 PROBLEM — K57.92 DIVERTICULITIS: Status: ACTIVE | Noted: 2020-01-28

## 2020-01-28 LAB
ALBUMIN SERPL-MCNC: 4 G/DL (ref 3.5–5.2)
ALBUMIN/GLOB SERPL: 1.4 G/DL
ALP SERPL-CCNC: 68 U/L (ref 39–117)
ALT SERPL W P-5'-P-CCNC: 10 U/L (ref 1–41)
ANION GAP SERPL CALCULATED.3IONS-SCNC: 17.6 MMOL/L (ref 5–15)
AST SERPL-CCNC: 13 U/L (ref 1–40)
BASOPHILS # BLD AUTO: 0.04 10*3/MM3 (ref 0–0.2)
BASOPHILS NFR BLD AUTO: 0.3 % (ref 0–1.5)
BILIRUB SERPL-MCNC: 0.9 MG/DL (ref 0.2–1.2)
BUN BLD-MCNC: 18 MG/DL (ref 8–23)
BUN/CREAT SERPL: 16.1 (ref 7–25)
CALCIUM SPEC-SCNC: 9.6 MG/DL (ref 8.6–10.5)
CHLORIDE SERPL-SCNC: 97 MMOL/L (ref 98–107)
CO2 SERPL-SCNC: 21.4 MMOL/L (ref 22–29)
CREAT BLD-MCNC: 1.12 MG/DL (ref 0.76–1.27)
D-LACTATE SERPL-SCNC: 1.8 MMOL/L (ref 0.5–2)
DEPRECATED RDW RBC AUTO: 48.5 FL (ref 37–54)
EOSINOPHIL # BLD AUTO: 0.06 10*3/MM3 (ref 0–0.4)
EOSINOPHIL NFR BLD AUTO: 0.5 % (ref 0.3–6.2)
ERYTHROCYTE [DISTWIDTH] IN BLOOD BY AUTOMATED COUNT: 14.1 % (ref 12.3–15.4)
GFR SERPL CREATININE-BSD FRML MDRD: 63 ML/MIN/1.73
GLOBULIN UR ELPH-MCNC: 2.8 GM/DL
GLUCOSE BLD-MCNC: 153 MG/DL (ref 65–99)
GLUCOSE BLDC GLUCOMTR-MCNC: 247 MG/DL (ref 70–130)
HCT VFR BLD AUTO: 38.4 % (ref 37.5–51)
HGB BLD-MCNC: 13.1 G/DL (ref 13–17.7)
IMM GRANULOCYTES # BLD AUTO: 0.03 10*3/MM3 (ref 0–0.05)
IMM GRANULOCYTES NFR BLD AUTO: 0.2 % (ref 0–0.5)
LYMPHOCYTES # BLD AUTO: 1.7 10*3/MM3 (ref 0.7–3.1)
LYMPHOCYTES NFR BLD AUTO: 12.8 % (ref 19.6–45.3)
MCH RBC QN AUTO: 32 PG (ref 26.6–33)
MCHC RBC AUTO-ENTMCNC: 34.1 G/DL (ref 31.5–35.7)
MCV RBC AUTO: 93.9 FL (ref 79–97)
MONOCYTES # BLD AUTO: 0.97 10*3/MM3 (ref 0.1–0.9)
MONOCYTES NFR BLD AUTO: 7.3 % (ref 5–12)
NEUTROPHILS # BLD AUTO: 10.5 10*3/MM3 (ref 1.7–7)
NEUTROPHILS NFR BLD AUTO: 78.9 % (ref 42.7–76)
NRBC BLD AUTO-RTO: 0 /100 WBC (ref 0–0.2)
PLATELET # BLD AUTO: 221 10*3/MM3 (ref 140–450)
PMV BLD AUTO: 10.1 FL (ref 6–12)
POTASSIUM BLD-SCNC: 4.2 MMOL/L (ref 3.5–5.2)
PROT SERPL-MCNC: 6.8 G/DL (ref 6–8.5)
RBC # BLD AUTO: 4.09 10*6/MM3 (ref 4.14–5.8)
SODIUM BLD-SCNC: 136 MMOL/L (ref 136–145)
WBC NRBC COR # BLD: 13.3 10*3/MM3 (ref 3.4–10.8)

## 2020-01-28 PROCEDURE — G0378 HOSPITAL OBSERVATION PER HR: HCPCS

## 2020-01-28 PROCEDURE — G0379 DIRECT REFER HOSPITAL OBSERV: HCPCS

## 2020-01-28 PROCEDURE — 82962 GLUCOSE BLOOD TEST: CPT

## 2020-01-28 PROCEDURE — 74176 CT ABD & PELVIS W/O CONTRAST: CPT

## 2020-01-28 PROCEDURE — 85025 COMPLETE CBC W/AUTO DIFF WBC: CPT | Performed by: NURSE PRACTITIONER

## 2020-01-28 PROCEDURE — 25010000002 PIPERACILLIN SOD-TAZOBACTAM PER 1 G: Performed by: NURSE PRACTITIONER

## 2020-01-28 PROCEDURE — 96365 THER/PROPH/DIAG IV INF INIT: CPT

## 2020-01-28 PROCEDURE — 80053 COMPREHEN METABOLIC PANEL: CPT | Performed by: NURSE PRACTITIONER

## 2020-01-28 PROCEDURE — 83605 ASSAY OF LACTIC ACID: CPT | Performed by: NURSE PRACTITIONER

## 2020-01-28 PROCEDURE — 63710000001 INSULIN LISPRO (HUMAN) PER 5 UNITS: Performed by: NURSE PRACTITIONER

## 2020-01-28 RX ORDER — SODIUM CHLORIDE 0.9 % (FLUSH) 0.9 %
10 SYRINGE (ML) INJECTION EVERY 12 HOURS SCHEDULED
Status: DISCONTINUED | OUTPATIENT
Start: 2020-01-28 | End: 2020-01-30 | Stop reason: HOSPADM

## 2020-01-28 RX ORDER — ASPIRIN 325 MG
325 TABLET ORAL DAILY
Status: DISCONTINUED | OUTPATIENT
Start: 2020-01-28 | End: 2020-01-30 | Stop reason: HOSPADM

## 2020-01-28 RX ORDER — ONDANSETRON 2 MG/ML
4 INJECTION INTRAMUSCULAR; INTRAVENOUS EVERY 6 HOURS PRN
Status: DISCONTINUED | OUTPATIENT
Start: 2020-01-28 | End: 2020-01-30 | Stop reason: HOSPADM

## 2020-01-28 RX ORDER — NICOTINE POLACRILEX 4 MG
15 LOZENGE BUCCAL
Status: DISCONTINUED | OUTPATIENT
Start: 2020-01-28 | End: 2020-01-30 | Stop reason: HOSPADM

## 2020-01-28 RX ORDER — ACETAMINOPHEN 650 MG/1
650 SUPPOSITORY RECTAL EVERY 4 HOURS PRN
Status: DISCONTINUED | OUTPATIENT
Start: 2020-01-28 | End: 2020-01-30 | Stop reason: HOSPADM

## 2020-01-28 RX ORDER — ALBUTEROL SULFATE 2.5 MG/3ML
2.5 SOLUTION RESPIRATORY (INHALATION) EVERY 6 HOURS PRN
Status: DISCONTINUED | OUTPATIENT
Start: 2020-01-28 | End: 2020-01-30 | Stop reason: HOSPADM

## 2020-01-28 RX ORDER — ALLOPURINOL 300 MG/1
300 TABLET ORAL DAILY
Status: DISCONTINUED | OUTPATIENT
Start: 2020-01-28 | End: 2020-01-30 | Stop reason: HOSPADM

## 2020-01-28 RX ORDER — ACETAMINOPHEN 160 MG/5ML
650 SOLUTION ORAL EVERY 4 HOURS PRN
Status: DISCONTINUED | OUTPATIENT
Start: 2020-01-28 | End: 2020-01-30 | Stop reason: HOSPADM

## 2020-01-28 RX ORDER — ACETAMINOPHEN 325 MG/1
650 TABLET ORAL EVERY 4 HOURS PRN
Status: DISCONTINUED | OUTPATIENT
Start: 2020-01-28 | End: 2020-01-30 | Stop reason: HOSPADM

## 2020-01-28 RX ORDER — PANTOPRAZOLE SODIUM 40 MG/1
40 TABLET, DELAYED RELEASE ORAL EVERY MORNING
Status: DISCONTINUED | OUTPATIENT
Start: 2020-01-29 | End: 2020-01-30 | Stop reason: HOSPADM

## 2020-01-28 RX ORDER — DEXTROSE MONOHYDRATE 25 G/50ML
25 INJECTION, SOLUTION INTRAVENOUS
Status: DISCONTINUED | OUTPATIENT
Start: 2020-01-28 | End: 2020-01-30 | Stop reason: HOSPADM

## 2020-01-28 RX ORDER — SODIUM CHLORIDE 0.9 % (FLUSH) 0.9 %
10 SYRINGE (ML) INJECTION AS NEEDED
Status: DISCONTINUED | OUTPATIENT
Start: 2020-01-28 | End: 2020-01-30 | Stop reason: HOSPADM

## 2020-01-28 RX ORDER — LISINOPRIL 10 MG/1
10 TABLET ORAL 2 TIMES DAILY
Status: DISCONTINUED | OUTPATIENT
Start: 2020-01-28 | End: 2020-01-30 | Stop reason: HOSPADM

## 2020-01-28 RX ORDER — ATORVASTATIN CALCIUM 20 MG/1
20 TABLET, FILM COATED ORAL DAILY
Status: DISCONTINUED | OUTPATIENT
Start: 2020-01-28 | End: 2020-01-30 | Stop reason: HOSPADM

## 2020-01-28 RX ORDER — SODIUM CHLORIDE 9 MG/ML
75 INJECTION, SOLUTION INTRAVENOUS CONTINUOUS
Status: DISCONTINUED | OUTPATIENT
Start: 2020-01-28 | End: 2020-01-29

## 2020-01-28 RX ORDER — SODIUM CHLORIDE 9 MG/ML
100 INJECTION, SOLUTION INTRAVENOUS CONTINUOUS
Status: DISCONTINUED | OUTPATIENT
Start: 2020-01-28 | End: 2020-01-28

## 2020-01-28 RX ADMIN — SODIUM CHLORIDE, PRESERVATIVE FREE 10 ML: 5 INJECTION INTRAVENOUS at 21:07

## 2020-01-28 RX ADMIN — LISINOPRIL 10 MG: 10 TABLET ORAL at 21:06

## 2020-01-28 RX ADMIN — INSULIN LISPRO 3 UNITS: 100 INJECTION, SOLUTION INTRAVENOUS; SUBCUTANEOUS at 21:08

## 2020-01-28 RX ADMIN — SODIUM CHLORIDE 75 ML/HR: 9 INJECTION, SOLUTION INTRAVENOUS at 18:14

## 2020-01-28 RX ADMIN — TAZOBACTAM SODIUM AND PIPERACILLIN SODIUM 3.38 G: 375; 3 INJECTION, SOLUTION INTRAVENOUS at 22:19

## 2020-01-29 PROBLEM — R26.9 GAIT ABNORMALITY: Status: ACTIVE | Noted: 2020-01-29

## 2020-01-29 LAB
ANION GAP SERPL CALCULATED.3IONS-SCNC: 14 MMOL/L (ref 5–15)
BUN BLD-MCNC: 14 MG/DL (ref 8–23)
BUN/CREAT SERPL: 13.6 (ref 7–25)
CALCIUM SPEC-SCNC: 9 MG/DL (ref 8.6–10.5)
CHLORIDE SERPL-SCNC: 99 MMOL/L (ref 98–107)
CO2 SERPL-SCNC: 21 MMOL/L (ref 22–29)
CREAT BLD-MCNC: 1.03 MG/DL (ref 0.76–1.27)
DEPRECATED RDW RBC AUTO: 45.6 FL (ref 37–54)
ERYTHROCYTE [DISTWIDTH] IN BLOOD BY AUTOMATED COUNT: 13.7 % (ref 12.3–15.4)
GFR SERPL CREATININE-BSD FRML MDRD: 69 ML/MIN/1.73
GLUCOSE BLD-MCNC: 174 MG/DL (ref 65–99)
GLUCOSE BLDC GLUCOMTR-MCNC: 163 MG/DL (ref 70–130)
GLUCOSE BLDC GLUCOMTR-MCNC: 172 MG/DL (ref 70–130)
GLUCOSE BLDC GLUCOMTR-MCNC: 185 MG/DL (ref 70–130)
GLUCOSE BLDC GLUCOMTR-MCNC: 238 MG/DL (ref 70–130)
HCT VFR BLD AUTO: 35.3 % (ref 37.5–51)
HGB BLD-MCNC: 11.9 G/DL (ref 13–17.7)
MCH RBC QN AUTO: 31.6 PG (ref 26.6–33)
MCHC RBC AUTO-ENTMCNC: 33.7 G/DL (ref 31.5–35.7)
MCV RBC AUTO: 93.6 FL (ref 79–97)
PLATELET # BLD AUTO: 207 10*3/MM3 (ref 140–450)
PMV BLD AUTO: 10.4 FL (ref 6–12)
POTASSIUM BLD-SCNC: 3.7 MMOL/L (ref 3.5–5.2)
RBC # BLD AUTO: 3.77 10*6/MM3 (ref 4.14–5.8)
SODIUM BLD-SCNC: 134 MMOL/L (ref 136–145)
WBC NRBC COR # BLD: 11.53 10*3/MM3 (ref 3.4–10.8)

## 2020-01-29 PROCEDURE — 25010000002 PIPERACILLIN SOD-TAZOBACTAM PER 1 G: Performed by: NURSE PRACTITIONER

## 2020-01-29 PROCEDURE — 96366 THER/PROPH/DIAG IV INF ADDON: CPT

## 2020-01-29 PROCEDURE — 80048 BASIC METABOLIC PNL TOTAL CA: CPT | Performed by: NURSE PRACTITIONER

## 2020-01-29 PROCEDURE — 63710000001 INSULIN LISPRO (HUMAN) PER 5 UNITS: Performed by: NURSE PRACTITIONER

## 2020-01-29 PROCEDURE — 82962 GLUCOSE BLOOD TEST: CPT

## 2020-01-29 PROCEDURE — G0378 HOSPITAL OBSERVATION PER HR: HCPCS

## 2020-01-29 PROCEDURE — 92610 EVALUATE SWALLOWING FUNCTION: CPT

## 2020-01-29 PROCEDURE — 99213 OFFICE O/P EST LOW 20 MIN: CPT | Performed by: INTERNAL MEDICINE

## 2020-01-29 PROCEDURE — 85027 COMPLETE CBC AUTOMATED: CPT | Performed by: NURSE PRACTITIONER

## 2020-01-29 RX ORDER — AMOXICILLIN AND CLAVULANATE POTASSIUM 875; 125 MG/1; MG/1
1 TABLET, FILM COATED ORAL EVERY 8 HOURS
Status: DISCONTINUED | OUTPATIENT
Start: 2020-01-30 | End: 2020-01-30 | Stop reason: HOSPADM

## 2020-01-29 RX ADMIN — INSULIN LISPRO 2 UNITS: 100 INJECTION, SOLUTION INTRAVENOUS; SUBCUTANEOUS at 08:42

## 2020-01-29 RX ADMIN — LISINOPRIL 10 MG: 10 TABLET ORAL at 08:44

## 2020-01-29 RX ADMIN — INSULIN LISPRO 3 UNITS: 100 INJECTION, SOLUTION INTRAVENOUS; SUBCUTANEOUS at 17:59

## 2020-01-29 RX ADMIN — SODIUM CHLORIDE 75 ML/HR: 9 INJECTION, SOLUTION INTRAVENOUS at 10:59

## 2020-01-29 RX ADMIN — TAZOBACTAM SODIUM AND PIPERACILLIN SODIUM 3.38 G: 375; 3 INJECTION, SOLUTION INTRAVENOUS at 04:01

## 2020-01-29 RX ADMIN — SODIUM CHLORIDE, PRESERVATIVE FREE 10 ML: 5 INJECTION INTRAVENOUS at 08:43

## 2020-01-29 RX ADMIN — ATORVASTATIN CALCIUM 20 MG: 20 TABLET, FILM COATED ORAL at 08:42

## 2020-01-29 RX ADMIN — SODIUM CHLORIDE, PRESERVATIVE FREE 10 ML: 5 INJECTION INTRAVENOUS at 20:10

## 2020-01-29 RX ADMIN — ASPIRIN 325 MG: 325 TABLET ORAL at 08:42

## 2020-01-29 RX ADMIN — TAZOBACTAM SODIUM AND PIPERACILLIN SODIUM 3.38 G: 375; 3 INJECTION, SOLUTION INTRAVENOUS at 11:03

## 2020-01-29 RX ADMIN — ALLOPURINOL 300 MG: 300 TABLET ORAL at 08:42

## 2020-01-29 RX ADMIN — PANTOPRAZOLE SODIUM 40 MG: 40 TABLET, DELAYED RELEASE ORAL at 07:15

## 2020-01-29 RX ADMIN — INSULIN LISPRO 2 UNITS: 100 INJECTION, SOLUTION INTRAVENOUS; SUBCUTANEOUS at 21:38

## 2020-01-29 RX ADMIN — INSULIN LISPRO 2 UNITS: 100 INJECTION, SOLUTION INTRAVENOUS; SUBCUTANEOUS at 13:42

## 2020-01-29 RX ADMIN — TAZOBACTAM SODIUM AND PIPERACILLIN SODIUM 3.38 G: 375; 3 INJECTION, SOLUTION INTRAVENOUS at 20:10

## 2020-01-29 RX ADMIN — LISINOPRIL 10 MG: 10 TABLET ORAL at 20:10

## 2020-01-30 VITALS
TEMPERATURE: 97.2 F | SYSTOLIC BLOOD PRESSURE: 130 MMHG | BODY MASS INDEX: 27.11 KG/M2 | RESPIRATION RATE: 16 BRPM | DIASTOLIC BLOOD PRESSURE: 75 MMHG | OXYGEN SATURATION: 92 % | HEART RATE: 70 BPM | WEIGHT: 188.93 LBS

## 2020-01-30 LAB
ANION GAP SERPL CALCULATED.3IONS-SCNC: 14.3 MMOL/L (ref 5–15)
BUN BLD-MCNC: 13 MG/DL (ref 8–23)
BUN/CREAT SERPL: 10.7 (ref 7–25)
CALCIUM SPEC-SCNC: 9 MG/DL (ref 8.6–10.5)
CHLORIDE SERPL-SCNC: 104 MMOL/L (ref 98–107)
CO2 SERPL-SCNC: 24.7 MMOL/L (ref 22–29)
CREAT BLD-MCNC: 1.22 MG/DL (ref 0.76–1.27)
DEPRECATED RDW RBC AUTO: 46.3 FL (ref 37–54)
ERYTHROCYTE [DISTWIDTH] IN BLOOD BY AUTOMATED COUNT: 13.6 % (ref 12.3–15.4)
GFR SERPL CREATININE-BSD FRML MDRD: 57 ML/MIN/1.73
GLUCOSE BLD-MCNC: 172 MG/DL (ref 65–99)
GLUCOSE BLDC GLUCOMTR-MCNC: 163 MG/DL (ref 70–130)
HCT VFR BLD AUTO: 35.3 % (ref 37.5–51)
HGB BLD-MCNC: 11.9 G/DL (ref 13–17.7)
MCH RBC QN AUTO: 31.5 PG (ref 26.6–33)
MCHC RBC AUTO-ENTMCNC: 33.7 G/DL (ref 31.5–35.7)
MCV RBC AUTO: 93.4 FL (ref 79–97)
PLATELET # BLD AUTO: 209 10*3/MM3 (ref 140–450)
PMV BLD AUTO: 10.7 FL (ref 6–12)
POTASSIUM BLD-SCNC: 4 MMOL/L (ref 3.5–5.2)
RBC # BLD AUTO: 3.78 10*6/MM3 (ref 4.14–5.8)
SODIUM BLD-SCNC: 143 MMOL/L (ref 136–145)
WBC NRBC COR # BLD: 9.67 10*3/MM3 (ref 3.4–10.8)

## 2020-01-30 PROCEDURE — 80048 BASIC METABOLIC PNL TOTAL CA: CPT | Performed by: NURSE PRACTITIONER

## 2020-01-30 PROCEDURE — G0378 HOSPITAL OBSERVATION PER HR: HCPCS

## 2020-01-30 PROCEDURE — 63710000001 INSULIN LISPRO (HUMAN) PER 5 UNITS: Performed by: NURSE PRACTITIONER

## 2020-01-30 PROCEDURE — 85027 COMPLETE CBC AUTOMATED: CPT | Performed by: NURSE PRACTITIONER

## 2020-01-30 PROCEDURE — 82962 GLUCOSE BLOOD TEST: CPT

## 2020-01-30 PROCEDURE — 97162 PT EVAL MOD COMPLEX 30 MIN: CPT

## 2020-01-30 PROCEDURE — 97110 THERAPEUTIC EXERCISES: CPT

## 2020-01-30 RX ORDER — AMOXICILLIN AND CLAVULANATE POTASSIUM 875; 125 MG/1; MG/1
1 TABLET, FILM COATED ORAL EVERY 8 HOURS
Qty: 17 TABLET | Refills: 0 | Status: SHIPPED | OUTPATIENT
Start: 2020-01-30 | End: 2020-02-05

## 2020-01-30 RX ADMIN — ALLOPURINOL 300 MG: 300 TABLET ORAL at 09:13

## 2020-01-30 RX ADMIN — ASPIRIN 325 MG: 325 TABLET ORAL at 09:13

## 2020-01-30 RX ADMIN — AMOXICILLIN AND CLAVULANATE POTASSIUM 1 TABLET: 875; 125 TABLET, FILM COATED ORAL at 09:13

## 2020-01-30 RX ADMIN — SODIUM CHLORIDE, PRESERVATIVE FREE 10 ML: 5 INJECTION INTRAVENOUS at 09:14

## 2020-01-30 RX ADMIN — LISINOPRIL 10 MG: 10 TABLET ORAL at 09:13

## 2020-01-30 RX ADMIN — INSULIN LISPRO 2 UNITS: 100 INJECTION, SOLUTION INTRAVENOUS; SUBCUTANEOUS at 09:13

## 2020-01-30 RX ADMIN — PANTOPRAZOLE SODIUM 40 MG: 40 TABLET, DELAYED RELEASE ORAL at 06:31

## 2020-01-31 ENCOUNTER — READMISSION MANAGEMENT (OUTPATIENT)
Dept: CALL CENTER | Facility: HOSPITAL | Age: 80
End: 2020-01-31

## 2020-01-31 NOTE — OUTREACH NOTE
Prep Survey      Responses   Facility patient discharged from?  [place]   Is patient eligible?  No   What are the reasons patient is not eligible?  Other [Pt has hx of TIA, admitted for diverticulitis]   Does the patient have one of the following disease processes/diagnoses(primary or secondary)?  Other   Prep survey completed?  Yes          Mimi Dunlap RN

## 2020-02-06 ENCOUNTER — TELEPHONE (OUTPATIENT)
Dept: GASTROENTEROLOGY | Facility: CLINIC | Age: 80
End: 2020-02-06

## 2020-02-06 NOTE — TELEPHONE ENCOUNTER
Called pt and advised of the note from Dr Walker. He verb understanding and appt made for follow-up 7/7 at 1PM.    Health Maintenance updated to reflect C/S due 1/8/21.

## 2020-02-06 NOTE — TELEPHONE ENCOUNTER
----- Message from Rigoberto Walker MD sent at 1/23/2020  4:23 PM EST -----  Terrzaas's but NO dysplasia  REpeat EGD in 1 year  O/V in 6 mos  Continue PPI

## 2020-04-27 ENCOUNTER — APPOINTMENT (OUTPATIENT)
Dept: CT IMAGING | Facility: HOSPITAL | Age: 80
End: 2020-04-27

## 2020-04-27 ENCOUNTER — APPOINTMENT (OUTPATIENT)
Dept: GENERAL RADIOLOGY | Facility: HOSPITAL | Age: 80
End: 2020-04-27

## 2020-04-27 ENCOUNTER — HOSPITAL ENCOUNTER (OUTPATIENT)
Facility: HOSPITAL | Age: 80
Setting detail: OBSERVATION
Discharge: HOME HEALTH | End: 2020-04-28
Attending: EMERGENCY MEDICINE | Admitting: INTERNAL MEDICINE

## 2020-04-27 DIAGNOSIS — R53.1 WEAKNESS: Primary | ICD-10-CM

## 2020-04-27 DIAGNOSIS — E83.42 HYPOMAGNESEMIA: ICD-10-CM

## 2020-04-27 DIAGNOSIS — G91.2 NPH (NORMAL PRESSURE HYDROCEPHALUS) (HCC): ICD-10-CM

## 2020-04-27 LAB
ALBUMIN SERPL-MCNC: 3.9 G/DL (ref 3.5–5.2)
ALBUMIN/GLOB SERPL: 1.2 G/DL
ALP SERPL-CCNC: 69 U/L (ref 39–117)
ALT SERPL W P-5'-P-CCNC: 9 U/L (ref 1–41)
ANION GAP SERPL CALCULATED.3IONS-SCNC: 12.5 MMOL/L (ref 5–15)
AST SERPL-CCNC: 10 U/L (ref 1–40)
BACTERIA UR QL AUTO: NORMAL /HPF
BASOPHILS # BLD AUTO: 0.03 10*3/MM3 (ref 0–0.2)
BASOPHILS NFR BLD AUTO: 0.2 % (ref 0–1.5)
BILIRUB SERPL-MCNC: 0.9 MG/DL (ref 0.2–1.2)
BILIRUB UR QL STRIP: NEGATIVE
BUN BLD-MCNC: 17 MG/DL (ref 8–23)
BUN/CREAT SERPL: 16.2 (ref 7–25)
CALCIUM SPEC-SCNC: 9.6 MG/DL (ref 8.6–10.5)
CHLORIDE SERPL-SCNC: 93 MMOL/L (ref 98–107)
CLARITY UR: CLEAR
CO2 SERPL-SCNC: 24.5 MMOL/L (ref 22–29)
COLOR UR: YELLOW
CREAT BLD-MCNC: 1.05 MG/DL (ref 0.76–1.27)
DEPRECATED RDW RBC AUTO: 44.1 FL (ref 37–54)
EOSINOPHIL # BLD AUTO: 0.14 10*3/MM3 (ref 0–0.4)
EOSINOPHIL NFR BLD AUTO: 1 % (ref 0.3–6.2)
ERYTHROCYTE [DISTWIDTH] IN BLOOD BY AUTOMATED COUNT: 13.2 % (ref 12.3–15.4)
GFR SERPL CREATININE-BSD FRML MDRD: 68 ML/MIN/1.73
GLOBULIN UR ELPH-MCNC: 3.3 GM/DL
GLUCOSE BLD-MCNC: 197 MG/DL (ref 65–99)
GLUCOSE UR STRIP-MCNC: NEGATIVE MG/DL
HCT VFR BLD AUTO: 39.9 % (ref 37.5–51)
HGB BLD-MCNC: 13.6 G/DL (ref 13–17.7)
HGB UR QL STRIP.AUTO: NEGATIVE
HOLD SPECIMEN: NORMAL
HOLD SPECIMEN: NORMAL
HYALINE CASTS UR QL AUTO: NORMAL /LPF
IMM GRANULOCYTES # BLD AUTO: 0.03 10*3/MM3 (ref 0–0.05)
IMM GRANULOCYTES NFR BLD AUTO: 0.2 % (ref 0–0.5)
INR PPP: 1.03 (ref 0.9–1.1)
KETONES UR QL STRIP: ABNORMAL
LEUKOCYTE ESTERASE UR QL STRIP.AUTO: NEGATIVE
LYMPHOCYTES # BLD AUTO: 1.71 10*3/MM3 (ref 0.7–3.1)
LYMPHOCYTES NFR BLD AUTO: 12 % (ref 19.6–45.3)
MAGNESIUM SERPL-MCNC: 1.3 MG/DL (ref 1.6–2.4)
MCH RBC QN AUTO: 31 PG (ref 26.6–33)
MCHC RBC AUTO-ENTMCNC: 34.1 G/DL (ref 31.5–35.7)
MCV RBC AUTO: 90.9 FL (ref 79–97)
MONOCYTES # BLD AUTO: 0.89 10*3/MM3 (ref 0.1–0.9)
MONOCYTES NFR BLD AUTO: 6.3 % (ref 5–12)
NEUTROPHILS # BLD AUTO: 11.41 10*3/MM3 (ref 1.7–7)
NEUTROPHILS NFR BLD AUTO: 80.3 % (ref 42.7–76)
NITRITE UR QL STRIP: NEGATIVE
NRBC BLD AUTO-RTO: 0 /100 WBC (ref 0–0.2)
PH UR STRIP.AUTO: 5.5 [PH] (ref 5–8)
PLATELET # BLD AUTO: 216 10*3/MM3 (ref 140–450)
PMV BLD AUTO: 10.6 FL (ref 6–12)
POTASSIUM BLD-SCNC: 3.9 MMOL/L (ref 3.5–5.2)
PROT SERPL-MCNC: 7.2 G/DL (ref 6–8.5)
PROT UR QL STRIP: ABNORMAL
PROTHROMBIN TIME: 13.2 SECONDS (ref 11.7–14.2)
RBC # BLD AUTO: 4.39 10*6/MM3 (ref 4.14–5.8)
RBC # UR: NORMAL /HPF
REF LAB TEST METHOD: NORMAL
SODIUM BLD-SCNC: 130 MMOL/L (ref 136–145)
SP GR UR STRIP: 1.02 (ref 1–1.03)
SQUAMOUS #/AREA URNS HPF: NORMAL /HPF
TROPONIN T SERPL-MCNC: <0.01 NG/ML (ref 0–0.03)
TSH SERPL DL<=0.05 MIU/L-ACNC: 0.99 UIU/ML (ref 0.27–4.2)
UROBILINOGEN UR QL STRIP: ABNORMAL
WBC NRBC COR # BLD: 14.21 10*3/MM3 (ref 3.4–10.8)
WBC UR QL AUTO: NORMAL /HPF
WHOLE BLOOD HOLD SPECIMEN: NORMAL
WHOLE BLOOD HOLD SPECIMEN: NORMAL

## 2020-04-27 PROCEDURE — 25010000002 MAGNESIUM SULFATE IN D5W 1G/100ML (PREMIX) 1-5 GM/100ML-% SOLUTION: Performed by: EMERGENCY MEDICINE

## 2020-04-27 PROCEDURE — G0378 HOSPITAL OBSERVATION PER HR: HCPCS

## 2020-04-27 PROCEDURE — 85610 PROTHROMBIN TIME: CPT | Performed by: EMERGENCY MEDICINE

## 2020-04-27 PROCEDURE — 71045 X-RAY EXAM CHEST 1 VIEW: CPT

## 2020-04-27 PROCEDURE — 99285 EMERGENCY DEPT VISIT HI MDM: CPT

## 2020-04-27 PROCEDURE — 96365 THER/PROPH/DIAG IV INF INIT: CPT

## 2020-04-27 PROCEDURE — 84443 ASSAY THYROID STIM HORMONE: CPT | Performed by: EMERGENCY MEDICINE

## 2020-04-27 PROCEDURE — 96361 HYDRATE IV INFUSION ADD-ON: CPT

## 2020-04-27 PROCEDURE — 85025 COMPLETE CBC W/AUTO DIFF WBC: CPT | Performed by: EMERGENCY MEDICINE

## 2020-04-27 PROCEDURE — 81001 URINALYSIS AUTO W/SCOPE: CPT | Performed by: EMERGENCY MEDICINE

## 2020-04-27 PROCEDURE — 80053 COMPREHEN METABOLIC PANEL: CPT | Performed by: EMERGENCY MEDICINE

## 2020-04-27 PROCEDURE — 83735 ASSAY OF MAGNESIUM: CPT | Performed by: EMERGENCY MEDICINE

## 2020-04-27 PROCEDURE — 70450 CT HEAD/BRAIN W/O DYE: CPT

## 2020-04-27 PROCEDURE — 84484 ASSAY OF TROPONIN QUANT: CPT | Performed by: EMERGENCY MEDICINE

## 2020-04-27 PROCEDURE — 93010 ELECTROCARDIOGRAM REPORT: CPT | Performed by: INTERNAL MEDICINE

## 2020-04-27 PROCEDURE — 93005 ELECTROCARDIOGRAM TRACING: CPT | Performed by: EMERGENCY MEDICINE

## 2020-04-27 RX ORDER — SODIUM CHLORIDE 9 MG/ML
100 INJECTION, SOLUTION INTRAVENOUS CONTINUOUS
Status: DISCONTINUED | OUTPATIENT
Start: 2020-04-27 | End: 2020-04-28 | Stop reason: HOSPADM

## 2020-04-27 RX ORDER — SODIUM CHLORIDE 0.9 % (FLUSH) 0.9 %
10 SYRINGE (ML) INJECTION AS NEEDED
Status: DISCONTINUED | OUTPATIENT
Start: 2020-04-27 | End: 2020-04-28

## 2020-04-27 RX ORDER — MAGNESIUM SULFATE 1 G/100ML
1 INJECTION INTRAVENOUS ONCE
Status: COMPLETED | OUTPATIENT
Start: 2020-04-27 | End: 2020-04-27

## 2020-04-27 RX ORDER — SODIUM CHLORIDE 0.9 % (FLUSH) 0.9 %
10 SYRINGE (ML) INJECTION EVERY 12 HOURS SCHEDULED
Status: DISCONTINUED | OUTPATIENT
Start: 2020-04-27 | End: 2020-04-28

## 2020-04-27 RX ORDER — SODIUM CHLORIDE 0.9 % (FLUSH) 0.9 %
10 SYRINGE (ML) INJECTION AS NEEDED
Status: DISCONTINUED | OUTPATIENT
Start: 2020-04-27 | End: 2020-04-28 | Stop reason: HOSPADM

## 2020-04-27 RX ORDER — ASPIRIN 300 MG/1
300 SUPPOSITORY RECTAL DAILY
Status: DISCONTINUED | OUTPATIENT
Start: 2020-04-28 | End: 2020-04-28 | Stop reason: HOSPADM

## 2020-04-27 RX ORDER — ASPIRIN 325 MG
325 TABLET ORAL DAILY
Status: DISCONTINUED | OUTPATIENT
Start: 2020-04-28 | End: 2020-04-28 | Stop reason: HOSPADM

## 2020-04-27 RX ORDER — ATORVASTATIN CALCIUM 80 MG/1
80 TABLET, FILM COATED ORAL NIGHTLY
Status: DISCONTINUED | OUTPATIENT
Start: 2020-04-28 | End: 2020-04-28 | Stop reason: HOSPADM

## 2020-04-27 RX ADMIN — SODIUM CHLORIDE 100 ML/HR: 9 INJECTION, SOLUTION INTRAVENOUS at 23:15

## 2020-04-27 RX ADMIN — MAGNESIUM SULFATE 1 G: 1 INJECTION INTRAVENOUS at 22:30

## 2020-04-28 ENCOUNTER — APPOINTMENT (OUTPATIENT)
Dept: MRI IMAGING | Facility: HOSPITAL | Age: 80
End: 2020-04-28

## 2020-04-28 VITALS
HEART RATE: 77 BPM | TEMPERATURE: 97.8 F | WEIGHT: 182.76 LBS | OXYGEN SATURATION: 92 % | HEIGHT: 70 IN | RESPIRATION RATE: 18 BRPM | BODY MASS INDEX: 26.16 KG/M2 | DIASTOLIC BLOOD PRESSURE: 85 MMHG | SYSTOLIC BLOOD PRESSURE: 149 MMHG

## 2020-04-28 PROBLEM — E83.42 HYPOMAGNESEMIA: Status: RESOLVED | Noted: 2020-04-28 | Resolved: 2020-04-28

## 2020-04-28 PROBLEM — E87.1 HYPONATREMIA: Status: ACTIVE | Noted: 2020-04-28

## 2020-04-28 PROBLEM — E83.42 HYPOMAGNESEMIA: Status: ACTIVE | Noted: 2020-04-28

## 2020-04-28 PROBLEM — E87.1 HYPONATREMIA: Status: RESOLVED | Noted: 2020-04-28 | Resolved: 2020-04-28

## 2020-04-28 LAB
ALBUMIN SERPL-MCNC: 3.4 G/DL (ref 3.5–5.2)
ALBUMIN/GLOB SERPL: 1.1 G/DL
ALP SERPL-CCNC: 61 U/L (ref 39–117)
ALT SERPL W P-5'-P-CCNC: 6 U/L (ref 1–41)
ANION GAP SERPL CALCULATED.3IONS-SCNC: 11.7 MMOL/L (ref 5–15)
AST SERPL-CCNC: 5 U/L (ref 1–40)
BILIRUB SERPL-MCNC: 0.9 MG/DL (ref 0.2–1.2)
BUN BLD-MCNC: 14 MG/DL (ref 8–23)
BUN/CREAT SERPL: 14.6 (ref 7–25)
CALCIUM SPEC-SCNC: 9 MG/DL (ref 8.6–10.5)
CHLORIDE SERPL-SCNC: 99 MMOL/L (ref 98–107)
CHOLEST SERPL-MCNC: 115 MG/DL (ref 0–200)
CO2 SERPL-SCNC: 25.3 MMOL/L (ref 22–29)
CREAT BLD-MCNC: 0.96 MG/DL (ref 0.76–1.27)
DEPRECATED RDW RBC AUTO: 41.9 FL (ref 37–54)
ERYTHROCYTE [DISTWIDTH] IN BLOOD BY AUTOMATED COUNT: 12.9 % (ref 12.3–15.4)
GFR SERPL CREATININE-BSD FRML MDRD: 75 ML/MIN/1.73
GLOBULIN UR ELPH-MCNC: 3.1 GM/DL
GLUCOSE BLD-MCNC: 156 MG/DL (ref 65–99)
GLUCOSE BLDC GLUCOMTR-MCNC: 153 MG/DL (ref 70–130)
GLUCOSE BLDC GLUCOMTR-MCNC: 197 MG/DL (ref 70–130)
GLUCOSE BLDC GLUCOMTR-MCNC: 232 MG/DL (ref 70–130)
HCT VFR BLD AUTO: 37.3 % (ref 37.5–51)
HDLC SERPL-MCNC: 41 MG/DL (ref 40–60)
HGB BLD-MCNC: 12.4 G/DL (ref 13–17.7)
LDLC SERPL CALC-MCNC: 36 MG/DL (ref 0–100)
LDLC/HDLC SERPL: 0.89 {RATIO}
MAGNESIUM SERPL-MCNC: 1.7 MG/DL (ref 1.6–2.4)
MCH RBC QN AUTO: 29.8 PG (ref 26.6–33)
MCHC RBC AUTO-ENTMCNC: 33.2 G/DL (ref 31.5–35.7)
MCV RBC AUTO: 89.7 FL (ref 79–97)
PLATELET # BLD AUTO: 203 10*3/MM3 (ref 140–450)
PMV BLD AUTO: 10.4 FL (ref 6–12)
POTASSIUM BLD-SCNC: 3.7 MMOL/L (ref 3.5–5.2)
PROT SERPL-MCNC: 6.5 G/DL (ref 6–8.5)
RBC # BLD AUTO: 4.16 10*6/MM3 (ref 4.14–5.8)
SODIUM BLD-SCNC: 136 MMOL/L (ref 136–145)
TRIGL SERPL-MCNC: 188 MG/DL (ref 0–150)
VLDLC SERPL-MCNC: 37.6 MG/DL (ref 5–40)
WBC NRBC COR # BLD: 8.75 10*3/MM3 (ref 3.4–10.8)

## 2020-04-28 PROCEDURE — 97165 OT EVAL LOW COMPLEX 30 MIN: CPT

## 2020-04-28 PROCEDURE — 80061 LIPID PANEL: CPT | Performed by: NURSE PRACTITIONER

## 2020-04-28 PROCEDURE — 97110 THERAPEUTIC EXERCISES: CPT | Performed by: PHYSICAL THERAPIST

## 2020-04-28 PROCEDURE — 85027 COMPLETE CBC AUTOMATED: CPT | Performed by: NURSE PRACTITIONER

## 2020-04-28 PROCEDURE — 83735 ASSAY OF MAGNESIUM: CPT | Performed by: HOSPITALIST

## 2020-04-28 PROCEDURE — G0378 HOSPITAL OBSERVATION PER HR: HCPCS

## 2020-04-28 PROCEDURE — 82962 GLUCOSE BLOOD TEST: CPT

## 2020-04-28 PROCEDURE — 96361 HYDRATE IV INFUSION ADD-ON: CPT

## 2020-04-28 PROCEDURE — 99215 OFFICE O/P EST HI 40 MIN: CPT | Performed by: PSYCHIATRY & NEUROLOGY

## 2020-04-28 PROCEDURE — 80053 COMPREHEN METABOLIC PANEL: CPT | Performed by: NURSE PRACTITIONER

## 2020-04-28 PROCEDURE — 63710000001 INSULIN LISPRO (HUMAN) PER 5 UNITS: Performed by: HOSPITALIST

## 2020-04-28 PROCEDURE — 97535 SELF CARE MNGMENT TRAINING: CPT

## 2020-04-28 PROCEDURE — 36415 COLL VENOUS BLD VENIPUNCTURE: CPT | Performed by: NURSE PRACTITIONER

## 2020-04-28 PROCEDURE — 97162 PT EVAL MOD COMPLEX 30 MIN: CPT | Performed by: PHYSICAL THERAPIST

## 2020-04-28 PROCEDURE — 70551 MRI BRAIN STEM W/O DYE: CPT

## 2020-04-28 RX ORDER — AMLODIPINE BESYLATE 5 MG/1
5 TABLET ORAL
Qty: 30 TABLET | Refills: 0 | Status: SHIPPED | OUTPATIENT
Start: 2020-04-28 | End: 2020-07-07

## 2020-04-28 RX ORDER — MAGNESIUM SULFATE HEPTAHYDRATE 40 MG/ML
2 INJECTION, SOLUTION INTRAVENOUS AS NEEDED
Status: DISCONTINUED | OUTPATIENT
Start: 2020-04-28 | End: 2020-04-28 | Stop reason: HOSPADM

## 2020-04-28 RX ORDER — FAMOTIDINE 20 MG/1
20 TABLET, FILM COATED ORAL DAILY
Status: DISCONTINUED | OUTPATIENT
Start: 2020-04-28 | End: 2020-04-28 | Stop reason: HOSPADM

## 2020-04-28 RX ORDER — ALBUTEROL SULFATE 2.5 MG/3ML
2.5 SOLUTION RESPIRATORY (INHALATION) EVERY 6 HOURS PRN
Status: DISCONTINUED | OUTPATIENT
Start: 2020-04-28 | End: 2020-04-28 | Stop reason: HOSPADM

## 2020-04-28 RX ORDER — DEXTROSE MONOHYDRATE 25 G/50ML
25 INJECTION, SOLUTION INTRAVENOUS
Status: DISCONTINUED | OUTPATIENT
Start: 2020-04-28 | End: 2020-04-28 | Stop reason: HOSPADM

## 2020-04-28 RX ORDER — ATORVASTATIN CALCIUM 20 MG/1
TABLET, FILM COATED ORAL
Status: DISPENSED
Start: 2020-04-28 | End: 2020-04-28

## 2020-04-28 RX ORDER — LISINOPRIL 10 MG/1
10 TABLET ORAL EVERY 12 HOURS SCHEDULED
Status: DISCONTINUED | OUTPATIENT
Start: 2020-04-28 | End: 2020-04-28 | Stop reason: HOSPADM

## 2020-04-28 RX ORDER — OMEGA-3S/DHA/EPA/FISH OIL/D3 300MG-1000
400 CAPSULE ORAL DAILY
Status: DISCONTINUED | OUTPATIENT
Start: 2020-04-28 | End: 2020-04-28 | Stop reason: HOSPADM

## 2020-04-28 RX ORDER — POTASSIUM CHLORIDE 1.5 G/1.77G
40 POWDER, FOR SOLUTION ORAL AS NEEDED
Status: DISCONTINUED | OUTPATIENT
Start: 2020-04-28 | End: 2020-04-28 | Stop reason: HOSPADM

## 2020-04-28 RX ORDER — MAGNESIUM SULFATE HEPTAHYDRATE 40 MG/ML
4 INJECTION, SOLUTION INTRAVENOUS AS NEEDED
Status: DISCONTINUED | OUTPATIENT
Start: 2020-04-28 | End: 2020-04-28 | Stop reason: HOSPADM

## 2020-04-28 RX ORDER — ALLOPURINOL 300 MG/1
300 TABLET ORAL DAILY
Status: DISCONTINUED | OUTPATIENT
Start: 2020-04-28 | End: 2020-04-28 | Stop reason: HOSPADM

## 2020-04-28 RX ORDER — POTASSIUM CHLORIDE 750 MG/1
40 CAPSULE, EXTENDED RELEASE ORAL AS NEEDED
Status: DISCONTINUED | OUTPATIENT
Start: 2020-04-28 | End: 2020-04-28 | Stop reason: HOSPADM

## 2020-04-28 RX ORDER — HYDRALAZINE HYDROCHLORIDE 25 MG/1
25 TABLET, FILM COATED ORAL EVERY 6 HOURS PRN
Status: DISCONTINUED | OUTPATIENT
Start: 2020-04-28 | End: 2020-04-28 | Stop reason: HOSPADM

## 2020-04-28 RX ORDER — AMLODIPINE BESYLATE 5 MG/1
5 TABLET ORAL
Status: DISCONTINUED | OUTPATIENT
Start: 2020-04-28 | End: 2020-04-28 | Stop reason: HOSPADM

## 2020-04-28 RX ORDER — NICOTINE POLACRILEX 4 MG
15 LOZENGE BUCCAL
Status: DISCONTINUED | OUTPATIENT
Start: 2020-04-28 | End: 2020-04-28 | Stop reason: HOSPADM

## 2020-04-28 RX ADMIN — LISINOPRIL 10 MG: 10 TABLET ORAL at 13:28

## 2020-04-28 RX ADMIN — SODIUM CHLORIDE 100 ML/HR: 9 INJECTION, SOLUTION INTRAVENOUS at 12:00

## 2020-04-28 RX ADMIN — ALLOPURINOL 300 MG: 300 TABLET ORAL at 08:27

## 2020-04-28 RX ADMIN — SODIUM CHLORIDE, PRESERVATIVE FREE 10 ML: 5 INJECTION INTRAVENOUS at 01:36

## 2020-04-28 RX ADMIN — FAMOTIDINE 20 MG: 20 TABLET, FILM COATED ORAL at 08:27

## 2020-04-28 RX ADMIN — ATORVASTATIN CALCIUM 80 MG: 20 TABLET, FILM COATED ORAL at 01:35

## 2020-04-28 RX ADMIN — INSULIN LISPRO 2 UNITS: 100 INJECTION, SOLUTION INTRAVENOUS; SUBCUTANEOUS at 08:26

## 2020-04-28 RX ADMIN — INSULIN LISPRO 4 UNITS: 100 INJECTION, SOLUTION INTRAVENOUS; SUBCUTANEOUS at 12:00

## 2020-04-28 RX ADMIN — AMLODIPINE BESYLATE 5 MG: 5 TABLET ORAL at 13:28

## 2020-04-28 RX ADMIN — CHOLECALCIFEROL TAB 10 MCG (400 UNIT) 400 UNITS: 10 TAB at 08:27

## 2020-04-28 RX ADMIN — ASPIRIN 325 MG: 325 TABLET ORAL at 08:26

## 2020-05-15 ENCOUNTER — OFFICE VISIT (OUTPATIENT)
Dept: CARDIOLOGY | Facility: CLINIC | Age: 80
End: 2020-05-15

## 2020-05-15 VITALS
BODY MASS INDEX: 26.86 KG/M2 | HEIGHT: 70 IN | HEART RATE: 82 BPM | WEIGHT: 187.6 LBS | SYSTOLIC BLOOD PRESSURE: 142 MMHG | DIASTOLIC BLOOD PRESSURE: 66 MMHG

## 2020-05-15 DIAGNOSIS — I25.10 CORONARY ARTERY DISEASE INVOLVING NATIVE CORONARY ARTERY OF NATIVE HEART WITHOUT ANGINA PECTORIS: ICD-10-CM

## 2020-05-15 DIAGNOSIS — I10 ESSENTIAL HYPERTENSION: ICD-10-CM

## 2020-05-15 DIAGNOSIS — G45.9 TRANSIENT CEREBRAL ISCHEMIA, UNSPECIFIED TYPE: Primary | ICD-10-CM

## 2020-05-15 PROCEDURE — 93000 ELECTROCARDIOGRAM COMPLETE: CPT | Performed by: INTERNAL MEDICINE

## 2020-05-15 PROCEDURE — 99214 OFFICE O/P EST MOD 30 MIN: CPT | Performed by: INTERNAL MEDICINE

## 2020-05-15 NOTE — PROGRESS NOTES
Date of Office Visit: 05/15/20  Encounter Provider: Jean Ford MD  Place of Service: [facility] CARDIOLOGY  Patient Name: Eugenio Joe  :[id number]  [id number]    Chief Complaint   Patient presents with   • Coronary Artery Disease     1 year f/u    • Hypertension   :     HPI: Eugenio Joe is a 79 y.o. male he had a three-vessel CABG in  with a LIMA to the LAD and vein to the ramus and a vein to an OM1 she had normal LV function.  Pulmonary embolus at one time he has had normal pressure hydrocephalus he has had a couple of TIAs was found to have a large PFO he has been managed medically recently was in the hospital for TIA-like symptoms but it was felt that it was due to small vessel disease consistent with hypertension he had his aspirin increased to a full aspirin a day.  He is not had any chest pain shortness of breath PND orthopnea edema    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 SWEAT GLAND    • Cardiac disease    • Coronary artery disease     CABG    • Diabetes mellitus (CMS/HCC)    • Difficulty in swallowing    • Difficulty walking    • Gout several years ago    medication  working   • Hearing aid worn     bilateral   • Hx of appendectomy    • Hydrocephaly (CMS/HCC)    • Hyperlipemia    • Hypertension    • Joint pain     or swelling   • Low back pain several years ago   • Orbit fracture    • Pulmonary embolism (CMS/HCC)     OCT 2016   • Rash     ARM-    • TIA (transient ischemic attack)        Past Surgical History:   Procedure Laterality Date   • APPENDECTOMY N/A 2019    Procedure: APPENDECTOMY LAPAROSCOPIC;  Surgeon: Luis Carlos Rick Jr., MD;  Location: [facility];  Service: General   • BRAIN SURGERY      BRAIN ABCESS 30 YR AGO   • CARDIAC SURGERY     • COLONOSCOPY  2012    Ciciliano- normal per pt, no polyps    • CORONARY  ARTERY BYPASS GRAFT     • ENDOSCOPY N/A 3/9/2017    Schatzki ring, dilated, LA Grade B esophagitis, HH, acquired duodenal stenosis   • ENDOSCOPY N/A 2018    candida, HH, torts, Schatzki ring, duodenal stenosis, dilatation perforemed, chronic inflammation   • ENDOSCOPY N/A 10/9/2019    White nummular lesions in esophageal mucosa, mild Schatzki ring, acquired duodenal stenosis, Path: Terrazas's, candida   • ENDOSCOPY N/A 2020    Procedure: ESOPHAGOGASTRODUODENOSCOPY with biopsies;  Surgeon: Rigoberto Walker MD;  Location: [facility] ENDOSCOPY;  Service: Gastroenterology   • FACIAL FRACTURE SURGERY      to repair 6 broken bones   • ORBITAL FRACTURE OPEN REDUCTION INTERNAL FIXATION Right 2017    Procedure: RT ORBIT FLOOR FRACTURE REPAIR RIGHT [facility] FRACTURE REPAIR, RIGHT NASAL BONE FRACTURE CLOSED REDUCTION;  Surgeon: Edil Lester MD;  Location: [facility];  Service:    • ORIF FOOT FRACTURE Right 2016    Procedure: RIGHT SECOND METATARSAL OPEN REDUCTION INTERNAL FIXATION WITh GRAFT FROM HEEL ;  Surgeon: Man Jenkins MD;  Location: [facility];  Service:    • SEPTOPLASTY     • SKIN CANCER EXCISION     • TONSILLECTOMY         Social History     Socioeconomic History   • Marital status:      Spouse name: Not on file   • Number of children: 2   • Years of education: 16   • Highest education level: Not on file   Occupational History   • Occupation: retired   Tobacco Use   • Smoking status: Former Smoker     Packs/day: 3.00     Years: 5.00     Pack years: 15.00     Types: Cigarettes     Start date:      Last attempt to quit:      Years since quittin.4   • Smokeless tobacco: Never Used   • Tobacco comment: Quit cold turkey   Substance and Sexual Activity   • Alcohol use: Yes     Alcohol/week: 2.0 standard drinks     Types: 1 Cans of beer, 1 Shots of liquor per week     Comment: RARELY    • Drug use: No   • Sexual activity: Defer     Partners: Female     Birth  control/protection: Surgical       Family History   Problem Relation Age of Onset   • Heart disease Mother    • Hypertension Mother    • Stroke Mother    • Diverticulitis Mother    • Heart disease Father    • Hypertension Father    • Malig Hyperthermia Neg Hx        Review of Systems   Constitution: Negative for decreased appetite, fever, malaise/fatigue and weight loss.   HENT: Negative for nosebleeds.    Eyes: Negative for double vision.   Cardiovascular: Negative for chest pain, claudication, cyanosis, dyspnea on exertion, irregular heartbeat, leg swelling, near-syncope, orthopnea, palpitations, paroxysmal nocturnal dyspnea and syncope.   Respiratory: Negative for cough, hemoptysis and shortness of breath.    Hematologic/Lymphatic: Negative for bleeding problem.   Skin: Negative for rash.   Musculoskeletal: Negative for falls and myalgias.   Gastrointestinal: Negative for hematochezia, jaundice, melena, nausea and vomiting.   Genitourinary: Negative for hematuria.   Neurological: Negative for dizziness and seizures.   Psychiatric/Behavioral: Negative for altered mental status and memory loss.       Allergies   Allergen Reactions   • Other Mental Status Change and Hallucinations     Oxy drugs   • Oxycodone Mental Status Change         Current Outpatient Medications:   •  acetaminophen (TYLENOL) 325 MG tablet, Take 2 tablets by mouth Every 4 (Four) Hours As Needed for Mild Pain ., Disp: , Rfl:   •  allopurinol (ZYLOPRIM) 300 MG tablet, Take 300 mg by mouth daily., Disp: , Rfl:   •  amLODIPine (NORVASC) 5 MG tablet, Take 1 tablet by mouth Daily. (Patient taking differently: Take 10 mg by mouth Daily.), Disp: 30 tablet, Rfl: 0  •  aspirin (DRU ASPIRIN) 325 MG tablet, Take 1 tablet by mouth Daily., Disp: 100 tablet, Rfl: 3  •  glucose blood (FREESTYLE LITE) test strip, 1 each by Other route See Admin Instructions. Use 1 to 2 times daily as instructed, Disp: , Rfl:   •  lisinopril (PRINIVIL,ZESTRIL) 10 MG tablet,  "Take 10 mg by mouth 2 (Two) Times a Day., Disp: , Rfl:   •  metFORMIN (GLUCOPHAGE) 500 MG tablet, Take 500 mg by mouth 2 (Two) Times a Day With Meals., Disp: , Rfl:   •  Multiple Vitamins-Minerals (MULTIVITAMIN ADULT PO), Take 1 tablet by mouth Daily., Disp: , Rfl:   •  PROAIR  (90 BASE) MCG/ACT inhaler, 2 puffs Every 4 (Four) Hours As Needed., Disp: , Rfl:   •  simvastatin (ZOCOR) 40 MG tablet, Take 40 mg by mouth every night., Disp: , Rfl:   •  Vitamin D, Cholecalciferol, (CHOLECALCIFEROL) 400 units tablet, Take 400 Units by mouth Daily., Disp: , Rfl:       Objective:     Vitals:    05/15/20 1002   BP: 142/66   BP Location: Left arm   Patient Position: Sitting   Pulse: 82   Weight: 85.1 kg (187 lb 9.6 oz)   Height: 177.8 cm (70\")     Body mass index is 26.92 kg/m².    Physical Exam   Constitutional: He is oriented to person, place, and time. He appears well-developed and well-nourished.   HENT:   Head: Normocephalic.   Eyes: No scleral icterus.   Neck: No JVD present. No thyromegaly present.   Cardiovascular: Normal rate, regular rhythm and normal heart sounds. Exam reveals no gallop and no friction rub.   No murmur heard.  Pulmonary/Chest: Effort normal and breath sounds normal. He has no wheezes. He has no rales.       Abdominal: Soft. There is no hepatosplenomegaly. There is no tenderness.   Musculoskeletal: Normal range of motion. He exhibits no edema.   Lymphadenopathy:     He has no cervical adenopathy.   Neurological: He is alert and oriented to person, place, and time.   Skin: Skin is warm and dry. No rash noted.   Psychiatric: He has a normal mood and affect.         ECG 12 Lead  Date/Time: 5/15/2020 10:34 AM  Performed by: Jean Ford MD  Authorized by: Jean Ford MD   Comparison: compared with previous ECG   Similar to previous ECG  Rhythm: sinus rhythm  Ectopy: unifocal PVCs    Clinical impression: abnormal EKG             Assessment:       Diagnosis Plan   1. Transient cerebral " ischemia, unspecified type     2. Coronary artery disease involving native coronary artery of native heart without angina pectoris     3. Essential hypertension            Plan:       From a cardiac standpoint Eugenio is doing pretty well he is asymptomatic his risk modification is good he is 79 years old he definitely has declined overall since the last time I saw him is got a walk with a walker now mentally he still seems pretty sharp I am not can make any changes but have him come back and see me and years he will see Johanne or Nae next year    As always, it has been a pleasure to participate in your patient's care.      Sincerely,       Jean Ford MD

## 2020-07-07 ENCOUNTER — OFFICE VISIT (OUTPATIENT)
Dept: GASTROENTEROLOGY | Facility: CLINIC | Age: 80
End: 2020-07-07

## 2020-07-07 VITALS — HEIGHT: 70 IN | TEMPERATURE: 98.4 F | WEIGHT: 180 LBS | BODY MASS INDEX: 25.77 KG/M2

## 2020-07-07 DIAGNOSIS — K22.70 BARRETT'S ESOPHAGUS WITHOUT DYSPLASIA: Primary | ICD-10-CM

## 2020-07-07 DIAGNOSIS — R13.12 OROPHARYNGEAL DYSPHAGIA: ICD-10-CM

## 2020-07-07 PROCEDURE — 99213 OFFICE O/P EST LOW 20 MIN: CPT | Performed by: INTERNAL MEDICINE

## 2020-07-07 RX ORDER — ESOMEPRAZOLE MAGNESIUM 40 MG/1
CAPSULE, DELAYED RELEASE ORAL
COMMUNITY
Start: 2020-06-29 | End: 2020-10-16

## 2020-07-07 RX ORDER — FUROSEMIDE 20 MG/1
20 TABLET ORAL DAILY
COMMUNITY
Start: 2020-05-27 | End: 2020-09-08 | Stop reason: HOSPADM

## 2020-07-07 RX ORDER — GLIPIZIDE 5 MG/1
5 TABLET ORAL
COMMUNITY
Start: 2020-05-26

## 2020-07-07 RX ORDER — DOXAZOSIN MESYLATE 1 MG/1
4 TABLET ORAL EVERY MORNING
COMMUNITY
Start: 2020-06-10

## 2020-07-07 NOTE — PROGRESS NOTES
Chief Complaint   Patient presents with   • Cough     Subjective     HPI  Eugenio Joe is a 79 y.o. male who presents today for follow up.    EGD 2020 with small focus of Terrazas's with no dysplasia (previous biopsies indeterminate for dysplasia).  He remains on daily PPI.  No dysphagia but does occasionally cough while eating.    Hospitalized briefly in April for weakness and ? TIA vs exacerbation of his NPH.  His wife notes patient has continued gradual decline in motor function over last year.        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 SWEAT GLAND    • Cardiac disease    • Coronary artery disease     CABG    • Diabetes mellitus (CMS/HCC)    • Difficulty in swallowing    • Difficulty walking    • Gout several years ago    medication  working   • Hearing aid worn     bilateral   • Hx of appendectomy    • Hydrocephaly (CMS/HCC)    • Hyperlipemia    • Hypertension    • Joint pain     or swelling   • Low back pain several years ago   • Orbit fracture (CMS/HCC)    • Pulmonary embolism (CMS/HCC)     OCT 2016   • Rash     ARM-    • TIA (transient ischemic attack)        Social History     Socioeconomic History   • Marital status:      Spouse name: Not on file   • Number of children: 2   • Years of education: 16   • Highest education level: Not on file   Occupational History   • Occupation: retired   Tobacco Use   • Smoking status: Former Smoker     Packs/day: 3.00     Years: 5.00     Pack years: 15.00     Types: Cigarettes     Start date:      Last attempt to quit:      Years since quittin.5   • Smokeless tobacco: Never Used   • Tobacco comment: Quit cold turkey   Substance and Sexual Activity   • Alcohol use: Yes     Alcohol/week: 2.0 standard drinks     Types: 1 Cans of beer, 1 Shots of liquor per week     Comment: RARELY    • Drug use: No   • Sexual activity:  Defer     Partners: Female     Birth control/protection: Surgical         Current Outpatient Medications:   •  acetaminophen (TYLENOL) 325 MG tablet, Take 2 tablets by mouth Every 4 (Four) Hours As Needed for Mild Pain ., Disp: , Rfl:   •  allopurinol (ZYLOPRIM) 300 MG tablet, Take 300 mg by mouth daily., Disp: , Rfl:   •  aspirin (DRU ASPIRIN) 325 MG tablet, Take 1 tablet by mouth Daily., Disp: 100 tablet, Rfl: 3  •  doxazosin (CARDURA) 1 MG tablet, Take 1 mg by mouth Daily., Disp: , Rfl:   •  esomeprazole (nexIUM) 40 MG capsule, TK 1 C PO D, Disp: , Rfl:   •  furosemide (LASIX) 20 MG tablet, Take 20 mg by mouth Daily., Disp: , Rfl:   •  glipizide (GLUCOTROL) 5 MG tablet, TK 1 T PO B THE FARZAD MEAL, Disp: , Rfl:   •  glucose blood (FREESTYLE LITE) test strip, 1 each by Other route See Admin Instructions. Use 1 to 2 times daily as instructed, Disp: , Rfl:   •  lisinopril (PRINIVIL,ZESTRIL) 10 MG tablet, Take 10 mg by mouth 2 (Two) Times a Day., Disp: , Rfl:   •  metFORMIN (GLUCOPHAGE) 500 MG tablet, Take 500 mg by mouth 2 (Two) Times a Day With Meals., Disp: , Rfl:   •  Multiple Vitamins-Minerals (MULTIVITAMIN ADULT PO), Take 1 tablet by mouth Daily., Disp: , Rfl:   •  PROAIR  (90 BASE) MCG/ACT inhaler, 2 puffs Every 4 (Four) Hours As Needed., Disp: , Rfl:   •  simvastatin (ZOCOR) 40 MG tablet, Take 40 mg by mouth every night., Disp: , Rfl:   •  Vitamin D, Cholecalciferol, (CHOLECALCIFEROL) 400 units tablet, Take 400 Units by mouth Daily., Disp: , Rfl:     Review of Systems   Constitutional: Negative.    Gastrointestinal: Negative.    Musculoskeletal: Positive for gait problem.       Objective   Vitals:    07/07/20 1256   Temp: 98.4 °F (36.9 °C)       Physical Exam   Constitutional: He is oriented to person, place, and time. He appears well-developed and well-nourished.   HENT:   Head: Normocephalic and atraumatic.   Abdominal: Soft. Bowel sounds are normal. He exhibits no distension and no mass. There is no  tenderness. No hernia.   Neurological: He is alert and oriented to person, place, and time.   Skin: Skin is warm and dry.   Psychiatric: He has a normal mood and affect. His behavior is normal. Judgment and thought content normal.   Vitals reviewed.      Assessment/Plan   Assessment:     1. 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 Oropharyngeal dysphagia      Plan:   Continue current dose PPI (Nexium 40mg/day)  The patient will f/u in office in 6 mos, we will reassess his overall clinical condition at that time and determine if continued surveillance for his Terrazas's is warranted.  Recommend pt remain upright for PO and take small bites and sips.        Rigoberto Walker M.D.  [facility]  [address] [place] [address]  Office: [phone]

## 2020-08-04 NOTE — PROGRESS NOTES
Patient Identification:  NAME:  Eugenio Joe  Age:  76 y.o.   Sex:  male   :  1940   MRN:  [id number]       Chief complaint: Post-LP headache, NPH    History of present illness:  He thinks is doing a lot better he denies any headache at this point he notes that he thinks his gait may be a little bit better status post the high volume spinal tap.  I did perform an independent eyeball review the MRI and MRA of the brain and they appear to be normal.      Past medical history:  Past Medical History:   Diagnosis Date   • Arthritis    • Asthma    • Brain abscess     at about age 44   • Bruise of face    • Cardiac disease    • Coronary artery disease     CABG    • Diabetes mellitus    • Gout several years ago    medication  working   • Headache 11/15/2017   • Hearing aid worn     bilateral   • Hydrocephaly    • Hyperlipemia    • Hypertension    • Joint pain     or swelling   • Low back pain several years ago   • Orbit fracture    • Pulmonary embolism     OCT 2016   • Sinus infection        Allergies:  Other and Oxycodone    Home medications:  Prescriptions Prior to Admission   Medication Sig Dispense Refill Last Dose   • acetaminophen (TYLENOL) 325 MG tablet Take 2 tablets by mouth Every 6 (Six) Hours As Needed for mild pain (1-3).  0 Taking   • allopurinol (ZYLOPRIM) 300 MG tablet Take 300 mg by mouth daily.   Taking   • esomeprazole (nexIUM) 40 MG capsule Take 1 capsule by mouth Daily. 30 capsule 5 Taking   • fluticasone-salmeterol (ADVAIR) 250-50 MCG/DOSE DISKUS Inhale 1 puff Every Evening. Advair Diskus 250-50 MCG/DOSE Inhalation Aerosol Powder Breath Activated; Patient Sig: Advair Diskus 250-50 MCG/DOSE Inhalation Aerosol Powder Breath Activated INHALE 1 PUFF BID; 60; 0; 15-Oct-2012; Active   Taking   • FREESTYLE LITE test strip USE TO TEST ONCE OR TWICE DAILY UTD  2 Taking   • hydrALAZINE (APRESOLINE) 25 MG tablet Take 1 tablet by mouth Every 8 (Eight) Hours As Needed (SBP >170). Take for systolic  blood pressure > 170 30 tablet 0    • lisinopril (PRINIVIL,ZESTRIL) 20 MG tablet Take 1 tablet by mouth Daily. 30 tablet 0    • metFORMIN (GLUCOPHAGE) 500 MG tablet Take 500 mg by mouth Daily With Breakfast.   11/14/2017   • Multiple Vitamins-Minerals (MULTIVITAMIN ADULT PO) Take  by mouth Daily.   Taking   • PROAIR  (90 BASE) MCG/ACT inhaler 2 puffs Every 4 (Four) Hours As Needed.   Taking   • simvastatin (ZOCOR) 40 MG tablet Take 40 mg by mouth every night.   Taking        Hospital medications:    albuterol 2.5 mg Nebulization Q4H - RT   allopurinol 300 mg Oral Daily   aspirin 300 mg Rectal Daily   Or      aspirin 325 mg Oral Daily   atorvastatin 80 mg Oral Nightly   budesonide-formoterol 2 puff Inhalation BID - RT   hydrochlorothiazide 25 mg Oral Q24H   insulin aspart 0-7 Units Subcutaneous 4x Daily With Meals & Nightly   lisinopril 40 mg Oral Q24H   pantoprazole 40 mg Oral QAM       sodium chloride 125 mL/hr Last Rate: 125 mL/hr (11/21/17 0519)     •  acetaminophen **OR** acetaminophen  •  acetaminophen  •  dextrose  •  dextrose  •  glucagon (human recombinant)  •  hydrALAZINE  •  ondansetron  •  sodium chloride  •  sodium chloride  •  Insert peripheral IV **AND** sodium chloride      Objective:  Vitals Ranges:   Temp:  [97.7 °F (36.5 °C)-98.6 °F (37 °C)] 97.9 °F (36.6 °C)  Heart Rate:  [73-94] 94  Resp:  [16-18] 18  BP: (114-182)/(63-93) 153/88      Physical Exam:  He is awake alert and well-oriented denies headache normal cranial nerves II through XII good motor times for extremities mild cogwheeling rigidity in the upper extremities no resting tremor axis trace throughout symmetrical toes downgoing bilaterally    Results review:   I reviewed the patient's new clinical results.    Data review:  Lab Results (last 24 hours)     Procedure Component Value Units Date/Time    POC Glucose Fingerstick [[id number]]  (Abnormal) Collected:  11/20/17 2120    Specimen:  Blood Updated:  11/20/17 2121     Glucose 181  (H) mg/dL     Narrative:       Meter: [id number] : [id number] Bennett Navarro    Hemoglobin A1c [[id number]]  (Abnormal) Collected:  11/21/17 0521    Specimen:  Blood Updated:  11/21/17 0607     Hemoglobin A1C 7.35 (H) %     Narrative:       Hemoglobin A1C Ranges:    Increased Risk for Diabetes  5.7% to 6.4%  Diabetes                     >= 6.5%  Diabetic Goal                < 7.0%    POC Glucose Fingerstick [[id number]]  (Abnormal) Collected:  11/21/17 0605    Specimen:  Blood Updated:  11/21/17 0608     Glucose 172 (H) mg/dL     Narrative:       Meter: [id number] : [id number] Bennett Navarro    Lipid Panel [[id number]]  (Abnormal) Collected:  11/21/17 0521    Specimen:  Blood Updated:  11/21/17 0626     Total Cholesterol 100 mg/dL      Triglycerides 227 (H) mg/dL      HDL Cholesterol 35 (L) mg/dL      LDL Cholesterol  20 mg/dL      VLDL Cholesterol 45.4 (H) mg/dL      LDL/HDL Ratio 0.56    Narrative:       Cholesterol Reference Ranges  (U.S[place] Department of Health and Human Services ATP III Classifications)    Desirable          <200 mg/dL  Borderline High    200-239 mg/dL  High Risk          >240 mg/dL      Triglyceride Reference Ranges  (U.S. Department of Health and Human Services ATP III Classifications)    Normal           <150 mg/dL  Borderline High  150-199 mg/dL  High             200-499 mg/dL  Very High        >500 mg/dL    HDL Reference Ranges  (U.S. Department of Health and Human Services ATP III Classifcations)    Low     <40 mg/dl (major risk factor for CHD)  High    >60 mg/dl ('negative' risk factor for CHD)        LDL Reference Ranges  (U.S. Department of Health and Human Services ATP III Classifcations)    Optimal          <100 mg/dL  Near Optimal     100-129 mg/dL  Borderline High  130-159 mg/dL  High             160-189 mg/dL  Very High        >189 mg/dL    POC Glucose Fingerstick [[id number]]  (Abnormal) Collected:  11/21/17 1215    Specimen:  Blood Updated:  11/21/17 1217     Glucose 192 (H)  mg/dL     Narrative:       Meter: [id number] : [id number] Mily Chang           Imaging:  Imaging Results (last 24 hours)     Procedure Component Value Units Date/Time    CT Head Without Contrast [[id number]] Collected:  11/20/17 1151     Updated:  11/21/17 0654    Narrative:       CT HEAD WITHOUT CONTRAST     HISTORY: Stroke, TIA.     A noncontrasted CT examination of the brain was performed.     FINDINGS: The brain ventricles are symmetrical. There is age-appropriate  atrophy. Beam-hardening artifact limits evaluation somewhat, but there  is no evidence of acute infarction or hemorrhage. There is moderate to  severe small vessel ischemic disease. An area of encephalomalacia is  appreciated involving the left parietal lobe posteriorly and superiorly,  underlying a small craniotomy flap similar to the MRI examination of  11/16/2017. No focal area of decreased attenuation to suggest acute  infarction is identified. There is no evidence of hydrocephalus.  Vascular calcification is noted. Further evaluation could be performed  with a MRI examination of brain as indicated.      The above information was called to and discussed with Dr. Moran.           Radiation dose reduction techniques were utilized, including automated  exposure control and exposure modulation based on body size.     This report was finalized on 11/21/2017 6:51 AM by Dr. Baldo Spencer MD.       MRI Brain With & Without Contrast [[id number]] Collected:  11/20/17 1824     Updated:  11/21/17 0658    Narrative:       MRI EXAMINATION OF THE BRAIN WITH AND WITHOUT CONTRAST, MRA OF THE HEAD  WITHOUT CONTRAST AND MRA OF THE NECK WITH AND WITHOUT CONTRAST     HISTORY: TIA, craniotomy, headaches.     COMPARISON: Comparison is made to multiple prior CT and MRI examinations  of the brain with the most recent CT examination being that of  11/20/2017 and the most recent MRI examination being that of 11/16/2017.  No prior MRA is available for comparison.      MRI EXAMINATION OF THE BRAIN WITH AND WITHOUT CONTRAST: There is no  evidence of restricted diffusion to suggest acute infarction. There is  age-appropriate atrophy. Confluent areas of increased signal intensity  are appreciated on the axial T2 FLAIR sequence involving the  periventricular and deep white matter at the cerebral hemispheres  bilaterally, nonspecific but suggesting small vessel ischemic disease.  There is a small area of encephalomalacia involving left parietal lobe  underlying a small craniotomy flap, unchanged. After contrast  administration there was moderate dural enhancement supratentorially  with the enhancement being slightly more prominent as compared to  11/16/2017. There was no evidence of abnormal intra-axial enhancement.       Impression:       No evidence of acute infarction. Small vessel ischemic  disease. Encephalomalacia involving the left parietal lobe, unchanged.  There is moderate dural enhancement supratentorially, uniform,  nonspecific, slightly more prominent as compared to the MRI examination  of 11/16/2017.     MRA OF THE HEAD WITHOUT CONTRAST: There is signal present within the  distal aspect of vertebral arteries bilaterally. The right vertebral  artery is slightly larger than that on the left. The basilar artery is  of normal caliber. The right posterior cerebral artery appears  unremarkable. There is a fetal origin at the left posterior cerebral  artery. The left P1 segment is hypoplastic.     There is signal present within the distal aspect of the internal carotid  arteries bilaterally. The right A1 segment is mildly hypoplastic.  Anterior communicating artery is patent, small. The proximal aspects of  the anterior and middle cerebral arteries appear otherwise unremarkable.     IMPRESSION: No evidence of focal high-grade stenosis or of aneurysm.      MRA OF THE NECK WITH AND WITHOUT CONTRAST: The great vessels are  arranged in a classic configuration. The right vertebral  artery is  larger than that of the left. The cervical segments of the vertebral  arteries are of relatively uniform caliber. There is eccentric stenosis,  mild, involving the right internal carotid artery proximally resulting  in a stenosis estimated to be approximately 10% using NASCET criteria.  Stenosis involving the left internal carotid artery proximally is also  appreciated with a suggestion of mild ulceration. Stenosis is less than  10% using NASCET criteria.     IMPRESSION: Atherosclerotic disease involving the carotid bifurcations  bilaterally but with less than 10% stenosis using NASCET criteria.  Irregularity is more prominent on the left where there is a suggestion  of a shallow ulcer.     This report was finalized on 11/21/2017 6:55 AM by Dr. Baldo Spencer MD.       MRI Angiogram Head Without Contrast [[id number]] Collected:  11/20/17 1824     Updated:  11/21/17 0658    Narrative:       MRI EXAMINATION OF THE BRAIN WITH AND WITHOUT CONTRAST, MRA OF THE HEAD  WITHOUT CONTRAST AND MRA OF THE NECK WITH AND WITHOUT CONTRAST     HISTORY: TIA, craniotomy, headaches.     COMPARISON: Comparison is made to multiple prior CT and MRI examinations  of the brain with the most recent CT examination being that of  11/20/2017 and the most recent MRI examination being that of 11/16/2017.  No prior MRA is available for comparison.     MRI EXAMINATION OF THE BRAIN WITH AND WITHOUT CONTRAST: There is no  evidence of restricted diffusion to suggest acute infarction. There is  age-appropriate atrophy. Confluent areas of increased signal intensity  are appreciated on the axial T2 FLAIR sequence involving the  periventricular and deep white matter at the cerebral hemispheres  bilaterally, nonspecific but suggesting small vessel ischemic disease.  There is a small area of encephalomalacia involving left parietal lobe  underlying a small craniotomy flap, unchanged. After contrast  administration there was moderate dural  enhancement supratentorially  with the enhancement being slightly more prominent as compared to  11/16/2017. There was no evidence of abnormal intra-axial enhancement.       Impression:       No evidence of acute infarction. Small vessel ischemic  disease. Encephalomalacia involving the left parietal lobe, unchanged.  There is moderate dural enhancement supratentorially, uniform,  nonspecific, slightly more prominent as compared to the MRI examination  of 11/16/2017.     MRA OF THE HEAD WITHOUT CONTRAST: There is signal present within the  distal aspect of vertebral arteries bilaterally. The right vertebral  artery is slightly larger than that on the left. The basilar artery is  of normal caliber. The right posterior cerebral artery appears  unremarkable. There is a fetal origin at the left posterior cerebral  artery. The left P1 segment is hypoplastic.     There is signal present within the distal aspect of the internal carotid  arteries bilaterally. The right A1 segment is mildly hypoplastic.  Anterior communicating artery is patent, small. The proximal aspects of  the anterior and middle cerebral arteries appear otherwise unremarkable.     IMPRESSION: No evidence of focal high-grade stenosis or of aneurysm.      MRA OF THE NECK WITH AND WITHOUT CONTRAST: The great vessels are  arranged in a classic configuration. The right vertebral artery is  larger than that of the left. The cervical segments of the vertebral  arteries are of relatively uniform caliber. There is eccentric stenosis,  mild, involving the right internal carotid artery proximally resulting  in a stenosis estimated to be approximately 10% using NASCET criteria.  Stenosis involving the left internal carotid artery proximally is also  appreciated with a suggestion of mild ulceration. Stenosis is less than  10% using NASCET criteria.     IMPRESSION: Atherosclerotic disease involving the carotid bifurcations  bilaterally but with less than 10% stenosis  using NASCET criteria.  Irregularity is more prominent on the left where there is a suggestion  of a shallow ulcer.     This report was finalized on 11/21/2017 6:55 AM by Dr. Baldo Spencer MD.       MRI Angiogram Neck With & Without Contrast [[id number]] Collected:  11/20/17 1824     Updated:  11/21/17 0658    Narrative:       MRI EXAMINATION OF THE BRAIN WITH AND WITHOUT CONTRAST, MRA OF THE HEAD  WITHOUT CONTRAST AND MRA OF THE NECK WITH AND WITHOUT CONTRAST     HISTORY: TIA, craniotomy, headaches.     COMPARISON: Comparison is made to multiple prior CT and MRI examinations  of the brain with the most recent CT examination being that of  11/20/2017 and the most recent MRI examination being that of 11/16/2017.  No prior MRA is available for comparison.     MRI EXAMINATION OF THE BRAIN WITH AND WITHOUT CONTRAST: There is no  evidence of restricted diffusion to suggest acute infarction. There is  age-appropriate atrophy. Confluent areas of increased signal intensity  are appreciated on the axial T2 FLAIR sequence involving the  periventricular and deep white matter at the cerebral hemispheres  bilaterally, nonspecific but suggesting small vessel ischemic disease.  There is a small area of encephalomalacia involving left parietal lobe  underlying a small craniotomy flap, unchanged. After contrast  administration there was moderate dural enhancement supratentorially  with the enhancement being slightly more prominent as compared to  11/16/2017. There was no evidence of abnormal intra-axial enhancement.       Impression:       No evidence of acute infarction. Small vessel ischemic  disease. Encephalomalacia involving the left parietal lobe, unchanged.  There is moderate dural enhancement supratentorially, uniform,  nonspecific, slightly more prominent as compared to the MRI examination  of 11/16/2017.     MRA OF THE HEAD WITHOUT CONTRAST: There is signal present within the  distal aspect of vertebral arteries  bilaterally. The right vertebral  artery is slightly larger than that on the left. The basilar artery is  of normal caliber. The right posterior cerebral artery appears  unremarkable. There is a fetal origin at the left posterior cerebral  artery. The left P1 segment is hypoplastic.     There is signal present within the distal aspect of the internal carotid  arteries bilaterally. The right A1 segment is mildly hypoplastic.  Anterior communicating artery is patent, small. The proximal aspects of  the anterior and middle cerebral arteries appear otherwise unremarkable.     IMPRESSION: No evidence of focal high-grade stenosis or of aneurysm.      MRA OF THE NECK WITH AND WITHOUT CONTRAST: The great vessels are  arranged in a classic configuration. The right vertebral artery is  larger than that of the left. The cervical segments of the vertebral  arteries are of relatively uniform caliber. There is eccentric stenosis,  mild, involving the right internal carotid artery proximally resulting  in a stenosis estimated to be approximately 10% using NASCET criteria.  Stenosis involving the left internal carotid artery proximally is also  appreciated with a suggestion of mild ulceration. Stenosis is less than  10% using NASCET criteria.     IMPRESSION: Atherosclerotic disease involving the carotid bifurcations  bilaterally but with less than 10% stenosis using NASCET criteria.  Irregularity is more prominent on the left where there is a suggestion  of a shallow ulcer.     This report was finalized on 11/21/2017 6:55 AM by Dr. Baldo Spencer MD.                Assessment and Plan:     At this point he states he has no headache at all.  He looks neurologically very good at this time and I'm okay with him being discharged.  He thinks that his gait MAY be a bit better after the high-volume lumbar puncture for normal pressure hydrocephalus, FYI!      Eugenio Manrique MD  11/21/17  1:38 PM   7

## 2020-09-05 ENCOUNTER — HOSPITAL ENCOUNTER (INPATIENT)
Facility: HOSPITAL | Age: 80
LOS: 1 days | Discharge: NURSING, REHAB OR LONG-TERM CARE | End: 2020-09-08
Attending: EMERGENCY MEDICINE | Admitting: HOSPITALIST

## 2020-09-05 ENCOUNTER — APPOINTMENT (OUTPATIENT)
Dept: CT IMAGING | Facility: HOSPITAL | Age: 80
End: 2020-09-05

## 2020-09-05 DIAGNOSIS — R47.01 EXPRESSIVE APHASIA: ICD-10-CM

## 2020-09-05 DIAGNOSIS — G45.9 TIA (TRANSIENT ISCHEMIC ATTACK): Primary | ICD-10-CM

## 2020-09-05 PROBLEM — I63.9 CVA (CEREBRAL VASCULAR ACCIDENT) (HCC): Status: ACTIVE | Noted: 2020-09-05

## 2020-09-05 LAB
ALBUMIN SERPL-MCNC: 3.8 G/DL (ref 3.5–5.2)
ALBUMIN/GLOB SERPL: 1.1 G/DL
ALP SERPL-CCNC: 83 U/L (ref 39–117)
ALT SERPL W P-5'-P-CCNC: 10 U/L (ref 1–41)
ANION GAP SERPL CALCULATED.3IONS-SCNC: 12.5 MMOL/L (ref 5–15)
APTT PPP: 30 SECONDS (ref 22.7–35.4)
AST SERPL-CCNC: 8 U/L (ref 1–40)
BASOPHILS # BLD AUTO: 0.05 10*3/MM3 (ref 0–0.2)
BASOPHILS NFR BLD AUTO: 0.5 % (ref 0–1.5)
BILIRUB SERPL-MCNC: 0.2 MG/DL (ref 0–1.2)
BUN SERPL-MCNC: 34 MG/DL (ref 8–23)
BUN/CREAT SERPL: 22.4 (ref 7–25)
CALCIUM SPEC-SCNC: 9.2 MG/DL (ref 8.6–10.5)
CHLORIDE SERPL-SCNC: 105 MMOL/L (ref 98–107)
CO2 SERPL-SCNC: 23.5 MMOL/L (ref 22–29)
CREAT SERPL-MCNC: 1.52 MG/DL (ref 0.76–1.27)
DEPRECATED RDW RBC AUTO: 43.4 FL (ref 37–54)
EOSINOPHIL # BLD AUTO: 0.2 10*3/MM3 (ref 0–0.4)
EOSINOPHIL NFR BLD AUTO: 2.1 % (ref 0.3–6.2)
ERYTHROCYTE [DISTWIDTH] IN BLOOD BY AUTOMATED COUNT: 12.8 % (ref 12.3–15.4)
GFR SERPL CREATININE-BSD FRML MDRD: 44 ML/MIN/1.73
GLOBULIN UR ELPH-MCNC: 3.4 GM/DL
GLUCOSE SERPL-MCNC: 185 MG/DL (ref 65–99)
HCT VFR BLD AUTO: 35.3 % (ref 37.5–51)
HGB BLD-MCNC: 11.6 G/DL (ref 13–17.7)
IMM GRANULOCYTES # BLD AUTO: 0.03 10*3/MM3 (ref 0–0.05)
IMM GRANULOCYTES NFR BLD AUTO: 0.3 % (ref 0–0.5)
INR PPP: 1.06 (ref 0.9–1.1)
LYMPHOCYTES # BLD AUTO: 2.42 10*3/MM3 (ref 0.7–3.1)
LYMPHOCYTES NFR BLD AUTO: 25 % (ref 19.6–45.3)
MAGNESIUM SERPL-MCNC: 1.5 MG/DL (ref 1.6–2.4)
MCH RBC QN AUTO: 30.4 PG (ref 26.6–33)
MCHC RBC AUTO-ENTMCNC: 32.9 G/DL (ref 31.5–35.7)
MCV RBC AUTO: 92.7 FL (ref 79–97)
MONOCYTES # BLD AUTO: 0.77 10*3/MM3 (ref 0.1–0.9)
MONOCYTES NFR BLD AUTO: 8 % (ref 5–12)
NEUTROPHILS NFR BLD AUTO: 6.21 10*3/MM3 (ref 1.7–7)
NEUTROPHILS NFR BLD AUTO: 64.1 % (ref 42.7–76)
NRBC BLD AUTO-RTO: 0 /100 WBC (ref 0–0.2)
PLATELET # BLD AUTO: 219 10*3/MM3 (ref 140–450)
PMV BLD AUTO: 10.6 FL (ref 6–12)
POTASSIUM SERPL-SCNC: 4.2 MMOL/L (ref 3.5–5.2)
PROT SERPL-MCNC: 7.2 G/DL (ref 6–8.5)
PROTHROMBIN TIME: 13.7 SECONDS (ref 11.7–14.2)
RBC # BLD AUTO: 3.81 10*6/MM3 (ref 4.14–5.8)
SODIUM SERPL-SCNC: 141 MMOL/L (ref 136–145)
TROPONIN T SERPL-MCNC: <0.01 NG/ML (ref 0–0.03)
WBC # BLD AUTO: 9.68 10*3/MM3 (ref 3.4–10.8)

## 2020-09-05 PROCEDURE — 99285 EMERGENCY DEPT VISIT HI MDM: CPT

## 2020-09-05 PROCEDURE — 93010 ELECTROCARDIOGRAM REPORT: CPT | Performed by: INTERNAL MEDICINE

## 2020-09-05 PROCEDURE — 80053 COMPREHEN METABOLIC PANEL: CPT | Performed by: EMERGENCY MEDICINE

## 2020-09-05 PROCEDURE — 83735 ASSAY OF MAGNESIUM: CPT | Performed by: HOSPITALIST

## 2020-09-05 PROCEDURE — G0378 HOSPITAL OBSERVATION PER HR: HCPCS

## 2020-09-05 PROCEDURE — U0004 COV-19 TEST NON-CDC HGH THRU: HCPCS | Performed by: EMERGENCY MEDICINE

## 2020-09-05 PROCEDURE — 84484 ASSAY OF TROPONIN QUANT: CPT | Performed by: EMERGENCY MEDICINE

## 2020-09-05 PROCEDURE — 99284 EMERGENCY DEPT VISIT MOD MDM: CPT

## 2020-09-05 PROCEDURE — 70450 CT HEAD/BRAIN W/O DYE: CPT

## 2020-09-05 PROCEDURE — 85025 COMPLETE CBC W/AUTO DIFF WBC: CPT | Performed by: EMERGENCY MEDICINE

## 2020-09-05 PROCEDURE — 93005 ELECTROCARDIOGRAM TRACING: CPT | Performed by: EMERGENCY MEDICINE

## 2020-09-05 PROCEDURE — 85610 PROTHROMBIN TIME: CPT | Performed by: EMERGENCY MEDICINE

## 2020-09-05 PROCEDURE — 85730 THROMBOPLASTIN TIME PARTIAL: CPT | Performed by: EMERGENCY MEDICINE

## 2020-09-05 RX ORDER — SODIUM CHLORIDE 0.9 % (FLUSH) 0.9 %
10 SYRINGE (ML) INJECTION AS NEEDED
Status: DISCONTINUED | OUTPATIENT
Start: 2020-09-05 | End: 2020-09-08 | Stop reason: HOSPADM

## 2020-09-05 RX ORDER — ATORVASTATIN CALCIUM 80 MG/1
80 TABLET, FILM COATED ORAL NIGHTLY
Status: DISCONTINUED | OUTPATIENT
Start: 2020-09-05 | End: 2020-09-06

## 2020-09-05 RX ORDER — SODIUM CHLORIDE 9 MG/ML
75 INJECTION, SOLUTION INTRAVENOUS CONTINUOUS
Status: DISCONTINUED | OUTPATIENT
Start: 2020-09-05 | End: 2020-09-08

## 2020-09-05 RX ORDER — ASPIRIN 325 MG
325 TABLET ORAL DAILY
Status: DISCONTINUED | OUTPATIENT
Start: 2020-09-06 | End: 2020-09-06

## 2020-09-05 RX ORDER — SODIUM CHLORIDE 0.9 % (FLUSH) 0.9 %
10 SYRINGE (ML) INJECTION EVERY 12 HOURS SCHEDULED
Status: DISCONTINUED | OUTPATIENT
Start: 2020-09-05 | End: 2020-09-08 | Stop reason: HOSPADM

## 2020-09-05 RX ORDER — NICOTINE POLACRILEX 4 MG
15 LOZENGE BUCCAL
Status: DISCONTINUED | OUTPATIENT
Start: 2020-09-05 | End: 2020-09-08 | Stop reason: HOSPADM

## 2020-09-05 RX ORDER — ASPIRIN 300 MG/1
300 SUPPOSITORY RECTAL DAILY
Status: DISCONTINUED | OUTPATIENT
Start: 2020-09-06 | End: 2020-09-06

## 2020-09-05 RX ORDER — ASPIRIN 325 MG
325 TABLET ORAL ONCE
Status: COMPLETED | OUTPATIENT
Start: 2020-09-05 | End: 2020-09-05

## 2020-09-05 RX ORDER — DEXTROSE MONOHYDRATE 25 G/50ML
25 INJECTION, SOLUTION INTRAVENOUS
Status: DISCONTINUED | OUTPATIENT
Start: 2020-09-05 | End: 2020-09-08 | Stop reason: HOSPADM

## 2020-09-05 RX ORDER — ONDANSETRON 2 MG/ML
4 INJECTION INTRAMUSCULAR; INTRAVENOUS EVERY 6 HOURS PRN
Status: DISCONTINUED | OUTPATIENT
Start: 2020-09-05 | End: 2020-09-08 | Stop reason: HOSPADM

## 2020-09-05 RX ADMIN — ASPIRIN 325 MG: 325 TABLET ORAL at 23:35

## 2020-09-05 RX ADMIN — SODIUM CHLORIDE, PRESERVATIVE FREE 10 ML: 5 INJECTION INTRAVENOUS at 21:45

## 2020-09-06 ENCOUNTER — APPOINTMENT (OUTPATIENT)
Dept: CARDIOLOGY | Facility: HOSPITAL | Age: 80
End: 2020-09-06

## 2020-09-06 ENCOUNTER — APPOINTMENT (OUTPATIENT)
Dept: MRI IMAGING | Facility: HOSPITAL | Age: 80
End: 2020-09-06

## 2020-09-06 LAB
ALBUMIN SERPL-MCNC: 3.3 G/DL (ref 3.5–5.2)
ALBUMIN/GLOB SERPL: 1 G/DL
ALP SERPL-CCNC: 74 U/L (ref 39–117)
ALT SERPL W P-5'-P-CCNC: 10 U/L (ref 1–41)
ANION GAP SERPL CALCULATED.3IONS-SCNC: 10.9 MMOL/L (ref 5–15)
AORTIC DIMENSIONLESS INDEX: 0.9 (DI)
AST SERPL-CCNC: 7 U/L (ref 1–40)
BH CV ECHO MEAS - AI DEC SLOPE: 76.5 CM/SEC^2
BH CV ECHO MEAS - AI MAX PG: 20.3 MMHG
BH CV ECHO MEAS - AI MAX VEL: 225 CM/SEC
BH CV ECHO MEAS - AI P1/2T: 861.5 MSEC
BH CV ECHO MEAS - AO MAX PG (FULL): -2.4 MMHG
BH CV ECHO MEAS - AO MAX PG: 4.2 MMHG
BH CV ECHO MEAS - AO MEAN PG (FULL): -2 MMHG
BH CV ECHO MEAS - AO MEAN PG: 2 MMHG
BH CV ECHO MEAS - AO V2 MAX: 103 CM/SEC
BH CV ECHO MEAS - AO V2 MEAN: 71.7 CM/SEC
BH CV ECHO MEAS - AO V2 VTI: 21.4 CM
BH CV ECHO MEAS - ASC AORTA: 3.3 CM
BH CV ECHO MEAS - AVA(I,A): 4.4 CM^2
BH CV ECHO MEAS - AVA(I,D): 4.4 CM^2
BH CV ECHO MEAS - AVA(V,A): 3.9 CM^2
BH CV ECHO MEAS - AVA(V,D): 3.9 CM^2
BH CV ECHO MEAS - BSA(HAYCOCK): 2.1 M^2
BH CV ECHO MEAS - BSA: 2 M^2
BH CV ECHO MEAS - BZI_BMI: 26.8 KILOGRAMS/M^2
BH CV ECHO MEAS - BZI_METRIC_HEIGHT: 178 CM
BH CV ECHO MEAS - BZI_METRIC_WEIGHT: 85 KG
BH CV ECHO MEAS - CONTRAST EF 4CH: 63 CM2
BH CV ECHO MEAS - EDV(CUBED): 97.3 ML
BH CV ECHO MEAS - EDV(MOD-SP2): 52 ML
BH CV ECHO MEAS - EDV(MOD-SP4): 61 ML
BH CV ECHO MEAS - EDV(TEICH): 97.3 ML
BH CV ECHO MEAS - EF(CUBED): 66.3 %
BH CV ECHO MEAS - EF(MOD-BP): 62.8 %
BH CV ECHO MEAS - EF(MOD-SP2): 65.4 %
BH CV ECHO MEAS - EF(MOD-SP4): 59 %
BH CV ECHO MEAS - EF(TEICH): 57.9 %
BH CV ECHO MEAS - ESV(CUBED): 32.8 ML
BH CV ECHO MEAS - ESV(MOD-SP2): 18 ML
BH CV ECHO MEAS - ESV(MOD-SP4): 25 ML
BH CV ECHO MEAS - ESV(TEICH): 41 ML
BH CV ECHO MEAS - FS: 30.4 %
BH CV ECHO MEAS - IVS/LVPW: 0.91
BH CV ECHO MEAS - IVSD: 1 CM
BH CV ECHO MEAS - LAT PEAK E' VEL: 6.5 CM/SEC
BH CV ECHO MEAS - LV DIASTOLIC VOL/BSA (35-75): 30 ML/M^2
BH CV ECHO MEAS - LV MASS(C)D: 169.9 GRAMS
BH CV ECHO MEAS - LV MASS(C)DI: 83.6 GRAMS/M^2
BH CV ECHO MEAS - LV MAX PG: 6.7 MMHG
BH CV ECHO MEAS - LV MEAN PG: 4 MMHG
BH CV ECHO MEAS - LV SYSTOLIC VOL/BSA (12-30): 12.3 ML/M^2
BH CV ECHO MEAS - LV V1 MAX: 129 CM/SEC
BH CV ECHO MEAS - LV V1 MEAN: 91.1 CM/SEC
BH CV ECHO MEAS - LV V1 VTI: 30 CM
BH CV ECHO MEAS - LVIDD: 4.6 CM
BH CV ECHO MEAS - LVIDS: 3.2 CM
BH CV ECHO MEAS - LVLD AP2: 7.1 CM
BH CV ECHO MEAS - LVLD AP4: 7.5 CM
BH CV ECHO MEAS - LVLS AP2: 6.2 CM
BH CV ECHO MEAS - LVLS AP4: 6.1 CM
BH CV ECHO MEAS - LVOT AREA (M): 3.1 CM^2
BH CV ECHO MEAS - LVOT AREA: 3.1 CM^2
BH CV ECHO MEAS - LVOT DIAM: 2 CM
BH CV ECHO MEAS - LVPWD: 1.1 CM
BH CV ECHO MEAS - MED PEAK E' VEL: 6.3 CM/SEC
BH CV ECHO MEAS - MV A DUR: 137 SEC
BH CV ECHO MEAS - MV A MAX VEL: 116 CM/SEC
BH CV ECHO MEAS - MV DEC SLOPE: 407 CM/SEC^2
BH CV ECHO MEAS - MV DEC TIME: 165 SEC
BH CV ECHO MEAS - MV E MAX VEL: 70.3 CM/SEC
BH CV ECHO MEAS - MV E/A: 0.61
BH CV ECHO MEAS - MV MAX PG: 6.4 MMHG
BH CV ECHO MEAS - MV MEAN PG: 3 MMHG
BH CV ECHO MEAS - MV P1/2T MAX VEL: 85.5 CM/SEC
BH CV ECHO MEAS - MV P1/2T: 61.5 MSEC
BH CV ECHO MEAS - MV V2 MAX: 126 CM/SEC
BH CV ECHO MEAS - MV V2 MEAN: 72.5 CM/SEC
BH CV ECHO MEAS - MV V2 VTI: 34.1 CM
BH CV ECHO MEAS - MVA P1/2T LCG: 2.6 CM^2
BH CV ECHO MEAS - MVA(P1/2T): 3.6 CM^2
BH CV ECHO MEAS - MVA(VTI): 2.8 CM^2
BH CV ECHO MEAS - PA MAX PG (FULL): 2 MMHG
BH CV ECHO MEAS - PA MAX PG: 4.5 MMHG
BH CV ECHO MEAS - PA V2 MAX: 106 CM/SEC
BH CV ECHO MEAS - PVA(V,A): 2.6 CM^2
BH CV ECHO MEAS - PVA(V,D): 2.6 CM^2
BH CV ECHO MEAS - QP/QS: 0.64
BH CV ECHO MEAS - RV MAX PG: 2.5 MMHG
BH CV ECHO MEAS - RV MEAN PG: 1 MMHG
BH CV ECHO MEAS - RV V1 MAX: 79.1 CM/SEC
BH CV ECHO MEAS - RV V1 MEAN: 56.6 CM/SEC
BH CV ECHO MEAS - RV V1 VTI: 17.4 CM
BH CV ECHO MEAS - RVOT AREA: 3.5 CM^2
BH CV ECHO MEAS - RVOT DIAM: 2.1 CM
BH CV ECHO MEAS - SI(CUBED): 31.8 ML/M^2
BH CV ECHO MEAS - SI(LVOT): 46.4 ML/M^2
BH CV ECHO MEAS - SI(MOD-SP2): 16.7 ML/M^2
BH CV ECHO MEAS - SI(MOD-SP4): 17.7 ML/M^2
BH CV ECHO MEAS - SI(TEICH): 27.7 ML/M^2
BH CV ECHO MEAS - SV(CUBED): 64.6 ML
BH CV ECHO MEAS - SV(LVOT): 94.2 ML
BH CV ECHO MEAS - SV(MOD-SP2): 34 ML
BH CV ECHO MEAS - SV(MOD-SP4): 36 ML
BH CV ECHO MEAS - SV(RVOT): 60.3 ML
BH CV ECHO MEAS - SV(TEICH): 56.4 ML
BH CV ECHO MEAS - TAPSE (>1.6): 2.2 CM
BH CV ECHO MEAS - TR MAX VEL: 200 CM/SEC
BH CV ECHO MEASUREMENTS AVERAGE E/E' RATIO: 10.98
BH CV XLRA - RV BASE: 3.5 CM
BH CV XLRA - RV LENGTH: 7.1 CM
BH CV XLRA - RV MID: 2.8 CM
BH CV XLRA - TDI S': 11.5 CM/SEC
BH CV XLRA MEAS LEFT DIST CCA EDV: -9.8 CM/SEC
BH CV XLRA MEAS LEFT DIST CCA PSV: -123 CM/SEC
BH CV XLRA MEAS LEFT DIST ICA EDV: -14.1 CM/SEC
BH CV XLRA MEAS LEFT DIST ICA PSV: -80.1 CM/SEC
BH CV XLRA MEAS LEFT ICA/CCA RATIO: 0.7
BH CV XLRA MEAS LEFT MID ICA EDV: -12.2 CM/SEC
BH CV XLRA MEAS LEFT MID ICA PSV: -68.4 CM/SEC
BH CV XLRA MEAS LEFT PROX CCA EDV: 11.1 CM/SEC
BH CV XLRA MEAS LEFT PROX CCA PSV: 107 CM/SEC
BH CV XLRA MEAS LEFT PROX ECA PSV: -149 CM/SEC
BH CV XLRA MEAS LEFT PROX ICA EDV: -10.6 CM/SEC
BH CV XLRA MEAS LEFT PROX ICA PSV: -62.9 CM/SEC
BH CV XLRA MEAS LEFT PROX SCLA PSV: -113 CM/SEC
BH CV XLRA MEAS LEFT VERTEBRAL A EDV: -8.3 CM/SEC
BH CV XLRA MEAS LEFT VERTEBRAL A PSV: -58.9 CM/SEC
BH CV XLRA MEAS RIGHT DIST CCA EDV: 13.4 CM/SEC
BH CV XLRA MEAS RIGHT DIST CCA PSV: 84.1 CM/SEC
BH CV XLRA MEAS RIGHT DIST ICA EDV: -16.4 CM/SEC
BH CV XLRA MEAS RIGHT DIST ICA PSV: -100 CM/SEC
BH CV XLRA MEAS RIGHT ICA/CCA RATIO: 1.2
BH CV XLRA MEAS RIGHT MID ICA EDV: -17 CM/SEC
BH CV XLRA MEAS RIGHT MID ICA PSV: -94.4 CM/SEC
BH CV XLRA MEAS RIGHT PROX CCA EDV: 13.4 CM/SEC
BH CV XLRA MEAS RIGHT PROX CCA PSV: 86.4 CM/SEC
BH CV XLRA MEAS RIGHT PROX ECA PSV: 89.1 CM/SEC
BH CV XLRA MEAS RIGHT PROX ICA EDV: -16.4 CM/SEC
BH CV XLRA MEAS RIGHT PROX ICA PSV: -95.6 CM/SEC
BH CV XLRA MEAS RIGHT PROX SCLA PSV: 128 CM/SEC
BH CV XLRA MEAS RIGHT VERTEBRAL A EDV: -10.6 CM/SEC
BH CV XLRA MEAS RIGHT VERTEBRAL A PSV: -61.6 CM/SEC
BILIRUB SERPL-MCNC: 0.4 MG/DL (ref 0–1.2)
BUN SERPL-MCNC: 31 MG/DL (ref 8–23)
BUN/CREAT SERPL: 23.8 (ref 7–25)
CALCIUM SPEC-SCNC: 8.9 MG/DL (ref 8.6–10.5)
CHLORIDE SERPL-SCNC: 107 MMOL/L (ref 98–107)
CHOLEST SERPL-MCNC: 105 MG/DL (ref 0–200)
CO2 SERPL-SCNC: 22.1 MMOL/L (ref 22–29)
CREAT SERPL-MCNC: 1.3 MG/DL (ref 0.76–1.27)
DEPRECATED RDW RBC AUTO: 40.5 FL (ref 37–54)
ERYTHROCYTE [DISTWIDTH] IN BLOOD BY AUTOMATED COUNT: 12.4 % (ref 12.3–15.4)
GFR SERPL CREATININE-BSD FRML MDRD: 53 ML/MIN/1.73
GLOBULIN UR ELPH-MCNC: 3.3 GM/DL
GLUCOSE BLDC GLUCOMTR-MCNC: 166 MG/DL (ref 70–130)
GLUCOSE BLDC GLUCOMTR-MCNC: 176 MG/DL (ref 70–130)
GLUCOSE BLDC GLUCOMTR-MCNC: 205 MG/DL (ref 70–130)
GLUCOSE BLDC GLUCOMTR-MCNC: 207 MG/DL (ref 70–130)
GLUCOSE SERPL-MCNC: 189 MG/DL (ref 65–99)
HBA1C MFR BLD: 7.81 % (ref 4.8–5.6)
HCT VFR BLD AUTO: 33.7 % (ref 37.5–51)
HDLC SERPL-MCNC: 39 MG/DL (ref 40–60)
HGB BLD-MCNC: 11.3 G/DL (ref 13–17.7)
LDLC SERPL CALC-MCNC: 44 MG/DL (ref 0–100)
LDLC/HDLC SERPL: 1.13 {RATIO}
LEFT ARM BP: NORMAL MMHG
LEFT ATRIUM VOLUME INDEX: 28.6 ML/M2
MCH RBC QN AUTO: 30.1 PG (ref 26.6–33)
MCHC RBC AUTO-ENTMCNC: 33.5 G/DL (ref 31.5–35.7)
MCV RBC AUTO: 89.9 FL (ref 79–97)
PLATELET # BLD AUTO: 186 10*3/MM3 (ref 140–450)
PMV BLD AUTO: 10.6 FL (ref 6–12)
POTASSIUM SERPL-SCNC: 4.2 MMOL/L (ref 3.5–5.2)
PROT SERPL-MCNC: 6.6 G/DL (ref 6–8.5)
RBC # BLD AUTO: 3.75 10*6/MM3 (ref 4.14–5.8)
RIGHT ARM BP: NORMAL MMHG
SODIUM SERPL-SCNC: 140 MMOL/L (ref 136–145)
TRIGL SERPL-MCNC: 110 MG/DL (ref 0–150)
VLDLC SERPL-MCNC: 22 MG/DL (ref 5–40)
WBC # BLD AUTO: 9.48 10*3/MM3 (ref 3.4–10.8)

## 2020-09-06 PROCEDURE — 93306 TTE W/DOPPLER COMPLETE: CPT | Performed by: INTERNAL MEDICINE

## 2020-09-06 PROCEDURE — 85027 COMPLETE CBC AUTOMATED: CPT | Performed by: NURSE PRACTITIONER

## 2020-09-06 PROCEDURE — G0378 HOSPITAL OBSERVATION PER HR: HCPCS

## 2020-09-06 PROCEDURE — 93880 EXTRACRANIAL BILAT STUDY: CPT

## 2020-09-06 PROCEDURE — 83036 HEMOGLOBIN GLYCOSYLATED A1C: CPT | Performed by: NURSE PRACTITIONER

## 2020-09-06 PROCEDURE — 97162 PT EVAL MOD COMPLEX 30 MIN: CPT

## 2020-09-06 PROCEDURE — 36415 COLL VENOUS BLD VENIPUNCTURE: CPT | Performed by: NURSE PRACTITIONER

## 2020-09-06 PROCEDURE — 82962 GLUCOSE BLOOD TEST: CPT

## 2020-09-06 PROCEDURE — 99222 1ST HOSP IP/OBS MODERATE 55: CPT | Performed by: PSYCHIATRY & NEUROLOGY

## 2020-09-06 PROCEDURE — 97110 THERAPEUTIC EXERCISES: CPT

## 2020-09-06 PROCEDURE — 80061 LIPID PANEL: CPT | Performed by: NURSE PRACTITIONER

## 2020-09-06 PROCEDURE — 70551 MRI BRAIN STEM W/O DYE: CPT

## 2020-09-06 PROCEDURE — 92610 EVALUATE SWALLOWING FUNCTION: CPT

## 2020-09-06 PROCEDURE — 93306 TTE W/DOPPLER COMPLETE: CPT

## 2020-09-06 PROCEDURE — 63710000001 INSULIN LISPRO (HUMAN) PER 5 UNITS: Performed by: NURSE PRACTITIONER

## 2020-09-06 PROCEDURE — 80053 COMPREHEN METABOLIC PANEL: CPT | Performed by: NURSE PRACTITIONER

## 2020-09-06 RX ORDER — ATORVASTATIN CALCIUM 20 MG/1
40 TABLET, FILM COATED ORAL NIGHTLY
Status: DISCONTINUED | OUTPATIENT
Start: 2020-09-06 | End: 2020-09-08 | Stop reason: HOSPADM

## 2020-09-06 RX ORDER — ALBUTEROL SULFATE 2.5 MG/3ML
2.5 SOLUTION RESPIRATORY (INHALATION) EVERY 6 HOURS PRN
Status: DISCONTINUED | OUTPATIENT
Start: 2020-09-06 | End: 2020-09-08 | Stop reason: HOSPADM

## 2020-09-06 RX ORDER — PANTOPRAZOLE SODIUM 40 MG/1
40 TABLET, DELAYED RELEASE ORAL EVERY MORNING
Status: DISCONTINUED | OUTPATIENT
Start: 2020-09-06 | End: 2020-09-08 | Stop reason: HOSPADM

## 2020-09-06 RX ORDER — CLOPIDOGREL BISULFATE 75 MG/1
75 TABLET ORAL DAILY
Status: DISCONTINUED | OUTPATIENT
Start: 2020-09-07 | End: 2020-09-08 | Stop reason: HOSPADM

## 2020-09-06 RX ORDER — TERAZOSIN 1 MG/1
1 CAPSULE ORAL NIGHTLY
Status: DISCONTINUED | OUTPATIENT
Start: 2020-09-06 | End: 2020-09-08 | Stop reason: HOSPADM

## 2020-09-06 RX ORDER — GLIPIZIDE 5 MG/1
5 TABLET ORAL
Status: DISCONTINUED | OUTPATIENT
Start: 2020-09-07 | End: 2020-09-08 | Stop reason: HOSPADM

## 2020-09-06 RX ORDER — ASPIRIN 81 MG/1
81 TABLET, CHEWABLE ORAL DAILY
Status: DISCONTINUED | OUTPATIENT
Start: 2020-09-07 | End: 2020-09-08 | Stop reason: HOSPADM

## 2020-09-06 RX ORDER — ALLOPURINOL 300 MG/1
300 TABLET ORAL DAILY
Status: DISCONTINUED | OUTPATIENT
Start: 2020-09-06 | End: 2020-09-08 | Stop reason: HOSPADM

## 2020-09-06 RX ORDER — CLOPIDOGREL BISULFATE 75 MG/1
300 TABLET ORAL ONCE
Status: COMPLETED | OUTPATIENT
Start: 2020-09-06 | End: 2020-09-06

## 2020-09-06 RX ORDER — MULTIPLE VITAMINS W/ MINERALS TAB 9MG-400MCG
1 TAB ORAL DAILY
Status: DISCONTINUED | OUTPATIENT
Start: 2020-09-06 | End: 2020-09-08 | Stop reason: HOSPADM

## 2020-09-06 RX ADMIN — CLOPIDOGREL 300 MG: 75 TABLET, FILM COATED ORAL at 13:23

## 2020-09-06 RX ADMIN — TERAZOSIN HYDROCHLORIDE 1 MG: 1 CAPSULE ORAL at 20:50

## 2020-09-06 RX ADMIN — SODIUM CHLORIDE, PRESERVATIVE FREE 10 ML: 5 INJECTION INTRAVENOUS at 01:57

## 2020-09-06 RX ADMIN — SODIUM CHLORIDE 100 ML/HR: 9 INJECTION, SOLUTION INTRAVENOUS at 01:54

## 2020-09-06 RX ADMIN — ALLOPURINOL 300 MG: 300 TABLET ORAL at 13:23

## 2020-09-06 RX ADMIN — INSULIN LISPRO 4 UNITS: 100 INJECTION, SOLUTION INTRAVENOUS; SUBCUTANEOUS at 13:24

## 2020-09-06 RX ADMIN — SODIUM CHLORIDE, PRESERVATIVE FREE 10 ML: 5 INJECTION INTRAVENOUS at 13:24

## 2020-09-06 RX ADMIN — ATORVASTATIN CALCIUM 40 MG: 20 TABLET, FILM COATED ORAL at 20:50

## 2020-09-06 RX ADMIN — INSULIN LISPRO 4 UNITS: 100 INJECTION, SOLUTION INTRAVENOUS; SUBCUTANEOUS at 18:33

## 2020-09-06 RX ADMIN — MULTIPLE VITAMINS W/ MINERALS TAB 1 TABLET: TAB at 13:23

## 2020-09-06 RX ADMIN — SODIUM CHLORIDE, PRESERVATIVE FREE 10 ML: 5 INJECTION INTRAVENOUS at 20:51

## 2020-09-06 NOTE — PLAN OF CARE
Problem: Patient Care Overview  Goal: Plan of Care Review  Flowsheets (Taken 9/6/2020 1204)  Plan of Care Reviewed With: patient; spouse  Outcome Summary: Clinical swallow evaluation completed. Recommend a mechanical soft diet (no mixed consistencies) with nectar thick liquids. Meds whole or crushed in puree or with nectar thick liquids. Upright for meals, small bites/sips. Small sips of water or ice chips 30 minutes after meals following oral care. ST to follow for full speech evaluation and re-evaluation of swallow.

## 2020-09-06 NOTE — ED NOTES
Nursing report ED to floor  Eugenio Joe  79 y.o.  male    HPI (triage note):   Chief Complaint   Patient presents with   • Speech Problem       Admitting doctor:   Hollis Mccarthy MD    Admitting diagnosis:   The primary encounter diagnosis was TIA (transient ischemic attack). A diagnosis of Expressive aphasia was also pertinent to this visit.    Code status:   Current Code Status     Date Active Code Status Order ID Comments User Context       9/5/2020 2335 CPR [id number]  English, Miriam C, APRN ED       Questions for Current Code Status     Question Answer Comment    Code Status CPR     Medical Interventions (Level of Support Prior to Arrest) Full           Allergies:   Other and Oxycodone    Weight:   There were no vitals filed for this visit.    Most recent vitals:   Vitals:    09/05/20 2230 09/05/20 2245 09/05/20 2300 09/05/20 2305   BP: 165/76  171/77    BP Location:       Patient Position:       Pulse:  80  82   Resp:       Temp:       TempSrc:       SpO2: 95% 93% 93% 95%   Height:           Active LDAs/IV Access:   Lines, Drains & Airways    Active LDAs     Name:   Placement date:   Placement time:   Site:   Days:    Peripheral IV 09/05/20 2145 Left Antecubital   09/05/20 2145    Antecubital   less than 1                Labs (abnormal labs have a star):   Labs Reviewed   COMPREHENSIVE METABOLIC PANEL - Abnormal; Notable for the following components:       Result Value    Glucose 185 (*)     BUN 34 (*)     Creatinine 1.52 (*)     eGFR Non  Amer 44 (*)     All other components within normal limits    Narrative:     GFR Normal >60  Chronic Kidney Disease <60  Kidney Failure <15     CBC WITH AUTO DIFFERENTIAL - Abnormal; Notable for the following components:    RBC 3.81 (*)     Hemoglobin 11.6 (*)     Hematocrit 35.3 (*)     All other components within normal limits   MAGNESIUM - Abnormal; Notable for the following components:    Magnesium 1.5 (*)     All other components within normal limits    TROPONIN (IN-HOUSE) - Normal    Narrative:     Troponin T Reference Range:  <= 0.03 ng/mL-   Negative for AMI  >0.03 ng/mL-     Abnormal for myocardial necrosis.  Clinicians would have to utilize clinical acumen, EKG, Troponin and serial changes to determine if it is an Acute Myocardial Infarction or myocardial injury due to an underlying chronic condition.       Results may be falsely decreased if patient taking Biotin.     PROTIME-INR - Normal   APTT - Normal   COVID PRE-OP / PRE-PROCEDURE SCREENING ORDER (NO ISOLATION)    Narrative:     The following orders were created for panel order COVID PRE-OP / PRE-PROCEDURE SCREENING ORDER (NO ISOLATION) - Swab, Nasopharynx.  Procedure                               Abnormality         Status                     ---------                               -----------         ------                     COVID-19,BIOTAP, NP/OP S...[[id number]]                      In process                   Please view results for these tests on the individual orders.   COVID-19,BIOTAP, NP/OP SWAB IN TRANSPORT MEDIA OR SALINE 24-36 HR TAT   CBC (NO DIFF)   COMPREHENSIVE METABOLIC PANEL   POCT GLUCOSE FINGERSTICK   POCT GLUCOSE FINGERSTICK   POCT GLUCOSE FINGERSTICK   POCT GLUCOSE FINGERSTICK   POCT GLUCOSE FINGERSTICK   POCT GLUCOSE FINGERSTICK   POCT GLUCOSE FINGERSTICK   POCT GLUCOSE FINGERSTICK   CBC AND DIFFERENTIAL    Narrative:     The following orders were created for panel order CBC & Differential.  Procedure                               Abnormality         Status                     ---------                               -----------         ------                     CBC Auto Differential[[id number]]        Abnormal            Final result                 Please view results for these tests on the individual orders.       EKG:   ECG 12 Lead   Preliminary Result   HEART RATE= 82  bpm   RR Interval= 728  ms   RI Interval= 191  ms   P Horizontal Axis= -11  deg   P Front Axis= 3  deg    QRSD Interval= 103  ms   QT Interval= 380  ms   QRS Axis= -2  deg   T Wave Axis= 58  deg   - ABNORMAL ECG -   Sinus rhythm   Multiple ventricular premature complexes   Electronically Signed By:    Date and Time of Study: 2020-09-05 23:18:38          Meds given in ED:   Medications   sodium chloride 0.9 % flush 10 mL (has no administration in time range)   sodium chloride 0.9 % flush 10 mL (10 mL Intravenous Given 9/5/20 2145)   dextrose (GLUTOSE) oral gel 15 g (has no administration in time range)   dextrose (D50W) 25 g/ 50mL Intravenous Solution 25 g (has no administration in time range)   glucagon (human recombinant) (GLUCAGEN DIAGNOSTIC) injection 1 mg (has no administration in time range)   sodium chloride 0.9 % flush 10 mL (has no administration in time range)   sodium chloride 0.9 % flush 10 mL (has no administration in time range)   aspirin tablet 325 mg (has no administration in time range)     Or   aspirin suppository 300 mg (has no administration in time range)   atorvastatin (LIPITOR) tablet 80 mg (has no administration in time range)   ondansetron (ZOFRAN) injection 4 mg (has no administration in time range)   sodium chloride 0.9 % infusion (has no administration in time range)   insulin lispro (humaLOG) injection 0-9 Units (has no administration in time range)   aspirin tablet 325 mg (325 mg Oral Given 9/5/20 2335)       Imaging results:  Ct Head Without Contrast    Result Date: 9/5/2020  No acute intracranial findings.  Radiation dose reduction techniques were utilized, including automated exposure control and exposure modulation based on body size.  This report was finalized on 9/5/2020 10:53 PM by Dr. Estefani Joshi M.D.        Ambulatory status:   - assist    Social issues:   Social History     Socioeconomic History   • Marital status:      Spouse name: Not on file   • Number of children: 2   • Years of education: 16   • Highest education level: Not on file   Occupational History   •  Occupation: retired   Tobacco Use   • Smoking status: Former Smoker     Packs/day: 3.00     Years: 5.00     Pack years: 15.00     Types: Cigarettes     Start date:      Last attempt to quit:      Years since quittin.7   • Smokeless tobacco: Never Used   • Tobacco comment: Quit cold turkey   Substance and Sexual Activity   • Alcohol use: Yes     Alcohol/week: 2.0 standard drinks     Types: 1 Cans of beer, 1 Shots of liquor per week     Comment: RARELY    • Drug use: No   • Sexual activity: Defer     Partners: Female     Birth control/protection: Surgical        Tracy Rodríguez, RN  [phone]

## 2020-09-06 NOTE — THERAPY EVALUATION
Acute Care - Speech Language Pathology   Swallow Initial Evaluation [facility]     Patient Name: Eugenio Joe  : 1940  MRN: [id number]  Today's Date: 2020               Admit Date: 2020    Visit Dx:     ICD-10-CM ICD-9-CM   1. TIA (transient ischemic attack) G45.9 435.9   2. Expressive aphasia R47.01 784.3     Patient Active Problem List   Diagnosis   • Quadriceps weakness   • ACL tear   • Knee pain, right   • S/P CABG x 3   • Essential hypertension   • Hyperlipemia   • Hallux rigidus   • Fracture, stress, metatarsal   • Osteopenia determined by x-ray   • Metatarsal stress fracture with nonunion   • Left hip pain   • Bursitis of left hip   • Pulmonary embolism (CMS/HCC)   • DALILA (acute kidney injury) (CMS/HCC)   • Type 2 diabetes mellitus with hyperglycemia, without long-term current use of insulin (CMS/HCC)   • Ascending aorta dilatation (CMS/HCC)   • Pulmonary nodule, left   • Right leg DVT (CMS/HCC)   • Acute renal failure (CMS/HCC)   • Hypotension   • Dehydration   • Hypercalcemia   • Dysphagia   • Syncope and collapse   • Coronary artery disease involving native coronary artery of native heart without angina pectoris   • Normal pressure hydrocephalus (CMS/HCC)   • Headache   • Impaired mobility   • Nausea & vomiting   • Fall at home   • Transient cerebral ischemia   • PFO (patent foramen ovale)   • Esophageal dysphagia   • TIA (transient ischemic attack)   • Acute appendicitis with localized peritonitis, without perforation or abscess   • Right lower quadrant abdominal pain   • History of CVA (cerebrovascular accident)   • On statin therapy   • 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   • Diverticulitis   • Hx of appendectomy   • Acute abdominal pain   • Gait abnormality   • Weakness   • CVA (cerebral vascular accident) (CMS/HCC)     Past Medical History:   Diagnosis Date   • Arthritis    • Asthma    • Brain abscess     at about age 44   • Bruise of face    • Cancer (CMS/HCC)     PT STATES IN  HIS SWEAT GLAND    • Cardiac disease    • Coronary artery disease     CABG 2012   • Diabetes mellitus (CMS/HCC)    • Difficulty in swallowing    • Difficulty walking    • DM2 (diabetes mellitus, type 2) (CMS/HCC) 10/12/2016   • Gout several years ago    medication  working   • Hearing aid worn     bilateral   • Hx of appendectomy    • Hydrocephaly (CMS/HCC)    • Hyperlipemia    • Hypertension    • Joint pain     or swelling   • Low back pain several years ago   • Orbit fracture (CMS/HCC)    • Pulmonary embolism (CMS/HCC)     OCT 2016   • Rash     ARM-    • TIA (transient ischemic attack)      Past Surgical History:   Procedure Laterality Date   • APPENDECTOMY N/A 7/18/2019    Procedure: APPENDECTOMY LAPAROSCOPIC;  Surgeon: Luis Carlos Rick Jr., MD;  Location: [facility] MAIN OR;  Service: General   • BRAIN SURGERY      BRAIN ABCESS 30 YR AGO   • CARDIAC SURGERY     • COLONOSCOPY  2012    Ciciliano- normal per pt, no polyps    • CORONARY ARTERY BYPASS GRAFT     • ENDOSCOPY N/A 3/9/2017    Schatzki ring, dilated, LA Grade B esophagitis, HH, acquired duodenal stenosis   • ENDOSCOPY N/A 4/6/2018    candida, HH, torts, Schatzki ring, duodenal stenosis, dilatation perforemed, chronic inflammation   • ENDOSCOPY N/A 10/9/2019    White nummular lesions in esophageal mucosa, mild Schatzki ring, acquired duodenal stenosis, Path: Terrazas's, candida   • ENDOSCOPY N/A 1/8/2020    Procedure: ESOPHAGOGASTRODUODENOSCOPY with biopsies;  Surgeon: Rigoberto Walker MD;  Location: [facility] ENDOSCOPY;  Service: Gastroenterology   • FACIAL FRACTURE SURGERY      to repair 6 broken bones   • ORBITAL FRACTURE OPEN REDUCTION INTERNAL FIXATION Right 1/13/2017    Procedure: RT ORBIT FLOOR FRACTURE REPAIR RIGHT [facility] FRACTURE REPAIR, RIGHT NASAL BONE FRACTURE CLOSED REDUCTION;  Surgeon: Edil Lester MD;  Location: [facility] OR [facility];  Service:    • ORIF FOOT FRACTURE Right 9/9/2016    Procedure: RIGHT SECOND METATARSAL OPEN REDUCTION INTERNAL  FIXATION WITh GRAFT FROM HEEL ;  Surgeon: Man Jenkins MD;  [place]  [facility] [place] [facility] [place]  Service:    • SEPTOPLASTY     • SKIN CANCER EXCISION     • TONSILLECTOMY          SWALLOW EVALUATION (last 72 hours)      SLP Adult Swallow Evaluation     Row Name 09/06/20 1100                   Rehab Evaluation    Document Type  evaluation  -HS        Subjective Information  no complaints  -HS        Patient Observations  alert;cooperative;agree to therapy  -HS        Patient/Family Observations  Wife present during evaluation  -HS        Patient Effort  good  -HS        Symptoms Noted During/After Treatment  none  -HS           General Information    Patient Profile Reviewed  yes  -HS        Pertinent History Of Current Problem  Admitted with slurred speech, right facial droop. MRI revealed 2 small acute bilateral infarcts.   -HS        Current Method of Nutrition  NPO;other (see comments) Failed RN swallow screen  -HS        Precautions/Limitations, Vision  WFL with corrective lenses Denies any vision changes  -HS        Precautions/Limitations, Hearing  hearing aid, bilaterally;other (see comments) Has hearing aids, wife states he does not wear often  -HS        Prior Level of Function-Communication  WFL  -HS        Prior Level of Function-Swallowing  esophageal concerns;no diet consistency restrictions  -HS        Plans/Goals Discussed with  patient;spouse/S.O.  -HS        Barriers to Rehab  none identified  -HS        Patient's Goals for Discharge  return to PO diet  -HS        Family Goals for Discharge  patient able to return to PO diet;patient able to return to regular diet  -HS           Oral Motor and Function    Dentition Assessment  natural, present and adequate  -HS        Secretion Management  WNL/WFL  -HS        Mucosal Quality  dry  -HS           Oral Musculature and Cranial Nerve Assessment    Oral Motor General Assessment  oral labial or buccal impairment;lingual impairment;velar impairment  -HS         Oral Labial or Buccal Impairment, Detail, Cranial Nerve VII (Facial):  right labial droop  -HS        Lingual Impairment, Detail. Cranial Nerves IX, XII (Glossopharyngeal and Hypoglossal)  reduced strength  -HS        Velar Impairment, Detail, Cranial Nerves X, XI (Vagus and Accessory)  reduced velar strength  -HS        Oral Motor, Comment  Dysarthric speech. Will need full speech language evaluation.   -HS           General Eating/Swallowing Observations    Respiratory Support Currently in Use  room air  -HS        Eating/Swallowing Skills  self-fed;appropriate self-feeding skills observed  -HS        Positioning During Eating  upright in bed  -HS        Utensils Used  spoon;cup;straw  -HS        Consistencies Trialed  soft textures;pureed;thin liquids;nectar/syrup-thick liquids  -HS           Respiratory    Respiratory Pattern  decrease control/incoordination of breathing swallow talking/swallowing   -HS           Clinical Swallow Eval    Oral Prep Phase  impaired  -HS        Oral Transit  impaired  -HS        Oral Residue  impaired  -HS        Pharyngeal Phase  suspected pharyngeal impairment  -HS        Esophageal Phase  suspected esophageal impairment  -HS        Clinical Swallow Evaluation Summary  The patient and his wife report a history of esophageal dysphagia with dilation about 1 year ago. They report patient coughs during meals, especially when socializing/talking. The patient demonstrated decreased oral control with thin liquids via cup/straw, prolonged mastication with mechanical soft. Suspected aspiration with mixed consistencies, as patient with significant coughing during trials. Improvement with no mixed consistencies. Delayed coughing noted after straw sips of thin liquids. No overt s/s of aspiration with puree (4 oz.) or nectar thick liquids (4 oz.).   -HS           Health Promotion    Psychosocial Eating History  social eating is important;food important aspect of social life  -HS            Clinical Impression    SLP Swallowing Diagnosis  mild;oral dysfunction;suspected pharyngeal dysfunction;esophageal dysfunction  -        Functional Impact  risk of aspiration/pneumonia  -HS        Rehab Potential/Prognosis, Swallowing  good, to achieve stated therapy goals  -        Swallow Criteria for Skilled Therapeutic Interventions Met  demonstrates skilled criteria  -           Recommendations    Therapy Frequency (Swallow)  PRN  -HS        Predicted Duration Therapy Intervention (Days)  until discharge  -HS        SLP Diet Recommendation  mechanical soft with no mixed consistencies;nectar thick liquids;water between meals after oral care, with supervision  -        Recommended Diagnostics  reassess via clinical swallow evaluation  -        Recommended Precautions and Strategies  upright posture during/after eating;small bites of food and sips of liquid  -HS        SLP Rec. for Method of Medication Administration  meds whole;meds crushed;with thick liquids;with pudding or applesauce;as tolerated  -        Monitor for Signs of Aspiration  yes;notify SLP if any concerns  -        Anticipated Dischage Disposition (SLP)  anticipate therapy at next level of care wife reports [facility] for PT currently  -HS          User Key  (r) = Recorded By, (t) = Taken By, (c) = Cosigned By    Initials Name Effective Dates     Mi Claros MS CCC-SLP 06/08/18 -           EDUCATION  The patient has been educated in the following areas:   Cognitive Impairment Communication Impairment Dysphagia (Swallowing Impairment) Oral Care/Hydration Modified Diet Instruction.    SLP Recommendation and Plan  SLP Swallowing Diagnosis: mild, oral dysfunction, suspected pharyngeal dysfunction, esophageal dysfunction  SLP Diet Recommendation: mechanical soft with no mixed consistencies, nectar thick liquids, water between meals after oral care, with supervision  Recommended Precautions and Strategies: upright posture during/after  eating, small bites of food and sips of liquid  SLP Rec. for Method of Medication Administration: meds whole, meds crushed, with thick liquids, with pudding or applesauce, as tolerated     Monitor for Signs of Aspiration: yes, notify SLP if any concerns  Recommended Diagnostics: reassess via clinical swallow evaluation  Swallow Criteria for Skilled Therapeutic Interventions Met: demonstrates skilled criteria  Anticipated Dischage Disposition (SLP): anticipate therapy at next level of care(wife reports [facility] for PT currently)  Rehab Potential/Prognosis, Swallowing: good, to achieve stated therapy goals  Therapy Frequency (Swallow): PRN  Predicted Duration Therapy Intervention (Days): until discharge       Plan of Care Reviewed With: patient, spouse  Outcome Summary: Clinical swallow evaluation completed. Recommend a mechanical soft diet (no mixed consistencies) with nectar thick liquids. Meds whole or crushed in puree or with nectar thick liquids. Upright for meals, small bites/sips. Small sips of water or ice chips 30 minutes after meals following oral care. ST to follow for full speech evaluation and re-evaluation of swallow.    Patient was not wearing a face mask during this therapy encounter. Therapist used appropriate personal protective equipment including mask, eye protection and gloves.  Mask used was N95/duckbill. Appropriate PPE was worn during the entire therapy session. Hand hygiene was completed before and after therapy session. Patient is not in enhanced droplet precautions. COVID testing pending.         SLP Outcome Measures (last 72 hours)      SLP Outcome Measures     Row Name 09/06/20 1200             SLP Outcome Measures    Outcome Measure Used?  Adult NOMS  -HS         Adult FCM Scores    FCM Chosen  Swallowing  -HS      Swallowing FCM Score  4  -HS        User Key  (r) = Recorded By, (t) = Taken By, (c) = Cosigned By    Initials Name Effective Dates    Mi Brown, MS CCC-SLP 06/08/18 -             Time Calculation:   Time Calculation- SLP     Row Name 09/06/20 1207             Time Calculation- SLP    SLP Start Time  1110  -      SLP Stop Time  1210  -HS      SLP Time Calculation (min)  60 min  -HS      SLP Received On  09/06/20  -        User Key  (r) = Recorded By, (t) = Taken By, (c) = Cosigned By    Initials Name Provider Type     Mi Claros MS CCC-SLP Speech and Language Pathologist          Therapy Charges for Today     Code Description Service Date Service Provider Modifiers Qty    [id number] HC ST EVAL ORAL PHARYNG SWALLOW 4 9/6/2020 Mi Claros MS CCC-SLP GN 1               MS JOCELYN Land  9/6/2020

## 2020-09-06 NOTE — PLAN OF CARE
Problem: Patient Care Overview  Goal: Plan of Care Review  Flowsheets  Taken 9/6/2020 1633  Plan of Care Reviewed With: patient;spouse  Taken 9/6/2020 1634  Outcome Summary: Patient is a 79 y.o. male admitted to [facility] for CVA on 9/5/2020. PMHx includes DM, HTN, HLD, vascular parkisonism, Binswanger's disease . Patient is independent at baseline and lives with his spouse. Patient reports he uses a rollator at baseline, has stair lift inside home. Today, patient performed bed mobility with Kale, required Kale for transfers, and ambulated 25 feet with rwx and minAx2.  Patient demonstrates impairments consisting of generalized weakness, decreased activity tolerance, decreased balance. Patient may benefit from skilled PT services acutely to address functional deficits as well as improve level of independence prior to discharge. Anticipate home with assist and HH upon DC.   Patient was wearing a face mask during this therapy encounter. Therapist used appropriate personal protective equipment including eye protection, mask, and gloves.  Mask used was standard procedure mask. Appropriate PPE was worn during the entire therapy session. Hand hygiene was completed before and after therapy session. Patient is not in enhanced droplet precautions.

## 2020-09-06 NOTE — THERAPY EVALUATION
Patient Name: Eugenio Joe  : 1940    MRN: [id number]                              Today's Date: 2020       Admit Date: 2020    Visit Dx:     ICD-10-CM ICD-9-CM   1. TIA (transient ischemic attack) G45.9 435.9   2. Expressive aphasia R47.01 784.3     Patient Active Problem List   Diagnosis   • Quadriceps weakness   • ACL tear   • Knee pain, right   • S/P CABG x 3   • Essential hypertension   • Hyperlipemia   • Hallux rigidus   • Fracture, stress, metatarsal   • Osteopenia determined by x-ray   • Metatarsal stress fracture with nonunion   • Left hip pain   • Bursitis of left hip   • Pulmonary embolism (CMS/[facility])   • DALILA (acute kidney injury) (CMS/[facility])   • Type 2 diabetes mellitus with hyperglycemia, without long-term current use of insulin (CMS/[facility])   • Ascending aorta dilatation (CMS/HCC)   • Pulmonary nodule, left   • Right leg DVT (CMS/[facility])   • Acute renal failure (CMS/[facility])   • Hypotension   • Dehydration   • Hypercalcemia   • Dysphagia   • Syncope and collapse   • Coronary artery disease involving native coronary artery of native heart without angina pectoris   • Normal pressure hydrocephalus (CMS/[facility])   • Headache   • Impaired mobility   • Nausea & vomiting   • Fall at home   • Transient cerebral ischemia   • PFO (patent foramen ovale)   • Esophageal dysphagia   • TIA (transient ischemic attack)   • Acute appendicitis with localized peritonitis, without perforation or abscess   • Right lower quadrant abdominal pain   • History of CVA (cerebrovascular accident)   • On statin therapy   • 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   • Diverticulitis   • Hx of appendectomy   • Acute abdominal pain   • Gait abnormality   • Weakness   • CVA (cerebral vascular accident) (CMS/[facility])     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 SWEAT GLAND    • Cardiac disease    • Coronary artery  disease     CABG 2012   • Diabetes mellitus (CMS/HCC)    • Difficulty in swallowing    • Difficulty walking    • DM2 (diabetes mellitus, type 2) (CMS/HCC) 10/12/2016   • Gout several years ago    medication  working   • Hearing aid worn     bilateral   • Hx of appendectomy    • Hydrocephaly (CMS/HCC)    • Hyperlipemia    • Hypertension    • Joint pain     or swelling   • Low back pain several years ago   • Orbit fracture (CMS/HCC)    • Pulmonary embolism (CMS/HCC)     OCT 2016   • Rash     ARM-    • TIA (transient ischemic attack)      Past Surgical History:   Procedure Laterality Date   • APPENDECTOMY N/A 7/18/2019    Procedure: APPENDECTOMY LAPAROSCOPIC;  Surgeon: Luis Carlos Rick Jr., MD;  Location: [facility] MAIN OR;  Service: General   • BRAIN SURGERY      BRAIN ABCESS 30 YR AGO   • CARDIAC SURGERY     • COLONOSCOPY  2012    Ciciliano- normal per pt, no polyps    • CORONARY ARTERY BYPASS GRAFT     • ENDOSCOPY N/A 3/9/2017    Schatzki ring, dilated, LA Grade B esophagitis, HH, acquired duodenal stenosis   • ENDOSCOPY N/A 4/6/2018    candida, HH, torts, Schatzki ring, duodenal stenosis, dilatation perforemed, chronic inflammation   • ENDOSCOPY N/A 10/9/2019    White nummular lesions in esophageal mucosa, mild Schatzki ring, acquired duodenal stenosis, Path: Terrazas's, candida   • ENDOSCOPY N/A 1/8/2020    Procedure: ESOPHAGOGASTRODUODENOSCOPY with biopsies;  Surgeon: Rigoberto Walker MD;  Location: [facility] ENDOSCOPY;  Service: Gastroenterology   • FACIAL FRACTURE SURGERY      to repair 6 broken bones   • ORBITAL FRACTURE OPEN REDUCTION INTERNAL FIXATION Right 1/13/2017    Procedure: RT ORBIT FLOOR FRACTURE REPAIR RIGHT [facility] FRACTURE REPAIR, RIGHT NASAL BONE FRACTURE CLOSED REDUCTION;  Surgeon: Edil Lester MD;  Location: [facility] OR [facility];  Service:    • ORIF FOOT FRACTURE Right 9/9/2016    Procedure: RIGHT SECOND METATARSAL OPEN REDUCTION INTERNAL FIXATION WITh GRAFT FROM HEEL ;  Surgeon: Man Rivera  MD Alice;  Location: [facility] OR [facility];  Service:    • SEPTOPLASTY     • SKIN CANCER EXCISION     • TONSILLECTOMY       General Information     Row Name 09/06/20 1629          PT Evaluation Time/Intention    Document Type  evaluation  -EM     Mode of Treatment  individual therapy;physical therapy  -EM     Row Name 09/06/20 1629          General Information    Patient Profile Reviewed?  yes  -EM     Prior Level of Function  independent:;all household mobility  -EM     Existing Precautions/Restrictions  fall  -EM     Barriers to Rehab  none identified  -EM     Row Name 09/06/20 1629          Relationship/Environment    Lives With  spouse  -EM     Row Name 09/06/20 1629          Resource/Environmental Concerns    Current Living Arrangements  home/apartment/condo  -EM     Row Name 09/06/20 1629          Home Main Entrance    Number of Stairs, Main Entrance  three  -EM     Row Name 09/06/20 1629          Cognitive Assessment/Intervention- PT/OT    Orientation Status (Cognition)  oriented x 4  -EM     Row Name 09/06/20 1629          Safety Issues, Functional Mobility    Impairments Affecting Function (Mobility)  balance;endurance/activity tolerance;strength  -EM       User Key  (r) = Recorded By, (t) = Taken By, (c) = Cosigned By    Initials Name Provider Type    EM Pily Vela, PT Physical Therapist        Mobility     Row Name 09/06/20 1631          Bed Mobility Assessment/Treatment    Bed Mobility Assessment/Treatment  supine-sit;sit-supine  -EM     Supine-Sit [place] (Bed Mobility)  minimum assist (75% patient effort)  -EM     Row Name 09/06/20 1631          Sit-Stand Transfer    Sit-Stand [place] (Transfers)  minimum assist (75% patient effort)  -EM     Assistive Device (Sit-Stand Transfers)  walker, front-wheeled  -EM     Row Name 09/06/20 1631          Gait/Stairs Assessment/Training    Gait/Stairs Assessment/Training  gait/ambulation independence  -EM     [place] Level (Gait)  minimum  assist (75% patient effort);1 person to manage equipment  -EM     Assistive Device (Gait)  walker, front-wheeled  -EM     Distance in Feet (Gait)  25  -EM     Deviations/Abnormal Patterns (Gait)  stride length decreased;gait speed decreased  -EM     Bilateral Gait Deviations  forward flexed posture  -EM     Comment (Gait/Stairs)  cues to get closer to rwx, assist to guide rwx, cues for longer step length   -EM       User Key  (r) = Recorded By, (t) = Taken By, (c) = Cosigned By    Initials Name Provider Type    EM Pily Vela PT Physical Therapist        Obj/Interventions     Row Name 09/06/20 1632          General ROM    GENERAL ROM COMMENTS  ROM WNL   -EM     Row Name 09/06/20 1632          MMT (Manual Muscle Testing)    General MMT Comments  no focal deficits identified   -EM     Row Name 09/06/20 1632          Static Sitting Balance    Level of [place] (Unsupported Sitting, Static Balance)  supervision  -EM     Sitting Position (Unsupported Sitting, Static Balance)  sitting on edge of bed  -EM     Row Name 09/06/20 1632          Dynamic Sitting Balance    Level of [place], Reaches Outside Midline (Sitting, Dynamic Balance)  supervision  -EM     Sitting Position, Reaches Outside Midline (Sitting, Dynamic Balance)  sitting on edge of bed  -EM     Row Name 09/06/20 1632          Static Standing Balance    Level of [place] (Supported Standing, Static Balance)  minimal assist, 75% patient effort  -EM     Assistive Device Utilized (Supported Standing, Static Balance)  walker, rolling  -EM     Row Name 09/06/20 1632          Dynamic Standing Balance    Level of [place], Reaches Outside Midline (Standing, Dynamic Balance)  minimal assist, 75% patient effort  -EM     Assistive Device Utilized (Supported Standing, Dynamic Balance)  walker, rolling  -EM       User Key  (r) = Recorded By, (t) = Taken By, (c) = Cosigned By    Initials Name Provider Type    EM Pily Vela PT Physical  Therapist        Goals/Plan     Row Name 09/06/20 1633          Bed Mobility Goal 1 (PT)    Activity/Assistive Device (Bed Mobility Goal 1, PT)  bed mobility activities, all  -EM     [place] Level/Cues Needed (Bed Mobility Goal 1, PT)  supervision required  -EM     Time Frame (Bed Mobility Goal 1, PT)  1 week  -EM     Row Name 09/06/20 1633          Transfer Goal 1 (PT)    Activity/Assistive Device (Transfer Goal 1, PT)  transfers, all;walker, rolling  -EM     [place] Level/Cues Needed (Transfer Goal 1, PT)  contact guard assist  -EM     Time Frame (Transfer Goal 1, PT)  1 week  -EM     Row Name 09/06/20 1633          Gait Training Goal 1 (PT)    Activity/Assistive Device (Gait Training Goal 1, PT)  gait (walking locomotion);walker, rolling  -EM     [place] Level (Gait Training Goal 1, PT)  contact guard assist  -EM     Distance (Gait Goal 1, PT)  100  -EM     Time Frame (Gait Training Goal 1, PT)  1 week  -EM       User Key  (r) = Recorded By, (t) = Taken By, (c) = Cosigned By    Initials Name Provider Type    EM Pily Vela, PT Physical Therapist        Clinical Impression     Row Name 09/06/20 1633          Pain Assessment    Additional Documentation  Pain Scale: Numbers Pre/Post-Treatment (Group)  -EM     Row Name 09/06/20 1633          Pain Scale: Numbers Pre/Post-Treatment    Pain Scale: Numbers, Pretreatment  0/10 - no pain  -EM     Row Name 09/06/20 1633          Plan of Care Review    Plan of Care Reviewed With  patient;spouse  -EM     Row Name 09/06/20 1633          Physical Therapy Clinical Impression    Patient/Family Goals Statement (PT Clinical Impression)  go home, return to normal activities   -EM     Criteria for Skilled Interventions Met (PT Clinical Impression)  yes;treatment indicated  -EM     Rehab Potential (PT Clinical Summary)  good, to achieve stated therapy goals  -EM     Row Name 09/06/20 1633          Positioning and Restraints    Pre-Treatment Position  in bed   -EM     Post Treatment Position  bathroom  -EM     Bathroom  sitting;call light within reach;notified nsg;with family/caregiver  -EM       User Key  (r) = Recorded By, (t) = Taken By, (c) = Cosigned By    Initials Name Provider Type    Pily Orellana PT Physical Therapist        Outcome Measures     Row Name 09/06/20 1634          How much help from another person do you currently need...    Turning from your back to your side while in flat bed without using bedrails?  3  -EM     Moving from lying on back to sitting on the side of a flat bed without bedrails?  3  -EM     Moving to and from a bed to a chair (including a wheelchair)?  3  -EM     Standing up from a chair using your arms (e.g., wheelchair, bedside chair)?  3  -EM     Climbing 3-5 steps with a railing?  2  -EM     To walk in hospital room?  3  -EM     AM-PAC 6 Clicks Score (PT)  17  -EM     Row Name 09/06/20 1634          Modified [place] Scale    Modified [place] Scale  4 - Moderately severe disability.  Unable to walk without assistance, and unable to attend to own bodily needs without assistance.  -EM     Row Name 09/06/20 1634          Functional Assessment    Outcome Measure Options  AM-PAC 6 Clicks Basic Mobility (PT);Modified [place]  -EM       User Key  (r) = Recorded By, (t) = Taken By, (c) = Cosigned By    Initials Name Provider Type    Pily Orellana PT Physical Therapist        Physical Therapy Education                 Title: PT OT SLP Therapies (In Progress)     Topic: Physical Therapy (In Progress)     Point: Mobility training (In Progress)     Description:   Instruct learner(s) on safety and technique for assisting patient out of bed, chair or wheelchair.  Instruct in the proper use of assistive devices, such as walker, crutches, cane or brace.              Patient Friendly Description:   It's important to get you on your feet again, but we need to do so in a way that is safe for you. Falling has serious consequences, and  your personal safety is the most important thing of all.        When it's time to get out of bed, one of us or a family member will sit next to you on the bed to give you support.     If your doctor or nurse tells you to use a walker, crutches, a cane, or a brace, be sure you use it every time you get out of bed, even if you think you don't need it.    Learning Progress Summary           Patient Acceptance, E, NR by EM at 9/6/2020 1634   Significant Other Acceptance, E, NR by EM at 9/6/2020 1634                   Point: Home exercise program (Not Started)     Description:   Instruct learner(s) on appropriate technique for monitoring, assisting and/or progressing patient with therapeutic exercises and activities.              Learner Progress:   Not documented in this visit.          Point: Body mechanics (Not Started)     Description:   Instruct learner(s) on proper positioning and spine alignment for patient and/or caregiver during mobility tasks and/or exercises.              Learner Progress:   Not documented in this visit.          Point: Precautions (Not Started)     Description:   Instruct learner(s) on prescribed precautions during mobility and gait tasks              Learner Progress:   Not documented in this visit.                      User Key     Initials Effective Dates Name Provider Type Discipline    EM 04/03/18 -  Pily Vela, PT Physical Therapist PT              PT Recommendation and Plan  Planned Therapy Interventions (PT Eval): balance training, bed mobility training, gait training, home exercise program, transfer training, patient/family education  Outcome Summary/Treatment Plan (PT)  Anticipated Discharge Disposition (PT): home with assist, home with home health  Plan of Care Reviewed With: patient, spouse  Outcome Summary: Patient is a 79 y.o. male admitted to [facility] for CVA on 9/5/2020. PMHx includes DM, HTN, HLD, vascular parkisonism, Binswanger's disease . Patient is independent at  baseline and lives with his spouse. Patient reports he uses a rollator at baseline, has stair lift inside home. Today, patient performed bed mobility with Kale, required Kale for transfers, and ambulated 25 feet with rwx and minAx2.  Patient demonstrates impairments consisting of generalized weakness, decreased activity tolerance, decreased balance. Patient may benefit from skilled PT services acutely to address functional deficits as well as improve level of independence prior to discharge. Anticipate home with assist and HH upon DC.     Time Calculation:   PT Charges     Row Name 09/06/20 1637             Time Calculation    Start Time  1340  -EM      Stop Time  1400  -EM      Time Calculation (min)  20 min  -EM      PT Received On  09/06/20  -EM      PT - Next Appointment  09/07/20  -EM      PT Goal Re-Cert Due Date  09/13/20  -EM        User Key  (r) = Recorded By, (t) = Taken By, (c) = Cosigned By    Initials Name Provider Type    EM Pily Vela, PT Physical Therapist        Therapy Charges for Today     Code Description Service Date Service Provider Modifiers Qty    78859855827 HC PT EVAL MOD COMPLEXITY 2 9/6/2020 Pily Vela, PT GP 1    61452616022 HC PT THER PROC EA 15 MIN 9/6/2020 Pily Vela, PT GP 1    97683024730 HC PT THER SUPP EA 15 MIN 9/6/2020 Pily Vela, PT GP 1          PT G-Codes  Outcome Measure Options: AM-PAC 6 Clicks Basic Mobility (PT), Modified [place]  AM-PAC 6 Clicks Score (PT): 17  Modified [place] Scale: 4 - Moderately severe disability.  Unable to walk without assistance, and unable to attend to own bodily needs without assistance.    Pily Vela PT  9/6/2020

## 2020-09-06 NOTE — PROGRESS NOTES
"DAILY PROGRESS NOTE  [facility]    Patient Identification:  Name: Eugenio Joe  Age: 79 y.o.  Sex: male  :  1940  MRN: [id number]         Primary Care Physician: Adeline Onofre MD      Subjective  No new complaints.  Patient states he is speaking more easily.    Objective:  General Appearance:  Comfortable, well-appearing, not in pain and in no acute distress.    Vital signs: (most recent): Blood pressure 154/70, pulse 105, temperature 98.4 °F (36.9 °C), temperature source Oral, resp. rate 22, height 177.8 cm (70\"), weight 85.2 kg (187 lb 14.4 oz), SpO2 92 %.    Lungs:  Normal effort and normal respiratory rate.  Breath sounds clear to auscultation.    Heart: Normal rate.  Regular rhythm.    Extremities: There is no dependent edema.    Neurological: Patient is alert and oriented to person, place and time.  (Speech presently is clear.).    Skin:  Warm and dry.                Vital signs in last 24 hours:  Temp:  [98.1 °F (36.7 °C)-99.2 °F (37.3 °C)] 98.4 °F (36.9 °C)  Heart Rate:  [] 105  Resp:  [16-22] 22  BP: (148-194)/(65-86) 154/70    Intake/Output:    Intake/Output Summary (Last 24 hours) at 2020 1838  Last data filed at 2020 1755  Gross per 24 hour   Intake 1120 ml   Output --   Net 1120 ml         Results from last 7 days   Lab Units 20  0447 20  2145   WBC 10*3/mm3 9.48 9.68   HEMOGLOBIN g/dL 11.3* 11.6*   PLATELETS 10*3/mm3 186 219     Results from last 7 days   Lab Units 20  0447 20  2145   SODIUM mmol/L 140 141   POTASSIUM mmol/L 4.2 4.2   CHLORIDE mmol/L 107 105   CO2 mmol/L 22.1 23.5   BUN mg/dL 31* 34*   CREATININE mg/dL 1.30* 1.52*   GLUCOSE mg/dL 189* 185*   Estimated Creatinine Clearance: 54.6 mL/min (A) (by C-G formula based on SCr of 1.3 mg/dL (H)).  Results from last 7 days   Lab Units 20  0447 20  2145   CALCIUM mg/dL 8.9 9.2   ALBUMIN g/dL 3.30* 3.80   MAGNESIUM mg/dL  --  1.5*     Results from last 7 days   Lab " Units 09/06/20  0447 09/05/20  2145   ALBUMIN g/dL 3.30* 3.80   BILIRUBIN mg/dL 0.4 0.2   ALK PHOS U/L 74 83   AST (SGOT) U/L 7 8   ALT (SGPT) U/L 10 10       Assessment:    CVA (cerebral vascular accident): Work-up in progress.  Discussed with neurology.    Essential hypertension: Uncontrolled on presentation.  Improving.    Hyperlipemia    DALILA (acute kidney injury): Improving.  Close to baseline.  Decrease IV fluid rate.  Monitor.    Type 2 diabetes mellitus with hyperglycemia, without long-term current use of insulin: Continue sliding scale insulin.  Resume glipizide.    Normal pressure hydrocephalus (CMS/HCC)        Plan:  Please see above.  Await echocardiogram report.    Alon Barrett MD  9/6/2020  18:38

## 2020-09-06 NOTE — ED NOTES
This RN wore gloves, mask, eye protection and all other necessary PPE while performing pt care.     Tracy Rodríguez, RN  09/05/20 4357

## 2020-09-06 NOTE — ED TRIAGE NOTES
To ER via  PV.  C/o slurred speech that started at approx 2030.   Pt denies any other neuro symptoms.  C/o rt earache.  Pt A&Ox 4 at this time.    Pt normally has great difficulty with ambulation.     Pt in mask at time of triage.  Triage staff in appropriate PPE.

## 2020-09-06 NOTE — CONSULTS
"Neurology Consult Note    Consult Date: 9/6/2020    Referring MD: Hollis Mccarthy MD    Reason for Consult I have been asked to see the patient in neurological consultation to render advice and opinion regarding stroke    Eugenio Joe is a 79 y.o. male with a past medical history of CAD status post CABG, diabetes, history of parkinsonism and ataxia previously attributed to normal pressure hydrocephalus which in my opinion more likely represents Binswanger disease who presented to the hospital with acute onset of slurred speech which has been persistent since admission.  He denies any associated facial droop or unilateral weakness.  As mentioned he is ataxic at baseline and uses a walker but he does not think that this is worse than normal.  He reports that his recent blood pressures have been fairly well controlled in the 120s to 130s.    Past Medical History:   Diagnosis Date   • Arthritis    • Asthma    • Brain abscess     at about age 44   • Bruise of face    • Cancer (CMS/[facility])     PT STATES IN HIS SWEAT GLAND    • Cardiac disease    • Coronary artery disease     CABG 2012   • Diabetes mellitus (CMS/HCC)    • Difficulty in swallowing    • Difficulty walking    • DM2 (diabetes mellitus, type 2) (CMS/HCC) 10/12/2016   • Gout several years ago    medication  working   • Hearing aid worn     bilateral   • Hx of appendectomy    • Hydrocephaly (CMS/HCC)    • Hyperlipemia    • Hypertension    • Joint pain     or swelling   • Low back pain several years ago   • Orbit fracture (CMS/HCC)    • Pulmonary embolism (CMS/HCC)     OCT 2016   • Rash     ARM-    • TIA (transient ischemic attack)        ROS:  No fevers, chills  No weakness, numbness, + slurred speech    Exam  /82 (BP Location: Left arm, Patient Position: Lying)   Pulse 80   Temp 98.1 °F (36.7 °C) (Oral)   Resp 18   Ht 177.8 cm (70\")   Wt 85.2 kg (187 lb 14.4 oz)   SpO2 92%   BMI 26.96 kg/m²   Gen: NAD, vitals reviewed  MS: oriented x3, recent/remote " memory mildly impaired, normal attention/concentration, language intact, no neglect.  CN: visual acuity grossly normal, PERRL, EOMI, no facial droop, moderate dysarthria  Motor: 5/5 throughout upper and lower extremities, increased tone bilaterally, bilateral postural tremor  Sensory: Intact light touch all 4 extremities    DATA:    Lab Results   Component Value Date    GLUCOSE 189 (H) 09/06/2020    CALCIUM 8.9 09/06/2020     09/06/2020    K 4.2 09/06/2020    CO2 22.1 09/06/2020     09/06/2020    BUN 31 (H) 09/06/2020    CREATININE 1.30 (H) 09/06/2020    EGFRIFAFRI  09/02/2016      Comment:      <15 Indicative of kidney failure.    EGFRIFNONA 53 (L) 09/06/2020    BCR 23.8 09/06/2020    ANIONGAP 10.9 09/06/2020     Lab Results   Component Value Date    WBC 9.48 09/06/2020    HGB 11.3 (L) 09/06/2020    HCT 33.7 (L) 09/06/2020    MCV 89.9 09/06/2020     09/06/2020       Lab review: Glucose 189, GFR 53, hemoglobin 11.3    Imaging review: I personally reviewed his MRI brain which shows 2 small acute periventricular infarcts suggestive of small vessel disease.  He also has significant generalized atrophy with white matter disease and hydrocephalus ex vacuo.  Radiology report reviewed    Diagnoses:  Acute stroke due to small vessel disease  Binswanger disease  Vascular parkinsonism    Comment: Suspect both of his new strokes are attributable to his known white matter disease.  The patient himself reports fairly well-controlled blood pressure however he was markedly hypertensive on presentation here to the hospital.  There is a possibility given the bilateral infarcts that there is some sort of cardioembolic etiology but I think this is much less likely based on the location of his strokes.  We will check carotid duplex and I would repeat a Holter monitor on discharge.    PLAN:   -Plavix 300mg load, then ASA 81 / plavix 75 for 21 days, then d/c ASA and continue Plavix 75 therafter  -Lipitor 40  -2D echo,  carotid duplex  -likely holter on d/c  -gradual normalization of BP  -PT/OT/ST assessments    Discussed with Dr. Barrett.  Attempted to call his spouse and son but phones were off.

## 2020-09-06 NOTE — H&P
Patient Name:  Eugenio Joe  YOB: 1940  MRN:  [id number]  Admit Date:  9/5/2020  Patient Care Team:  Adeline Onofre MD as PCP - General  Adeline Onofre MD as PCP - Family Medicine  Adeline Onofre MD as PCP - Claims Attributed  Jean Ford MD as Consulting Physician (Cardiology)      Chief Complaint   Patient presents with   • Speech Problem       Subjective     Mr. Joe is a 79 y.o. male with a history of NPH, CAD, DM2, HLD, HTN that presents to [facility] complaining of speech difficulty. Patient reports that tonight at about 8:30 pm, he began to have difficulty speaking and slurred speech that has persisted since that time. Wife at bedside states that he had initially improved but not back to baseline and she also reports noted right sided facial droop. Patient denies unilateral weakness, sensation changes, headache, visual disturbances, chest pain, shortness of breath, abdominal pain, changes in bowel or bladder habits, or edema. Workup done tonight shows DALILA but CT head was negative. ED physician spoke with neuro who wants non-contrasted MRI in the morning and will consult.     History of Present Illness    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 SWEAT GLAND    • Cardiac disease    • Coronary artery disease     CABG 2012   • Diabetes mellitus (CMS/HCC)    • Difficulty in swallowing    • Difficulty walking    • Gout several years ago    medication  working   • Hearing aid worn     bilateral   • Hx of appendectomy    • Hydrocephaly (CMS/HCC)    • Hyperlipemia    • Hypertension    • Joint pain     or swelling   • Low back pain several years ago   • Orbit fracture (CMS/HCC)    • Pulmonary embolism (CMS/HCC)     OCT 2016   • Rash     ARM-    • TIA (transient ischemic attack)      Past Surgical History:   Procedure Laterality Date   •  APPENDECTOMY N/A 2019    Procedure: APPENDECTOMY LAPAROSCOPIC;  Surgeon: Luis Carlos Rick Jr., MD;  Location: [facility] MAIN OR;  Service: General   • BRAIN SURGERY      BRAIN ABCESS 30 YR AGO   • CARDIAC SURGERY     • COLONOSCOPY      Ciciliano- normal per pt, no polyps    • CORONARY ARTERY BYPASS GRAFT     • ENDOSCOPY N/A 3/9/2017    Schatzki ring, dilated, LA Grade B esophagitis, HH, acquired duodenal stenosis   • ENDOSCOPY N/A 2018    candida, HH, torts, Schatzki ring, duodenal stenosis, dilatation perforemed, chronic inflammation   • ENDOSCOPY N/A 10/9/2019    White nummular lesions in esophageal mucosa, mild Schatzki ring, acquired duodenal stenosis, Path: Terrazas's, candida   • ENDOSCOPY N/A 2020    Procedure: ESOPHAGOGASTRODUODENOSCOPY with biopsies;  Surgeon: Rigoberto Walker MD;  Location: [facility] ENDOSCOPY;  Service: Gastroenterology   • FACIAL FRACTURE SURGERY      to repair 6 broken bones   • ORBITAL FRACTURE OPEN REDUCTION INTERNAL FIXATION Right 2017    Procedure: RT ORBIT FLOOR FRACTURE REPAIR RIGHT ZMC FRACTURE REPAIR, RIGHT NASAL BONE FRACTURE CLOSED REDUCTION;  Surgeon: Edil Lester MD;  Location: [facility] OR [facility];  Service:    • ORIF FOOT FRACTURE Right 2016    Procedure: RIGHT SECOND METATARSAL OPEN REDUCTION INTERNAL FIXATION WITh GRAFT FROM HEEL ;  Surgeon: Man Jenkins MD;  Location: [facility] OR [facility];  Service:    • SEPTOPLASTY     • SKIN CANCER EXCISION     • TONSILLECTOMY       Family History   Problem Relation Age of Onset   • Heart disease Mother    • Hypertension Mother    • Stroke Mother    • Diverticulitis Mother    • Heart disease Father    • Hypertension Father    • Malig Hyperthermia Neg Hx      Social History     Tobacco Use   • Smoking status: Former Smoker     Packs/day: 3.00     Years: 5.00     Pack years: 15.00     Types: Cigarettes     Start date:      Last attempt to quit:      Years since quittin.7   • Smokeless tobacco:  Never Used   • Tobacco comment: Quit cold turkey   Substance Use Topics   • Alcohol use: Yes     Alcohol/week: 2.0 standard drinks     Types: 1 Cans of beer, 1 Shots of liquor per week     Comment: RARELY    • Drug use: No       (Not in a hospital admission)  Allergies:    Allergies   Allergen Reactions   • Other Mental Status Change and Hallucinations     Oxy drugs   • Oxycodone Mental Status Change       Review of Systems   Constitutional: Negative.  Negative for chills and fever.   HENT: Negative.  Negative for congestion and rhinorrhea.    Eyes: Negative.  Negative for visual disturbance.   Respiratory: Negative.  Negative for cough and shortness of breath.    Cardiovascular: Negative.  Negative for chest pain and leg swelling.   Gastrointestinal: Negative.  Negative for abdominal pain, nausea and vomiting.   Endocrine: Negative.    Genitourinary: Negative.  Negative for dysuria, frequency and urgency.   Musculoskeletal: Negative.  Negative for arthralgias and myalgias.   Skin: Negative for color change and pallor.   Allergic/Immunologic: Negative.    Neurological: Negative.  Negative for dizziness, weakness, light-headedness and headaches.   Hematological: Negative.    Psychiatric/Behavioral: Negative for agitation, behavioral problems and confusion.        Objective    Vital Signs  Temp:  [99.1 °F (37.3 °C)] 99.1 °F (37.3 °C)  Heart Rate:  [80-89] 82  Resp:  [16] 16  BP: (165-187)/(76-86) 171/77  SpO2:  [93 %-98 %] 95 %  on   ;      Body mass index is 25.83 kg/m².    Physical Exam   Constitutional: He is oriented to person, place, and time. He appears well-developed and well-nourished. No distress.   HENT:   Head: Normocephalic and atraumatic.   Eyes: EOM are normal.   Neck: Normal range of motion. Neck supple.   Cardiovascular: Normal rate, regular rhythm and intact distal pulses.   Pulmonary/Chest: Effort normal and breath sounds normal. No respiratory distress.   Abdominal: Soft. Bowel sounds are normal. He  exhibits no distension.   Musculoskeletal: Normal range of motion. He exhibits no edema.   Neurological: He is alert and oriented to person, place, and time. He has normal strength. No sensory deficit.   Right sided facial droop, noted aphasia and slurred speech   Skin: Skin is warm and dry. He is not diaphoretic. No erythema.   Psychiatric: He has a normal mood and affect. His behavior is normal. Judgment and thought content normal.   Nursing note and vitals reviewed.      Results Review:   I reviewed the patient's new clinical results including all labs and xrays.    Lab Results (last 24 hours)     Procedure Component Value Units Date/Time    CBC & Differential [[id number]] Collected:  09/05/20 2145    Specimen:  Blood Updated:  09/05/20 2201    Narrative:       The following orders were created for panel order CBC & Differential.  Procedure                               Abnormality         Status                     ---------                               -----------         ------                     CBC Auto Differential[[id number]]        Abnormal            Final result                 Please view results for these tests on the individual orders.    Comprehensive Metabolic Panel [[id number]]  (Abnormal) Collected:  09/05/20 2145    Specimen:  Blood Updated:  09/05/20 2219     Glucose 185 mg/dL      BUN 34 mg/dL      Creatinine 1.52 mg/dL      Sodium 141 mmol/L      Potassium 4.2 mmol/L      Chloride 105 mmol/L      CO2 23.5 mmol/L      Calcium 9.2 mg/dL      Total Protein 7.2 g/dL      Albumin 3.80 g/dL      ALT (SGPT) 10 U/L      AST (SGOT) 8 U/L      Alkaline Phosphatase 83 U/L      Total Bilirubin 0.2 mg/dL      eGFR Non African Amer 44 mL/min/1.73      Globulin 3.4 gm/dL      A/G Ratio 1.1 g/dL      BUN/Creatinine Ratio 22.4     Anion Gap 12.5 mmol/L     Narrative:       GFR Normal >60  Chronic Kidney Disease <60  Kidney Failure <15      Troponin [[id number]]  (Normal) Collected:  09/05/20 2145    Specimen:   Blood Updated:  09/05/20 2218     Troponin T <0.010 ng/mL     Narrative:       Troponin T Reference Range:  <= 0.03 ng/mL-   Negative for AMI  >0.03 ng/mL-     Abnormal for myocardial necrosis.  Clinicians would have to utilize clinical acumen, EKG, Troponin and serial changes to determine if it is an Acute Myocardial Infarction or myocardial injury due to an underlying chronic condition.       Results may be falsely decreased if patient taking Biotin.      Protime-INR [[id number]]  (Normal) Collected:  09/05/20 2145    Specimen:  Blood Updated:  09/05/20 2219     Protime 13.7 Seconds      INR 1.06    aPTT [[id number]]  (Normal) Collected:  09/05/20 2145    Specimen:  Blood Updated:  09/05/20 2219     PTT 30.0 seconds     CBC Auto Differential [[id number]]  (Abnormal) Collected:  09/05/20 2145    Specimen:  Blood Updated:  09/05/20 2201     WBC 9.68 10*3/mm3      RBC 3.81 10*6/mm3      Hemoglobin 11.6 g/dL      Hematocrit 35.3 %      MCV 92.7 fL      MCH 30.4 pg      MCHC 32.9 g/dL      RDW 12.8 %      RDW-SD 43.4 fl      MPV 10.6 fL      Platelets 219 10*3/mm3      Neutrophil % 64.1 %      Lymphocyte % 25.0 %      Monocyte % 8.0 %      Eosinophil % 2.1 %      Basophil % 0.5 %      Immature Grans % 0.3 %      Neutrophils, Absolute 6.21 10*3/mm3      Lymphocytes, Absolute 2.42 10*3/mm3      Monocytes, Absolute 0.77 10*3/mm3      Eosinophils, Absolute 0.20 10*3/mm3      Basophils, Absolute 0.05 10*3/mm3      Immature Grans, Absolute 0.03 10*3/mm3      nRBC 0.0 /100 WBC     Magnesium [[id number]]  (Abnormal) Collected:  09/05/20 2145    Specimen:  Blood Updated:  09/05/20 2330     Magnesium 1.5 mg/dL     COVID PRE-OP / PRE-PROCEDURE SCREENING ORDER (NO ISOLATION) - Swab, Nasopharynx [[id number]] Collected:  09/05/20 2328    Specimen:  Swab from Nasopharynx Updated:  09/05/20 2333    Narrative:       The following orders were created for panel order COVID PRE-OP / PRE-PROCEDURE SCREENING ORDER (NO ISOLATION) - Swab,  Nasopharynx.  Procedure                               Abnormality         Status                     ---------                               -----------         ------                     COVID-19,BIOTAP, NP/OP S...[[id number]]                      In process                   Please view results for these tests on the individual orders.    COVID-19,BIOTAP, NP/OP SWAB IN TRANSPORT MEDIA OR SALINE 24-36 HR TAT - Swab, Nasopharynx [[id number]] Collected:  09/05/20 2328    Specimen:  Swab from Nasopharynx Updated:  09/05/20 2333          CT Head Without Contrast   Final Result   No acute intracranial findings.       Radiation dose reduction techniques were utilized, including automated   exposure control and exposure modulation based on body size.       This report was finalized on 9/5/2020 10:53 PM by Dr. Estefani Joshi M.D.          MRI Brain Without Contrast    (Results Pending)     Assessment/Plan      Active Hospital Problems    Diagnosis  POA   • **CVA (cerebral vascular accident) (CMS/[facility]) [I63.9]  Yes   • Normal pressure hydrocephalus (CMS/[facility]) [G91.2]  Yes   • DALILA (acute kidney injury) (CMS/[facility]) [N17.9]  Yes   • DM2 (diabetes mellitus, type 2) (CMS/[facility]) [E11.9]  Yes   • Hyperlipemia [E78.5]  Yes   • Essential hypertension [I10]  Yes      Resolved Hospital Problems   No resolved problems to display.     CVA  -slight aphasia, slurred speech and right sided facial droop beginning tonight   -MRI in AM  -neuro consult  -lipids/a1c  -statin/aspirin  -NPO until seen by speech  -echo    DALILA  -creatinine 1.52 tonight, up from baseline of 1  -will give IVF overnight  -avoid further nephrotoxins  -recheck labs in AM  -hold home dose lisinopril for now    DM2  -hold home dose metformin and glipizide and start moderate dose correctional factor insulin for meals  -a1c in AM as per above    HTN/HLD  -slightly hypertensive in ED but holding home dose lisinopril given DALILA and to allow for permissive hypertension, may  continue other home meds    VTE Ppx  -SCDs    CODE status  -full    I discussed the patients findings and my recommendations with patient.    JULISA Hallman  [facility]  09/05/20  11:01 PM

## 2020-09-06 NOTE — PLAN OF CARE
Problem: Patient Care Overview  Goal: Plan of Care Review  Outcome: Ongoing (interventions implemented as appropriate)  Flowsheets (Taken 9/6/2020 2185)  Progress: no change  Plan of Care Reviewed With: patient  Outcome Summary: Pt is alert and oriented x4. Pt with slurred speech noted. Pt failed bedside swallow ST to evaluate today. On NIH (3). Pt denies any pain or discomfort. Will continue to monitor.     Problem: Patient Care Overview  Goal: Individualization and Mutuality  Outcome: Ongoing (interventions implemented as appropriate)     Problem: Fall Risk (Adult)  Goal: Identify Related Risk Factors and Signs and Symptoms  Outcome: Ongoing (interventions implemented as appropriate)     Problem: Fall Risk (Adult)  Goal: Absence of Fall  Outcome: Ongoing (interventions implemented as appropriate)     Problem: Stroke (Ischemic) (Adult)  Goal: Signs and Symptoms of Listed Potential Problems Will be Absent, Minimized or Managed (Stroke)  Outcome: Ongoing (interventions implemented as appropriate)

## 2020-09-06 NOTE — CONSULTS
Acute rehab referral received via stroke order set. Patient denies focal weakness. Not appropriate for acute rehab. Will sign off.     Thank you!    Gerardo Humphries RN  p

## 2020-09-06 NOTE — ED PROVIDER NOTES
EMERGENCY DEPARTMENT ENCOUNTER    CHIEF COMPLAINT  Chief Complaint: Difficulty with speech  History given by: Patient/spouse  History limited by: None  Room Number: 14/14  PMD: Adeline Onofre MD      HPI:  Pt is a 79 y.o. male who presents complaining of difficulty with speech that began suddenly 1 hour prior to ED presentation.  The patient as well as his spouse state that he knew what he wanted to say but was unable to get his words out clearly.  They both state that since arrival to the ED, symptoms have improved significantly but he has not at his baseline.  The patient denies any unilateral numbness or weakness or any further complaints.  The patient currently denies chest pain, back pain, shortness of breath, fever/chills, abdominal pain, nausea/vomiting, or any known sick contacts.  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  Nothing exacerbated the symptoms or improve the symptoms other than time.  The patient did not take any medication since onset of symptoms to treat this.  Symptoms are mild to moderate in intensity but improving.      PAST MEDICAL HISTORY  Active Ambulatory Problems     Diagnosis Date Noted   • Quadriceps weakness 04/08/2016   • ACL tear 04/08/2016   • Knee pain, right 04/08/2016   • S/P CABG x 3 04/18/2016   • Essential hypertension 04/18/2016   • Hyperlipemia 04/18/2016   • Hallux rigidus 07/11/2016   • Fracture, stress, metatarsal 07/11/2016   • Osteopenia determined by x-ray 07/11/2016   • Metatarsal stress fracture with nonunion 08/10/2016   • Left hip pain 08/26/2016   • Bursitis of left hip 08/26/2016   • Pulmonary embolism (CMS/[facility]) 10/12/2016   • DALILA (acute kidney injury) (CMS/[facility]) 10/12/2016   • Diabetes mellitus type 2, controlled (CMS/[facility]) 10/12/2016   • Ascending aorta dilatation (CMS/[facility]) 10/12/2016   • Pulmonary nodule, left 10/12/2016   • Right leg DVT (CMS/[facility]) 10/13/2016   • Acute renal  failure (CMS/HCC) 01/18/2017   • Hypotension 01/18/2017   • Dehydration 01/18/2017   • Hypercalcemia 01/18/2017   • Dysphagia 01/21/2017   • Syncope and collapse 02/24/2017   • Coronary artery disease involving native coronary artery of native heart without angina pectoris 04/10/2017   • Normal pressure hydrocephalus (CMS/HCC) 11/09/2017   • Headache 11/15/2017   • Impaired mobility 11/15/2017   • Nausea & vomiting 11/15/2017   • Fall at home 11/15/2017   • Transient cerebral ischemia 11/20/2017   • PFO (patent foramen ovale) 11/21/2017   • Esophageal dysphagia 03/20/2018   • TIA (transient ischemic attack) 06/23/2019   • Acute appendicitis with localized peritonitis, without perforation or abscess 07/18/2019   • Right lower quadrant abdominal pain 07/18/2019   • History of CVA (cerebrovascular accident) 07/18/2019   • On statin therapy 11/04/2019   • 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 11/26/2019   • Diverticulitis 01/28/2020   • Hx of appendectomy 01/28/2020   • Acute abdominal pain 01/28/2020   • Gait abnormality 01/29/2020   • Weakness 04/27/2020     Resolved Ambulatory Problems     Diagnosis Date Noted   • Change in hearing 11/15/2017   • Hypomagnesemia 04/28/2020   • Hyponatremia 04/28/2020     Past Medical History:   Diagnosis Date   • Arthritis    • Asthma    • Brain abscess    • Bruise of face    • Cancer (CMS/HCC)    • Cardiac disease    • Coronary artery disease    • Diabetes mellitus (CMS/HCC)    • Difficulty in swallowing    • Difficulty walking    • Gout several years ago   • Hearing aid worn    • Hydrocephaly (CMS/HCC)    • Hypertension    • Joint pain    • Low back pain several years ago   • Orbit fracture (CMS/HCC)    • Rash        PAST SURGICAL HISTORY  Past Surgical History:   Procedure Laterality Date   • APPENDECTOMY N/A 7/18/2019    Procedure: APPENDECTOMY LAPAROSCOPIC;  Surgeon: Luis Carlos Rick Jr., MD;  Location: [facility];  Service: General   • BRAIN SURGERY      BRAIN ABCESS 30 YR  AGO   • CARDIAC SURGERY     • COLONOSCOPY  2012    Ciciliano- normal per pt, no polyps    • CORONARY ARTERY BYPASS GRAFT     • ENDOSCOPY N/A 3/9/2017    Schatzki ring, dilated, LA Grade B esophagitis, HH, acquired duodenal stenosis   • ENDOSCOPY N/A 4/6/2018    candida, HH, torts, Schatzki ring, duodenal stenosis, dilatation perforemed, chronic inflammation   • ENDOSCOPY N/A 10/9/2019    White nummular lesions in esophageal mucosa, mild Schatzki ring, acquired duodenal stenosis, Path: Terrazas's, candida   • ENDOSCOPY N/A 1/8/2020    Procedure: ESOPHAGOGASTRODUODENOSCOPY with biopsies;  Surgeon: Rigoberto Walker MD;  Location: [facility] ENDOSCOPY;  Service: Gastroenterology   • FACIAL FRACTURE SURGERY      to repair 6 broken bones   • ORBITAL FRACTURE OPEN REDUCTION INTERNAL FIXATION Right 1/13/2017    Procedure: RT ORBIT FLOOR FRACTURE REPAIR RIGHT [facility] FRACTURE REPAIR, RIGHT NASAL BONE FRACTURE CLOSED REDUCTION;  Surgeon: Edil Lester MD;  Location: [facility] OR [facility];  Service:    • ORIF FOOT FRACTURE Right 9/9/2016    Procedure: RIGHT SECOND METATARSAL OPEN REDUCTION INTERNAL FIXATION WITh GRAFT FROM HEEL ;  Surgeon: Man Jenkins MD;  Location: [facility] OR [facility];  Service:    • SEPTOPLASTY     • SKIN CANCER EXCISION     • TONSILLECTOMY         FAMILY HISTORY  Family History   Problem Relation Age of Onset   • Heart disease Mother    • Hypertension Mother    • Stroke Mother    • Diverticulitis Mother    • Heart disease Father    • Hypertension Father    • Malig Hyperthermia Neg Hx        SOCIAL HISTORY  Social History     Socioeconomic History   • Marital status:      Spouse name: Not on file   • Number of children: 2   • Years of education: 16   • Highest education level: Not on file   Occupational History   • Occupation: retired   Tobacco Use   • Smoking status: Former Smoker     Packs/day: 3.00     Years: 5.00     Pack years: 15.00     Types: Cigarettes     Start date: 1957     Last attempt  to quit: 1961     Years since quittin.7   • Smokeless tobacco: Never Used   • Tobacco comment: Quit cold turkey   Substance and Sexual Activity   • Alcohol use: Yes     Alcohol/week: 2.0 standard drinks     Types: 1 Cans of beer, 1 Shots of liquor per week     Comment: RARELY    • Drug use: No   • Sexual activity: Defer     Partners: Female     Birth control/protection: Surgical       ALLERGIES  Other and Oxycodone    REVIEW OF SYSTEMS  Review of Systems   Constitutional: Negative for activity change, appetite change and fever.   HENT: Negative for congestion and sore throat.    Eyes: Negative.    Respiratory: Negative for cough and shortness of breath.    Cardiovascular: Negative for chest pain and leg swelling.   Gastrointestinal: Negative for abdominal pain, diarrhea and vomiting.   Endocrine: Negative.    Genitourinary: Negative for decreased urine volume and dysuria.   Musculoskeletal: Negative for neck pain.   Skin: Negative for rash and wound.   Allergic/Immunologic: Negative.    Neurological: Positive for speech difficulty. Negative for weakness, numbness and headaches.   Hematological: Negative.    Psychiatric/Behavioral: Negative.    All other systems reviewed and are negative.      PHYSICAL EXAM  ED Triage Vitals [20]   Temp Heart Rate Resp BP SpO2   99.1 °F (37.3 °C) 89 16 -- 98 %      Temp src Heart Rate Source Patient Position BP Location FiO2 (%)   Tympanic Monitor -- -- --       Physical Exam   Constitutional: He is oriented to person, place, and time. No distress.   HENT:   Head: Normocephalic and atraumatic.   Eyes: Pupils are equal, round, and reactive to light. EOM are normal.   Neck: Normal range of motion. Neck supple.   Cardiovascular: Normal rate, regular rhythm and normal heart sounds.   Pulmonary/Chest: Effort normal and breath sounds normal. No respiratory distress.   Abdominal: Soft. There is no tenderness. There is no rebound and no guarding.   Musculoskeletal: Normal  range of motion. He exhibits no edema.   Neurological: He is alert and oriented to person, place, and time. He has normal sensation and normal strength.   See NIH for full neurological assessment   Skin: Skin is warm and dry.   Psychiatric: Mood and affect normal.   Nursing note and vitals reviewed.    NIH Stroke Scale      Interval: Baseline  Time: 23:26  Person Administering Scale: René Lawrence MD    Administer stroke scale items in the order listed. Record performance in each category after each subscale exam. Do not go back and change scores. Follow directions provided for each exam technique. Scores should reflect what the patient does, not what the clinician thinks the patient can do. The clinician should record answers while administering the exam and work quickly. Except where indicated, the patient should not be coached (i.e., repeated requests to patient to make a special effort).      1a  Level of consciousness: 0=alert; keenly responsive   1b. LOC questions:  0=Performs both tasks correctly   1c. LOC commands: 0=Performs both tasks correctly   2.  Best Gaze: 0=normal   3.  Visual: 0=No visual loss   4. Facial Palsy: 0=Normal symmetric movement   5a.  Motor left arm: 0=No drift, limb holds 90 (or 45) degrees for full 10 seconds   5b.  Motor right arm: 0=No drift, limb holds 90 (or 45) degrees for full 10 seconds   6a. motor left le=No drift, limb holds 90 (or 45) degrees for full 10 seconds   6b  Motor right le=No drift, limb holds 90 (or 45) degrees for full 10 seconds   7. Limb Ataxia: 0=Absent   8.  Sensory: 0=Normal; no sensory loss   9. Best Language:  1=Mild to moderate aphasia; some obvious loss of fluency or facility of comprehension without significant limitation on ideas expressed or form of expression.   10. Dysarthria: 1=Mild to moderate, patient slurs at least some words and at worst, can be understood with some difficulty   11. Extinction and Inattention: 0=No abnormality    12. Distal motor function: 0=Normal    Total:   2     LAB RESULTS  Lab Results (last 24 hours)     Procedure Component Value Units Date/Time    CBC & Differential [[id number]] Collected:  09/05/20 2145    Specimen:  Blood Updated:  09/05/20 2201    Narrative:       The following orders were created for panel order CBC & Differential.  Procedure                               Abnormality         Status                     ---------                               -----------         ------                     CBC Auto Differential[[id number]]        Abnormal            Final result                 Please view results for these tests on the individual orders.    Comprehensive Metabolic Panel [[id number]]  (Abnormal) Collected:  09/05/20 2145    Specimen:  Blood Updated:  09/05/20 2219     Glucose 185 mg/dL      BUN 34 mg/dL      Creatinine 1.52 mg/dL      Sodium 141 mmol/L      Potassium 4.2 mmol/L      Chloride 105 mmol/L      CO2 23.5 mmol/L      Calcium 9.2 mg/dL      Total Protein 7.2 g/dL      Albumin 3.80 g/dL      ALT (SGPT) 10 U/L      AST (SGOT) 8 U/L      Alkaline Phosphatase 83 U/L      Total Bilirubin 0.2 mg/dL      eGFR Non African Amer 44 mL/min/1.73      Globulin 3.4 gm/dL      A/G Ratio 1.1 g/dL      BUN/Creatinine Ratio 22.4     Anion Gap 12.5 mmol/L     Narrative:       GFR Normal >60  Chronic Kidney Disease <60  Kidney Failure <15      Troponin [[id number]]  (Normal) Collected:  09/05/20 2145    Specimen:  Blood Updated:  09/05/20 2218     Troponin T <0.010 ng/mL     Narrative:       Troponin T Reference Range:  <= 0.03 ng/mL-   Negative for AMI  >0.03 ng/mL-     Abnormal for myocardial necrosis.  Clinicians would have to utilize clinical acumen, EKG, Troponin and serial changes to determine if it is an Acute Myocardial Infarction or myocardial injury due to an underlying chronic condition.       Results may be falsely decreased if patient taking Biotin.      Protime-INR [[id number]]  (Normal) Collected:   09/05/20 2145    Specimen:  Blood Updated:  09/05/20 2219     Protime 13.7 Seconds      INR 1.06    aPTT [[id number]]  (Normal) Collected:  09/05/20 2145    Specimen:  Blood Updated:  09/05/20 2219     PTT 30.0 seconds     CBC Auto Differential [[id number]]  (Abnormal) Collected:  09/05/20 2145    Specimen:  Blood Updated:  09/05/20 2201     WBC 9.68 10*3/mm3      RBC 3.81 10*6/mm3      Hemoglobin 11.6 g/dL      Hematocrit 35.3 %      MCV 92.7 fL      MCH 30.4 pg      MCHC 32.9 g/dL      RDW 12.8 %      RDW-SD 43.4 fl      MPV 10.6 fL      Platelets 219 10*3/mm3      Neutrophil % 64.1 %      Lymphocyte % 25.0 %      Monocyte % 8.0 %      Eosinophil % 2.1 %      Basophil % 0.5 %      Immature Grans % 0.3 %      Neutrophils, Absolute 6.21 10*3/mm3      Lymphocytes, Absolute 2.42 10*3/mm3      Monocytes, Absolute 0.77 10*3/mm3      Eosinophils, Absolute 0.20 10*3/mm3      Basophils, Absolute 0.05 10*3/mm3      Immature Grans, Absolute 0.03 10*3/mm3      nRBC 0.0 /100 WBC     Magnesium [[id number]] Collected:  09/05/20 2145    Specimen:  Blood Updated:  09/05/20 2322          I ordered the above labs and reviewed the results    RADIOLOGY  CT Head Without Contrast   Final Result   No acute intracranial findings.       Radiation dose reduction techniques were utilized, including automated   exposure control and exposure modulation based on body size.       This report was finalized on 9/5/2020 10:53 PM by Dr. Estefani Joshi M.D.               I ordered the above noted radiological studies. Interpreted by radiologist. Reviewed by me in PACS.       PROCEDURES  Procedures  EKG          EKG time: 2318  Rhythm/Rate: NSR with PVC, 82  P waves and MT: nml  QRS, axis: nml, nml   ST and T waves: nml     Interpreted Contemporaneously by me, independently viewed  unchanged compared to prior 4/27/20      PROGRESS AND CONSULTS     The patient was wearing a facemask upon entrance into the room and remained in such throughout their  visit.  I was wearing PPE including a facemask, eye protection, as well as gloves at any point entering the room and throughout the visit    2140  Case discussed with Dr. Ba, neurology, who said that the patient at this point likely would not need acute angiography secondary to his minimal symptoms and low NIH score.  He did suggest that the patient be admitted to the hospital pending work-up, for a full CVA work-up.    2250  Upon reevaluation, to me his speech sounds much clearer and he is able to articulate much better than previously.  Per his wife, he is still not speaking normally but greatly improved over initial presentation.  This does appear fairly similar to his admission in 2019.  I did discuss with the patient and family the unremarkable work-up including laboratory results as well as the CT scan of the head.  We will admit him to the hospital tonight for observation and a full CVA work-up.    2300  Case discussed with JULISA Eduardo for [facility], who agrees to admit the patient to the hospital for Dr. Mccarthy      MEDICAL DECISION MAKING  Results were reviewed/discussed with the patient and they were also made aware of online access. Pt also made aware that some labs, such as cultures, will not be resulted during ER visit and follow up with PMD is necessary.     MDM  Number of Diagnoses or Management Options     Amount and/or Complexity of Data Reviewed  Clinical lab tests: ordered and reviewed  Tests in the radiology section of CPT®: ordered and reviewed  Tests in the medicine section of CPT®: ordered and reviewed  Review and summarize past medical records: yes (Upon medical records review, the patient had a similar presentation and was admitted to the hospital secondary to a TIA in June 2019)  Discuss the patient with other providers: yes (Dr. Acharya, neurology, who will see the patient in consultation.  JULISA Eduardo for [facility], who will admit the patient for Dr. Mccarthy)  Independent visualization  of images, tracings, or specimens: yes (Unremarkable CT scan of the head)           DIAGNOSIS  Final diagnoses:   TIA (transient ischemic attack)   Expressive aphasia       DISPOSITION  ADMISSION    Discussed treatment plan and reason for admission with pt/family and admitting physician.  Pt/family voiced understanding of the plan for admission for further testing/treatment as needed.         Latest Documented Vital Signs:  As of 23:26  BP- 171/77 HR- 82 Temp- 99.1 °F (37.3 °C) (Tympanic) O2 sat- 95%       René Lawrence MD  09/05/20 2180

## 2020-09-07 PROBLEM — I63.9 ACUTE CVA (CEREBROVASCULAR ACCIDENT) (HCC): Status: ACTIVE | Noted: 2020-09-07

## 2020-09-07 LAB
ALBUMIN SERPL-MCNC: 3.3 G/DL (ref 3.5–5.2)
ALBUMIN/GLOB SERPL: 1.1 G/DL
ALP SERPL-CCNC: 73 U/L (ref 39–117)
ALT SERPL W P-5'-P-CCNC: 8 U/L (ref 1–41)
ANION GAP SERPL CALCULATED.3IONS-SCNC: 11.1 MMOL/L (ref 5–15)
AST SERPL-CCNC: 10 U/L (ref 1–40)
BILIRUB SERPL-MCNC: 0.6 MG/DL (ref 0–1.2)
BUN SERPL-MCNC: 18 MG/DL (ref 8–23)
BUN/CREAT SERPL: 16.4 (ref 7–25)
CALCIUM SPEC-SCNC: 8.7 MG/DL (ref 8.6–10.5)
CHLORIDE SERPL-SCNC: 107 MMOL/L (ref 98–107)
CO2 SERPL-SCNC: 20.9 MMOL/L (ref 22–29)
CREAT SERPL-MCNC: 1.1 MG/DL (ref 0.76–1.27)
DEPRECATED RDW RBC AUTO: 42.7 FL (ref 37–54)
ERYTHROCYTE [DISTWIDTH] IN BLOOD BY AUTOMATED COUNT: 12.3 % (ref 12.3–15.4)
GFR SERPL CREATININE-BSD FRML MDRD: 64 ML/MIN/1.73
GLOBULIN UR ELPH-MCNC: 2.9 GM/DL
GLUCOSE BLDC GLUCOMTR-MCNC: 168 MG/DL (ref 70–130)
GLUCOSE BLDC GLUCOMTR-MCNC: 189 MG/DL (ref 70–130)
GLUCOSE BLDC GLUCOMTR-MCNC: 192 MG/DL (ref 70–130)
GLUCOSE BLDC GLUCOMTR-MCNC: 200 MG/DL (ref 70–130)
GLUCOSE SERPL-MCNC: 183 MG/DL (ref 65–99)
HCT VFR BLD AUTO: 34.1 % (ref 37.5–51)
HGB BLD-MCNC: 11.5 G/DL (ref 13–17.7)
MCH RBC QN AUTO: 31.4 PG (ref 26.6–33)
MCHC RBC AUTO-ENTMCNC: 33.7 G/DL (ref 31.5–35.7)
MCV RBC AUTO: 93.2 FL (ref 79–97)
PLATELET # BLD AUTO: 193 10*3/MM3 (ref 140–450)
PMV BLD AUTO: 10.7 FL (ref 6–12)
POTASSIUM SERPL-SCNC: 3.9 MMOL/L (ref 3.5–5.2)
PROT SERPL-MCNC: 6.2 G/DL (ref 6–8.5)
RBC # BLD AUTO: 3.66 10*6/MM3 (ref 4.14–5.8)
SARS-COV-2 RNA RESP QL NAA+PROBE: NOT DETECTED
SODIUM SERPL-SCNC: 139 MMOL/L (ref 136–145)
WBC # BLD AUTO: 8.08 10*3/MM3 (ref 3.4–10.8)

## 2020-09-07 PROCEDURE — 85027 COMPLETE CBC AUTOMATED: CPT | Performed by: NURSE PRACTITIONER

## 2020-09-07 PROCEDURE — 97166 OT EVAL MOD COMPLEX 45 MIN: CPT

## 2020-09-07 PROCEDURE — 92526 ORAL FUNCTION THERAPY: CPT | Performed by: SPEECH-LANGUAGE PATHOLOGIST

## 2020-09-07 PROCEDURE — 63710000001 INSULIN LISPRO (HUMAN) PER 5 UNITS: Performed by: NURSE PRACTITIONER

## 2020-09-07 PROCEDURE — 97530 THERAPEUTIC ACTIVITIES: CPT

## 2020-09-07 PROCEDURE — 80053 COMPREHEN METABOLIC PANEL: CPT | Performed by: NURSE PRACTITIONER

## 2020-09-07 PROCEDURE — 99233 SBSQ HOSP IP/OBS HIGH 50: CPT | Performed by: NURSE PRACTITIONER

## 2020-09-07 PROCEDURE — 36415 COLL VENOUS BLD VENIPUNCTURE: CPT | Performed by: NURSE PRACTITIONER

## 2020-09-07 PROCEDURE — 82962 GLUCOSE BLOOD TEST: CPT

## 2020-09-07 PROCEDURE — 97110 THERAPEUTIC EXERCISES: CPT

## 2020-09-07 RX ADMIN — MULTIPLE VITAMINS W/ MINERALS TAB 1 TABLET: TAB at 08:30

## 2020-09-07 RX ADMIN — CLOPIDOGREL BISULFATE 75 MG: 75 TABLET, FILM COATED ORAL at 08:30

## 2020-09-07 RX ADMIN — SODIUM CHLORIDE, PRESERVATIVE FREE 10 ML: 5 INJECTION INTRAVENOUS at 08:30

## 2020-09-07 RX ADMIN — INSULIN LISPRO 2 UNITS: 100 INJECTION, SOLUTION INTRAVENOUS; SUBCUTANEOUS at 06:48

## 2020-09-07 RX ADMIN — PANTOPRAZOLE SODIUM 40 MG: 40 TABLET, DELAYED RELEASE ORAL at 06:48

## 2020-09-07 RX ADMIN — INSULIN LISPRO 2 UNITS: 100 INJECTION, SOLUTION INTRAVENOUS; SUBCUTANEOUS at 13:09

## 2020-09-07 RX ADMIN — ALLOPURINOL 300 MG: 300 TABLET ORAL at 08:30

## 2020-09-07 RX ADMIN — GLIPIZIDE 5 MG: 5 TABLET ORAL at 06:48

## 2020-09-07 RX ADMIN — SODIUM CHLORIDE 75 ML/HR: 9 INJECTION, SOLUTION INTRAVENOUS at 08:30

## 2020-09-07 RX ADMIN — TERAZOSIN HYDROCHLORIDE 1 MG: 1 CAPSULE ORAL at 20:27

## 2020-09-07 RX ADMIN — ASPIRIN 81 MG: 81 TABLET, CHEWABLE ORAL at 08:30

## 2020-09-07 RX ADMIN — ATORVASTATIN CALCIUM 40 MG: 20 TABLET, FILM COATED ORAL at 20:27

## 2020-09-07 RX ADMIN — INSULIN LISPRO 2 UNITS: 100 INJECTION, SOLUTION INTRAVENOUS; SUBCUTANEOUS at 17:01

## 2020-09-07 NOTE — THERAPY EVALUATION
Acute Care - Speech Language Pathology   Swallow Initial Evaluation [facility]     Patient Name: Eugenio Joe  : 1940  MRN: [id number]  Today's Date: 2020               Admit Date: 2020    Visit Dx:     ICD-10-CM ICD-9-CM   1. TIA (transient ischemic attack) G45.9 435.9   2. Expressive aphasia R47.01 784.3     Patient Active Problem List   Diagnosis   • Quadriceps weakness   • ACL tear   • Knee pain, right   • S/P CABG x 3   • Essential hypertension   • Hyperlipemia   • Hallux rigidus   • Fracture, stress, metatarsal   • Osteopenia determined by x-ray   • Metatarsal stress fracture with nonunion   • Left hip pain   • Bursitis of left hip   • Pulmonary embolism (CMS/[facility])   • DALILA (acute kidney injury) (CMS/[facility])   • Type 2 diabetes mellitus with hyperglycemia, without long-term current use of insulin (CMS/HCC)   • Ascending aorta dilatation (CMS/[facility])   • Pulmonary nodule, left   • Right leg DVT (CMS/[facility])   • Acute renal failure (CMS/[facility])   • Hypotension   • Dehydration   • Hypercalcemia   • Dysphagia   • Syncope and collapse   • Coronary artery disease involving native coronary artery of native heart without angina pectoris   • Normal pressure hydrocephalus (CMS/[facility])   • Headache   • Impaired mobility   • Nausea & vomiting   • Fall at home   • Transient cerebral ischemia   • PFO (patent foramen ovale)   • Esophageal dysphagia   • TIA (transient ischemic attack)   • Acute appendicitis with localized peritonitis, without perforation or abscess   • Right lower quadrant abdominal pain   • History of CVA (cerebrovascular accident)   • On statin therapy   • 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   • Diverticulitis   • Hx of appendectomy   • Acute abdominal pain   • Gait abnormality   • Weakness   • CVA (cerebral vascular accident) (CMS/HCC)   • Acute CVA (cerebrovascular accident) (CMS/HCC)     Past Medical History:   Diagnosis Date   • Arthritis    • Asthma    • Brain abscess     at about age 44   •  Bruise of face    • Cancer (CMS/HCC)     PT STATES IN HIS SWEAT GLAND    • Cardiac disease    • Coronary artery disease     CABG 2012   • Diabetes mellitus (CMS/HCC)    • Difficulty in swallowing    • Difficulty walking    • DM2 (diabetes mellitus, type 2) (CMS/HCC) 10/12/2016   • Gout several years ago    medication  working   • Hearing aid worn     bilateral   • Hx of appendectomy    • Hydrocephaly (CMS/HCC)    • Hyperlipemia    • Hypertension    • Joint pain     or swelling   • Low back pain several years ago   • Orbit fracture (CMS/HCC)    • Pulmonary embolism (CMS/HCC)     OCT 2016   • Rash     ARM-    • TIA (transient ischemic attack)      Past Surgical History:   Procedure Laterality Date   • APPENDECTOMY N/A 7/18/2019    Procedure: APPENDECTOMY LAPAROSCOPIC;  Surgeon: Luis Carlos Rick Jr., MD;  Location: [facility] OR;  Service: General   • BRAIN SURGERY      BRAIN ABCESS 30 YR AGO   • CARDIAC SURGERY     • COLONOSCOPY  2012    Ciciliano- normal per pt, no polyps    • CORONARY ARTERY BYPASS GRAFT     • ENDOSCOPY N/A 3/9/2017    Schatzki ring, dilated, LA Grade B esophagitis, HH, acquired duodenal stenosis   • ENDOSCOPY N/A 4/6/2018    candida, HH, torts, Schatzki ring, duodenal stenosis, dilatation perforemed, chronic inflammation   • ENDOSCOPY N/A 10/9/2019    White nummular lesions in esophageal mucosa, mild Schatzki ring, acquired duodenal stenosis, Path: Terrazas's, candida   • ENDOSCOPY N/A 1/8/2020    Procedure: ESOPHAGOGASTRODUODENOSCOPY with biopsies;  Surgeon: Rigoberto Walker MD;  Location: [facility] ENDOSCOPY;  Service: Gastroenterology   • FACIAL FRACTURE SURGERY      to repair 6 broken bones   • ORBITAL FRACTURE OPEN REDUCTION INTERNAL FIXATION Right 1/13/2017    Procedure: RT ORBIT FLOOR FRACTURE REPAIR RIGHT [facility] FRACTURE REPAIR, RIGHT NASAL BONE FRACTURE CLOSED REDUCTION;  Surgeon: Edil Lester MD;  Location: [facility] OR [facility];  Service:    • ORIF FOOT FRACTURE Right 9/9/2016     Procedure: RIGHT SECOND METATARSAL OPEN REDUCTION INTERNAL FIXATION WITh GRAFT FROM HEEL ;  Surgeon: Man Jenkins MD;  Location: [facility] OR [facility];  Service:    • SEPTOPLASTY     • SKIN CANCER EXCISION     • TONSILLECTOMY          SWALLOW EVALUATION (last 72 hours)      SLP Adult Swallow Evaluation     Row Name 09/07/20 1202 09/06/20 1100                Rehab Evaluation    Document Type  re-evaluation  -KA  evaluation  -HS       Subjective Information  no complaints  -KA  no complaints  -HS       Patient Observations  alert;cooperative  -KA  alert;cooperative;agree to therapy  -HS       Patient/Family Observations  wife present  -KA  Wife present during evaluation  -HS       Patient Effort  good  -KA  good  -HS       Symptoms Noted During/After Treatment  none  -KA  none  -HS          General Information    Patient Profile Reviewed  yes  -KA  yes  -HS       Pertinent History Of Current Problem  acute CVA, see initial eval for hx   -KA  Admitted with slurred speech, right facial droop. MRI revealed 2 small acute bilateral infarcts.   -HS       Current Method of Nutrition  mechanical soft, no mixed consistencies;nectar/syrup-thick liquids  -KA  NPO;other (see comments) Failed RN swallow screen  -HS       Precautions/Limitations, Vision  WFL with corrective lenses  -KA  WFL with corrective lenses Denies any vision changes  -HS       Precautions/Limitations, Hearing  hearing aid, bilaterally;other (see comments)  -KA  hearing aid, bilaterally;other (see comments) Has hearing aids, wife states he does not wear often  -HS       Prior Level of Function-Communication  WFL  -KA  WFL  -       Prior Level of Function-Swallowing  esophageal concerns;no diet consistency restrictions  -KA  esophageal concerns;no diet consistency restrictions  -HS       Plans/Goals Discussed with  patient;spouse/S.O.  -KA  patient;spouse/S.O.  -HS       Barriers to Rehab  none identified  -KA  none identified  -HS       Patient's Goals  for Discharge  eat/drink without coughing/choking  -KA  return to PO diet  -HS       Family Goals for Discharge  patient able to eat/drink without coughing/choking  -KA  patient able to return to PO diet;patient able to return to regular diet  -HS          Oral Motor and Function    Dentition Assessment  natural, present and adequate  -KA  natural, present and adequate  -HS       Secretion Management  WNL/WFL  -KA  WNL/WFL  -HS       Mucosal Quality  moist, healthy  -KA  dry  -HS       Volitional Swallow  delayed  -KA  --       Volitional Cough  WFL  -KA  --          Oral Musculature and Cranial Nerve Assessment    Oral Motor General Assessment  oral labial or buccal impairment  -KA  oral labial or buccal impairment;lingual impairment;velar impairment  -HS       Oral Labial or Buccal Impairment, Detail, Cranial Nerve VII (Facial):  right labial droop slight  -KA  right labial droop  -HS       Lingual Impairment, Detail. Cranial Nerves IX, XII (Glossopharyngeal and Hypoglossal)  reduced strength  -KA  reduced strength  -HS       Velar Impairment, Detail, Cranial Nerves X, XI (Vagus and Accessory)  --  reduced velar strength  -HS       Oral Motor, Comment  Mildly dysarthric speech, wife reports speech intelligibility is much improved from yesterday, is  now intelligible   -KA  Dysarthric speech. Will need full speech language evaluation.   -HS          General Eating/Swallowing Observations    Respiratory Support Currently in Use  room air  -KA  room air  -HS       Eating/Swallowing Skills  self-fed  -KA  self-fed;appropriate self-feeding skills observed  -HS       Positioning During Eating  upright in bed  -KA  upright in bed  -HS       Utensils Used  spoon;cup;straw  -KA  spoon;cup;straw  -HS       Consistencies Trialed  soft textures;pureed;nectar/syrup-thick liquids;honey-thick liquids mixed  -KA  soft textures;pureed;thin liquids;nectar/syrup-thick liquids  -HS          Respiratory    Respiratory Pattern  --   decrease control/incoordination of breathing swallow talking/swallowing   -HS          Clinical Swallow Eval    Oral Prep Phase  WFL  -KA  impaired  -HS       Oral Transit  impaired  -KA  impaired  -HS       Oral Residue  WFL  -KA  impaired  -HS       Pharyngeal Phase  suspected pharyngeal impairment  -KA  suspected pharyngeal impairment  -HS       Esophageal Phase  suspected esophageal impairment  -KA  suspected esophageal impairment  -HS       Clinical Swallow Evaluation Summary  RN reported patient had coughing episode lastnight at dinner and pt food is being held until swallow re-evaluation. Wife reports pt has hx of coughing with food and liquids, last esophageal dilation November last year. Patient demonstrated no overt s/s of pen/asp with cup sip of NTL, however demonstrated signficiant coughing with repetitive sips of NTL and watery eyes suspect aspiration. No overt s/s of pen/asp with 4oz of puree and 4oz single cup sips of HTL and x1 throat clear with mixed consistency suspect from juice  -KA  The patient and his wife report a history of esophageal dysphagia with dilation about 1 year ago. They report patient coughs during meals, especially when socializing/talking. The patient demonstrated decreased oral control with thin liquids via cup/straw, prolonged mastication with mechanical soft. Suspected aspiration with mixed consistencies, as patient with significant coughing during trials. Improvement with no mixed consistencies. Delayed coughing noted after straw sips of thin liquids. No overt s/s of aspiration with puree (4 oz.) or nectar thick liquids (4 oz.).   -HS          Health Promotion    Psychosocial Eating History  --  social eating is important;food important aspect of social life  -HS          Clinical Impression    SLP Swallowing Diagnosis  mild;oral dysfunction;suspected pharyngeal dysfunction;esophageal dysfunction  -KA  mild;oral dysfunction;suspected pharyngeal dysfunction;esophageal  dysfunction  -HS       Functional Impact  risk of aspiration/pneumonia  -KA  risk of aspiration/pneumonia  -HS       Rehab Potential/Prognosis, Swallowing  good, to achieve stated therapy goals  -KA  good, to achieve stated therapy goals  -HS       Swallow Criteria for Skilled Therapeutic Interventions Met  demonstrates skilled criteria  -KA  demonstrates skilled criteria  -          Recommendations    Therapy Frequency (Swallow)  PRN  -KA  PRN  -HS       Predicted Duration Therapy Intervention (Days)  until discharge  -KA  until discharge  -HS       SLP Diet Recommendation  mechanical soft with no mixed consistencies;honey thick liquids;water between meals after oral care, with supervision  -KA  mechanical soft with no mixed consistencies;nectar thick liquids;water between meals after oral care, with supervision  -HS       Recommended Diagnostics  VFSS (MBS)  -KA  reassess via clinical swallow evaluation  -       Recommended Precautions and Strategies  upright posture during/after eating;small bites of food and sips of liquid  -KA  upright posture during/after eating;small bites of food and sips of liquid  -HS       SLP Rec. for Method of Medication Administration  meds whole;with pudding or applesauce;as tolerated  -KA  meds whole;meds crushed;with thick liquids;with pudding or applesauce;as tolerated  -       Monitor for Signs of Aspiration  yes;notify SLP if any concerns  -KA  yes;notify SLP if any concerns  -       Anticipated Dischage Disposition (SLP)  anticipate therapy at next level of care  -KA  anticipate therapy at next level of care wife reports [facility] for PT currently  -         User Key  (r) = Recorded By, (t) = Taken By, (c) = Cosigned By    Initials Name Effective Dates     Mi Claros MS CCC-SLP 06/08/18 -     Ronald Gerber MA,[facility]-SLP 06/08/18 -        Patient was not wearing a face mask during this therapy encounter. Therapist used appropriate personal protective equipment  including mask, eye protection and gloves.  Mask used was standard procedure mask. Appropriate PPE was worn during the entire therapy session. Hand hygiene was completed before and after therapy session. Patient is not in enhanced droplet precautions.       EDUCATION  The patient has been educated in the following areas:   Dysphagia (Swallowing Impairment).    SLP Recommendation and Plan  SLP Swallowing Diagnosis: mild, oral dysfunction, suspected pharyngeal dysfunction, esophageal dysfunction  SLP Diet Recommendation: mechanical soft with no mixed consistencies, honey thick liquids, water between meals after oral care, with supervision  Recommended Precautions and Strategies: upright posture during/after eating, small bites of food and sips of liquid  SLP Rec. for Method of Medication Administration: meds whole, with pudding or applesauce, as tolerated     Monitor for Signs of Aspiration: yes, notify SLP if any concerns  Recommended Diagnostics: VFSS (MBS)  Swallow Criteria for Skilled Therapeutic Interventions Met: demonstrates skilled criteria  Anticipated Dischage Disposition (SLP): anticipate therapy at next level of care  Rehab Potential/Prognosis, Swallowing: good, to achieve stated therapy goals  Therapy Frequency (Swallow): PRN  Predicted Duration Therapy Intervention (Days): until discharge       Plan of Care Reviewed With: patient, spouse  Outcome Summary: SLP completed swallow re-evaluation recommend mechanical soft no mixed consistencies HTL, VFSS 9/8 in AM, meds whole with puree. Small bites and sips         SLP Outcome Measures (last 72 hours)      SLP Outcome Measures     Row Name 09/07/20 1200 09/06/20 1200          SLP Outcome Measures    Outcome Measure Used?  Adult NOMS  -KA  Adult NOMS  -HS        Adult FCM Scores    FCM Chosen  Swallowing  -KA  Swallowing  -HS     Swallowing FCM Score  3  -KA  4  -HS       User Key  (r) = Recorded By, (t) = Taken By, (c) = Cosigned By    Initials Name Effective  Dates    HS Mi Claros, MS CCC-SLP 06/08/18 -     Ronald Gerber MA,CCC-SLP 06/08/18 -            Time Calculation:   Time Calculation- SLP     Row Name 09/07/20 1249             Time Calculation- SLP    SLP Start Time  1115  -KA      SLP Received On  09/07/20  -SUMA        User Key  (r) = Recorded By, (t) = Taken By, (c) = Cosigned By    Initials Name Provider Type    Ronald Gerber MA,CCC-SLP Speech and Language Pathologist          Therapy Charges for Today     Code Description Service Date Service Provider Modifiers Qty    [id number] HC ST TREATMENT SWALLOW 4 9/7/2020 Ronald Alexander MA,CCC-SLP GN 1               Ronald Alexander MA,CCC-SLP  9/7/2020

## 2020-09-07 NOTE — THERAPY TREATMENT NOTE
Patient Name: Eugenio Joe  : 1940    MRN: [id number]                              Today's Date: 2020       Admit Date: 2020    Visit Dx:     ICD-10-CM ICD-9-CM   1. TIA (transient ischemic attack) G45.9 435.9   2. Expressive aphasia R47.01 784.3     Patient Active Problem List   Diagnosis   • Quadriceps weakness   • ACL tear   • Knee pain, right   • S/P CABG x 3   • Essential hypertension   • Hyperlipemia   • Hallux rigidus   • Fracture, stress, metatarsal   • Osteopenia determined by x-ray   • Metatarsal stress fracture with nonunion   • Left hip pain   • Bursitis of left hip   • Pulmonary embolism (CMS/[facility])   • DALILA (acute kidney injury) (CMS/[facility])   • Type 2 diabetes mellitus with hyperglycemia, without long-term current use of insulin (CMS/HCC)   • Ascending aorta dilatation (CMS/HCC)   • Pulmonary nodule, left   • Right leg DVT (CMS/HCC)   • Acute renal failure (CMS/HCC)   • Hypotension   • Dehydration   • Hypercalcemia   • Dysphagia   • Syncope and collapse   • Coronary artery disease involving native coronary artery of native heart without angina pectoris   • Normal pressure hydrocephalus (CMS/HCC)   • Headache   • Impaired mobility   • Nausea & vomiting   • Fall at home   • Transient cerebral ischemia   • PFO (patent foramen ovale)   • Esophageal dysphagia   • TIA (transient ischemic attack)   • Acute appendicitis with localized peritonitis, without perforation or abscess   • Right lower quadrant abdominal pain   • History of CVA (cerebrovascular accident)   • On statin therapy   • 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   • Diverticulitis   • Hx of appendectomy   • Acute abdominal pain   • Gait abnormality   • Weakness   • CVA (cerebral vascular accident) (CMS/HCC)   • Acute CVA (cerebrovascular accident) (CMS/HCC)     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 SWEAT  GLAND    • Cardiac disease    • Coronary artery disease     CABG 2012   • Diabetes mellitus (CMS/HCC)    • Difficulty in swallowing    • Difficulty walking    • DM2 (diabetes mellitus, type 2) (CMS/HCC) 10/12/2016   • Gout several years ago    medication  working   • Hearing aid worn     bilateral   • Hx of appendectomy    • Hydrocephaly (CMS/HCC)    • Hyperlipemia    • Hypertension    • Joint pain     or swelling   • Low back pain several years ago   • Orbit fracture (CMS/HCC)    • Pulmonary embolism (CMS/HCC)     OCT 2016   • Rash     ARM-    • TIA (transient ischemic attack)      Past Surgical History:   Procedure Laterality Date   • APPENDECTOMY N/A 7/18/2019    Procedure: APPENDECTOMY LAPAROSCOPIC;  Surgeon: Luis Carlos Rick Jr., MD;  Location: [facility] MAIN OR;  Service: General   • BRAIN SURGERY      BRAIN ABCESS 30 YR AGO   • CARDIAC SURGERY     • COLONOSCOPY  2012    Ciciliano- normal per pt, no polyps    • CORONARY ARTERY BYPASS GRAFT     • ENDOSCOPY N/A 3/9/2017    Schatzki ring, dilated, LA Grade B esophagitis, HH, acquired duodenal stenosis   • ENDOSCOPY N/A 4/6/2018    candida, HH, torts, Schatzki ring, duodenal stenosis, dilatation perforemed, chronic inflammation   • ENDOSCOPY N/A 10/9/2019    White nummular lesions in esophageal mucosa, mild Schatzki ring, acquired duodenal stenosis, Path: Terrazas's, candida   • ENDOSCOPY N/A 1/8/2020    Procedure: ESOPHAGOGASTRODUODENOSCOPY with biopsies;  Surgeon: Rigoberto Walker MD;  Location: [facility] ENDOSCOPY;  Service: Gastroenterology   • FACIAL FRACTURE SURGERY      to repair 6 broken bones   • ORBITAL FRACTURE OPEN REDUCTION INTERNAL FIXATION Right 1/13/2017    Procedure: RT ORBIT FLOOR FRACTURE REPAIR RIGHT [facility] FRACTURE REPAIR, RIGHT NASAL BONE FRACTURE CLOSED REDUCTION;  Surgeon: Edil Lester MD;  Location: [facility] OR [facility];  Service:    • ORIF FOOT FRACTURE Right 9/9/2016    Procedure: RIGHT SECOND METATARSAL OPEN REDUCTION INTERNAL FIXATION WITh  GRAFT FROM HEEL ;  Surgeon: Man Jenkins MD;  [place] [facility] [place] [facility] [place]:    • SEPTOPLASTY     • SKIN CANCER EXCISION     • TONSILLECTOMY       General Information     Row Name 09/07/20 1200          PT Evaluation Time/Intention    Document Type  therapy note (daily note)  -EM     Mode of Treatment  individual therapy;physical therapy  -EM     Row Name 09/07/20 1200          General Information    Existing Precautions/Restrictions  fall  -EM       User Key  (r) = Recorded By, (t) = Taken By, (c) = Cosigned By    Initials Name Provider Type    EM Pily Vela, PT Physical Therapist        Mobility     Row Name 09/07/20 1200          Bed Mobility Assessment/Treatment    Supine-Sit [place] (Bed Mobility)  minimum assist (75% patient effort)  -EM     Comment (Bed Mobility)  pt sitting up on EOB at end of treatment with NA   -EM     Row Name 09/07/20 1200          Sit-Stand Transfer    Sit-Stand [place] (Transfers)  minimum assist (75% patient effort)  -EM     Assistive Device (Sit-Stand Transfers)  walker, 4-wheeled  -EM     Row Name 09/07/20 1200          Gait/Stairs Assessment/Training    Gait/Stairs Assessment/Training  gait/ambulation independence  -EM     [place] Level (Gait)  minimum assist (75% patient effort);1 person to manage equipment  -EM     Assistive Device (Gait)  walker, 4-wheeled  -EM     Distance in Feet (Gait)  75   -EM     Deviations/Abnormal Patterns (Gait)  stride length decreased;gait speed decreased  -EM     Bilateral Gait Deviations  forward flexed posture  -EM     Comment (Gait/Stairs)  2 standing rest breaks to re-establish upright posture, cues for longer step on R, decreased weight shift to L, tends to push rollator too far out in front   -EM       User Key  (r) = Recorded By, (t) = Taken By, (c) = Cosigned By    Initials Name Provider Type    Pily Orellana PT Physical Therapist        Obj/Interventions     Row Name 09/07/20 1201           Therapeutic Exercise    Lower Extremity (Therapeutic Exercise)  LAQ (long arc quad), bilateral  -EM     Lower Extremity Range of Motion (Therapeutic Exercise)  ankle dorsiflexion/plantar flexion, bilateral  -EM     Sets/Reps (Therapeutic Exercise)  1/10  -EM       User Key  (r) = Recorded By, (t) = Taken By, (c) = Cosigned By    Initials Name Provider Type    Pily rOellana, PT Physical Therapist        Goals/Plan    No documentation.       Clinical Impression     Row Name 09/07/20 1202          Pain Scale: Numbers Pre/Post-Treatment    Pain Scale: Numbers, Pretreatment  0/10 - no pain  -EM     Row Name 09/07/20 1202          Plan of Care Review    Plan of Care Reviewed With  patient;spouse  -EM     Progress  improving  -EM     Outcome Summary  patient demonstrates improvement in overall mobility today, able to tolerate increased ambulation distance. Patient requires Kale for ambulation, constant verbal cues for upright posture and walker placement, tactile cues for increased weight shifting to L to increase step length on R. Spouse states patient will d/c to [facility] for continued therapy. Patient would require significant assistance to ascend 3 steps at this point, would benefit from rehab for continued strengthening prior to returning home with spouse.   -EM     Row Name 09/07/20 1202          Positioning and Restraints    Pre-Treatment Position  in bed  -EM     Post Treatment Position  bed  -EM     In Bed  sitting EOB;call light within reach;with nsg  -EM       User Key  (r) = Recorded By, (t) = Taken By, (c) = Cosigned By    Initials Name Provider Type    EM Pily Vela, PT Physical Therapist        Outcome Measures     Row Name 09/07/20 1205          How much help from another person do you currently need...    Turning from your back to your side while in flat bed without using bedrails?  3  -EM     Moving from lying on back to sitting on the side of a flat bed without bedrails?  3  -EM      Moving to and from a bed to a chair (including a wheelchair)?  3  -EM     Standing up from a chair using your arms (e.g., wheelchair, bedside chair)?  3  -EM     Climbing 3-5 steps with a railing?  2  -EM     To walk in hospital room?  3  -EM     AM-PAC 6 Clicks Score (PT)  17  -EM       User Key  (r) = Recorded By, (t) = Taken By, (c) = Cosigned By    Initials Name Provider Type    Pily Orellana PT Physical Therapist        Physical Therapy Education                 Title: PT OT SLP Therapies (In Progress)     Topic: Physical Therapy (In Progress)     Point: Mobility training (In Progress)     Description:   Instruct learner(s) on safety and technique for assisting patient out of bed, chair or wheelchair.  Instruct in the proper use of assistive devices, such as walker, crutches, cane or brace.              Patient Friendly Description:   It's important to get you on your feet again, but we need to do so in a way that is safe for you. Falling has serious consequences, and your personal safety is the most important thing of all.        When it's time to get out of bed, one of us or a family member will sit next to you on the bed to give you support.     If your doctor or nurse tells you to use a walker, crutches, a cane, or a brace, be sure you use it every time you get out of bed, even if you think you don't need it.    Learning Progress Summary           Patient Acceptance, E, VU by EM at 9/7/2020 1205    Acceptance, E, NR by EM at 9/6/2020 1634   Significant Other Acceptance, E, NR by EM at 9/6/2020 1634                   Point: Home exercise program (Not Started)     Description:   Instruct learner(s) on appropriate technique for monitoring, assisting and/or progressing patient with therapeutic exercises and activities.              Learner Progress:   Not documented in this visit.          Point: Body mechanics (Not Started)     Description:   Instruct learner(s) on proper positioning and spine  alignment for patient and/or caregiver during mobility tasks and/or exercises.              Learner Progress:   Not documented in this visit.          Point: Precautions (Not Started)     Description:   Instruct learner(s) on prescribed precautions during mobility and gait tasks              Learner Progress:   Not documented in this visit.                      User Key     Initials Effective Dates Name Provider Type [facility] 04/03/18 -  Pily Vela PT Physical Therapist PT              PT Recommendation and Plan  Planned Therapy Interventions (PT Eval): balance training, bed mobility training, gait training, home exercise program, transfer training, patient/family education  Outcome Summary/Treatment Plan (PT)  Anticipated Discharge Disposition (PT): skilled nursing facility  Plan of Care Reviewed With: patient, spouse  Progress: improving  Outcome Summary: patient demonstrates improvement in overall mobility today, able to tolerate increased ambulation distance. Patient requires Kale for ambulation, constant verbal cues for upright posture and walker placement, tactile cues for increased weight shifting to L to increase step length on R. Spouse states patient will d/c to [facility] for continued therapy. Patient would require significant assistance to ascend 3 steps at this point, would benefit from rehab for continued strengthening prior to returning home with spouse.      Time Calculation:   PT Charges     Row Name 09/07/20 1206             Time Calculation    Start Time  1100  -EM      Stop Time  1116  -EM      Time Calculation (min)  16 min  -EM      PT Received On  09/07/20  -EM      PT - Next Appointment  09/08/20  -EM         Time Calculation- PT    Total Timed Code Minutes- PT  16 minute(s)  -EM        User Key  (r) = Recorded By, (t) = Taken By, (c) = Cosigned By    Initials Name Provider Type    EM Pily Vela PT Physical Therapist        Therapy Charges for Today     Code  Description Service Date Service Provider Modifiers Qty    96456355405  PT EVAL MOD COMPLEXITY 2 9/6/2020 Pily Vela, PT GP 1    17637401305 HC PT THER PROC EA 15 MIN 9/6/2020 Pily Vela, PT GP 1    58981117043 HC PT THER SUPP EA 15 MIN 9/6/2020 Pily Vela, PT GP 1    11170036001 HC PT THER PROC EA 15 MIN 9/7/2020 Pily Vela, PT GP 1    [id number]  PT THER SUPP EA 15 MIN 9/7/2020 Pily Vela, PT GP 1          PT G-Codes  Outcome Measure Options: AM-PAC 6 Clicks Basic Mobility (PT), Modified [place]  AM-PAC 6 Clicks Score (PT): 17  Modified [place] Scale: 4 - Moderately severe disability.  Unable to walk without assistance, and unable to attend to own bodily needs without assistance.    Pily Vela, PT  9/7/2020

## 2020-09-07 NOTE — PLAN OF CARE
Problem: Patient Care Overview  Goal: Plan of Care Review  Flowsheets (Taken 9/7/2020 3231)  Outcome Summary: Pt is an 79 y.o male admitted to [facility] after CVA. Pt has mild expressive aphasia but does well with yes/no simple short answers with questions. Pt currently min A for bed mobility and sit to stand transfers with rollator. Pt demo's good static/dynamic sitting balance to complete LBD with socks. No deficits noted in sensation/coordination/visual changes. Pt has an essential tremor with L hand that is not new. Pt may benefit from OT in acute setting to increase strength, endurance, and balance for self care independence. Pt and pt's wife are worried about going home and wife not being able to provide care for pt with worries for functional mobility to bathroom for toileting and for pt to get up 3 stairs to enter home. Anticipated d/c to acute/subacute rehab after d/c.    Note:   Appropriate PPE worn throughout encounter including gloves, eye protection, and mask. Hand hygiene performed prior and after. Pt and visitor were in masks.

## 2020-09-07 NOTE — PLAN OF CARE
Alert and oriented. No neuro changes. No falls. Turns independently. Appeared to rest well [facility].

## 2020-09-07 NOTE — THERAPY EVALUATION
Acute Care - Occupational Therapy Initial Evaluation  [facility]     Patient Name: Eugenio Joe  : 1940  MRN: [id number]  Today's Date: 2020             Admit Date: 2020       ICD-10-CM ICD-9-CM   1. TIA (transient ischemic attack) G45.9 435.9   2. Expressive aphasia R47.01 784.3     Patient Active Problem List   Diagnosis   • Quadriceps weakness   • ACL tear   • Knee pain, right   • S/P CABG x 3   • Essential hypertension   • Hyperlipemia   • Hallux rigidus   • Fracture, stress, metatarsal   • Osteopenia determined by x-ray   • Metatarsal stress fracture with nonunion   • Left hip pain   • Bursitis of left hip   • Pulmonary embolism (CMS/[facility])   • DALILA (acute kidney injury) (CMS/[facility])   • Type 2 diabetes mellitus with hyperglycemia, without long-term current use of insulin (CMS/[facility])   • Ascending aorta dilatation (CMS/[facility])   • Pulmonary nodule, left   • Right leg DVT (CMS/[facility])   • Acute renal failure (CMS/[facility])   • Hypotension   • Dehydration   • Hypercalcemia   • Dysphagia   • Syncope and collapse   • Coronary artery disease involving native coronary artery of native heart without angina pectoris   • Normal pressure hydrocephalus (CMS/[facility])   • Headache   • Impaired mobility   • Nausea & vomiting   • Fall at home   • Transient cerebral ischemia   • PFO (patent foramen ovale)   • Esophageal dysphagia   • TIA (transient ischemic attack)   • Acute appendicitis with localized peritonitis, without perforation or abscess   • Right lower quadrant abdominal pain   • History of CVA (cerebrovascular accident)   • On statin therapy   • 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   • Diverticulitis   • Hx of appendectomy   • Acute abdominal pain   • Gait abnormality   • Weakness   • CVA (cerebral vascular accident) (CMS/HCC)   • Acute CVA (cerebrovascular accident) (CMS/HCC)     Past Medical History:   Diagnosis Date   • Arthritis    • Asthma    • Brain abscess     at about age 44   • Bruise of face    • Cancer  (CMS/HCC)     PT STATES IN HIS SWEAT GLAND    • Cardiac disease    • Coronary artery disease     CABG 2012   • Diabetes mellitus (CMS/HCC)    • Difficulty in swallowing    • Difficulty walking    • DM2 (diabetes mellitus, type 2) (CMS/HCC) 10/12/2016   • Gout several years ago    medication  working   • Hearing aid worn     bilateral   • Hx of appendectomy    • Hydrocephaly (CMS/HCC)    • Hyperlipemia    • Hypertension    • Joint pain     or swelling   • Low back pain several years ago   • Orbit fracture (CMS/HCC)    • Pulmonary embolism (CMS/HCC)     OCT 2016   • Rash     ARM-    • TIA (transient ischemic attack)      Past Surgical History:   Procedure Laterality Date   • APPENDECTOMY N/A 7/18/2019    Procedure: APPENDECTOMY LAPAROSCOPIC;  Surgeon: Luis Carlos Rick Jr., MD;  Location: [facility] OR;  Service: General   • BRAIN SURGERY      BRAIN ABCESS 30 YR AGO   • CARDIAC SURGERY     • COLONOSCOPY  2012    Ciciliano- normal per pt, no polyps    • CORONARY ARTERY BYPASS GRAFT     • ENDOSCOPY N/A 3/9/2017    Schatzki ring, dilated, LA Grade B esophagitis, HH, acquired duodenal stenosis   • ENDOSCOPY N/A 4/6/2018    candida, HH, torts, Schatzki ring, duodenal stenosis, dilatation perforemed, chronic inflammation   • ENDOSCOPY N/A 10/9/2019    White nummular lesions in esophageal mucosa, mild Schatzki ring, acquired duodenal stenosis, Path: Terrazas's, candida   • ENDOSCOPY N/A 1/8/2020    Procedure: ESOPHAGOGASTRODUODENOSCOPY with biopsies;  Surgeon: Rigoberto Walker MD;  Location: [facility] ENDOSCOPY;  Service: Gastroenterology   • FACIAL FRACTURE SURGERY      to repair 6 broken bones   • ORBITAL FRACTURE OPEN REDUCTION INTERNAL FIXATION Right 1/13/2017    Procedure: RT ORBIT FLOOR FRACTURE REPAIR RIGHT ZMC FRACTURE REPAIR, RIGHT NASAL BONE FRACTURE CLOSED REDUCTION;  Surgeon: Edil Lester MD;  Location: [facility] OR [facility];  Service:    • ORIF FOOT FRACTURE Right 9/9/2016    Procedure: RIGHT SECOND METATARSAL  OPEN REDUCTION INTERNAL FIXATION WITh GRAFT FROM HEEL ;  Surgeon: Man Jenkins MD;  Location: [facility] OR [facility];  Service:    • SEPTOPLASTY     • SKIN CANCER EXCISION     • TONSILLECTOMY            OT ASSESSMENT FLOWSHEET (last 12 hours)      Occupational Therapy Evaluation     Row Name 09/07/20 1351                   OT Evaluation Time/Intention    Subjective Information  no complaints  -        Document Type  evaluation  -        Mode of Treatment  physical therapy;individual therapy  -        Patient Effort  good  -SM        Symptoms Noted During/After Treatment  none  -SM           General Information    Patient Profile Reviewed?  yes  -SM        Patient Observations  alert;cooperative;agree to therapy  -SM        Patient/Family Observations  wife present, reports pt is doing better today.   -        Prior Level of Function  independent:;ADL's;all household mobility  -        Equipment Currently Used at Home  rollator;shower chair;other (see comments) stair lift.   -        Existing Precautions/Restrictions  fall  -           Relationship/Environment    Primary Source of Support/Comfort  spouse  -           Resource/Environmental Concerns    Current Living Arrangements  home/apartment/condo  -           Home Main Entrance    Number of Stairs, Main Entrance  three  -SM           Cognitive Assessment/Intervention- PT/OT    Orientation Status (Cognition)  oriented x 4  -        Follows Commands (Cognition)  follows one step commands;over 90% accuracy  -           Safety Issues, Functional Mobility    Impairments Affecting Function (Mobility)  balance;endurance/activity tolerance;strength  -           Bed Mobility Assessment/Treatment    Bed Mobility Assessment/Treatment  sit-supine;supine-sit  -        Supine-Sit [place] (Bed Mobility)  minimum assist (75% patient effort)  -        Sit-Supine [place] (Bed Mobility)  minimum assist (75% patient effort)  -            Transfer Assessment/Treatment    Comment (Transfers)  encouraged to transfer to chair to increase OOB ax, pt reports he will attempt before meals to increase safety with swallowing.   -           Sit-Stand Transfer    Sit-Stand [place] (Transfers)  minimum assist (75% patient effort)  -        Assistive Device (Sit-Stand Transfers)  walker, front-wheeled  -           ADL Assessment/Intervention    BADL Assessment/Intervention  bathing;lower body dressing;upper body dressing;grooming;toileting  -           Lower Body Dressing Assessment/Training    Lower Body Dressing [place] Level  don;doff;socks;supervision;set up  -        Lower Body Dressing Position  edge of bed sitting  -           General ROM    GENERAL ROM COMMENTS  BUE AROM WFL  -           MMT (Manual Muscle Testing)    General MMT Comments  BUE 4/5 grossly   -SM           Motor Assessment/Interventions    Additional Documentation  Fine Motor Testing & Training (Group);Gross Motor Coordination (Group)  -           Gross Motor Coordination    Gross Motor Skill, Impairments Detail  Demo's good GMC with functional reach and finger to nose.   -           Static Sitting Balance    Level of [place] (Unsupported Sitting, Static Balance)  supervision  -        Sitting Position (Unsupported Sitting, Static Balance)  sitting on edge of bed  -           Dynamic Sitting Balance    Level of [place], Reaches Outside Midline (Sitting, Dynamic Balance)  supervision  -        Sitting Position, Reaches Outside Midline (Sitting, Dynamic Balance)  sitting on edge of bed  -           Static Standing Balance    Level of [place] (Supported Standing, Static Balance)  contact guard assist  -        Assistive Device Utilized (Supported Standing, Static Balance)  walker, rolling  -           Fine Motor Testing & Training    Comment, Fine Motor Coordination  No deficits noted, essential tremor in L hand but pt reports that is not  "new. Completes most ADLs R handed.   -           Sensory Assessment/Intervention    Sensory General Assessment  no sensation deficits identified  -           Positioning and Restraints    Pre-Treatment Position  in bed  -SM        Post Treatment Position  bed  -SM        In Bed  exit alarm on;encouraged to call for assist;call light within reach;with family/caregiver  -           Pain Scale: Numbers Pre/Post-Treatment    Pain Scale: Numbers, Pretreatment  0/10 - no pain  -           Plan of Care Review    Plan of Care Reviewed With  patient;spouse  -        Outcome Summary  Pt is an 79 y.o male admitted to [facility] after CVA. Pt has mild expressive aphasia but does well with yes/no simple short answers with questions. Pt currently min A for bed mobility and sit to stand transfers with rollator. Pt demo's good static/dynamic sitting balance to complete LBD with socks. No deficits noted in sensation/coordination/visual changes. Pt has an essential tremor with L hand that is not new. Pt may benefit from OT in acute setting to increase strength, endurance, and balance for self care independence. Pt and pt's wife are worried about going home and wife not being able to provide care for pt. Anticipated d/c to acute/subacute rehab after d/c.    -SM           Clinical Impression (OT)    Functional Level at Time of Evaluation (OT Eval)  expressive aphasia, impaired balance, ax tolerance, strength.   -SM        Patient/Family Goals Statement (OT Eval)  \"To go home.\"  -        Criteria for Skilled Therapeutic Interventions Met (OT Eval)  yes;treatment indicated  -        Rehab Potential (OT Eval)  good, to achieve stated therapy goals  -        Therapy Frequency (OT Eval)  5 times/wk  -        Care Plan Review (OT)  evaluation/treatment results reviewed;care plan/treatment goals reviewed;patient/other agree to care plan  -        Care Plan Review, Other Participant (OT Eval)  spouse  -SM        Anticipated " Discharge Disposition (OT)  inpatient rehabilitation facility  -SM           OT Goals    Transfer Goal Selection (OT)  transfer, OT goal 1  -SM        Dressing Goal Selection (OT)  dressing, OT goal 1  -SM        Balance Goal Selection (OT)  balance, OT goal 1  -SM        Additional Documentation  Balance Goal Selection (OT) (Row)  -SM           Transfer Goal 1 (OT)    Activity/Assistive Device (Transfer Goal 1, OT)  toilet  -SM        [place] Level/Cues Needed (Transfer Goal 1, OT)  contact guard assist  -SM        Time Frame (Transfer Goal 1, OT)  short term goal (STG);2 weeks  -SM        Progress/Outcome (Transfer Goal 1, OT)  continuing progress toward goal  -SM           Dressing Goal 1 (OT)    Activity/Assistive Device (Dressing Goal 1, OT)  lower body dressing  -SM        [place]/Cues Needed (Dressing Goal 1, OT)  supervision required  -SM        Time Frame (Dressing Goal 1, OT)  short term goal (STG);2 weeks  -SM        Progress/Outcome (Dressing Goal 1, OT)  continuing progress toward goal  -SM           Balance Goal 1 (OT)    Activity/Assistive Device (Balance Goal 1, OT)  standing, static  -SM        [place] Level/Cues Needed (Balance Goal 1, OT)  supervision required  -SM        Time Frame (Balance Goal 1, OT)  short term goal (STG);2 weeks  -SM        Progress/Outcomes (Balance Goal 1, OT)  continuing progress toward goal  -SM           Living Environment    Home Accessibility  stairs to enter home;tub/shower is not walk in  -SM          User Key  (r) = Recorded By, (t) = Taken By, (c) = Cosigned By    Initials Name Effective Dates    Sasha Bain OT 04/02/20 -          Occupational Therapy Education                 Title: PT OT SLP Therapies (In Progress)     Topic: Occupational Therapy (In Progress)     Point: ADL training (Done)     Description:   Instruct learner(s) on proper safety adaptation and remediation techniques during self care or transfers.   Instruct in proper use  of assistive devices.              Learning Progress Summary           Patient Acceptance, E, VU by  at 9/7/2020 9006    Comment:  Instructed pt on safe technique for ADLs and functional transfers.                   Point: Home exercise program (Not Started)     Description:   Instruct learner(s) on appropriate technique for monitoring, assisting and/or progressing therapeutic exercises/activities.              Learner Progress:   Not documented in this visit.          Point: Precautions (Not Started)     Description:   Instruct learner(s) on prescribed precautions during self-care and functional transfers.              Learner Progress:   Not documented in this visit.          Point: Body mechanics (Not Started)     Description:   Instruct learner(s) on proper positioning and spine alignment during self-care, functional mobility activities and/or exercises.              Learner Progress:   Not documented in this visit.                      User Key     Initials Effective Dates Name Provider Type Discipline     04/02/20 -  Sasha Barry OT Occupational Therapist OT                  OT Recommendation and Plan  Outcome Summary/Treatment Plan (OT)  Anticipated Discharge Disposition (OT): inpatient rehabilitation facility, skilled nursing facility  Therapy Frequency (OT Eval): 5 times/wk  Plan of Care Review  Plan of Care Reviewed With: patient, spouse  Plan of Care Reviewed With: patient, spouse  Outcome Summary: Pt is an 79 y.o male admitted to [facility] after CVA. Pt has mild expressive aphasia but does well with yes/no simple short answers with questions. Pt currently min A for bed mobility and sit to stand transfers with rollator. Pt demo's good static/dynamic sitting balance to complete LBD with socks. No deficits noted in sensation/coordination/visual changes. Pt has an essential tremor with L hand that is not new. Pt may benefit from OT in acute setting to increase strength, endurance, and balance for self  care independence. Pt and pt's wife are worried about going home and wife not being able to provide care for pt. Anticipated d/c to acute/subacute rehab after d/c.      Outcome Measures     Row Name 09/07/20 1500             How much help from another is currently needed...    Putting on and taking off regular lower body clothing?  3  -SM      Bathing (including washing, rinsing, and drying)  3  -SM      Toileting (which includes using toilet bed pan or urinal)  3  -SM      Putting on and taking off regular upper body clothing  3  -SM      Taking care of personal grooming (such as brushing teeth)  3  -SM      Eating meals  3  -SM      AM-PAC 6 Clicks Score (OT)  18  -SM         Modified Ishan Scale    Modified Barry Scale  4 - Moderately severe disability.  Unable to walk without assistance, and unable to attend to own bodily needs without assistance.  -         Functional Assessment    Outcome Measure Options  AM-PAC 6 Clicks Daily Activity (OT)  -        User Key  (r) = Recorded By, (t) = Taken By, (c) = Cosigned By    Initials Name Provider Type    Sasha Bain OT Occupational Therapist          Time Calculation:   Time Calculation- OT     Row Name 09/07/20 1543             Time Calculation- OT    OT Start Time  1332  -      OT Stop Time  1350  -      OT Time Calculation (min)  18 min  -      Total Timed Code Minutes- OT  14 minute(s)  -      OT Received On  09/07/20  -      OT - Next Appointment  09/08/20  -      OT Goal Re-Cert Due Date  09/21/20  -        User Key  (r) = Recorded By, (t) = Taken By, (c) = Cosigned By    Initials Name Provider Type    Sasha Bain OT Occupational Therapist        Therapy Charges for Today     Code Description Service Date Service Provider Modifiers Qty    [id number]  OT EVAL MOD COMPLEXITY 2 9/7/2020 Sasha Barry OT GO 1    [id number]  OT THERAPEUTIC ACT EA 15 MIN 9/7/2020 Sasha Barry OT GO 1               Sasha  ULICES Barry  9/7/2020

## 2020-09-07 NOTE — PLAN OF CARE
Problem: Patient Care Overview  Goal: Plan of Care Review  Flowsheets (Taken 9/7/2020 1202)  Progress: improving  Plan of Care Reviewed With: patient;spouse  Outcome Summary: patient demonstrates improvement in overall mobility today, able to tolerate increased ambulation distance. Patient requires [place] for ambulation, constant verbal cues for upright posture and walker placement, tactile cues for increased weight shifting to L to increase step length on R. Spouse states patient will d/c to [facility] for continued therapy. Patient would require significant assistance to ascend 3 steps at this point, would benefit from rehab for continued strengthening prior to returning home with spouse.    Patient was wearing a face mask during this therapy encounter. Therapist used appropriate personal protective equipment including eye protection, mask, and gloves.  Mask used was standard procedure mask. Appropriate PPE was worn during the entire therapy session. Hand hygiene was completed before and after therapy session. Patient is not in enhanced droplet precautions.

## 2020-09-07 NOTE — PLAN OF CARE
Problem: Patient Care Overview  Goal: Plan of Care Review  Outcome: Ongoing (interventions implemented as appropriate)  Flowsheets  Taken 9/7/2020 1202 by Pily Vela PT  Progress: improving  Taken 9/7/2020 1411 by Chayito Quintero RN  Plan of Care Reviewed With: patient  Goal: Individualization and Mutuality  Outcome: Ongoing (interventions implemented as appropriate)  Goal: Discharge Needs Assessment  Outcome: Ongoing (interventions implemented as appropriate)  Goal: Interprofessional Rounds/Family Conf  Outcome: Ongoing (interventions implemented as appropriate)     Problem: Fall Risk (Adult)  Goal: Identify Related Risk Factors and Signs and Symptoms  Outcome: Ongoing (interventions implemented as appropriate)  Goal: Absence of Fall  Outcome: Ongoing (interventions implemented as appropriate)     Problem: Stroke (Ischemic) (Adult)  Goal: Signs and Symptoms of Listed Potential Problems Will be Absent, Minimized or Managed (Stroke)  Outcome: Ongoing (interventions implemented as appropriate)     Problem: Skin Injury Risk (Adult)  Goal: Identify Related Risk Factors and Signs and Symptoms  Outcome: Ongoing (interventions implemented as appropriate)  Goal: Skin Health and Integrity  Outcome: Ongoing (interventions implemented as appropriate)

## 2020-09-07 NOTE — PLAN OF CARE
Problem: Patient Care Overview  Goal: Plan of Care Review  Outcome: Ongoing (interventions implemented as appropriate)  Flowsheets (Taken 9/7/2020 6817)  Plan of Care Reviewed With: patient; spouse  Outcome Summary: SLP completed swallow re-evaluation recommend mechanical soft no mixed consistencies HTL, VFSS 9/8 in AM, meds whole with puree. Small bites and sips

## 2020-09-07 NOTE — PROGRESS NOTES
"DAILY PROGRESS NOTE  [facility]    Patient Identification:  Name: Eugenio Joe  Age: 79 y.o.  Sex: male  :  1940  MRN: [id number]         Primary Care Physician: Adeline Onofre MD      Subjective  No new complaints.  Speech be much back to baseline.  Speech therapy is presently reevaluating him.  Patient's wife is concerned as to whether he can handle 3 steps at home.    Objective:  General Appearance:  Comfortable, well-appearing, in no acute distress and not in pain.    Vital signs: (most recent): Blood pressure 148/77, pulse 77, temperature 98.2 °F (36.8 °C), temperature source Oral, resp. rate 18, height 177.8 cm (70\"), weight 85.2 kg (187 lb 14.4 oz), SpO2 94 %.    Lungs:  Normal effort and normal respiratory rate.  Breath sounds clear to auscultation.    Heart: Normal rate.  Regular rhythm.    Extremities: There is no dependent edema.    Neurological: Patient is alert.    Skin:  Warm and dry.                Vital signs in last 24 hours:  Temp:  [98.2 °F (36.8 °C)-98.8 °F (37.1 °C)] 98.2 °F (36.8 °C)  Heart Rate:  [] 77  Resp:  [18-22] 18  BP: (148-176)/(70-97) 148/77    Intake/Output:    Intake/Output Summary (Last 24 hours) at 2020 1145  Last data filed at 2020 1755  Gross per 24 hour   Intake 120 ml   Output --   Net 120 ml         Results from last 7 days   Lab Units 20  04520  2145   WBC 10*3/mm3 8.08 9.48 9.68   HEMOGLOBIN g/dL 11.5* 11.3* 11.6*   PLATELETS 10*3/mm3 193 186 219     Results from last 7 days   Lab Units 20  2145   SODIUM mmol/L 139 140 141   POTASSIUM mmol/L 3.9 4.2 4.2   CHLORIDE mmol/L 107 107 105   CO2 mmol/L 20.9* 22.1 23.5   BUN mg/dL 18 31* 34*   CREATININE mg/dL 1.10 1.30* 1.52*   GLUCOSE mg/dL 183* 189* 185*   Estimated Creatinine Clearance: 64.5 mL/min (by C-G formula based on SCr of 1.1 mg/dL).  Results from last 7 days   Lab Units 20  0456 20  0447 " 09/05/20  2145   CALCIUM mg/dL 8.7 8.9 9.2   ALBUMIN g/dL 3.30* 3.30* 3.80   MAGNESIUM mg/dL  --   --  1.5*     Results from last 7 days   Lab Units 09/07/20  0456 09/06/20  0447 09/05/20  2145   ALBUMIN g/dL 3.30* 3.30* 3.80   BILIRUBIN mg/dL 0.6 0.4 0.2   ALK PHOS U/L 73 74 83   AST (SGOT) U/L 10 7 8   ALT (SGPT) U/L 8 10 10       Assessment:    CVA (cerebral vascular accident):  Inpatient work-up completed.  Patient to wear a ZIO Patch on discharge.  Dual antiplatelet treatment for 3 weeks then Plavix alone.  Await further recommendations from speech therapy.  Request physical therapy evaluation of patient's ability to handle a three-step stairway.    Essential hypertension: Uncontrolled on presentation.  Improving.    Hyperlipemia    DALILA (acute kidney injury): Improving.    About baseline today.  DC IV fluids.    Type 2 diabetes mellitus with hyperglycemia, without long-term current use of insulin: Continue sliding scale insulin.  Resume glipizide.    Normal pressure hydrocephalus (CMS/HCC)         Plan:  Please see above.  Discharge planning.  Discussed with patient's wife at bedside.  Discussed with speech therapist.    Alon Barrett MD  9/7/2020  11:45     .  Addendum:  Speech therapy recommendations noted appreciated.  PT reevaluation also noted and appreciated.  They are now recommending rehab prior to discharge home.  Assessment as above.  Over 30 minutes spent with over one half in counseling medical management.

## 2020-09-07 NOTE — DISCHARGE INSTRUCTIONS
Ideal targets for stroke prevention would be :  Blood pressure < 130/80; B12 > 500 TSH in normal range and LDL < 70; HbA1c < 6.5 and smoking cessation if applicable.

## 2020-09-07 NOTE — PROGRESS NOTES
"DOS: 2020  NAME: Eugenio Joe   : 1940  PCP: Adeline Onofre MD  Chief Complaint   Patient presents with   • Speech Problem   Patient seen in follow-up today; new to me        Stroke    Subjective: No events overnight.  Patient still with moderate dysarthria on my exam.    Wife at bedside.  All questions answered and diagnostic imaging discussed with both patient and spouse.    Objective:  Vital signs: /77 (BP Location: Left arm, Patient Position: Lying)   Pulse 77   Temp 98.2 °F (36.8 °C) (Oral)   Resp 18   Ht 177.8 cm (70\")   Wt 85.2 kg (187 lb 14.4 oz)   SpO2 94%   BMI 26.96 kg/m²       HEENT: Normocephalic, atraumatic   COR: RRR  Resp: Even and unlabored  Extremities: Equal pulses, nondistal embolization    Neurological:   MS: AO. Language: Dysarthric. No neglect. Higher integrative function normal  CN: II-XII normal  Motor: 5/5, normal tone  Reflexes: Toes downgoing  Sensory: Intact  Coordination: Normal    Laboratory results:  Lab Results   Component Value Date    GLUCOSE 183 (H) 2020    CALCIUM 8.7 2020     2020    K 3.9 2020    CO2 20.9 (L) 2020     2020    BUN 18 2020    CREATININE 1.10 2020    EGFRIFAFRI  2016      Comment:      <15 Indicative of kidney failure.    EGFRIFNONA 64 2020    BCR 16.4 2020    ANIONGAP 11.1 2020     Lab Results   Component Value Date    WBC 8.08 2020    HGB 11.5 (L) 2020    HCT 34.1 (L) 2020    MCV 93.2 2020     2020     Lab Results   Component Value Date    CHOL 105 2020    CHOL 115 2020    CHOL 115 2019     Lab Results   Component Value Date    HDL 39 (L) 2020    HDL 41 2020    HDL 36 (L) 2019     Lab Results   Component Value Date    LDL 44 2020    LDL 36 2020    LDL 30 2019     Lab Results   Component Value Date    TRIG 110 2020    TRIG 188 (H) 2020    TRIG 245 " (H) 06/24/2019     Lab Results   Component Value Date    ULQGWDTM95 580 06/23/2019     Lab Results   Component Value Date    HGBA1C 7.81 (H) 09/06/2020     Lab Results   Component Value Date    CHOL 105 09/06/2020    CHOL 115 04/28/2020    CHOL 115 06/24/2019     Lab Results   Component Value Date    TRIG 110 09/06/2020    TRIG 188 (H) 04/28/2020    TRIG 245 (H) 06/24/2019     Lab Results   Component Value Date    HDL 39 (L) 09/06/2020    HDL 41 04/28/2020    HDL 36 (L) 06/24/2019     Lab Results   Component Value Date    LDL 44 09/06/2020    LDL 36 04/28/2020    LDL 30 06/24/2019     Lab Results   Component Value Date    TSH 0.988 04/27/2020       Review and interpretation of imaging:  Interpretation Summary     · Right internal carotid artery mild stenosis.  · Left internal carotid artery is normal.     Interpretation Summary     · Estimated EF appears to be in the range of 61 - 65%  · Sigmoid-shaped ventricular septum is present.  · Left ventricular diastolic dysfunction is noted (grade I) consistent with impaired relaxation.  · Right ventricular cavity is mildly dilated.  · Normal right ventricular systolic function noted. Right ventricular cavity is mildly dilated.  · Saline test for shunting was performed and inconclusive for atrial level shunt.  · Mild aortic valve regurgitation is present.  · The aortic valve leaflets are moderately calcified.  · There is moderate nodular mitral annular calcification.  · There is no evidence of pericardial effusion.        MR SCAN OF THE BRAIN WITHOUT CONTRAST     HISTORY: TIAs. Aphasia.     The MR scan was performed with sagittal and axial images without  contrast. There is prominent diffuse atrophy and chronic small vessel  ischemic change similar to the MRI scan dated 04/28/2020. The diffusion  sequence confirms the presence of several small acute infarcts  bilaterally. One is located in the deep white matter on the right near  the caudate nucleus and measures 3 x 6 mm.  The second is located  adjacent to the posterolateral margin of the left lateral ventricle and  measures 6 x 10 mm. There is no evidence of hemorrhage are mass effect.     There are normal flow voids in the major vessels. The sinuses and  mastoid air cells are clear.     CONCLUSION: Prominent diffuse atrophy and chronic small vessel ischemic  change. 2 small acute infarcts in the deep white matter on the left and  right as described above. These involve 2 different vascular  distributions and raises the concern of cardiac etiology.     This report was finalized on 9/6/2020 9:05 AM by Dr. Andrew Omalley M.D.     CT HEAD WITHOUT CONTRAST     HISTORY: Aphasia and slurred speech starting at 8:30     COMPARISON: None available.     TECHNIQUE: Axial CT imaging was obtained through the brain. No IV  contrast was administered.     FINDINGS:  No acute intracranial hemorrhage is seen. There is diffuse atrophy.  There is periventricular and deep white matter microangiopathic change.  There is no midline shift or mass effect. There does appear to be some  encephalomalacia within the left parieto-occipital region. This is  probably related to a prior carl hole. Postsurgical changes are noted  involving the right maxilla. Mild mucosal thickening is noted within the  left sphenoid sinus.     IMPRESSION:  No acute intracranial findings.     Radiation dose reduction techniques were utilized, including automated  exposure control and exposure modulation based on body size.     This report was finalized on 9/5/2020 10:53 PM by Dr. Estefani Joshi M.D.    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  Initial CT the head negative for acute findings.  MRI of the brain showed 2 acute small periventricular infarct and suggestive  of small vessel disease etiology.  Also, significant generalized atrophy with white matter disease and hydrocephalus ex vacuo noted.  Carotid duplex showed normal left ICA and mild right ICA stenosis.  TTE showed EF 65%.  LV normal.  LA mildly dilated.  Inconclusive saline test therefore PFO unable to be excluded entirely. Left atrial volume index 28.6.    Neurologically, stable with residual dysarthria.  Recommendations below. No further neurology workup indicated. We will sign off and see again upon request.      Plan:  Aspirin 81mg daily x21 days then discontinue  Plavix 75mg daily; 300mg load given this admission   Lipitor 40 mg daily  (LDL 44)   Long-term Holter at discharge  Neurochecks  BP control  Stroke Education  YANE/SCDs  PT/OT/ST  Follow-up with me in outpatient clinic in 3 months      Case reviewed w/ Dr. Mena and he agrees with plan above.     JULISA Meraz

## 2020-09-08 ENCOUNTER — APPOINTMENT (OUTPATIENT)
Dept: GENERAL RADIOLOGY | Facility: HOSPITAL | Age: 80
End: 2020-09-08

## 2020-09-08 ENCOUNTER — APPOINTMENT (OUTPATIENT)
Dept: CARDIOLOGY | Facility: HOSPITAL | Age: 80
End: 2020-09-08

## 2020-09-08 VITALS
BODY MASS INDEX: 26.9 KG/M2 | DIASTOLIC BLOOD PRESSURE: 84 MMHG | RESPIRATION RATE: 16 BRPM | TEMPERATURE: 97.7 F | HEART RATE: 78 BPM | WEIGHT: 187.9 LBS | OXYGEN SATURATION: 94 % | SYSTOLIC BLOOD PRESSURE: 156 MMHG | HEIGHT: 70 IN

## 2020-09-08 LAB
ALBUMIN SERPL-MCNC: 3.4 G/DL (ref 3.5–5.2)
ALBUMIN/GLOB SERPL: 1.2 G/DL
ALP SERPL-CCNC: 77 U/L (ref 39–117)
ALT SERPL W P-5'-P-CCNC: 10 U/L (ref 1–41)
ANION GAP SERPL CALCULATED.3IONS-SCNC: 10.2 MMOL/L (ref 5–15)
AST SERPL-CCNC: 10 U/L (ref 1–40)
BILIRUB SERPL-MCNC: 0.6 MG/DL (ref 0–1.2)
BUN SERPL-MCNC: 13 MG/DL (ref 8–23)
BUN/CREAT SERPL: 11.4 (ref 7–25)
CALCIUM SPEC-SCNC: 9.1 MG/DL (ref 8.6–10.5)
CHLORIDE SERPL-SCNC: 107 MMOL/L (ref 98–107)
CO2 SERPL-SCNC: 21.8 MMOL/L (ref 22–29)
CREAT SERPL-MCNC: 1.14 MG/DL (ref 0.76–1.27)
DEPRECATED RDW RBC AUTO: 43.7 FL (ref 37–54)
ERYTHROCYTE [DISTWIDTH] IN BLOOD BY AUTOMATED COUNT: 12.6 % (ref 12.3–15.4)
GFR SERPL CREATININE-BSD FRML MDRD: 62 ML/MIN/1.73
GLOBULIN UR ELPH-MCNC: 2.9 GM/DL
GLUCOSE BLDC GLUCOMTR-MCNC: 146 MG/DL (ref 70–130)
GLUCOSE BLDC GLUCOMTR-MCNC: 178 MG/DL (ref 70–130)
GLUCOSE BLDC GLUCOMTR-MCNC: 376 MG/DL (ref 70–130)
GLUCOSE SERPL-MCNC: 183 MG/DL (ref 65–99)
HCT VFR BLD AUTO: 36.4 % (ref 37.5–51)
HGB BLD-MCNC: 11.8 G/DL (ref 13–17.7)
MCH RBC QN AUTO: 30.5 PG (ref 26.6–33)
MCHC RBC AUTO-ENTMCNC: 32.4 G/DL (ref 31.5–35.7)
MCV RBC AUTO: 94.1 FL (ref 79–97)
PLATELET # BLD AUTO: 203 10*3/MM3 (ref 140–450)
PMV BLD AUTO: 10.5 FL (ref 6–12)
POTASSIUM SERPL-SCNC: 4.1 MMOL/L (ref 3.5–5.2)
PROT SERPL-MCNC: 6.3 G/DL (ref 6–8.5)
RBC # BLD AUTO: 3.87 10*6/MM3 (ref 4.14–5.8)
SODIUM SERPL-SCNC: 139 MMOL/L (ref 136–145)
WBC # BLD AUTO: 8.95 10*3/MM3 (ref 3.4–10.8)

## 2020-09-08 PROCEDURE — 63710000001 INSULIN LISPRO (HUMAN) PER 5 UNITS: Performed by: NURSE PRACTITIONER

## 2020-09-08 PROCEDURE — 36415 COLL VENOUS BLD VENIPUNCTURE: CPT | Performed by: NURSE PRACTITIONER

## 2020-09-08 PROCEDURE — 0296T HC EXT ECG > 48HR TO 21 DAY RCRD W/CONECT INTL RCRD: CPT

## 2020-09-08 PROCEDURE — 80053 COMPREHEN METABOLIC PANEL: CPT | Performed by: NURSE PRACTITIONER

## 2020-09-08 PROCEDURE — 82962 GLUCOSE BLOOD TEST: CPT

## 2020-09-08 PROCEDURE — 97116 GAIT TRAINING THERAPY: CPT

## 2020-09-08 PROCEDURE — 92611 MOTION FLUOROSCOPY/SWALLOW: CPT

## 2020-09-08 PROCEDURE — 74230 X-RAY XM SWLNG FUNCJ C+: CPT

## 2020-09-08 PROCEDURE — 85027 COMPLETE CBC AUTOMATED: CPT | Performed by: NURSE PRACTITIONER

## 2020-09-08 PROCEDURE — 97110 THERAPEUTIC EXERCISES: CPT

## 2020-09-08 RX ORDER — ATORVASTATIN CALCIUM 40 MG/1
40 TABLET, FILM COATED ORAL NIGHTLY
Start: 2020-09-08

## 2020-09-08 RX ORDER — CLOPIDOGREL BISULFATE 75 MG/1
75 TABLET ORAL DAILY
Qty: 30 TABLET
Start: 2020-09-09

## 2020-09-08 RX ORDER — ASPIRIN 81 MG/1
81 TABLET, CHEWABLE ORAL DAILY
Qty: 19 TABLET | Refills: 0
Start: 2020-09-09 | End: 2020-09-28

## 2020-09-08 RX ADMIN — PANTOPRAZOLE SODIUM 40 MG: 40 TABLET, DELAYED RELEASE ORAL at 06:48

## 2020-09-08 RX ADMIN — MULTIPLE VITAMINS W/ MINERALS TAB 1 TABLET: TAB at 09:15

## 2020-09-08 RX ADMIN — INSULIN LISPRO 8 UNITS: 100 INJECTION, SOLUTION INTRAVENOUS; SUBCUTANEOUS at 12:00

## 2020-09-08 RX ADMIN — BARIUM SULFATE 50 ML: 400 SUSPENSION ORAL at 08:40

## 2020-09-08 RX ADMIN — BARIUM SULFATE 55 ML: 0.81 POWDER, FOR SUSPENSION ORAL at 08:40

## 2020-09-08 RX ADMIN — ALLOPURINOL 300 MG: 300 TABLET ORAL at 09:21

## 2020-09-08 RX ADMIN — INSULIN LISPRO 2 UNITS: 100 INJECTION, SOLUTION INTRAVENOUS; SUBCUTANEOUS at 09:20

## 2020-09-08 RX ADMIN — ASPIRIN 81 MG: 81 TABLET, CHEWABLE ORAL at 09:15

## 2020-09-08 RX ADMIN — GLIPIZIDE 5 MG: 5 TABLET ORAL at 09:21

## 2020-09-08 RX ADMIN — SODIUM CHLORIDE, PRESERVATIVE FREE 10 ML: 5 INJECTION INTRAVENOUS at 09:15

## 2020-09-08 RX ADMIN — BARIUM SULFATE 4 ML: 980 POWDER, FOR SUSPENSION ORAL at 08:40

## 2020-09-08 RX ADMIN — CLOPIDOGREL BISULFATE 75 MG: 75 TABLET, FILM COATED ORAL at 09:15

## 2020-09-08 NOTE — PROGRESS NOTES
Continued Stay Note  [facility]     Patient Name: Eugenio Joe  MRN: [id number]  Today's Date: 9/8/2020    Admit Date: 9/5/2020    Discharge Plan     Row Name 09/08/20 1713       Plan    Plan  [facility] acute rehab-    Plan Comments  Spoke with [facility]/SIR she states pt can arrive after 6pm,  BED 1, Report #[phone]. Updated RN Karlee Robertson rn/ccp    Row Name 09/08/20 1533       Plan    Plan  [facility] acute rehab     Plan Comments  Spoke with [facility]/ ([phone]) pt accepted to acute rehab and bed available today for [place]. She states she will call nursing unit when room assignment. CCP gave her 5n nurses station. Packet given to ENEDELIA Robertson RN/CCP        Discharge Codes    No documentation.       Expected Discharge Date and Time     Expected Discharge Date Expected Discharge Time    Sep 8, 2020             Alicia Castañeda RN

## 2020-09-08 NOTE — PROGRESS NOTES
Discharge Planning Assessment  [facility]     Patient Name: Eugenio Joe  MRN: [id number]  Today's Date: 9/8/2020    Admit Date: 9/5/2020    Discharge Needs Assessment     Row Name 09/08/20 1153       Living Environment    Lives With  spouse    Current Living Arrangements  home/apartment/condo    Primary Care Provided by  self;spouse/significant other    Provides Primary Care For  no one, unable/limited ability to care for self    Family Caregiver if Needed  spouse;child(cayla), adult    Quality of Family Relationships  helpful;involved;supportive    Able to Return to Prior Arrangements  yes       Resource/Environmental Concerns    Resource/Environmental Concerns  home accessibility    Home Accessibility Concerns  stairs to enter home    Transportation Concerns  car, none       Transition Planning    Patient/Family Anticipates Transition to  home with family;inpatient rehabilitation facility    Patient/Family Anticipated Services at Transition  skilled nursing;home health care    Transportation Anticipated  car, drives self;family or friend will provide       Discharge Needs Assessment    Readmission Within the Last 30 Days  no previous admission in last 30 days    Concerns to be Addressed  adjustment to diagnosis/illness;decision making    Equipment Currently Used at Home  walker, rolling;rollator;shower chair;grab bar    Anticipated Changes Related to Illness  other (see comments)    Equipment Needed After Discharge  none        Discharge Plan     Row Name 09/08/20 1137       Plan    Plan  Home with [facility] HH or SNF referral to [facility] pending on todays PT notes    Provided Post Acute Provider List?  Refused    Provided Post Acute Provider Quality & Resource List?  Refused    Patient/Family in Agreement with Plan  yes    Plan Comments  Spoke with pt and wife Annette Joe ([phone]) at bedside, introduced self and explained CCP role. Verified facesheet and confirmed [facility]. Pt  lives at home with his wife in a s/s home with 3 steps to enter. Pt uses a rolling walker to ambulate. He has 3 walkers, shower chair and grab bards. Pt denies SNF hx but is current with [facility].  Discussed dc planning, per Wife if pt able to ambulate and navigate steps plan will be home with [facility] . However, if not she would like SNF referral to [facility]. CCP offered quality info medicare.gov and they declined. CCP made referral to [facility]  and [facility] if pt unable to go home. Brayan rn/ccp        Destination      Service Provider Request Status Selected Services Address Phone Number Fax Number    [facility] Pending - Request Sent N/A [address] [facility] [place] [address] [phone]    [facility] Pending - Request Sent N/A [address] [facility] [place] [address] [phone]      Durable Medical Equipment      Coordination has not been started for this encounter.      Dialysis/Infusion      Coordination has not been started for this encounter.      Home Medical Care      Service Provider Request Status Selected Services Address Phone Number Fax Number    [facility] - Request Sent N/A [address] [id number] [phone]    [facility] Pending - Request Sent N/A [address] [place] [address] [phone]      Therapy      Coordination has not been started for this encounter.      Community Resources      Coordination has not been started for this encounter.          Demographic Summary     Row Name 09/08/20 8184       General Information    Referral Source  admission list    Reason for Consult  decision making;discharge planning    Preferred Language  English     Used During This Interaction  no       Contact Information    Permission Granted to Share Info With  family/designee;facility         Functional  Status     Row Name 09/08/20 1152       Functional Status    Usual Activity Tolerance  moderate;good    Current Activity Tolerance  fair       Functional Status, IADL    Medications  independent    Meal Preparation  assistive person    Housekeeping  assistive person    Laundry  assistive person    Shopping  assistive person       Mental Status    General Appearance WDL  WDL       Mental Status Summary    Recent Changes in Mental Status/Cognitive Functioning  no changes       Employment/    Employment Status  retired        Psychosocial    No documentation.       Abuse/Neglect    No documentation.       Legal    No documentation.       Substance Abuse    No documentation.       Patient Forms    No documentation.           Alicia Castañeda RN

## 2020-09-08 NOTE — PROGRESS NOTES
"DAILY PROGRESS NOTE  [facility]    Patient Identification:  Name: Eugenio Joe  Age: 79 y.o.  Sex: male  :  1940  MRN: [id number]         Primary Care Physician: Adeline Onofre MD      Subjective  No new complaints.  Patient and his wife both note that his speech is improved even further (at this point it actually sounds normal).  He also notes that he is feeling stronger today.  His wife is also having second thoughts about him going to rehab.    Objective:  General Appearance:  Comfortable, well-appearing, in no acute distress and not in pain.    Vital signs: (most recent): Blood pressure 156/84, pulse 78, temperature 97.7 °F (36.5 °C), temperature source Oral, resp. rate 16, height 177.8 cm (70\"), weight 85.2 kg (187 lb 14.4 oz), SpO2 94 %.    Lungs:  Normal effort and normal respiratory rate.  Breath sounds clear to auscultation.    Heart: Normal rate.  Regular rhythm.    Extremities: There is no dependent edema.    Neurological: Patient is alert and oriented to person, place and time.  (Speech is fluid and clear.).    Skin:  Warm and dry.                Vital signs in last 24 hours:  Temp:  [97.7 °F (36.5 °C)-98.2 °F (36.8 °C)] 97.7 °F (36.5 °C)  Heart Rate:  [78-88] 78  Resp:  [16-18] 16  BP: (152-158)/(72-98) 156/84    Intake/Output:    Intake/Output Summary (Last 24 hours) at 2020 1025  Last data filed at 2020 1723  Gross per 24 hour   Intake 100 ml   Output --   Net 100 ml         Results from last 7 days   Lab Units 20  0603 20  0456 20  2145   WBC 10*3/mm3 8.95 8.08 9.48 9.68   HEMOGLOBIN g/dL 11.8* 11.5* 11.3* 11.6*   PLATELETS 10*3/mm3 203 193 186 219     Results from last 7 days   Lab Units 20  0603 20  0456 09/06/20  0447 09/05/20  2145   SODIUM mmol/L 139 139 140 141   POTASSIUM mmol/L 4.1 3.9 4.2 4.2   CHLORIDE mmol/L 107 107 107 105   CO2 mmol/L 21.8* 20.9* 22.1 23.5   BUN mg/dL 13 18 31* 34*   CREATININE mg/dL 1.14 " 1.10 1.30* 1.52*   GLUCOSE mg/dL 183* 183* 189* 185*   Estimated Creatinine Clearance: 62.3 mL/min (by C-G formula based on SCr of 1.14 mg/dL).  Results from last 7 days   Lab Units 09/08/20  0603 09/07/20 0456 09/06/20 0447 09/05/20  2145   CALCIUM mg/dL 9.1 8.7 8.9 9.2   ALBUMIN g/dL 3.40* 3.30* 3.30* 3.80   MAGNESIUM mg/dL  --   --   --  1.5*     Results from last 7 days   Lab Units 09/08/20  0603 09/07/20  0456 09/06/20 0447 09/05/20  2145   ALBUMIN g/dL 3.40* 3.30* 3.30* 3.80   BILIRUBIN mg/dL 0.6 0.6 0.4 0.2   ALK PHOS U/L 77 73 74 83   AST (SGOT) U/L 10 10 7 8   ALT (SGPT) U/L 10 8 10 10        Assessment:    CVA (cerebral vascular accident):  Inpatient work-up completed.  Patient to wear a ZIO Patch on discharge.  Dual antiplatelet treatment for 3 weeks then Plavix alone.      Essential hypertension: Uncontrolled on presentation.  Improving.    Hyperlipemia    DALILA (acute kidney injury): Improving.    About baseline today.  DC IV fluids.    Type 2 diabetes mellitus with hyperglycemia, without long-term current use of insulin: Continue sliding scale insulin. .      Plan:  Please see above.  Discharge planning.  Discussed with patient and wife at bedside.  Discussed with CCP.    Alon Barrett MD  9/8/2020  10:25

## 2020-09-08 NOTE — DISCHARGE PLACEMENT REQUEST
"Christopher Brown (79 y.o. Male)     Date of Birth Social Security Number Address Home Phone MRN    1940  [address]  [place] [address] [phone] [id number]    Yazidi Marital Status          Druze        Admission Date Admission Type Admitting Provider Attending Provider Department, Room/Bed    9/5/20 Emergency RayHollis MD Broaddus, Emmett J., MD [address], [address]    Discharge Date Discharge Disposition Discharge Destination                       Attending Provider:  Alon Barrett MD    Allergies:  Other, Oxycodone    Isolation:  None   Infection:  None   Code Status:  CPR    Ht:  177.8 cm (70\")   Wt:  85.2 kg (187 lb 14.4 oz)    Admission Cmt:  None   Principal Problem:  CVA (cerebral vascular accident) (CMS/[facility]) [I63.9]                 Active Insurance as of 9/5/2020     Primary Coverage     Payor Plan Insurance Group Employer/Plan Group    MEDICARE MEDICARE A & B      Payor Plan Address Payor Plan Phone Number Payor Plan Fax Number Effective Dates    [address] [phone]  12/1/2004 - None Entered    [facility] [id number]       Subscriber Name Subscriber Birth Date Member ID       CHRISTOPHER BROWN 1940 [id number]           Secondary Coverage     Payor Plan Insurance Group Employer/Plan Group    AETNA COMMERCIAL AETNA   SUPP [id number]     Payor Plan Address Payor Plan Phone Number Payor Plan Fax Number Effective Dates    [address] [phone]  6/1/2013 - None Entered    [facility] [id number]       Subscriber Name Subscriber Birth Date Member ID       CHRISTOPHER BROWN 1940 [id number]                 Emergency Contacts      (Rel.) Home Phone Work Phone Mobile Phone    Annette Brown (Spouse) [phone] -- [phone]    TatyanaJose (Son) [phone] -- [phone]    Francisca Brown (Other) -- -- [phone]              "

## 2020-09-08 NOTE — PROGRESS NOTES
Continued Stay Note  [facility]     Patient Name: Eugenio Joe  MRN: [id number]  Today's Date: 9/8/2020    Admit Date: 9/5/2020    Discharge Plan     Row Name 09/08/20 1533       Plan    Plan  [facility] acute rehab     Plan Comments  Spoke with Mi/KATERYNA ([phone]) pt accepted to acute rehab and bed available today for [place]. She states she will call nursing unit when room assignment. [facility] gave her 5n nurses station. Packet given to RN Jayden. Marcelo MCDANIELS/CCP    Row Name 09/08/20 1413       Plan    Plan  [facility] referral    Patient/Family in Agreement with Plan  yes    Plan Comments  Reviewed PT notes and spoke with pt and wife at bedside. PT recommended rehab and they are agreeable. CCP explained will call [facility] for bed availablity. Spoke with Felix [phone] with pt will need rehab, she will evaluate notes and call [facility] back with bed availablity. Packet started in ccp office. marcelo mcdaniels/ccp        Discharge Codes    No documentation.             Alicia Castañeda, RN

## 2020-09-08 NOTE — PROGRESS NOTES
Continued Stay Note  [facility]     Patient Name: Eugenio Joe  MRN: [id number]  Today's Date: 9/8/2020    Admit Date: 9/5/2020    Discharge Plan     Row Name 09/08/20 1413       Plan    Plan  [facility] Re[facility] referral    Patient/Family in Agreement with Plan  yes    Plan Comments  Reviewed PT notes and spoke with pt and wife at bedside. PT recommended rehab and they are agreeable. CCP explained will call [facility] for bed availablity. Spoke with Mi/[facility] [phone] with pt will need rehab, she will evaluate notes and call ccp back with bed availablity. Packet started in ccp office. linda amin/ccp    Row Name 09/08/20 3058       Plan    Plan  Home with [facility] HH or SNF referral to [facility] pending on todays PT notes    Provided Post Acute Provider List?  Refused    Provided Post Acute Provider Quality & Resource List?  Refused    Patient/Family in Agreement with Plan  yes    Plan Comments  Spoke with pt and wife Annette Joe ([phone]) at bedside, introduced self and explained CCP role. Verified facesheet and confirmed local pharmacy is [facility]. Pt lives at home with his wife in a s/s home with 3 steps to enter. Pt uses a rolling walker to ambulate. He has 3 walkers, shower chair and grab bards. Pt denies SNF hx but [facility].  Discussed dc planning, per Wife if pt able to ambulate and navigate steps plan will be home with [facility] . However, if not she would like SNF referral to [facility]. CCP offered quality info medicare.gov and they declined. CCP made referral to [facility] and [facility] if pt unable to go home. Linda amin/ccp        Discharge Codes    No documentation.             Alicia Castañeda, RN

## 2020-09-08 NOTE — MBS/VFSS/FEES
Acute Care - Speech Language Pathology   Swallow Initial Evaluation [facility]     Patient Name: Eugenio Joe  : 1940  MRN: [id number]  Today's Date: 2020               Admit Date: 2020    Visit Dx:     ICD-10-CM ICD-9-CM   1. TIA (transient ischemic attack) G45.9 435.9   2. Expressive aphasia R47.01 784.3     Patient Active Problem List   Diagnosis   • Quadriceps weakness   • ACL tear   • Knee pain, right   • S/P CABG x 3   • Essential hypertension   • Hyperlipemia   • Hallux rigidus   • Fracture, stress, metatarsal   • Osteopenia determined by x-ray   • Metatarsal stress fracture with nonunion   • Left hip pain   • Bursitis of left hip   • Pulmonary embolism (CMS/[facility])   • DALILA (acute kidney injury) (CMS/[facility])   • Type 2 diabetes mellitus with hyperglycemia, without long-term current use of insulin (CMS/HCC)   • Ascending aorta dilatation (CMS/[facility])   • Pulmonary nodule, left   • Right leg DVT (CMS/[facility])   • Acute renal failure (CMS/[facility])   • Hypotension   • Dehydration   • Hypercalcemia   • Dysphagia   • Syncope and collapse   • Coronary artery disease involving native coronary artery of native heart without angina pectoris   • Normal pressure hydrocephalus (CMS/[facility])   • Headache   • Impaired mobility   • Nausea & vomiting   • Fall at home   • Transient cerebral ischemia   • PFO (patent foramen ovale)   • Esophageal dysphagia   • TIA (transient ischemic attack)   • Acute appendicitis with localized peritonitis, without perforation or abscess   • Right lower quadrant abdominal pain   • History of CVA (cerebrovascular accident)   • On statin therapy   • 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   • Diverticulitis   • Hx of appendectomy   • Acute abdominal pain   • Gait abnormality   • Weakness   • CVA (cerebral vascular accident) (CMS/HCC)   • Acute CVA (cerebrovascular accident) (CMS/HCC)     Past Medical History:   Diagnosis Date   • Arthritis    • Asthma    • Brain abscess     at about age 44   •  Bruise of face    • Cancer (CMS/HCC)     PT STATES IN HIS SWEAT GLAND    • Cardiac disease    • Coronary artery disease     CABG 2012   • Diabetes mellitus (CMS/HCC)    • Difficulty in swallowing    • Difficulty walking    • DM2 (diabetes mellitus, type 2) (CMS/HCC) 10/12/2016   • Gout several years ago    medication  working   • Hearing aid worn     bilateral   • Hx of appendectomy    • Hydrocephaly (CMS/HCC)    • Hyperlipemia    • Hypertension    • Joint pain     or swelling   • Low back pain several years ago   • Orbit fracture (CMS/HCC)    • Pulmonary embolism (CMS/HCC)     OCT 2016   • Rash     ARM-    • TIA (transient ischemic attack)      Past Surgical History:   Procedure Laterality Date   • APPENDECTOMY N/A 7/18/2019    Procedure: APPENDECTOMY LAPAROSCOPIC;  Surgeon: Luis Carlos Rick Jr., MD;  Location: [facility] OR;  Service: General   • BRAIN SURGERY      BRAIN ABCESS 30 YR AGO   • CARDIAC SURGERY     • COLONOSCOPY  2012    Ciciliano- normal per pt, no polyps    • CORONARY ARTERY BYPASS GRAFT     • ENDOSCOPY N/A 3/9/2017    Schatzki ring, dilated, LA Grade B esophagitis, HH, acquired duodenal stenosis   • ENDOSCOPY N/A 4/6/2018    candida, HH, torts, Schatzki ring, duodenal stenosis, dilatation perforemed, chronic inflammation   • ENDOSCOPY N/A 10/9/2019    White nummular lesions in esophageal mucosa, mild Schatzki ring, acquired duodenal stenosis, Path: Terrazas's, candida   • ENDOSCOPY N/A 1/8/2020    Procedure: ESOPHAGOGASTRODUODENOSCOPY with biopsies;  Surgeon: Rigoberto Walker MD;  Location: [facility] ENDOSCOPY;  Service: Gastroenterology   • FACIAL FRACTURE SURGERY      to repair 6 broken bones   • ORBITAL FRACTURE OPEN REDUCTION INTERNAL FIXATION Right 1/13/2017    Procedure: RT ORBIT FLOOR FRACTURE REPAIR RIGHT ZMC FRACTURE REPAIR, RIGHT NASAL BONE FRACTURE CLOSED REDUCTION;  Surgeon: Edil Lester MD;  Location: [facility] OR [facility];  Service:    • ORIF FOOT FRACTURE Right 9/9/2016     Procedure: RIGHT SECOND METATARSAL OPEN REDUCTION INTERNAL FIXATION WITh GRAFT FROM HEEL ;  Surgeon: Man Jenkins MD;  Location: [facility] OR [facility] [place];  Service:    • SEPTOPLASTY     • SKIN CANCER EXCISION     • TONSILLECTOMY          SWALLOW EVALUATION (last 72 hours)      SLP Adult Swallow Evaluation     Row Name 09/08/20 0911 09/07/20 1202 09/06/20 1100             Rehab Evaluation    Document Type  evaluation  -OC  re-evaluation  -KA  evaluation  -HS      Subjective Information  no complaints  -OC  no complaints  -KA  no complaints  -HS      Patient Observations  alert;cooperative;agree to therapy  -OC  alert;cooperative  -KA  alert;cooperative;agree to therapy  -HS      Patient/Family Observations  --  wife present  -KA  Wife present during evaluation  -HS      Patient Effort  good  -OC  good  -KA  good  -HS      Symptoms Noted During/After Treatment  none  -OC  none  -KA  none  -HS         General Information    Patient Profile Reviewed  yes  -OC  yes  -KA  yes  -HS      Pertinent History Of Current Problem  --  acute CVA, see initial eval for hx   -KA  Admitted with slurred speech, right facial droop. MRI revealed 2 small acute bilateral infarcts.   -HS      Current Method of Nutrition  mechanical soft, no mixed consistencies;honey-thick liquids  -OC  mechanical soft, no mixed consistencies;nectar/syrup-thick liquids  -KA  NPO;other (see comments) Failed RN swallow screen  -HS      Precautions/Limitations, Vision  WFL with corrective lenses  -OC  WFL with corrective lenses  -KA  WFL with corrective lenses Denies any vision changes  -HS      Precautions/Limitations, Hearing  hearing aid, bilaterally;other (see comments)  -OC  hearing aid, bilaterally;other (see comments)  -KA  hearing aid, bilaterally;other (see comments) Has hearing aids, wife states he does not wear often  -HS      Prior Level of Function-Communication  WFL  -OC  WFL  -KA  WFL  -HS      Prior Level of Function-Swallowing  esophageal  concerns;no diet consistency restrictions  -OC  esophageal concerns;no diet consistency restrictions  -KA  esophageal concerns;no diet consistency restrictions  -HS      Plans/Goals Discussed with  patient;agreed upon  -OC  patient;spouse/S.O.  -KA  patient;spouse/S.O.  -HS      Barriers to Rehab  none identified  -OC  none identified  -KA  none identified  -HS      Patient's Goals for Discharge  patient did not state  -OC  eat/drink without coughing/choking  -KA  return to PO diet  -HS      Family Goals for Discharge  --  patient able to eat/drink without coughing/choking  -KA  patient able to return to PO diet;patient able to return to regular diet  -HS         Pain Scale: Numbers Pre/Post-Treatment    Pain Scale: Numbers, Pretreatment  0/10 - no pain  -OC  --  --         Oral Motor and Function    Dentition Assessment  --  natural, present and adequate  -KA  natural, present and adequate  -HS      Secretion Management  --  WNL/WFL  -KA  WNL/WFL  -HS      Mucosal Quality  --  moist, healthy  -KA  dry  -HS      Volitional Swallow  --  delayed  -KA  --      Volitional Cough  --  WFL  -KA  --         Oral Musculature and Cranial Nerve Assessment    Oral Motor General Assessment  --  oral labial or buccal impairment  -KA  oral labial or buccal impairment;lingual impairment;velar impairment  -HS      Oral Labial or Buccal Impairment, Detail, Cranial Nerve VII (Facial):  --  right labial droop slight  -KA  right labial droop  -HS      Lingual Impairment, Detail. Cranial Nerves IX, XII (Glossopharyngeal and Hypoglossal)  --  reduced strength  -KA  reduced strength  -HS      Velar Impairment, Detail, Cranial Nerves X, XI (Vagus and Accessory)  --  --  reduced velar strength  -HS      Oral Motor, Comment  --  Mildly dysarthric speech, wife reports speech intelligibility is much improved from yesterday, is  now intelligible   -KA  Dysarthric speech. Will need full speech language evaluation.   -HS         General  Eating/Swallowing Observations    Respiratory Support Currently in Use  --  room air  -KA  room air  -      Eating/Swallowing Skills  --  self-fed  -KA  self-fed;appropriate self-feeding skills observed  -HS      Positioning During Eating  --  upright in bed  -KA  upright in bed  -HS      Utensils Used  --  spoon;cup;straw  -KA  spoon;cup;straw  -HS      Consistencies Trialed  --  soft textures;pureed;nectar/syrup-thick liquids;honey-thick liquids mixed  -KA  soft textures;pureed;thin liquids;nectar/syrup-thick liquids  -HS         Respiratory    Respiratory Pattern  --  --  decrease control/incoordination of breathing swallow talking/swallowing   -HS         Clinical Swallow Eval    Oral Prep Phase  --  WFL  -KA  impaired  -HS      Oral Transit  --  impaired  -KA  impaired  -HS      Oral Residue  --  WFL  -KA  impaired  -HS      Pharyngeal Phase  --  suspected pharyngeal impairment  -KA  suspected pharyngeal impairment  -HS      Esophageal Phase  --  suspected esophageal impairment  -KA  suspected esophageal impairment  -      Clinical Swallow Evaluation Summary  --  RN reported patient had coughing episode lastnight at dinner and pt food is being held until swallow re-evaluation. Wife reports pt has hx of coughing with food and liquids, last esophageal dilation November last year. Patient demonstrated no overt s/s of pen/asp with cup sip of NTL, however demonstrated signficiant coughing with repetitive sips of NTL and watery eyes suspect aspiration. No overt s/s of pen/asp with 4oz of puree and 4oz single cup sips of HTL and x1 throat clear with mixed consistency suspect from juice  -KA  The patient and his wife report a history of esophageal dysphagia with dilation about 1 year ago. They report patient coughs during meals, especially when socializing/talking. The patient demonstrated decreased oral control with thin liquids via cup/straw, prolonged mastication with mechanical soft. Suspected aspiration with  mixed consistencies, as patient with significant coughing during trials. Improvement with no mixed consistencies. Delayed coughing noted after straw sips of thin liquids. No overt s/s of aspiration with puree (4 oz.) or nectar thick liquids (4 oz.).   -HS         MBS/VFSS Interpretation    VFSS Summary  VFSS completed. Patient presents with mild-moderate oropharyngeal dysphagia. Patient demonstrated decreased coordination, mistiming, decreased pharyngeal constriction, reduced epiglottic deflection, and decreased hyolarygeal excursion.  Patient demonstrated silent penetration with honey thick on the initial swallow of the exam. Patient demonstrated penetration of nectar thick residue and with consecutive sips. Silent penetration and aspiration of thin liquid via cup during the swallow. Penetration of residue of thin liquid during puree trial, able to clear with cued throat clear. Deep silent penetration puree residue. Prespill mechanical soft to the vallecula & pyriforms, silent penetration during the swallow. Mild vallecular and posterior pharyngeal wall residue noted throughout study.    -OC  --  --         SLP Communication to Radiology    Summary Statement  VFSS completed. Patient presents with mild-moderate oropharyngeal dysphagia. Patient demonstrated decreased coordination, mistiming, decreased pharyngeal constriction, reduced epiglottic deflection, and decreased hyolarygeal excursion.  Patient demonstrated silent penetration with honey thick on the initial swallow of the exam. Patient demonstrated penetration of nectar thick residue and with consecutive sips. Silent penetration and aspiration of thin liquid via cup during the swallow. Penetration of residue of thin liquid during puree trial, able to clear with cued throat clear. Deep silent penetration puree residue. Prespill mechanical soft to the vallecula & pyriforms, silent penetration during the swallow. Mild vallecular and posterior pharyngeal wall  residue noted throughout study.    -OC  --  --         Health Promotion    Psychosocial Eating History  (S) social eating is important;food important aspect of social life  -OC  --  social eating is important;food important aspect of social life  -HS         Clinical Impression    SLP Swallowing Diagnosis  mild-moderate;oral dysfunction;pharyngeal dysfunction  -OC  mild;oral dysfunction;suspected pharyngeal dysfunction;esophageal dysfunction  -KA  mild;oral dysfunction;suspected pharyngeal dysfunction;esophageal dysfunction  -HS      Functional Impact  risk of aspiration/pneumonia  -OC  risk of aspiration/pneumonia  -KA  risk of aspiration/pneumonia  -HS      Rehab Potential/Prognosis, Swallowing  good, to achieve stated therapy goals  -OC  good, to achieve stated therapy goals  -KA  good, to achieve stated therapy goals  -HS      Swallow Criteria for Skilled Therapeutic Interventions Met  demonstrates skilled criteria  -OC  demonstrates skilled criteria  -KA  demonstrates skilled criteria  -HS         Recommendations    Therapy Frequency (Swallow)  PRN  -OC  PRN  -KA  PRN  -HS      Predicted Duration Therapy Intervention (Days)  until discharge  -OC  until discharge  -KA  until discharge  -HS      SLP Diet Recommendation  mechanical soft with no mixed consistencies;water between meals after oral care, with supervision;nectar thick liquids  -OC  mechanical soft with no mixed consistencies;honey thick liquids;water between meals after oral care, with supervision  -KA  mechanical soft with no mixed consistencies;nectar thick liquids;water between meals after oral care, with supervision  -      Recommended Diagnostics  --  VFSS (MBS)  -KA  reassess via clinical swallow evaluation  -HS      Recommended Precautions and Strategies  upright posture during/after eating;small bites of food and sips of liquid  -OC  upright posture during/after eating;small bites of food and sips of liquid  -KA  upright posture during/after  eating;small bites of food and sips of liquid  -      SLP Rec. for Method of Medication Administration  meds crushed;with pudding or applesauce  -OC  meds whole;with pudding or applesauce;as tolerated  -KA  meds whole;meds crushed;with thick liquids;with pudding or applesauce;as tolerated  -      Monitor for Signs of Aspiration  yes;notify SLP if any concerns  -OC  yes;notify SLP if any concerns  -KA  yes;notify SLP if any concerns  -HS      Anticipated Dischage Disposition (SLP)  anticipate therapy at next level of care;home with home health  -OC  anticipate therapy at next level of care  -KA  anticipate therapy at next level of care wife reports [facility] for PT currently  -         Swallow Goals (SLP)    Oral Nutrition/Hydration Goal Selection (SLP)  oral nutrition/hydration, SLP goal 1  -OC  --  --         Oral Nutrition/Hydration Goal 1 (SLP)    Oral Nutrition/Hydration Goal 1, SLP  Tolerate least restrictive diet with no overt s/s aspiration.   -OC  --  --      Time Frame (Oral Nutrition/Hydration Goal 1, SLP)  by discharge  -OC  --  --        User Key  (r) = Recorded By, (t) = Taken By, (c) = Cosigned By    Initials Name Effective Dates    Susie Anthony MA,[facility] 06/08/18 -     Mi Brown MS CCC-[facility] 06/08/18 -     Ronald Gerber MA,[facility] 06/08/18 -           EDUCATION  The patient has been educated in the following areas:   Dysphagia (Swallowing Impairment).    SLP Recommendation and Plan  SLP Swallowing Diagnosis: mild-moderate, oral dysfunction, pharyngeal dysfunction  SLP Diet Recommendation: mechanical soft with no mixed consistencies, water between meals after oral care, with supervision, nectar thick liquids  Recommended Precautions and Strategies: upright posture during/after eating, small bites of food and sips of liquid  SLP Rec. for Method of Medication Administration: meds crushed, with pudding or applesauce     Monitor for Signs of Aspiration: yes, notify SLP if any  concerns     Swallow Criteria for Skilled Therapeutic Interventions Met: demonstrates skilled criteria  Anticipated Dischage Disposition (SLP): anticipate therapy at next level of care, home with home health  Rehab Potential/Prognosis, Swallowing: good, to achieve stated therapy goals  Therapy Frequency (Swallow): PRN  Predicted Duration Therapy Intervention (Days): until discharge       Plan of Care Reviewed With: patient  Outcome Summary: VFSS completed. Patient presents with mild-moderate oropharyngeal dysphagia. Recommend nectar thick liquids and mechanical soft no mixed consistencies. Meds crushed with puree. Recommend close monitoring or pulmonary status secondary to risks of aspiration. Per report, PO intake/diet is a very important social aspect per patients wife and wife reports coughing with PO prior to hospitilization. ? if dysphagia is acute or if some element of dysphagia is baseline.    SLP GOALS     Row Name 09/08/20 0911             Oral Nutrition/Hydration Goal 1 (SLP)    Oral Nutrition/Hydration Goal 1, SLP  Tolerate least restrictive diet with no overt s/s aspiration.   -OC      Time Frame (Oral Nutrition/Hydration Goal 1, SLP)  by discharge  -OC        User Key  (r) = Recorded By, (t) = Taken By, (c) = Cosigned By    Initials Name Provider Type    Susie Anthony MA,[facility]SLP Speech and Language Pathologist           SLP Outcome Measures (last 72 hours)      SLP Outcome Measures     Row Name 09/08/20 0935 09/07/20 1200 09/06/20 1200       SLP Outcome Measures    Outcome Measure Used?  Adult NOMS  -OC  Adult NOMS  -KA  Adult NOMS  -HS       Adult FCM Scores    FCM Chosen  Swallowing  -OC  Swallowing  -KA  Swallowing  -HS    Swallowing FCM Score  4  -OC  3  -KA  4  -HS      User Key  (r) = Recorded By, (t) = Taken By, (c) = Cosigned By    Initials Name Effective Dates    Susie Anthony MA,[facility]SLP 06/08/18 -     Mi Brown, MS CCC-SLP 06/08/18 -     Ronald Gerber MA,[facility]SLP 06/08/18 -             Time Calculation:   Time Calculation- SLP     Row Name 09/08/20 0936             Time Calculation- SLP    SLP Start Time  0800  -OC      SLP Received On  09/08/20  -OC        User Key  (r) = Recorded By, (t) = Taken By, (c) = Cosigned By    Initials Name Provider Type    OC Susie Henriquez MA,CCC-SLP Speech and Language Pathologist          Therapy Charges for Today     Code Description Service Date Service Provider Modifiers Qty    [id number] HC ST MOTION FLUORO EVAL SWALLOW 6 9/8/2020 Susie Henriquez MA,CCC-SLP GN 1               Susie Henriquez MA,CHRISTIAN-SLP  9/8/2020

## 2020-09-08 NOTE — PLAN OF CARE
Problem: Patient Care Overview  Goal: Plan of Care Review  Outcome: Ongoing (interventions implemented as appropriate)  Flowsheets (Taken 9/8/2020 0928)  Plan of Care Reviewed With: patient  Outcome Summary: VFSS completed. Patient presents with mild-moderate oropharyngeal dysphagia. Recommend nectar thick liquids and mechanical soft no mixed consistencies. Meds crushed with puree. Recommend close monitoring of pulmonary status secondary to risks of aspiration. Recommend upright, slow rate, alternate liquids/solids, and throat clear every 2 bites/sips with subsequent dry swallow. Per report, PO intake/diet is a very important social aspect of his life per patient's wife and wife reports coughing with PO prior to hospitilization. SLP ?s if dysphagia is acute or if some element of dysphagia is baseline. SLP to follow for education and diet tolerance.  Water protocol ok, 30 mins after oral care between meals with supervision, small single sips.    Patient was not wearing a face mask during this therapy encounter. Therapist used appropriate personal protective equipment including mask, eye protection and gloves.  Mask used was standard procedure mask. Appropriate PPE was worn during the entire therapy session. Hand hygiene was completed before and after therapy session. Patient is not in enhanced droplet precautions.

## 2020-09-08 NOTE — NURSING NOTE
"Diabetes Education  Assessment/Teaching    Patient Name:  Eugenio Joe  YOB: 1940  MRN: [id number]  Admit Date:  9/5/2020      Assessment Date:  9/8/2020    Most Recent Value   General Information    Referral From:  Other- stroke order set. Meet with 80 y/o at bedside while spouse present.    Height  177.8 cm (70\")   Height Method  Stated   Weight  85.2 kg (187 lb 14.4 oz)   Weight Method  Bed scale   Diabetes History   What type of diabetes do you have?  Type 2   Do you test your blood sugar at home?  yes   Who performs the test?  pt   Have you had low blood sugar? (<70mg/dl)  no [pt denies. ]   Education Preferences   Barriers to Learning  --no barriers to learning noted this afternoon.   Assessment Topics   Problem Solving  Needs education.   Taking Medication - Assessment  Needs education. Spouse voices concern re an elevated BG and the use of insulin.    Healthy Coping - Assessment  Competent   Monitoring - Assessment  Competent   DM Goals            Most Recent Value   DM Education Needs   Meter  Has own   Frequency of Testing  Daily [a.m per pt. ]   Blood Glucose Target Range  -- in the morning or fasting.   Problem Solving   Pt/spouse cite recently some a.m BGs have been elevated >200. Advise pt, spouse to call PCP for high BG >200 for instruction.    Medication  Oral, Insulin. explain to pt, spouse the rationale for holding metformin inpt and the use of (sliding scale) insulin with hyperglycemia.    Healthy Coping  Appropriate   Discharge Plan  Facility, Follow-up with PCP Dr Onofre.    Motivation  Engaged   Teaching Method  Explanation, Discussion, Handouts   Patient Response  Verbalized understanding      Other Comments:    Electronically signed by:  Maya Chatman RN, BSN, CDE   09/08/20 16:18  "

## 2020-09-08 NOTE — PLAN OF CARE
Discharge instructions, zio patch, follow up and medications reviewed with patient and wife.  Both verbalized understanding of teachings.  VSS, awaiting for bed assignment from [facility].  No complaints at this time.

## 2020-09-08 NOTE — DISCHARGE SUMMARY
PHYSICIAN DISCHARGE SUMMARY                                                                        [facility]    Patient Identification:  Name: Eugenio Joe  Age: 79 y.o.  Sex: male  :  1940  MRN: [id number]  Primary Care Physician: Adeline Onofre MD    Admit date: 2020  Discharge date and time: 2020     Discharged Condition: good    Discharge Diagnoses:    CVA (cerebral vascular accident):  Patient to wear a ZIO Patch on discharge. Dual antiplatelet treatment for 3 weeks then Plavix alone.     Dysphasia sec to above: Modified diet.    Essential hypertension:      Hyperlipemia    DALILA (acute kidney injury):     Type 2 diabetes mellitus with hyperglycemia, without long-term current use of insulin:      Parkinsonism with ataxia:    Hospital Course:  Pleasant 79-year-old gentleman presenting with slurred speech.  Please H&P for full details.  Work-up was as follows:  Initial CT the head negative for acute findings.  MRI of the brain showed 2 acute small periventricular infarct and suggestive of small vessel disease etiology.  Also, significant generalized atrophy with white matter disease and hydrocephalus ex vacuo noted.  Carotid duplex showed normal left ICA and mild right ICA stenosis.  TTE showed EF 65%.  LV normal.  LA mildly dilated.  Inconclusive saline test therefore PFO unable to be excluded entirely. Left atrial volume index 28.6.  He did show significant dysphagia issues.  Speech therapy recommendations are noted below.  It appears his ataxia issues are also worsening.  Physical therapy has recommended rehab on discharge.    Consults:     Consults     Date and Time Order Name Status Description    2020 0783 Inpatient Neurology Consult Stroke Completed     2020 2241 [facility] (on-call MD unless specified) Details Completed             Discharge Exam:  Physical Exam   General Appearance:  Comfortable,  "well-appearing, in no acute distress and not in pain.    Vital signs: (most recent): Blood pressure 156/84, pulse 78, temperature 97.7 °F (36.5 °C), temperature source Oral, resp. rate 16, height 177.8 cm (70\"), weight 85.2 kg (187 lb 14.4 oz), SpO2 94 %.    Lungs:  Normal effort and normal respiratory rate.  Breath sounds clear to auscultation.    Heart: Normal rate.  Regular rhythm.    Extremities: There is no dependent edema.    Neurological: Patient is alert and oriented to person, place and time.  (Speech is fluid and clear.).    Skin:  Warm and dry.       Disposition:  Rehab    Patient Instructions:      Discharge Medications      New Medications      Instructions Start Date   aspirin 81 MG chewable tablet  Replaces:  aspirin 325 MG tablet   81 mg, Oral, Daily   Start Date:  September 9, 2020     atorvastatin 40 MG tablet  Commonly known as:  LIPITOR   40 mg, Oral, Nightly      clopidogrel 75 MG tablet  Commonly known as:  PLAVIX   75 mg, Oral, Daily   Start Date:  September 9, 2020        Continue These Medications      Instructions Start Date   acetaminophen 325 MG tablet  Commonly known as:  TYLENOL   650 mg, Oral, Every 4 Hours PRN      allopurinol 300 MG tablet  Commonly known as:  ZYLOPRIM   300 mg, Oral, Daily      doxazosin 1 MG tablet  Commonly known as:  CARDURA   1 mg, Oral, Daily      esomeprazole 40 MG capsule  Commonly known as:  nexIUM   TK 1 C PO D      FREESTYLE LITE test strip  Generic drug:  glucose blood   1 each, Other, See Admin Instructions, Use 1 to 2 times daily as instructed       glipizide 5 MG tablet  Commonly known as:  GLUCOTROL   TK 1 T PO B THE FARZAD MEAL      lisinopril 10 MG tablet  Commonly known as:  PRINIVIL,ZESTRIL   10 mg, Oral, 2 Times Daily      metFORMIN 500 MG tablet  Commonly known as:  GLUCOPHAGE   500 mg, Oral, 2 Times Daily With Meals      MULTIVITAMIN ADULT PO   1 tablet, Oral, Daily      ProAir  (90 Base) MCG/ACT inhaler  Generic drug:  albuterol sulfate " HFA   2 puffs, Every 4 Hours PRN      Vitamin D (Cholecalciferol) 10 MCG (400 UNIT) tablet  Commonly known as:  CHOLECALCIFEROL   400 Units, Oral, Daily         Stop These Medications    aspirin 325 MG tablet  Commonly known as:  Nora Aspirin  Replaced by:  aspirin 81 MG chewable tablet     furosemide 20 MG tablet  Commonly known as:  LASIX     simvastatin 40 MG tablet  Commonly known as:  ZOCOR          Diet Instructions     Diet: Consistent Carbohydrate, Dysphagia; Nectar / Syrup Thick Liquids; Mechanical Soft      Discharge Diet:   Consistent Carbohydrate  Dysphagia       Fluid Consistency:  Nectar / Syrup Thick Liquids    Pureed Options:  Mechanical Soft    Fluid Restriction per day:  Other (See comments)    No straws.  No mixed consistencies.        Future Appointments   Date Time Provider Department Center   1/8/2021 11:00 AM Ez Campo MD [facility]     Additional Instructions for the Follow-ups that You Need to Schedule     Discharge Follow-up with PCP   As directed       Currently Documented PCP:    Adeline Onofre MD    PCP Phone Number:    [phone]     Follow Up Details:  1 week         Discharge Follow-up with Specialty: Neurology   As directed      Specialty:  Neurology    Follow Up Details:  As directed.            Contact information for follow-up providers     Janet Clayton APRN Follow up in 3 month(s).    Specialties:  Neurology, Nurse Practitioner, Emergency Medicine  Contact information:  [address]  [facility] [address]  [place] [address]  [phone]             Adeline Onofre MD .    Specialty:  Internal Medicine  Why:  1 week  Contact information:  [address]  [facility] [address]  [place] [address]  [phone]                   Contact information for after-discharge care     Destination     [facility] .    Service:  Inpatient Rehabilitation  Contact information:  [address] [facility] [address] [phone]                            Discharge Order (From admission, onward)     Start     Ordered    09/08/20 1532  Discharge patient  Once     Expected Discharge Date:  09/08/20    Discharge Disposition:  Rehab Facility or Unit (DC - External)    Physician of Record for Attribution - Please select from Treatment Team:  ALON BARRETT [[id number]]    Review needed by CMO to determine Physician of Record:  No       Question Answer Comment   Physician of Record for Attribution - Please select from Treatment Team ALON BARRETT    Review needed by CMO to determine Physician of Record No        09/08/20 1535                  Total time spent discharging patient including evaluation,post hospitalization follow up,  medication and post hospitalization instructions and education total time exceeds 30 minutes.    Signed:  Alon Barrett MD  9/8/2020  15:37    EMR Dragon/Transcription disclaimer:   Much of this encounter note is an electronic transcription/translation of spoken language to printed text. The electronic translation of spoken language may permit erroneous, or at times, nonsensical words or phrases to be inadvertently transcribed; Although I have reviewed the note for such errors, some may still exist.

## 2020-09-08 NOTE — PLAN OF CARE
Problem: Patient Care Overview  Goal: Plan of Care Review  Flowsheets (Taken 9/8/2020 9889)  Outcome Summary: Improved gait pattern and activity tolerance during PT.  Able to ambulate 100' w/ min A using rollator, VC for safety, posutre, and keeping rollator closer.  Performed few sitting and standing exercises after sitting rest break.  Currently recommend DC to acute rehab.         ..Patient was intermittently wearing a face mask during this therapy encounter. Therapist used appropriate personal protective equipment including eye protection, mask, and gloves.  Mask used was standard procedure mask. Appropriate PPE was worn during the entire therapy session. Hand hygiene was completed before and after therapy session. Patient is not in enhanced droplet precautions.

## 2020-09-09 ENCOUNTER — LAB REQUISITION (OUTPATIENT)
Dept: LAB | Facility: HOSPITAL | Age: 80
End: 2020-09-09

## 2020-09-09 DIAGNOSIS — Z00.00 ENCOUNTER FOR GENERAL ADULT MEDICAL EXAMINATION WITHOUT ABNORMAL FINDINGS: ICD-10-CM

## 2020-09-09 LAB
ALBUMIN SERPL-MCNC: 3.4 G/DL (ref 3.5–5.2)
ALBUMIN/GLOB SERPL: 1.1 G/DL
ALP SERPL-CCNC: 83 U/L (ref 39–117)
ALT SERPL W P-5'-P-CCNC: 6 U/L (ref 1–41)
ANION GAP SERPL CALCULATED.3IONS-SCNC: 12 MMOL/L (ref 5–15)
AST SERPL-CCNC: 12 U/L (ref 1–40)
BILIRUB SERPL-MCNC: 0.5 MG/DL (ref 0–1.2)
BUN SERPL-MCNC: 14 MG/DL (ref 8–23)
BUN SERPL-MCNC: ABNORMAL MG/DL
BUN/CREAT SERPL: ABNORMAL
CALCIUM SPEC-SCNC: 9.3 MG/DL (ref 8.6–10.5)
CHLORIDE SERPL-SCNC: 103 MMOL/L (ref 98–107)
CO2 SERPL-SCNC: 23 MMOL/L (ref 22–29)
CREAT SERPL-MCNC: 1.13 MG/DL (ref 0.76–1.27)
DEPRECATED RDW RBC AUTO: 42.4 FL (ref 37–54)
ERYTHROCYTE [DISTWIDTH] IN BLOOD BY AUTOMATED COUNT: 13.3 % (ref 12.3–15.4)
GFR SERPL CREATININE-BSD FRML MDRD: 62 ML/MIN/1.73
GLOBULIN UR ELPH-MCNC: 3.1 GM/DL
GLUCOSE SERPL-MCNC: 175 MG/DL (ref 65–99)
HCT VFR BLD AUTO: 38.3 % (ref 37.5–51)
HGB BLD-MCNC: 12.6 G/DL (ref 13–17.7)
MAGNESIUM SERPL-MCNC: 1.6 MG/DL (ref 1.6–2.4)
MCH RBC QN AUTO: 30.7 PG (ref 26.6–33)
MCHC RBC AUTO-ENTMCNC: 32.8 G/DL (ref 31.5–35.7)
MCV RBC AUTO: 93.6 FL (ref 79–97)
PHOSPHATE SERPL-MCNC: 2.6 MG/DL (ref 2.5–4.5)
PLATELET # BLD AUTO: 227 10*3/MM3 (ref 140–450)
PMV BLD AUTO: 9.3 FL (ref 6–12)
POTASSIUM SERPL-SCNC: 4 MMOL/L (ref 3.5–5.2)
PROT SERPL-MCNC: 6.5 G/DL (ref 6–8.5)
RBC # BLD AUTO: 4.09 10*6/MM3 (ref 4.14–5.8)
SODIUM SERPL-SCNC: 138 MMOL/L (ref 136–145)
WBC # BLD AUTO: 10 10*3/MM3 (ref 3.4–10.8)

## 2020-09-09 PROCEDURE — 85027 COMPLETE CBC AUTOMATED: CPT | Performed by: PHYSICAL MEDICINE & REHABILITATION

## 2020-09-09 PROCEDURE — 84100 ASSAY OF PHOSPHORUS: CPT | Performed by: PHYSICAL MEDICINE & REHABILITATION

## 2020-09-09 PROCEDURE — 80053 COMPREHEN METABOLIC PANEL: CPT | Performed by: PHYSICAL MEDICINE & REHABILITATION

## 2020-09-09 PROCEDURE — 83735 ASSAY OF MAGNESIUM: CPT | Performed by: PHYSICAL MEDICINE & REHABILITATION

## 2020-09-09 NOTE — PROGRESS NOTES
Case Management Discharge Note      Final Note: [facility] acute rehab via gwendolyn mckeon rn/ccp    Provided Post Acute Provider List?: Refused  Provided Post Acute Provider Quality & Resource List?: Refused    Destination - Selection Complete      Service Provider Request Status Selected Services Address Phone Number Fax Number    [facility] [address]  [address] [phone]      Durable Medical Equipment      No service has been selected for the patient.      Dialysis/Infusion      No service has been selected for the patient.      Home Medical Care      No service has been selected for the patient.      Therapy      No service has been selected for the patient.      Community Resources      No service has been selected for the patient.        Transportation Services  Private: Car    Final Discharge Disposition Code: 62 - inpatient rehab facility

## 2020-09-14 ENCOUNTER — LAB REQUISITION (OUTPATIENT)
Dept: LAB | Facility: HOSPITAL | Age: 80
End: 2020-09-14

## 2020-09-14 DIAGNOSIS — Z00.00 ENCOUNTER FOR GENERAL ADULT MEDICAL EXAMINATION WITHOUT ABNORMAL FINDINGS: ICD-10-CM

## 2020-09-14 LAB
ANION GAP SERPL CALCULATED.3IONS-SCNC: 11 MMOL/L (ref 5–15)
BUN SERPL-MCNC: 25 MG/DL (ref 8–23)
BUN SERPL-MCNC: ABNORMAL MG/DL
BUN/CREAT SERPL: ABNORMAL
CALCIUM SPEC-SCNC: 9.4 MG/DL (ref 8.6–10.5)
CHLORIDE SERPL-SCNC: 106 MMOL/L (ref 98–107)
CO2 SERPL-SCNC: 22 MMOL/L (ref 22–29)
CREAT SERPL-MCNC: 1.22 MG/DL (ref 0.76–1.27)
DEPRECATED RDW RBC AUTO: 42.9 FL (ref 37–54)
ERYTHROCYTE [DISTWIDTH] IN BLOOD BY AUTOMATED COUNT: 13.3 % (ref 12.3–15.4)
GFR SERPL CREATININE-BSD FRML MDRD: 57 ML/MIN/1.73
GLUCOSE SERPL-MCNC: 126 MG/DL (ref 65–99)
HCT VFR BLD AUTO: 36.3 % (ref 37.5–51)
HGB BLD-MCNC: 12 G/DL (ref 13–17.7)
MCH RBC QN AUTO: 30.6 PG (ref 26.6–33)
MCHC RBC AUTO-ENTMCNC: 32.9 G/DL (ref 31.5–35.7)
MCV RBC AUTO: 92.8 FL (ref 79–97)
PLATELET # BLD AUTO: 238 10*3/MM3 (ref 140–450)
PMV BLD AUTO: 9.3 FL (ref 6–12)
POTASSIUM SERPL-SCNC: 4.8 MMOL/L (ref 3.5–5.2)
RBC # BLD AUTO: 3.91 10*6/MM3 (ref 4.14–5.8)
SODIUM SERPL-SCNC: 139 MMOL/L (ref 136–145)
WBC # BLD AUTO: 7 10*3/MM3 (ref 3.4–10.8)

## 2020-09-14 PROCEDURE — 80048 BASIC METABOLIC PNL TOTAL CA: CPT | Performed by: PHYSICAL MEDICINE & REHABILITATION

## 2020-09-14 PROCEDURE — 85027 COMPLETE CBC AUTOMATED: CPT | Performed by: PHYSICAL MEDICINE & REHABILITATION

## 2020-09-15 ENCOUNTER — LAB REQUISITION (OUTPATIENT)
Dept: LAB | Facility: HOSPITAL | Age: 80
End: 2020-09-15

## 2020-09-15 DIAGNOSIS — Z00.00 ENCOUNTER FOR GENERAL ADULT MEDICAL EXAMINATION WITHOUT ABNORMAL FINDINGS: ICD-10-CM

## 2020-09-15 LAB
ANION GAP SERPL CALCULATED.3IONS-SCNC: 10 MMOL/L (ref 5–15)
BUN SERPL-MCNC: 25 MG/DL (ref 8–23)
BUN SERPL-MCNC: ABNORMAL MG/DL
BUN/CREAT SERPL: ABNORMAL
CALCIUM SPEC-SCNC: 9.2 MG/DL (ref 8.6–10.5)
CHLORIDE SERPL-SCNC: 105 MMOL/L (ref 98–107)
CO2 SERPL-SCNC: 22 MMOL/L (ref 22–29)
CREAT SERPL-MCNC: 1.29 MG/DL (ref 0.76–1.27)
DEPRECATED RDW RBC AUTO: 42.4 FL (ref 37–54)
ERYTHROCYTE [DISTWIDTH] IN BLOOD BY AUTOMATED COUNT: 13.2 % (ref 12.3–15.4)
GFR SERPL CREATININE-BSD FRML MDRD: 54 ML/MIN/1.73
GLUCOSE SERPL-MCNC: 135 MG/DL (ref 65–99)
HCT VFR BLD AUTO: 35.3 % (ref 37.5–51)
HGB BLD-MCNC: 11.6 G/DL (ref 13–17.7)
MCH RBC QN AUTO: 30.6 PG (ref 26.6–33)
MCHC RBC AUTO-ENTMCNC: 32.8 G/DL (ref 31.5–35.7)
MCV RBC AUTO: 93.3 FL (ref 79–97)
PLATELET # BLD AUTO: 227 10*3/MM3 (ref 140–450)
PMV BLD AUTO: 9.2 FL (ref 6–12)
POTASSIUM SERPL-SCNC: 4.7 MMOL/L (ref 3.5–5.2)
RBC # BLD AUTO: 3.78 10*6/MM3 (ref 4.14–5.8)
SODIUM SERPL-SCNC: 137 MMOL/L (ref 136–145)
WBC # BLD AUTO: 6.3 10*3/MM3 (ref 3.4–10.8)

## 2020-09-15 PROCEDURE — 80048 BASIC METABOLIC PNL TOTAL CA: CPT

## 2020-09-15 PROCEDURE — 85027 COMPLETE CBC AUTOMATED: CPT

## 2020-09-24 ENCOUNTER — TELEPHONE (OUTPATIENT)
Dept: NEUROLOGY | Facility: CLINIC | Age: 80
End: 2020-09-24

## 2020-09-24 NOTE — TELEPHONE ENCOUNTER
Two Week Stroke Phone Call  Spoke with the patient's wife  · Admission Date: 9/5/2020  · Discharge Date:  9/8/2020  · [facility]  · Meds reviewed with patient/caregiver?    [x]Yes [] No   o Antiplatelet:  DAPT x 21 days then Plavix only.  Discussed with wife, ASA dc date 9/29/2020.  - Understands purpose     [x]  Yes     []  No     - Understands how to take      [x]  Yes     []  No    o Cholesterol Reducing: Lipitor  - Understands purpose     [x]  Yes     []  No    - Understands how to take      [x]  Yes     []  No   · Is the patient taking all medication as directed?   [x]  Yes  []  No  · Discussed personal risk factors   [x]  Yes []  No    o Physical Inactivity  - Engaged in physical activity [x]  Yes []  No   o Obesity or overweight  - Been on a calorie restricted diet? [x]  Yes []  No  o High blood pressure   - Reviewed medications ordered for high blood pressure  - Has been monitoring BP [x]  Yes     []  No  - BP goal <130/80   - Wife states he checks daily.  o Diabetes   - Reviewed medications ordered for diabetes   o High cholesterol   - Review desired LDL goal <70  o Atherosclerosis  - Plaque inside the arteries, “hardening of the arteries”  • Discussed signs and symptoms of stroke and when patient to call 911?   [x]  Yes []  No  o Sudden weakness or numbness of the face, arm, or leg especially on one side of the body  o Sudden confusion, trouble speaking or understanding  o Sudden trouble seeing in one or both eyes   o Sudden trouble walking, dizziness, loss of balance or coordination  o Sudden severe headaches with no known cause    Notified Patient that if any of these symptoms occur to call 911  · Does the patient have any new signs or symptoms of a stroke?   [x]  Yes     []  No  • Does the patient have increasing stiffness in arms, hands, or legs?    []  Yes     [x]  No   Is this interfering with activities of daily living?   []  Yes     [x]  No  · Does the patient  have an appointment with PCP?  [x]  Yes     []  No  · Does the patient have 3 month Stroke Clinic appointment?  [x]  Yes     []  No  · Is the patient currently in therapy, outpatient, or home health?  [x]  Yes     []  No     Needs a referral?       []  Yes     [x]  No    Patient Satisfaction   · How would you rate your satisfaction with the instructions provided about your specific risk factors for stroke?   []Poor  [] Fair    [] Good [] Very Good  [x] Excellent   · How would you rate your satisfaction with the instructions provided on the warnings signs and symptoms of stroke?   []Poor  [] Fair   [] Good [] Very Good  [x] Excellent   · How well did we explain the importance of calling 911 to activate the emergency medical system for new signs and symptoms of stroke?    []Poor  [] Fair   [] Good [] Very Good  [x] Excellent   · Would you recommend the stroke center to your friends and family?   []Definitely Would Not  [] Probably Would Not  [] Neutral   []  Probably Would [x] Definitely Would  • Did you understand who all of your providers were and what their roles were?      [x]  Yes     []  No

## 2020-09-29 ENCOUNTER — TRANSCRIBE ORDERS (OUTPATIENT)
Dept: ADMINISTRATIVE | Facility: HOSPITAL | Age: 80
End: 2020-09-29

## 2020-09-29 DIAGNOSIS — Z01.818 OTHER SPECIFIED PRE-OPERATIVE EXAMINATION: Primary | ICD-10-CM

## 2020-09-29 DIAGNOSIS — R13.10 DYSPHAGIA, UNSPECIFIED TYPE: Primary | ICD-10-CM

## 2020-10-02 ENCOUNTER — LAB (OUTPATIENT)
Dept: LAB | Facility: HOSPITAL | Age: 80
End: 2020-10-02

## 2020-10-02 DIAGNOSIS — Z01.818 OTHER SPECIFIED PRE-OPERATIVE EXAMINATION: ICD-10-CM

## 2020-10-02 PROCEDURE — C9803 HOPD COVID-19 SPEC COLLECT: HCPCS

## 2020-10-02 PROCEDURE — U0004 COV-19 TEST NON-CDC HGH THRU: HCPCS

## 2020-10-02 PROCEDURE — 0298T HOLTER MONITOR - 72 HOUR UP TO 21 DAY: CPT | Performed by: INTERNAL MEDICINE

## 2020-10-03 LAB — SARS-COV-2 RNA RESP QL NAA+PROBE: NOT DETECTED

## 2020-10-05 ENCOUNTER — HOSPITAL ENCOUNTER (OUTPATIENT)
Dept: GENERAL RADIOLOGY | Facility: HOSPITAL | Age: 80
Discharge: HOME | End: 2020-10-05
Admitting: PHYSICAL MEDICINE & REHABILITATION

## 2020-10-05 DIAGNOSIS — R13.10 DYSPHAGIA, UNSPECIFIED TYPE: ICD-10-CM

## 2020-10-05 PROCEDURE — 63710000001 BARIUM SULFATE 40 % SUSPENSION: Performed by: PHYSICAL MEDICINE & REHABILITATION

## 2020-10-05 PROCEDURE — A9270 NON-COVERED ITEM OR SERVICE: HCPCS | Performed by: PHYSICAL MEDICINE & REHABILITATION

## 2020-10-05 PROCEDURE — 74230 X-RAY XM SWLNG FUNCJ C+: CPT

## 2020-10-05 PROCEDURE — 63710000001 BARIUM SULFATE 98 % RECONSTITUTED SUSPENSION: Performed by: PHYSICAL MEDICINE & REHABILITATION

## 2020-10-05 PROCEDURE — 63710000001 BARIUM SULFATE 40 % RECONSTITUTED SUSPENSION: Performed by: PHYSICAL MEDICINE & REHABILITATION

## 2020-10-05 PROCEDURE — 92611 MOTION FLUOROSCOPY/SWALLOW: CPT

## 2020-10-05 RX ADMIN — BARIUM SULFATE 4 ML: 980 POWDER, FOR SUSPENSION ORAL at 10:19

## 2020-10-05 RX ADMIN — BARIUM SULFATE 50 ML: 400 SUSPENSION ORAL at 10:19

## 2020-10-05 RX ADMIN — BARIUM SULFATE 55 ML: 0.81 POWDER, FOR SUSPENSION ORAL at 10:19

## 2020-10-06 NOTE — MBS/VFSS/FEES
Outpatient Speech Language Pathology   Adult Swallow Initial Evaluation  [facility]     Patient Name: Eugenio Joe  : 1940  MRN: [id number]  Today's Date: 10/6/2020         Visit Date: 10/05/2020   Patient Active Problem List   Diagnosis   • Quadriceps weakness   • ACL tear   • Knee pain, right   • S/P CABG x 3   • Essential hypertension   • Hyperlipemia   • Hallux rigidus   • Fracture, stress, metatarsal   • Osteopenia determined by x-ray   • Metatarsal stress fracture with nonunion   • Left hip pain   • Bursitis of left hip   • Pulmonary embolism (CMS/HCC)   • DALILA (acute kidney injury) (CMS/HCC)   • Type 2 diabetes mellitus with hyperglycemia, without long-term current use of insulin (CMS/HCC)   • Ascending aorta dilatation (CMS/HCC)   • Pulmonary nodule, left   • Right leg DVT (CMS/HCC)   • Acute renal failure (CMS/HCC)   • Hypotension   • Dehydration   • Hypercalcemia   • Dysphagia   • Syncope and collapse   • Coronary artery disease involving native coronary artery of native heart without angina pectoris   • Normal pressure hydrocephalus (CMS/HCC)   • Headache   • Impaired mobility   • Nausea & vomiting   • Fall at home   • Transient cerebral ischemia   • PFO (patent foramen ovale)   • Esophageal dysphagia   • TIA (transient ischemic attack)   • Acute appendicitis with localized peritonitis, without perforation or abscess   • Right lower quadrant abdominal pain   • History of CVA (cerebrovascular accident)   • On statin therapy   • 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   • Diverticulitis   • Hx of appendectomy   • Acute abdominal pain   • Gait abnormality   • Weakness   • CVA (cerebral vascular accident) (CMS/HCC)   • Acute CVA (cerebrovascular accident) (CMS/HCC)        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 SWEAT GLAND    • Cardiac disease    • Coronary artery disease      CABG 2012   • Diabetes mellitus (CMS/HCC)    • Difficulty in swallowing    • Difficulty walking    • DM2 (diabetes mellitus, type 2) (CMS/HCC) 10/12/2016   • Gout several years ago    medication  working   • Hearing aid worn     bilateral   • Hx of appendectomy    • Hydrocephaly (CMS/HCC)    • Hyperlipemia    • Hypertension    • Joint pain     or swelling   • Low back pain several years ago   • Orbit fracture (CMS/HCC)    • Pulmonary embolism (CMS/HCC)     OCT 2016   • Rash     ARM-    • TIA (transient ischemic attack)         Past Surgical History:   Procedure Laterality Date   • APPENDECTOMY N/A 7/18/2019    Procedure: APPENDECTOMY LAPAROSCOPIC;  Surgeon: Luis Carlos Rick Jr., MD;  Location: [facility] MAIN OR;  Service: General   • BRAIN SURGERY      BRAIN ABCESS 30 YR AGO   • CARDIAC SURGERY     • COLONOSCOPY  2012    Ciciliano- normal per pt, no polyps    • CORONARY ARTERY BYPASS GRAFT     • ENDOSCOPY N/A 3/9/2017    Schatzki ring, dilated, LA Grade B esophagitis, HH, acquired duodenal stenosis   • ENDOSCOPY N/A 4/6/2018    candida, HH, torts, Schatzki ring, duodenal stenosis, dilatation perforemed, chronic inflammation   • ENDOSCOPY N/A 10/9/2019    White nummular lesions in esophageal mucosa, mild Schatzki ring, acquired duodenal stenosis, Path: Terrazas's, candida   • ENDOSCOPY N/A 1/8/2020    Procedure: ESOPHAGOGASTRODUODENOSCOPY with biopsies;  Surgeon: Rigoberto Walker MD;  Location: [facility] ENDOSCOPY;  Service: Gastroenterology   • FACIAL FRACTURE SURGERY      to repair 6 broken bones   • ORBITAL FRACTURE OPEN REDUCTION INTERNAL FIXATION Right 1/13/2017    Procedure: RT ORBIT FLOOR FRACTURE REPAIR RIGHT ZMC FRACTURE REPAIR, RIGHT NASAL BONE FRACTURE CLOSED REDUCTION;  Surgeon: Edil Lester MD;  Location: [facility] OR [facility];  Service:    • ORIF FOOT FRACTURE Right 9/9/2016    Procedure: RIGHT SECOND METATARSAL OPEN REDUCTION INTERNAL FIXATION WITh GRAFT FROM HEEL ;  Surgeon: Man Jenkins  MD;  Location:  [facility] [place] [facility];  Service:    • SEPTOPLASTY     • SKIN CANCER EXCISION     • TONSILLECTOMY           Visit Dx:     ICD-10-CM ICD-9-CM   1. Dysphagia, unspecified type  R13.10 787.20        Patient was not wearing a face mask during this therapy encounter. Therapist used appropriate personal protective equipment including mask, eye protection and gloves.  Mask used was standard procedure mask. Appropriate PPE was worn during the entire therapy session. Hand hygiene was completed before and after therapy session. Patient is not in enhanced droplet precautions.         SLP Adult Swallow Evaluation     Row Name 10/05/20 1600       Rehab Evaluation    Document Type  evaluation  -AW    Subjective Information  no complaints  -AW    Patient Observations  alert;cooperative;agree to therapy  -AW    Patient/Family/Caregiver Comments/Observations  Pt pleasant and cooperative.  -AW    Care Plan Review  evaluation/treatment results reviewed;care plan/treatment goals reviewed;risks/benefits reviewed;current/potential barriers reviewed;patient/other agree to care plan  -AW    Patient Effort  good  -AW    Symptoms Noted During/After Treatment  dizziness  -AW       General Information    Patient Profile Reviewed  yes  -AW    Pertinent History Of Current Problem  Pt s/p CVA and was discharged from [facility] last week and has started outpatient ST. Pt reported he is eating regular and thin since going home. VFSS scheduled today to verify safety on regular and thin.   -AW    Current Method of Nutrition  regular textures;thin liquids  -AW    Precautions/Limitations, Vision  WFL with corrective lenses  -AW    Precautions/Limitations, Hearing  WFL  -AW    Prior Level of Function-Communication  WFL  -AW    Prior Level of Function-Swallowing  mechanical soft with no mixed consistencies;nectar thick liquids;other (see comments)  -AW    Plans/Goals Discussed with  patient;agreed upon  -AW    Barriers to Rehab  previous functional  deficit  -AW    Patient's Goals for Discharge  return to regular diet  -AW       Pain    Additional Documentation  Pain Scale: Numbers Pre/Post-Treatment (Group)  -AW       Pain Scale: Numbers Pre/Post-Treatment    Pretreatment Pain Rating  0/10 - no pain  -AW    Posttreatment Pain Rating  0/10 - no pain  -AW       Oral Motor Structure and Function    Dentition Assessment  natural, present and adequate  -AW    Secretion Management  WNL/WFL  -AW    Mucosal Quality  moist, healthy  -AW       Oral Musculature and Cranial Nerve Assessment    Oral Motor General Assessment  WFL  -AW       General Eating/Swallowing Observations    Respiratory Support Currently in Use  room air  -AW    Eating/Swallowing Skills  fed by SLP;self-fed  -AW    Positioning During Eating  upright in chair  -AW       MBS/VFSS    Utensils Used  spoon;cup  -AW    Consistencies Trialed  regular textures;soft textures;mixed consistency;pureed;thin liquids;nectar/syrup-thick liquids;honey-thick liquids  -AW       MBS/VFSS Interpretation    Oral Prep Phase  WFL  -AW    VFSS Summary  Pt presented with mod to severe oropharyngeal dysphagia characterized by decreased hyolaryngeal excursion, decreased airway protection, and decreased pharyngeal constriction. Pt has kristal SILENT aspiration with cup sips of thin and nectar thick liquids during the swallow. Silent penetration was noted with smaller sip size of nectar. Chin tuck was tried but did not eliminate the penetration/aspiration. Trace penetration was noted with honey thick liquids via cup and pureed trials. Mastication was functional for soft and regular consistencies. Silent aspiration was noted with the juice from the soft peach trials. Pt assisted in clearing mild pharyngeal residuals with a spontaneous swallow. Pt had reduced sensation with all penetration and aspiration silent. Pt had strong cough with cue. Recommend a regular diet no mixed with honey thick liquids. Long discussion with pt after  study. Pt verbalized he would follow precautions and stated he is not doing swallow therapy in outpatient. Recommend adding swallow goals back to POC.   -AW       Initiation of Pharyngeal Swallow    Pharyngeal Phase  impaired pharyngeal phase of swallowing  -AW       SLP Communication to Radiology    Summary Statement  Pt presented with mod to severe oropharyngeal dysphagia characterized by decreased hyolaryngeal excursion, decreased airway protection, and decreased pharyngeal constriction. Pt has kristal SILENT aspiration with cup sips of thin and nectar thick liquids during the swallow. Silent penetration was noted with smaller sip size of nectar. Chin tuck was tried but did not eliminate the penetration/aspiration. Trace penetration was noted with honey thick liquids via cup and pureed trials. Mastication was functional for soft and regular consistencies. Silent aspiration was noted with the juice from the soft peach trials. Pt assisted in clearing mild pharyngeal residuals with a spontaneous swallow.   -AW       Clinical Impression    SLP Swallowing Diagnosis  mod-severe;oral dysphagia;pharyngeal dysphagia  -AW    Functional Impact  risk of aspiration/pneumonia  -AW    Swallow Criteria for Skilled Therapeutic Interventions Met  demonstrates skilled criteria;other (see comments)  -AW       Recommendations    SLP Diet Recommendation  regular textures;honey thick liquids;water between meals after oral care, with supervision;ice chips between meals after oral care, with supervision;other (see comments)  -AW    Recommended Diagnostics  reassess via VFSS (MBS);other (see comments)  -AW    Recommended Precautions and Strategies  upright posture during/after eating;small bites of food and sips of liquid;no straw;multiple swallows per bite of food;multiple swallows per sip of liquid;volitional throat clear  -AW    Oral Care Recommendations  Oral Care before breakfast, after meals and PRN  -AW    SLP Rec. for Method of  Medication Administration  meds whole;meds crushed;with thick liquids;with pudding or applesauce;as tolerated  -AW    Monitor for Signs of Aspiration  yes  -AW    Anticipated Discharge Disposition (SLP)  home with assist  -AW      User Key  (r) = Recorded By, (t) = Taken By, (c) = Cosigned By    Initials Name Provider Type    Yamilex Nunez MS CCC-SLP Speech and Language Pathologist                        OP SLP Education     Row Name 10/05/20 1610       Education    Barriers to Learning  No barriers identified  -AW    Assessed  Learning needs;Learning motivation;Learning preferences;Learning readiness  -AW    Learning Motivation  Strong  -AW    Learning Method  Explanation;Demonstration;Teach back;Written materials  -AW    Teaching Response  Verbalized understanding;Demonstrated understanding;Reinforcement needed  -AW    Education Comments  Pt disappointed in results of VFSS. Pt shown the VFSS and verbalized understanding of all precautions and recommendations. Pt has thickener at home. Pt needs reinforcement and continued education in OP ST.  -AW      User Key  (r) = Recorded By, (t) = Taken By, (c) = Cosigned By    Initials Name Effective Dates    Yamilex Nunez MS CCC-SLP 06/08/18 -               OP SLP Assessment/Plan - 10/05/20 1609        SLP Assessment    Functional Problems  Swallowing   -AW    Impact on Function: Swallowing  Risk of aspiration   -AW    Prognosis  Good (comment)   -AW    Patient/caregiver participated in establishment of treatment plan and goals  Yes   -AW    Patient would benefit from skilled therapy intervention  Yes    Pt currently receiving outpatient ST [facility].  -AW       SLP Plan    Planned CPT's?  SLP SWALLOW THERAPY: 90885   -AW      User Key  (r) = Recorded By, (t) = Taken By, (c) = Cosigned By    Initials Name Provider Type    Yamilex Nunez MS CCC-SLP Speech and Language Pathologist              SLP Outcome Measures (last 72 hours)      SLP Outcome Measures      Row Name 10/05/20 1600             SLP Outcome Measures    Outcome Measure Used?  Adult NOMS  -AW         Adult FCM Scores    FCM Chosen  Swallowing  -AW      Swallowing FCM Score  4  -AW        User Key  (r) = Recorded By, (t) = Taken By, (c) = Cosigned By    Initials Name Effective Dates    Yamilex Nunez, MS CCC-SLP 06/08/18 -                Time Calculation:   SLP Start Time: 0915  SLP Stop Time: 1045  SLP Time Calculation (min): 90 min    Therapy Charges for Today     Code Description Service Date Service Provider Modifiers Qty    [id number] HC ST MOTION FLUORO EVAL SWALLOW 6 10/5/2020 Yamilex Jones, MS CCC-SLP GN 1                   Yamilex Jones MS CCC-SLP  10/6/2020

## 2020-10-09 ENCOUNTER — TELEPHONE (OUTPATIENT)
Dept: NEUROLOGY | Facility: CLINIC | Age: 80
End: 2020-10-09

## 2020-10-09 NOTE — TELEPHONE ENCOUNTER
Spoke with patient's wife, Annette regarding holter results and recommended non-urgent follow up with cardiology.  She states that she will reach out to [facility] to schedule the follow up.

## 2020-10-09 NOTE — TELEPHONE ENCOUNTER
----- Message from JULISA Meraz sent at 10/9/2020  2:17 PM EDT -----  No episodes of atrial verbalization noted on this exam.  However, 18 episodes of nonsustained SVT noted; recommend follow-up with cardiology as an outpatient; non-urgent.  Keep planned follow-up appointment with me.

## 2020-10-16 ENCOUNTER — TRANSCRIBE ORDERS (OUTPATIENT)
Dept: ADMINISTRATIVE | Facility: HOSPITAL | Age: 80
End: 2020-10-16

## 2020-10-16 ENCOUNTER — OFFICE VISIT (OUTPATIENT)
Dept: CARDIOLOGY | Facility: CLINIC | Age: 80
End: 2020-10-16

## 2020-10-16 ENCOUNTER — LAB (OUTPATIENT)
Dept: LAB | Facility: HOSPITAL | Age: 80
End: 2020-10-16

## 2020-10-16 ENCOUNTER — TRANSCRIBE ORDERS (OUTPATIENT)
Dept: SLEEP MEDICINE | Facility: HOSPITAL | Age: 80
End: 2020-10-16

## 2020-10-16 VITALS
WEIGHT: 183.2 LBS | DIASTOLIC BLOOD PRESSURE: 80 MMHG | SYSTOLIC BLOOD PRESSURE: 140 MMHG | BODY MASS INDEX: 26.23 KG/M2 | HEIGHT: 70 IN | HEART RATE: 77 BPM

## 2020-10-16 DIAGNOSIS — Q21.12 PFO (PATENT FORAMEN OVALE): Primary | ICD-10-CM

## 2020-10-16 DIAGNOSIS — Z01.810 PRE-OPERATIVE CARDIOVASCULAR EXAMINATION: Primary | ICD-10-CM

## 2020-10-16 DIAGNOSIS — I63.81 LACUNAR INFARCTION (HCC): ICD-10-CM

## 2020-10-16 DIAGNOSIS — Z01.818 OTHER SPECIFIED PRE-OPERATIVE EXAMINATION: Primary | ICD-10-CM

## 2020-10-16 DIAGNOSIS — Z01.810 PRE-OPERATIVE CARDIOVASCULAR EXAMINATION: ICD-10-CM

## 2020-10-16 DIAGNOSIS — G45.9 TRANSIENT CEREBRAL ISCHEMIA, UNSPECIFIED TYPE: ICD-10-CM

## 2020-10-16 DIAGNOSIS — Z13.6 SCREENING FOR ISCHEMIC HEART DISEASE: ICD-10-CM

## 2020-10-16 DIAGNOSIS — I63.81 CEREBROVASCULAR ACCIDENT (CVA) DUE TO OCCLUSION OF SMALL ARTERY (HCC): ICD-10-CM

## 2020-10-16 LAB
ANION GAP SERPL CALCULATED.3IONS-SCNC: 8.7 MMOL/L (ref 5–15)
BASOPHILS # BLD AUTO: 0.04 10*3/MM3 (ref 0–0.2)
BASOPHILS NFR BLD AUTO: 0.5 % (ref 0–1.5)
BUN SERPL-MCNC: 20 MG/DL (ref 8–23)
BUN/CREAT SERPL: 16 (ref 7–25)
CALCIUM SPEC-SCNC: 9.8 MG/DL (ref 8.6–10.5)
CHLORIDE SERPL-SCNC: 106 MMOL/L (ref 98–107)
CO2 SERPL-SCNC: 22.3 MMOL/L (ref 22–29)
CREAT SERPL-MCNC: 1.25 MG/DL (ref 0.76–1.27)
DEPRECATED RDW RBC AUTO: 47.5 FL (ref 37–54)
EOSINOPHIL # BLD AUTO: 0.07 10*3/MM3 (ref 0–0.4)
EOSINOPHIL NFR BLD AUTO: 0.9 % (ref 0.3–6.2)
ERYTHROCYTE [DISTWIDTH] IN BLOOD BY AUTOMATED COUNT: 14.3 % (ref 12.3–15.4)
GFR SERPL CREATININE-BSD FRML MDRD: 56 ML/MIN/1.73
GLUCOSE SERPL-MCNC: 185 MG/DL (ref 65–99)
HCT VFR BLD AUTO: 38.3 % (ref 37.5–51)
HGB BLD-MCNC: 12.9 G/DL (ref 13–17.7)
IMM GRANULOCYTES # BLD AUTO: 0.03 10*3/MM3 (ref 0–0.05)
IMM GRANULOCYTES NFR BLD AUTO: 0.4 % (ref 0–0.5)
LYMPHOCYTES # BLD AUTO: 1.51 10*3/MM3 (ref 0.7–3.1)
LYMPHOCYTES NFR BLD AUTO: 19.7 % (ref 19.6–45.3)
MCH RBC QN AUTO: 30.7 PG (ref 26.6–33)
MCHC RBC AUTO-ENTMCNC: 33.7 G/DL (ref 31.5–35.7)
MCV RBC AUTO: 91.2 FL (ref 79–97)
MONOCYTES # BLD AUTO: 0.44 10*3/MM3 (ref 0.1–0.9)
MONOCYTES NFR BLD AUTO: 5.8 % (ref 5–12)
NEUTROPHILS NFR BLD AUTO: 5.56 10*3/MM3 (ref 1.7–7)
NEUTROPHILS NFR BLD AUTO: 72.7 % (ref 42.7–76)
NRBC BLD AUTO-RTO: 0 /100 WBC (ref 0–0.2)
PLATELET # BLD AUTO: 191 10*3/MM3 (ref 140–450)
PMV BLD AUTO: 10.2 FL (ref 6–12)
POTASSIUM SERPL-SCNC: 4.5 MMOL/L (ref 3.5–5.2)
RBC # BLD AUTO: 4.2 10*6/MM3 (ref 4.14–5.8)
SODIUM SERPL-SCNC: 137 MMOL/L (ref 136–145)
WBC # BLD AUTO: 7.65 10*3/MM3 (ref 3.4–10.8)

## 2020-10-16 PROCEDURE — 85025 COMPLETE CBC W/AUTO DIFF WBC: CPT

## 2020-10-16 PROCEDURE — 36415 COLL VENOUS BLD VENIPUNCTURE: CPT

## 2020-10-16 PROCEDURE — 93000 ELECTROCARDIOGRAM COMPLETE: CPT | Performed by: INTERNAL MEDICINE

## 2020-10-16 PROCEDURE — 99214 OFFICE O/P EST MOD 30 MIN: CPT | Performed by: INTERNAL MEDICINE

## 2020-10-16 PROCEDURE — 80048 BASIC METABOLIC PNL TOTAL CA: CPT

## 2020-10-16 RX ORDER — PANTOPRAZOLE SODIUM 40 MG/1
40 TABLET, DELAYED RELEASE ORAL DAILY
COMMUNITY
Start: 2020-10-02 | End: 2021-02-08 | Stop reason: SDUPTHER

## 2020-10-16 RX ORDER — LANCETS
EACH MISCELLANEOUS
COMMUNITY
Start: 2020-08-29

## 2020-10-16 NOTE — PROGRESS NOTES
Date of Office Visit: 10/16/20  Encounter Provider: Jean Ford MD  Place of Service: [facility] CARDIOLOGY  Patient Name: Eugenio Joe  :1940  [id number]    Chief Complaint   Patient presents with   • Coronary Artery Disease     Follow up   :     HPI: Eugenio Joe is a 79 y.o. male he had a three-vessel CABG in  with a LIMA to the LAD and vein to the ramus and a vein to an OM1 she had normal LV function.  Pulmonary embolus at one time he has had normal pressure hydrocephalus he has had a couple of TIAs was found to have a large PFO he has been managed medically recently was in the hospital for TIA-like symptoms but it was felt that it was due to small vessel disease consistent with hypertension he had his aspirin increased to a full aspirin a day.  He he is back in the hospital with more strokes and this time they have called a stroke his Holter does not reveal much some PAT his echo did not show a PFO but in the past he has been labeled as having a large PFO.  He does not have a lot in the way of residual he is feeling okay    Past Medical History:   Diagnosis Date   • Arthritis    • Asthma    • Brain abscess     at about age 44   • Bruise of face    • Cancer (CMS/[facility])     PT STATES IN HIS SWEAT GLAND    • Cardiac disease    • Coronary artery disease     CABG    • Diabetes mellitus (CMS/[facility])    • Difficulty in swallowing    • Difficulty walking    • DM2 (diabetes mellitus, type 2) (CMS/[facility]) 10/12/2016   • Gout several years ago    medication  working   • Hearing aid worn     bilateral   • Hx of appendectomy    • Hydrocephaly (CMS/[facility])    • Hyperlipemia    • Hypertension    • Joint pain     or swelling   • Low back pain several years ago   • Orbit fracture (CMS/HCC)    • Pulmonary embolism (CMS/HCC)     OCT 2016   • Rash     ARM-    • Stroke (cerebrum) (CMS/[facility]) 2020    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   • TIA (transient ischemic attack)        Past Surgical History:    Procedure Laterality Date   • APPENDECTOMY N/A 2019    Procedure: APPENDECTOMY LAPAROSCOPIC;  Surgeon: Luis Carlos Rick Jr., MD;  Location: [facility] MAIN OR;  Service: General   • BRAIN SURGERY      BRAIN ABCESS 30 YR AGO   • CARDIAC SURGERY     • COLONOSCOPY      Ciciliano- normal per pt, no polyps    • CORONARY ARTERY BYPASS GRAFT     • ENDOSCOPY N/A 3/9/2017    Schatzki ring, dilated, LA Grade B esophagitis, HH, acquired duodenal stenosis   • ENDOSCOPY N/A 2018    candida, HH, torts, Schatzki ring, duodenal stenosis, dilatation perforemed, chronic inflammation   • ENDOSCOPY N/A 10/9/2019    White nummular lesions in esophageal mucosa, mild Schatzki ring, acquired duodenal stenosis, Path: Terrazas's, candida   • ENDOSCOPY N/A 2020    Procedure: ESOPHAGOGASTRODUODENOSCOPY with biopsies;  Surgeon: Rigoberto Walker MD;  Location: [facility] ENDOSCOPY;  Service: Gastroenterology   • FACIAL FRACTURE SURGERY      to repair 6 broken bones   • ORBITAL FRACTURE OPEN REDUCTION INTERNAL FIXATION Right 2017    Procedure: RT ORBIT FLOOR FRACTURE REPAIR RIGHT ZMC FRACTURE REPAIR, RIGHT NASAL BONE FRACTURE CLOSED REDUCTION;  Surgeon: Edil Lester MD;  Location: [facility] OR [facility];  Service:    • ORIF FOOT FRACTURE Right 2016    Procedure: RIGHT SECOND METATARSAL OPEN REDUCTION INTERNAL FIXATION WITh GRAFT FROM HEEL ;  Surgeon: Man Jenkins MD;  Location: [facility] OR [facility];  Service:    • SEPTOPLASTY     • SKIN CANCER EXCISION     • TONSILLECTOMY         Social History     Socioeconomic History   • Marital status:      Spouse name: Not on file   • Number of children: 2   • Years of education: 16   • Highest education level: Not on file   Occupational History   • Occupation: retired   Tobacco Use   • Smoking status: Former Smoker     Packs/day: 3.00     Years: 5.00     Pack years: 15.00     Types: Cigarettes     Start date:      Quit date:      Years since quittin.8   •  Smokeless tobacco: Never Used   • Tobacco comment: Quit cold turkey   Substance and Sexual Activity   • Alcohol use: Yes     Alcohol/week: 2.0 standard drinks     Types: 1 Cans of beer, 1 Shots of liquor per week     Comment: RARELY    • Drug use: No   • Sexual activity: Defer     Partners: Female     Birth control/protection: Surgical       Family History   Problem Relation Age of Onset   • Heart disease Mother    • Hypertension Mother    • Stroke Mother    • Diverticulitis Mother    • Heart disease Father    • Hypertension Father    • Malig Hyperthermia Neg Hx        Review of Systems   Constitution: Negative for decreased appetite, fever, malaise/fatigue and weight loss.   HENT: Negative for nosebleeds.    Eyes: Negative for double vision.   Cardiovascular: Negative for chest pain, claudication, cyanosis, dyspnea on exertion, irregular heartbeat, leg swelling, near-syncope, orthopnea, palpitations, paroxysmal nocturnal dyspnea and syncope.   Respiratory: Negative for cough, hemoptysis and shortness of breath.    Hematologic/Lymphatic: Negative for bleeding problem.   Skin: Negative for rash.   Musculoskeletal: Negative for falls and myalgias.   Gastrointestinal: Negative for hematochezia, jaundice, melena, nausea and vomiting.   Genitourinary: Negative for hematuria.   Neurological: Negative for dizziness and seizures.   Psychiatric/Behavioral: Negative for altered mental status and memory loss.       Allergies   Allergen Reactions   • Other Mental Status Change and Hallucinations     Oxy drugs   • Oxycodone Mental Status Change         Current Outpatient Medications:   •  Accu-Chek FastClix Lancets misc, TEST ONCE TO BID PRN, Disp: , Rfl:   •  acetaminophen (TYLENOL) 325 MG tablet, Take 2 tablets by mouth Every 4 (Four) Hours As Needed for Mild Pain ., Disp: , Rfl:   •  allopurinol (ZYLOPRIM) 300 MG tablet, Take 300 mg by mouth daily., Disp: , Rfl:   •  atorvastatin (LIPITOR) 40 MG tablet, Take 1 tablet by mouth  "Every Night., Disp: , Rfl:   •  clopidogrel (PLAVIX) 75 MG tablet, Take 1 tablet by mouth Daily., Disp: 30 tablet, Rfl:   •  doxazosin (CARDURA) 1 MG tablet, Take 1 mg by mouth Daily., Disp: , Rfl:   •  glipizide (GLUCOTROL) 5 MG tablet, TK 1 T PO B THE FARZAD MEAL, Disp: , Rfl:   •  glucose blood (FREESTYLE LITE) test strip, 1 each by Other route See Admin Instructions. Use 1 to 2 times daily as instructed, Disp: , Rfl:   •  lisinopril (PRINIVIL,ZESTRIL) 10 MG tablet, Take 10 mg by mouth 2 (Two) Times a Day., Disp: , Rfl:   •  metFORMIN (GLUCOPHAGE) 500 MG tablet, Take 500 mg by mouth 2 (Two) Times a Day With Meals., Disp: , Rfl:   •  Multiple Vitamins-Minerals (MULTIVITAMIN ADULT PO), Take 1 tablet by mouth Daily., Disp: , Rfl:   •  pantoprazole (PROTONIX) 40 MG EC tablet, Take 40 mg by mouth Daily., Disp: , Rfl:   •  PROAIR  (90 BASE) MCG/ACT inhaler, 2 puffs Every 4 (Four) Hours As Needed., Disp: , Rfl:   •  Vitamin D, Cholecalciferol, (CHOLECALCIFEROL) 400 units tablet, Take 400 Units by mouth Daily., Disp: , Rfl:       Objective:     Vitals:    10/16/20 1119   BP: 140/80   BP Location: Right arm   Patient Position: Sitting   Pulse: 77   Weight: 83.1 kg (183 lb 3.2 oz)   Height: 177.8 cm (70\")     Body mass index is 26.29 kg/m².    Physical Exam   Constitutional: He is oriented to person, place, and time. He appears well-developed and well-nourished.   HENT:   Head: Normocephalic.   Eyes: No scleral icterus.   Neck: No JVD present. No thyromegaly present.   Cardiovascular: Normal rate, regular rhythm and normal heart sounds. Exam reveals no gallop and no friction rub.   No murmur heard.  Pulmonary/Chest: Effort normal and breath sounds normal. He has no wheezes. He has no rales.   Abdominal: Soft. There is no hepatosplenomegaly. There is no abdominal tenderness.   Musculoskeletal: Normal range of motion.         General: No edema.   Lymphadenopathy:     He has no cervical adenopathy.   Neurological: He is " alert and oriented to person, place, and time.   Skin: Skin is warm and dry. No rash noted.   Psychiatric: He has a normal mood and affect.         ECG 12 Lead    Date/Time: 10/16/2020 11:46 AM  Performed by: Jean Ford MD  Authorized by: Jean Ford MD   Comparison: compared with previous ECG   Similar to previous ECG  Rhythm: sinus rhythm  Ectopy: atrial premature contractions    Clinical impression: abnormal EKG             Assessment:       Diagnosis Plan   1. PFO (patent foramen ovale)  Adult Transesophageal Echo (STELLA) W/ Cont if Necessary Per Protocol   2. Transient cerebral ischemia, unspecified type  Adult Transesophageal Echo (STELLA) W/ Cont if Necessary Per Protocol   3. Cerebrovascular accident (CVA) due to occlusion of small artery (CMS/HCC)  Adult Transesophageal Echo (STELLA) W/ Cont if Necessary Per Protocol          Plan:       I am a little bothered by his continued TIAs and strokes I am going to I think he needs a STELLA to answer if he has a PFO or not I need to review his data a little closer he is not having A. fib that we can see but he has a lot of atrial ectopy so we certainly set up to have a fib.  Further decisions to be based on the findings at the time of his STELLA    As always, it has been a pleasure to participate in your patient's care.      Sincerely,       Jean Ford MD      The MRI on September 2020 shows 2 acute strokes in 2 different distributions suggestive of a cardioembolic source.

## 2020-10-19 ENCOUNTER — LAB (OUTPATIENT)
Dept: LAB | Facility: HOSPITAL | Age: 80
End: 2020-10-19

## 2020-10-19 DIAGNOSIS — Z01.818 OTHER SPECIFIED PRE-OPERATIVE EXAMINATION: ICD-10-CM

## 2020-10-19 PROCEDURE — C9803 HOPD COVID-19 SPEC COLLECT: HCPCS

## 2020-10-19 PROCEDURE — U0004 COV-19 TEST NON-CDC HGH THRU: HCPCS | Performed by: INTERNAL MEDICINE

## 2020-10-20 LAB — SARS-COV-2 RNA RESP QL NAA+PROBE: NOT DETECTED

## 2020-10-21 ENCOUNTER — HOSPITAL ENCOUNTER (OUTPATIENT)
Dept: CARDIOLOGY | Facility: HOSPITAL | Age: 80
Discharge: HOME | End: 2020-10-21
Admitting: INTERNAL MEDICINE

## 2020-10-21 VITALS
SYSTOLIC BLOOD PRESSURE: 134 MMHG | DIASTOLIC BLOOD PRESSURE: 57 MMHG | RESPIRATION RATE: 18 BRPM | OXYGEN SATURATION: 95 % | HEART RATE: 70 BPM

## 2020-10-21 DIAGNOSIS — G45.9 TRANSIENT CEREBRAL ISCHEMIA, UNSPECIFIED TYPE: ICD-10-CM

## 2020-10-21 DIAGNOSIS — I63.81 CEREBROVASCULAR ACCIDENT (CVA) DUE TO OCCLUSION OF SMALL ARTERY (HCC): ICD-10-CM

## 2020-10-21 DIAGNOSIS — Q21.12 PFO (PATENT FORAMEN OVALE): ICD-10-CM

## 2020-10-21 LAB
BH CV ECHO MEAS - AI DEC SLOPE: 314 CM/SEC^2
BH CV ECHO MEAS - AI MAX PG: 103 MMHG
BH CV ECHO MEAS - AI MAX VEL: 507.4 CM/SEC
BH CV ECHO MEAS - AI P1/2T: 473.3 MSEC
BH CV ECHO MEAS - BSA(HAYCOCK): 2 M^2
BH CV ECHO MEAS - BSA: 2 M^2
BH CV ECHO MEAS - BZI_BMI: 26.3 KILOGRAMS/M^2
BH CV ECHO MEAS - BZI_METRIC_HEIGHT: 177.8 CM
BH CV ECHO MEAS - BZI_METRIC_WEIGHT: 83 KG

## 2020-10-21 PROCEDURE — 63710000001 LIDOCAINE VISCOUS HCL 2 % SOLUTION: Performed by: INTERNAL MEDICINE

## 2020-10-21 PROCEDURE — 93320 DOPPLER ECHO COMPLETE: CPT | Performed by: INTERNAL MEDICINE

## 2020-10-21 PROCEDURE — 93325 DOPPLER ECHO COLOR FLOW MAPG: CPT

## 2020-10-21 PROCEDURE — 99152 MOD SED SAME PHYS/QHP 5/>YRS: CPT

## 2020-10-21 PROCEDURE — 93320 DOPPLER ECHO COMPLETE: CPT

## 2020-10-21 PROCEDURE — 25010000002 FENTANYL CITRATE (PF) 100 MCG/2ML SOLUTION: Performed by: INTERNAL MEDICINE

## 2020-10-21 PROCEDURE — 93325 DOPPLER ECHO COLOR FLOW MAPG: CPT | Performed by: INTERNAL MEDICINE

## 2020-10-21 PROCEDURE — 93312 ECHO TRANSESOPHAGEAL: CPT

## 2020-10-21 PROCEDURE — A9270 NON-COVERED ITEM OR SERVICE: HCPCS | Performed by: INTERNAL MEDICINE

## 2020-10-21 PROCEDURE — 93312 ECHO TRANSESOPHAGEAL: CPT | Performed by: INTERNAL MEDICINE

## 2020-10-21 PROCEDURE — 99153 MOD SED SAME PHYS/QHP EA: CPT

## 2020-10-21 PROCEDURE — 25010000002 MIDAZOLAM PER 1 MG: Performed by: INTERNAL MEDICINE

## 2020-10-21 RX ORDER — MIDAZOLAM HYDROCHLORIDE 1 MG/ML
INJECTION INTRAMUSCULAR; INTRAVENOUS
Status: COMPLETED | OUTPATIENT
Start: 2020-10-21 | End: 2020-10-21

## 2020-10-21 RX ORDER — LIDOCAINE HYDROCHLORIDE 20 MG/ML
SOLUTION OROPHARYNGEAL
Status: COMPLETED | OUTPATIENT
Start: 2020-10-21 | End: 2020-10-21

## 2020-10-21 RX ORDER — SODIUM CHLORIDE 9 MG/ML
INJECTION, SOLUTION INTRAVENOUS
Status: COMPLETED | OUTPATIENT
Start: 2020-10-21 | End: 2020-10-21

## 2020-10-21 RX ORDER — FENTANYL CITRATE 50 UG/ML
INJECTION, SOLUTION INTRAMUSCULAR; INTRAVENOUS
Status: COMPLETED | OUTPATIENT
Start: 2020-10-21 | End: 2020-10-21

## 2020-10-21 RX ADMIN — LIDOCAINE HYDROCHLORIDE 7 ML: 20 SOLUTION ORAL; TOPICAL at 07:58

## 2020-10-21 RX ADMIN — MIDAZOLAM HYDROCHLORIDE 1 MG: 1 INJECTION, SOLUTION INTRAMUSCULAR; INTRAVENOUS at 08:34

## 2020-10-21 RX ADMIN — MIDAZOLAM HYDROCHLORIDE 2 MG: 1 INJECTION, SOLUTION INTRAMUSCULAR; INTRAVENOUS at 08:15

## 2020-10-21 RX ADMIN — SODIUM CHLORIDE 50 ML/HR: 9 INJECTION, SOLUTION INTRAVENOUS at 08:03

## 2020-10-21 RX ADMIN — FENTANYL CITRATE 50 MCG: 50 INJECTION, SOLUTION INTRAMUSCULAR; INTRAVENOUS at 08:15

## 2020-10-21 RX ADMIN — FENTANYL CITRATE 25 MCG: 50 INJECTION, SOLUTION INTRAMUSCULAR; INTRAVENOUS at 08:34

## 2020-10-21 RX ADMIN — LIDOCAINE HYDROCHLORIDE 7 ML: 20 SOLUTION ORAL; TOPICAL at 08:02

## 2020-10-21 NOTE — H&P
Date of Hospital Visit: 10/21/2020  Encounter Provider: Uriah Coats MD  Place of Service: [facility] CARDIOLOGY  Patient Name: Eugenio Joe  :1940  Referral Provider: Jean Ford MD    Chief complaint-patient here for elective STELLA for stroke work-up    History of Present Illness  Patient had recent cryptogenic stroke suspected to be of cardiac source.  Denied any new symptoms today and denied any cough or fever.  Last meal was before midnight.      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 SWEAT GLAND    • Cardiac disease    • Coronary artery disease     CABG    • Diabetes mellitus (CMS/HCC)    • Difficulty in swallowing    • Difficulty walking    • DM2 (diabetes mellitus, type 2) (CMS/HCC) 10/12/2016   • Gout several years ago    medication  working   • Hearing aid worn     bilateral   • Hx of appendectomy    • Hydrocephaly (CMS/HCC)    • Hyperlipemia    • Hypertension    • Joint pain     or swelling   • Low back pain several years ago   • Orbit fracture (CMS/HCC)    • Pulmonary embolism (CMS/HCC)     OCT 2016   • Rash     ARM-    • Stroke (cerebrum) (CMS/HCC) 2020    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   • TIA (transient ischemic attack)        Past Surgical History:   Procedure Laterality Date   • APPENDECTOMY N/A 2019    Procedure: APPENDECTOMY LAPAROSCOPIC;  Surgeon: Luis Carlos Rick Jr., MD;  Location: [facility];  Service: General   • BRAIN SURGERY      BRAIN ABCESS 30 YR AGO   • CARDIAC SURGERY     • COLONOSCOPY      Ciciliano- normal per pt, no polyps    • CORONARY ARTERY BYPASS GRAFT     • ENDOSCOPY N/A 3/9/2017    Schatzki ring, dilated, LA Grade B esophagitis, HH, acquired duodenal stenosis   • ENDOSCOPY N/A 2018    candida, HH, torts, Schatzki ring, duodenal stenosis, dilatation perforemed, chronic inflammation   •  ENDOSCOPY N/A 10/9/2019    White nummular lesions in esophageal mucosa, mild Schatzki ring, acquired duodenal stenosis, Path: Terrazas's, candida   • ENDOSCOPY N/A 2020    Procedure: ESOPHAGOGASTRODUODENOSCOPY with biopsies;  Surgeon: Rigoberto Walker MD;  Location: [facility] ENDOSCOPY;  Service: Gastroenterology   • FACIAL FRACTURE SURGERY      to repair 6 broken bones   • ORBITAL FRACTURE OPEN REDUCTION INTERNAL FIXATION Right 2017    Procedure: RT ORBIT FLOOR FRACTURE REPAIR RIGHT ZMC FRACTURE REPAIR, RIGHT NASAL BONE FRACTURE CLOSED REDUCTION;  Surgeon: Edil Lester MD;  Location: [facility];  Service:    • ORIF FOOT FRACTURE Right 2016    Procedure: RIGHT SECOND METATARSAL OPEN REDUCTION INTERNAL FIXATION WITh GRAFT FROM HEEL ;  Surgeon: Man Jenkins MD;  Location: [facility] [place] [facility];  Service:    • SEPTOPLASTY     • SKIN CANCER EXCISION     • TONSILLECTOMY         (Not in a hospital admission)      Current Meds  Scheduled Meds:   Continuous Infusions:   PRN Meds:.•  Lidocaine Viscous HCl    Allergies as of 10/21/2020 - Reviewed 10/21/2020   Allergen Reaction Noted   • Other Mental Status Change and Hallucinations 2016   • Oxycodone Mental Status Change 2017       Social History     Socioeconomic History   • Marital status:      Spouse name: Not on file   • Number of children: 2   • Years of education: 16   • Highest education level: Not on file   Occupational History   • Occupation: retired   Tobacco Use   • Smoking status: Former Smoker     Packs/day: 3.00     Years: 5.00     Pack years: 15.00     Types: Cigarettes     Start date:      Quit date:      Years since quittin.8   • Smokeless tobacco: Never Used   • Tobacco comment: Quit cold turkey   Substance and Sexual Activity   • Alcohol use: Yes     Alcohol/week: 2.0 standard drinks     Types: 1 Cans of beer, 1 Shots of liquor per week     Comment: RARELY    • Drug use: No   • Sexual activity:  Defer     Partners: Female     Birth control/protection: Surgical       Family History   Problem Relation Age of Onset   • Heart disease Mother    • Hypertension Mother    • Stroke Mother    • Diverticulitis Mother    • Heart disease Father    • Hypertension Father    • Malig Hyperthermia Neg Hx        REVIEW OF SYSTEMS:   All systems reviewed and negative except as noted in HPI.       Objective:   Heart Rate:  [81] 81  Resp:  [16] 16  BP: (131)/(69) 131/69  There is no height or weight on file to calculate BMI.    Vitals:    10/21/20 0800   BP: 131/69   Pulse: 81   Resp: 16   SpO2: 99%       General Appearance:    Alert, cooperative, in no acute distress   Head:    Normocephalic, without obvious abnormality, atraumatic   Eyes:            Lids and lashes normal, conjunctivae and sclerae normal, no   icterus, no pallor, corneas clear, PERRLA   Ears:    Ears appear intact with no abnormalities noted   Throat:   No oral lesions, no thrush, oral mucosa moist   Neck:   No adenopathy, supple, trachea midline, no thyromegaly, no   carotid bruit, no JVD   Back:     No kyphosis present, no scoliosis present, no skin lesions, erythema or scars, no tenderness to percussion or palpation, range of motion normal   Lungs:     Clear to auscultation,respirations regular, even and unlabored    Heart:    Regular rhythm and normal rate, normal S1 and S2, no murmur, no gallop, no rub, no click   Chest Wall:    No abnormalities observed   Abdomen:     Normal bowel sounds, no masses, no organomegaly, soft nontender, nondistended, no guarding, no rebound  tenderness   Extremities:   Moves all extremities well, no edema, no cyanosis, no redness   Pulses:   Pulses palpable and equal bilaterally. Normal radial, carotid, femoral, dorsalis pedis and posterior tibial pulses bilaterally. Normal abdominal aorta   Skin:  Neurology:   Psychiatric:   No bleeding, bruising or rash   Normal speech and cranial nerve exam, no focal deficit   Alert and  oriented x 3, normal mood and affect                 Review of Data:      Results from last 7 days   Lab Units 10/16/20  1234   SODIUM mmol/L 137   POTASSIUM mmol/L 4.5   CHLORIDE mmol/L 106   CO2 mmol/L 22.3   BUN mg/dL 20   CREATININE mg/dL 1.25   CALCIUM mg/dL 9.8   GLUCOSE mg/dL 185*         @LABRCNTbnp@  Results from last 7 days   Lab Units 10/16/20  1234   WBC 10*3/mm3 7.65   HEMOGLOBIN g/dL 12.9*   HEMATOCRIT % 38.3   PLATELETS 10*3/mm3 191             @LABRCNTIP(chol,trig,hdl,ldl)    I personally viewed and interpreted the patient's EKG/Telemetry data  )  Patient Active Problem List   Diagnosis   • Quadriceps weakness   • ACL tear   • Knee pain, right   • S/P CABG x 3   • Essential hypertension   • Hyperlipemia   • Hallux rigidus   • Fracture, stress, metatarsal   • Osteopenia determined by x-ray   • Metatarsal stress fracture with nonunion   • Left hip pain   • Bursitis of left hip   • Pulmonary embolism (CMS/HCC)   • DALILA (acute kidney injury) (CMS/HCC)   • Type 2 diabetes mellitus with hyperglycemia, without long-term current use of insulin (CMS/HCC)   • Ascending aorta dilatation (CMS/HCC)   • Pulmonary nodule, left   • Right leg DVT (CMS/HCC)   • Acute renal failure (CMS/HCC)   • Hypotension   • Dehydration   • Hypercalcemia   • Dysphagia   • Syncope and collapse   • Coronary artery disease involving native coronary artery of native heart without angina pectoris   • Normal pressure hydrocephalus (CMS/HCC)   • Headache   • Impaired mobility   • Nausea & vomiting   • Fall at home   • Transient cerebral ischemia   • PFO (patent foramen ovale)   • Esophageal dysphagia   • TIA (transient ischemic attack)   • Acute appendicitis with localized peritonitis, without perforation or abscess   • Right lower quadrant abdominal pain   • History of CVA (cerebrovascular accident)   • On statin therapy   • 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  Time spent in reviewing chart, discussion and examination:

## 2020-11-05 ENCOUNTER — OFFICE VISIT (OUTPATIENT)
Dept: CARDIOLOGY | Facility: CLINIC | Age: 80
End: 2020-11-05

## 2020-11-05 VITALS
WEIGHT: 181.2 LBS | DIASTOLIC BLOOD PRESSURE: 80 MMHG | BODY MASS INDEX: 25.94 KG/M2 | SYSTOLIC BLOOD PRESSURE: 120 MMHG | HEIGHT: 70 IN

## 2020-11-05 DIAGNOSIS — I10 ESSENTIAL HYPERTENSION: ICD-10-CM

## 2020-11-05 DIAGNOSIS — G45.9 TRANSIENT CEREBRAL ISCHEMIA, UNSPECIFIED TYPE: ICD-10-CM

## 2020-11-05 DIAGNOSIS — I63.9 ACUTE CVA (CEREBROVASCULAR ACCIDENT) (HCC): ICD-10-CM

## 2020-11-05 DIAGNOSIS — I63.20 CEREBROVASCULAR ACCIDENT (CVA) DUE TO OCCLUSION OF PRECEREBRAL ARTERY (HCC): ICD-10-CM

## 2020-11-05 DIAGNOSIS — Q21.12 PFO (PATENT FORAMEN OVALE): Primary | ICD-10-CM

## 2020-11-05 PROCEDURE — 99214 OFFICE O/P EST MOD 30 MIN: CPT | Performed by: INTERNAL MEDICINE

## 2020-11-05 NOTE — PROGRESS NOTES
Eugenio Joe  1940  Date of Office Visit: 11/05/20  Encounter Provider: Frank Pablo MD  Place of Service: [facility] CARDIOLOGY      CHIEF COMPLAINT:  PFO   CVA  History of TIA    HISTORY OF PRESENT ILLNESS:   Dr. Holbrook:    Thanks for letting me see your patient, Eugenio Joe. He is a very pleasant 79-year-old male with medical history of TIA and CVA. He has a history of coronary disease and prior CABG with LIMA to LAD and vein to the ramus and OM in 2010. He has a normal left ventricular size and systolic function. He also has normal pressure hydrocephalus. He has had a couple of events consistent with transient ischemic attacks and then more recently had issues with aphasia and was noted on MRI imaging to have 2 distinct acute CVAs. These were in 2 different territories. He had evaluation including a transthoracic echocardiogram, which I have reviewed showing an intraatrial defect consistent with a patent foramen ovale. The right ventricle was mildly dilated with normal right ventricular systolic function.    He had a transesophageal echocardiograph confirming the PFO along with shunting. There is also documentation of moderate aortic valve regurgitation.    He is here today to discuss patent foramen ovale closure.        Review of Systems   Constitution: Negative for fever and malaise/fatigue.   HENT: Negative for nosebleeds and sore throat.    Eyes: Negative for blurred vision and double vision.   Cardiovascular: Negative for chest pain, claudication, palpitations and syncope.   Respiratory: Negative for cough, shortness of breath and snoring.    Endocrine: Negative for cold intolerance, heat intolerance and polydipsia.   Skin: Negative for itching, poor wound healing and rash.   Musculoskeletal: Negative for joint pain, joint swelling, muscle weakness and myalgias.   Gastrointestinal: Negative for abdominal pain, melena, nausea and vomiting.   Neurological:  Negative for light-headedness, loss of balance, seizures, vertigo and weakness.   Psychiatric/Behavioral: Negative for altered mental status and depression.       Past Medical History:   Diagnosis Date   • Arthritis    • Asthma    • Brain abscess     at about age 44   • Bruise of face    • Cancer (CMS/[facility])     PT STATES IN HIS SWEAT GLAND    • Cardiac disease    • Coronary artery disease     CABG 2012   • Diabetes mellitus (CMS/[facility])    • Difficulty in swallowing    • Difficulty walking    • DM2 (diabetes mellitus, type 2) (CMS/[facility]) 10/12/2016   • Gout several years ago    medication  working   • Hearing aid worn     bilateral   • Hx of appendectomy    • Hydrocephaly (CMS/[facility])    • Hyperlipemia    • Hypertension    • Joint pain     or swelling   • Low back pain several years ago   • Orbit fracture (CMS/[facility])    • Pulmonary embolism (CMS/[facility])     OCT 2016   • Rash     ARM-    • Stroke (cerebrum) (CMS/[facility]) 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   • TIA (transient ischemic attack)        The following portions of the patient's history were reviewed and updated as appropriate: Social history , Family history and Surgical history     Current Outpatient Medications on File Prior to Visit   Medication Sig Dispense Refill   • Accu-Chek FastClix Lancets misc TEST ONCE TO BID PRN     • acetaminophen (TYLENOL) 325 MG tablet Take 2 tablets by mouth Every 4 (Four) Hours As Needed for Mild Pain .     • allopurinol (ZYLOPRIM) 300 MG tablet Take 300 mg by mouth daily.     • atorvastatin (LIPITOR) 40 MG tablet Take 1 tablet by mouth Every Night.     • clopidogrel (PLAVIX) 75 MG tablet Take 1 tablet by mouth Daily. 30 tablet    • doxazosin (CARDURA) 1 MG tablet Take 1 mg by mouth Daily.     • glipizide (GLUCOTROL) 5 MG tablet TK 1 T PO B THE FARZAD MEAL     • glucose blood (FREESTYLE LITE) test strip 1 each by Other route See Admin Instructions. Use 1 to 2 times daily as instructed     • lisinopril (PRINIVIL,ZESTRIL) 10 MG tablet Take 10 mg by  "mouth 2 (Two) Times a Day.     • metFORMIN (GLUCOPHAGE) 500 MG tablet Take 500 mg by mouth 2 (Two) Times a Day With Meals.     • Multiple Vitamins-Minerals (MULTIVITAMIN ADULT PO) Take 1 tablet by mouth Daily.     • pantoprazole (PROTONIX) 40 MG EC tablet Take 40 mg by mouth Daily.     • PROAIR  (90 BASE) MCG/ACT inhaler 2 puffs Every 4 (Four) Hours As Needed.     • Vitamin D, Cholecalciferol, (CHOLECALCIFEROL) 400 units tablet Take 50 Units by mouth Daily.       No current facility-administered medications on file prior to visit.        Allergies   Allergen Reactions   • Other Mental Status Change and Hallucinations     Oxy drugs   • Oxycodone Mental Status Change       Vitals:    11/05/20 1434 11/05/20 1435   BP: 133/72 120/80   BP Location: Right arm Left arm   Patient Position: Sitting Sitting   Weight: 82.2 kg (181 lb 3.2 oz)    Height: 177.8 cm (70\")      Constitutional:       Appearance: Well-developed.   Eyes:      General: No scleral icterus.     Conjunctiva/sclera: Conjunctivae normal.   HENT:      Head: Normocephalic and atraumatic.   Neck:      Musculoskeletal: Normal range of motion and neck supple.      Thyroid: No thyromegaly.      Vascular: Normal carotid pulses. No carotid bruit, hepatojugular reflux or JVD.      Trachea: No tracheal deviation.   Pulmonary:      Effort: No respiratory distress.      Breath sounds: Normal breath sounds. No decreased breath sounds. No wheezing. No rhonchi. No rales.   Chest:      Chest wall: Not tender to palpatation.   Cardiovascular:      Normal rate. Regular rhythm.      No gallop.   Pulses:     Carotid: 2+ bilaterally.     Radial: 2+ bilaterally.     Femoral: 2+ bilaterally.     Dorsalis pedis: 2+ bilaterally.     Posterior tibial: 2+ bilaterally.  Edema:     Peripheral edema absent.   Abdominal:      General: Bowel sounds are normal. There is no distension.      Palpations: Abdomen is soft.      Tenderness: There is no abdominal tenderness. There is no " rebound.   Musculoskeletal:         General: No deformity.   Skin:     Findings: No erythema or rash.   Neurological:      Mental Status: Alert and oriented to person, place, and time.      Sensory: No sensory deficit.   Psychiatric:         Behavior: Behavior normal.            Lab Results   Component Value Date    WBC 7.65 10/16/2020    HGB 12.9 (L) 10/16/2020    HCT 38.3 10/16/2020    MCV 91.2 10/16/2020     10/16/2020       Lab Results   Component Value Date    GLUCOSE 185 (H) 10/16/2020    BUN 20 10/16/2020    CREATININE 1.25 10/16/2020    EGFRIFNONA 56 (L) 10/16/2020    EGFRIFAFRI  09/02/2016      Comment:      <15 Indicative of kidney failure.    BCR 16.0 10/16/2020    K 4.5 10/16/2020    CO2 22.3 10/16/2020    CALCIUM 9.8 10/16/2020    PROTENTOTREF 6.5 01/02/2020    ALBUMIN 3.40 (L) 09/09/2020    LABIL2 1.2 01/02/2020    AST 12 09/09/2020    ALT 6 09/09/2020       Lab Results   Component Value Date    GLUCOSE 185 (H) 10/16/2020    CALCIUM 9.8 10/16/2020     10/16/2020    K 4.5 10/16/2020    CO2 22.3 10/16/2020     10/16/2020    BUN 20 10/16/2020    CREATININE 1.25 10/16/2020    EGFRIFAFRI  09/02/2016      Comment:      <15 Indicative of kidney failure.    EGFRIFNONA 56 (L) 10/16/2020    BCR 16.0 10/16/2020    ANIONGAP 8.7 10/16/2020       Lab Results   Component Value Date    CHOL 105 09/06/2020    TRIG 110 09/06/2020    HDL 39 (L) 09/06/2020    LDL 44 09/06/2020       Procedures       Results for orders placed during the hospital encounter of 10/21/20   Adult Transesophageal Echo (STELLA) W/ Cont if Necessary Per Protocol    Narrative · Left ventricular ejection fraction appears to be 56 - 60%.  · The left atrial cavity is mild to moderately dilated.  · Saline test results are positive for right to left atrial level shunt   suggesting small PFO.  · There is moderate calcification of the aortic valve mainly affecting the   non-coronary cusp(s).Moderate aortic valve regurgitation is  present.  · Mild mitral valve regurgitation is present.  · No significant valvular masses or vegetations noted  · Mild to moderate aortic plaque noted in descending and arch of the   aortal.        9/6/2020  CONCLUSION: Prominent diffuse atrophy and chronic small vessel ischemic  change. 2 small acute infarcts in the deep white matter on the left and  right as described above. These involve 2 different vascular  distributions and raises the concern of cardiac etiology.    DISCUSSION/SUMMARYThis is a very pleasant 79-year-old male with a medical history of TIA, recurrent TIA and now CVA with 2 distinct infarcts in slightly different territories consistent with cardioembolic CVA. He has no evidence of atrial fibrillation and just had a nonsustained atrial tachycardia documented on his monitor.    I do agree that at this point in time he has had multiple neurological advancing and this is consistent with a cardioembolic  etiology. His patent foramen ovale should be closed and this is what I have recommended. I have explained to him the risks and benefits of the procedure and he wants to discuss with both Dr. Holbrook and Dr. Deal.

## 2020-11-09 ENCOUNTER — TELEPHONE (OUTPATIENT)
Dept: CARDIOLOGY | Facility: CLINIC | Age: 80
End: 2020-11-09

## 2020-11-09 DIAGNOSIS — Q21.12 PFO (PATENT FORAMEN OVALE): ICD-10-CM

## 2020-11-09 DIAGNOSIS — I63.40 CEREBROVASCULAR ACCIDENT (CVA) DUE TO EMBOLISM OF CEREBRAL ARTERY (HCC): Primary | ICD-10-CM

## 2020-11-09 NOTE — TELEPHONE ENCOUNTER
Dr Semder    Mr Ferguson called to say he is wanting to schedule a PFO closure.    Thank you  Monie SOSA

## 2020-11-11 ENCOUNTER — TRANSCRIBE ORDERS (OUTPATIENT)
Dept: CARDIOLOGY | Facility: CLINIC | Age: 80
End: 2020-11-11

## 2020-11-11 DIAGNOSIS — Z01.810 PRE-OPERATIVE CARDIOVASCULAR EXAMINATION: Primary | ICD-10-CM

## 2020-11-11 DIAGNOSIS — Z13.6 SCREENING FOR ISCHEMIC HEART DISEASE: ICD-10-CM

## 2020-11-11 PROBLEM — I63.40 CEREBROVASCULAR ACCIDENT (CVA) DUE TO EMBOLISM OF CEREBRAL ARTERY (HCC): Status: ACTIVE | Noted: 2020-11-11

## 2020-11-13 ENCOUNTER — TRANSCRIBE ORDERS (OUTPATIENT)
Dept: ADMINISTRATIVE | Facility: HOSPITAL | Age: 80
End: 2020-11-13

## 2020-11-13 DIAGNOSIS — Z01.818 OTHER SPECIFIED PRE-OPERATIVE EXAMINATION: Primary | ICD-10-CM

## 2020-12-02 ENCOUNTER — LAB (OUTPATIENT)
Dept: LAB | Facility: HOSPITAL | Age: 80
End: 2020-12-02

## 2020-12-02 DIAGNOSIS — Z01.818 OTHER SPECIFIED PRE-OPERATIVE EXAMINATION: ICD-10-CM

## 2020-12-02 DIAGNOSIS — Z01.810 PRE-OPERATIVE CARDIOVASCULAR EXAMINATION: ICD-10-CM

## 2020-12-02 DIAGNOSIS — Z13.6 SCREENING FOR ISCHEMIC HEART DISEASE: ICD-10-CM

## 2020-12-02 LAB
ANION GAP SERPL CALCULATED.3IONS-SCNC: 12.7 MMOL/L (ref 5–15)
BASOPHILS # BLD AUTO: 0.04 10*3/MM3 (ref 0–0.2)
BASOPHILS NFR BLD AUTO: 0.6 % (ref 0–1.5)
BUN SERPL-MCNC: 18 MG/DL (ref 8–23)
BUN/CREAT SERPL: 14.4 (ref 7–25)
CALCIUM SPEC-SCNC: 9.7 MG/DL (ref 8.6–10.5)
CHLORIDE SERPL-SCNC: 104 MMOL/L (ref 98–107)
CO2 SERPL-SCNC: 23.3 MMOL/L (ref 22–29)
CREAT SERPL-MCNC: 1.25 MG/DL (ref 0.76–1.27)
DEPRECATED RDW RBC AUTO: 44.6 FL (ref 37–54)
EOSINOPHIL # BLD AUTO: 0.16 10*3/MM3 (ref 0–0.4)
EOSINOPHIL NFR BLD AUTO: 2.5 % (ref 0.3–6.2)
ERYTHROCYTE [DISTWIDTH] IN BLOOD BY AUTOMATED COUNT: 13.3 % (ref 12.3–15.4)
GFR SERPL CREATININE-BSD FRML MDRD: 56 ML/MIN/1.73
GLUCOSE SERPL-MCNC: 222 MG/DL (ref 65–99)
HCT VFR BLD AUTO: 40.1 % (ref 37.5–51)
HGB BLD-MCNC: 13.1 G/DL (ref 13–17.7)
IMM GRANULOCYTES # BLD AUTO: 0.01 10*3/MM3 (ref 0–0.05)
IMM GRANULOCYTES NFR BLD AUTO: 0.2 % (ref 0–0.5)
LYMPHOCYTES # BLD AUTO: 1.58 10*3/MM3 (ref 0.7–3.1)
LYMPHOCYTES NFR BLD AUTO: 24.5 % (ref 19.6–45.3)
MCH RBC QN AUTO: 29.8 PG (ref 26.6–33)
MCHC RBC AUTO-ENTMCNC: 32.7 G/DL (ref 31.5–35.7)
MCV RBC AUTO: 91.3 FL (ref 79–97)
MONOCYTES # BLD AUTO: 0.4 10*3/MM3 (ref 0.1–0.9)
MONOCYTES NFR BLD AUTO: 6.2 % (ref 5–12)
NEUTROPHILS NFR BLD AUTO: 4.26 10*3/MM3 (ref 1.7–7)
NEUTROPHILS NFR BLD AUTO: 66 % (ref 42.7–76)
NRBC BLD AUTO-RTO: 0 /100 WBC (ref 0–0.2)
PLATELET # BLD AUTO: 192 10*3/MM3 (ref 140–450)
PMV BLD AUTO: 10.3 FL (ref 6–12)
POTASSIUM SERPL-SCNC: 4.1 MMOL/L (ref 3.5–5.2)
RBC # BLD AUTO: 4.39 10*6/MM3 (ref 4.14–5.8)
SODIUM SERPL-SCNC: 140 MMOL/L (ref 136–145)
WBC # BLD AUTO: 6.45 10*3/MM3 (ref 3.4–10.8)

## 2020-12-02 PROCEDURE — U0004 COV-19 TEST NON-CDC HGH THRU: HCPCS

## 2020-12-02 PROCEDURE — 36415 COLL VENOUS BLD VENIPUNCTURE: CPT

## 2020-12-02 PROCEDURE — 80048 BASIC METABOLIC PNL TOTAL CA: CPT

## 2020-12-02 PROCEDURE — 85025 COMPLETE CBC W/AUTO DIFF WBC: CPT

## 2020-12-02 PROCEDURE — C9803 HOPD COVID-19 SPEC COLLECT: HCPCS

## 2020-12-03 LAB — SARS-COV-2 RNA RESP QL NAA+PROBE: NOT DETECTED

## 2020-12-04 ENCOUNTER — HOSPITAL ENCOUNTER (OUTPATIENT)
Facility: HOSPITAL | Age: 80
Setting detail: HOSPITAL OUTPATIENT SURGERY
Discharge: HOME | End: 2020-12-04
Attending: INTERNAL MEDICINE | Admitting: INTERNAL MEDICINE

## 2020-12-04 VITALS
HEART RATE: 73 BPM | OXYGEN SATURATION: 98 % | SYSTOLIC BLOOD PRESSURE: 155 MMHG | WEIGHT: 180 LBS | HEIGHT: 70 IN | RESPIRATION RATE: 18 BRPM | BODY MASS INDEX: 25.77 KG/M2 | TEMPERATURE: 98 F | DIASTOLIC BLOOD PRESSURE: 61 MMHG

## 2020-12-04 DIAGNOSIS — I63.40 CEREBROVASCULAR ACCIDENT (CVA) DUE TO EMBOLISM OF CEREBRAL ARTERY (HCC): ICD-10-CM

## 2020-12-04 DIAGNOSIS — Q21.12 PFO (PATENT FORAMEN OVALE): ICD-10-CM

## 2020-12-04 LAB
ACT BLD: 164 SECONDS (ref 82–152)
GLUCOSE BLDC GLUCOMTR-MCNC: 150 MG/DL (ref 70–130)

## 2020-12-04 PROCEDURE — 25010000002 HEPARIN (PORCINE) PER 1000 UNITS: Performed by: INTERNAL MEDICINE

## 2020-12-04 PROCEDURE — 25010000002 MIDAZOLAM PER 1 MG: Performed by: INTERNAL MEDICINE

## 2020-12-04 PROCEDURE — C1894 INTRO/SHEATH, NON-LASER: HCPCS | Performed by: INTERNAL MEDICINE

## 2020-12-04 PROCEDURE — C1817 SEPTAL DEFECT IMP SYS: HCPCS | Performed by: INTERNAL MEDICINE

## 2020-12-04 PROCEDURE — C1769 GUIDE WIRE: HCPCS | Performed by: INTERNAL MEDICINE

## 2020-12-04 PROCEDURE — 93580 TRANSCATH CLOSURE OF ASD: CPT | Performed by: INTERNAL MEDICINE

## 2020-12-04 PROCEDURE — 0 IOPAMIDOL PER 1 ML: Performed by: INTERNAL MEDICINE

## 2020-12-04 PROCEDURE — 99152 MOD SED SAME PHYS/QHP 5/>YRS: CPT | Performed by: INTERNAL MEDICINE

## 2020-12-04 PROCEDURE — 93662 INTRACARDIAC ECG (ICE): CPT | Performed by: INTERNAL MEDICINE

## 2020-12-04 PROCEDURE — C1887 CATHETER, GUIDING: HCPCS | Performed by: INTERNAL MEDICINE

## 2020-12-04 PROCEDURE — 99153 MOD SED SAME PHYS/QHP EA: CPT | Performed by: INTERNAL MEDICINE

## 2020-12-04 PROCEDURE — 85347 COAGULATION TIME ACTIVATED: CPT

## 2020-12-04 PROCEDURE — 25010000002 FENTANYL CITRATE (PF) 100 MCG/2ML SOLUTION: Performed by: INTERNAL MEDICINE

## 2020-12-04 PROCEDURE — 25010000003 CEFAZOLIN IN DEXTROSE 2-4 GM/100ML-% SOLUTION: Performed by: INTERNAL MEDICINE

## 2020-12-04 PROCEDURE — 82962 GLUCOSE BLOOD TEST: CPT

## 2020-12-04 PROCEDURE — 25010000002 HYDRALAZINE PER 20 MG: Performed by: INTERNAL MEDICINE

## 2020-12-04 PROCEDURE — C1759 CATH, INTRA ECHOCARDIOGRAPHY: HCPCS | Performed by: INTERNAL MEDICINE

## 2020-12-04 DEVICE — OCCL PFO AMPLATZER CVR 45D 8F 25MM: Type: IMPLANTABLE DEVICE | Status: FUNCTIONAL

## 2020-12-04 RX ORDER — FENTANYL CITRATE 50 UG/ML
INJECTION, SOLUTION INTRAMUSCULAR; INTRAVENOUS AS NEEDED
Status: DISCONTINUED | OUTPATIENT
Start: 2020-12-04 | End: 2020-12-04 | Stop reason: HOSPADM

## 2020-12-04 RX ORDER — MORPHINE SULFATE 2 MG/ML
1 INJECTION, SOLUTION INTRAMUSCULAR; INTRAVENOUS EVERY 4 HOURS PRN
Status: DISCONTINUED | OUTPATIENT
Start: 2020-12-04 | End: 2020-12-04 | Stop reason: HOSPADM

## 2020-12-04 RX ORDER — SODIUM CHLORIDE 9 MG/ML
250 INJECTION, SOLUTION INTRAVENOUS ONCE AS NEEDED
Status: DISCONTINUED | OUTPATIENT
Start: 2020-12-04 | End: 2020-12-04 | Stop reason: HOSPADM

## 2020-12-04 RX ORDER — ASPIRIN 81 MG/1
81 TABLET, CHEWABLE ORAL DAILY
COMMUNITY

## 2020-12-04 RX ORDER — ACETAMINOPHEN 325 MG/1
650 TABLET ORAL EVERY 4 HOURS PRN
Status: DISCONTINUED | OUTPATIENT
Start: 2020-12-04 | End: 2020-12-04 | Stop reason: HOSPADM

## 2020-12-04 RX ORDER — LIDOCAINE HYDROCHLORIDE 10 MG/ML
0.1 INJECTION, SOLUTION EPIDURAL; INFILTRATION; INTRACAUDAL; PERINEURAL ONCE AS NEEDED
Status: DISCONTINUED | OUTPATIENT
Start: 2020-12-04 | End: 2020-12-04 | Stop reason: HOSPADM

## 2020-12-04 RX ORDER — ONDANSETRON 2 MG/ML
4 INJECTION INTRAMUSCULAR; INTRAVENOUS EVERY 6 HOURS PRN
Status: DISCONTINUED | OUTPATIENT
Start: 2020-12-04 | End: 2020-12-04 | Stop reason: HOSPADM

## 2020-12-04 RX ORDER — CLOPIDOGREL BISULFATE 75 MG/1
TABLET ORAL AS NEEDED
Status: DISCONTINUED | OUTPATIENT
Start: 2020-12-04 | End: 2020-12-04 | Stop reason: HOSPADM

## 2020-12-04 RX ORDER — SODIUM CHLORIDE 9 MG/ML
100 INJECTION, SOLUTION INTRAVENOUS CONTINUOUS
Status: DISCONTINUED | OUTPATIENT
Start: 2020-12-04 | End: 2020-12-04 | Stop reason: HOSPADM

## 2020-12-04 RX ORDER — SODIUM CHLORIDE 0.9 % (FLUSH) 0.9 %
10 SYRINGE (ML) INJECTION AS NEEDED
Status: DISCONTINUED | OUTPATIENT
Start: 2020-12-04 | End: 2020-12-04 | Stop reason: HOSPADM

## 2020-12-04 RX ORDER — ONDANSETRON 4 MG/1
4 TABLET, FILM COATED ORAL EVERY 6 HOURS PRN
Status: DISCONTINUED | OUTPATIENT
Start: 2020-12-04 | End: 2020-12-04 | Stop reason: HOSPADM

## 2020-12-04 RX ORDER — HYDRALAZINE HYDROCHLORIDE 20 MG/ML
10 INJECTION INTRAMUSCULAR; INTRAVENOUS ONCE
Status: COMPLETED | OUTPATIENT
Start: 2020-12-04 | End: 2020-12-04

## 2020-12-04 RX ORDER — HEPARIN SODIUM 1000 [USP'U]/ML
INJECTION, SOLUTION INTRAVENOUS; SUBCUTANEOUS AS NEEDED
Status: DISCONTINUED | OUTPATIENT
Start: 2020-12-04 | End: 2020-12-04 | Stop reason: HOSPADM

## 2020-12-04 RX ORDER — SODIUM CHLORIDE 9 MG/ML
75 INJECTION, SOLUTION INTRAVENOUS CONTINUOUS
Status: DISCONTINUED | OUTPATIENT
Start: 2020-12-04 | End: 2020-12-04 | Stop reason: HOSPADM

## 2020-12-04 RX ORDER — LIDOCAINE HYDROCHLORIDE 20 MG/ML
INJECTION, SOLUTION INFILTRATION; PERINEURAL AS NEEDED
Status: DISCONTINUED | OUTPATIENT
Start: 2020-12-04 | End: 2020-12-04 | Stop reason: HOSPADM

## 2020-12-04 RX ORDER — SODIUM CHLORIDE 0.9 % (FLUSH) 0.9 %
3 SYRINGE (ML) INJECTION EVERY 12 HOURS SCHEDULED
Status: DISCONTINUED | OUTPATIENT
Start: 2020-12-04 | End: 2020-12-04 | Stop reason: HOSPADM

## 2020-12-04 RX ORDER — NALOXONE HCL 0.4 MG/ML
0.4 VIAL (ML) INJECTION
Status: DISCONTINUED | OUTPATIENT
Start: 2020-12-04 | End: 2020-12-04 | Stop reason: HOSPADM

## 2020-12-04 RX ORDER — HYDROCODONE BITARTRATE AND ACETAMINOPHEN 5; 325 MG/1; MG/1
1 TABLET ORAL EVERY 4 HOURS PRN
Status: DISCONTINUED | OUTPATIENT
Start: 2020-12-04 | End: 2020-12-04 | Stop reason: HOSPADM

## 2020-12-04 RX ORDER — CEFAZOLIN SODIUM 2 G/100ML
INJECTION, SOLUTION INTRAVENOUS CONTINUOUS PRN
Status: DISCONTINUED | OUTPATIENT
Start: 2020-12-04 | End: 2020-12-04 | Stop reason: HOSPADM

## 2020-12-04 RX ORDER — MIDAZOLAM HYDROCHLORIDE 1 MG/ML
INJECTION INTRAMUSCULAR; INTRAVENOUS AS NEEDED
Status: DISCONTINUED | OUTPATIENT
Start: 2020-12-04 | End: 2020-12-04 | Stop reason: HOSPADM

## 2020-12-04 RX ADMIN — HYDRALAZINE HYDROCHLORIDE 10 MG: 20 INJECTION, SOLUTION INTRAMUSCULAR; INTRAVENOUS at 10:05

## 2020-12-04 RX ADMIN — SODIUM CHLORIDE 75 ML/HR: 9 INJECTION, SOLUTION INTRAVENOUS at 07:32

## 2020-12-04 NOTE — DISCHARGE INSTRUCTIONS
****YOU HAVE HAD YOUR DOSE OF PLAVIX TODAY****      [facility]  [address] [place] [address]       (Femoral Approach) After Care     Refer to this sheet in the next few weeks. These instructions provide you with information on caring for yourself after your procedure. Your health care provider may also give you more specific instructions. Your treatment has been planned according to current medical practices, but problems sometimes occur. Call your health care provider if you have any problems or questions after your procedure.      What to Expect After the Procedure:  After your procedure, it is typical to have the following sensations:  · Minor discomfort or tenderness and a small bump at the catheter insertion site. The bump should usually decrease in size and tenderness within 1 to 2 weeks.  · Any bruising will usually fade within 2 to 4 weeks.  Home Care Instructions:  · Do not apply powder or lotion to the site.  · Do not take baths, swim, or use a hot tub until your health care provider approves and the site is completely healed.  · Do not bend, squat, or lift anything over 10 pounds. However, we recommend lifting nothing heavier than a gallon of milk.    · You may shower 24 hours after the procedure. Remove the bandage (dressing) and gently wash the site with plain soap and water. Gently pat the site dry. You may apply a band aid daily for 2 days if desired.    · Inspect the site at least twice daily.    · Limit your activity for the first 48 hours. .    · Avoid strenuous activity for 1 week or as advised by your physician.    · Follow instructions about when you can drive or return to work as directed by your physician.    · Hold direct pressure over the site when you cough, sneeze, laugh or change positions.  Do this for the next 2 days.    · Do not operate machinery or power tools for 24 hours.  · A responsible adult should be with you for the first 24 hours after you arrive home.  Do not make any important legal decisions or sign legal papers for 24 hours.  Do not drink alcohol for 24 hours.      Call Your Doctor If:  · You have drainage (other than a small amount of blood on the dressing).  · You have chills or a fever > 101.  · You have redness, warmth, swelling(larger than a walnut), or pain at the insertion site  · .You have heavy bleeding from the site. If this happens, hold pressure on the site and call 911.  · You develop chest pain or shortness of breath, feel faint, or pass out.  · You develop pain, discoloration, coldness, numbness, tingling, or severe bruising in the leg that held the catheter.  · You develop bleeding from any other place, such as the bowels.    Make Sure You:  · Understand these instructions.  · Will watch your condition.  · Will get help right away if you are not doing well or get worse.

## 2020-12-05 LAB — ACT BLD: 235 SECONDS (ref 82–152)

## 2021-01-01 ENCOUNTER — OFFICE VISIT (OUTPATIENT)
Dept: CARDIOLOGY | Facility: CLINIC | Age: 81
End: 2021-01-01

## 2021-01-01 VITALS
HEART RATE: 80 BPM | WEIGHT: 197.8 LBS | BODY MASS INDEX: 31.04 KG/M2 | SYSTOLIC BLOOD PRESSURE: 148 MMHG | HEIGHT: 67 IN | DIASTOLIC BLOOD PRESSURE: 70 MMHG

## 2021-01-01 DIAGNOSIS — I10 ESSENTIAL HYPERTENSION: ICD-10-CM

## 2021-01-01 DIAGNOSIS — Z95.1 S/P CABG X 3: Primary | ICD-10-CM

## 2021-01-01 PROCEDURE — 93000 ELECTROCARDIOGRAM COMPLETE: CPT | Performed by: INTERNAL MEDICINE

## 2021-01-01 PROCEDURE — 99214 OFFICE O/P EST MOD 30 MIN: CPT | Performed by: INTERNAL MEDICINE

## 2021-01-01 RX ORDER — AMLODIPINE BESYLATE 5 MG/1
5 TABLET ORAL DAILY
Qty: 90 TABLET | Refills: 3 | Status: SHIPPED | OUTPATIENT
Start: 2021-01-01 | End: 2022-01-01 | Stop reason: HOSPADM

## 2021-01-07 ENCOUNTER — TRANSCRIBE ORDERS (OUTPATIENT)
Dept: SLEEP MEDICINE | Facility: HOSPITAL | Age: 81
End: 2021-01-07

## 2021-01-07 DIAGNOSIS — Z01.818 OTHER SPECIFIED PRE-OPERATIVE EXAMINATION: Primary | ICD-10-CM

## 2021-01-08 ENCOUNTER — LAB (OUTPATIENT)
Dept: LAB | Facility: HOSPITAL | Age: 81
End: 2021-01-08

## 2021-01-08 ENCOUNTER — OFFICE VISIT (OUTPATIENT)
Dept: NEUROLOGY | Facility: CLINIC | Age: 81
End: 2021-01-08

## 2021-01-08 VITALS
HEIGHT: 70 IN | OXYGEN SATURATION: 97 % | DIASTOLIC BLOOD PRESSURE: 68 MMHG | BODY MASS INDEX: 27.35 KG/M2 | HEART RATE: 71 BPM | SYSTOLIC BLOOD PRESSURE: 140 MMHG | WEIGHT: 191 LBS

## 2021-01-08 DIAGNOSIS — E11.42 DIABETIC PERIPHERAL NEUROPATHY (HCC): ICD-10-CM

## 2021-01-08 DIAGNOSIS — I69.30 SEQUELAE, POST-STROKE: ICD-10-CM

## 2021-01-08 DIAGNOSIS — G20 PARKINSON'S DISEASE (HCC): ICD-10-CM

## 2021-01-08 DIAGNOSIS — Z01.818 OTHER SPECIFIED PRE-OPERATIVE EXAMINATION: ICD-10-CM

## 2021-01-08 DIAGNOSIS — G91.2 NPH (NORMAL PRESSURE HYDROCEPHALUS) (HCC): Primary | ICD-10-CM

## 2021-01-08 PROCEDURE — 99214 OFFICE O/P EST MOD 30 MIN: CPT | Performed by: PSYCHIATRY & NEUROLOGY

## 2021-01-08 RX ORDER — BETAMETHASONE DIPROPIONATE 0.5 MG/G
1 LOTION TOPICAL AS NEEDED
COMMUNITY
Start: 2020-12-21 | End: 2022-01-01 | Stop reason: HOSPADM

## 2021-01-08 NOTE — PROGRESS NOTES
"CC: NPH, Parkinson's disease, diabetes with neuropathy, stroke    HPI:  Eugenio Joe is a  80 y.o. right-handed White male who I am seeing in follow-up with multiple neurologic diagnoses.  His initial evaluation was regarding NPH and the work-up is summarized as follows from when I saw the patient previously 1/6/2020    In June 2019 he was found to have a tiny acute left occipital infarct.  Vascular studies showed atherosclerotic changes without clear-cut hemodynamically significant stenoses.  The origins of the vertebral arteries were however somewhat obscured.  He has no history of atrial fibrillation.  He is on blood pressure and diabetes medications.  He quit smoking in his early 20s and he was treated with aspirin 325 and simvastatin 40 mg daily for stroke prevention.    The patient has had a progressive gait disturbance which dates back several years.  He was seen by Dr. Polanco and evaluated in 2017 for NPH.  I reviewed previous MRI scans as well as most recent MRI scan done 6/23/2019.  He has mild diffuse atrophy.  The ventricles are larger than expected but the temporal tips are not clearly as ballooned as expected.  The cerebral aqueduct is not significantly enlarged but there is flow void.  Some turbulent flow is seen in the third ventricle.  There is notable chronic white matter changes which are moderate in severity.  There is mild cortical atrophy which looks consistent with age.  A radionucleotide cisternogram was obtained which I also reviewed demonstrating ventricular reflux within 4 hours with persistent tracer at 48 hours consistent with NPH.  A lumbar puncture was obtained and the patient's gait was perhaps a bit better for a couple of days and then 3 days out he had sudden transient hearing loss and 2 to 3 days later he had a TIA or stroke.  Shunting was not undertaken.  He sought a second opinion at the [facility] in [place] and they had the same opinion.  She states the term \"possible " "NPH\" was given.  They did not recommend shunting.    When I saw him last he had evidence of mild Parkinson syndrome but not enough to treat.  Subsequent to that he had abrupt onset of slurred speech and trouble swallowing as well as some facial weakness and was found to have an acute lacunar infarct in the left parietal area and a tiny 1 in the right frontal area.  He was seen while hospitalized here by Dr. Melendez.  Further evaluation demonstrated that he had a PFO which was subsequently closed in November with no side effects.  He remains on aspirin and Plavix plus atorvastatin 40 mg daily.    The patient uses a stand-up rolling walker which works out very well.  He keeps him upright and he is able to navigate effectively.  He has some minimal tremor occurring in either hand mostly the left but it does not interfere with function.  His handwriting is gotten very small.  Due to his trouble swallowing he has been seen by speech therapy.  They have recommended mechanical soft with nectar thick liquids with water in between taking small sips at a time.    The patient and his wife are the parents of Maryannavelino Joe Rivera and grandparents of a [employer] gold medalist breaststroker.    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 SWEAT GLAND    • Cardiac disease    • Coronary artery disease     CABG 2012   • Diabetes mellitus (CMS/HCC)    • Difficulty in swallowing    • Difficulty walking    • DM2 (diabetes mellitus, type 2) (CMS/HCC) 10/12/2016   • Gout several years ago    medication  working   • Hearing aid worn     bilateral   • Hx of appendectomy    • Hydrocephaly (CMS/HCC)    • Hyperlipemia    • Hypertension    • Joint pain     or swelling   • Low back pain several years ago   • Orbit fracture (CMS/HCC)    • Pulmonary embolism (CMS/HCC)     OCT 2016   • Rash     ARM-    • Stroke (cerebrum) " (CMS/[facility]) 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   • TIA (transient ischemic attack)          Past Surgical History:   Procedure Laterality Date   • APPENDECTOMY N/A 7/18/2019    Procedure: APPENDECTOMY LAPAROSCOPIC;  Surgeon: Luis Carlos Rick Jr., MD;  Location: [facility] MAIN OR;  Service: General   • BRAIN SURGERY      BRAIN ABCESS 30 YR AGO   • CARDIAC CATHETERIZATION N/A 12/4/2020    Procedure: Patent foramen ovale closure ABBOTT;  Surgeon: Frank Pablo MD;  Location: [facility] CATH INVASIVE LOCATION;  Service: Cardiology;  Laterality: N/A;   • CARDIAC CATHETERIZATION N/A 12/4/2020    Procedure: Intracardiac echocardiogram;  Surgeon: Frank Pablo MD;  Location: [facility] CATH INVASIVE LOCATION;  Service: Cardiology;  Laterality: N/A;   • CARDIAC SURGERY     • COLONOSCOPY  2012    Ciciliano- normal per pt, no polyps    • CORONARY ARTERY BYPASS GRAFT     • ENDOSCOPY N/A 3/9/2017    Schatzki ring, dilated, LA Grade B esophagitis, HH, acquired duodenal stenosis   • ENDOSCOPY N/A 4/6/2018    candida, HH, torts, Schatzki ring, duodenal stenosis, dilatation perforemed, chronic inflammation   • ENDOSCOPY N/A 10/9/2019    White nummular lesions in esophageal mucosa, mild Schatzki ring, acquired duodenal stenosis, Path: Terrazas's, candida   • ENDOSCOPY N/A 1/8/2020    Procedure: ESOPHAGOGASTRODUODENOSCOPY with biopsies;  Surgeon: Rigoberto Walker MD;  Location: [facility] ENDOSCOPY;  Service: Gastroenterology   • FACIAL FRACTURE SURGERY      to repair 6 broken bones   • ORBITAL FRACTURE OPEN REDUCTION INTERNAL FIXATION Right 1/13/2017    Procedure: RT ORBIT FLOOR FRACTURE REPAIR RIGHT [facility] FRACTURE REPAIR, RIGHT NASAL BONE FRACTURE CLOSED REDUCTION;  Surgeon: Edil Lester MD;  Location: [facility] OR [facility];  Service:    • ORIF FOOT FRACTURE Right 9/9/2016    Procedure: RIGHT SECOND METATARSAL OPEN REDUCTION INTERNAL FIXATION WITh GRAFT FROM HEEL ;  Surgeon: Man Jenkins MD;  Location: [facility] OR [facility];   Service:    • SEPTOPLASTY     • SKIN CANCER EXCISION     • TONSILLECTOMY             Current Outpatient Medications:   •  acetaminophen (TYLENOL) 325 MG tablet, Take 2 tablets by mouth Every 4 (Four) Hours As Needed for Mild Pain ., Disp: , Rfl:   •  allopurinol (ZYLOPRIM) 300 MG tablet, Take 300 mg by mouth daily., Disp: , Rfl:   •  aspirin 81 MG chewable tablet, Chew 81 mg Daily., Disp: , Rfl:   •  atorvastatin (LIPITOR) 40 MG tablet, Take 1 tablet by mouth Every Night., Disp: , Rfl:   •  betamethasone dipropionate (DIPROLENE) 0.05 % lotion, APPLY TOPICALLY TO RASH ON ARMS AND LEGS TWICE DAILY, Disp: , Rfl:   •  clopidogrel (PLAVIX) 75 MG tablet, Take 1 tablet by mouth Daily., Disp: 30 tablet, Rfl:   •  doxazosin (CARDURA) 1 MG tablet, Take 1 mg by mouth Daily., Disp: , Rfl:   •  glipizide (GLUCOTROL) 5 MG tablet, TK 1 T PO B THE FARZAD MEAL, Disp: , Rfl:   •  lisinopril (PRINIVIL,ZESTRIL) 10 MG tablet, Take 10 mg by mouth 2 (Two) Times a Day., Disp: , Rfl:   •  metFORMIN (GLUCOPHAGE) 500 MG tablet, Take 500 mg by mouth 2 (Two) Times a Day With Meals., Disp: , Rfl:   •  Multiple Vitamins-Minerals (MULTIVITAMIN ADULT PO), Take 1 tablet by mouth Daily., Disp: , Rfl:   •  pantoprazole (PROTONIX) 40 MG EC tablet, Take 40 mg by mouth Daily., Disp: , Rfl:   •  PROAIR  (90 BASE) MCG/ACT inhaler, 2 puffs Every 4 (Four) Hours As Needed., Disp: , Rfl:   •  Vitamin D, Cholecalciferol, (CHOLECALCIFEROL) 400 units tablet, Take 50 Units by mouth Daily., Disp: , Rfl:   •  Accu-Chek FastClix Lancets misc, TEST ONCE TO BID PRN, Disp: , Rfl:   •  glucose blood (FREESTYLE LITE) test strip, 1 each by Other route See Admin Instructions. Use 1 to 2 times daily as instructed, Disp: , Rfl:       Family History   Problem Relation Age of Onset   • Heart disease Mother    • Hypertension Mother    • Stroke Mother    • Diverticulitis Mother    • Heart disease Father    • Hypertension Father    • Malig Hyperthermia Neg Hx          Social  "History     Socioeconomic History   • Marital status:      Spouse name: Not on file   • Number of children: 2   • Years of education: 16   • Highest education level: Not on file   Occupational History   • Occupation: retired   Tobacco Use   • Smoking status: Former Smoker     Packs/day: 3.00     Years: 5.00     Pack years: 15.00     Types: Cigarettes     Start date:      Quit date:      Years since quittin.0   • Smokeless tobacco: Never Used   • Tobacco comment: Quit cold turkey   Substance and Sexual Activity   • Alcohol use: Yes     Alcohol/week: 2.0 standard drinks     Types: 1 Cans of beer, 1 Shots of liquor per week     Comment: RARELY    • Drug use: No   • Sexual activity: Defer     Partners: Female     Birth control/protection: Surgical         Allergies   Allergen Reactions   • Other Mental Status Change and Hallucinations     Oxy drugs   • Oxycodone Mental Status Change         Pain Scale: 0/10        ROS:  Review of Systems   Constitutional: Negative for activity change, appetite change and fatigue.   Eyes: Negative for pain, redness and itching.   Respiratory: Positive for cough, choking and shortness of breath.    Neurological: Negative for dizziness, tremors, seizures, syncope, facial asymmetry, speech difficulty, weakness, light-headedness, numbness and headaches.   Psychiatric/Behavioral: Negative for agitation, behavioral problems, confusion, decreased concentration, dysphoric mood, hallucinations, self-injury, sleep disturbance and suicidal ideas. The patient is not nervous/anxious and is not hyperactive.          I have reviewed and agree with the above ROS completed by the medical assistant.      Physical Exam:  Vitals:    21 1105   BP: 140/68   Pulse: 71   SpO2: 97%   Weight: 86.6 kg (191 lb)   Height: 177.8 cm (70\")     Orthostatic BP:    Body mass index is 27.41 kg/m².    Physical Exam  General: Overweight white male no acute distress  HEENT: Normocephalic no evidence " of trauma  Neck: Supple  Heart: Regular rate and rhythm  Extremities: No pedal edema      Neurological Exam:   Mental Status: Awake, alert, oriented to person, place and time.  Conversant without evidence of an affective disorder, thought disorder, delusions or hallucinations.  Attention span and concentration are normal.  HCF: No aphasia, apraxia or dysarthria.  Recent and remote memory intact.  Knowledge of recent events intact.  CN: I:   II: Visual fields full without left inattention   III, IV, VI: Eye movements intact without nystagmus or ptosis.  Pupils equal round and reactive to light.   V,VII: Light touch and pinprick intact all 3 divisions of V.  Facial muscles symmetrical.   VIII: Hearing intact to finger rub   IX,X: Soft palate elevates symmetrically   XI: Sternomastoid and trapezius are strong.   XII: Tongue midline without atrophy or fasciculations  Motor: Minimal cogwheeling in both upper extremities.  Normal bulk in the upper and lower extremities   Power testing: No focal weakness appreciated  Reflexes: Upper extremities:        Lower extremities:        Toe signs:  Sensory: Light touch:        Pinprick:        Vibration:        Position:    Cerebellar: Finger-to-nose: Intact           Rapid movement: Intact           Heel-to-shin:  Gait and Station: Uses his rolling walker which is a stand up type.  His step length is not bad and is base is pretty good.  No festination or magnetic gait appreciated.    Results:      Lab Results   Component Value Date    GLUCOSE 222 (H) 12/02/2020    BUN 18 12/02/2020    CREATININE 1.25 12/02/2020    EGFRIFNONA 56 (L) 12/02/2020    EGFRIFAFRI  09/02/2016      Comment:      <15 Indicative of kidney failure.    BCR 14.4 12/02/2020    CO2 23.3 12/02/2020    CALCIUM 9.7 12/02/2020    PROTENTOTREF 6.5 01/02/2020    ALBUMIN 3.40 (L) 09/09/2020    LABIL2 1.2 01/02/2020    AST 12 09/09/2020    ALT 6 09/09/2020       Lab Results   Component Value Date    WBC 6.45 12/02/2020     HGB 13.1 12/02/2020    HCT 40.1 12/02/2020    MCV 91.3 12/02/2020     12/02/2020         .  Lab Results   Component Value Date    RPR Non-Reactive 06/23/2019         Lab Results   Component Value Date    TSH 0.988 04/27/2020         Lab Results   Component Value Date    JIRMNPSM89 580 06/23/2019         Lab Results   Component Value Date    FOLATE >20.00 06/23/2019         Lab Results   Component Value Date    HGBA1C 7.81 (H) 09/06/2020         Lab Results   Component Value Date    GLUCOSE 222 (H) 12/02/2020    BUN 18 12/02/2020    CREATININE 1.25 12/02/2020    EGFRIFNONA 56 (L) 12/02/2020    EGFRIFAFRI  09/02/2016      Comment:      <15 Indicative of kidney failure.    BCR 14.4 12/02/2020    K 4.1 12/02/2020    CO2 23.3 12/02/2020    CALCIUM 9.7 12/02/2020    PROTENTOTREF 6.5 01/02/2020    ALBUMIN 3.40 (L) 09/09/2020    LABIL2 1.2 01/02/2020    AST 12 09/09/2020    ALT 6 09/09/2020         Lab Results   Component Value Date    WBC 6.45 12/02/2020    HGB 13.1 12/02/2020    HCT 40.1 12/02/2020    MCV 91.3 12/02/2020     12/02/2020             Assessment:   1.  NPH and Parkinson's disease-not enough findings to consider treatment of Parkinson's disease.  He has been evaluated by 2 neurosurgeons and neither have recommended a shunt.  2.  Diabetic neuropathy  3.  Stroke with PFO status post closure.          Plan:  1.  Continue aspirin and Plavix along with risk factor control with rosuvastatin, blood sugar and blood pressure control  2.  Follow-up 6 months            >50% of this 25-minute follow-up was spent counseling the patient on his diagnoses along with treatment options which are not going to be pursued at this time              Dictated utilizing Dragon dictation.

## 2021-01-11 ENCOUNTER — OFFICE VISIT (OUTPATIENT)
Dept: CARDIOLOGY | Facility: CLINIC | Age: 81
End: 2021-01-11

## 2021-01-11 ENCOUNTER — HOSPITAL ENCOUNTER (OUTPATIENT)
Dept: CARDIOLOGY | Facility: HOSPITAL | Age: 81
Discharge: HOME | End: 2021-01-11
Admitting: INTERNAL MEDICINE

## 2021-01-11 VITALS
HEIGHT: 70 IN | SYSTOLIC BLOOD PRESSURE: 143 MMHG | WEIGHT: 190.92 LBS | BODY MASS INDEX: 27.33 KG/M2 | HEART RATE: 78 BPM | DIASTOLIC BLOOD PRESSURE: 65 MMHG

## 2021-01-11 VITALS
BODY MASS INDEX: 27.2 KG/M2 | HEART RATE: 75 BPM | WEIGHT: 190 LBS | DIASTOLIC BLOOD PRESSURE: 70 MMHG | HEIGHT: 70 IN | SYSTOLIC BLOOD PRESSURE: 130 MMHG

## 2021-01-11 DIAGNOSIS — Q21.12 PFO (PATENT FORAMEN OVALE): ICD-10-CM

## 2021-01-11 DIAGNOSIS — I63.81 CEREBROVASCULAR ACCIDENT (CVA) DUE TO OCCLUSION OF SMALL ARTERY (HCC): ICD-10-CM

## 2021-01-11 DIAGNOSIS — I10 ESSENTIAL HYPERTENSION: ICD-10-CM

## 2021-01-11 DIAGNOSIS — Q21.12 PFO (PATENT FORAMEN OVALE): Primary | ICD-10-CM

## 2021-01-11 PROCEDURE — 99214 OFFICE O/P EST MOD 30 MIN: CPT | Performed by: INTERNAL MEDICINE

## 2021-01-11 PROCEDURE — 93308 TTE F-UP OR LMTD: CPT | Performed by: INTERNAL MEDICINE

## 2021-01-11 PROCEDURE — 93325 DOPPLER ECHO COLOR FLOW MAPG: CPT

## 2021-01-11 PROCEDURE — 93325 DOPPLER ECHO COLOR FLOW MAPG: CPT | Performed by: INTERNAL MEDICINE

## 2021-01-11 PROCEDURE — 93000 ELECTROCARDIOGRAM COMPLETE: CPT | Performed by: INTERNAL MEDICINE

## 2021-01-11 PROCEDURE — 93308 TTE F-UP OR LMTD: CPT

## 2021-01-11 NOTE — PROGRESS NOTES
Eugenio Joe  1940  Date of Office Visit: 01/11/21  Encounter Provider: Frank Pablo MD  Place of Service: [facility] CARDIOLOGY      CHIEF COMPLAINT:  PFO   CVA  History of TIA     HISTORY OF PRESENT ILLNESS:   Dr. Ford,  I am seeing Mr. oJe back in follow-up.  He is a very pleasant 80-year-old male with medical history of TIA and CVA. He has a history of coronary disease and prior CABG with LIMA to LAD and vein to the ramus and OM in 2010. He has a normal left ventricular size and systolic function. He also has normal pressure hydrocephalus. He has had a couple of events consistent with transient ischemic attacks and then more recently had issues with aphasia and was noted on MRI imaging to have 2 distinct acute CVAs. These were in 2 different territories. He had evaluation including a transthoracic echocardiogram, which I have reviewed showing an intraatrial defect consistent with a patent foramen ovale. The right ventricle was mildly dilated with normal right ventricular systolic function.     He had a transesophageal echocardiograph confirming the PFO along with shunting. There is also documentation of moderate aortic valve regurgitation. He underwent patent foramen ovale closure which was performed on 12/04/2020 with a 25 mm Amplatzer PFO occluder device with no residual shunt.  He did very well with that.  He has had no issues with his groin access following this.  He is continued on aspirin and Plavix therapy.  He did have a followup transthoracic echocardiogram today to evaluate his previously placed device.  He has no evidence of a pericardial effusion.  His device is well-seated and stable.   He had a bubble study done at rest and with Valsalva that I have reviewed.  I do not see any residual flow across this defect.          Review of Systems   Constitution: Negative for fever and malaise/fatigue.   HENT: Negative for nosebleeds and sore throat.     Eyes: Negative for blurred vision and double vision.   Cardiovascular: Negative for chest pain, claudication, palpitations and syncope.   Respiratory: Negative for cough, shortness of breath and snoring.    Endocrine: Negative for cold intolerance, heat intolerance and polydipsia.   Skin: Negative for itching, poor wound healing and rash.   Musculoskeletal: Negative for joint pain, joint swelling, muscle weakness and myalgias.   Gastrointestinal: Negative for abdominal pain, melena, nausea and vomiting.   Neurological: Negative for light-headedness, loss of balance, seizures, vertigo and weakness.   Psychiatric/Behavioral: Negative for altered mental status and depression.       Past Medical History:   Diagnosis Date   • Arthritis    • Asthma    • Brain abscess     at about age 44   • Bruise of face    • Cancer (CMS/[facility])     PT STATES IN HIS SWEAT GLAND    • Cardiac disease    • Coronary artery disease     CABG 2012   • Diabetes mellitus (CMS/[facility])    • Difficulty in swallowing    • Difficulty walking    • DM2 (diabetes mellitus, type 2) (CMS/[facility]) 10/12/2016   • Gout several years ago    medication  working   • Hearing aid worn     bilateral   • Hx of appendectomy    • Hydrocephaly (CMS/[facility])    • Hyperlipemia    • Hypertension    • Joint pain     or swelling   • Low back pain several years ago   • Orbit fracture (CMS/[facility])    • Pulmonary embolism (CMS/[facility])     OCT 2016   • Rash     ARM-    • Stroke (cerebrum) (CMS/[facility]) 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   • TIA (transient ischemic attack)        The following portions of the patient's history were reviewed and updated as appropriate: Social history , Family history and Surgical history     Current Outpatient Medications on File Prior to Visit   Medication Sig Dispense Refill   • Accu-Chek FastClix Lancets misc TEST ONCE TO BID PRN     • acetaminophen (TYLENOL) 325 MG tablet Take 2 tablets by mouth Every 4 (Four) Hours As Needed for Mild Pain .     • allopurinol  "(ZYLOPRIM) 300 MG tablet Take 300 mg by mouth daily.     • aspirin 81 MG chewable tablet Chew 81 mg Daily.     • atorvastatin (LIPITOR) 40 MG tablet Take 1 tablet by mouth Every Night.     • betamethasone dipropionate (DIPROLENE) 0.05 % lotion APPLY TOPICALLY TO RASH ON ARMS AND LEGS TWICE DAILY     • clopidogrel (PLAVIX) 75 MG tablet Take 1 tablet by mouth Daily. 30 tablet    • doxazosin (CARDURA) 1 MG tablet Take 1 mg by mouth Daily.     • glipizide (GLUCOTROL) 5 MG tablet TK 1 T PO B THE FARZAD MEAL     • glucose blood (FREESTYLE LITE) test strip 1 each by Other route See Admin Instructions. Use 1 to 2 times daily as instructed     • lisinopril (PRINIVIL,ZESTRIL) 10 MG tablet Take 10 mg by mouth 2 (Two) Times a Day.     • metFORMIN (GLUCOPHAGE) 500 MG tablet Take 500 mg by mouth 2 (Two) Times a Day With Meals.     • Multiple Vitamins-Minerals (MULTIVITAMIN ADULT PO) Take 1 tablet by mouth Daily.     • pantoprazole (PROTONIX) 40 MG EC tablet Take 40 mg by mouth Daily.     • PROAIR  (90 BASE) MCG/ACT inhaler 2 puffs Every 4 (Four) Hours As Needed.     • Vitamin D, Cholecalciferol, (CHOLECALCIFEROL) 400 units tablet Take 50 Units by mouth Daily.       No current facility-administered medications on file prior to visit.        Allergies   Allergen Reactions   • Other Mental Status Change and Hallucinations     Oxy drugs   • Oxycodone Mental Status Change       Vitals:    01/11/21 1017   BP: 130/70   Pulse: 75   Weight: 86.2 kg (190 lb)   Height: 177.8 cm (70\")     Constitutional:       Appearance: Well-developed.   Eyes:      General: No scleral icterus.     Conjunctiva/sclera: Conjunctivae normal.   HENT:      Head: Normocephalic and atraumatic.   Neck:      Musculoskeletal: Normal range of motion and neck supple.      Thyroid: No thyromegaly.      Vascular: Normal carotid pulses. No carotid bruit, hepatojugular reflux or JVD.      Trachea: No tracheal deviation.   Pulmonary:      Effort: No respiratory " distress.      Breath sounds: Normal breath sounds. No decreased breath sounds. No wheezing. No rhonchi. No rales.   Chest:      Chest wall: Not tender to palpatation.   Cardiovascular:      Normal rate. Regular rhythm.      No gallop.   Pulses:     Carotid: 2+ bilaterally.     Radial: 2+ bilaterally.     Femoral: 2+ bilaterally.     Dorsalis pedis: 2+ bilaterally.     Posterior tibial: 2+ bilaterally.  Edema:     Peripheral edema absent.   Abdominal:      General: Bowel sounds are normal. There is no distension.      Palpations: Abdomen is soft.      Tenderness: There is no abdominal tenderness. There is no rebound.   Musculoskeletal:         General: No deformity.   Skin:     Findings: No erythema or rash.   Neurological:      Mental Status: Alert and oriented to person, place, and time.      Sensory: No sensory deficit.   Psychiatric:         Behavior: Behavior normal.            Lab Results   Component Value Date    WBC 6.45 12/02/2020    HGB 13.1 12/02/2020    HCT 40.1 12/02/2020    MCV 91.3 12/02/2020     12/02/2020       Lab Results   Component Value Date    GLUCOSE 222 (H) 12/02/2020    BUN 18 12/02/2020    CREATININE 1.25 12/02/2020    EGFRIFNONA 56 (L) 12/02/2020    EGFRIFAFRI  09/02/2016      Comment:      <15 Indicative of kidney failure.    BCR 14.4 12/02/2020    K 4.1 12/02/2020    CO2 23.3 12/02/2020    CALCIUM 9.7 12/02/2020    PROTENTOTREF 6.5 01/02/2020    ALBUMIN 3.40 (L) 09/09/2020    LABIL2 1.2 01/02/2020    AST 12 09/09/2020    ALT 6 09/09/2020       Lab Results   Component Value Date    GLUCOSE 222 (H) 12/02/2020    CALCIUM 9.7 12/02/2020     12/02/2020    K 4.1 12/02/2020    CO2 23.3 12/02/2020     12/02/2020    BUN 18 12/02/2020    CREATININE 1.25 12/02/2020    EGFRIFAFRI  09/02/2016      Comment:      <15 Indicative of kidney failure.    EGFRIFNONA 56 (L) 12/02/2020    BCR 14.4 12/02/2020    ANIONGAP 12.7 12/02/2020       Lab Results   Component Value Date    CHOL 105  09/06/2020    TRIG 110 09/06/2020    HDL 39 (L) 09/06/2020    LDL 44 09/06/2020         ECG 12 Lead    Date/Time: 1/11/2021 10:44 AM  Performed by: Frank Pablo MD  Authorized by: Frank Pablo MD   Comparison: compared with previous ECG from 11/5/2020  Similar to previous ECG  Rhythm: sinus rhythm  Ectopy: unifocal PVCs  Rate: normal    Clinical impression: non-specific ECG             Results for orders placed during the hospital encounter of 10/21/20   Adult Transesophageal Echo (STELLA) W/ Cont if Necessary Per Protocol    Narrative · Left ventricular ejection fraction appears to be 56 - 60%.  · The left atrial cavity is mild to moderately dilated.  · Saline test results are positive for right to left atrial level shunt   suggesting small PFO.  · There is moderate calcification of the aortic valve mainly affecting the   non-coronary cusp(s).Moderate aortic valve regurgitation is present.  · Mild mitral valve regurgitation is present.  · No significant valvular masses or vegetations noted  · Mild to moderate aortic plaque noted in descending and arch of the   aortal.          DISCUSSION/SUMMARYAn 80-year-old male with a medical history of coronary disease with prior CABG, TIA, and CVA along with aphasia and MRI data consistent with 2 distinct acute infarcts in different territories.  He has not had issues with atrial fibrillation and did have an atrial septal defect consistent with a patent foramen ovale.  He underwent closure of that defect and has no residual flow.  I am really happy with how his device looks.    1.  Patent foramen ovale/CVA, recurrent.  -Continue aspirin lifelong along with Plavix for 6 months.  -Continue antibiotic prophylaxis for 6 months.  -I have reviewed his echocardiogram images and his device looks great.  He does not need repeat echocardiograms for this.  -He is tolerating dual antiplatelet therapy without any bleeding complications.    2.  Essential hypertension:  well  controlled.  Continue current regimen.  He is on lisinopril along with Cardura.  He is doing well on that medical regimen.  I have reviewed his BMP from 12/2020 and he has no evidence of hyperkalemia or significant renal insufficiency on that therapy.      3.  Diabetes mellitus:  Continue glipizide at current dose.   Defer additional changes to primary care.    I will see him back as needed.  He will follow up with Dr. Ford

## 2021-01-12 LAB
BH CV ECHO MEAS - BSA(HAYCOCK): 2.1 M^2
BH CV ECHO MEAS - BSA: 2 M^2
BH CV ECHO MEAS - BZI_BMI: 27.4 KILOGRAMS/M^2
BH CV ECHO MEAS - BZI_METRIC_HEIGHT: 177.8 CM
BH CV ECHO MEAS - BZI_METRIC_WEIGHT: 86.6 KG
MAXIMAL PREDICTED HEART RATE: 139 BPM
STRESS TARGET HR: 118 BPM

## 2021-02-08 ENCOUNTER — APPOINTMENT (OUTPATIENT)
Dept: GENERAL RADIOLOGY | Facility: HOSPITAL | Age: 81
End: 2021-02-08

## 2021-02-08 ENCOUNTER — HOSPITAL ENCOUNTER (EMERGENCY)
Facility: HOSPITAL | Age: 81
Discharge: HOME | End: 2021-02-08
Attending: EMERGENCY MEDICINE | Admitting: EMERGENCY MEDICINE

## 2021-02-08 VITALS
HEIGHT: 70 IN | RESPIRATION RATE: 18 BRPM | WEIGHT: 175 LBS | HEART RATE: 94 BPM | SYSTOLIC BLOOD PRESSURE: 148 MMHG | OXYGEN SATURATION: 98 % | BODY MASS INDEX: 25.05 KG/M2 | DIASTOLIC BLOOD PRESSURE: 71 MMHG | TEMPERATURE: 98.2 F

## 2021-02-08 DIAGNOSIS — S82.832A CLOSED FRACTURE OF DISTAL END OF LEFT FIBULA, UNSPECIFIED FRACTURE MORPHOLOGY, INITIAL ENCOUNTER: Primary | ICD-10-CM

## 2021-02-08 PROCEDURE — 73630 X-RAY EXAM OF FOOT: CPT

## 2021-02-08 PROCEDURE — 73590 X-RAY EXAM OF LOWER LEG: CPT

## 2021-02-08 PROCEDURE — 73610 X-RAY EXAM OF ANKLE: CPT

## 2021-02-08 PROCEDURE — 99283 EMERGENCY DEPT VISIT LOW MDM: CPT

## 2021-02-08 RX ORDER — PANTOPRAZOLE SODIUM 40 MG/1
40 TABLET, DELAYED RELEASE ORAL DAILY
Qty: 30 TABLET | Refills: 0 | Status: SHIPPED | OUTPATIENT
Start: 2021-02-08 | End: 2021-02-12

## 2021-02-08 RX ORDER — IBUPROFEN 600 MG/1
600 TABLET ORAL EVERY 6 HOURS PRN
Qty: 30 TABLET | Refills: 0 | Status: SHIPPED | OUTPATIENT
Start: 2021-02-08 | End: 2021-02-12

## 2021-02-08 RX ORDER — ACETAMINOPHEN 500 MG
1000 TABLET ORAL EVERY 8 HOURS PRN
Qty: 30 TABLET | Refills: 0 | Status: SHIPPED | OUTPATIENT
Start: 2021-02-08

## 2021-02-08 RX ORDER — PANTOPRAZOLE SODIUM 40 MG/1
40 TABLET, DELAYED RELEASE ORAL DAILY
Qty: 14 TABLET | Refills: 0 | Status: SHIPPED | OUTPATIENT
Start: 2021-02-08 | End: 2021-02-22

## 2021-02-08 RX ORDER — HYDROCODONE BITARTRATE AND ACETAMINOPHEN 5; 325 MG/1; MG/1
1 TABLET ORAL EVERY 6 HOURS PRN
Qty: 6 TABLET | Refills: 0 | Status: SHIPPED | OUTPATIENT
Start: 2021-02-08 | End: 2021-02-18 | Stop reason: HOSPADM

## 2021-02-08 NOTE — ED TRIAGE NOTES
Patient had a fall last night he states he tripped and fell, his left leg is hurting him. He denies loc and did not hit his head, patient has on mask and nurse has on mask.

## 2021-02-08 NOTE — ED PROVIDER NOTES
MD ATTESTATION NOTE  Patient was placed in face mask in first look and the following protective measures were taken unless additional measures were taken and documented below in the ED course. Patient was wearing facemask when I entered the room and throughout our encounter. I wore full protective equipment throughout this patient encounter including a face mask, and gloves. Hand hygiene was performed before donning protective equipment and after removal when leaving the room.    The KIM and I have discussed this patient's history, physical exam, and treatment plan. I have reviewed the documentation and personally had a face to face interaction with the patient. I affirm the KIM documentation and agree with their diagnostics, findings, treatment, plan, and disposition.  The attached note describes my personal findings.    Eugenio Joe is a 80 y.o. male who presents to the ED c/o left leg pain.  Patient reports he was chasing a vacuum  last night when he tripped and fell, injuring his left lower leg.  Patient denies any head injury, no loss of consciousness, no neck or back pain.  Patient complains of dull achy pain most severe at left lateral ankle, worse with movement.  Patient has been able to ambulate but with great difficulty.  Patient denies any skin injury, no weakness or numbness.  Prior to fall, patient was at baseline without complaint.    On exam:  General: NAD  Head: NCAT  ENT: Extraocular motion intact, pupils equal and round reactive to light, moist mucous membranes  Neck: Supple, trachea midline  Cardiac: regular rate and rhythm  Lungs: Clear to auscultation bilaterally  Abdomen: Soft, nontender, no rebound tenderness/guarding/rigidity  : Deferred to KIM  Extremities: Moves all extremities well, no peripheral edema.  Left lower extremity: Patient has tenderness and swelling of left lateral malleolus, tenderness and swelling on the dorsum of the foot.  Proximal calf has no tenderness, all  compartments in the calf are soft, skin intact. Ankle/toes up/down, sensation intact light touch all peripheral nerve distributions, 2+ dorsalis pedis and posterior tibial pulses, brisk cap refill all digits.  Skin: Warm, dry    LAB RESULTS  No results found for this or any previous visit (from the past 24 hour(s)).    I ordered the above labs and reviewed the results.    RADIOLOGY  Xr Tibia Fibula 2 View Left    Result Date: 2/8/2021  XR FOOT 3+ VW LEFT-, XR ANKLE 3+ VW LEFT-, XR TIBIA FIBULA 2 VW LEFT-  INDICATIONS: Trauma  TECHNIQUE: 3 views of the left foot, 3 views of the left ankle, 2 views of the left lower leg  COMPARISON: None available  FINDINGS:  Obliquely oriented fracture at the distal fibular metadiaphysis shows 2 mm cortical step-off laterally, with adjacent soft tissue swelling. No other fractures are noted. Mild calcaneal spurring. No dislocation. Lateral deviation of the second distal phalanx is noted. Soft tissue swelling is also seen at the forefoot.       Left fibula fracture.    This report was finalized on 2/8/2021 1:10 PM by Dr. Prabhjot Mcarthur M.D.      Xr Ankle 3+ View Left    Result Date: 2/8/2021  XR FOOT 3+ VW LEFT-, XR ANKLE 3+ VW LEFT-, XR TIBIA FIBULA 2 VW LEFT-  INDICATIONS: Trauma  TECHNIQUE: 3 views of the left foot, 3 views of the left ankle, 2 views of the left lower leg  COMPARISON: None available  FINDINGS:  Obliquely oriented fracture at the distal fibular metadiaphysis shows 2 mm cortical step-off laterally, with adjacent soft tissue swelling. No other fractures are noted. Mild calcaneal spurring. No dislocation. Lateral deviation of the second distal phalanx is noted. Soft tissue swelling is also seen at the forefoot.       Left fibula fracture.    This report was finalized on 2/8/2021 1:10 PM by Dr. Prabhjot Mcarthur M.D.      Xr Foot 3+ View Left    Result Date: 2/8/2021  XR FOOT 3+ VW LEFT-, XR ANKLE 3+ VW LEFT-, XR TIBIA FIBULA 2 VW LEFT-  INDICATIONS: Trauma   TECHNIQUE: 3 views of the left foot, 3 views of the left ankle, 2 views of the left lower leg  COMPARISON: None available  FINDINGS:  Obliquely oriented fracture at the distal fibular metadiaphysis shows 2 mm cortical step-off laterally, with adjacent soft tissue swelling. No other fractures are noted. Mild calcaneal spurring. No dislocation. Lateral deviation of the second distal phalanx is noted. Soft tissue swelling is also seen at the forefoot.       Left fibula fracture.    This report was finalized on 2/8/2021 1:10 PM by Dr. Prabhjot Mcarthur M.D.        I ordered the above noted radiological studies. I reviewed the images and results. I agree with the radiologist interpretation.    PROCEDURES  Procedures    MEDICATIONS GIVEN IN ER  Medications - No data to display    PROGRESS, DATA ANALYSIS, CONSULTS, AND MEDICAL DECISION MAKING  A complete history and physical exam have been performed.  All available laboratory and imaging results have been reviewed by myself prior to disposition.  Face mask and gloves were worn throughout the patient encounter, unless additional PPE was worn and specified below. Hand hygiene was performed before entering and after leaving the patient room.  [facility]    ED Course as of Feb 08 1719   Mon Feb 08, 2021   1325 Patient reevaluated, discussed ED work-up and results including finding of left fibula fracture.  After discussion of pros and cons of narcotic pain medication, patient declines any narcotic prescription.  Plan for splinting, nonweightbearing on affected extremity, walker, prescribing Tylenol and ibuprofen for pain, PPI given patient's age and risk for gastric ulcers.  Patient will require close follow-up with primary care as well as with podiatry.    [JG]   5744 The patient was reexamined.  They have had symptomatic improvement during their ED stay.  I discussed today's findings with the patient, explaining the pertinent positives and negatives from today's visit, and the  plan of care.  Discussed plan for discharge as there is no emergent indication for admission.  Discussed limitation of the ED work-up and that this is to rule out life-threatening emergencies but that they could require further testing as determined by their primary care and or any referred specialist patient is agreeable and understands need for follow-up and repeat exam/testing.  Patient is aware that discharge does not mean there is nothing wrong, indicates no emergency is present, and that they must continue their care with their primary care physician and/or any referred specialist.  They were given appropriate follow-up with their primary care physician and/or specialist.  I had an extensive discussion on the expected clinical course and return precautions.  Patient understands to return to the emergency department for continuation, worsening, or new symptoms.  I answered any of the patient's questions. Patient was discharged home in a stable condition.        [JG]   1336 Patient is now requesting prescription for narcotic, will comply.    [JG]   1336 WILBERTO query complete. Treatment plan to include limited course of prescribed  controlled substance. Risks including addiction, benefits, and alternatives presented to patient.       [JG]      ED Course User Index  [JG] Manuel Caruso MD       AS OF 13:33 EST VITALS:    BP - 148/71  HR - 94  TEMP - 98.2 °F (36.8 °C)  O2 SATS - 98%    DIAGNOSIS  Final diagnoses:   Closed fracture of distal end of left fibula, unspecified fracture morphology, initial encounter         DISPOSITION  DISCHARGE    Patient discharged in stable condition.    Reviewed implications of results, diagnosis, meds, responsibility to follow up, warning signs and symptoms of possible worsening, potential complications and reasons to return to ER.    Patient/Family voiced understanding of above instructions.    Discussed plan for discharge, as there is no emergent indication for admission.  Patient referred to primary care provider for BP management due to today's BP. Pt/family is agreeable and understands need for follow up and repeat testing.  Pt is aware that discharge does not mean that nothing is wrong but it indicates no emergency is present that requires admission and they must continue care with follow-up as given below or physician of their choice.     FOLLOW-UP  Adeline Onofre MD  [address] [address]  [phone]    Schedule an appointment as soon as possible for a visit in 2 days  even if well    Jamaal Gallagher MD  [address] [address]  [phone]    Schedule an appointment as soon as possible for a visit in 2 days  for further evaluation/management of your fibula fracture         Medication List      New Prescriptions    ibuprofen 600 MG tablet  Commonly known as: ADVIL,MOTRIN  Take 1 tablet by mouth Every 6 (Six) Hours As Needed for Mild Pain .        Changed    acetaminophen 500 MG tablet  Commonly known as: TYLENOL  Take 2 tablets by mouth Every 8 (Eight) Hours As Needed for Mild Pain .  What changed:   · medication strength  · how much to take  · when to take this     * pantoprazole 40 MG EC tablet  Commonly known as: PROTONIX  Take 1 tablet by mouth Daily for 14 days.  What changed: You were already taking a medication with the same name, and this prescription was added. Make sure you understand how and when to take each.     * pantoprazole 40 MG EC tablet  Commonly known as: PROTONIX  Take 1 tablet by mouth Daily.  What changed: Another medication with the same name was added. Make sure you understand how and when to take each.         * This list has 2 medication(s) that are the same as other medications prescribed for you. Read the directions carefully, and ask your doctor or other care provider to review them with you.               Where to Get Your Medications      These medications were sent to [id number] [place] - [address] AT [facility] [place] [address] [address] - [phone] [facility] [phone] FX  [address], [facility] [place] [address]    Hours: 24-hours Phone: [phone]   · acetaminophen 500 MG tablet  · ibuprofen 600 MG tablet  · pantoprazole 40 MG EC tablet  · pantoprazole 40 MG EC tablet            Manuel Caruso MD  02/08/21 2453       Manuel Caruso MD  02/08/21 5522

## 2021-02-08 NOTE — ED NOTES
Pt states he tripped over his electric vacuum last night, fell to L side and rolled L ankle, pain, edema, bruising to L foot. + distal pulses present, taking tylenol at home for pain with some relief.     Patient was placed in face mask in first look. Patient was wearing facemask when I entered the room and throughout our encounter. I wore full protective equipment throughout this patient encounter including a face mask, eye shield, and gloves. Hand hygiene was performed before donning protective equipment and after removal when leaving the room.       Jaylin Flores, RN  02/08/21 6004

## 2021-02-08 NOTE — ED PROVIDER NOTES
EMERGENCY DEPARTMENT ENCOUNTER    Room Number:  30/30  Date seen:  2/8/2021  Time seen: 13:36 EST  PCP: Adeline Onofre MD  Historian: Patient    HPI:  Chief complaint:Fall  A complete HPI/ROS/PMH/PSH/SH/FH are unobtainable due to: None  Context:Eugenio Joe is a 80 y.o. male, who presents to the ED with c/o chasing an electric vacuum  last night when he tripped and fell, injuring his left lower leg. Denies hitting his head or any other injuries. Now in the ER due to persistent left lower leg pain with associated swelling. Pain is worse with ambulation and improved with rest. Denies any skin injury, denies any weakness or numbness.     Patient was placed in face mask in first look. Patient was wearing facemask when I entered the room and throughout our encounter. I wore full protective equipment throughout this patient encounter including a face mask, goggles, and gloves. Hand hygiene was performed before donning protective equipment and after removal when leaving the room.    MEDICAL RECORD REVIEW      ALLERGIES  Other and Oxycodone    PAST MEDICAL HISTORY  Active Ambulatory Problems     Diagnosis Date Noted   • Quadriceps weakness 04/08/2016   • ACL tear 04/08/2016   • Knee pain, right 04/08/2016   • S/P CABG x 3 04/18/2016   • Essential hypertension 04/18/2016   • Hyperlipemia 04/18/2016   • Hallux rigidus 07/11/2016   • Fracture, stress, metatarsal 07/11/2016   • Osteopenia determined by x-ray 07/11/2016   • Metatarsal stress fracture with nonunion 08/10/2016   • Left hip pain 08/26/2016   • Bursitis of left hip 08/26/2016   • Pulmonary embolism (CMS/[facility]) 10/12/2016   • DALILA (acute kidney injury) (CMS/[facility]) 10/12/2016   • Type 2 diabetes mellitus with hyperglycemia, without long-term current use of insulin (CMS/[facility]) 10/12/2016   • Ascending aorta dilatation (CMS/[facility]) 10/12/2016   • Pulmonary nodule, left 10/12/2016   • Right leg DVT (CMS/[facility]) 10/13/2016   • Acute renal failure (CMS/[facility]) 01/18/2017   •  Hypotension 01/18/2017   • Dehydration 01/18/2017   • Hypercalcemia 01/18/2017   • Dysphagia 01/21/2017   • Syncope and collapse 02/24/2017   • Coronary artery disease involving native coronary artery of native heart without angina pectoris 04/10/2017   • Normal pressure hydrocephalus (CMS/[facility]CC) 11/09/2017   • Headache 11/15/2017   • Impaired mobility 11/15/2017   • Nausea & vomiting 11/15/2017   • Fall at home 11/15/2017   • Transient cerebral ischemia 11/20/2017   • PFO (patent foramen ovale) 11/21/2017   • Esophageal dysphagia 03/20/2018   • TIA (transient ischemic attack) 06/23/2019   • Acute appendicitis with localized peritonitis, without perforation or abscess 07/18/2019   • Right lower quadrant abdominal pain 07/18/2019   • History of CVA (cerebrovascular accident) 07/18/2019   • On statin therapy 11/04/2019   • 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 11/26/2019   • Diverticulitis 01/28/2020   • Hx of appendectomy 01/28/2020   • Acute abdominal pain 01/28/2020   • Gait abnormality 01/29/2020   • Weakness 04/27/2020   • CVA (cerebral vascular accident) (CMS/[facility]) 09/05/2020   • Acute CVA (cerebrovascular accident) (CMS/[facility]) 09/07/2020   • Cerebrovascular accident (CVA) due to embolism of cerebral artery (CMS/[facility]) 11/11/2020     Resolved Ambulatory Problems     Diagnosis Date Noted   • Change in hearing 11/15/2017   • Hypomagnesemia 04/28/2020   • Hyponatremia 04/28/2020     Past Medical History:   Diagnosis Date   • Arthritis    • Asthma    • Brain abscess    • Bruise of face    • Cancer (CMS/[facility])    • Cardiac disease    • Coronary artery disease    • Diabetes mellitus (CMS/[facility])    • Difficulty in swallowing    • Difficulty walking    • DM2 (diabetes mellitus, type 2) (CMS/[facility]) 10/12/2016   • Gout several years ago   • Hearing aid worn    • Hydrocephaly (CMS/[facility])    • Hypertension    • Joint pain    • Low back pain several years ago   • Orbit fracture (CMS/[facility])    • Rash    • Stroke (cerebrum) (CMS/[facility])  09/05/2020       PAST SURGICAL HISTORY  Past Surgical History:   Procedure Laterality Date   • APPENDECTOMY N/A 7/18/2019    Procedure: APPENDECTOMY LAPAROSCOPIC;  Surgeon: Luis Carlos Rick Jr., MD;  Location: [facility] MAIN OR;  Service: General   • BRAIN SURGERY      BRAIN ABCESS 30 YR AGO   • CARDIAC CATHETERIZATION N/A 12/4/2020    Procedure: Patent foramen ovale closure ABBOTT;  Surgeon: Frank Pablo MD;  Location: [facility] CATH INVASIVE LOCATION;  Service: Cardiology;  Laterality: N/A;   • CARDIAC CATHETERIZATION N/A 12/4/2020    Procedure: Intracardiac echocardiogram;  Surgeon: Frank Pablo MD;  Location: [facility] CATH INVASIVE LOCATION;  Service: Cardiology;  Laterality: N/A;   • CARDIAC SURGERY     • COLONOSCOPY  2012    Ciciliano- normal per pt, no polyps    • CORONARY ARTERY BYPASS GRAFT     • ENDOSCOPY N/A 3/9/2017    Schatzki ring, dilated, LA Grade B esophagitis, HH, acquired duodenal stenosis   • ENDOSCOPY N/A 4/6/2018    candida, HH, torts, Schatzki ring, duodenal stenosis, dilatation perforemed, chronic inflammation   • ENDOSCOPY N/A 10/9/2019    White nummular lesions in esophageal mucosa, mild Schatzki ring, acquired duodenal stenosis, Path: Terrazas's, candida   • ENDOSCOPY N/A 1/8/2020    Procedure: ESOPHAGOGASTRODUODENOSCOPY with biopsies;  Surgeon: Rigoberto Walker MD;  Location: [facility] ENDOSCOPY;  Service: Gastroenterology   • FACIAL FRACTURE SURGERY      to repair 6 broken bones   • ORBITAL FRACTURE OPEN REDUCTION INTERNAL FIXATION Right 1/13/2017    Procedure: RT ORBIT FLOOR FRACTURE REPAIR RIGHT ZMC FRACTURE REPAIR, RIGHT NASAL BONE FRACTURE CLOSED REDUCTION;  Surgeon: Edil Lester MD;  Location: [facility] OR [facility];  Service:    • ORIF FOOT FRACTURE Right 9/9/2016    Procedure: RIGHT SECOND METATARSAL OPEN REDUCTION INTERNAL FIXATION WITh GRAFT FROM HEEL ;  Surgeon: Man Jenkins MD;  Location: [facility] OR [facility] [place] [facility];  Service:    • SEPTOPLASTY     • SKIN CANCER  EXCISION     • TONSILLECTOMY         FAMILY HISTORY  Family History   Problem Relation Age of Onset   • Heart disease Mother    • Hypertension Mother    • Stroke Mother    • Diverticulitis Mother    • Heart disease Father    • Hypertension Father    • Malig Hyperthermia Neg Hx        SOCIAL HISTORY  Social History     Socioeconomic History   • Marital status:      Spouse name: Not on file   • Number of children: 2   • Years of education: 16   • Highest education level: Not on file   Occupational History   • Occupation: retired   Tobacco Use   • Smoking status: Former Smoker     Packs/day: 3.00     Years: 5.00     Pack years: 15.00     Types: Cigarettes     Start date:      Quit date:      Years since quittin.1   • Smokeless tobacco: Never Used   • Tobacco comment: Quit cold turkey   Substance and Sexual Activity   • Alcohol use: Yes     Alcohol/week: 2.0 standard drinks     Types: 1 Cans of beer, 1 Shots of liquor per week     Comment: RARELY    • Drug use: No   • Sexual activity: Defer     Partners: Female     Birth control/protection: Surgical       REVIEW OF SYSTEMS  Review of Systems    All systems reviewed and negative except for those discussed in HPI.     PHYSICAL EXAM    ED Triage Vitals   Temp Heart Rate Resp BP SpO2   21 1210 21 1210 21 1209 21 1210 21 1210   98.2 °F (36.8 °C) 93 18 (!) 190/86 98 %      Temp src Heart Rate Source Patient Position BP Location FiO2 (%)   -- -- -- -- --            Physical Exam    I have reviewed the triage vital signs and nursing notes.      GENERAL: not distressed  HENT: nares patent  EYES: no scleral icterus  NECK: no ROM limitations  CV: regular rhythm, regular rate  RESPIRATORY: normal effort; ctab  ABDOMEN: soft; nontender  MUSCULOSKELETAL: Left lower extremity: pt has tenderness and moderate swelling to left lateral malleolus, tenderness and swelling on the dorsum of the foot; no left calf tenderness, skin is intact,  able to move toes, sensation is intact to light touch; 2+ DP pulses to LLE; 2+ capillary refill  NEURO: alert, moves all extremities, follows commands  SKIN: warm, dry    LAB RESULTS  No results found for this or any previous visit (from the past 24 hour(s)).      RADIOLOGY RESULTS  XR Foot 3+ View Left   Final Result       Left fibula fracture.               This report was finalized on 2/8/2021 1:10 PM by Dr. Prabhjot Mcarthur M.D.          XR Ankle 3+ View Left   Final Result       Left fibula fracture.               This report was finalized on 2/8/2021 1:10 PM by Dr. Prabhjot Mcarthur M.D.          XR Tibia Fibula 2 View Left   Final Result       Left fibula fracture.               This report was finalized on 2/8/2021 1:10 PM by Dr. Prabhjot Mcarthur M.D.                PROGRESS, DATA ANALYSIS, CONSULTS AND MEDICAL DECISION MAKING  All labs have been independently reviewed by me.  All radiology studies have been reviewed by me and discussed with radiologist dictating the report. Discussion below represents my analysis of pertinent findings related to patient's condition, differential diagnosis, treatment plan and final disposition.     ED Course as of Feb 08 1632   Mon Feb 08, 2021   1325 Patient reevaluated, discussed ED work-up and results including finding of left fibula fracture.  After discussion of pros and cons of narcotic pain medication, patient declines any narcotic prescription.  Plan for splinting, nonweightbearing on affected extremity, walker, prescribing Tylenol and ibuprofen for pain, PPI given patient's age and risk for gastric ulcers.  Patient will require close follow-up with primary care as well as with podiatry.    [JG]   1332 The patient was reexamined.  They have had symptomatic improvement during their ED stay.  I discussed today's findings with the patient, explaining the pertinent positives and negatives from today's visit, and the plan of care.  Discussed plan for discharge  as there is no emergent indication for admission.  Discussed limitation of the ED work-up and that this is to rule out life-threatening emergencies but that they could require further testing as determined by their primary care and or any referred specialist patient is agreeable and understands need for follow-up and repeat exam/testing.  Patient is aware that discharge does not mean there is nothing wrong, indicates no emergency is present, and that they must continue their care with their primary care physician and/or any referred specialist.  They were given appropriate follow-up with their primary care physician and/or specialist.  I had an extensive discussion on the expected clinical course and return precautions.  Patient understands to return to the emergency department for continuation, worsening, or new symptoms.  I answered any of the patient's questions. Patient was discharged home in a stable condition.        [JG]   1336 Patient is now requesting prescription for narcotic, will comply.    [JG]   1336 WILBERTO query complete. Treatment plan to include limited course of prescribed  controlled substance. Risks including addiction, benefits, and alternatives presented to patient.       [JG]      ED Course User Index  [JG] Manuel Caruso MD       The differential diagnosis include but are not limited to tibial plateau fracture, ankle fracture, ankle sprain, dislocation.       Reviewed pt's history and workup with Dr. Caruso.  After a bedside evaluation, Dr. Caruso agrees with the plan of care.    (FOR DISCHARGE)The patient's history, physical exam, and lab findings were discussed with the physician, who also performed a face to face history and physical exam.  I discussed all results and noted any abnormalities with patient.  Discussed absoute need to recheck abnormalities with their family physician.  I answered any of the patient's questions.  Discussed plan for discharge, as there is no emergent  "indication for admission.  Pt is agreeable and understands need for follow up and repeat testing.  Pt is aware that discharge does not mean that nothing is wrong but it indicates no emergency is present and they must continue care with their family physician.  Pt is discharged with instructions to follow up with primary care doctor to have their blood pressure rechecked.         Disposition vitals:  /71   Pulse 94   Temp 98.2 °F (36.8 °C)   Resp 18   Ht 177.8 cm (70\")   Wt 79.4 kg (175 lb)   SpO2 98%   BMI 25.11 kg/m²       DIAGNOSIS  Final diagnoses:   Closed fracture of distal end of left fibula, unspecified fracture morphology, initial encounter       FOLLOW UP   Adeline Onofre MD  [address] [id number] [phone]    Schedule an appointment as soon as possible for a visit in 2 days  even if well    Jamaal Gallagher MD  [address] [address]  [phone]    Schedule an appointment as soon as possible for a visit in 2 days  for further evaluation/management of your fibula fracture         nAa Laura Forrest PA-C  02/08/21 1632    "

## 2021-02-09 ENCOUNTER — TELEPHONE (OUTPATIENT)
Dept: ORTHOPEDIC SURGERY | Facility: CLINIC | Age: 81
End: 2021-02-09

## 2021-02-09 NOTE — TELEPHONE ENCOUNTER
I can see tomorrow morning in office but patient will need to be prepared to wait.  Please schedule him around 840.

## 2021-02-10 ENCOUNTER — OFFICE VISIT (OUTPATIENT)
Dept: ORTHOPEDIC SURGERY | Facility: CLINIC | Age: 81
End: 2021-02-10

## 2021-02-10 VITALS — BODY MASS INDEX: 25.05 KG/M2 | TEMPERATURE: 98.3 F | HEIGHT: 70 IN | WEIGHT: 175 LBS

## 2021-02-10 DIAGNOSIS — S82.62XA CLOSED DISPLACED FRACTURE OF LATERAL MALLEOLUS OF LEFT FIBULA, INITIAL ENCOUNTER: Primary | ICD-10-CM

## 2021-02-10 PROCEDURE — 99204 OFFICE O/P NEW MOD 45 MIN: CPT | Performed by: ORTHOPAEDIC SURGERY

## 2021-02-10 RX ORDER — CEFAZOLIN SODIUM 2 G/100ML
2 INJECTION, SOLUTION INTRAVENOUS ONCE
Status: CANCELLED | OUTPATIENT
Start: 2021-02-16 | End: 2021-02-10

## 2021-02-10 NOTE — PROGRESS NOTES
New Patient Complaint      Patient: Eugenio Joe  YOB: 1940 80 y.o. male  Medical Record Number: [id number]    Chief Complaints: I hurt my ankle    History of Present Illness:   Patient injured his left ankle on 2/8/2020 when he got caught up trying to get the vacuum.  He injured his left ankle and was seen in the emergency room.  Is been using a boot and elevating and wheelchair.  He reports moderate aching pain in the lateral aspect much more so medially of the left ankle.    I treated him in the past for right second metatarsal nonunion in 2016 and he had a subsequent DVT and was anticoagulated till 2017 and has been off it since then.    He did have a history of a stroke back in the fall and had a closure of his PFO and is on Plavix now.  Said he is followed through cardiology by Dr. Ford he is diabetic treated with oral medication.    HPI    Allergies:   Allergies   Allergen Reactions   • Other Mental Status Change and Hallucinations     Oxy drugs   • Oxycodone Mental Status Change       Medications:   Current Outpatient Medications on File Prior to Visit   Medication Sig   • Accu-Chek FastClix Lancets misc TEST ONCE TO BID PRN   • acetaminophen (TYLENOL) 500 MG tablet Take 2 tablets by mouth Every 8 (Eight) Hours As Needed for Mild Pain .   • allopurinol (ZYLOPRIM) 300 MG tablet Take 300 mg by mouth daily.   • aspirin 81 MG chewable tablet Chew 81 mg Daily.   • atorvastatin (LIPITOR) 40 MG tablet Take 1 tablet by mouth Every Night.   • betamethasone dipropionate (DIPROLENE) 0.05 % lotion APPLY TOPICALLY TO RASH ON ARMS AND LEGS TWICE DAILY   • clopidogrel (PLAVIX) 75 MG tablet Take 1 tablet by mouth Daily.   • doxazosin (CARDURA) 1 MG tablet Take 1 mg by mouth Daily.   • glipizide (GLUCOTROL) 5 MG tablet TK 1 T PO B THE FARZAD MEAL   • glucose blood (FREESTYLE LITE) test strip 1 each by Other route See Admin Instructions. Use 1 to 2 times daily as instructed   • ibuprofen (ADVIL,MOTRIN) 600  MG tablet Take 1 tablet by mouth Every 6 (Six) Hours As Needed for Mild Pain .   • lisinopril (PRINIVIL,ZESTRIL) 10 MG tablet Take 10 mg by mouth 2 (Two) Times a Day.   • metFORMIN (GLUCOPHAGE) 500 MG tablet Take 500 mg by mouth Daily With Breakfast.   • Multiple Vitamins-Minerals (MULTIVITAMIN ADULT PO) Take 1 tablet by mouth Daily.   • pantoprazole (PROTONIX) 40 MG EC tablet Take 1 tablet by mouth Daily for 14 days.   • pantoprazole (PROTONIX) 40 MG EC tablet Take 1 tablet by mouth Daily.   • PROAIR  (90 BASE) MCG/ACT inhaler 2 puffs Every 4 (Four) Hours As Needed.   • Vitamin D, Cholecalciferol, (CHOLECALCIFEROL) 400 units tablet Take 50 Units by mouth Daily.   • HYDROcodone-acetaminophen (NORCO) 5-325 MG per tablet Take 1 tablet by mouth Every 6 (Six) Hours As Needed for Moderate Pain .     No current facility-administered medications on file prior to visit.        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 SWEAT GLAND    • Cardiac disease    • Coronary artery disease     CABG 2012   • Diabetes mellitus (CMS/HCC)    • Difficulty in swallowing    • Difficulty walking    • DM2 (diabetes mellitus, type 2) (CMS/HCC) 10/12/2016   • Gout several years ago    medication  working   • Hearing aid worn     bilateral   • Hx of appendectomy    • Hydrocephaly (CMS/HCC)    • Hyperlipemia    • Hypertension    • Joint pain     or swelling   • Low back pain several years ago   • Orbit fracture (CMS/HCC)    • Pulmonary embolism (CMS/HCC)     OCT 2016   • Rash     ARM-    • Stroke (cerebrum) (CMS/HCC) 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   • TIA (transient ischemic attack)      Past Surgical History:   Procedure Laterality Date   • APPENDECTOMY N/A 7/18/2019    Procedure: APPENDECTOMY LAPAROSCOPIC;  Surgeon: Luis Carlos Rick Jr., MD;  Location: [facility] OR;  Service: General   • BRAIN SURGERY      BRAIN  ABCESS 30 YR AGO   • CARDIAC CATHETERIZATION N/A 12/4/2020    Procedure: Patent foramen ovale closure ABBOTT;  Surgeon: Frank Pablo MD;  Location: [facility] CATH INVASIVE LOCATION;  Service: Cardiology;  Laterality: N/A;   • CARDIAC CATHETERIZATION N/A 12/4/2020    Procedure: Intracardiac echocardiogram;  Surgeon: Frank Pablo MD;  Location: [facility] CATH INVASIVE LOCATION;  Service: Cardiology;  Laterality: N/A;   • CARDIAC SURGERY     • COLONOSCOPY  2012    Ciciliano- normal per pt, no polyps    • CORONARY ARTERY BYPASS GRAFT     • ENDOSCOPY N/A 3/9/2017    Schatzki ring, dilated, LA Grade B esophagitis, HH, acquired duodenal stenosis   • ENDOSCOPY N/A 4/6/2018    candida, HH, torts, Schatzki ring, duodenal stenosis, dilatation perforemed, chronic inflammation   • ENDOSCOPY N/A 10/9/2019    White nummular lesions in esophageal mucosa, mild Schatzki ring, acquired duodenal stenosis, Path: Terrazas's, candida   • ENDOSCOPY N/A 1/8/2020    Procedure: ESOPHAGOGASTRODUODENOSCOPY with biopsies;  Surgeon: Rigoberto Walker MD;  Location: [facility] ENDOSCOPY;  Service: Gastroenterology   • FACIAL FRACTURE SURGERY      to repair 6 broken bones   • ORBITAL FRACTURE OPEN REDUCTION INTERNAL FIXATION Right 1/13/2017    Procedure: RT ORBIT FLOOR FRACTURE REPAIR RIGHT ZMC FRACTURE REPAIR, RIGHT NASAL BONE FRACTURE CLOSED REDUCTION;  Surgeon: Edil Lester MD;  Location: [facility] OR [facility] [place];  Service:    • ORIF FOOT FRACTURE Right 9/9/2016    Procedure: RIGHT SECOND METATARSAL OPEN REDUCTION INTERNAL FIXATION WITh GRAFT FROM HEEL ;  Surgeon: Man Jenkins MD;  Location: [facility] OR [facility] [place];  Service:    • SEPTOPLASTY     • SKIN CANCER EXCISION     • TONSILLECTOMY       Social History     Occupational History   • Occupation: retired   Tobacco Use   • Smoking status: Former Smoker     Packs/day: 3.00     Years: 5.00     Pack years: 15.00     Types: Cigarettes     Start date: 1957     Quit date: 1961     Years  "since quittin.1   • Smokeless tobacco: Never Used   • Tobacco comment: Quit cold turkey   Substance and Sexual Activity   • Alcohol use: Yes     Alcohol/week: 2.0 standard drinks     Types: 1 Cans of beer, 1 Shots of liquor per week     Comment: RARELY    • Drug use: No   • Sexual activity: Defer     Partners: Female     Birth control/protection: Surgical      Social History     Social History Narrative   • Not on file     Family History   Problem Relation Age of Onset   • Heart disease Mother    • Hypertension Mother    • Stroke Mother    • Diverticulitis Mother    • Heart disease Father    • Hypertension Father    • Malig Hyperthermia Neg Hx        Review of Systems: 14 point review of systems performed, positive pertinent findings identified in HPI. All remaining systems negative     Review of Systems      Physical Exam:   Vitals:    02/10/21 0900   Temp: 98.3 °F (36.8 °C)   TempSrc: Temporal   Weight: 79.4 kg (175 lb)   Height: 177.8 cm (70\")     Physical Exam   Constitutional: pleasant, well developed He is with his wife  Eyes: sclera non icteric  Hearing : adequate for exam  Cardiovascular: palpable pulses in left foot, left calf/ thigh NT without sign of DVT  Respiratoy: breathing unlabored   Neurological: grossly sensate to LT throughout left LE  Psychiatric: oriented with normal mood and affect.   Lymphatic: No palpable popliteal lymphadenopathy left LE  Skin: intact throughout left leg/foot  Musculoskeletal: Left ankle shows mild swelling and ecchymosis but no blistering.  There is minimal discomfort medially moderate discomfort laterally.  He was nontender about the knee or the foot.  Physical Exam  Ortho Exam    Radiology: 3 views of the left ankle, 3 views of the left foot, and 2 views of the left tib-fib reviewed on the Confucianist system from 2021.  I do not see any obvious fracture at the foot.  There is some slight hammering of the second toe and some mild arthritic change of the first MTP " joint.  No obvious fracture of the proximal two thirds of the tib-fib as imaged.  Ankle x-rays however show plantar and posterior calcaneal spurring with prominent superior calcaneal tuberosity.  There is an oblique fracture of the distal fibula with some slight lateral displacement to this but talus appears to be well seated within the mortise.    Assessment/Plan: 1.  Left distal fibula fracture with possible syndesmotic injury.    We discussed operative and nonoperative treatment options and had difficulty healing his previous second metatarsal fracture with nonunion and the fact he is diabetic also makes it harder to heal.    Although there guarantees could be given in voicing a clear understanding of operative and nonoperative treatment options mutual decision was made proceed with operative treatment.    Plan will be for open reduction internal fixation with plate fixation and possible tight rope.    Risk benefits potential outcomes and complications can include but not limited to heart attack stroke death pneumonia infection bleeding damage to blood vessels nerves or tendons blood clots pulmonary embolism persistent or worsening pain stiffness malunion nonunion need for subsequent surgery and wound healing complications as well as failure to return to presurgery and precondition levels of activity.    He was fitted with Ace wrap's gently applied from the toes to the upper calf and with a better fitted boot and has a wheelchair and remain nonweightbearing and to continue elevation.    He is due to check with cardiology and/or primary care provider with him he said he has an appointment tomorrow is to make sure he can come off the Plavix prior to surgery.    If medically he is unable to have surgery then we would have to continue with nonoperative treatment but I think this would be less than optimal.

## 2021-02-11 ENCOUNTER — TELEPHONE (OUTPATIENT)
Dept: ORTHOPEDIC SURGERY | Facility: CLINIC | Age: 81
End: 2021-02-11

## 2021-02-11 ENCOUNTER — TRANSCRIBE ORDERS (OUTPATIENT)
Dept: ADMINISTRATIVE | Facility: HOSPITAL | Age: 81
End: 2021-02-11

## 2021-02-11 ENCOUNTER — TELEPHONE (OUTPATIENT)
Dept: CARDIOLOGY | Facility: CLINIC | Age: 81
End: 2021-02-11

## 2021-02-11 DIAGNOSIS — Z01.818 OTHER SPECIFIED PRE-OPERATIVE EXAMINATION: Primary | ICD-10-CM

## 2021-02-11 NOTE — TELEPHONE ENCOUNTER
Dr. Jenkins's office called today about this patient.  He fell and broke his ankle recently and needs surgery.  They have him scheduled for this Tuesday 2/19/21.  Is it ok to hold the plavix 5 days prior. He is also on 81mg asa.    He's a recent PFO closure on 1/11/21 with history of CVA x2.      Please advise.    Thank you,  Louisa Duarte RN  [facility]  Triage

## 2021-02-11 NOTE — TELEPHONE ENCOUNTER
Message left for Raysa at Dr. Jenkins's office with approval of holding the plavix 5 days prior to surgery.  Requested call back for any questions or concerns.    Thank you,  Louisa Duarte RN  [facility]  Triage

## 2021-02-11 NOTE — TELEPHONE ENCOUNTER
Raysa L/M again re: Cardiac Clearance.    S/W Raysa and she advised she just seen the note in Epic and is good to go. Raysa verbalized appreciation for CB.    ED Krishnamurthy

## 2021-02-11 NOTE — TELEPHONE ENCOUNTER
Spoke with the patient and his wife.  Of advised him that cardiology has okayed him to stop the Plavix 5 days prior to surgery.  Patient does have dysphagia and uses a thickener with all liquids.  Have advised her that it is okay to continue with the thickened liquids until midnight the night before surgery.  Have reinforced to the patient's wife and the patient that he would be nonweightbearing for 4 to 6 weeks after surgery.  Will need to make arrangements to get him a wheelchair with elevated leg rest as a possible slide board for transfers

## 2021-02-11 NOTE — TELEPHONE ENCOUNTER
I have left a message with Dr. Ford's assistant to see if it is okay for patient to come off his Plavix 5 days prior to surgery

## 2021-02-12 ENCOUNTER — APPOINTMENT (OUTPATIENT)
Dept: PREADMISSION TESTING | Facility: HOSPITAL | Age: 81
End: 2021-02-12

## 2021-02-12 VITALS
HEART RATE: 78 BPM | RESPIRATION RATE: 16 BRPM | HEIGHT: 70 IN | WEIGHT: 175 LBS | OXYGEN SATURATION: 99 % | DIASTOLIC BLOOD PRESSURE: 81 MMHG | BODY MASS INDEX: 25.05 KG/M2 | TEMPERATURE: 97.1 F | SYSTOLIC BLOOD PRESSURE: 181 MMHG

## 2021-02-12 LAB
ANION GAP SERPL CALCULATED.3IONS-SCNC: 12.5 MMOL/L (ref 5–15)
BUN SERPL-MCNC: 20 MG/DL (ref 8–23)
BUN/CREAT SERPL: 16.8 (ref 7–25)
CALCIUM SPEC-SCNC: 9.3 MG/DL (ref 8.6–10.5)
CHLORIDE SERPL-SCNC: 104 MMOL/L (ref 98–107)
CO2 SERPL-SCNC: 22.5 MMOL/L (ref 22–29)
CREAT SERPL-MCNC: 1.19 MG/DL (ref 0.76–1.27)
DEPRECATED RDW RBC AUTO: 47.8 FL (ref 37–54)
ERYTHROCYTE [DISTWIDTH] IN BLOOD BY AUTOMATED COUNT: 13.6 % (ref 12.3–15.4)
GFR SERPL CREATININE-BSD FRML MDRD: 59 ML/MIN/1.73
GLUCOSE SERPL-MCNC: 227 MG/DL (ref 65–99)
HCT VFR BLD AUTO: 39.6 % (ref 37.5–51)
HGB BLD-MCNC: 12.5 G/DL (ref 13–17.7)
MCH RBC QN AUTO: 30 PG (ref 26.6–33)
MCHC RBC AUTO-ENTMCNC: 31.6 G/DL (ref 31.5–35.7)
MCV RBC AUTO: 95 FL (ref 79–97)
PLATELET # BLD AUTO: 182 10*3/MM3 (ref 140–450)
PMV BLD AUTO: 10.7 FL (ref 6–12)
POTASSIUM SERPL-SCNC: 4 MMOL/L (ref 3.5–5.2)
RBC # BLD AUTO: 4.17 10*6/MM3 (ref 4.14–5.8)
SODIUM SERPL-SCNC: 139 MMOL/L (ref 136–145)
WBC # BLD AUTO: 5.53 10*3/MM3 (ref 3.4–10.8)

## 2021-02-12 PROCEDURE — 36415 COLL VENOUS BLD VENIPUNCTURE: CPT

## 2021-02-12 PROCEDURE — 85027 COMPLETE CBC AUTOMATED: CPT

## 2021-02-12 PROCEDURE — 80048 BASIC METABOLIC PNL TOTAL CA: CPT

## 2021-02-12 RX ORDER — CHOLECALCIFEROL (VITAMIN D3) 125 MCG
50 CAPSULE ORAL EVERY MORNING
COMMUNITY
End: 2022-01-01

## 2021-02-12 NOTE — DISCHARGE INSTRUCTIONS
PLEASE ARRIVE AT 11AM ON 2/16/2021      Take the following medications the morning of surgery:  CARDURA, NORCO AND PROAIR IF NEEDED    ***VERIFY STOPPING ASPIRIN WITH MD***    If you are on prescription narcotic pain medication to control your pain you may also take that medication the morning of surgery.    General Instructions:  • Do not eat solid food after midnight the night before surgery.  • You may drink clear liquids day of surgery but must stop at least one hour before your hospital arrival time.  • It is beneficial for you to have a clear drink that contains carbohydrates the day of surgery.  We suggest a 12 to 20 ounce bottle of Gatorade or Powerade for non-diabetic patients or a 12 to 20 ounce bottle of G2 or Powerade Zero for diabetic patients. (Pediatric patients, are not advised to drink a 12 to 20 ounce carbohydrate drink)    Clear liquids are liquids you can see through.  Nothing red in color.     Plain water                               Sports drinks  Sodas                                   Gelatin (Jell-O)  Fruit juices without pulp such as white grape juice and apple juice  Popsicles that contain no fruit or yogurt  Tea or coffee (no cream or milk added)  Gatorade / Powerade  G2 / Powerade Zero    • Infants may have breast milk up to four hours before surgery.  • Infants drinking formula may drink formula up to six hours before surgery.   • Patients who avoid smoking, chewing tobacco and alcohol for 4 weeks prior to surgery have a reduced risk of post-operative complications.  Quit smoking as many days before surgery as you can.  • Do not smoke, use chewing tobacco or drink alcohol the day of surgery.   • If applicable bring your C-PAP/ BI-PAP machine.  • Bring any papers given to you in the doctor’s office.  • Wear clean comfortable clothes.  • Do not wear contact lenses, false eyelashes or make-up.  Bring a case for your glasses.   • Bring crutches or walker if applicable.  • Remove all  piercings.  Leave jewelry and any other valuables at home.  • Hair extensions with metal clips must be removed prior to surgery.  • The Pre-Admission Testing nurse will instruct you to bring medications if unable to obtain an accurate list in Pre-Admission Testing.            Preventing a Surgical Site Infection:  • For 2 to 3 days before surgery, avoid shaving with a razor because the razor can irritate skin and make it easier to develop an infection.    • Any areas of open skin can increase the risk of a post-operative wound infection by allowing bacteria to enter and travel throughout the body.  Notify your surgeon if you have any skin wounds / rashes even if it is not near the expected surgical site.  The area will need assessed to determine if surgery should be delayed until it is healed.  • The night prior to surgery shower using a fresh bar of anti-bacterial soap (such as Dial) and clean washcloth.  Sleep in a clean bed with clean clothing.  Do not allow pets to sleep with you.  • Shower on the morning of surgery using a fresh bar of anti-bacterial soap (such as Dial) and clean washcloth.  Dry with a clean towel and dress in clean clothing.  • Ask your surgeon if you will be receiving antibiotics prior to surgery.  • Make sure you, your family, and all healthcare providers clean their hands with soap and water or an alcohol based hand  before caring for you or your wound.    Day of surgery:  Your arrival time is approximately two hours before your scheduled surgery time.  Upon arrival, a Pre-op nurse and Anesthesiologist will review your health history, obtain vital signs, and answer questions you may have.  The only belongings needed at this time will be a list of your home medications and if applicable your C-PAP/BI-PAP machine.  A Pre-op nurse will start an IV and you may receive medication in preparation for surgery, including something to help you relax.     Please be aware that surgery does  come with discomfort.  We want to make every effort to control your discomfort so please discuss any uncontrolled symptoms with your nurse.   Your doctor will most likely have prescribed pain medications.      If you are going home after surgery you will receive individualized written care instructions before being discharged.  A responsible adult must drive you to and from the hospital on the day of your surgery and stay with you for 24 hours.  Discharge prescriptions can be filled by the hospital pharmacy during regular pharmacy hours.  If you are having surgery late in the day/evening your prescription may be e-prescribed to your pharmacy.  Please verify your pharmacy hours or chose a 24 hour pharmacy to avoid not having access to your prescription because your pharmacy has closed for the day.    If you are staying overnight following surgery, you will be transported to your hospital room following the recovery period.  [facility] has all private rooms.    If you have any questions please call Pre-Admission Testing at [phone].  Deductibles and co-payments are collected on the day of service. Please be prepared to pay the required co-pay, deductible or deposit on the day of service as defined by your plan.    Patient Education for Self-Quarantine Process    Following your COVID testing, we strongly recommend that you do not leave your home after you have been tested for COVID except to get medical care. This includes not going to work, school or to public areas.  If this is not possible for you to do please limit your activities to only required outings.  Be sure to wear a mask when you are with other people, practice social distancing and wash your hands frequently.      The following items provide additional details to keep you safe.  • Wash your hands with soap and water frequently for at least 20 seconds.   • Avoid touching your eyes, nose and mouth with unwashed hands.  • Do not share  anything - utensils, towels, food from the same bowl.   • Have your own utensils, drinking glass, dishes, towels and bedding.   • Do not have visitors.   • Do use FaceTime to stay in touch with family and friends.  • You should stay in a specific room away from others if possible.   • Stay at least 6 feet away from others in the home if you cannot have a dedicated room to yourself.   • Do not snuggle with your pet. While the CDC says there is no evidence that pets can spread COVID-19 or be infected from humans, it is probably best to avoid “petting, snuggling, being kissed or licked and sharing food (during self-quarantine)”, according to the CDC.   • Sanitize household surfaces daily. Include all high touch areas (door handles, light switches, phones, countertops, etc.)  • Do not share a bathroom with others, if possible.   • Wear a mask around others in your home if you are unable to stay in a separate room or 6 feet apart. If  you are unable to wear a mask, have your family member wear a mask if they must be within 6 feet of you.   Call your surgeon immediately if you experience any of the following symptoms:  • Sore Throat  • Shortness of Breath or difficulty breathing  • Cough  • Chills  • Body soreness or muscle pain  • Headache  • Fever  • New loss of taste or smell  • Do not arrive for your surgery ill.  Your procedure will need to be rescheduled to another time.  You will need to call your physician before the day of surgery to avoid any unnecessary exposure to hospital staff as well as other patients.

## 2021-02-13 ENCOUNTER — LAB (OUTPATIENT)
Dept: LAB | Facility: HOSPITAL | Age: 81
End: 2021-02-13

## 2021-02-13 DIAGNOSIS — Z01.818 OTHER SPECIFIED PRE-OPERATIVE EXAMINATION: ICD-10-CM

## 2021-02-13 PROCEDURE — U0004 COV-19 TEST NON-CDC HGH THRU: HCPCS

## 2021-02-13 PROCEDURE — U0005 INFEC AGEN DETEC AMPLI PROBE: HCPCS

## 2021-02-13 PROCEDURE — C9803 HOPD COVID-19 SPEC COLLECT: HCPCS

## 2021-02-15 LAB — SARS-COV-2 RNA RESP QL NAA+PROBE: NOT DETECTED

## 2021-02-15 NOTE — H&P
Patient: Eugenio Joe  YOB: 1940 80 y.o. male  Medical Record Number: [id number]     Chief Complaints: I hurt my ankle     History of Present Illness:   Patient injured his left ankle on 2/8/2020 when he got caught up trying to get the vacuum.  He injured his left ankle and was seen in the emergency room.  Is been using a boot and elevating and wheelchair.  He reports moderate aching pain in the lateral aspect much more so medially of the left ankle.     I treated him in the past for right second metatarsal nonunion in 2016 and he had a subsequent DVT and was anticoagulated till 2017 and has been off it since then.     He did have a history of a stroke back in the fall and had a closure of his PFO and is on Plavix now.  Said he is followed through cardiology by Dr. Ford he is diabetic treated with oral medication.     HPI     Allergies:         Allergies   Allergen Reactions   • Other Mental Status Change and Hallucinations       Oxy drugs   • Oxycodone Mental Status Change         Medications:        Current Outpatient Medications on File Prior to Visit   Medication Sig   • Accu-Chek FastClix Lancets misc TEST ONCE TO BID PRN   • acetaminophen (TYLENOL) 500 MG tablet Take 2 tablets by mouth Every 8 (Eight) Hours As Needed for Mild Pain .   • allopurinol (ZYLOPRIM) 300 MG tablet Take 300 mg by mouth daily.   • aspirin 81 MG chewable tablet Chew 81 mg Daily.   • atorvastatin (LIPITOR) 40 MG tablet Take 1 tablet by mouth Every Night.   • betamethasone dipropionate (DIPROLENE) 0.05 % lotion APPLY TOPICALLY TO RASH ON ARMS AND LEGS TWICE DAILY   • clopidogrel (PLAVIX) 75 MG tablet Take 1 tablet by mouth Daily.   • doxazosin (CARDURA) 1 MG tablet Take 1 mg by mouth Daily.   • glipizide (GLUCOTROL) 5 MG tablet TK 1 T PO B THE FARZAD MEAL   • glucose blood (FREESTYLE LITE) test strip 1 each by Other route See Admin Instructions. Use 1 to 2 times daily as instructed   • ibuprofen (ADVIL,MOTRIN) 600 MG  tablet Take 1 tablet by mouth Every 6 (Six) Hours As Needed for Mild Pain .   • lisinopril (PRINIVIL,ZESTRIL) 10 MG tablet Take 10 mg by mouth 2 (Two) Times a Day.   • metFORMIN (GLUCOPHAGE) 500 MG tablet Take 500 mg by mouth Daily With Breakfast.   • Multiple Vitamins-Minerals (MULTIVITAMIN ADULT PO) Take 1 tablet by mouth Daily.   • pantoprazole (PROTONIX) 40 MG EC tablet Take 1 tablet by mouth Daily for 14 days.   • pantoprazole (PROTONIX) 40 MG EC tablet Take 1 tablet by mouth Daily.   • PROAIR  (90 BASE) MCG/ACT inhaler 2 puffs Every 4 (Four) Hours As Needed.   • Vitamin D, Cholecalciferol, (CHOLECALCIFEROL) 400 units tablet Take 50 Units by mouth Daily.   • HYDROcodone-acetaminophen (NORCO) 5-325 MG per tablet Take 1 tablet by mouth Every 6 (Six) Hours As Needed for Moderate Pain .      No current facility-administered medications on file prior to visit.          Medical History[]Expand by Default        Past Medical History:   Diagnosis Date   • Arthritis     • Asthma     • Brain abscess       at about age 44   • Bruise of face     • Cancer (CMS/[facility])       PT STATES IN HIS SWEAT GLAND    • Cardiac disease     • Coronary artery disease       CABG 2012   • Diabetes mellitus (CMS/HCC)     • Difficulty in swallowing     • Difficulty walking     • DM2 (diabetes mellitus, type 2) (CMS/HCC) 10/12/2016   • Gout several years ago     medication  working   • Hearing aid worn       bilateral   • Hx of appendectomy     • Hydrocephaly (CMS/HCC)     • Hyperlipemia     • Hypertension     • Joint pain       or swelling   • Low back pain several years ago   • Orbit fracture (CMS/HCC)     • Pulmonary embolism (CMS/HCC)       OCT 2016   • Rash       ARM-    • Stroke (cerebrum) (CMS/[facility]) 09/05/2020     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   • TIA (transient ischemic attack)          Surgical History[]Expand by Default         Past Surgical History:   Procedure Laterality Date   • APPENDECTOMY N/A 7/18/2019     Procedure: APPENDECTOMY  LAPAROSCOPIC;  Surgeon: Luis Carlos Rick Jr., MD;  Location: [facility] MAIN OR;  Service: General   • BRAIN SURGERY         BRAIN ABCESS 30 YR AGO   • CARDIAC CATHETERIZATION N/A 12/4/2020     Procedure: Patent foramen ovale closure ABBOTT;  Surgeon: Frank Pablo MD;  Location: [facility] CATH INVASIVE LOCATION;  Service: Cardiology;  Laterality: N/A;   • CARDIAC CATHETERIZATION N/A 12/4/2020     Procedure: Intracardiac echocardiogram;  Surgeon: Frank Pablo MD;  Location: [facility] CATH INVASIVE LOCATION;  Service: Cardiology;  Laterality: N/A;   • CARDIAC SURGERY       • COLONOSCOPY   2012     Ciciliano- normal per pt, no polyps    • CORONARY ARTERY BYPASS GRAFT       • ENDOSCOPY N/A 3/9/2017     Schatzki ring, dilated, LA Grade B esophagitis, HH, acquired duodenal stenosis   • ENDOSCOPY N/A 4/6/2018     candida, HH, torts, Schatzki ring, duodenal stenosis, dilatation perforemed, chronic inflammation   • ENDOSCOPY N/A 10/9/2019     White nummular lesions in esophageal mucosa, mild Schatzki ring, acquired duodenal stenosis, Path: Terrazas's, candida   • ENDOSCOPY N/A 1/8/2020     Procedure: ESOPHAGOGASTRODUODENOSCOPY with biopsies;  Surgeon: Rigoberto Walker MD;  Location: [facility] ENDOSCOPY;  Service: Gastroenterology   • FACIAL FRACTURE SURGERY         to repair 6 broken bones   • ORBITAL FRACTURE OPEN REDUCTION INTERNAL FIXATION Right 1/13/2017     Procedure: RT ORBIT FLOOR FRACTURE REPAIR RIGHT [facility] FRACTURE REPAIR, RIGHT NASAL BONE FRACTURE CLOSED REDUCTION;  Surgeon: Edil Lester MD;  [place] [facility] [place] [facility] [place]  Service:    • ORIF FOOT FRACTURE Right 9/9/2016     Procedure: RIGHT SECOND METATARSAL OPEN REDUCTION INTERNAL FIXATION WITh GRAFT FROM HEEL ;  Surgeon: Man Jenkins MD;  [place] [facility] [place] [facility] [place]  Service:    • SEPTOPLASTY       • SKIN CANCER EXCISION       • TONSILLECTOMY            Social History            Occupational History   • Occupation: retired   Tobacco Use   •  "Smoking status: Former Smoker       Packs/day: 3.00       Years: 5.00       Pack years: 15.00       Types: Cigarettes       Start date:        Quit date:        Years since quittin.1   • Smokeless tobacco: Never Used   • Tobacco comment: Quit cold turkey   Substance and Sexual Activity   • Alcohol use: Yes       Alcohol/week: 2.0 standard drinks       Types: 1 Cans of beer, 1 Shots of liquor per week       Comment: RARELY    • Drug use: No   • Sexual activity: Defer       Partners: Female       Birth control/protection: Surgical      Social History          Social History Narrative   • Not on file            Family History   Problem Relation Age of Onset   • Heart disease Mother     • Hypertension Mother     • Stroke Mother     • Diverticulitis Mother     • Heart disease Father     • Hypertension Father     • Malig Hyperthermia Neg Hx           Review of Systems: 14 point review of systems performed, positive pertinent findings identified in HPI. All remaining systems negative      Review of Systems        Physical Exam:   Vitals[]Expand by Default       Vitals:     02/10/21 0900   Temp: 98.3 °F (36.8 °C)   TempSrc: Temporal   Weight: 79.4 kg (175 lb)   Height: 177.8 cm (70\")        Physical Exam   Constitutional: pleasant, well developed He is with his wife  Eyes: sclera non icteric  Hearing : adequate for exam  Cardiovascular: palpable pulses in left foot, left calf/ thigh NT without sign of DVT  Respiratoy: breathing unlabored   Neurological: grossly sensate to LT throughout left LE  Psychiatric: oriented with normal mood and affect.   Lymphatic: No palpable popliteal lymphadenopathy left LE  Skin: intact throughout left leg/foot  Musculoskeletal: Left ankle shows mild swelling and ecchymosis but no blistering.  There is minimal discomfort medially moderate discomfort laterally.  He was nontender about the knee or the foot.  Physical Exam  Ortho Exam     Radiology: 3 views of the left ankle, 3 views " of the left foot, and 2 views of the left tib-fib reviewed on the Performance Indicator system from 2/8/2021.  I do not see any obvious fracture at the foot.  There is some slight hammering of the second toe and some mild arthritic change of the first MTP joint.  No obvious fracture of the proximal two thirds of the tib-fib as imaged.  Ankle x-rays however show plantar and posterior calcaneal spurring with prominent superior calcaneal tuberosity.  There is an oblique fracture of the distal fibula with some slight lateral displacement to this but talus appears to be well seated within the mortise.     Assessment/Plan: 1.  Left distal fibula fracture with possible syndesmotic injury.     We discussed operative and nonoperative treatment options and had difficulty healing his previous second metatarsal fracture with nonunion and the fact he is diabetic also makes it harder to heal.     Although there guarantees could be given in voicing a clear understanding of operative and nonoperative treatment options mutual decision was made proceed with operative treatment.     Plan will be for open reduction internal fixation with plate fixation and possible tight rope.     Risk benefits potential outcomes and complications can include but not limited to heart attack stroke death pneumonia infection bleeding damage to blood vessels nerves or tendons blood clots pulmonary embolism persistent or worsening pain stiffness malunion nonunion need for subsequent surgery and wound healing complications as well as failure to return to presurgery and precondition levels of activity.     He was fitted with Ace wrap's gently applied from the toes to the upper calf and with a better fitted boot and has a wheelchair and remain nonweightbearing and to continue elevation.     He is due to check with cardiology and/or primary care provider with him he said he has an appointment tomorrow is to make sure he can come off the Plavix prior to surgery.     If  medically he is unable to have surgery then we would have to continue with nonoperative treatment but I think this would be less than optimal.               Office Visit on 2/10/2021        Detailed Report

## 2021-02-16 ENCOUNTER — APPOINTMENT (OUTPATIENT)
Dept: GENERAL RADIOLOGY | Facility: HOSPITAL | Age: 81
End: 2021-02-16

## 2021-02-16 ENCOUNTER — HOSPITAL ENCOUNTER (OUTPATIENT)
Facility: HOSPITAL | Age: 81
Discharge: NURSING, REHAB OR LONG-TERM CARE | End: 2021-02-18
Attending: ORTHOPAEDIC SURGERY | Admitting: ORTHOPAEDIC SURGERY

## 2021-02-16 ENCOUNTER — ANESTHESIA (OUTPATIENT)
Dept: PERIOP | Facility: HOSPITAL | Age: 81
End: 2021-02-16

## 2021-02-16 ENCOUNTER — ANESTHESIA EVENT (OUTPATIENT)
Dept: PERIOP | Facility: HOSPITAL | Age: 81
End: 2021-02-16

## 2021-02-16 DIAGNOSIS — S82.62XA CLOSED DISPLACED FRACTURE OF LATERAL MALLEOLUS OF LEFT FIBULA, INITIAL ENCOUNTER: ICD-10-CM

## 2021-02-16 LAB
GLUCOSE BLDC GLUCOMTR-MCNC: 138 MG/DL (ref 70–130)
GLUCOSE BLDC GLUCOMTR-MCNC: 162 MG/DL (ref 70–130)

## 2021-02-16 PROCEDURE — 82962 GLUCOSE BLOOD TEST: CPT

## 2021-02-16 PROCEDURE — A9270 NON-COVERED ITEM OR SERVICE: HCPCS | Performed by: ORTHOPAEDIC SURGERY

## 2021-02-16 PROCEDURE — 25010000002 FENTANYL CITRATE (PF) 100 MCG/2ML SOLUTION: Performed by: ANESTHESIOLOGY

## 2021-02-16 PROCEDURE — C1713 ANCHOR/SCREW BN/BN,TIS/BN: HCPCS | Performed by: ORTHOPAEDIC SURGERY

## 2021-02-16 PROCEDURE — 25010000002 PHENYLEPHRINE PER 1 ML: Performed by: NURSE ANESTHETIST, CERTIFIED REGISTERED

## 2021-02-16 PROCEDURE — 25010000002 PROPOFOL 10 MG/ML EMULSION: Performed by: NURSE ANESTHETIST, CERTIFIED REGISTERED

## 2021-02-16 PROCEDURE — 76000 FLUOROSCOPY <1 HR PHYS/QHP: CPT

## 2021-02-16 PROCEDURE — A9270 NON-COVERED ITEM OR SERVICE: HCPCS | Performed by: ANESTHESIOLOGY

## 2021-02-16 PROCEDURE — 25010000002 MIDAZOLAM PER 1 MG: Performed by: ANESTHESIOLOGY

## 2021-02-16 PROCEDURE — G0378 HOSPITAL OBSERVATION PER HR: HCPCS

## 2021-02-16 PROCEDURE — 63710000001 GLIPIZIDE 5 MG TABLET: Performed by: ORTHOPAEDIC SURGERY

## 2021-02-16 PROCEDURE — 63710000001 TERAZOSIN 1 MG CAPSULE: Performed by: ORTHOPAEDIC SURGERY

## 2021-02-16 PROCEDURE — 63710000001 TERAZOSIN 1 MG CAPSULE: Performed by: ANESTHESIOLOGY

## 2021-02-16 PROCEDURE — 25010000002 ROPIVACAINE PER 1 MG: Performed by: ANESTHESIOLOGY

## 2021-02-16 PROCEDURE — 76942 ECHO GUIDE FOR BIOPSY: CPT | Performed by: ORTHOPAEDIC SURGERY

## 2021-02-16 PROCEDURE — 27829 TREAT LOWER LEG JOINT: CPT | Performed by: ORTHOPAEDIC SURGERY

## 2021-02-16 PROCEDURE — 63710000001 ATORVASTATIN 20 MG TABLET: Performed by: ORTHOPAEDIC SURGERY

## 2021-02-16 PROCEDURE — 73610 X-RAY EXAM OF ANKLE: CPT

## 2021-02-16 PROCEDURE — 27792 TREATMENT OF ANKLE FRACTURE: CPT | Performed by: ORTHOPAEDIC SURGERY

## 2021-02-16 PROCEDURE — 63710000001 POVIDONE-IODINE 10 % SOLUTION 30 ML BOTTLE: Performed by: ORTHOPAEDIC SURGERY

## 2021-02-16 PROCEDURE — 25010000002 CEFAZOLIN 1-4 GM/50ML-% SOLUTION: Performed by: ORTHOPAEDIC SURGERY

## 2021-02-16 PROCEDURE — 63710000001 LISINOPRIL 20 MG TABLET: Performed by: ANESTHESIOLOGY

## 2021-02-16 PROCEDURE — 25010000003 CEFAZOLIN IN DEXTROSE 2-4 GM/100ML-% SOLUTION: Performed by: ORTHOPAEDIC SURGERY

## 2021-02-16 PROCEDURE — 63710000001 LISINOPRIL 10 MG TABLET: Performed by: ORTHOPAEDIC SURGERY

## 2021-02-16 PROCEDURE — 63710000001 ALLOPURINOL 300 MG TABLET: Performed by: ORTHOPAEDIC SURGERY

## 2021-02-16 PROCEDURE — 25010000002 ONDANSETRON PER 1 MG: Performed by: NURSE ANESTHETIST, CERTIFIED REGISTERED

## 2021-02-16 PROCEDURE — 63710000001 PANTOPRAZOLE 40 MG TABLET DELAYED-RELEASE: Performed by: ORTHOPAEDIC SURGERY

## 2021-02-16 DEVICE — SCRW LK LP SS 3.5X14MM: Type: IMPLANTABLE DEVICE | Site: ANKLE | Status: FUNCTIONAL

## 2021-02-16 DEVICE — SCRW LK LP SS 2.7X14MM: Type: IMPLANTABLE DEVICE | Site: ANKLE | Status: FUNCTIONAL

## 2021-02-16 DEVICE — PLT FIB LK DIST SS 6H LT: Type: IMPLANTABLE DEVICE | Site: ANKLE | Status: FUNCTIONAL

## 2021-02-16 DEVICE — SYS SYNDESMOSIS REPR ANKL TIGHTROPE/XP KNOTLS SS: Type: IMPLANTABLE DEVICE | Site: ANKLE | Status: FUNCTIONAL

## 2021-02-16 DEVICE — SCRW LK LP SS 2.7X16MM: Type: IMPLANTABLE DEVICE | Site: ANKLE | Status: FUNCTIONAL

## 2021-02-16 DEVICE — SCRW CORT LP SS 2.7X16MM: Type: IMPLANTABLE DEVICE | Site: ANKLE | Status: FUNCTIONAL

## 2021-02-16 RX ORDER — MULTIPLE VITAMINS W/ MINERALS TAB 9MG-400MCG
1 TAB ORAL DAILY
Status: DISCONTINUED | OUTPATIENT
Start: 2021-02-16 | End: 2021-02-18 | Stop reason: HOSPADM

## 2021-02-16 RX ORDER — FLUMAZENIL 0.1 MG/ML
0.2 INJECTION INTRAVENOUS AS NEEDED
Status: DISCONTINUED | OUTPATIENT
Start: 2021-02-16 | End: 2021-02-16 | Stop reason: HOSPADM

## 2021-02-16 RX ORDER — PROMETHAZINE HYDROCHLORIDE 25 MG/1
25 SUPPOSITORY RECTAL ONCE AS NEEDED
Status: DISCONTINUED | OUTPATIENT
Start: 2021-02-16 | End: 2021-02-16 | Stop reason: HOSPADM

## 2021-02-16 RX ORDER — LISINOPRIL 20 MG/1
10 TABLET ORAL ONCE
Status: DISCONTINUED | OUTPATIENT
Start: 2021-02-16 | End: 2021-02-16 | Stop reason: CLARIF

## 2021-02-16 RX ORDER — GLIPIZIDE 5 MG/1
5 TABLET ORAL
Status: DISCONTINUED | OUTPATIENT
Start: 2021-02-16 | End: 2021-02-18 | Stop reason: HOSPADM

## 2021-02-16 RX ORDER — FENTANYL CITRATE 50 UG/ML
50 INJECTION, SOLUTION INTRAMUSCULAR; INTRAVENOUS
Status: DISCONTINUED | OUTPATIENT
Start: 2021-02-16 | End: 2021-02-16 | Stop reason: HOSPADM

## 2021-02-16 RX ORDER — HYDROCODONE BITARTRATE AND ACETAMINOPHEN 7.5; 325 MG/1; MG/1
1 TABLET ORAL ONCE AS NEEDED
Status: DISCONTINUED | OUTPATIENT
Start: 2021-02-16 | End: 2021-02-16 | Stop reason: HOSPADM

## 2021-02-16 RX ORDER — EPHEDRINE SULFATE 50 MG/ML
5 INJECTION, SOLUTION INTRAVENOUS ONCE AS NEEDED
Status: DISCONTINUED | OUTPATIENT
Start: 2021-02-16 | End: 2021-02-16 | Stop reason: HOSPADM

## 2021-02-16 RX ORDER — FENTANYL CITRATE 50 UG/ML
25 INJECTION, SOLUTION INTRAMUSCULAR; INTRAVENOUS
Status: DISCONTINUED | OUTPATIENT
Start: 2021-02-16 | End: 2021-02-16 | Stop reason: HOSPADM

## 2021-02-16 RX ORDER — DIPHENHYDRAMINE HCL 25 MG
25 CAPSULE ORAL
Status: DISCONTINUED | OUTPATIENT
Start: 2021-02-16 | End: 2021-02-16 | Stop reason: HOSPADM

## 2021-02-16 RX ORDER — SODIUM CHLORIDE 0.9 % (FLUSH) 0.9 %
3-10 SYRINGE (ML) INJECTION AS NEEDED
Status: DISCONTINUED | OUTPATIENT
Start: 2021-02-16 | End: 2021-02-16 | Stop reason: HOSPADM

## 2021-02-16 RX ORDER — ATORVASTATIN CALCIUM 20 MG/1
40 TABLET, FILM COATED ORAL NIGHTLY
Status: DISCONTINUED | OUTPATIENT
Start: 2021-02-16 | End: 2021-02-18 | Stop reason: HOSPADM

## 2021-02-16 RX ORDER — SODIUM CHLORIDE, SODIUM LACTATE, POTASSIUM CHLORIDE, CALCIUM CHLORIDE 600; 310; 30; 20 MG/100ML; MG/100ML; MG/100ML; MG/100ML
9 INJECTION, SOLUTION INTRAVENOUS CONTINUOUS
Status: DISCONTINUED | OUTPATIENT
Start: 2021-02-16 | End: 2021-02-18 | Stop reason: HOSPADM

## 2021-02-16 RX ORDER — LIDOCAINE HYDROCHLORIDE 20 MG/ML
INJECTION, SOLUTION INFILTRATION; PERINEURAL AS NEEDED
Status: DISCONTINUED | OUTPATIENT
Start: 2021-02-16 | End: 2021-02-16 | Stop reason: SURG

## 2021-02-16 RX ORDER — ALLOPURINOL 300 MG/1
300 TABLET ORAL DAILY
Status: DISCONTINUED | OUTPATIENT
Start: 2021-02-16 | End: 2021-02-18 | Stop reason: HOSPADM

## 2021-02-16 RX ORDER — ACETAMINOPHEN 500 MG
1000 TABLET ORAL EVERY 8 HOURS PRN
Status: DISCONTINUED | OUTPATIENT
Start: 2021-02-16 | End: 2021-02-18 | Stop reason: HOSPADM

## 2021-02-16 RX ORDER — ONDANSETRON 2 MG/ML
4 INJECTION INTRAMUSCULAR; INTRAVENOUS EVERY 6 HOURS PRN
Status: DISCONTINUED | OUTPATIENT
Start: 2021-02-16 | End: 2021-02-18 | Stop reason: HOSPADM

## 2021-02-16 RX ORDER — ROPIVACAINE HYDROCHLORIDE 5 MG/ML
INJECTION, SOLUTION EPIDURAL; INFILTRATION; PERINEURAL
Status: DISCONTINUED | OUTPATIENT
Start: 2021-02-16 | End: 2021-02-16 | Stop reason: SURG

## 2021-02-16 RX ORDER — MORPHINE SULFATE 2 MG/ML
2 INJECTION, SOLUTION INTRAMUSCULAR; INTRAVENOUS
Status: DISCONTINUED | OUTPATIENT
Start: 2021-02-16 | End: 2021-02-18 | Stop reason: HOSPADM

## 2021-02-16 RX ORDER — SODIUM CHLORIDE 0.9 % (FLUSH) 0.9 %
10 SYRINGE (ML) INJECTION EVERY 12 HOURS SCHEDULED
Status: DISCONTINUED | OUTPATIENT
Start: 2021-02-16 | End: 2021-02-18 | Stop reason: HOSPADM

## 2021-02-16 RX ORDER — ALBUTEROL SULFATE 90 UG/1
2 AEROSOL, METERED RESPIRATORY (INHALATION) EVERY 4 HOURS PRN
Status: DISCONTINUED | OUTPATIENT
Start: 2021-02-16 | End: 2021-02-18 | Stop reason: HOSPADM

## 2021-02-16 RX ORDER — GLYCOPYRROLATE 0.2 MG/ML
INJECTION INTRAMUSCULAR; INTRAVENOUS AS NEEDED
Status: DISCONTINUED | OUTPATIENT
Start: 2021-02-16 | End: 2021-02-16 | Stop reason: SURG

## 2021-02-16 RX ORDER — CEFAZOLIN SODIUM 2 G/100ML
2 INJECTION, SOLUTION INTRAVENOUS ONCE
Status: COMPLETED | OUTPATIENT
Start: 2021-02-16 | End: 2021-02-16

## 2021-02-16 RX ORDER — PROMETHAZINE HYDROCHLORIDE 25 MG/1
25 TABLET ORAL ONCE AS NEEDED
Status: DISCONTINUED | OUTPATIENT
Start: 2021-02-16 | End: 2021-02-16 | Stop reason: HOSPADM

## 2021-02-16 RX ORDER — TERAZOSIN 1 MG/1
1 CAPSULE ORAL NIGHTLY
Status: DISCONTINUED | OUTPATIENT
Start: 2021-02-16 | End: 2021-02-18 | Stop reason: HOSPADM

## 2021-02-16 RX ORDER — MIDAZOLAM HYDROCHLORIDE 1 MG/ML
1 INJECTION INTRAMUSCULAR; INTRAVENOUS
Status: DISCONTINUED | OUTPATIENT
Start: 2021-02-16 | End: 2021-02-16 | Stop reason: HOSPADM

## 2021-02-16 RX ORDER — LISINOPRIL 20 MG/1
10 TABLET ORAL ONCE
Status: COMPLETED | OUTPATIENT
Start: 2021-02-16 | End: 2021-02-16

## 2021-02-16 RX ORDER — LISINOPRIL 10 MG/1
10 TABLET ORAL 2 TIMES DAILY
Status: DISCONTINUED | OUTPATIENT
Start: 2021-02-16 | End: 2021-02-18 | Stop reason: HOSPADM

## 2021-02-16 RX ORDER — LIDOCAINE HYDROCHLORIDE 10 MG/ML
0.5 INJECTION, SOLUTION EPIDURAL; INFILTRATION; INTRACAUDAL; PERINEURAL ONCE AS NEEDED
Status: DISCONTINUED | OUTPATIENT
Start: 2021-02-16 | End: 2021-02-16 | Stop reason: HOSPADM

## 2021-02-16 RX ORDER — ONDANSETRON 2 MG/ML
4 INJECTION INTRAMUSCULAR; INTRAVENOUS ONCE AS NEEDED
Status: DISCONTINUED | OUTPATIENT
Start: 2021-02-16 | End: 2021-02-16 | Stop reason: HOSPADM

## 2021-02-16 RX ORDER — MIDAZOLAM HYDROCHLORIDE 1 MG/ML
0.5 INJECTION INTRAMUSCULAR; INTRAVENOUS
Status: DISCONTINUED | OUTPATIENT
Start: 2021-02-16 | End: 2021-02-16 | Stop reason: HOSPADM

## 2021-02-16 RX ORDER — SODIUM CHLORIDE 0.9 % (FLUSH) 0.9 %
1-10 SYRINGE (ML) INJECTION AS NEEDED
Status: DISCONTINUED | OUTPATIENT
Start: 2021-02-16 | End: 2021-02-18 | Stop reason: HOSPADM

## 2021-02-16 RX ORDER — TERAZOSIN 1 MG/1
1 CAPSULE ORAL ONCE
Status: COMPLETED | OUTPATIENT
Start: 2021-02-16 | End: 2021-02-16

## 2021-02-16 RX ORDER — CEFAZOLIN SODIUM 1 G/50ML
1 INJECTION, SOLUTION INTRAVENOUS EVERY 8 HOURS
Status: COMPLETED | OUTPATIENT
Start: 2021-02-16 | End: 2021-02-17

## 2021-02-16 RX ORDER — ONDANSETRON 4 MG/1
4 TABLET, FILM COATED ORAL EVERY 6 HOURS PRN
Status: DISCONTINUED | OUTPATIENT
Start: 2021-02-16 | End: 2021-02-18 | Stop reason: HOSPADM

## 2021-02-16 RX ORDER — ASPIRIN 325 MG
325 TABLET, DELAYED RELEASE (ENTERIC COATED) ORAL DAILY
Status: DISCONTINUED | OUTPATIENT
Start: 2021-02-17 | End: 2021-02-17

## 2021-02-16 RX ORDER — ONDANSETRON 2 MG/ML
INJECTION INTRAMUSCULAR; INTRAVENOUS AS NEEDED
Status: DISCONTINUED | OUTPATIENT
Start: 2021-02-16 | End: 2021-02-16 | Stop reason: SURG

## 2021-02-16 RX ORDER — LABETALOL HYDROCHLORIDE 5 MG/ML
5 INJECTION, SOLUTION INTRAVENOUS
Status: DISCONTINUED | OUTPATIENT
Start: 2021-02-16 | End: 2021-02-16 | Stop reason: HOSPADM

## 2021-02-16 RX ORDER — NALOXONE HCL 0.4 MG/ML
0.2 VIAL (ML) INJECTION AS NEEDED
Status: DISCONTINUED | OUTPATIENT
Start: 2021-02-16 | End: 2021-02-16 | Stop reason: HOSPADM

## 2021-02-16 RX ORDER — EPHEDRINE SULFATE 50 MG/ML
INJECTION, SOLUTION INTRAVENOUS AS NEEDED
Status: DISCONTINUED | OUTPATIENT
Start: 2021-02-16 | End: 2021-02-16 | Stop reason: SURG

## 2021-02-16 RX ORDER — DIPHENHYDRAMINE HYDROCHLORIDE 50 MG/ML
12.5 INJECTION INTRAMUSCULAR; INTRAVENOUS
Status: DISCONTINUED | OUTPATIENT
Start: 2021-02-16 | End: 2021-02-16 | Stop reason: HOSPADM

## 2021-02-16 RX ORDER — OXYCODONE AND ACETAMINOPHEN 7.5; 325 MG/1; MG/1
1 TABLET ORAL ONCE AS NEEDED
Status: DISCONTINUED | OUTPATIENT
Start: 2021-02-16 | End: 2021-02-16 | Stop reason: HOSPADM

## 2021-02-16 RX ORDER — SODIUM CHLORIDE, SODIUM LACTATE, POTASSIUM CHLORIDE, CALCIUM CHLORIDE 600; 310; 30; 20 MG/100ML; MG/100ML; MG/100ML; MG/100ML
100 INJECTION, SOLUTION INTRAVENOUS CONTINUOUS
Status: DISCONTINUED | OUTPATIENT
Start: 2021-02-16 | End: 2021-02-18 | Stop reason: HOSPADM

## 2021-02-16 RX ORDER — PROPOFOL 10 MG/ML
VIAL (ML) INTRAVENOUS AS NEEDED
Status: DISCONTINUED | OUTPATIENT
Start: 2021-02-16 | End: 2021-02-16 | Stop reason: SURG

## 2021-02-16 RX ORDER — HYDROCODONE BITARTRATE AND ACETAMINOPHEN 7.5; 325 MG/1; MG/1
1 TABLET ORAL EVERY 4 HOURS PRN
Status: DISCONTINUED | OUTPATIENT
Start: 2021-02-16 | End: 2021-02-18 | Stop reason: HOSPADM

## 2021-02-16 RX ORDER — NALOXONE HCL 0.4 MG/ML
0.4 VIAL (ML) INJECTION
Status: DISCONTINUED | OUTPATIENT
Start: 2021-02-16 | End: 2021-02-18 | Stop reason: HOSPADM

## 2021-02-16 RX ORDER — HYDROMORPHONE HYDROCHLORIDE 1 MG/ML
0.2 INJECTION, SOLUTION INTRAMUSCULAR; INTRAVENOUS; SUBCUTANEOUS
Status: DISCONTINUED | OUTPATIENT
Start: 2021-02-16 | End: 2021-02-16 | Stop reason: HOSPADM

## 2021-02-16 RX ORDER — SODIUM CHLORIDE 0.9 % (FLUSH) 0.9 %
3 SYRINGE (ML) INJECTION EVERY 12 HOURS SCHEDULED
Status: DISCONTINUED | OUTPATIENT
Start: 2021-02-16 | End: 2021-02-16 | Stop reason: HOSPADM

## 2021-02-16 RX ORDER — PANTOPRAZOLE SODIUM 40 MG/1
40 TABLET, DELAYED RELEASE ORAL NIGHTLY
Status: DISCONTINUED | OUTPATIENT
Start: 2021-02-16 | End: 2021-02-18 | Stop reason: HOSPADM

## 2021-02-16 RX ORDER — FAMOTIDINE 10 MG/ML
20 INJECTION, SOLUTION INTRAVENOUS ONCE
Status: COMPLETED | OUTPATIENT
Start: 2021-02-16 | End: 2021-02-16

## 2021-02-16 RX ADMIN — LIDOCAINE HYDROCHLORIDE 60 MG: 20 INJECTION, SOLUTION INFILTRATION; PERINEURAL at 10:44

## 2021-02-16 RX ADMIN — ROPIVACAINE HYDROCHLORIDE 20 ML: 5 INJECTION, SOLUTION EPIDURAL; INFILTRATION; PERINEURAL at 12:48

## 2021-02-16 RX ADMIN — SODIUM CHLORIDE, POTASSIUM CHLORIDE, SODIUM LACTATE AND CALCIUM CHLORIDE 9 ML/HR: 600; 310; 30; 20 INJECTION, SOLUTION INTRAVENOUS at 10:20

## 2021-02-16 RX ADMIN — TERAZOSIN HYDROCHLORIDE 1 MG: 1 CAPSULE ORAL at 21:38

## 2021-02-16 RX ADMIN — EPHEDRINE SULFATE 10 MG: 50 INJECTION INTRAVENOUS at 10:59

## 2021-02-16 RX ADMIN — ONDANSETRON HYDROCHLORIDE 4 MG: 2 SOLUTION INTRAMUSCULAR; INTRAVENOUS at 12:25

## 2021-02-16 RX ADMIN — LISINOPRIL 10 MG: 10 TABLET ORAL at 21:38

## 2021-02-16 RX ADMIN — ALLOPURINOL 300 MG: 300 TABLET ORAL at 21:37

## 2021-02-16 RX ADMIN — FENTANYL CITRATE 50 MCG: 50 INJECTION, SOLUTION INTRAMUSCULAR; INTRAVENOUS at 10:20

## 2021-02-16 RX ADMIN — PHENYLEPHRINE HYDROCHLORIDE 100 MCG: 10 INJECTION INTRAVENOUS at 11:07

## 2021-02-16 RX ADMIN — ROPIVACAINE HYDROCHLORIDE 20 ML: 5 INJECTION, SOLUTION EPIDURAL; INFILTRATION; PERINEURAL at 12:46

## 2021-02-16 RX ADMIN — MIDAZOLAM 1 MG: 1 INJECTION INTRAMUSCULAR; INTRAVENOUS at 10:20

## 2021-02-16 RX ADMIN — LISINOPRIL 10 MG: 20 TABLET ORAL at 14:43

## 2021-02-16 RX ADMIN — FAMOTIDINE 20 MG: 10 INJECTION INTRAVENOUS at 10:20

## 2021-02-16 RX ADMIN — PANTOPRAZOLE SODIUM 40 MG: 40 TABLET, DELAYED RELEASE ORAL at 21:38

## 2021-02-16 RX ADMIN — SODIUM CHLORIDE, POTASSIUM CHLORIDE, SODIUM LACTATE AND CALCIUM CHLORIDE 100 ML/HR: 600; 310; 30; 20 INJECTION, SOLUTION INTRAVENOUS at 18:39

## 2021-02-16 RX ADMIN — CEFAZOLIN SODIUM 1 G: 1 INJECTION, SOLUTION INTRAVENOUS at 18:41

## 2021-02-16 RX ADMIN — ATORVASTATIN CALCIUM 40 MG: 20 TABLET, FILM COATED ORAL at 21:38

## 2021-02-16 RX ADMIN — CEFAZOLIN SODIUM 2 G: 2 INJECTION, SOLUTION INTRAVENOUS at 10:50

## 2021-02-16 RX ADMIN — GLIPIZIDE 5 MG: 5 TABLET ORAL at 21:37

## 2021-02-16 RX ADMIN — GLYCOPYRROLATE 0.1 MG: 0.2 INJECTION INTRAMUSCULAR; INTRAVENOUS at 10:44

## 2021-02-16 RX ADMIN — EPHEDRINE SULFATE 10 MG: 50 INJECTION INTRAVENOUS at 10:57

## 2021-02-16 RX ADMIN — GLYCOPYRROLATE 0.2 MG: 0.2 INJECTION INTRAMUSCULAR; INTRAVENOUS at 11:01

## 2021-02-16 RX ADMIN — PHENYLEPHRINE HYDROCHLORIDE 100 MCG: 10 INJECTION INTRAVENOUS at 11:03

## 2021-02-16 RX ADMIN — EPHEDRINE SULFATE 5 MG: 50 INJECTION INTRAVENOUS at 10:55

## 2021-02-16 RX ADMIN — EPHEDRINE SULFATE 10 MG: 50 INJECTION INTRAVENOUS at 11:03

## 2021-02-16 RX ADMIN — PHENYLEPHRINE HYDROCHLORIDE 50 MCG: 10 INJECTION INTRAVENOUS at 11:40

## 2021-02-16 RX ADMIN — EPHEDRINE SULFATE 15 MG: 50 INJECTION INTRAVENOUS at 11:07

## 2021-02-16 RX ADMIN — PROPOFOL 120 MG: 10 INJECTION, EMULSION INTRAVENOUS at 10:44

## 2021-02-16 RX ADMIN — PHENYLEPHRINE HYDROCHLORIDE 100 MCG: 10 INJECTION INTRAVENOUS at 12:13

## 2021-02-16 RX ADMIN — TERAZOSIN HYDROCHLORIDE 1 MG: 1 CAPSULE ORAL at 14:43

## 2021-02-16 RX ADMIN — PHENYLEPHRINE HYDROCHLORIDE 100 MCG: 10 INJECTION INTRAVENOUS at 11:55

## 2021-02-16 NOTE — BRIEF OP NOTE
ANKLE OPEN REDUCTION INTERNAL FIXATION  Progress Note    Eugenio Joe  2/16/2021    Pre-op Diagnosis:   Closed displaced fracture of lateral malleolus of left fibula, initial encounter [S82.62XA]       Post-Op Diagnosis Codes:     * Closed displaced fracture of lateral malleolus of left fibula, initial encounter [S82.62XA]    Procedure/CPT® Codes:        Procedure(s):  Left ankle open reduction internal fixation with implants as needed    Surgeon(s):  Man Jenkins MD    Anesthesia: General    Staff:   Circulator: Alley Ellison RN; Mi Santiago RN  Radiology Technologist: Tracy Mary  Scrub Person: Urszula Kahn  Vendor Representative: Henry Wheatley         Estimated Blood Loss: minimal    Urine Voided: * No values recorded between 2/16/2021 10:45 AM and 2/16/2021 12:36 PM *    Specimens:                None    TT 56 min      Drains: * No LDAs found *    Findings: see dict    Complications: none          Man Jenkins MD     Date: 2/16/2021  Time: 12:36 EST

## 2021-02-16 NOTE — ANESTHESIA PROCEDURE NOTES
Peripheral Block    Pre-sedation assessment completed: 2/16/2021 12:25 PM    Patient reassessed immediately prior to procedure    Patient location during procedure: holding area  Start time: 2/16/2021 12:25 PM  Stop time: 2/16/2021 12:34 PM  Reason for block: at surgeon's request and post-op pain management  Performed by  Anesthesiologist: Manolo England MD  Preanesthetic Checklist  Completed: patient identified, site marked, surgical consent, pre-op evaluation, timeout performed, IV checked, risks and benefits discussed and monitors and equipment checked  Prep:  Sterile barriers:cap, gloves and mask  Prep: ChloraPrep  Patient monitoring: blood pressure monitoring, continuous pulse oximetry and EKG  Procedure  Sedation:yes  Performed under: local infiltration  Guidance:ultrasound guided  ULTRASOUND INTERPRETATION.  Using ultrasound guidance a 21 G gauge needle was placed in close proximity to the femoral nerve, at which point, under ultrasound guidance anesthetic was injected in the area of the nerve and spread of the anesthesia was seen on ultrasound in close proximity thereto.  There were no abnormalities seen on ultrasound; a digital image was taken; and the patient tolerated the procedure with no complications. Images:still images obtained    Laterality:left  Block Type:femoral and adductor canal block  Injection Technique:single-shot  Needle Type:echogenic  Resistance on Injection: none    Medications Used: ropivacaine (NAROPIN) 0.5 % injection, 20 mL      Post Assessment  Injection Assessment: negative aspiration for heme, no paresthesia on injection and incremental injection  Patient Tolerance:comfortable throughout block  Complications:no  Additional Notes  Ultrasound Interpretation:  Using ultrasound guidance the needle was placed in close proximity to the femoral nerve and anesthetic was injected in the area of the femoral nerve and spread of the anesthetic was seen on ultrasound in close proximity  thereto.  There were no abnormalities seen on ultrasound; a digital image was taken; and the patient tolerated the procedure with no complications.    Block placed for postoperative pain control per surgeon request.

## 2021-02-16 NOTE — OP NOTE
Operative Note      Facility: [facility]  Patient Name: Eugenio Joe  YOB: 1940  Date: 2/16/2021  Medical Record Number: [id number]      Pre-op Diagnosis: 1.  Displaced fracture left lateral malleolus    Post-op Diagnosis:  1.  Displaced fracture left lateral malleolus  2.  Left ankle syndesmotic injury      Procedure(s):  1.  Left ankle open reduction internal fixation lateral malleolus  2.  Left ankle Mcgowan syndesmotic fixation using Arthrex tight rope    Surgeon(s):  Man Jenkins MD    Anesthesia: General  Anesthesiologist: Jean Haney MD  CRNA: Tasha Loera CRNA    Staff:   Circulator: Alley Ellison RN; Mi Santiago RN  Radiology Technologist: Tracy Mary  Scrub Person: Urszula Kahn  Vendor Representative: Henry Wheatley  Assistants : none      Tourniquet time:56 min        Estimated Blood Loss:  minimal  Drains: None  Specimens: * No orders in the log *  Findings: See Dictation    Complications: None      Indications for Procedure: Patient sustained a left displaced lateral malleolus fracture on 2/8/2021 when he got caught up in his vacuum.  He was seen in the office with a recommendation made for operative fixation.  He and his wife voiced clear understanding of operative and nonoperative treatment options with mutual decision made to proceed with operative treatment understanding risk benefits potential outcomes and complications.          Description of Procedure: Preoperative informed consent and anesthesia evaluation were obtained.  IV antibiotics were administered and the surgical site was marked.  Block semester by anesthesia.  Patient was brought to the operating placed in supine position.  Anesthesia was induced and LMA was positioned.  Well-padded tourniquet was placed left proximal thigh.    Left leg was exsanguinated and pneumatic tourniquet inflated to 250 mmHg.  Fracture was examined radiographically and found to be  unstable with widening medially the closed down well and I did not feel needed to be opened medially.  Incision was made over the fracture and carried both proximally and distally with careful attention taken to minimize retraction of the soft tissues but full-thickness flaps were carefully elevated with care taken avoid the superficial peroneal nerve.  Periosteum was gently elevated around the fracture which was mobilized and cleared of incarcerated periosteum and fibrotic material.  This was gently irrigated.  The fracture was then distracted rotated and crossclamped and found to be well aligned and talus remains well-seated within the mortise.  This was then secured with an interfragmentary lag screw and then contoured an Arthrex lateral locking plate and positioned this over the area of the fracture and was well aligned distally and proximally centered on the fibula.  This was secured provisionally and then secured with distal locking screws as well as proximal locking screws given rigid fixation across the fracture site.  X-rays showed hardware to be well contained and talus was well seated within the mortise on all views.    The syndesmosis was then examined radiographically and clinically with questionable instability on external rotation testing and felt best to secure to stabilize this.  The syndesmosis was held reduced and secured with an Arthrex tight rope after passage of a guidewire and confirmation of appropriate alignment.  This was done with the ankle in maximum dorsiflexion required a counterincision medially where full-thickness flaps were elevated and care taken to make sure that the toggle did not incarcerate the superficial peroneal nerve or vein and was flush down onto the cortex after elevating the surrounding periosteum..    X-rays show good alignment and syndesmosis with stable today this was nicely aligned within the mortise.  The tourniquet was released and hemostasis was obtained.  The  wounds were thoroughly irrigated with Betadine solution as had been done throughout the case.  Deep tissue was closed laterally over the plate with 2-0 Vicryl suture and skin was closed with 3-0 Vicryl and staples.  Medially the wound was closed with 3-0 Vicryl and staples.  Sterile dressings were applied.  He was placed in a very well-padded short leg posterior splint in neutral dorsiflexion.  He was awakened transferred to stretcher and taken recovery in stable condition

## 2021-02-16 NOTE — NURSING NOTE
Per pharmacy, Baptism version of Cardura 1mg is Hytrin 1mg. Orders for Hyrtin 1mg PO and Lisinopril 10 mg PO placed.

## 2021-02-16 NOTE — ANESTHESIA PROCEDURE NOTES
Peripheral Block    Pre-sedation assessment completed: 2/16/2021 12:25 PM    Patient reassessed immediately prior to procedure    Patient location during procedure: holding area  Start time: 2/16/2021 12:25 PM  Stop time: 2/16/2021 12:34 PM  Reason for block: at surgeon's request and post-op pain management  Performed by  Anesthesiologist: Manolo England MD  Preanesthetic Checklist  Completed: patient identified, site marked, surgical consent, pre-op evaluation, timeout performed, IV checked, risks and benefits discussed and monitors and equipment checked  Prep:  Sterile barriers:cap, mask and gloves  Prep: ChloraPrep  Patient monitoring: blood pressure monitoring, continuous pulse oximetry and EKG  Procedure  Sedation:yes  Performed under: local infiltration  Guidance:ultrasound guided  ULTRASOUND INTERPRETATION.  Using ultrasound guidance a 21 G gauge needle was placed in close proximity to the sciatic nerve, at which point, under ultrasound guidance anesthetic was injected in the area of the nerve and spread of the anesthesia was seen on ultrasound in close proximity thereto.  There were no abnormalities seen on ultrasound; a digital image was taken; and the patient tolerated the procedure with no complications. Images:still images obtained    Block Type:popliteal  Injection Technique:single-shot  Needle Type:echogenic  Resistance on Injection: none    Medications Used: ropivacaine (NAROPIN) 0.5 % injection, 20 mL      Post Assessment  Injection Assessment: negative aspiration for heme, no paresthesia on injection and incremental injection  Patient Tolerance:comfortable throughout block  Complications:no  Additional Notes  Ultrasound Interpretation:  Using ultrasound guidance the needle was placed in close proximity to the popliteal/sciatic nerve and anesthetic was injected in the area of the  popliteal/sciatic nerve and spread of the anesthetic was seen on ultrasound in close proximity thereto.  There  were no abnormalities seen on ultrasound; a digital image was taken; and the patient tolerated the procedure with no complications.    Block placed for postoperative pain control per surgeon request.

## 2021-02-16 NOTE — INTERVAL H&P NOTE
"H&P updated. The patient was examined and There is diminished swelling to the left ankle and leg.  There is several scratches and small areas around the upper leg that he thinks may be from some type of a \"winter rash or he may have scratched this but none of these appear infected nor with any exposed tissue in or outside the operative field so I feel it is reasonable to proceed with operative treatment.  Patient and wife voiced clear understanding the operative procedure with associated risk benefits potential outcomes and complications  "

## 2021-02-16 NOTE — ANESTHESIA POSTPROCEDURE EVALUATION
Patient: Eugenio Joe    Procedure Summary     Date: 02/16/21 Room / Location:  [facility]  /  [facility]    Anesthesia Start: 1035 Anesthesia Stop: 1240    Procedure: Left ankle open reduction internal fixation with implants as needed (Left Ankle) Diagnosis:       Closed displaced fracture of lateral malleolus of left fibula, initial encounter      (Closed displaced fracture of lateral malleolus of left fibula, initial encounter [S82.62XA])    Surgeon: Man Jenkins MD Provider: Jean Haney MD    Anesthesia Type: general with block ASA Status: 4          Anesthesia Type: general with block    Vitals  Vitals Value Taken Time   /82 02/16/21 1351   Temp 36.3 °C (97.3 °F) 02/16/21 1238   Pulse 68 02/16/21 1402   Resp 16 02/16/21 1315   SpO2 100 % 02/16/21 1402   Vitals shown include unvalidated device data.        Post Anesthesia Care and Evaluation    Patient location during evaluation: bedside  Pain management: adequate  Airway patency: patent  Anesthetic complications: No anesthetic complications    Cardiovascular status: acceptable  Respiratory status: acceptable  Hydration status: acceptable

## 2021-02-16 NOTE — ANESTHESIA PROCEDURE NOTES
Airway  Urgency: elective    Date/Time: 2/16/2021 10:46 AM  Airway not difficult    General Information and Staff    Patient location during procedure: OR  Anesthesiologist: Jean Haney MD  CRNA: Tsaha Loera CRNA    Indications and Patient Condition  Indications for airway management: airway protection    Preoxygenated: yes  MILS maintained throughout  Mask difficulty assessment: 0 - not attempted    Final Airway Details  Final airway type: supraglottic airway      Successful airway: unique  Size 5    Number of attempts at approach: 1  Assessment: lips, teeth, and gum same as pre-op and atraumatic intubation

## 2021-02-16 NOTE — ANESTHESIA PREPROCEDURE EVALUATION
Anesthesia Evaluation     Patient summary reviewed and Nursing notes reviewed   NPO Solid Status: > 8 hours  NPO Liquid Status: > 2 hours           Airway   Mallampati: II  TM distance: >3 FB  Neck ROM: full  Dental - normal exam     Pulmonary - normal exam   (+) pulmonary embolism, a smoker Former, asthma,  Cardiovascular - normal exam    ECG reviewed    (+) hypertension, CAD, CABG, DVT, hyperlipidemia,       Neuro/Psych  (+) TIA, CVA, headaches, syncope, weakness,     GI/Hepatic/Renal/Endo    (+)   renal disease ARF, diabetes mellitus type 2,     Musculoskeletal (-) negative ROS    Abdominal    Substance History - negative use     OB/GYN negative ob/gyn ROS         Other                        Anesthesia Plan    ASA 4     general with block       Anesthetic plan, all risks, benefits, and alternatives have been provided, discussed and informed consent has been obtained with: patient.

## 2021-02-17 LAB — SARS-COV-2 ORF1AB RESP QL NAA+PROBE: NOT DETECTED

## 2021-02-17 PROCEDURE — 63710000001 ATORVASTATIN 20 MG TABLET: Performed by: ORTHOPAEDIC SURGERY

## 2021-02-17 PROCEDURE — A9270 NON-COVERED ITEM OR SERVICE: HCPCS | Performed by: ORTHOPAEDIC SURGERY

## 2021-02-17 PROCEDURE — 63710000001 TERAZOSIN 1 MG CAPSULE: Performed by: ORTHOPAEDIC SURGERY

## 2021-02-17 PROCEDURE — 63710000001 GLIPIZIDE 5 MG TABLET: Performed by: ORTHOPAEDIC SURGERY

## 2021-02-17 PROCEDURE — U0005 INFEC AGEN DETEC AMPLI PROBE: HCPCS | Performed by: ORTHOPAEDIC SURGERY

## 2021-02-17 PROCEDURE — 97110 THERAPEUTIC EXERCISES: CPT

## 2021-02-17 PROCEDURE — 63710000001 LISINOPRIL 10 MG TABLET: Performed by: ORTHOPAEDIC SURGERY

## 2021-02-17 PROCEDURE — U0004 COV-19 TEST NON-CDC HGH THRU: HCPCS | Performed by: ORTHOPAEDIC SURGERY

## 2021-02-17 PROCEDURE — 25010000002 CEFAZOLIN 1-4 GM/50ML-% SOLUTION: Performed by: ORTHOPAEDIC SURGERY

## 2021-02-17 PROCEDURE — 63710000001 CLOPIDOGREL 75 MG TABLET: Performed by: ORTHOPAEDIC SURGERY

## 2021-02-17 PROCEDURE — G0378 HOSPITAL OBSERVATION PER HR: HCPCS

## 2021-02-17 PROCEDURE — 97162 PT EVAL MOD COMPLEX 30 MIN: CPT

## 2021-02-17 PROCEDURE — 63710000001 ASPIRIN EC 325 MG TABLET DELAYED-RELEASE: Performed by: ORTHOPAEDIC SURGERY

## 2021-02-17 PROCEDURE — 63710000001 MULTIVITAMIN WITH MINERALS TABLET: Performed by: ORTHOPAEDIC SURGERY

## 2021-02-17 PROCEDURE — 63710000001 HYDROCODONE-ACETAMINOPHEN 7.5-325 MG TABLET: Performed by: ORTHOPAEDIC SURGERY

## 2021-02-17 PROCEDURE — 63710000001 ALLOPURINOL 300 MG TABLET: Performed by: ORTHOPAEDIC SURGERY

## 2021-02-17 PROCEDURE — 63710000001 PANTOPRAZOLE 40 MG TABLET DELAYED-RELEASE: Performed by: ORTHOPAEDIC SURGERY

## 2021-02-17 PROCEDURE — 63710000001 METFORMIN 500 MG TABLET: Performed by: ORTHOPAEDIC SURGERY

## 2021-02-17 RX ORDER — ASPIRIN 81 MG/1
81 TABLET ORAL DAILY
Status: DISCONTINUED | OUTPATIENT
Start: 2021-02-18 | End: 2021-02-18 | Stop reason: HOSPADM

## 2021-02-17 RX ORDER — CLOPIDOGREL BISULFATE 75 MG/1
75 TABLET ORAL DAILY
Status: DISCONTINUED | OUTPATIENT
Start: 2021-02-17 | End: 2021-02-18 | Stop reason: HOSPADM

## 2021-02-17 RX ADMIN — GLIPIZIDE 5 MG: 5 TABLET ORAL at 18:26

## 2021-02-17 RX ADMIN — SODIUM CHLORIDE, PRESERVATIVE FREE 10 ML: 5 INJECTION INTRAVENOUS at 11:34

## 2021-02-17 RX ADMIN — ALLOPURINOL 300 MG: 300 TABLET ORAL at 08:51

## 2021-02-17 RX ADMIN — PANTOPRAZOLE SODIUM 40 MG: 40 TABLET, DELAYED RELEASE ORAL at 21:17

## 2021-02-17 RX ADMIN — HYDROCODONE BITARTRATE AND ACETAMINOPHEN 1 TABLET: 7.5; 325 TABLET ORAL at 15:42

## 2021-02-17 RX ADMIN — LISINOPRIL 10 MG: 10 TABLET ORAL at 21:16

## 2021-02-17 RX ADMIN — SODIUM CHLORIDE, PRESERVATIVE FREE 10 ML: 5 INJECTION INTRAVENOUS at 21:17

## 2021-02-17 RX ADMIN — CLOPIDOGREL 75 MG: 75 TABLET, FILM COATED ORAL at 15:36

## 2021-02-17 RX ADMIN — ASPIRIN 325 MG: 325 TABLET, COATED ORAL at 08:51

## 2021-02-17 RX ADMIN — ATORVASTATIN CALCIUM 40 MG: 20 TABLET, FILM COATED ORAL at 21:17

## 2021-02-17 RX ADMIN — MULTIPLE VITAMINS W/ MINERALS TAB 1 TABLET: TAB at 08:51

## 2021-02-17 RX ADMIN — CEFAZOLIN SODIUM 1 G: 1 INJECTION, SOLUTION INTRAVENOUS at 01:37

## 2021-02-17 RX ADMIN — TERAZOSIN HYDROCHLORIDE 1 MG: 1 CAPSULE ORAL at 21:16

## 2021-02-17 RX ADMIN — METFORMIN HYDROCHLORIDE 500 MG: 500 TABLET ORAL at 08:51

## 2021-02-17 RX ADMIN — HYDROCODONE BITARTRATE AND ACETAMINOPHEN 1 TABLET: 7.5; 325 TABLET ORAL at 07:58

## 2021-02-17 RX ADMIN — SODIUM CHLORIDE, POTASSIUM CHLORIDE, SODIUM LACTATE AND CALCIUM CHLORIDE 9 ML/HR: 600; 310; 30; 20 INJECTION, SOLUTION INTRAVENOUS at 01:38

## 2021-02-17 RX ADMIN — HYDROCODONE BITARTRATE AND ACETAMINOPHEN 1 TABLET: 7.5; 325 TABLET ORAL at 21:19

## 2021-02-17 RX ADMIN — LISINOPRIL 10 MG: 10 TABLET ORAL at 08:51

## 2021-02-17 RX ADMIN — SODIUM CHLORIDE, POTASSIUM CHLORIDE, SODIUM LACTATE AND CALCIUM CHLORIDE 9 ML/HR: 600; 310; 30; 20 INJECTION, SOLUTION INTRAVENOUS at 18:23

## 2021-02-17 NOTE — PROGRESS NOTES
Discharge Planning Assessment  [facility]     Patient Name: Eugenio Joe  MRN: [id number]  Today's Date: 2/17/2021    Admit Date: 2/16/2021    Discharge Needs Assessment     Row Name 02/17/21 1145       Living Environment    Lives With  spouse    Name(s) of Who Lives With Patient  wife/Bettey    Current Living Arrangements  home/apartment/condo    Primary Care Provided by  self    Provides Primary Care For  no one    Family Caregiver if Needed  spouse;child(cayla), adult    Quality of Family Relationships  helpful;involved;supportive       Resource/Environmental Concerns    Transportation Concerns  car, none       Transition Planning    Patient/Family Anticipates Transition to  inpatient rehabilitation facility    Patient/Family Anticipated Services at Transition      Transportation Anticipated  family or friend will provide;health plan transportation       Discharge Needs Assessment    Readmission Within the Last 30 Days  no previous admission in last 30 days    Equipment Currently Used at Home  cane, straight;walker, rolling;rollator;shower chair;glucometer    Discharge Facility/Level of Care Needs  nursing facility, skilled;rehabilitation facility    Provided Post Acute Provider List?  Refused    Refused Provider List Comment  Requests a referral to [facility].        Discharge Plan     Row Name 02/17/21 1147       Plan    Plan  [facility] -- Referral Pending.    Patient/Family in Agreement with Plan  yes    Plan Comments  Spoke with both the patient and his wife/Bettey, verified current information and CCP role explained. Patient said he has very good family support. He's overall dependent on his wife/Thelma to assist with most all of his ADLs. [facility] in-home caregivers visit twice a week to assist the patient with miscellaneous ADLs. Patient's been to [facility] in the past (most recently in September 2020) and has no history with . Patient and wife/Bettey plans for him to d/c to  [facility]. Referral sent in epic. Spoke with Jen/[facility] who will look into the patient's case, but she feels she'll be able to accept him to their rehab. Wife/Thelma said either her or their son can transport the patient at d/c if it's medically appropriate. Await [facility]'s determination.        Continued Care and Services - Admitted Since 2/16/2021     Destination     Service Provider Request Status Selected Services Address Phone Fax Patient Preferred    [facility]  Pending - Request Sent N/A [address] [facility] [place] [address] [phone] --                Demographic Summary     Row Name 02/17/21 1144       General Information    Admission Type  observation    Reason for Consult  discharge planning    Preferred Language  English     Used During This Interaction  no       Contact Information    Permission Granted to Share Info With  ;family/designee        Functional Status     Row Name 02/17/21 1144       Functional Status    Usual Activity Tolerance  fair    Current Activity Tolerance  fair       Functional Status, IADL    Medications  assistive equipment and person    Meal Preparation  assistive equipment and person    Housekeeping  assistive equipment and person    Laundry  assistive equipment and person    Shopping  assistive equipment and person       Mental Status Summary    Recent Changes in Mental Status/Cognitive Functioning  no changes        Psychosocial     Row Name 02/17/21 1144       Intellectual Performance WDL    Level of Consciousness  Alert       Coping/Stress    Patient Personal Strengths  able to adapt    Sources of Support  spouse;adult child(cayla)    Reaction to Health Status  accepting    Understanding of Condition and Treatment  adequate understanding of medical condition       Developmental Stage (Erdodieon's)    Developmental Stage  Stage 8 (65 years-death/Late Adulthood) Integrity vs. Despair        Abuse/Neglect    No  documentation.       Legal    No documentation.       Substance Abuse    No documentation.       Patient Forms    No documentation.           Ariadna Ly RN

## 2021-02-17 NOTE — DISCHARGE PLACEMENT REQUEST
"Christopher Brown (80 y.o. Male)     Date of Birth Social Security Number Address Home Phone MRN    1940  [id number] [address] [phone] [id number]    [employer] Marital Status          Religious        Admission Date Admission Type Admitting Provider Attending Provider Department, Room/Bed    2/16/21 Elective Man Jenkins MD McQuillen, Michael Wayne, MD [address], P886/1    Discharge Date Discharge Disposition Discharge Destination                       Attending Provider: Man Jenkins MD    Allergies: Other, Oxycodone    Isolation: None   Infection: None   Code Status: CPR    Ht: 177.8 cm (70\")   Wt: 79.4 kg (175 lb)    Admission Cmt: None   Principal Problem: Closed displaced fracture of lateral malleolus of left fibula [S82.62XA]                 Active Insurance as of 2/16/2021     Primary Coverage     Payor Plan Insurance Group Employer/Plan Group    MEDICARE MEDICARE A & B      Payor Plan Address Payor Plan Phone Number Payor Plan Fax Number Effective Dates    [address] [phone]  12/1/2004 - None Entered    [facility] [id number]       Subscriber Name Subscriber Birth Date Member ID       CHRISTOPHER BROWN 1940 [id number]           Secondary Coverage     Payor Plan Insurance Group Employer/Plan Group    AETNA COMMERCIAL AETNA   SUPP [id number]     Payor Plan Address Payor Plan Phone Number Payor Plan Fax Number Effective Dates    [address] [phone]  6/1/2013 - None Entered    [facility] [id number]       Subscriber Name Subscriber Birth Date Member ID       CHRISTOPHER BROWN 1940 [id number]                 Emergency Contacts      (Rel.) Home Phone Work Phone Mobile Phone    TatyanaAnnette (Spouse) [phone] -- [phone]    TatyanaJose (Son) [phone] -- [phone]    Francisca Brown (Other) -- -- [phone]              "

## 2021-02-17 NOTE — PLAN OF CARE
Goal Outcome Evaluation:        Outcome Summary: Pt is an 79 yo male who presents POD1 s/p L ankle ORIF, also with PMH of NPH and prior CVA, with hx of balance impairments which may impact functional mobility. Upon exam, pt demonstrates post op pain, generalized weakness, impaired balance, impaired trunk control, and decreased endurance limiting mobility. Pt is independent with walker at baseline, currently requiring up to mod A with STS transfers. Pt also NWB L LE which further limits mobility, unable to assess gait at this time due to weakness, balance impairment, and NWB status. Pt would benefit from continued PT to address impairments and increase independence with functional mobility. Would recommend DC to rehab before returning home to increase independence with household mobility and ADLs, given pt would currently be unsafe to negotiate at home w/o assistance.

## 2021-02-17 NOTE — NURSING NOTE
1710- O2 sensor meenu james. This RN entered patients room to find patient cougheing profusly on food. Thickened liquids and regular diet on patieints tray. Only a few bites of meatloaf and gravy taken. Will call attending for chest x ray and possibly dobhof and gastro consulf or ENT.     1720- attending stated that should continue to monitor closely. After patient sat up at 90 degrees to eat there was no coughing.     1904- patient dropping below 90 on O2 stats. Nasal cannula applied at 2 L. Still hovering in low 90's and high 80's. Will increase to 4 L for night.    1910- still in low 90s. Increased HOB to 20 degrees, o2 saturation increased to 95%. Will pass in report.

## 2021-02-17 NOTE — PROGRESS NOTES
Continued Stay Note  [facility]     Patient Name: Eugenio Joe  MRN: [id number]  Today's Date: 2/17/2021    Admit Date: 2/16/2021    Discharge Plan     Row Name 02/17/21 1543       Plan    Plan  [facility]  -- Accepted    Patient/Family in Agreement with Plan  yes    Plan Comments  Spoke with [facility] to clarify Covid-19 test criteria needs for the patient to d/c to [facility]. The patient's last Covid-19 test was 2/13/2021 PTA. Per [facility], the patient needs to have one Covid-19 test during his hospital stay before discharge. Covid-19 test ordered. No other needs identified at this time. Await Covid-19 test results.    Row Name 02/17/21 1256       Plan    Plan  [facility] -- Accepted.    Patient/Family in Agreement with Plan  yes    Plan Comments  Spoke with [facility] who has accepted the patient and said she will have a bed for him tomorrow. [facility] is asking for another Covid-19 test prior to d/c. [facility] notified the patient's nurse/ENEDELIA Carmona and left a message for Dr. Jenkins. No other needs identified at this time.        Discharge Codes    No documentation.             Ariadna Ly, RN

## 2021-02-17 NOTE — PLAN OF CARE
Goal Outcome Evaluation:  Plan of Care Reviewed With: patient  Progress: improving  Outcome Summary: POD#1 OF LEFT ANKLE ORIF. ACEWRAP AND SPLINT IN PLACE. NUMDNESS TO LEFT ANKLE. NWB TO LEFT LEG. VSS. ZERO COMPLAIN OF PAIN. EDUCATION PROVIDED ON BP MONITORING. PATIENT VERBALIZED UNDERSTANDING.

## 2021-02-17 NOTE — PLAN OF CARE
Problem: Adult Inpatient Plan of Care  Goal: Plan of Care Review  Outcome: Ongoing, Progressing     Problem: Adult Inpatient Plan of Care  Goal: Patient-Specific Goal (Individualized)  Outcome: Ongoing, Progressing   Goal Outcome Evaluation:

## 2021-02-17 NOTE — THERAPY EVALUATION
Patient Name: Eugenio Joe  : 1940    MRN: [id number]                              Today's Date: 2021       Admit Date: 2021    Visit Dx:     ICD-10-CM ICD-9-CM   1. Closed displaced fracture of lateral malleolus of left fibula, initial encounter  S82.62XA 824.2     Patient Active Problem List   Diagnosis   • Quadriceps weakness   • ACL tear   • Knee pain, right   • S/P CABG x 3   • Essential hypertension   • Hyperlipemia   • Hallux rigidus   • Fracture, stress, metatarsal   • Osteopenia determined by x-ray   • Metatarsal stress fracture with nonunion   • Left hip pain   • Bursitis of left hip   • Pulmonary embolism (CMS/[facility])   • DALILA (acute kidney injury) (CMS/[facility])   • Type 2 diabetes mellitus with hyperglycemia, without long-term current use of insulin (CMS/[facility])   • Ascending aorta dilatation (CMS/[facility])   • Pulmonary nodule, left   • Right leg DVT (CMS/[facility])   • Acute renal failure (CMS/[facility])   • Hypotension   • Dehydration   • Hypercalcemia   • Dysphagia   • Syncope and collapse   • Coronary artery disease involving native coronary artery of native heart without angina pectoris   • Normal pressure hydrocephalus (CMS/[facility])   • Headache   • Impaired mobility   • Nausea & vomiting   • Fall at home   • Transient cerebral ischemia   • PFO (patent foramen ovale)   • Esophageal dysphagia   • TIA (transient ischemic attack)   • Acute appendicitis with localized peritonitis, without perforation or abscess   • Right lower quadrant abdominal pain   • History of CVA (cerebrovascular accident)   • On statin therapy   • 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   • Diverticulitis   • Hx of appendectomy   • Acute abdominal pain   • Gait abnormality   • Weakness   • CVA (cerebral vascular accident) (CMS/HCC)   • Acute CVA (cerebrovascular accident) (CMS/[facility])   • Cerebrovascular accident (CVA) due to embolism of cerebral artery (CMS/[facility])   • Closed displaced fracture of lateral malleolus of left fibula     Past  Medical History:   Diagnosis Date   • Arthritis    • Asthma    • Brain abscess     at about age 44   • Bruise of face    • Cancer (CMS/[facility])     PT STATES IN HIS SWEAT GLAND    • Cardiac disease    • Coronary artery disease     CABG 2012   • Diabetes mellitus (CMS/[facility])    • Difficulty in swallowing     LIQUIDS   • Difficulty walking    • Diverticulitis    • DM2 (diabetes mellitus, type 2) (CMS/[facility]) 10/12/2016   • Gout several years ago    medication  working   • Hearing aid worn     bilateral   • Hx of appendectomy    • Hyperlipemia    • Hypertension    • Joint pain     or swelling   • Low back pain several years ago   • Normal pressure hydrocephalus (CMS/[facility])    • Orbit fracture (CMS/[facility])    • Pulmonary embolism (CMS/[facility])     OCT 2016 - AFTER FOOT SURGERY   • Rash     ARM-    • Spinal headache     AFTER SPINAL TAP - NO BLOOD PATCH   • Stroke (cerebrum) (CMS/[facility]) 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   • TIA (transient ischemic attack)     MULTIPLE     Past Surgical History:   Procedure Laterality Date   • ANKLE OPEN REDUCTION INTERNAL FIXATION Left 2/16/2021    Procedure: Left ankle open reduction internal fixation with implants as needed;  Surgeon: Man Jenkins MD;  [place] [facility] [place] [facility] [place]  Service: [place]opedics;  Laterality: Left;   • APPENDECTOMY N/A 7/18/2019    Procedure: APPENDECTOMY LAPAROSCOPIC;  Surgeon: Luis Carlos Rick Jr., MD;  Location: [facility] OR;  Service: General   • BRAIN SURGERY      BRAIN ABCESS 30 YR AGO   • CARDIAC CATHETERIZATION N/A 12/4/2020    Procedure: Patent foramen ovale closure ABBOTT;  Surgeon: Frank Pablo MD;  Location: [facility] CATH INVASIVE LOCATION;  Service: Cardiology;  Laterality: N/A;   • CARDIAC CATHETERIZATION N/A 12/4/2020    Procedure: Intracardiac echocardiogram;  Surgeon: Frank Pablo MD;  Location: [facility] CATH INVASIVE LOCATION;  Service: Cardiology;  Laterality: N/A;   • CARDIAC SURGERY     • COLONOSCOPY  2012    Ciciliano- normal per pt,  no polyps    • CORONARY ARTERY BYPASS GRAFT      3 VESSEL   • ENDOSCOPY N/A 3/9/2017    Schatzki ring, dilated, LA Grade B esophagitis, HH, acquired duodenal stenosis   • ENDOSCOPY N/A 4/6/2018    candida, HH, torts, Schatzki ring, duodenal stenosis, dilatation perforemed, chronic inflammation   • ENDOSCOPY N/A 10/9/2019    White nummular lesions in esophageal mucosa, mild Schatzki ring, acquired duodenal stenosis, Path: Terrazas's, candida   • ENDOSCOPY N/A 1/8/2020    Procedure: ESOPHAGOGASTRODUODENOSCOPY with biopsies;  Surgeon: Rigoberto Walker MD;  Location: [facility] ENDOSCOPY;  Service: Gastroenterology   • FACIAL FRACTURE SURGERY      to repair 6 broken bones   • ORBITAL FRACTURE OPEN REDUCTION INTERNAL FIXATION Right 1/13/2017    Procedure: RT ORBIT FLOOR FRACTURE REPAIR RIGHT ZMC FRACTURE REPAIR, RIGHT NASAL BONE FRACTURE CLOSED REDUCTION;  Surgeon: Edil Lester MD;  Location: [facility] OR [facility];  Service:    • ORIF FOOT FRACTURE Right 9/9/2016    Procedure: RIGHT SECOND METATARSAL OPEN REDUCTION INTERNAL FIXATION WITh GRAFT FROM HEEL ;  Surgeon: Man Jenkins MD;  Location: [facility] OR [facility];  Service:    • PATENT FORAMEN OVALE CLOSURE     • SEPTOPLASTY     • SKIN CANCER EXCISION     • TONSILLECTOMY     • TRANSESOPHAGEAL ECHOCARDIOGRAM (STELLA)       General Information     Row Name 02/17/21 1104          Physical Therapy Time and Intention    Document Type  evaluation  -EE     Mode of Treatment  individual therapy;physical therapy  -EE     Row Name 02/17/21 1104          General Information    Prior Level of Function  independent:;all household mobility has walker and rollator at home  -EE     Existing Precautions/Restrictions  fall;non-weight bearing;left NWB L LE  -EE     Barriers to Rehab  none identified  -EE     Row Name 02/17/21 1104          Living Environment    Lives With  spouse  -EE     Row Name 02/17/21 1104          Home Main Entrance    Number of Stairs, Main Entrance  three spouse  states she is hoping to have a chair lift on stairs by next week  -EE     Row Name 02/17/21 1104          Cognition    Orientation Status (Cognition)  oriented x 3  -EE     Row Name 02/17/21 1104          Safety Issues, Functional Mobility    Impairments Affecting Function (Mobility)  balance;endurance/activity tolerance;strength;pain;range of motion (ROM);postural/trunk control  -EE       User Key  (r) = Recorded By, (t) = Taken By, (c) = Cosigned By    Initials Name Provider Type    EE Alley Diana, COLEEN Physical Therapist        Mobility     Row Name 02/17/21 1104          Bed Mobility    Bed Mobility  supine-sit;sit-supine  -EE     Supine-Sit Barnes (Bed Mobility)  minimum assist (75% patient effort);verbal cues  -EE     Sit-Supine Barnes (Bed Mobility)  minimum assist (75% patient effort);verbal cues  -EE     Assistive Device (Bed Mobility)  head of bed elevated;bed rails  -EE     Comment (Bed Mobility)  assist required with L LE  -EE     Row Name 02/17/21 1104          Transfers    Comment (Transfers)  STS x2 from elevated bed surface, pt required min to mod A to maintain NWB L LE while standing  -EE     Row Name 02/17/21 1104          Sit-Stand Transfer    Sit-Stand Barnes (Transfers)  moderate assist (50% patient effort);verbal cues  -EE     Assistive Device (Sit-Stand Transfers)  walker, front-wheeled  -EE     Row Name 02/17/21 1104          Gait/Stairs (Locomotion)    Barnes Level (Gait)  unable to assess  -EE     Row Name 02/17/21 1104          Mobility    Extremity Weight-bearing Status  left lower extremity  -EE     Left Lower Extremity (Weight-bearing Status)  non weight-bearing (NWB)  -EE       User Key  (r) = Recorded By, (t) = Taken By, (c) = Cosigned By    Initials Name Provider Type    EE Alley Diana PT Physical Therapist        Obj/Interventions     Row Name 02/17/21 1105          Range of Motion Comprehensive    General Range of Motion  other (see comments)  -EE      Comment, General Range of Motion  B LEs grossly WFL except L foot/ankle not tested  -EE     Row Name 02/17/21 1105          Strength Comprehensive (MMT)    General Manual Muscle Testing (MMT) Assessment  lower extremity strength deficits identified  -EE     Comment, General Manual Muscle Testing (MMT) Assessment  R LE grossly 4-/5, L hip/knee 3/5, L foot/ankle deferred  -EE     Row Name 02/17/21 1105          Motor Skills    Therapeutic Exercise  -- seated B LAQ and marching x 10 reps  -EE     Row Name 02/17/21 1105          Balance    Balance Assessment  standing static balance;sitting static balance;sitting dynamic balance  -EE     Static Sitting Balance  WFL;sitting, edge of bed CGA  -EE     Dynamic Sitting Balance  mild impairment;supported;sitting, edge of bed min/CGA, leans posteriorly  -EE     Static Standing Balance  mild impairment;supported;standing min A with rwx  -EE     Row Name 02/17/21 1105          Sensory Assessment (Somatosensory)    Sensory Assessment (Somatosensory)  not tested  -EE       User Key  (r) = Recorded By, (t) = Taken By, (c) = Cosigned By    Initials Name Provider Type    EE Alley Diana, PT Physical Therapist        Goals/Plan     Row Name 02/17/21 1110          Bed Mobility Goal 1 (PT)    Activity/Assistive Device (Bed Mobility Goal 1, PT)  bed mobility activities, all  -EE     Star Level/Cues Needed (Bed Mobility Goal 1, PT)  standby assist  -EE     Time Frame (Bed Mobility Goal 1, PT)  1 week  -EE     Progress/Outcomes (Bed Mobility Goal 1, PT)  goal ongoing  -EE     Row Name 02/17/21 1110          Transfer Goal 1 (PT)    Activity/Assistive Device (Transfer Goal 1, PT)  sit-to-stand/stand-to-sit;bed-to-chair/chair-to-bed;walker, rolling  -EE     Star Level/Cues Needed (Transfer Goal 1, PT)  contact guard assist  -EE     Time Frame (Transfer Goal 1, PT)  1 week  -EE     Progress/Outcome (Transfer Goal 1, PT)  goal ongoing  -EE       User Key  (r) = Recorded By, (t)  = Taken By, (c) = Cosigned By    Initials Name Provider Type    EE Alley Diana, PT Physical Therapist        Clinical Impression     Row Name 02/17/21 1107          Pain    Additional Documentation  Pain Scale: Numbers Pre/Post-Treatment (Group)  -EE     Row Name 02/17/21 1107          Pain Scale: Numbers Pre/Post-Treatment    Pretreatment Pain Rating  4/10  -EE     Posttreatment Pain Rating  6/10  -EE     Pain Location - Side  Left  -EE     Pain Location  ankle;foot  -EE     Pain Intervention(s)  Repositioned;Medication (See MAR);Elevated  -EE     Row Name 02/17/21 1107          Plan of Care Review    Outcome Summary  Pt is an 79 yo male who presents POD1 s/p L ankle ORIF, also with PMH of NPH and prior CVA, with hx of balance impairments which may impact functional mobility. Upon exam, pt demonstrates post op pain, generalized weakness, impaired balance, impaired trunk control, and decreased endurance limiting mobility. Pt is independent with walker at baseline, currently requiring up to mod A with STS transfers. Pt also NWB L LE which further limits mobility, unable to assess gait at this time due to weakness, balance impairment, and NWB status. Pt would benefit from continued PT to address impairments and increase independence with functional mobility. Would recommend DC to rehab before returning home to increase independence with household mobility and ADLs, given pt would currently be unsafe to negotiate at home w/o assistance.  -EE     Row Name 02/17/21 1107          Therapy Assessment/Plan (PT)    Rehab Potential (PT)  good, to achieve stated therapy goals  -EE     Criteria for Skilled Interventions Met (PT)  yes;meets criteria;skilled treatment is necessary  -EE     Row Name 02/17/21 1107          Positioning and Restraints    Pre-Treatment Position  in bed  -EE     Post Treatment Position  bed  -EE     In Bed  fowlers;call light within reach;encouraged to call for assist;exit alarm on;with  family/caregiver;LLE elevated  -EE       User Key  (r) = Recorded By, (t) = Taken By, (c) = Cosigned By    Initials Name Provider Type    EE Alley Diana PT Physical Therapist        Outcome Measures     Row Name 02/17/21 1110          How much help from another person do you currently need...    Turning from your back to your side while in flat bed without using bedrails?  3  -EE     Moving from lying on back to sitting on the side of a flat bed without bedrails?  3  -EE     Moving to and from a bed to a chair (including a wheelchair)?  2  -EE     Standing up from a chair using your arms (e.g., wheelchair, bedside chair)?  2  -EE     Climbing 3-5 steps with a railing?  1  -EE     To walk in hospital room?  1  -EE     AM-PAC 6 Clicks Score (PT)  12  -EE     Row Name 02/17/21 1110          Functional Assessment    Outcome Measure Options  AM-PAC 6 Clicks Basic Mobility (PT)  -EE       User Key  (r) = Recorded By, (t) = Taken By, (c) = Cosigned By    Initials Name Provider Type    Alley Villaseñor PT Physical Therapist        Physical Therapy Education                 Title: PT OT SLP Therapies (Done)     Topic: Physical Therapy (Done)     Point: Mobility training (Done)     Learning Progress Summary           Patient Acceptance, E,TB, VU,NR by EE at 2/17/2021 1111                   Point: Home exercise program (Done)     Learning Progress Summary           Patient Acceptance, E,TB, VU,NR by EE at 2/17/2021 1111                   Point: Body mechanics (Done)     Learning Progress Summary           Patient Acceptance, E,TB, VU,NR by EE at 2/17/2021 1111                   Point: Precautions (Done)     Learning Progress Summary           Patient Acceptance, E,TB, VU,NR by EE at 2/17/2021 1111                               User Key     Initials Effective Dates Name Provider Type Discipline    EE 04/03/18 -  Alley Diana, COLEEN Physical Therapist PT              PT Recommendation and Plan  Planned Therapy Interventions (PT):  balance training, bed mobility training, gait training, home exercise program, patient/family education, ROM (range of motion), strengthening, stretching, transfer training  Outcome Summary: Pt is an 81 yo male who presents POD1 s/p L ankle ORIF, also with PMH of NPH and prior CVA, with hx of balance impairments which may impact functional mobility. Upon exam, pt demonstrates post op pain, generalized weakness, impaired balance, impaired trunk control, and decreased endurance limiting mobility. Pt is independent with walker at baseline, currently requiring up to mod A with STS transfers. Pt also NWB L LE which further limits mobility, unable to assess gait at this time due to weakness, balance impairment, and NWB status. Pt would benefit from continued PT to address impairments and increase independence with functional mobility. Would recommend DC to rehab before returning home to increase independence with household mobility and ADLs, given pt would currently be unsafe to negotiate at home w/o assistance.     Time Calculation:   PT Charges     Row Name 02/17/21 1103             Time Calculation    Start Time  1034  -EE      Stop Time  1100  -EE      Time Calculation (min)  26 min  -EE      PT Received On  02/17/21  -EE      PT - Next Appointment  02/18/21  -EE      PT Goal Re-Cert Due Date  02/24/21  -EE         Time Calculation- PT    Total Timed Code Minutes- PT  12 minute(s)  -EE        User Key  (r) = Recorded By, (t) = Taken By, (c) = Cosigned By    Initials Name Provider Type    EE Alley Diana, PT Physical Therapist        Therapy Charges for Today     Code Description Service Date Service Provider Modifiers Qty    06743889129 HC PT EVAL MOD COMPLEXITY 2 2/17/2021 Alley Diana, PT GP 1    51309899675 HC PT THER PROC EA 15 MIN 2/17/2021 Alley Diana, PT GP 1          PT G-Codes  Outcome Measure Options: AM-PAC 6 Clicks Basic Mobility (PT)  AM-PAC 6 Clicks Score (PT): 12     Patient was wearing a face mask  during this therapy encounter. Therapist used appropriate personal protective equipment including eye protection, mask, and gloves.  Mask used was standard procedure mask. Appropriate PPE was worn during the entire therapy session. Hand hygiene was completed before and after therapy session. Patient is not in enhanced droplet precautions.       Alley Diana, PT  2/17/2021

## 2021-02-17 NOTE — PROGRESS NOTES
Orthopedic Progress Note      Patient: Eugenio Joe    YOB: 1940    Medical Record Number: [id number]    Attending Physician: Man Jenkins*    Date of Admission: 2/16/2021  9:19 AM    Admitting Dx:  Closed displaced fracture of lateral malleolus of left fibula, initial encounter [S82.62XA]  Closed displaced fracture of lateral malleolus of left fibula, initial encounter [S82.62XA]    Status Post: Left ankle open reduction internal fixation with implants as needed    Post Operative Day Number: 1    Current Problem List:   Patient Active Problem List   Diagnosis   • Quadriceps weakness   • ACL tear   • Knee pain, right   • S/P CABG x 3   • Essential hypertension   • Hyperlipemia   • Hallux rigidus   • Fracture, stress, metatarsal   • Osteopenia determined by x-ray   • Metatarsal stress fracture with nonunion   • Left hip pain   • Bursitis of left hip   • Pulmonary embolism (CMS/HCC)   • DALILA (acute kidney injury) (CMS/[facility])   • Type 2 diabetes mellitus with hyperglycemia, without long-term current use of insulin (CMS/[facility])   • Ascending aorta dilatation (CMS/HCC)   • Pulmonary nodule, left   • Right leg DVT (CMS/HCC)   • Acute renal failure (CMS/HCC)   • Hypotension   • Dehydration   • Hypercalcemia   • Dysphagia   • Syncope and collapse   • Coronary artery disease involving native coronary artery of native heart without angina pectoris   • Normal pressure hydrocephalus (CMS/HCC)   • Headache   • Impaired mobility   • Nausea & vomiting   • Fall at home   • Transient cerebral ischemia   • PFO (patent foramen ovale)   • Esophageal dysphagia   • TIA (transient ischemic attack)   • Acute appendicitis with localized peritonitis, without perforation or abscess   • Right lower quadrant abdominal pain   • History of CVA (cerebrovascular accident)   • On statin therapy   • 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   • Diverticulitis   • Hx of appendectomy   • Acute abdominal pain   • Gait abnormality    • Weakness   • CVA (cerebral vascular accident) (CMS/[facility])   • Acute CVA (cerebrovascular accident) (CMS/[facility])   • Cerebrovascular accident (CVA) due to embolism of cerebral artery (CMS/[facility])   • Closed displaced fracture of lateral malleolus of left fibula         Past Medical History:   Diagnosis Date   • Arthritis    • Asthma    • Brain abscess     at about age 44   • Bruise of face    • Cancer (CMS/[facility])     PT STATES IN HIS SWEAT GLAND    • Cardiac disease    • Coronary artery disease     CABG 2012   • Diabetes mellitus (CMS/[facility])    • Difficulty in swallowing     LIQUIDS   • Difficulty walking    • Diverticulitis    • DM2 (diabetes mellitus, type 2) (CMS/[facility]) 10/12/2016   • Gout several years ago    medication  working   • Hearing aid worn     bilateral   • Hx of appendectomy    • Hyperlipemia    • Hypertension    • Joint pain     or swelling   • Low back pain several years ago   • Normal pressure hydrocephalus (CMS/[facility])    • Orbit fracture (CMS/[facility])    • Pulmonary embolism (CMS/[facility])     OCT 2016 - AFTER FOOT SURGERY   • Rash     ARM-    • Spinal headache     AFTER SPINAL TAP - NO BLOOD PATCH   • Stroke (cerebrum) (CMS/[facility]) 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   • TIA (transient ischemic attack)     MULTIPLE       Current Medications:  Scheduled Meds:allopurinol, 300 mg, Oral, Daily  [START ON 2/18/2021] aspirin, 81 mg, Oral, Daily  atorvastatin, 40 mg, Oral, Nightly  clopidogrel, 75 mg, Oral, Daily  glipizide, 5 mg, Oral, Daily With Dinner  lisinopril, 10 mg, Oral, BID  metFORMIN, 500 mg, Oral, Daily With Breakfast  multivitamin with minerals, 1 tablet, Oral, Daily  pantoprazole, 40 mg, Oral, Nightly  sodium chloride, 10 mL, Intravenous, Q12H  terazosin, 1 mg, Oral, Nightly      PRN Meds:.•  acetaminophen  •  albuterol sulfate HFA  •  HYDROcodone-acetaminophen  •  Morphine **AND** naloxone  •  ondansetron **OR** ondansetron  •  sodium chloride    SUBJECTIVE: 80 y.o.  male.  Awake and alert.  No complaints,  initially had some pain earlier today but has been controlled with medication    OBJECTIVE:   Vitals:    02/16/21 2241 02/17/21 0330 02/17/21 0658 02/17/21 1100   BP: 126/52 109/53 124/80 143/68   BP Location: Right arm Right arm Right arm Right arm   Patient Position: Lying Lying Lying Lying   Pulse: 90 83 80 78   Resp: 16 16 16 16   Temp: 97.3 °F (36.3 °C) 98.6 °F (37 °C) 99.3 °F (37.4 °C) 98.6 °F (37 °C)   TempSrc: Skin Skin Skin Skin   SpO2: 93% 97% 92% 96%     I/O last 3 completed shifts:  In: 600 [I.V.:600]  Out: -     Diagnostic Tests:   Lab Results (last 24 hours)     ** No results found for the last 24 hours. **          PHYSICAL EXAM: Left short leg posterior splint is dry and intact.  Toes are warm and pink with good capillary refill.  He has good motion and sensation.  Cast does not feel too tight.  Patient tends to keep his knee flexed and the leg externally rotated.  Have advised him to try to keep the knee in neutral to avoid pressure on the wound.  Voiding well.     ASSESSMENT & PLAN:  Have had a lengthy discussion with the patient's wife.  He does have a history of dysphagia and did not do very well with physical therapy this morning.  He has difficulty understanding nonweightbearing and was the max assist of 2 with just standing at the bedside.  Have discussed going to skilled nursing facility for short stay.  Patient and the wife are agreeable and he has been to [facility] rehab in the past.  Patient is a high risk for falls and would not recommend full anticoagulation.  I will go ahead and resume his Plavix and aspirin 81 mg daily.    Man Jenkins MD        Date: 2/17/2021    Raysa Casiano RN

## 2021-02-17 NOTE — PROGRESS NOTES
Continued Stay Note  [facility]     Patient Name: Eugenio Joe  MRN: [id number]  Today's Date: 2/17/2021    Admit Date: 2/16/2021    Discharge Plan     Row Name 02/17/21 1255       Plan    Plan  [facility] -- Accepted.    Patient/Family in Agreement with Plan  yes    Plan Comments  Spoke with Jen/KATERYNA who has accepted the patient and said she will have a bed for him tomorrow. Jen/KATERYNA is asking for another Covid-19 test prior to d/c. [facility] notified the patient's nurse/ENEDELIA Carmona and left a message for Dr. Jenkins. No other needs identified at this time.    Row Name 02/17/21 1147       Plan    Plan  [facility] -- Referral Pending.    Patient/Family in Agreement with Plan  yes    Plan Comments  Spoke with both the patient and his wife/Bettey, verified current information and CCP role explained. Patient said he has very good family support. He's overall dependent on his wife/Thelma to assist with most all of his ADLs. [facility] in-home caregivers visit twice a week to assist the patient with miscellaneous ADLs. Patient's been to [facility] in the past (most recently in September 2020) and has no history with . Patient and wife/Bettey plans for him to d/c to [facility]. Referral sent in [facility]. Spoke with [facility] who will look into the patient's case, but she feels she'll be able to accept him to their rehab. Wife/Thelma said either her or their son can transport the patient at d/c if it's medically appropriate. Await [facility]'s determination.        Discharge Codes    No documentation.             Ariadna Ly RN

## 2021-02-18 VITALS
TEMPERATURE: 98 F | HEART RATE: 79 BPM | SYSTOLIC BLOOD PRESSURE: 157 MMHG | DIASTOLIC BLOOD PRESSURE: 64 MMHG | RESPIRATION RATE: 18 BRPM | OXYGEN SATURATION: 96 %

## 2021-02-18 PROCEDURE — 63710000001 CLOPIDOGREL 75 MG TABLET: Performed by: ORTHOPAEDIC SURGERY

## 2021-02-18 PROCEDURE — A9270 NON-COVERED ITEM OR SERVICE: HCPCS | Performed by: ORTHOPAEDIC SURGERY

## 2021-02-18 PROCEDURE — G0378 HOSPITAL OBSERVATION PER HR: HCPCS

## 2021-02-18 PROCEDURE — 63710000001 MULTIVITAMIN WITH MINERALS TABLET: Performed by: ORTHOPAEDIC SURGERY

## 2021-02-18 PROCEDURE — 63710000001 ALLOPURINOL 300 MG TABLET: Performed by: ORTHOPAEDIC SURGERY

## 2021-02-18 PROCEDURE — 63710000001 HYDROCODONE-ACETAMINOPHEN 7.5-325 MG TABLET: Performed by: ORTHOPAEDIC SURGERY

## 2021-02-18 PROCEDURE — 97110 THERAPEUTIC EXERCISES: CPT

## 2021-02-18 PROCEDURE — 63710000001 LISINOPRIL 10 MG TABLET: Performed by: ORTHOPAEDIC SURGERY

## 2021-02-18 PROCEDURE — 63710000001 ASPIRIN 81 MG TABLET DELAYED-RELEASE: Performed by: ORTHOPAEDIC SURGERY

## 2021-02-18 PROCEDURE — 97530 THERAPEUTIC ACTIVITIES: CPT

## 2021-02-18 PROCEDURE — 63710000001 METFORMIN 500 MG TABLET: Performed by: ORTHOPAEDIC SURGERY

## 2021-02-18 RX ORDER — ONDANSETRON 4 MG/1
4 TABLET, FILM COATED ORAL EVERY 6 HOURS PRN
Start: 2021-02-18 | End: 2022-01-01

## 2021-02-18 RX ORDER — HYDROCODONE BITARTRATE AND ACETAMINOPHEN 7.5; 325 MG/1; MG/1
1 TABLET ORAL EVERY 4 HOURS PRN
Qty: 50 TABLET | Refills: 0 | Status: SHIPPED | OUTPATIENT
Start: 2021-02-18 | End: 2021-02-23

## 2021-02-18 RX ADMIN — HYDROCODONE BITARTRATE AND ACETAMINOPHEN 1 TABLET: 7.5; 325 TABLET ORAL at 15:33

## 2021-02-18 RX ADMIN — LISINOPRIL 10 MG: 10 TABLET ORAL at 08:13

## 2021-02-18 RX ADMIN — CLOPIDOGREL 75 MG: 75 TABLET, FILM COATED ORAL at 08:13

## 2021-02-18 RX ADMIN — SODIUM CHLORIDE, PRESERVATIVE FREE 10 ML: 5 INJECTION INTRAVENOUS at 08:18

## 2021-02-18 RX ADMIN — ALLOPURINOL 300 MG: 300 TABLET ORAL at 08:13

## 2021-02-18 RX ADMIN — MULTIPLE VITAMINS W/ MINERALS TAB 1 TABLET: TAB at 08:13

## 2021-02-18 RX ADMIN — ASPIRIN 81 MG: 81 TABLET, COATED ORAL at 08:13

## 2021-02-18 RX ADMIN — METFORMIN HYDROCHLORIDE 500 MG: 500 TABLET ORAL at 08:13

## 2021-02-18 NOTE — PROGRESS NOTES
Orthopedic Progress Note      Patient: Eugenio Joe    YOB: 1940    Medical Record Number: [id number]    Attending Physician: Man Jenkins*    Date of Admission: 2/16/2021  9:19 AM    Admitting Dx:  Closed displaced fracture of lateral malleolus of left fibula, initial encounter [S82.62XA]  Closed displaced fracture of lateral malleolus of left fibula, initial encounter [S82.62XA]    Status Post: Left ankle open reduction internal fixation with implants as needed    Post Operative Day Number: 2    Current Problem List:   Patient Active Problem List   Diagnosis   • Quadriceps weakness   • ACL tear   • Knee pain, right   • S/P CABG x 3   • Essential hypertension   • Hyperlipemia   • Hallux rigidus   • Fracture, stress, metatarsal   • Osteopenia determined by x-ray   • Metatarsal stress fracture with nonunion   • Left hip pain   • Bursitis of left hip   • Pulmonary embolism (CMS/HCC)   • DALILA (acute kidney injury) (CMS/[facility])   • Type 2 diabetes mellitus with hyperglycemia, without long-term current use of insulin (CMS/[facility])   • Ascending aorta dilatation (CMS/HCC)   • Pulmonary nodule, left   • Right leg DVT (CMS/HCC)   • Acute renal failure (CMS/HCC)   • Hypotension   • Dehydration   • Hypercalcemia   • Dysphagia   • Syncope and collapse   • Coronary artery disease involving native coronary artery of native heart without angina pectoris   • Normal pressure hydrocephalus (CMS/HCC)   • Headache   • Impaired mobility   • Nausea & vomiting   • Fall at home   • Transient cerebral ischemia   • PFO (patent foramen ovale)   • Esophageal dysphagia   • TIA (transient ischemic attack)   • Acute appendicitis with localized peritonitis, without perforation or abscess   • Right lower quadrant abdominal pain   • History of CVA (cerebrovascular accident)   • On statin therapy   • 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   • Diverticulitis   • Hx of appendectomy   • Acute abdominal pain   • Gait abnormality    • Weakness   • CVA (cerebral vascular accident) (CMS/[facility])   • Acute CVA (cerebrovascular accident) (CMS/[facility])   • Cerebrovascular accident (CVA) due to embolism of cerebral artery (CMS/[facility])   • Closed displaced fracture of lateral malleolus of left fibula         Past Medical History:   Diagnosis Date   • Arthritis    • Asthma    • Brain abscess     at about age 44   • Bruise of face    • Cancer (CMS/[facility])     PT STATES IN HIS SWEAT GLAND    • Cardiac disease    • Coronary artery disease     CABG 2012   • Diabetes mellitus (CMS/[facility])    • Difficulty in swallowing     LIQUIDS   • Difficulty walking    • Diverticulitis    • DM2 (diabetes mellitus, type 2) (CMS/[facility]) 10/12/2016   • Gout several years ago    medication  working   • Hearing aid worn     bilateral   • Hx of appendectomy    • Hyperlipemia    • Hypertension    • Joint pain     or swelling   • Low back pain several years ago   • Normal pressure hydrocephalus (CMS/[facility])    • Orbit fracture (CMS/[facility])    • Pulmonary embolism (CMS/[facility])     OCT 2016 - AFTER FOOT SURGERY   • Rash     ARM-    • Spinal headache     AFTER SPINAL TAP - NO BLOOD PATCH   • Stroke (cerebrum) (CMS/[facility]) 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   • TIA (transient ischemic attack)     MULTIPLE       Current Medications:  Scheduled Meds:allopurinol, 300 mg, Oral, Daily  aspirin, 81 mg, Oral, Daily  atorvastatin, 40 mg, Oral, Nightly  clopidogrel, 75 mg, Oral, Daily  glipizide, 5 mg, Oral, Daily With Dinner  lisinopril, 10 mg, Oral, BID  metFORMIN, 500 mg, Oral, Daily With Breakfast  multivitamin with minerals, 1 tablet, Oral, Daily  pantoprazole, 40 mg, Oral, Nightly  sodium chloride, 10 mL, Intravenous, Q12H  terazosin, 1 mg, Oral, Nightly      PRN Meds:.•  acetaminophen  •  albuterol sulfate HFA  •  HYDROcodone-acetaminophen  •  Morphine **AND** naloxone  •  ondansetron **OR** ondansetron  •  sodium chloride    SUBJECTIVE: 80 y.o.  male.  Awake and alert.  Complaints of left ankle pain but relieved  with medication  NO SOB    OBJECTIVE:   Vitals:    02/17/21 2300 02/18/21 0300 02/18/21 0642 02/18/21 1100   BP: 178/76 149/66 174/73 159/69   BP Location: Right arm Right arm Right arm Right arm   Patient Position: Lying Lying Lying Lying   Pulse: 78 72 76 79   Resp: 16 16 16 18   Temp: 98.7 °F (37.1 °C) 97.7 °F (36.5 °C) 98.9 °F (37.2 °C) 98 °F (36.7 °C)   TempSrc: Skin Skin Skin Skin   SpO2: 100% 96% 98% 96%     I/O last 3 completed shifts:  In: 180 [P.O.:180]  Out: -     Diagnostic Tests:   Lab Results (last 24 hours)     Procedure Component Value Units Date/Time    COVID PRE-OP / PRE-PROCEDURE SCREENING ORDER (NO ISOLATION) - Swab, Nasopharynx [[id number]]  (Normal) Collected: 02/17/21 1844    Specimen: Swab from Nasopharynx Updated: 02/17/21 2347    Narrative:      The following orders were created for panel order COVID PRE-OP / PRE-PROCEDURE SCREENING ORDER (NO ISOLATION) - Swab, Nasopharynx.  Procedure                               Abnormality         Status                     ---------                               -----------         ------                     COVID-19,APTIMA PANTHER,...[[id number]]  Normal              Final result                 Please view results for these tests on the individual orders.    COVID-19,APTIMA PANTHER,[facility] IN-HOUSE, NP/OP SWAB IN UTM/VTM/SALINE TRANSPORT MEDIA,24 HR TAT - Swab, Nasopharynx [[id number]]  (Normal) Collected: 02/17/21 1844    Specimen: Swab from Nasopharynx Updated: 02/17/21 2347     COVID19 Not Detected    Narrative:      Fact sheet for providers: https://www.fda.gov/media/201706/download     Fact sheet for patients: https://www.fda.gov/media/166747/download    Test performed by RT PCR.          PHYSICAL EXAM:  LLE short leg posterior splint dry and intact.  Toes warm and pink.  Good motion and sensation to left toes.   Voiding well.  Lungs clear.  Remains on oxygen.  History of dysphagia.   Slow progress with therapy.  Patient has trouble maintaining NWB LLE.       ASSESSMENT & PLAN:    Wean of oxygen today    Plan tranfers to [facility]IR later today pending oxygen sats.      Man Jenkins MD          Date: 2/18/2021    Raysa Casiano RN

## 2021-02-18 NOTE — DISCHARGE SUMMARY
Orthopedic Discharge Summary      Patient: Eugenio Joe      YOB: 1940    Medical Record Number: [id number]    Attending Physician: Man Jenkins*  Consulting Physician(s): None  Date of Admission: 2/16/2021  9:19 AM  Date of Discharge: 2/18/21      Patient Active Problem List   Diagnosis   • Quadriceps weakness   • ACL tear   • Knee pain, right   • S/P CABG x 3   • Essential hypertension   • Hyperlipemia   • Hallux rigidus   • Fracture, stress, metatarsal   • Osteopenia determined by x-ray   • Metatarsal stress fracture with nonunion   • Left hip pain   • Bursitis of left hip   • Pulmonary embolism (CMS/[facility])   • DALILA (acute kidney injury) (CMS/[facility])   • Type 2 diabetes mellitus with hyperglycemia, without long-term current use of insulin (CMS/[facility])   • Ascending aorta dilatation (CMS/[facility])   • Pulmonary nodule, left   • Right leg DVT (CMS/[facility])   • Acute renal failure (CMS/[facility])   • Hypotension   • Dehydration   • Hypercalcemia   • Dysphagia   • Syncope and collapse   • Coronary artery disease involving native coronary artery of native heart without angina pectoris   • Normal pressure hydrocephalus (CMS/[facility])   • Headache   • Impaired mobility   • Nausea & vomiting   • Fall at home   • Transient cerebral ischemia   • PFO (patent foramen ovale)   • Esophageal dysphagia   • TIA (transient ischemic attack)   • Acute appendicitis with localized peritonitis, without perforation or abscess   • Right lower quadrant abdominal pain   • History of CVA (cerebrovascular accident)   • On statin therapy   • 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   Left ankle open reduction internal fixation lateral malleolus  2.  Left ankle Mcgowan syndesmotic fixation using Arthrex tight rope    Allergies:   Allergies   Allergen Reactions   • Other Mental Status Change and Hallucinations     Oxy drugs   • Oxycodone Mental Status Change       Current Medications:     Discharge Medications      New Medications      Instructions Start Date   HYDROcodone-acetaminophen 7.5-325 MG per tablet  Commonly known as: NORCO  Replaces: HYDROcodone-acetaminophen 5-325 MG per tablet   1 tablet, Oral, Every 4 Hours PRN      ondansetron 4 MG tablet  Commonly known as: ZOFRAN   4 mg, Oral, Every 6 Hours PRN         Changes to Medications      Instructions Start Date   pantoprazole 40 MG EC tablet  Commonly known as: PROTONIX  What changed: when to take this   40 mg, Oral, Daily         Continue These Medications      Instructions Start Date   Accu-Chek FastClix Lancets misc   TEST ONCE TO BID PRN      acetaminophen 500 MG tablet  Commonly known as: TYLENOL   1,000 mg, Oral, Every 8 Hours PRN      allopurinol 300 MG tablet  Commonly known as: ZYLOPRIM   300 mg, Oral, Daily      aspirin 81 MG chewable tablet   81 mg, Oral, Daily, WILL VERIFY STOP DATE WITH MD      atorvastatin 40 MG tablet  Commonly known as: LIPITOR   40 mg, Oral, Nightly      betamethasone dipropionate 0.05 % lotion  Commonly known as: DIPROLENE   1 application, Topical, As Needed      clopidogrel 75 MG tablet  Commonly known as: PLAVIX   75 mg, Oral, Daily      doxazosin 1 MG tablet  Commonly known as: CARDURA   1 mg, Oral, Every Morning      FREESTYLE LITE test strip  Generic drug: glucose blood   1 each, Other, See Admin Instructions, Use 1 to 2 times daily as instructed       glipizide 5 MG tablet  Commonly known as: GLUCOTROL   5 mg, Oral, Daily With Dinner      lisinopril 10 MG tablet  Commonly known as: PRINIVIL,ZESTRIL   10 mg, Oral, 2 Times Daily      metFORMIN 500 MG tablet  Commonly known as: GLUCOPHAGE   500 mg,  Oral, Daily With Breakfast      multivitamin with minerals tablet tablet   1 tablet, Oral, Daily      ProAir  (90 Base) MCG/ACT inhaler  Generic drug: albuterol sulfate HFA   2 puffs, Inhalation, Every 4 Hours PRN      Vitamin D3 50 MCG (2000 UT) tablet   50 mcg, Oral, Every Morning         Stop These Medications    HYDROcodone-acetaminophen 5-325 MG per tablet  Commonly known as: NORCO  Replaced by: HYDROcodone-acetaminophen 7.5-325 MG per tablet                Past Medical History:   Diagnosis Date   • Arthritis    • Asthma    • Brain abscess     at about age 44   • Bruise of face    • Cancer (CMS/[facility])     PT STATES IN HIS SWEAT GLAND    • Cardiac disease    • Coronary artery disease     CABG 2012   • Diabetes mellitus (CMS/[facility])    • Difficulty in swallowing     LIQUIDS   • Difficulty walking    • Diverticulitis    • DM2 (diabetes mellitus, type 2) (CMS/[facility]) 10/12/2016   • Gout several years ago    medication  working   • Hearing aid worn     bilateral   • Hx of appendectomy    • Hyperlipemia    • Hypertension    • Joint pain     or swelling   • Low back pain several years ago   • Normal pressure hydrocephalus (CMS/[facility])    • Orbit fracture (CMS/[facility])    • Pulmonary embolism (CMS/[facility])     OCT 2016 - AFTER FOOT SURGERY   • Rash     ARM-    • Spinal headache     AFTER SPINAL TAP - NO BLOOD PATCH   • Stroke (cerebrum) (CMS/[facility]) 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   • TIA (transient ischemic attack)     MULTIPLE        Past Surgical History:   Procedure Laterality Date   • ANKLE OPEN REDUCTION INTERNAL FIXATION Left 2/16/2021    Procedure: Left ankle open reduction internal fixation with implants as needed;  Surgeon: Man Jenkins MD;  [place] [facility] [place] [facility] [place]  Service: Orthopedics;  Laterality: Left;   • APPENDECTOMY N/A 7/18/2019    Procedure: APPENDECTOMY LAPAROSCOPIC;  Surgeon: Luis Carlos Rick Jr., MD;  Location: [facility] OR;  Service: General   • BRAIN SURGERY      BRAIN ABCESS 30 YR AGO   •  CARDIAC CATHETERIZATION N/A 12/4/2020    Procedure: Patent foramen ovale closure ABBOTT;  Surgeon: Frank Pablo MD;  Location: [facility] CATH INVASIVE LOCATION;  Service: Cardiology;  Laterality: N/A;   • CARDIAC CATHETERIZATION N/A 12/4/2020    Procedure: Intracardiac echocardiogram;  Surgeon: Frank Pablo MD;  Location: [facility] CATH INVASIVE LOCATION;  Service: Cardiology;  Laterality: N/A;   • CARDIAC SURGERY     • COLONOSCOPY  2012    Ciciliano- normal per pt, no polyps    • CORONARY ARTERY BYPASS GRAFT      3 VESSEL   • ENDOSCOPY N/A 3/9/2017    Schatzki ring, dilated, LA Grade B esophagitis, HH, acquired duodenal stenosis   • ENDOSCOPY N/A 4/6/2018    candida, HH, torts, Schatzki ring, duodenal stenosis, dilatation perforemed, chronic inflammation   • ENDOSCOPY N/A 10/9/2019    White nummular lesions in esophageal mucosa, mild Schatzki ring, acquired duodenal stenosis, Path: Terrazas's, candida   • ENDOSCOPY N/A 1/8/2020    Procedure: ESOPHAGOGASTRODUODENOSCOPY with biopsies;  Surgeon: Rigoberto Walker MD;  Location: [facility] ENDOSCOPY;  Service: Gastroenterology   • FACIAL FRACTURE SURGERY      to repair 6 broken bones   • ORBITAL FRACTURE OPEN REDUCTION INTERNAL FIXATION Right 1/13/2017    Procedure: RT ORBIT FLOOR FRACTURE REPAIR RIGHT ZMC FRACTURE REPAIR, RIGHT NASAL BONE FRACTURE CLOSED REDUCTION;  Surgeon: Edil Lester MD;  Location: [facility] OR [facility] [place];  Service:    • ORIF FOOT FRACTURE Right 9/9/2016    Procedure: RIGHT SECOND METATARSAL OPEN REDUCTION INTERNAL FIXATION WITh GRAFT FROM HEEL ;  Surgeon: Man Jenkins MD;  Location: [facility] OR [facility] [place];  Service:    • PATENT FORAMEN OVALE CLOSURE     • SEPTOPLASTY     • SKIN CANCER EXCISION     • TONSILLECTOMY     • TRANSESOPHAGEAL ECHOCARDIOGRAM (STELLA)          Social History     Occupational History   • Occupation: retired   Tobacco Use   • Smoking status: Former Smoker     Packs/day: 3.00     Years: 5.00     Pack years: 15.00      Types: Cigarettes     Start date:      Quit date:      Years since quittin.1   • Smokeless tobacco: Never Used   • Tobacco comment: Quit cold turkey   Substance and Sexual Activity   • Alcohol use: Yes     Alcohol/week: 2.0 standard drinks     Types: 1 Cans of beer, 1 Shots of liquor per week     Comment: RARELY    • Drug use: No   • Sexual activity: Defer     Partners: Female     Birth control/protection: Surgical      Social History     Social History Narrative   • Not on file        Family History   Problem Relation Age of Onset   • Heart disease Mother    • Hypertension Mother    • Stroke Mother    • Diverticulitis Mother    • Heart disease Father    • Hypertension Father    • Malig Hyperthermia Neg Hx          Physical Exam: 80 y.o. male  General Appearance:    Alert, cooperative, in no acute distress   there is no shortness of breath.  Left ankle splint is intact he was able to flex and extend his toes well and was grossly sensate light touch.  There was no focal tenderness along the palpable portion of the calf nor at the thigh.                   Vitals:    21 2300 21 0300 21 0642 21 1100   BP: 178/76 149/66 174/73 159/69   BP Location: Right arm Right arm Right arm Right arm   Patient Position: Lying Lying Lying Lying   Pulse: 78 72 76 79   Resp:    Temp: 98.7 °F (37.1 °C) 97.7 °F (36.5 °C) 98.9 °F (37.2 °C) 98 °F (36.7 °C)   TempSrc: Skin Skin Skin Skin   SpO2: 100% 96% 98% 96%                                                                                Hospital Course:  80 y.o. male admitted to [facility] to services of Man Jenkins* with Closed displaced fracture of lateral malleolus of left fibula, initial encounter [S82.62XA]  Closed displaced fracture of lateral malleolus of left fibula, initial encounter [S82.62XA] on 2021 and underwent :  1.  Left ankle open reduction internal fixation lateral malleolus  2.  Left ankle Mcgowan  syndesmotic fixation using Arthrex tight rope Antibiotic and VTE prophylaxis were per SCIP protocols. Post-operatively the patient transferred to the post-operative floor where the patient underwent mobilization therapy that included active as well as passive ROM exercises. Opioids were titrated to achieve appropriate pain management to allow for participation in mobilization exercises. Vital signs are now stable. The incision is intact without signs or symptoms of infection. Operative extremity neurovascular status remains intact.   Patient does have a history of dysphagia and needs to be sitting up at 90 degrees at all times when taking medication, eating or drinking.  He does have a dry chronic cough.  O2 sats on room air today are 94-95%.  He has no shortness of breath.  Appropriate education re: incision care, activity levels, medications, and follow up visits was completed and all questions were answered. The patient is now deemed stable for discharge to rehab.    He was restarted on his Plavix and is also on baby aspirin.  He previously had DVT and has been on anticoagulation many years ago and had fallen with facial fractures and felt that he was at increased risk for more formal anticoagulation and we will have him continue with the Plavix that he was on preoperatively and baby aspirin.       Discharge and Follow up Instructions:     1.  Continue with Strict NWB LLE  2.  Keep LLE Splint dry and intact, Do not remove.   3.  Ice to left ankle prn  4.  PT for bed to chair transfers strict NWB LLE.   Core and balance training, strengthening exercise BUE and BLE.  Progressed to ambulation with walker strict nonweightbearing left lower extremity.  5. Return to the office to see Dr. Man Jenkins in 10 days    MD Raysa Perez RN    02/18/21   13:11 EST

## 2021-02-18 NOTE — PROGRESS NOTES
Continued Stay Note  [facility]     Patient Name: Eugenio Joe  MRN: [id number]  Today's Date: 2/18/2021    Admit Date: 2/16/2021    Discharge Plan     Row Name 02/18/21 1450       Plan    Plan  [facility] -- Accepted.    Patient/Family in Agreement with Plan  yes    Plan Comments  Spoke with Jen/KATERYNA who said she has a bed for the patient today and the patient is ok to d/c to [facility]. Nurse is to call report to: [phone]. Updated the patient's nurse/ENEDELIA Carmona. No other needs identified.    Addendum: I was called by the patient's nurse/ENEDELIA Carmona who said the patient and his wife were concerned with the transport to [facility] at d/c. I then met with both the patient and his wife at bedside and spoke with them in great length regarding how we were going to transport the patient to [facility] at discharge. I told both the patient and his wife at bedside that I was happy to schedule an ambulance, stretcher van or wheelchair van for them, but they both declined stating they want family to transport by car. I also brought the charge nurse, Jerri, into the room to discuss with them how our ortho team would help the patient into the car at [facility] and how we anticipated [facility] would help the patient out of the car upon their arrival to [facility]. Both the patient and wife were agreeable. Jerri answered all of their questions regarding the transfer and how our team has historically handled transfers at discharge with orthopedic patients in the past - we continued to encourage the patient and wife to allow us to schedule transporation, but both the patient and wife continued to deny stating they preferred to transport by car themselves... We then offered to schedule transport if our staff or the patient/family feel unsafe while our staff is loading the patient into the car at d/, and both the patient and wife were agreeable.CCP also asked if they were concerned with the cost of other modes of transport (ambulance/stretcher van/wheelchair van),  and both the patient and wife denied cost was a factor. Unfortunately [facility] cannot schedule transportation without the patient's permission. --ADENIKE Ly RN.    Final Discharge Disposition Code  62 - inpatient rehab facility    Final Note  Patient's being discharged to [facility].        Discharge Codes    No documentation.       Expected Discharge Date and Time     Expected Discharge Date Expected Discharge Time    Feb 18, 2021             Ariadna Ly, RN

## 2021-02-18 NOTE — THERAPY TREATMENT NOTE
Patient Name: Eugenio Joe  : 1940    MRN: [id number]                              Today's Date: 2021       Admit Date: 2021    Visit Dx:     ICD-10-CM ICD-9-CM   1. Closed displaced fracture of lateral malleolus of left fibula, initial encounter  S82.62XA 824.2     Patient Active Problem List   Diagnosis   • Quadriceps weakness   • ACL tear   • Knee pain, right   • S/P CABG x 3   • Essential hypertension   • Hyperlipemia   • Hallux rigidus   • Fracture, stress, metatarsal   • Osteopenia determined by x-ray   • Metatarsal stress fracture with nonunion   • Left hip pain   • Bursitis of left hip   • Pulmonary embolism (CMS/[facility])   • DALILA (acute kidney injury) (CMS/[facility])   • Type 2 diabetes mellitus with hyperglycemia, without long-term current use of insulin (CMS/[facility])   • Ascending aorta dilatation (CMS/[facility])   • Pulmonary nodule, left   • Right leg DVT (CMS/[facility])   • Acute renal failure (CMS/[facility])   • Hypotension   • Dehydration   • Hypercalcemia   • Dysphagia   • Syncope and collapse   • Coronary artery disease involving native coronary artery of native heart without angina pectoris   • Normal pressure hydrocephalus (CMS/[facility])   • Headache   • Impaired mobility   • Nausea & vomiting   • Fall at home   • Transient cerebral ischemia   • PFO (patent foramen ovale)   • Esophageal dysphagia   • TIA (transient ischemic attack)   • Acute appendicitis with localized peritonitis, without perforation or abscess   • Right lower quadrant abdominal pain   • History of CVA (cerebrovascular accident)   • On statin therapy   • 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   • Diverticulitis   • Hx of appendectomy   • Acute abdominal pain   • Gait abnormality   • Weakness   • CVA (cerebral vascular accident) (CMS/HCC)   • Acute CVA (cerebrovascular accident) (CMS/[facility])   • Cerebrovascular accident (CVA) due to embolism of cerebral artery (CMS/[facility])   • Closed displaced fracture of lateral malleolus of left fibula     Past  Medical History:   Diagnosis Date   • Arthritis    • Asthma    • Brain abscess     at about age 44   • Bruise of face    • Cancer (CMS/[facility])     PT STATES IN HIS SWEAT GLAND    • Cardiac disease    • Coronary artery disease     CABG 2012   • Diabetes mellitus (CMS/[facility])    • Difficulty in swallowing     LIQUIDS   • Difficulty walking    • Diverticulitis    • DM2 (diabetes mellitus, type 2) (CMS/[facility]) 10/12/2016   • Gout several years ago    medication  working   • Hearing aid worn     bilateral   • Hx of appendectomy    • Hyperlipemia    • Hypertension    • Joint pain     or swelling   • Low back pain several years ago   • Normal pressure hydrocephalus (CMS/[facility])    • Orbit fracture (CMS/[facility])    • Pulmonary embolism (CMS/[facility])     OCT 2016 - AFTER FOOT SURGERY   • Rash     ARM-    • Spinal headache     AFTER SPINAL TAP - NO BLOOD PATCH   • Stroke (cerebrum) (CMS/[facility]) 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   • TIA (transient ischemic attack)     MULTIPLE     Past Surgical History:   Procedure Laterality Date   • ANKLE OPEN REDUCTION INTERNAL FIXATION Left 2/16/2021    Procedure: Left ankle open reduction internal fixation with implants as needed;  Surgeon: Man Jenkins MD;  Location: [facility] OR [place];  Service: Orthopedics;  Laterality: Left;   • APPENDECTOMY N/A 7/18/2019    Procedure: APPENDECTOMY LAPAROSCOPIC;  Surgeon: Luis Carlos Rick Jr., MD;  Location: [facility] OR;  Service: General   • BRAIN SURGERY      BRAIN ABCESS 30 YR AGO   • CARDIAC CATHETERIZATION N/A 12/4/2020    Procedure: Patent foramen ovale closure ABBOTT;  Surgeon: Frank Pablo MD;  Location: [facility] CATH INVASIVE LOCATION;  Service: Cardiology;  Laterality: N/A;   • CARDIAC CATHETERIZATION N/A 12/4/2020    Procedure: Intracardiac echocardiogram;  Surgeon: Frank Pablo MD;  Location: [facility] CATH INVASIVE LOCATION;  Service: Cardiology;  Laterality: N/A;   • CARDIAC SURGERY     • COLONOSCOPY  2012    Ciciliano- normal per pt,  no polyps    • CORONARY ARTERY BYPASS GRAFT      3 VESSEL   • ENDOSCOPY N/A 3/9/2017    Schatzki ring, dilated, LA Grade B esophagitis, HH, acquired duodenal stenosis   • ENDOSCOPY N/A 4/6/2018    candida, HH, torts, Schatzki ring, duodenal stenosis, dilatation perforemed, chronic inflammation   • ENDOSCOPY N/A 10/9/2019    White nummular lesions in esophageal mucosa, mild Schatzki ring, acquired duodenal stenosis, Path: Terrazas's, candida   • ENDOSCOPY N/A 1/8/2020    Procedure: ESOPHAGOGASTRODUODENOSCOPY with biopsies;  Surgeon: Rigoberto Walker MD;  Location: [facility] ENDOSCOPY;  Service: Gastroenterology   • FACIAL FRACTURE SURGERY      to repair 6 broken bones   • ORBITAL FRACTURE OPEN REDUCTION INTERNAL FIXATION Right 1/13/2017    Procedure: RT ORBIT FLOOR FRACTURE REPAIR RIGHT [facility] FRACTURE REPAIR, RIGHT NASAL BONE FRACTURE CLOSED REDUCTION;  Surgeon: Edil Lester MD;  Location: [facility];  Service:    • ORIF FOOT FRACTURE Right 9/9/2016    Procedure: RIGHT SECOND METATARSAL OPEN REDUCTION INTERNAL FIXATION WITh GRAFT FROM HEEL ;  Surgeon: Man Jenkins MD;  Location: [facility];  Service:    • PATENT FORAMEN OVALE CLOSURE     • SEPTOPLASTY     • SKIN CANCER EXCISION     • TONSILLECTOMY     • TRANSESOPHAGEAL ECHOCARDIOGRAM (STELLA)       General Information     Row Name 02/18/21 1159          Physical Therapy Time and Intention    Document Type  therapy note (daily note)  -EE     Mode of Treatment  physical therapy;individual therapy  -EE     Row Name 02/18/21 1157          General Information    Existing Precautions/Restrictions  fall;non-weight bearing;left NWB L LE  -EE     Barriers to Rehab  none identified  -EE     Row Name 02/18/21 1157          Cognition    Orientation Status (Cognition)  oriented x 3  -EE     Row Name 02/18/21 115          Safety Issues, Functional Mobility    Impairments Affecting Function (Mobility)  balance;coordination;endurance/activity  tolerance;pain;postural/trunk control;strength  -EE       User Key  (r) = Recorded By, (t) = Taken By, (c) = Cosigned By    Initials Name Provider Type    Alley Villaseñor PT Physical Therapist        Mobility     Row Name 02/18/21 1157          Bed Mobility    Bed Mobility  rolling left;rolling right  -EE     Rolling Left [place] (Bed Mobility)  minimum assist (75% patient effort);verbal cues  -EE     Rolling Right [place] (Bed Mobility)  minimum assist (75% patient effort);verbal cues  -EE     Supine-Sit [place] (Bed Mobility)  moderate assist (50% patient effort);verbal cues  -EE     Sit-Supine [place] (Bed Mobility)  moderate assist (50% patient effort);verbal cues  -EE     Assistive Device (Bed Mobility)  head of bed elevated;draw sheet;bed rails  -EE     Row Name 02/18/21 1157          Transfers    Comment (Transfers)  STS x3 from elevated bed surface. Unable to pivot to chair due to impaired static standing balance, pt leaning R and forward. Min to mod A required to maintain L LE NWB while standing.  -EE     Row Name 02/18/21 1157          Bed-Chair Transfer    Bed-Chair [place] (Transfers)  unable to assess  -EE     Row Name 02/18/21 1157          Sit-Stand Transfer    Sit-Stand [place] (Transfers)  minimum assist (75% patient effort);moderate assist (50% patient effort);2 person assist;verbal cues  -EE     Assistive Device (Sit-Stand Transfers)  walker, front-wheeled  -EE     Row Name 02/18/21 1157          Gait/Stairs (Locomotion)    [place] Level (Gait)  unable to assess  -EE     Row Name 02/18/21 1157          Mobility    Extremity Weight-bearing Status  left lower extremity  -EE     Left Lower Extremity (Weight-bearing Status)  non weight-bearing (NWB)  -EE       User Key  (r) = Recorded By, (t) = Taken By, (c) = Cosigned By    Initials Name Provider Type    Alley Villaseñor PT Physical Therapist        Obj/Interventions     Row Name 02/18/21 1159          Motor Skills     Therapeutic Exercise  -- B LAQ x 10 reps  -EE     Row Name 02/18/21 1159          Balance    Balance Interventions  static;sitting;standing;supported  -EE     Comment, Balance  Sitting EOB with CGA for 8 min. Static standing 3 x 10-15 sec each. Vc's and tc's for R knee extension and upright posture.  -EE       User Key  (r) = Recorded By, (t) = Taken By, (c) = Cosigned By    Initials Name Provider Type    Alley Villaseñor, PT Physical Therapist        Goals/Plan    No documentation.       Clinical Impression     Row Name 02/18/21 1159          Pain    Additional Documentation  Pain Scale: Numbers Pre/Post-Treatment (Group)  -EE     Row Name 02/18/21 1159          Pain Scale: Numbers Pre/Post-Treatment    Pretreatment Pain Rating  5/10  -EE     Posttreatment Pain Rating  7/10  -EE     Pain Location - Side  Left  -EE     Pain Location - Orientation  lower  -EE     Pain Location  extremity  -EE     Pain Intervention(s)  Repositioned;Elevated  -EE     Row Name 02/18/21 1156          Plan of Care Review    Plan of Care Reviewed With  patient  -EE     Outcome Summary  Pt reporting more pain today and required increased assistance with transfers. Pt unable to perform stand pivot transfer to chair due to impaired static standing balance. No new PT concerns, will continue to strengthen and advance activity as tolerated. Pt hopeful to DC to rehab soon.  -EE     Row Name 02/18/21 1157          Positioning and Restraints    Pre-Treatment Position  in bed  -EE     Post Treatment Position  bed  -EE     In Bed  fowlers;call light within reach;encouraged to call for assist;exit alarm on;notified nsg  -EE       User Key  (r) = Recorded By, (t) = Taken By, (c) = Cosigned By    Initials Name Provider Type    Alley Villaseñor, PT Physical Therapist        Outcome Measures     Row Name 02/18/21 1201          How much help from another person do you currently need...    Turning from your back to your side while in flat bed without using  bedrails?  3  -EE     Moving from lying on back to sitting on the side of a flat bed without bedrails?  2  -EE     Moving to and from a bed to a chair (including a wheelchair)?  2  -EE     Standing up from a chair using your arms (e.g., wheelchair, bedside chair)?  2  -EE     Climbing 3-5 steps with a railing?  1  -EE     To walk in hospital room?  1  -EE     AM-PAC 6 Clicks Score (PT)  11  -EE     Row Name 02/18/21 1201          Functional Assessment    Outcome Measure Options  AM-PAC 6 Clicks Basic Mobility (PT)  -EE       User Key  (r) = Recorded By, (t) = Taken By, (c) = Cosigned By    Initials Name Provider Type    EE Alley Diana PT Physical Therapist        Physical Therapy Education                 Title: PT OT SLP Therapies (Done)     Topic: Physical Therapy (Done)     Point: Mobility training (Done)     Learning Progress Summary           Patient Acceptance, E,TB, VU,NR by EE at 2/18/2021 1201    Acceptance, E,TB, VU,NR by EE at 2/17/2021 1111                   Point: Home exercise program (Done)     Learning Progress Summary           Patient Acceptance, E,TB, VU,NR by EE at 2/18/2021 1201    Acceptance, E,TB, VU,NR by EE at 2/17/2021 1111                   Point: Body mechanics (Done)     Learning Progress Summary           Patient Acceptance, E,TB, VU,NR by EE at 2/18/2021 1201    Acceptance, E,TB, VU,NR by EE at 2/17/2021 1111                   Point: Precautions (Done)     Learning Progress Summary           Patient Acceptance, E,TB, VU,NR by EE at 2/18/2021 1201    Acceptance, E,TB, VU,NR by EE at 2/17/2021 1111                               User Key     Initials Effective Dates Name Provider Type Discipline    EE 04/03/18 -  Alley Diana, COLEEN Physical Therapist PT              PT Recommendation and Plan  Planned Therapy Interventions (PT): balance training, bed mobility training, gait training, home exercise program, patient/family education, ROM (range of motion), strengthening, stretching,  transfer training  Plan of Care Reviewed With: patient  Outcome Summary: Pt reporting more pain today and required increased assistance with transfers. Pt unable to perform stand pivot transfer to chair due to impaired static standing balance. No new PT concerns, will continue to strengthen and advance activity as tolerated. Pt hopeful to DC to rehab soon.     Time Calculation:   PT Charges     Row Name 02/18/21 1201             Time Calculation    Start Time  1127  -EE      Stop Time  1150  -EE      Time Calculation (min)  23 min  -EE      PT Received On  02/18/21  -EE      PT - Next Appointment  02/19/21  -EE         Time Calculation- PT    Total Timed Code Minutes- PT  23 minute(s)  -EE        User Key  (r) = Recorded By, (t) = Taken By, (c) = Cosigned By    Initials Name Provider Type    EE Alley Diana, COLEEN Physical Therapist        Therapy Charges for Today     Code Description Service Date Service Provider Modifiers Qty    62095904788  PT EVAL MOD COMPLEXITY 2 2/17/2021 Alley Diana, PT GP 1    61806545452 HC PT THER PROC EA 15 MIN 2/17/2021 Alley Diana, PT GP 1    04049003273 HC PT THER PROC EA 15 MIN 2/18/2021 Alley Diana, PT GP 1    28793586335 HC PT THERAPEUTIC ACT EA 15 MIN 2/18/2021 Alley Diana, PT GP 1    94231841021 HC PT THER SUPP EA 15 MIN 2/18/2021 Alley Diana, PT GP 2          PT G-Codes  Outcome Measure Options: AM-PAC 6 Clicks Basic Mobility (PT)  AM-PAC 6 Clicks Score (PT): 11     Patient was wearing a face mask during this therapy encounter. Therapist used appropriate personal protective equipment including eye protection, mask, and gloves.  Mask used was standard procedure mask. Appropriate PPE was worn during the entire therapy session. Hand hygiene was completed before and after therapy session. Patient is not in enhanced droplet precautions.       Alley Diana PT  2/18/2021

## 2021-02-18 NOTE — NURSING NOTE
"1135- Attempted to call report to SIR. Rep stated \"we will have to call you back buddy\" This RN left phone number. [facility]  "

## 2021-02-18 NOTE — PLAN OF CARE
Problem: Adult Inpatient Plan of Care  Goal: Absence of Hospital-Acquired Illness or Injury  Outcome: Ongoing, Not Progressing   Goal Outcome Evaluation:

## 2021-02-18 NOTE — PLAN OF CARE
Goal Outcome Evaluation:  Plan of Care Reviewed With: patient  Progress: improving  Outcome Summary: Pt is stable.  S/p L ankle ORIF.  Pain controlled via PO medications.  Pt has been approved for rehab at [facility].  No swallowing issues on this shift.  Will continue to monitor.

## 2021-02-18 NOTE — PLAN OF CARE
Goal Outcome Evaluation:  Plan of Care Reviewed With: patient     Outcome Summary: Pt reporting more pain today and required increased assistance with transfers. Pt unable to perform stand pivot transfer to chair due to impaired static standing balance. No new PT concerns, will continue to strengthen and advance activity as tolerated. Pt hopeful to DC to rehab soon.

## 2021-02-19 ENCOUNTER — TELEPHONE (OUTPATIENT)
Dept: ORTHOPEDIC SURGERY | Facility: CLINIC | Age: 81
End: 2021-02-19

## 2021-02-19 NOTE — TELEPHONE ENCOUNTER
Patients wife called and was wanting to ask Raysa why his oxygen level was low yesterday when he was going to be discharged from the hospital, she would like a call back.

## 2021-02-19 NOTE — TELEPHONE ENCOUNTER
Call returned to the patient's wife.  She was concerned about him having some low oxygen sats yesterday prior to leaving the hospital.  Have advised her that this is not uncommon when immobile and taking pain medicine and having anesthesia.  His lungs were clear he had no shortness of breath and when he did his deep breathing and coughing and his incentive spirometer his oxygen returned to normal.  Was able to remain off oxygen with O2 sats 94-95%.  Have reassured her that they will continue to monitor his oxygen level while at [facility].  Have left it open for her to call if she has any questions or concerns

## 2021-02-19 NOTE — NURSING NOTE
Received a call from pt's wife regarding DC yesterday. Family had evidently opted to transport pt by car as there was difficulty obtaining WC van or ambulance. Pt's wife stated that 2 nursing assistants brought the patient down and got him in the car. Pt was concerned that the CNA's could have injured themselves or the patient as, according to the wife, they had a very difficult time getting the patient from the wheelchair into the car. According to the wife, at the rehab center, the pt was assisted out of the car by 4 people. Pt's wife felt that someone should be aware and felt as if the pt should have been discharged via ambulance.

## 2021-02-26 ENCOUNTER — TELEPHONE (OUTPATIENT)
Dept: ORTHOPEDIC SURGERY | Facility: CLINIC | Age: 81
End: 2021-02-26

## 2021-02-26 NOTE — TELEPHONE ENCOUNTER
Have spoken with the patient's wife.  Initially she did not think they would let him out of rehab to come to his appointment because of the increased risk of Covid.  She has been working with the charge nurse and the physician there and they are trying to work out for the patient to come to his postop appointment.  Have left it open for her to call if there are any problems otherwise will see him at his regular scheduled appointment

## 2021-02-26 NOTE — TELEPHONE ENCOUNTER
Caller: MRS BROWN    Relationship to patient: WIFE    Best call back number: [phone]  Patient is needin WEEK PO/LT ANKLE ORIF/SX   AND PATIENT CURRENTLY IN REHAB.  BECAUSE OF COVID RISK, IF PATIENT LEAVES REHAB FOR POST OP APPOINTMENT, HE CANNOT RETURN TO REHAB.  PLEASE ADVISE HOW PATIENT SHOULD GO FORWARD.

## 2021-02-26 NOTE — TELEPHONE ENCOUNTER
Can you please call wife and rehab and see what we can do? I could go there next Wednesday if needed or he could maybe go home with home health. Will plan on casting and allowing partial wb after the appt if that helps, thanks please let me know

## 2021-03-01 ENCOUNTER — TELEPHONE (OUTPATIENT)
Dept: ORTHOPEDIC SURGERY | Facility: CLINIC | Age: 81
End: 2021-03-01

## 2021-03-01 NOTE — TELEPHONE ENCOUNTER
Can you lease call and see what is going on? Thanks I can see him on Monday 3/8 if he is being d/cd on 3/6? thanks

## 2021-03-01 NOTE — TELEPHONE ENCOUNTER
Spoken with the nurse it is set at [facility].  Patient is being discharged from March 6 and they would prefer that the patient's do not leave the facility for appointments due to increased risk of potential Covid exposure.  I have rescheduled his appointment for Monday, March 8

## 2021-03-01 NOTE — TELEPHONE ENCOUNTER
Caller: THELMA   Relationship to Patient: CHARGE NURSE AT REHAB     Phone Number: [phone]   Reason for Call: CAN ALSO SPEAK TO MIKEL (TODAYS NURSE)   CHARGE NURSE IS CALLING BACK ABOUT MOVING THIS APPOINTMENT BECAUSE THE REHAB FACILITY WOULD RATHER THE PATIENT WAIT UNTIL AFTER BEING DISCHARGED FROM THEIR FACILITY DUE TO SAFETY REASONS.

## 2021-03-08 ENCOUNTER — OFFICE VISIT (OUTPATIENT)
Dept: ORTHOPEDIC SURGERY | Facility: CLINIC | Age: 81
End: 2021-03-08

## 2021-03-08 VITALS — BODY MASS INDEX: 25.05 KG/M2 | TEMPERATURE: 98 F | WEIGHT: 175 LBS | HEIGHT: 70 IN

## 2021-03-08 DIAGNOSIS — S93.492D SYNDESMOTIC ANKLE SPRAIN, LEFT, SUBSEQUENT ENCOUNTER: ICD-10-CM

## 2021-03-08 DIAGNOSIS — S82.62XD CLOSED DISPLACED FRACTURE OF LATERAL MALLEOLUS OF LEFT FIBULA WITH ROUTINE HEALING, SUBSEQUENT ENCOUNTER: Primary | ICD-10-CM

## 2021-03-08 DIAGNOSIS — R52 PAIN: ICD-10-CM

## 2021-03-08 PROCEDURE — 99024 POSTOP FOLLOW-UP VISIT: CPT | Performed by: ORTHOPAEDIC SURGERY

## 2021-03-08 PROCEDURE — 29405 APPL SHORT LEG CAST: CPT | Performed by: ORTHOPAEDIC SURGERY

## 2021-03-08 PROCEDURE — 73610 X-RAY EXAM OF ANKLE: CPT | Performed by: ORTHOPAEDIC SURGERY

## 2021-03-08 NOTE — PROGRESS NOTES
"Ankle Follow Up      Patient: Eugenio Joe    YOB: 1940 80 y.o. male    Chief Complaints: Ankle feels pretty good    History of Present Illness: Patient underwent ORIF of his  Left ankle on 2/16/2021 was discharged to rehab and has been unable to follow-up until now due to Covid restrictions.    He has been nonweightbearing and elevating and says is \"not hardly hurting at all.  He is no longer using pain medication.    We had discussed his DVT prophylaxis with cardiology during his hospitalization and he was instructed to continue with Plavix and baby aspirin due to fall risk after having been previously on anticoagulation.  He has been getting Lovenox at rehab and was discharged several days ago but has not had a dose in a day or 2 now.      ROS: ankle pain no fevers chills chest pain or shortness of breath  Past Medical History:   Diagnosis Date   • Arthritis    • Asthma    • Brain abscess     at about age 44   • Bruise of face    • Cancer (CMS/[facility])     PT STATES IN HIS SWEAT GLAND    • Cardiac disease    • Coronary artery disease     CABG 2012   • Diabetes mellitus (CMS/HCC)    • Difficulty in swallowing     LIQUIDS   • Difficulty walking    • Diverticulitis    • DM2 (diabetes mellitus, type 2) (CMS/HCC) 10/12/2016   • Gout several years ago    medication  working   • Hearing aid worn     bilateral   • Hx of appendectomy    • Hyperlipemia    • Hypertension    • Joint pain     or swelling   • Low back pain several years ago   • Normal pressure hydrocephalus (CMS/HCC)    • Orbit fracture (CMS/HCC)    • Pulmonary embolism (CMS/HCC)     OCT 2016 - AFTER FOOT SURGERY   • Rash     ARM-    • Spinal headache     AFTER SPINAL TAP - NO BLOOD PATCH   • Stroke (cerebrum) (CMS/HCC) 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   • TIA (transient ischemic attack)     MULTIPLE       Physical Exam:   Vitals:    03/08/21 1122   Temp: 98 °F (36.7 °C)   Weight: 79.4 kg (175 lb)   Height: 177.8 cm (70\")   PainSc:   2     Well " developed with normal mood.  He is with his wife.  Left ankle incisions are healing very nicely without sign of infection only minimal swelling and really no focal discomfort with range of motion of the ankle.  Left calf and thigh are nontender without sign of DVT      Radiology: 3 views of the left ankle ordered evaluate fracture alignment reviewed and compared with previous x-rays.  Talus is well-seated within the mortise fibula fracture appears well aligned no widening to the syndesmosis or medial clear space.      Assessment/Plan: Status post ORIF left ankle as outlined above.    Sutures removed and Steri-Strips were applied.  Clean sterile dressings were placed and he was placed back in a very well-padded short leg cast in neutral dorsiflexion he may do partial weightbearing only with his walker and crutches for balance and we will have him continue with Plavix and baby aspirin.    We will see him back in about 2 weeks with x-ray of his left ankle and will bring his boot with him at that time and will plan on transitioning to that and progressing with weightbearing and begin physical therapy.

## 2021-03-22 ENCOUNTER — TELEPHONE (OUTPATIENT)
Dept: ORTHOPEDIC SURGERY | Facility: CLINIC | Age: 81
End: 2021-03-22

## 2021-03-22 ENCOUNTER — OFFICE VISIT (OUTPATIENT)
Dept: ORTHOPEDIC SURGERY | Facility: CLINIC | Age: 81
End: 2021-03-22

## 2021-03-22 VITALS — BODY MASS INDEX: 25.06 KG/M2 | HEIGHT: 70 IN | WEIGHT: 175.04 LBS | TEMPERATURE: 97 F

## 2021-03-22 DIAGNOSIS — S93.492D SYNDESMOTIC ANKLE SPRAIN, LEFT, SUBSEQUENT ENCOUNTER: ICD-10-CM

## 2021-03-22 DIAGNOSIS — S82.62XD CLOSED DISPLACED FRACTURE OF LATERAL MALLEOLUS OF LEFT FIBULA WITH ROUTINE HEALING, SUBSEQUENT ENCOUNTER: ICD-10-CM

## 2021-03-22 DIAGNOSIS — M25.572 LEFT ANKLE PAIN, UNSPECIFIED CHRONICITY: Primary | ICD-10-CM

## 2021-03-22 PROCEDURE — 73610 X-RAY EXAM OF ANKLE: CPT | Performed by: ORTHOPAEDIC SURGERY

## 2021-03-22 PROCEDURE — 99024 POSTOP FOLLOW-UP VISIT: CPT | Performed by: ORTHOPAEDIC SURGERY

## 2021-03-22 NOTE — PROGRESS NOTES
"Ankle Follow Up      Patient: Eugenio Joe    YOB: 1940 80 y.o. male    Chief Complaints: Ankle feels good    History of Present Illness: Patient underwent ORIF of the left ankle on 2/16/2021 with plating of the lateral fibula and tight rope placement on 2/16/2021.    Due to Covid restrictions he did not follow-up until 3/8/2021 at which time his ankle was doing well.  He was placed into a cast and has been nonweightbearing since then.  He has been on Plavix and baby aspirin per cardiology recommendations had been on Lovenox while he was at rehab.    He has no complaints of pain at the ankle stating \"no pain at all\"   HPI    ROS: No ankle pain  Past Medical History:   Diagnosis Date   • Arthritis    • Asthma    • Brain abscess     at about age 44   • Bruise of face    • Cancer (CMS/[facility])     PT STATES IN HIS SWEAT GLAND    • Cardiac disease    • Coronary artery disease     CABG 2012   • Diabetes mellitus (CMS/[facility])    • Difficulty in swallowing     LIQUIDS   • Difficulty walking    • Diverticulitis    • DM2 (diabetes mellitus, type 2) (CMS/[facility]) 10/12/2016   • Gout several years ago    medication  working   • Hearing aid worn     bilateral   • Hx of appendectomy    • Hyperlipemia    • Hypertension    • Joint pain     or swelling   • Low back pain several years ago   • Normal pressure hydrocephalus (CMS/HCC)    • Orbit fracture (CMS/HCC)    • Pulmonary embolism (CMS/[facility])     OCT 2016 - AFTER FOOT SURGERY   • Rash     ARM-    • Spinal headache     AFTER SPINAL TAP - NO BLOOD PATCH   • 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  Left ankle incisions are healing nicely without sign of infection.  There is minimal " swelling and no pain with range of motion.  Left calf is nontender without sign of DVT      Radiology: 3 views of the left ankle ordered evaluate postoperative alignment reviewed and compared with previous x-rays.  Hardware remains intact fibula fracture remains well aligned talus is well-seated within the mortise without widening of the medial clear space or syndesmosis.      Assessment/Plan:  Status post ORIF left ankle as outlined above.    Overall seems be doing very well.  Believe amount of a cast and he may use the boot when he is up and about and do touchdown foot flat weightbearing only with a walker for the next 10 days and if does well at that point may then transition to 50% weightbearing.    We will get him started in some formal physical therapy rather than what they have been doing at the house and he may let this get wet in the shower but understands not to immerse it.  He will sleep in an air stirrup brace.    Anything worsens let me know otherwise I will see him back in 3 weeks x-rays left ankle

## 2021-03-22 NOTE — TELEPHONE ENCOUNTER
Caller:  ESAU    Reason for call:  CALLING TO GET PT ORDERS SENT TO  [facility].    LATRICE IN# [phone]  Fax# [phone]        PLEASE ADVISE,     Caller# [phone]

## 2021-04-21 ENCOUNTER — OFFICE VISIT (OUTPATIENT)
Dept: ORTHOPEDIC SURGERY | Facility: CLINIC | Age: 81
End: 2021-04-21

## 2021-04-21 VITALS — TEMPERATURE: 95.7 F | BODY MASS INDEX: 25.05 KG/M2 | HEIGHT: 70 IN | WEIGHT: 175 LBS

## 2021-04-21 DIAGNOSIS — R52 PAIN: Primary | ICD-10-CM

## 2021-04-21 DIAGNOSIS — S82.62XD CLOSED DISPLACED FRACTURE OF LATERAL MALLEOLUS OF LEFT FIBULA WITH ROUTINE HEALING, SUBSEQUENT ENCOUNTER: ICD-10-CM

## 2021-04-21 DIAGNOSIS — S93.492D SYNDESMOTIC ANKLE SPRAIN, LEFT, SUBSEQUENT ENCOUNTER: ICD-10-CM

## 2021-04-21 PROCEDURE — 73610 X-RAY EXAM OF ANKLE: CPT | Performed by: ORTHOPAEDIC SURGERY

## 2021-04-21 PROCEDURE — 99024 POSTOP FOLLOW-UP VISIT: CPT | Performed by: ORTHOPAEDIC SURGERY

## 2021-04-21 RX ORDER — PANTOPRAZOLE SODIUM 40 MG/1
40 TABLET, DELAYED RELEASE ORAL
COMMUNITY
Start: 2021-04-16

## 2021-04-21 NOTE — PROGRESS NOTES
"Ankle Follow Up      Patient: Eugenio Joe    YOB: 1940 80 y.o. male    Chief Complaints: Ankle feels fine    History of Present Illness:Patient underwent ORIF of the left ankle on 2/16/2021 with plating of the lateral fibula and tight rope placement on 2/16/2021.    He was last seen on 3/22/2021 without complaints of pain in ankle.    Instructions given to start physical therapy rather than what they were doing at the house and allowed to progress to up to 50% weightbearing.    He said he been doing as such and physical therapy but really not been putting any weight on it at the house and is sleeping in an ankle stirrup brace and using his boot wheelchair.  He has no complaints of pain in the left ankle  HPI    ROS: No ankle pain  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 SWEAT GLAND    • Cardiac disease    • Coronary artery disease     CABG 2012   • Diabetes mellitus (CMS/HCC)    • Difficulty in swallowing     LIQUIDS   • Difficulty walking    • Diverticulitis    • DM2 (diabetes mellitus, type 2) (CMS/HCC) 10/12/2016   • Gout several years ago    medication  working   • Hearing aid worn     bilateral   • Hx of appendectomy    • Hyperlipemia    • Hypertension    • Joint pain     or swelling   • Low back pain several years ago   • Normal pressure hydrocephalus (CMS/HCC)    • Orbit fracture (CMS/HCC)    • Pulmonary embolism (CMS/HCC)     OCT 2016 - AFTER FOOT SURGERY   • Rash     ARM-    • Spinal headache     AFTER SPINAL TAP - NO BLOOD PATCH   • 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  Left ankle incisions are well-healed no " focal tenderness to palpation but mild swelling and no pain with range of motion or external rotation testing      Radiology: 3 views left ankle ordered evaluate postoperative alignment reviewed and compared to prior x-rays.  Fibula fracture appears to be healing in good alignment and talus is well-seated within the mortise without widening of the syndesmosis or medial clear space.      Assessment/Plan: Status post ORIF left ankle as outlined above.    Overall he continues to do very well and we will have him continue with his boot and walker and may progress with weightbearing as pain allows for the next 2 weeks.  He does well he may then transition to an ASO brace in the house and boot out of the house but continue with his walker.    Anything worsens he will let me know otherwise I will see him back in 3 weeks with x-rays of his left ankle

## 2021-05-11 ENCOUNTER — OFFICE VISIT (OUTPATIENT)
Dept: CARDIOLOGY | Facility: CLINIC | Age: 81
End: 2021-05-11

## 2021-05-11 VITALS
OXYGEN SATURATION: 95 % | HEIGHT: 70 IN | SYSTOLIC BLOOD PRESSURE: 160 MMHG | BODY MASS INDEX: 27.77 KG/M2 | DIASTOLIC BLOOD PRESSURE: 80 MMHG | HEART RATE: 71 BPM | WEIGHT: 194 LBS

## 2021-05-11 DIAGNOSIS — I63.40 CEREBROVASCULAR ACCIDENT (CVA) DUE TO EMBOLISM OF CEREBRAL ARTERY (HCC): ICD-10-CM

## 2021-05-11 DIAGNOSIS — Z95.1 S/P CABG X 3: ICD-10-CM

## 2021-05-11 DIAGNOSIS — I10 ESSENTIAL HYPERTENSION: ICD-10-CM

## 2021-05-11 DIAGNOSIS — E78.49 OTHER HYPERLIPIDEMIA: ICD-10-CM

## 2021-05-11 DIAGNOSIS — I25.10 CORONARY ARTERY DISEASE INVOLVING NATIVE CORONARY ARTERY OF NATIVE HEART WITHOUT ANGINA PECTORIS: Primary | ICD-10-CM

## 2021-05-11 DIAGNOSIS — I82.591 CHRONIC DEEP VEIN THROMBOSIS (DVT) OF OTHER VEIN OF RIGHT LOWER EXTREMITY (HCC): ICD-10-CM

## 2021-05-11 DIAGNOSIS — Q21.12 PFO (PATENT FORAMEN OVALE): ICD-10-CM

## 2021-05-11 PROCEDURE — 99213 OFFICE O/P EST LOW 20 MIN: CPT | Performed by: NURSE PRACTITIONER

## 2021-05-11 PROCEDURE — 93000 ELECTROCARDIOGRAM COMPLETE: CPT | Performed by: NURSE PRACTITIONER

## 2021-05-11 RX ORDER — LISINOPRIL 20 MG/1
20 TABLET ORAL 2 TIMES DAILY
Qty: 60 TABLET | Refills: 11 | Status: SHIPPED | OUTPATIENT
Start: 2021-05-11 | End: 2021-05-12 | Stop reason: SDUPTHER

## 2021-05-11 NOTE — PROGRESS NOTES
Date of Office Visit: 2021  Encounter Provider: JULISA Blake  Place of Service: [facility] CARDIOLOGY  Patient Name: Eugenio Joe  :1940    Chief Complaint   Patient presents with   • Follow-up   • Coronary Artery Disease   • Hyperlipidemia   • Hypertension   • Post-op Open Heart Surgery   :     HPI: Eugenio Joe is a 80 y.o. male who presents today for follow-up.  Old records have been obtained and reviewed by me.  He is a patient of Dr. Ford's with a past medical history significant for TIA/CVA, PFO, and coronary artery disease.  In , he underwent a CABG with a LIMA to the LAD and SVG to the ramus and OM.  Due to recurrent TIA-like symptoms, he was referred to Dr. Pablo for a PFO closure.  In 2020, he underwent a PFO closure.  He was last seen in the office by Dr. Pablo in January of this year at which time he was doing well with no complaints of angina or heart failure.  Repeat echocardiogram revealed a PFO closure device with no residual flow.  No changes were made to his medical regimen, and he was advised to resume care with Dr. Ford.    Overall he is doing fairly well.  He recently in February fell over the vacuum  and has a foot fracture and is wearing a boot.  He denies any shortness of breath, palpitations or chest pain.  His blood pressure is running a little elevated at home in the 160s to 170s.    Past Medical History:   Diagnosis Date   • Arthritis    • Asthma    • Brain abscess     at about age 44   • Bruise of face    • Cancer (CMS/[facility])     PT STATES IN HIS SWEAT GLAND    • Cardiac disease    • Coronary artery disease     CABG    • Diabetes mellitus (CMS/HCC)    • Difficulty in swallowing     LIQUIDS   • Difficulty walking    • Diverticulitis    • DM2 (diabetes mellitus, type 2) (CMS/HCC) 10/12/2016   • Gout several years ago    medication  working   • Hearing aid worn     bilateral   • Hx of appendectomy    •  Hyperlipemia    • Hypertension    • Joint pain     or swelling   • Low back pain several years ago   • Normal pressure hydrocephalus (CMS/HCC)    • Orbit fracture (CMS/HCC)    • Pulmonary embolism (CMS/HCC)     OCT 2016 - AFTER FOOT SURGERY   • Rash     ARM-    • Spinal headache     AFTER SPINAL TAP - NO BLOOD PATCH   • Stroke (cerebrum) (CMS/HCC) 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   • TIA (transient ischemic attack)     MULTIPLE       Past Surgical History:   Procedure Laterality Date   • ANKLE OPEN REDUCTION INTERNAL FIXATION Left 2/16/2021    Procedure: Left ankle open reduction internal fixation with implants as needed;  Surgeon: Man Jenkins MD;  Location:  [facility];  Service: Orthopedics;  Laterality: Left;   • APPENDECTOMY N/A 7/18/2019    Procedure: APPENDECTOMY LAPAROSCOPIC;  Surgeon: Luis Carlos Rick Jr., MD;  Location: [facility] MAIN OR;  Service: General   • BRAIN SURGERY      BRAIN ABCESS 30 YR AGO   • CARDIAC CATHETERIZATION N/A 12/4/2020    Procedure: Patent foramen ovale closure ABBOTT;  Surgeon: Frank Pablo MD;  Location: [facility] C[facility]VASIVE LOC[facility];  Service: Cardiology;  Laterality: N/A;   • CARDIAC CATHETERIZATION N/A 12/4/2020    Procedure: Intracardiac echocardiogram;  Surgeon: Frank Pablo MD;  Location: [facility] CATH INVASIVE LOCATION;  Service: Cardiology;  Laterality: N/A;   • CARDIAC SURGERY     • COLONOSCOPY  2012    Ciciliano- normal per pt, no polyps    • CORONARY ARTERY BYPASS GRAFT      3 VESSEL   • ENDOSCOPY N/A 3/9/2017    Schatzki ring, dilated, LA Grade B esophagitis, HH, acquired duodenal stenosis   • ENDOSCOPY N/A 4/6/2018    candida, HH, torts, Schatzki ring, duodenal stenosis, dilatation perforemed, chronic inflammation   • ENDOSCOPY N/A 10/9/2019    White nummular lesions in esophageal mucosa, mild Schatzki ring, acquired duodenal stenosis, Path: Terrazas's, candida   • ENDOSCOPY N/A 1/8/2020    Procedure: ESOPHAGOGASTRODUODENOSCOPY with  biopsies;  Surgeon: Rigoberto Walker MD;  Location: [facility] ENDOSCOPY;  Service: Gastroenterology   • FACIAL FRACTURE SURGERY      to repair 6 broken bones   • ORBITAL FRACTURE OPEN REDUCTION INTERNAL FIXATION Right 2017    Procedure: RT ORBIT FLOOR FRACTURE REPAIR RIGHT ZMC FRACTURE REPAIR, RIGHT NASAL BONE FRACTURE CLOSED REDUCTION;  Surgeon: Edil Lester MD;  Location: [facility] OR [facility];  Service:    • ORIF FOOT FRACTURE Right 2016    Procedure: RIGHT SECOND METATARSAL OPEN REDUCTION INTERNAL FIXATION WITh GRAFT FROM HEEL ;  Surgeon: Man Jenkins MD;  Location: [facility] OR [facility];  Service:    • PATENT FORAMEN OVALE CLOSURE     • SEPTOPLASTY     • SKIN CANCER EXCISION     • TONSILLECTOMY     • TRANSESOPHAGEAL ECHOCARDIOGRAM (STELLA)         Social History     Socioeconomic History   • Marital status:      Spouse name: Not on file   • Number of children: 2   • Years of education: 16   • Highest education level: Not on file   Tobacco Use   • Smoking status: Former Smoker     Packs/day: 3.00     Years: 5.00     Pack years: 15.00     Types: Cigarettes     Start date:      Quit date:      Years since quittin.3   • Smokeless tobacco: Never Used   • Tobacco comment: Quit cold turkey   Substance and Sexual Activity   • Alcohol use: Yes     Alcohol/week: 2.0 standard drinks     Types: 1 Cans of beer, 1 Shots of liquor per week     Comment: RARELY    • Drug use: No   • Sexual activity: Defer     Partners: Female     Birth control/protection: Surgical       Family History   Problem Relation Age of Onset   • Heart disease Mother    • Hypertension Mother    • Stroke Mother    • Diverticulitis Mother    • Heart disease Father    • Hypertension Father    • Malig Hyperthermia Neg Hx        Review of Systems   Constitutional: Negative for diaphoresis and malaise/fatigue.   Cardiovascular: Negative for chest pain, claudication, dyspnea on exertion, irregular heartbeat, leg swelling,  near-syncope, orthopnea, palpitations, paroxysmal nocturnal dyspnea and syncope.   Respiratory: Negative for cough, shortness of breath and sleep disturbances due to breathing.    Musculoskeletal: Negative for falls.   Neurological: Negative for dizziness and weakness.   Psychiatric/Behavioral: Negative for altered mental status and substance abuse.       Allergies   Allergen Reactions   • Other Mental Status Change and Hallucinations     Oxy drugs   • Oxycodone Mental Status Change         Current Outpatient Medications:   •  Accu-Chek FastClix Lancets misc, TEST ONCE TO BID PRN, Disp: , Rfl:   •  acetaminophen (TYLENOL) 500 MG tablet, Take 2 tablets by mouth Every 8 (Eight) Hours As Needed for Mild Pain ., Disp: 30 tablet, Rfl: 0  •  allopurinol (ZYLOPRIM) 300 MG tablet, Take 300 mg by mouth daily., Disp: , Rfl:   •  aspirin 81 MG chewable tablet, Chew 81 mg Daily. WILL VERIFY STOP DATE WITH MD, Disp: , Rfl:   •  atorvastatin (LIPITOR) 40 MG tablet, Take 1 tablet by mouth Every Night., Disp: , Rfl:   •  betamethasone dipropionate (DIPROLENE) 0.05 % lotion, Apply 1 application topically to the appropriate area as directed As Needed for Irritation., Disp: , Rfl:   •  Cholecalciferol (Vitamin D3) 50 MCG (2000 UT) tablet, Take 50 mcg by mouth Every Morning., Disp: , Rfl:   •  clopidogrel (PLAVIX) 75 MG tablet, Take 1 tablet by mouth Daily., Disp: 30 tablet, Rfl:   •  doxazosin (CARDURA) 1 MG tablet, Take 1 mg by mouth Every Morning., Disp: , Rfl:   •  enoxaparin (LOVENOX) 40 MG/0.4ML solution syringe, INJECT 40 MG SUBCUTANEOUSLY DAILY ASK ORTHO DOCTOR IF NEED TO CONTINUE THIS MED, Disp: , Rfl:   •  glipizide (GLUCOTROL) 5 MG tablet, Take 5 mg by mouth Daily With Dinner., Disp: , Rfl:   •  glucose blood (FREESTYLE LITE) test strip, 1 each by Other route See Admin Instructions. Use 1 to 2 times daily as instructed, Disp: , Rfl:   •  lisinopril (PRINIVIL,ZESTRIL) 20 MG tablet, Take 1 tablet by mouth 2 (Two) Times a Day.,  "Disp: 60 tablet, Rfl: 11  •  metFORMIN (GLUCOPHAGE) 500 MG tablet, Take 500 mg by mouth Daily With Breakfast., Disp: , Rfl:   •  Multiple Vitamins-Minerals (MULTIVITAMIN ADULT PO), Take 1 tablet by mouth Daily., Disp: , Rfl:   •  pantoprazole (PROTONIX) 40 MG EC tablet, , Disp: , Rfl:   •  PROAIR  (90 BASE) MCG/ACT inhaler, Inhale 2 puffs Every 4 (Four) Hours As Needed., Disp: , Rfl:   •  metFORMIN (GLUCOPHAGE) 850 MG tablet, Take 850 mg by mouth 2 (Two) Times a Day With Meals., Disp: , Rfl:   •  ondansetron (ZOFRAN) 4 MG tablet, Take 1 tablet by mouth Every 6 (Six) Hours As Needed for Nausea or Vomiting., Disp:  , Rfl:       Objective:     Vitals:    05/11/21 1235   BP: 160/80   Pulse: 71   SpO2: 95%   Weight: 88 kg (194 lb)   Height: 177.8 cm (70\")     Body mass index is 27.84 kg/m².    PHYSICAL EXAM:    Constitutional:       General: Not in acute distress.     Appearance: Normal appearance. Well-developed.   Eyes:      Pupils: Pupils are equal, round, and reactive to light.   HENT:      Head: Normocephalic.   Neck:      Vascular: No carotid bruit or JVD.   Pulmonary:      Effort: Pulmonary effort is normal. No tachypnea.      Breath sounds: Normal breath sounds. No wheezing. No rales.   Cardiovascular:      Normal rate. Regular rhythm.      No gallop.   Pulses:     Intact distal pulses.   Abdominal:      General: Bowel sounds are normal.      Palpations: Abdomen is soft.      Tenderness: There is no abdominal tenderness.   Musculoskeletal: Normal range of motion.      Cervical back: Normal range of motion and neck supple. No edema. Skin:     General: Skin is warm and dry.   Neurological:      Mental Status: Alert and oriented to person, place, and time.           ECG 12 Lead    Date/Time: 5/11/2021 1:13 PM  Performed by: Johanne Buchanan APRN  Authorized by: Johanne Buchanan APRN   Comparison: compared with previous ECG from 1/11/2021  Similar to previous ECG  Rhythm: sinus rhythm  Ectopy: unifocal " PVCs  Rate: normal  QRS axis: normal    Clinical impression: normal ECG              Assessment:       Diagnosis Plan   1. Coronary artery disease involving native coronary artery of native heart without angina pectoris     2. S/P CABG x 3     3. PFO (patent foramen ovale)     4. Essential hypertension     5. Other hyperlipidemia     6. Chronic deep vein thrombosis (DVT) of other vein of right lower extremity (CMS/HCC)     7. Cerebrovascular accident (CVA) due to embolism of cerebral artery (CMS/HCC)       No orders of the defined types were placed in this encounter.         Plan:       I am getting increase his lisinopril to 20 mg twice a day.  I told him he could take it once a day but he says he is comfortable with twice a day.  That is fine.  He is a little unsure if he is taking a total of 10 a day or 10s a day.  So he is get a fair follow-up his dose when he gets home and let me know.  I told him he can follow-up with his PCP in regards to his blood pressure further management from here and we will see him back in 6 months time.      As always, it has been a pleasure to participate in your patient's care.      Sincerely,         JULISA Le

## 2021-05-12 RX ORDER — LISINOPRIL 20 MG/1
20 TABLET ORAL 2 TIMES DAILY
Qty: 180 TABLET | Refills: 2 | Status: SHIPPED | OUTPATIENT
Start: 2021-05-12 | End: 2022-01-01

## 2021-05-13 ENCOUNTER — OFFICE VISIT (OUTPATIENT)
Dept: ORTHOPEDIC SURGERY | Facility: CLINIC | Age: 81
End: 2021-05-13

## 2021-05-13 VITALS — BODY MASS INDEX: 27.77 KG/M2 | HEIGHT: 70 IN | WEIGHT: 194 LBS | TEMPERATURE: 97 F

## 2021-05-13 DIAGNOSIS — S82.62XD CLOSED DISPLACED FRACTURE OF LATERAL MALLEOLUS OF LEFT FIBULA WITH ROUTINE HEALING, SUBSEQUENT ENCOUNTER: ICD-10-CM

## 2021-05-13 DIAGNOSIS — S93.492D SYNDESMOTIC ANKLE SPRAIN, LEFT, SUBSEQUENT ENCOUNTER: ICD-10-CM

## 2021-05-13 DIAGNOSIS — M25.572 LEFT ANKLE PAIN, UNSPECIFIED CHRONICITY: Primary | ICD-10-CM

## 2021-05-13 PROCEDURE — 99024 POSTOP FOLLOW-UP VISIT: CPT | Performed by: ORTHOPAEDIC SURGERY

## 2021-05-13 PROCEDURE — 73610 X-RAY EXAM OF ANKLE: CPT | Performed by: ORTHOPAEDIC SURGERY

## 2021-05-13 NOTE — PROGRESS NOTES
"Ankle Follow Up      Patient: Eugenio Joe    YOB: 1940 80 y.o. male    Chief Complaints: Ankle feels good    History of Present Illness:Patient underwent ORIF of the left ankle on 2/16/2021 with plating of the lateral fibula and tight rope placement on 2/16/2021.    He was last seen on 4/21/2021 and has since transitioned to use of his ASO brace in the house and boot out of the house and using his walker.  He reports no pain in the ankle but does state that it does feel a little stiff and has been working with physical therapy.  HPI    ROS: No ankle pain  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 SWEAT GLAND    • Cardiac disease    • Coronary artery disease     CABG 2012   • Diabetes mellitus (CMS/HCC)    • Difficulty in swallowing     LIQUIDS   • Difficulty walking    • Diverticulitis    • DM2 (diabetes mellitus, type 2) (CMS/HCC) 10/12/2016   • Gout several years ago    medication  working   • Hearing aid worn     bilateral   • Hx of appendectomy    • Hyperlipemia    • Hypertension    • Joint pain     or swelling   • Low back pain several years ago   • Normal pressure hydrocephalus (CMS/HCC)    • Orbit fracture (CMS/HCC)    • Pulmonary embolism (CMS/HCC)     OCT 2016 - AFTER FOOT SURGERY   • Rash     ARM-    • Spinal headache     AFTER SPINAL TAP - NO BLOOD PATCH   • Stroke (cerebrum) (CMS/HCC) 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   • TIA (transient ischemic attack)     MULTIPLE       Physical Exam:   Vitals:    05/13/21 1010   Temp: 97 °F (36.1 °C)   Weight: 88 kg (194 lb)   Height: 177.8 cm (70\")   PainSc:   1     Well developed with normal mood.  He is with his wife.  Left ankle shows well-healed incisions only slight swelling.  There was no focal tenderness to palpation about the ankle and no pain with range of motion or external rotation testing.      Radiology: 3 views " of the left ankle ordered evaluate postoperative alignment reviewed and compared to previous x-rays.  Fibula fracture remains in good alignment and hardware is intact without widening of the syndesmosis and talus well-seated within the mortise.      Assessment/Plan: Status post ORIF left ankle as outlined above.    He has continued to do quite well and may wean out of his boot into an air stirrup brace out of the house and may start weaning out of the air stirrup brace in the house but recommend he continue with his walker.    I will see him back in 4 weeks with x-rays of his left ankle but if anything worsens in the interim he will let me know

## 2021-06-14 ENCOUNTER — OFFICE VISIT (OUTPATIENT)
Dept: ORTHOPEDIC SURGERY | Facility: CLINIC | Age: 81
End: 2021-06-14

## 2021-06-14 VITALS — HEIGHT: 70 IN | WEIGHT: 194 LBS | BODY MASS INDEX: 27.77 KG/M2 | TEMPERATURE: 97.7 F

## 2021-06-14 DIAGNOSIS — S82.62XD CLOSED DISPLACED FRACTURE OF LATERAL MALLEOLUS OF LEFT FIBULA WITH ROUTINE HEALING, SUBSEQUENT ENCOUNTER: ICD-10-CM

## 2021-06-14 DIAGNOSIS — R52 PAIN: Primary | ICD-10-CM

## 2021-06-14 DIAGNOSIS — S93.492D SYNDESMOTIC ANKLE SPRAIN, LEFT, SUBSEQUENT ENCOUNTER: ICD-10-CM

## 2021-06-14 PROCEDURE — 99213 OFFICE O/P EST LOW 20 MIN: CPT | Performed by: ORTHOPAEDIC SURGERY

## 2021-06-14 PROCEDURE — 73610 X-RAY EXAM OF ANKLE: CPT | Performed by: ORTHOPAEDIC SURGERY

## 2021-06-14 NOTE — PROGRESS NOTES
Ankle Follow Up      Patient: Eugenio Joe    YOB: 1940 80 y.o. male    Chief Complaints: Ankle feels good    History of Present Illness:Patient underwent ORIF of the left ankle on 2/16/2021 with plating of the lateral fibula and tight rope placement on 2/16/2021.    He was last seen on 5/13/2021 at which time he had no reported pain but had a little bit of stiffness and was working with therapy.  He was instructed on weaning out of his boot to an air stirrup brace and continue with his walker.    He has done as such and quit using his ankle stirrup brace several days ago without any pain.  Still reports intermittent swelling to the ankle which improves with elevation and feels like therapy is doing well and having no complaints of pain in the left ankle.    He continues to use his walker that he used even prior to this injury because of poor ambulatory status due to normal pressure hydrocephalus.  HPI    ROS: No ankle pain  Past Medical History:   Diagnosis Date   • Arthritis    • Asthma    • Brain abscess     at about age 44   • Bruise of face    • Cancer (CMS/[facility])     PT STATES IN HIS SWEAT GLAND    • Cardiac disease    • Coronary artery disease     CABG 2012   • Diabetes mellitus (CMS/HCC)    • Difficulty in swallowing     LIQUIDS   • Difficulty walking    • Diverticulitis    • DM2 (diabetes mellitus, type 2) (CMS/[facility]) 10/12/2016   • Gout several years ago    medication  working   • Hearing aid worn     bilateral   • Hx of appendectomy    • Hyperlipemia    • Hypertension    • Joint pain     or swelling   • Low back pain several years ago   • Normal pressure hydrocephalus (CMS/HCC)    • Orbit fracture (CMS/[facility])    • Pulmonary embolism (CMS/[facility])     OCT 2016 - AFTER FOOT SURGERY   • Rash     ARM-    • Spinal headache     AFTER SPINAL TAP - NO BLOOD PATCH   • 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  He is with his wife.  His left ankle incisions are well-healed there is only mild swelling to the ankle but no tenderness to palpation.  Sensation is grossly intact light touch she had no pain with range of motion or external rotation to the ankle.  Left calf was nontender      Radiology:.  3 views of the left ankle ordered evaluate fracture alignment reviewed and compared to previous x-rays.  Fibula fracture appears to be healing in good alignment hardware is well contained and there is no widening of the syndesmosis.      Assessment/Plan: Status post ORIF left ankle as outlined above     He continues to do well we will continue out of his brace for continue with accommodative shoes and use of his walker.    He will continue with therapy ~have plateaued and then continue with home exercises.    Breathing worsens he will let me know otherwise I will see him back in 6 weeks with x-rays of his left ankle for final check.  "

## 2021-07-16 ENCOUNTER — OFFICE VISIT (OUTPATIENT)
Dept: NEUROLOGY | Facility: CLINIC | Age: 81
End: 2021-07-16

## 2021-07-16 VITALS
WEIGHT: 194 LBS | SYSTOLIC BLOOD PRESSURE: 148 MMHG | BODY MASS INDEX: 27.77 KG/M2 | OXYGEN SATURATION: 97 % | HEIGHT: 70 IN | HEART RATE: 81 BPM | DIASTOLIC BLOOD PRESSURE: 80 MMHG

## 2021-07-16 DIAGNOSIS — G91.2 NPH (NORMAL PRESSURE HYDROCEPHALUS) (HCC): Primary | ICD-10-CM

## 2021-07-16 DIAGNOSIS — G20 PARKINSON'S DISEASE (HCC): ICD-10-CM

## 2021-07-16 DIAGNOSIS — E11.42 DIABETIC PERIPHERAL NEUROPATHY (HCC): ICD-10-CM

## 2021-07-16 PROCEDURE — 99214 OFFICE O/P EST MOD 30 MIN: CPT | Performed by: PSYCHIATRY & NEUROLOGY

## 2021-07-16 NOTE — PROGRESS NOTES
"CC: NPH, Parkinson's disease, diabetic peripheral neuropathy    HPI:  Eugenio Joe is a  80 y.o. right-handed White male who I am seeing in follow-up with multiple neurologic diagnoses.  I saw him last 1/8/2021 with history taken from that note:    In June 2019 he was found to have a tiny acute left occipital infarct.  Vascular studies showed atherosclerotic changes without clear-cut hemodynamically significant stenoses.  The origins of the vertebral arteries were however somewhat obscured.  He has no history of atrial fibrillation.  He is on blood pressure and diabetes medications.  He quit smoking in his early 20s and he was treated with aspirin 325 and simvastatin 40 mg daily for stroke prevention.     The patient has had a progressive gait disturbance which dates back several years.  He was seen by Dr. Polanco and evaluated in 2017 for NPH.  I reviewed previous MRI scans as well as most recent MRI scan done 6/23/2019.  He has mild diffuse atrophy.  The ventricles are larger than expected but the temporal tips are not clearly as ballooned as expected.  The cerebral aqueduct is not significantly enlarged but there is flow void.  Some turbulent flow is seen in the third ventricle.  There is notable chronic white matter changes which are moderate in severity.  There is mild cortical atrophy which looks consistent with age.  A radionucleotide cisternogram was obtained which I also reviewed demonstrating ventricular reflux within 4 hours with persistent tracer at 48 hours consistent with NPH.  A lumbar puncture was obtained and the patient's gait was perhaps a bit better for a couple of days and then 3 days out he had sudden transient hearing loss and 2 to 3 days later he had a TIA or stroke.  Shunting was not undertaken.  He sought a second opinion at the [facility] in [place] and they had the same opinion.  She states the term \"possible NPH\" was given.  They did not recommend shunting.    He uses a stand-up " walker.  He had an unfortunate accident where he fell apparently due to a rumba robotic vacuum.  The fracture was in the tibia relatively distally in the left leg requiring open reduction internal fixation.  He still has a couple of PT appointments for this but has done fairly well.  He did not do well enough using a cane and so it has been recommended for him to continue using the stand-up walker.  He has some memory loss as his wife would say the patient does not seem to notice much memory trouble.  She describes a telephone call from their daughter Maryann at about 8:00 PM and some nights he will ask if they talk to her when it had already talked to her.  He does not really notice numbness in the feet but his wife indicates that when he saw his primary physician recently he had some patchy numbness in the feet.  He uses depends due to some bladder function issues.      Sherry are the parents of Maryann Stafford and grandparents of Verónica Stafford.      Past Medical History:   Diagnosis Date   • Arthritis    • Asthma    • Brain abscess     at about age 44   • Bruise of face    • Cancer (CMS/[facility])     PT STATES IN HIS SWEAT GLAND    • Cardiac disease    • Coronary artery disease     CABG 2012   • Diabetes mellitus (CMS/[facility])    • Difficulty in swallowing     LIQUIDS   • Difficulty walking    • Diverticulitis    • DM2 (diabetes mellitus, type 2) (CMS/[facility]) 10/12/2016   • Gout several years ago    medication  working   • Hearing aid worn     bilateral   • Hx of appendectomy    • Hyperlipemia    • Hypertension    • Joint pain     or swelling   • Low back pain several years ago   • Normal pressure hydrocephalus (CMS/[facility])    • Orbit fracture (CMS/[facility])    • Pulmonary embolism (CMS/[facility])     OCT 2016 - AFTER FOOT SURGERY   • Rash     ARM-    • Spinal headache     AFTER SPINAL TAP - NO BLOOD PATCH   • Stroke (cerebrum) (CMS/[facility]) 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   • TIA (transient ischemic attack)     MULTIPLE          Past Surgical History:   Procedure Laterality Date   • ANKLE OPEN REDUCTION INTERNAL FIXATION Left 2/16/2021    Procedure: Left ankle open reduction internal fixation with implants as needed;  Surgeon: Man Jenkins MD;  Location: [facility] OR [facility];  Service: Orthopedics;  Laterality: Left;   • APPENDECTOMY N/A 7/18/2019    Procedure: APPENDECTOMY LAPAROSCOPIC;  Surgeon: Luis Carlos Rick Jr., MD;  Location: [facility] MAIN OR;  Service: General   • BRAIN SURGERY      BRAIN ABCESS 30 YR AGO   • CARDIAC CATHETERIZATION N/A 12/4/2020    Procedure: Patent foramen ovale closure ABBOTT;  Surgeon: Frank Pablo MD;  Location: [facility] CATH INVASIVE LOCATION;  Service: Cardiology;  Laterality: N/A;   • CARDIAC CATHETERIZATION N/A 12/4/2020    Procedure: Intracardiac echocardiogram;  Surgeon: Frank Pablo MD;  Location: [facility] CATH INVASIVE LOCATION;  Service: Cardiology;  Laterality: N/A;   • CARDIAC SURGERY     • COLONOSCOPY  2012    Ciciliano- normal per pt, no polyps    • CORONARY ARTERY BYPASS GRAFT      3 VESSEL   • ENDOSCOPY N/A 3/9/2017    Schatzki ring, dilated, LA Grade B esophagitis, HH, acquired duodenal stenosis   • ENDOSCOPY N/A 4/6/2018    candida, HH, torts, Schatzki ring, duodenal stenosis, dilatation perforemed, chronic inflammation   • ENDOSCOPY N/A 10/9/2019    White nummular lesions in esophageal mucosa, mild Schatzki ring, acquired duodenal stenosis, Path: Terrazas's, candida   • ENDOSCOPY N/A 1/8/2020    Procedure: ESOPHAGOGASTRODUODENOSCOPY with biopsies;  Surgeon: Rigoberto Walker MD;  Location: [facility] ENDOSCOPY;  Service: Gastroenterology   • FACIAL FRACTURE SURGERY      to repair 6 broken bones   • ORBITAL FRACTURE OPEN REDUCTION INTERNAL FIXATION Right 1/13/2017    Procedure: RT ORBIT FLOOR FRACTURE REPAIR RIGHT [facility] FRACTURE REPAIR, RIGHT NASAL BONE FRACTURE CLOSED REDUCTION;  Surgeon: Edil Lester MD;  Location: [facility];  Service:    • ORIF FOOT  FRACTURE Right 9/9/2016    Procedure: RIGHT SECOND METATARSAL OPEN REDUCTION INTERNAL FIXATION WITh GRAFT FROM HEEL ;  Surgeon: Man Jenkins MD;  Location: [facility] OR [place];  Service:    • PATENT FORAMEN OVALE CLOSURE     • SEPTOPLASTY     • SKIN CANCER EXCISION     • TONSILLECTOMY     • TRANSESOPHAGEAL ECHOCARDIOGRAM (STELLA)             Current Outpatient Medications:   •  acetaminophen (TYLENOL) 500 MG tablet, Take 2 tablets by mouth Every 8 (Eight) Hours As Needed for Mild Pain ., Disp: 30 tablet, Rfl: 0  •  allopurinol (ZYLOPRIM) 300 MG tablet, Take 300 mg by mouth daily., Disp: , Rfl:   •  aspirin 81 MG chewable tablet, Chew 81 mg Daily. WILL VERIFY STOP DATE WITH MD, Disp: , Rfl:   •  atorvastatin (LIPITOR) 40 MG tablet, Take 1 tablet by mouth Every Night., Disp: , Rfl:   •  betamethasone dipropionate (DIPROLENE) 0.05 % lotion, Apply 1 application topically to the appropriate area as directed As Needed for Irritation., Disp: , Rfl:   •  Cholecalciferol (Vitamin D3) 50 MCG (2000 UT) tablet, Take 50 mcg by mouth Every Morning., Disp: , Rfl:   •  clopidogrel (PLAVIX) 75 MG tablet, Take 1 tablet by mouth Daily., Disp: 30 tablet, Rfl:   •  doxazosin (CARDURA) 1 MG tablet, Take 1 mg by mouth Every Morning., Disp: , Rfl:   •  glipizide (GLUCOTROL) 5 MG tablet, Take 5 mg by mouth Daily With Dinner., Disp: , Rfl:   •  lisinopril (PRINIVIL,ZESTRIL) 20 MG tablet, Take 1 tablet by mouth 2 (Two) Times a Day., Disp: 180 tablet, Rfl: 2  •  metFORMIN (GLUCOPHAGE) 500 MG tablet, Take 500 mg by mouth Daily With Breakfast., Disp: , Rfl:   •  Multiple Vitamins-Minerals (MULTIVITAMIN ADULT PO), Take 1 tablet by mouth Daily., Disp: , Rfl:   •  pantoprazole (PROTONIX) 40 MG EC tablet, , Disp: , Rfl:   •  PROAIR  (90 BASE) MCG/ACT inhaler, Inhale 2 puffs Every 4 (Four) Hours As Needed., Disp: , Rfl:   •  Accu-Chek FastClix Lancets misc, TEST ONCE TO BID PRN, Disp: , Rfl:   •  enoxaparin (LOVENOX) 40 MG/0.4ML solution  syringe, INJECT 40 MG SUBCUTANEOUSLY DAILY ASK ORTHO DOCTOR IF NEED TO CONTINUE THIS MED, Disp: , Rfl:   •  glucose blood (FREESTYLE LITE) test strip, 1 each by Other route See Admin Instructions. Use 1 to 2 times daily as instructed, Disp: , Rfl:   •  metFORMIN (GLUCOPHAGE) 850 MG tablet, Take 850 mg by mouth 2 (Two) Times a Day With Meals., Disp: , Rfl:   •  ondansetron (ZOFRAN) 4 MG tablet, Take 1 tablet by mouth Every 6 (Six) Hours As Needed for Nausea or Vomiting., Disp:  , Rfl:       Family History   Problem Relation Age of Onset   • Heart disease Mother    • Hypertension Mother    • Stroke Mother    • Diverticulitis Mother    • Heart disease Father    • Hypertension Father    • Malig Hyperthermia Neg Hx          Social History     Socioeconomic History   • Marital status:      Spouse name: Not on file   • Number of children: 2   • Years of education: 16   • Highest education level: Not on file   Tobacco Use   • Smoking status: Former Smoker     Packs/day: 3.00     Years: 5.00     Pack years: 15.00     Types: Cigarettes     Start date:      Quit date:      Years since quittin.5   • Smokeless tobacco: Never Used   • Tobacco comment: Quit cold turkey   Substance and Sexual Activity   • Alcohol use: Yes     Alcohol/week: 2.0 standard drinks     Types: 1 Cans of beer, 1 Shots of liquor per week     Comment: RARELY    • Drug use: No   • Sexual activity: Defer     Partners: Female     Birth control/protection: Surgical         Allergies   Allergen Reactions   • Other Mental Status Change and Hallucinations     Oxy drugs   • Oxycodone Mental Status Change         Pain Scale: 0/10        ROS:  Review of Systems   Constitutional: Negative for activity change, appetite change and fatigue.   Neurological: Positive for tremors. Negative for dizziness, seizures, syncope, facial asymmetry, speech difficulty, weakness, light-headedness, numbness and headaches.   Psychiatric/Behavioral: Negative for  "agitation, behavioral problems, confusion, decreased concentration, dysphoric mood, hallucinations, self-injury, sleep disturbance and suicidal ideas. The patient is not nervous/anxious and is not hyperactive.      Gait disturbance requiring a stand-up walker.    I have reviewed and agree with the above ROS completed by the medical assistant.      Physical Exam:  Vitals:    07/16/21 1056   BP: 148/80   Pulse: 81   SpO2: 97%   Weight: 88 kg (194 lb)   Height: 177.8 cm (70\")     Orthostatic BP:    Body mass index is 27.84 kg/m².    Physical Exam  General: Overweight white male no acute distress  HEENT: Normocephalic no evidence of trauma  Neck: Supple  Heart: Regular rate and rhythm  Extremities: No pedal edema      Neurological Exam:   Mental Status: Awake, alert, oriented to person, place and time.  Conversant without evidence of an affective disorder, thought disorder, delusions or hallucinations.  Attention span and concentration are normal.  HCF: No aphasia, apraxia or dysarthria.  Mild short-term memory deficits noted.  Knowledge of recent events intact.  CN: I:   II: Visual fields full without left inattention   III, IV, VI: Eye movements intact without nystagmus or ptosis.  Pupils equal round and reactive to light.   V,VII: Light touch and pinprick intact all 3 divisions of V.  Facial muscles symmetrical.   VIII: Hearing intact to finger rub   IX,X: Soft palate elevates symmetrically   XI: Sternomastoid and trapezius are strong.   XII: Tongue midline without atrophy or fasciculations  Motor: Very mild cogwheeling in both upper extremities with normal  bulk in the upper and lower extremities   Power testing: Intrinsic hand weakness  Reflexes: Upper extremities:        Lower extremities:        Toe signs:  Sensory: Light touch:        Pinprick:        Vibration:        Position:    Cerebellar: Finger-to-nose: No bradykinesia.  No dysmetria.  Some occasional postural tremor in the left hand but no resting tremor " was seen.           Rapid movement: Intact           Heel-to-shin: Intact  Gait and Station: Uses a rolling walker.  Somewhat slow.  Step length is short but not really shuffling.    Results:      Lab Results   Component Value Date    GLUCOSE 129 (H) 03/02/2021    BUN 25 (H) 03/02/2021    CREATININE 1.24 03/02/2021    EGFRIFNONA 56 (L) 03/02/2021    EGFRIFAFRI  09/02/2016      Comment:      <15 Indicative of kidney failure.    BCR 20.2 03/02/2021    CO2 24.0 03/02/2021    CALCIUM 9.4 03/02/2021    PROTENTOTREF 6.5 01/02/2020    ALBUMIN 3.00 (L) 02/19/2021    LABIL2 1.2 01/02/2020    AST 13 02/19/2021    ALT 11 02/19/2021       Lab Results   Component Value Date    WBC 5.50 03/02/2021    HGB 11.8 (L) 03/02/2021    HCT 34.1 (L) 03/02/2021    MCV 91.0 03/02/2021     03/02/2021         .  Lab Results   Component Value Date    RPR Non-Reactive 06/23/2019         Lab Results   Component Value Date    TSH 0.988 04/27/2020         Lab Results   Component Value Date    [id number] 580 06/23/2019         Lab Results   Component Value Date    FOLATE >20.00 06/23/2019         Lab Results   Component Value Date    HGBA1C 7.81 (H) 09/06/2020         Lab Results   Component Value Date    GLUCOSE 129 (H) 03/02/2021    BUN 25 (H) 03/02/2021    CREATININE 1.24 03/02/2021    EGFRIFNONA 56 (L) 03/02/2021    EGFRIFAFRI  09/02/2016      Comment:      <15 Indicative of kidney failure.    BCR 20.2 03/02/2021    K 4.4 03/02/2021    CO2 24.0 03/02/2021    CALCIUM 9.4 03/02/2021    PROTENTOTREF 6.5 01/02/2020    ALBUMIN 3.00 (L) 02/19/2021    LABIL2 1.2 01/02/2020    AST 13 02/19/2021    ALT 11 02/19/2021         Lab Results   Component Value Date    WBC 5.50 03/02/2021    HGB 11.8 (L) 03/02/2021    HCT 34.1 (L) 03/02/2021    MCV 91.0 03/02/2021     03/02/2021             Assessment:   1.  NPH-surgery declined by 2 neurosurgeons  2.  Parkinson's disease-on enough features to treat at this point  3.  Diabetic peripheral  neuropathy  4.  History of occipital stroke        Plan:  1.  Continue to monitor follow-up in 6 months.  2.  To check MMSE next visit.  3.  Continue target risk factor control of diabetes hypertension and hyperlipidemia  4.  Continue aspirin 81 mg daily            Time: 30 minutes            Dictated utilizing Dragon dictation.

## 2021-08-09 ENCOUNTER — OFFICE VISIT (OUTPATIENT)
Dept: ORTHOPEDIC SURGERY | Facility: CLINIC | Age: 81
End: 2021-08-09

## 2021-08-09 VITALS — TEMPERATURE: 97.8 F | WEIGHT: 195 LBS | HEIGHT: 67 IN | BODY MASS INDEX: 30.61 KG/M2

## 2021-08-09 DIAGNOSIS — S93.492D SYNDESMOTIC ANKLE SPRAIN, LEFT, SUBSEQUENT ENCOUNTER: ICD-10-CM

## 2021-08-09 DIAGNOSIS — S82.62XD CLOSED DISPLACED FRACTURE OF LATERAL MALLEOLUS OF LEFT FIBULA WITH ROUTINE HEALING, SUBSEQUENT ENCOUNTER: Primary | ICD-10-CM

## 2021-08-09 PROCEDURE — 99213 OFFICE O/P EST LOW 20 MIN: CPT | Performed by: ORTHOPAEDIC SURGERY

## 2021-08-09 PROCEDURE — 73610 X-RAY EXAM OF ANKLE: CPT | Performed by: ORTHOPAEDIC SURGERY

## 2021-08-09 NOTE — PROGRESS NOTES
"Ankle Follow Up      Patient: Eugenio Joe    YOB: 1940 80 y.o. male    Chief Complaints: Ankle feels okay    History of Present Illness:Patient underwent ORIF of the left ankle on 2/16/2021 with plating of the lateral fibula and tight rope placement on 2/16/2021.    He was last seen on 6/14/2021 and has been using his walker that he uses for his normal pressure hydrocephalus for balance and limits his activity.    He has no complaints of pain at the ankle and has not been using his brace.  He has completed physical therapy  HPI    ROS: No ankle pain  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 SWEAT GLAND    • Cardiac disease    • Coronary artery disease     CABG 2012   • Diabetes mellitus (CMS/HCC)    • Difficulty in swallowing     LIQUIDS   • Difficulty walking    • Diverticulitis    • DM2 (diabetes mellitus, type 2) (CMS/HCC) 10/12/2016   • Gout several years ago    medication  working   • Hearing aid worn     bilateral   • Hx of appendectomy    • Hyperlipemia    • Hypertension    • Joint pain     or swelling   • Low back pain several years ago   • Normal pressure hydrocephalus (CMS/HCC)    • Orbit fracture (CMS/HCC)    • Pulmonary embolism (CMS/HCC)     OCT 2016 - AFTER FOOT SURGERY   • Rash     ARM-    • Spinal headache     AFTER SPINAL TAP - NO BLOOD PATCH   • Stroke (cerebrum) (CMS/HCC) 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   • TIA (transient ischemic attack)     MULTIPLE       Physical Exam:   Vitals:    08/09/21 1414   Temp: 97.8 °F (36.6 °C)   Weight: 88.5 kg (195 lb)   Height: 170.2 cm (67\")     Well developed with normal mood.  He is with his wife.  Left ankle incisions remain well-healed with minimal swelling and no focal tenderness to palpation about the ankle or with range of motion or external rotation testing.      Radiology: 3 views left ankle ordered evaluate " postoperative alignment reviewed and compared to previous x-rays.  The distal fibula fracture appears to be healed in good alignment hardware is intact and talus is well-seated within the mortise without widening of the syndesmosis.      Assessment/Plan: Status post ORIF left ankle as outlined above.    Overall he seems to be doing very well and pleased with his outcome.  He may advance activity slowly as tolerated and recommend he continue with use of his walker to minimize fall potential.    Anything worsens to let me know otherwise I will see him back as needed.

## 2021-11-23 NOTE — PROGRESS NOTES
Date of Office Visit: 21  Encounter Provider: Jean Ford MD  Place of Service: [facility] CARDIOLOGY  Patient Name: Eugenio Joe  :1940  [id number]    Chief Complaint   Patient presents with   • Coronary Artery Disease   :     HPI: Eugenio Joe is a 80 y.o. male he had a three-vessel CABG in  with a LIMA to the LAD and vein to the ramus and a vein to an OM1 she had normal LV function.  Pulmonary embolus at one time he has had normal pressure hydrocephalus he has had a couple of TIAs was found to have a large PFO he has been managed medically recently was in the hospital for TIA-like symptoms but it was felt that it was due to small vessel disease consistent with hypertension he had his aspirin increased to a full aspirin a day.  We had Dr. Pablo shot his PFO.  He is now following up with us mainly because of hypertension he has been maxed out on his dose of lisinopril and still has hypertension he has had a history of gout and diabetes    Past Medical History:   Diagnosis Date   • Arthritis    • Asthma    • Brain abscess     at about age 44   • Bruise of face    • Cancer ([facility])     PT STATES IN HIS SWEAT GLAND    • Cardiac disease    • Coronary artery disease     CABG    • Diabetes mellitus ([facility])    • Difficulty in swallowing     LIQUIDS   • Difficulty walking    • Diverticulitis    • DM2 (diabetes mellitus, type 2) ([facility]) 10/12/2016   • Gout several years ago    medication  working   • Hearing aid worn     bilateral   • Hx of appendectomy    • Hyperlipemia    • Hypertension    • Joint pain     or swelling   • Low back pain several years ago   • Normal pressure hydrocephalus ([facility])    • Orbit fracture ([facility])    • Pulmonary embolism ([facility])     OCT 2016 - AFTER FOOT SURGERY   • Rash     ARM-    • Spinal headache     AFTER SPINAL TAP - NO BLOOD PATCH   • 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   • TIA (transient ischemic attack)     MULTIPLE        Past Surgical History:   Procedure Laterality Date   • ANKLE OPEN REDUCTION INTERNAL FIXATION Left 2/16/2021    Procedure: Left ankle open reduction internal fixation with implants as needed;  Surgeon: Man Jenkins MD;  Location: [facility] OR [facility];  Service: Orthopedics;  Laterality: Left;   • APPENDECTOMY N/A 7/18/2019    Procedure: APPENDECTOMY LAPAROSCOPIC;  Surgeon: Luis Carlos Rick Jr., MD;  Location: [facility] MAIN OR;  Service: General   • BRAIN SURGERY      BRAIN ABCESS 30 YR AGO   • CARDIAC CATHETERIZATION N/A 12/4/2020    Procedure: Patent foramen ovale closure ABBOTT;  Surgeon: Frank Pablo MD;  Location: [facility] CATH INVASIVE LOCATION;  Service: Cardiology;  Laterality: N/A;   • CARDIAC CATHETERIZATION N/A 12/4/2020    Procedure: Intracardiac echocardiogram;  Surgeon: Frank Pablo MD;  Location: [facility] CATH INVASIVE LOCATION;  Service: Cardiology;  Laterality: N/A;   • CARDIAC SURGERY     • COLONOSCOPY  2012    Ciciliano- normal per pt, no polyps    • CORONARY ARTERY BYPASS GRAFT      3 VESSEL   • ENDOSCOPY N/A 3/9/2017    Schatzki ring, dilated, LA Grade B esophagitis, HH, acquired duodenal stenosis   • ENDOSCOPY N/A 4/6/2018    candida, HH, torts, Schatzki ring, duodenal stenosis, dilatation perforemed, chronic inflammation   • ENDOSCOPY N/A 10/9/2019    White nummular lesions in esophageal mucosa, mild Schatzki ring, acquired duodenal stenosis, Path: Terrazas's, candida   • ENDOSCOPY N/A 1/8/2020    Procedure: ESOPHAGOGASTRODUODENOSCOPY with biopsies;  Surgeon: Rigoberto Walker MD;  Location: [facility] ENDOSCOPY;  Service: Gastroenterology   • FACIAL FRACTURE SURGERY      to repair 6 broken bones   • ORBITAL FRACTURE OPEN REDUCTION INTERNAL FIXATION Right 1/13/2017    Procedure: RT ORBIT FLOOR FRACTURE REPAIR RIGHT [facility] FRACTURE REPAIR, RIGHT NASAL BONE FRACTURE CLOSED REDUCTION;  Surgeon: Edil Lester MD;  Location: [facility] OR [facility];  Service:    • ORIF FOOT  FRACTURE Right 2016    Procedure: RIGHT SECOND METATARSAL OPEN REDUCTION INTERNAL FIXATION WITh GRAFT FROM HEEL ;  Surgeon: Man Jenkins MD;  [place] [facility] [place] [facility]  Service:    • PATENT FORAMEN OVALE CLOSURE     • SEPTOPLASTY     • SKIN CANCER EXCISION     • TONSILLECTOMY     • TRANSESOPHAGEAL ECHOCARDIOGRAM (STELLA)         Social History     Socioeconomic History   • Marital status:    • Number of children: 2   • Years of education: 16   Tobacco Use   • Smoking status: Former Smoker     Packs/day: 3.00     Years: 5.00     Pack years: 15.00     Types: Cigarettes     Start date:      Quit date:      Years since quittin.9   • Smokeless tobacco: Never Used   • Tobacco comment: Quit cold turkey   Substance and Sexual Activity   • Alcohol use: Yes     Alcohol/week: 2.0 standard drinks     Types: 1 Cans of beer, 1 Shots of liquor per week     Comment: RARELY    • Drug use: No   • Sexual activity: Defer     Partners: Female     Birth control/protection: Surgical       Family History   Problem Relation Age of Onset   • Heart disease Mother    • Hypertension Mother    • Stroke Mother    • Diverticulitis Mother    • Heart disease Father    • Hypertension Father    • Malig Hyperthermia Neg Hx        Review of Systems   Constitutional: Negative for decreased appetite, fever, malaise/fatigue and weight loss.   HENT: Negative for nosebleeds.    Eyes: Negative for double vision.   Cardiovascular: Negative for chest pain, claudication, cyanosis, dyspnea on exertion, irregular heartbeat, leg swelling, near-syncope, orthopnea, palpitations, paroxysmal nocturnal dyspnea and syncope.   Respiratory: Negative for cough, hemoptysis and shortness of breath.    Hematologic/Lymphatic: Negative for bleeding problem.   Skin: Negative for rash.   Musculoskeletal: Negative for falls and myalgias.   Gastrointestinal: Negative for hematochezia, jaundice, melena, nausea and vomiting.   Genitourinary: Negative  for hematuria.   Neurological: Negative for dizziness and seizures.   Psychiatric/Behavioral: Negative for altered mental status and memory loss.       Allergies   Allergen Reactions   • Other Mental Status Change and Hallucinations     Oxy drugs   • Oxycodone Mental Status Change         Current Outpatient Medications:   •  Accu-Chek FastClix Lancets misc, TEST ONCE TO BID PRN, Disp: , Rfl:   •  acetaminophen (TYLENOL) 500 MG tablet, Take 2 tablets by mouth Every 8 (Eight) Hours As Needed for Mild Pain ., Disp: 30 tablet, Rfl: 0  •  allopurinol (ZYLOPRIM) 300 MG tablet, Take 300 mg by mouth daily., Disp: , Rfl:   •  aspirin 81 MG chewable tablet, Chew 81 mg Daily. WILL VERIFY STOP DATE WITH MD, Disp: , Rfl:   •  atorvastatin (LIPITOR) 40 MG tablet, Take 1 tablet by mouth Every Night., Disp: , Rfl:   •  betamethasone dipropionate (DIPROLENE) 0.05 % lotion, Apply 1 application topically to the appropriate area as directed As Needed for Irritation., Disp: , Rfl:   •  Cholecalciferol (Vitamin D3) 50 MCG (2000 UT) tablet, Take 50 mcg by mouth Every Morning., Disp: , Rfl:   •  clopidogrel (PLAVIX) 75 MG tablet, Take 1 tablet by mouth Daily., Disp: 30 tablet, Rfl:   •  doxazosin (CARDURA) 1 MG tablet, Take 1 mg by mouth Every Morning., Disp: , Rfl:   •  glipizide (GLUCOTROL) 5 MG tablet, Take 5 mg by mouth Daily With Dinner., Disp: , Rfl:   •  glucose blood (FREESTYLE LITE) test strip, 1 each by Other route See Admin Instructions. Use 1 to 2 times daily as instructed, Disp: , Rfl:   •  lisinopril (PRINIVIL,ZESTRIL) 20 MG tablet, Take 1 tablet by mouth 2 (Two) Times a Day., Disp: 180 tablet, Rfl: 2  •  metFORMIN (GLUCOPHAGE) 500 MG tablet, Take 500 mg by mouth Daily With Breakfast., Disp: , Rfl:   •  Multiple Vitamins-Minerals (MULTIVITAMIN ADULT PO), Take 1 tablet by mouth Daily., Disp: , Rfl:   •  pantoprazole (PROTONIX) 40 MG EC tablet, , Disp: , Rfl:   •  PROAIR  (90 BASE) MCG/ACT inhaler, Inhale 2 puffs Every 4  "(Four) Hours As Needed., Disp: , Rfl:   •  enoxaparin (LOVENOX) 40 MG/0.4ML solution syringe, INJECT 40 MG SUBCUTANEOUSLY DAILY ASK ORTHO DOCTOR IF NEED TO CONTINUE THIS MED, Disp: , Rfl:   •  metFORMIN (GLUCOPHAGE) 850 MG tablet, Take 850 mg by mouth 2 (Two) Times a Day With Meals., Disp: , Rfl:   •  ondansetron (ZOFRAN) 4 MG tablet, Take 1 tablet by mouth Every 6 (Six) Hours As Needed for Nausea or Vomiting., Disp:  , Rfl:       Objective:     Vitals:    11/23/21 1415   BP: 148/70   Pulse: 80   Weight: 89.7 kg (197 lb 12.8 oz)   Height: 170.2 cm (67\")     Body mass index is 30.98 kg/m².    Physical Exam  Constitutional:       Appearance: He is well-developed.   HENT:      Head: Normocephalic.   Eyes:      General: No scleral icterus.  Neck:      Thyroid: No thyromegaly.      Vascular: No JVD.   Cardiovascular:      Rate and Rhythm: Normal rate and regular rhythm.      Heart sounds: Normal heart sounds. No murmur heard.  No friction rub. No gallop.    Pulmonary:      Effort: Pulmonary effort is normal.      Breath sounds: Normal breath sounds. No wheezing or rales.   Abdominal:      Palpations: Abdomen is soft.      Tenderness: There is no abdominal tenderness.   Musculoskeletal:         General: Normal range of motion.   Lymphadenopathy:      Cervical: No cervical adenopathy.   Skin:     General: Skin is warm and dry.      Findings: No rash.   Neurological:      Mental Status: He is alert and oriented to person, place, and time.           ECG 12 Lead    Date/Time: 11/23/2021 2:35 PM  Performed by: Jean Ford MD  Authorized by: Jean Ford MD   Comparison: compared with previous ECG   Similar to previous ECG  Rhythm: sinus rhythm    Clinical impression: normal ECG             Assessment:       Diagnosis Plan   1. S/P CABG x 3     2. Essential hypertension            Plan:       I do not see it a big change overall with Eugenio he is kind of fragile and I think we can want to be conservative.  His blood " pressure is high when increase his amlodipine to 5 mg a day and then I will does have him come back and see us in a year sooner if he has trouble    As always, it has been a pleasure to participate in your patient's care.      Sincerely,       Jean Ford MD      The MRI on September 2020 shows 2 acute strokes in 2 different distributions suggestive of a cardioembolic source.

## 2022-01-01 ENCOUNTER — TRANSCRIBE ORDERS (OUTPATIENT)
Dept: ADMINISTRATIVE | Facility: HOSPITAL | Age: 82
End: 2022-01-01

## 2022-01-01 ENCOUNTER — TELEPHONE (OUTPATIENT)
Dept: CARDIOLOGY | Facility: CLINIC | Age: 82
End: 2022-01-01

## 2022-01-01 ENCOUNTER — APPOINTMENT (OUTPATIENT)
Dept: CARDIOLOGY | Facility: HOSPITAL | Age: 82
End: 2022-01-01

## 2022-01-01 ENCOUNTER — PATIENT ROUNDING (BHMG ONLY) (OUTPATIENT)
Dept: ORTHOPEDIC SURGERY | Facility: CLINIC | Age: 82
End: 2022-01-01

## 2022-01-01 ENCOUNTER — HOSPITAL ENCOUNTER (OUTPATIENT)
Dept: SPEECH THERAPY | Facility: HOSPITAL | Age: 82
Setting detail: THERAPIES SERIES
Discharge: HOME | End: 2022-05-02

## 2022-01-01 ENCOUNTER — APPOINTMENT (OUTPATIENT)
Dept: NUCLEAR MEDICINE | Facility: HOSPITAL | Age: 82
End: 2022-01-01

## 2022-01-01 ENCOUNTER — APPOINTMENT (OUTPATIENT)
Dept: SPEECH THERAPY | Facility: HOSPITAL | Age: 82
End: 2022-01-01

## 2022-01-01 ENCOUNTER — LAB (OUTPATIENT)
Dept: LAB | Facility: HOSPITAL | Age: 82
End: 2022-01-01

## 2022-01-01 ENCOUNTER — READMISSION MANAGEMENT (OUTPATIENT)
Dept: CALL CENTER | Facility: HOSPITAL | Age: 82
End: 2022-01-01

## 2022-01-01 ENCOUNTER — APPOINTMENT (OUTPATIENT)
Dept: GENERAL RADIOLOGY | Facility: HOSPITAL | Age: 82
End: 2022-01-01

## 2022-01-01 ENCOUNTER — TELEPHONE (OUTPATIENT)
Dept: NEUROSURGERY | Facility: CLINIC | Age: 82
End: 2022-01-01

## 2022-01-01 ENCOUNTER — OFFICE VISIT (OUTPATIENT)
Dept: NEUROLOGY | Facility: CLINIC | Age: 82
End: 2022-01-01

## 2022-01-01 ENCOUNTER — HOSPITAL ENCOUNTER (OUTPATIENT)
Dept: SPEECH THERAPY | Facility: HOSPITAL | Age: 82
Setting detail: THERAPIES SERIES
Discharge: HOME | End: 2022-05-10

## 2022-01-01 ENCOUNTER — HOSPITAL ENCOUNTER (OUTPATIENT)
Dept: CARDIOLOGY | Facility: HOSPITAL | Age: 82
Discharge: HOME | End: 2022-04-22
Admitting: NURSE PRACTITIONER

## 2022-01-01 ENCOUNTER — HOSPITAL ENCOUNTER (INPATIENT)
Facility: HOSPITAL | Age: 82
LOS: 3 days | Discharge: HOME | End: 2022-04-14
Attending: EMERGENCY MEDICINE | Admitting: STUDENT IN AN ORGANIZED HEALTH CARE EDUCATION/TRAINING PROGRAM

## 2022-01-01 ENCOUNTER — APPOINTMENT (OUTPATIENT)
Dept: CT IMAGING | Facility: HOSPITAL | Age: 82
End: 2022-01-01

## 2022-01-01 ENCOUNTER — OFFICE VISIT (OUTPATIENT)
Dept: CARDIOLOGY | Facility: CLINIC | Age: 82
End: 2022-01-01

## 2022-01-01 ENCOUNTER — HOSPITAL ENCOUNTER (INPATIENT)
Facility: HOSPITAL | Age: 82
LOS: 4 days | End: 2022-11-10
Attending: EMERGENCY MEDICINE | Admitting: INTERNAL MEDICINE

## 2022-01-01 ENCOUNTER — HOSPITAL ENCOUNTER (INPATIENT)
Facility: HOSPITAL | Age: 82
LOS: 1 days | End: 2022-11-11
Attending: INTERNAL MEDICINE | Admitting: INTERNAL MEDICINE

## 2022-01-01 ENCOUNTER — OFFICE VISIT (OUTPATIENT)
Dept: ORTHOPEDIC SURGERY | Facility: CLINIC | Age: 82
End: 2022-01-01

## 2022-01-01 ENCOUNTER — HOSPITAL ENCOUNTER (OUTPATIENT)
Dept: GENERAL RADIOLOGY | Facility: HOSPITAL | Age: 82
Discharge: HOME | End: 2022-09-30
Admitting: INTERNAL MEDICINE

## 2022-01-01 ENCOUNTER — HOSPITAL ENCOUNTER (INPATIENT)
Facility: HOSPITAL | Age: 82
LOS: 4 days | Discharge: NURSING, REHAB OR LONG-TERM CARE | End: 2022-10-21
Attending: EMERGENCY MEDICINE | Admitting: HOSPITALIST

## 2022-01-01 ENCOUNTER — HOSPITAL ENCOUNTER (OUTPATIENT)
Dept: SPEECH THERAPY | Facility: HOSPITAL | Age: 82
Setting detail: THERAPIES SERIES
Discharge: HOME | End: 2022-04-25

## 2022-01-01 VITALS
HEART RATE: 70 BPM | TEMPERATURE: 98.1 F | DIASTOLIC BLOOD PRESSURE: 73 MMHG | RESPIRATION RATE: 20 BRPM | HEIGHT: 70 IN | SYSTOLIC BLOOD PRESSURE: 148 MMHG | OXYGEN SATURATION: 92 % | BODY MASS INDEX: 27.26 KG/M2 | WEIGHT: 190.4 LBS

## 2022-01-01 VITALS
BODY MASS INDEX: 27.02 KG/M2 | HEIGHT: 70 IN | OXYGEN SATURATION: 96 % | RESPIRATION RATE: 18 BRPM | WEIGHT: 188.71 LBS | DIASTOLIC BLOOD PRESSURE: 83 MMHG | TEMPERATURE: 96.9 F | SYSTOLIC BLOOD PRESSURE: 179 MMHG | HEART RATE: 57 BPM

## 2022-01-01 VITALS
TEMPERATURE: 97 F | WEIGHT: 189.15 LBS | HEART RATE: 133 BPM | HEIGHT: 70 IN | DIASTOLIC BLOOD PRESSURE: 89 MMHG | BODY MASS INDEX: 27.08 KG/M2 | RESPIRATION RATE: 16 BRPM | SYSTOLIC BLOOD PRESSURE: 153 MMHG | OXYGEN SATURATION: 89 %

## 2022-01-01 VITALS — TEMPERATURE: 97.8 F | WEIGHT: 185 LBS | HEIGHT: 69 IN | BODY MASS INDEX: 27.4 KG/M2

## 2022-01-01 VITALS
WEIGHT: 190.6 LBS | HEIGHT: 69 IN | SYSTOLIC BLOOD PRESSURE: 124 MMHG | HEART RATE: 71 BPM | BODY MASS INDEX: 28.53 KG/M2 | DIASTOLIC BLOOD PRESSURE: 74 MMHG | SYSTOLIC BLOOD PRESSURE: 128 MMHG | BODY MASS INDEX: 28.23 KG/M2 | HEART RATE: 74 BPM | WEIGHT: 192.6 LBS | OXYGEN SATURATION: 99 % | HEIGHT: 69 IN | DIASTOLIC BLOOD PRESSURE: 64 MMHG

## 2022-01-01 VITALS
HEART RATE: 109 BPM | SYSTOLIC BLOOD PRESSURE: 142 MMHG | RESPIRATION RATE: 16 BRPM | OXYGEN SATURATION: 90 % | DIASTOLIC BLOOD PRESSURE: 69 MMHG | TEMPERATURE: 99.9 F

## 2022-01-01 VITALS
HEART RATE: 60 BPM | HEIGHT: 69 IN | DIASTOLIC BLOOD PRESSURE: 64 MMHG | SYSTOLIC BLOOD PRESSURE: 122 MMHG | BODY MASS INDEX: 29.06 KG/M2 | WEIGHT: 196.2 LBS

## 2022-01-01 VITALS
HEIGHT: 69 IN | OXYGEN SATURATION: 98 % | SYSTOLIC BLOOD PRESSURE: 134 MMHG | HEART RATE: 67 BPM | WEIGHT: 197 LBS | DIASTOLIC BLOOD PRESSURE: 58 MMHG | BODY MASS INDEX: 29.18 KG/M2

## 2022-01-01 DIAGNOSIS — R53.1 GENERALIZED WEAKNESS: ICD-10-CM

## 2022-01-01 DIAGNOSIS — Z95.1 S/P CABG X 3: Primary | ICD-10-CM

## 2022-01-01 DIAGNOSIS — I50.43 CHF (CONGESTIVE HEART FAILURE), NYHA CLASS I, ACUTE ON CHRONIC, COMBINED: ICD-10-CM

## 2022-01-01 DIAGNOSIS — I50.33 ACUTE ON CHRONIC DIASTOLIC HEART FAILURE: ICD-10-CM

## 2022-01-01 DIAGNOSIS — G91.2 NORMAL PRESSURE HYDROCEPHALUS: Primary | ICD-10-CM

## 2022-01-01 DIAGNOSIS — Q21.12 PFO (PATENT FORAMEN OVALE): ICD-10-CM

## 2022-01-01 DIAGNOSIS — I50.43 CHF (CONGESTIVE HEART FAILURE), NYHA CLASS I, ACUTE ON CHRONIC, COMBINED: Primary | ICD-10-CM

## 2022-01-01 DIAGNOSIS — I77.810 ASCENDING AORTA DILATATION: ICD-10-CM

## 2022-01-01 DIAGNOSIS — R52 PAIN: Primary | ICD-10-CM

## 2022-01-01 DIAGNOSIS — I67.9 CEREBROVASCULAR DISEASE: ICD-10-CM

## 2022-01-01 DIAGNOSIS — J12.82 PNEUMONIA DUE TO COVID-19 VIRUS: Primary | ICD-10-CM

## 2022-01-01 DIAGNOSIS — I50.9 ACUTE CONGESTIVE HEART FAILURE, UNSPECIFIED HEART FAILURE TYPE: ICD-10-CM

## 2022-01-01 DIAGNOSIS — R77.8 ELEVATED TROPONIN: ICD-10-CM

## 2022-01-01 DIAGNOSIS — R13.12 OROPHARYNGEAL DYSPHAGIA: Primary | ICD-10-CM

## 2022-01-01 DIAGNOSIS — I50.9 HEART FAILURE, UNSPECIFIED HF CHRONICITY, UNSPECIFIED HEART FAILURE TYPE: ICD-10-CM

## 2022-01-01 DIAGNOSIS — I16.0 HYPERTENSIVE URGENCY: ICD-10-CM

## 2022-01-01 DIAGNOSIS — I16.1 HYPERTENSIVE EMERGENCY: ICD-10-CM

## 2022-01-01 DIAGNOSIS — E87.5 HYPERKALEMIA: ICD-10-CM

## 2022-01-01 DIAGNOSIS — G91.2 NPH (NORMAL PRESSURE HYDROCEPHALUS): Primary | ICD-10-CM

## 2022-01-01 DIAGNOSIS — I50.33 ACUTE ON CHRONIC DIASTOLIC HEART FAILURE: Primary | ICD-10-CM

## 2022-01-01 DIAGNOSIS — I10 ESSENTIAL HYPERTENSION: Primary | ICD-10-CM

## 2022-01-01 DIAGNOSIS — I10 ESSENTIAL HYPERTENSION: ICD-10-CM

## 2022-01-01 DIAGNOSIS — Z95.1 S/P CABG X 3: ICD-10-CM

## 2022-01-01 DIAGNOSIS — I61.9 INTRAPARENCHYMAL HEMORRHAGE OF BRAIN: Primary | ICD-10-CM

## 2022-01-01 DIAGNOSIS — I69.30 SEQUELAE, POST-STROKE: ICD-10-CM

## 2022-01-01 DIAGNOSIS — J69.0 ASPIRATION PNEUMONIA OF BOTH LOWER LOBES DUE TO VOMIT: ICD-10-CM

## 2022-01-01 DIAGNOSIS — I25.10 CORONARY ARTERY DISEASE INVOLVING NATIVE CORONARY ARTERY OF NATIVE HEART WITHOUT ANGINA PECTORIS: ICD-10-CM

## 2022-01-01 DIAGNOSIS — I25.10 CORONARY ARTERY DISEASE INVOLVING NATIVE CORONARY ARTERY OF NATIVE HEART WITHOUT ANGINA PECTORIS: Primary | ICD-10-CM

## 2022-01-01 DIAGNOSIS — E78.49 OTHER HYPERLIPIDEMIA: ICD-10-CM

## 2022-01-01 DIAGNOSIS — R06.00 ACUTE DYSPNEA: Primary | ICD-10-CM

## 2022-01-01 DIAGNOSIS — R20.0 SENSORY LOSS: ICD-10-CM

## 2022-01-01 DIAGNOSIS — N28.9 RENAL INSUFFICIENCY: ICD-10-CM

## 2022-01-01 DIAGNOSIS — U07.1 PNEUMONIA DUE TO COVID-19 VIRUS: Primary | ICD-10-CM

## 2022-01-01 LAB
ABO GROUP BLD: NORMAL
ALBUMIN SERPL-MCNC: 2.9 G/DL (ref 3.5–5.2)
ALBUMIN SERPL-MCNC: 3.2 G/DL (ref 3.5–5.2)
ALBUMIN SERPL-MCNC: 3.3 G/DL (ref 3.5–5.2)
ALBUMIN SERPL-MCNC: 3.4 G/DL (ref 3.5–5.2)
ALBUMIN SERPL-MCNC: 3.5 G/DL (ref 3.5–5.2)
ALBUMIN SERPL-MCNC: 3.6 G/DL (ref 3.5–5.2)
ALBUMIN SERPL-MCNC: 3.6 G/DL (ref 3.5–5.2)
ALBUMIN SERPL-MCNC: 3.8 G/DL (ref 3.5–5.2)
ALBUMIN SERPL-MCNC: 3.8 G/DL (ref 3.5–5.2)
ALBUMIN SERPL-MCNC: 3.9 G/DL (ref 3.5–5.2)
ALBUMIN/GLOB SERPL: 1.1 G/DL
ALBUMIN/GLOB SERPL: 1.1 G/DL
ALBUMIN/GLOB SERPL: 1.2 G/DL
ALBUMIN/GLOB SERPL: 1.3 G/DL
ALBUMIN/GLOB SERPL: 1.4 G/DL
ALBUMIN/GLOB SERPL: 1.4 G/DL
ALP SERPL-CCNC: 64 U/L (ref 39–117)
ALP SERPL-CCNC: 64 U/L (ref 39–117)
ALP SERPL-CCNC: 68 U/L (ref 39–117)
ALP SERPL-CCNC: 69 U/L (ref 39–117)
ALP SERPL-CCNC: 71 U/L (ref 39–117)
ALP SERPL-CCNC: 71 U/L (ref 39–117)
ALP SERPL-CCNC: 79 U/L (ref 39–117)
ALP SERPL-CCNC: 84 U/L (ref 39–117)
ALP SERPL-CCNC: 85 U/L (ref 39–117)
ALP SERPL-CCNC: 88 U/L (ref 39–117)
ALT SERPL W P-5'-P-CCNC: 11 U/L (ref 1–41)
ALT SERPL W P-5'-P-CCNC: 11 U/L (ref 1–41)
ALT SERPL W P-5'-P-CCNC: 12 U/L (ref 1–41)
ALT SERPL W P-5'-P-CCNC: 12 U/L (ref 1–41)
ALT SERPL W P-5'-P-CCNC: 14 U/L (ref 1–41)
ALT SERPL W P-5'-P-CCNC: 18 U/L (ref 1–41)
ALT SERPL W P-5'-P-CCNC: 18 U/L (ref 1–41)
ALT SERPL W P-5'-P-CCNC: 5 U/L (ref 1–41)
ALT SERPL W P-5'-P-CCNC: 8 U/L (ref 1–41)
ANION GAP SERPL CALCULATED.3IONS-SCNC: 10 MMOL/L (ref 5–15)
ANION GAP SERPL CALCULATED.3IONS-SCNC: 10.2 MMOL/L (ref 5–15)
ANION GAP SERPL CALCULATED.3IONS-SCNC: 10.8 MMOL/L (ref 5–15)
ANION GAP SERPL CALCULATED.3IONS-SCNC: 11.1 MMOL/L (ref 5–15)
ANION GAP SERPL CALCULATED.3IONS-SCNC: 11.8 MMOL/L (ref 5–15)
ANION GAP SERPL CALCULATED.3IONS-SCNC: 12 MMOL/L (ref 5–15)
ANION GAP SERPL CALCULATED.3IONS-SCNC: 12.3 MMOL/L (ref 5–15)
ANION GAP SERPL CALCULATED.3IONS-SCNC: 12.5 MMOL/L (ref 5–15)
ANION GAP SERPL CALCULATED.3IONS-SCNC: 12.5 MMOL/L (ref 5–15)
ANION GAP SERPL CALCULATED.3IONS-SCNC: 12.8 MMOL/L (ref 5–15)
ANION GAP SERPL CALCULATED.3IONS-SCNC: 13 MMOL/L (ref 5–15)
ANION GAP SERPL CALCULATED.3IONS-SCNC: 14 MMOL/L (ref 5–15)
ANION GAP SERPL CALCULATED.3IONS-SCNC: 14.2 MMOL/L (ref 5–15)
ANION GAP SERPL CALCULATED.3IONS-SCNC: 14.4 MMOL/L (ref 5–15)
ANION GAP SERPL CALCULATED.3IONS-SCNC: 14.7 MMOL/L (ref 5–15)
ANION GAP SERPL CALCULATED.3IONS-SCNC: 14.9 MMOL/L (ref 5–15)
ANION GAP SERPL CALCULATED.3IONS-SCNC: 7.2 MMOL/L (ref 5–15)
AORTIC DIMENSIONLESS INDEX: 0.6 (DI)
APTT PPP: <20 SECONDS (ref 22.7–35.4)
AST SERPL-CCNC: 12 U/L (ref 1–40)
AST SERPL-CCNC: 13 U/L (ref 1–40)
AST SERPL-CCNC: 15 U/L (ref 1–40)
AST SERPL-CCNC: 16 U/L (ref 1–40)
AST SERPL-CCNC: 17 U/L (ref 1–40)
AST SERPL-CCNC: 20 U/L (ref 1–40)
AST SERPL-CCNC: 22 U/L (ref 1–40)
AST SERPL-CCNC: 22 U/L (ref 1–40)
AST SERPL-CCNC: 26 U/L (ref 1–40)
AST SERPL-CCNC: 38 U/L (ref 1–40)
B PARAPERT DNA SPEC QL NAA+PROBE: NOT DETECTED
B PERT DNA SPEC QL NAA+PROBE: NOT DETECTED
BACTERIA UR QL AUTO: NORMAL /HPF
BASOPHILS # BLD AUTO: 0.04 10*3/MM3 (ref 0–0.2)
BASOPHILS # BLD AUTO: 0.05 10*3/MM3 (ref 0–0.2)
BASOPHILS # BLD AUTO: 0.05 10*3/MM3 (ref 0–0.2)
BASOPHILS # BLD AUTO: 0.06 10*3/MM3 (ref 0–0.2)
BASOPHILS NFR BLD AUTO: 0.5 % (ref 0–1.5)
BASOPHILS NFR BLD AUTO: 0.6 % (ref 0–1.5)
BASOPHILS NFR BLD AUTO: 0.7 % (ref 0–1.5)
BH CV ECHO MEAS - ACS: 1.27 CM
BH CV ECHO MEAS - ACS: 1.76 CM
BH CV ECHO MEAS - AO MAX PG: 10.8 MMHG
BH CV ECHO MEAS - AO MAX PG: 19.5 MMHG
BH CV ECHO MEAS - AO MEAN PG: 5.6 MMHG
BH CV ECHO MEAS - AO MEAN PG: 9.3 MMHG
BH CV ECHO MEAS - AO V2 MAX: 164.2 CM/SEC
BH CV ECHO MEAS - AO V2 MAX: 221 CM/SEC
BH CV ECHO MEAS - AO V2 VTI: 29.8 CM
BH CV ECHO MEAS - AO V2 VTI: 46.7 CM
BH CV ECHO MEAS - AVA(I,D): 1.38 CM2
BH CV ECHO MEAS - AVA(I,D): 2.5 CM2
BH CV ECHO MEAS - CONTRAST EF 4CH: 55 CM2
BH CV ECHO MEAS - EDV(CUBED): 80 ML
BH CV ECHO MEAS - EDV(MOD-SP2): 117 ML
BH CV ECHO MEAS - EDV(MOD-SP2): 139 ML
BH CV ECHO MEAS - EDV(MOD-SP4): 121 ML
BH CV ECHO MEAS - EDV(MOD-SP4): 147 ML
BH CV ECHO MEAS - EF(MOD-BP): 53.2 %
BH CV ECHO MEAS - EF(MOD-BP): 54.5 %
BH CV ECHO MEAS - EF(MOD-SP2): 46 %
BH CV ECHO MEAS - EF(MOD-SP2): 54.7 %
BH CV ECHO MEAS - EF(MOD-SP4): 53.7 %
BH CV ECHO MEAS - EF(MOD-SP4): 61.9 %
BH CV ECHO MEAS - ESV(CUBED): 23.6 ML
BH CV ECHO MEAS - ESV(MOD-SP2): 53 ML
BH CV ECHO MEAS - ESV(MOD-SP2): 75 ML
BH CV ECHO MEAS - ESV(MOD-SP4): 56 ML
BH CV ECHO MEAS - ESV(MOD-SP4): 56 ML
BH CV ECHO MEAS - FS: 33.4 %
BH CV ECHO MEAS - IVS/LVPW: 1.06 CM
BH CV ECHO MEAS - IVSD: 1.3 CM
BH CV ECHO MEAS - LAT PEAK E' VEL: 6.3 CM/SEC
BH CV ECHO MEAS - LAT PEAK E' VEL: 8.4 CM/SEC
BH CV ECHO MEAS - LV DIASTOLIC VOL/BSA (35-75): 57.2 CM2
BH CV ECHO MEAS - LV DIASTOLIC VOL/BSA (35-75): 72 CM2
BH CV ECHO MEAS - LV MASS(C)D: 200 GRAMS
BH CV ECHO MEAS - LV MAX PG: 3.2 MMHG
BH CV ECHO MEAS - LV MAX PG: 3.9 MMHG
BH CV ECHO MEAS - LV MEAN PG: 1.59 MMHG
BH CV ECHO MEAS - LV MEAN PG: 1.72 MMHG
BH CV ECHO MEAS - LV SYSTOLIC VOL/BSA (12-30): 26.5 CM2
BH CV ECHO MEAS - LV SYSTOLIC VOL/BSA (12-30): 27.4 CM2
BH CV ECHO MEAS - LV V1 MAX: 90 CM/SEC
BH CV ECHO MEAS - LV V1 MAX: 98.1 CM/SEC
BH CV ECHO MEAS - LV V1 VTI: 19.7 CM
BH CV ECHO MEAS - LV V1 VTI: 23.4 CM
BH CV ECHO MEAS - LVIDD: 4.3 CM
BH CV ECHO MEAS - LVIDS: 2.9 CM
BH CV ECHO MEAS - LVOT AREA: 3.2 CM2
BH CV ECHO MEAS - LVOT AREA: 3.3 CM2
BH CV ECHO MEAS - LVOT DIAM: 2.01 CM
BH CV ECHO MEAS - LVOT DIAM: 2.04 CM
BH CV ECHO MEAS - LVPWD: 1.23 CM
BH CV ECHO MEAS - MED PEAK E' VEL: 4.6 CM/SEC
BH CV ECHO MEAS - MED PEAK E' VEL: 4.9 CM/SEC
BH CV ECHO MEAS - MR MAX PG: 131.5 MMHG
BH CV ECHO MEAS - MR MAX PG: 48.6 MMHG
BH CV ECHO MEAS - MR MAX VEL: 348.6 CM/SEC
BH CV ECHO MEAS - MR MAX VEL: 573.4 CM/SEC
BH CV ECHO MEAS - MV A DUR: 0.1 SEC
BH CV ECHO MEAS - MV A DUR: 0.13 SEC
BH CV ECHO MEAS - MV A MAX VEL: 109.4 CM/SEC
BH CV ECHO MEAS - MV A MAX VEL: 95.5 CM/SEC
BH CV ECHO MEAS - MV DEC SLOPE: 341.7 CM/SEC2
BH CV ECHO MEAS - MV DEC SLOPE: 374.6 CM/SEC2
BH CV ECHO MEAS - MV DEC TIME: 0.2 MSEC
BH CV ECHO MEAS - MV DEC TIME: 300 MSEC
BH CV ECHO MEAS - MV E MAX VEL: 86.7 CM/SEC
BH CV ECHO MEAS - MV E MAX VEL: 96.1 CM/SEC
BH CV ECHO MEAS - MV E/A: 0.79
BH CV ECHO MEAS - MV E/A: 1.01
BH CV ECHO MEAS - MV MAX PG: 5.2 MMHG
BH CV ECHO MEAS - MV MAX PG: 6 MMHG
BH CV ECHO MEAS - MV MEAN PG: 2.4 MMHG
BH CV ECHO MEAS - MV MEAN PG: 2.6 MMHG
BH CV ECHO MEAS - MV P1/2T: 91.3 MSEC
BH CV ECHO MEAS - MV V2 VTI: 36.3 CM
BH CV ECHO MEAS - MV V2 VTI: 37.6 CM
BH CV ECHO MEAS - MVA(P1/2T): 2.41 CM2
BH CV ECHO MEAS - MVA(VTI): 1.71 CM2
BH CV ECHO MEAS - MVA(VTI): 2.05 CM2
BH CV ECHO MEAS - PA ACC TIME: 0.12 SEC
BH CV ECHO MEAS - PA ACC TIME: 0.13 SEC
BH CV ECHO MEAS - PA PR(ACCEL): 20.8 MMHG
BH CV ECHO MEAS - PA PR(ACCEL): 26.7 MMHG
BH CV ECHO MEAS - PA V2 MAX: 109.9 CM/SEC
BH CV ECHO MEAS - PA V2 MAX: 117.3 CM/SEC
BH CV ECHO MEAS - PULM A REVS DUR: 0.13 SEC
BH CV ECHO MEAS - PULM A REVS DUR: 0.14 SEC
BH CV ECHO MEAS - PULM A REVS VEL: 16 CM/SEC
BH CV ECHO MEAS - PULM A REVS VEL: 28.4 CM/SEC
BH CV ECHO MEAS - PULM DIAS VEL: 39.4 CM/SEC
BH CV ECHO MEAS - PULM DIAS VEL: 39.9 CM/SEC
BH CV ECHO MEAS - PULM S/D: 1.4
BH CV ECHO MEAS - PULM SYS VEL: 55.8 CM/SEC
BH CV ECHO MEAS - PULM SYS VEL: 57.6 CM/SEC
BH CV ECHO MEAS - RAP SYSTOLE: 3 MMHG
BH CV ECHO MEAS - RV MAX PG: 2 MMHG
BH CV ECHO MEAS - RV MAX PG: 4 MMHG
BH CV ECHO MEAS - RV V1 MAX: 70.7 CM/SEC
BH CV ECHO MEAS - RV V1 MAX: 99.8 CM/SEC
BH CV ECHO MEAS - RV V1 VTI: 14 CM
BH CV ECHO MEAS - RV V1 VTI: 21.3 CM
BH CV ECHO MEAS - RVSP: 39.6 MMHG
BH CV ECHO MEAS - SI(MOD-SP2): 30.3 ML/M2
BH CV ECHO MEAS - SI(MOD-SP2): 31.3 ML/M2
BH CV ECHO MEAS - SI(MOD-SP4): 30.8 ML/M2
BH CV ECHO MEAS - SI(MOD-SP4): 44.6 ML/M2
BH CV ECHO MEAS - SV(LVOT): 64.5 ML
BH CV ECHO MEAS - SV(LVOT): 74.4 ML
BH CV ECHO MEAS - SV(MOD-SP2): 64 ML
BH CV ECHO MEAS - SV(MOD-SP2): 64 ML
BH CV ECHO MEAS - SV(MOD-SP4): 65 ML
BH CV ECHO MEAS - SV(MOD-SP4): 91 ML
BH CV ECHO MEAS - TAPSE (>1.6): 2.42 CM
BH CV ECHO MEAS - TAPSE (>1.6): 2.9 CM
BH CV ECHO MEAS - TR MAX PG: 31.9 MMHG
BH CV ECHO MEAS - TR MAX PG: 36.6 MMHG
BH CV ECHO MEAS - TR MAX VEL: 282.3 CM/SEC
BH CV ECHO MEAS - TR MAX VEL: 302.7 CM/SEC
BH CV ECHO MEASUREMENTS AVERAGE E/E' RATIO: 14.78
BH CV ECHO MEASUREMENTS AVERAGE E/E' RATIO: 15.48
BH CV LOWER VASCULAR LEFT COMMON FEMORAL AUGMENT: NORMAL
BH CV LOWER VASCULAR LEFT COMMON FEMORAL COMPETENT: NORMAL
BH CV LOWER VASCULAR LEFT COMMON FEMORAL COMPRESS: NORMAL
BH CV LOWER VASCULAR LEFT COMMON FEMORAL PHASIC: NORMAL
BH CV LOWER VASCULAR LEFT COMMON FEMORAL SPONT: NORMAL
BH CV LOWER VASCULAR LEFT DISTAL FEMORAL COMPRESS: NORMAL
BH CV LOWER VASCULAR LEFT GASTRONEMIUS COMPRESS: NORMAL
BH CV LOWER VASCULAR LEFT GREATER SAPH AK COMPRESS: NORMAL
BH CV LOWER VASCULAR LEFT GREATER SAPH BK COMPRESS: NORMAL
BH CV LOWER VASCULAR LEFT LESSER SAPH COMPRESS: NORMAL
BH CV LOWER VASCULAR LEFT MID FEMORAL AUGMENT: NORMAL
BH CV LOWER VASCULAR LEFT MID FEMORAL COMPETENT: NORMAL
BH CV LOWER VASCULAR LEFT MID FEMORAL COMPRESS: NORMAL
BH CV LOWER VASCULAR LEFT MID FEMORAL PHASIC: NORMAL
BH CV LOWER VASCULAR LEFT MID FEMORAL SPONT: NORMAL
BH CV LOWER VASCULAR LEFT PERONEAL COMPRESS: NORMAL
BH CV LOWER VASCULAR LEFT POPLITEAL AUGMENT: NORMAL
BH CV LOWER VASCULAR LEFT POPLITEAL COMPETENT: NORMAL
BH CV LOWER VASCULAR LEFT POPLITEAL COMPRESS: NORMAL
BH CV LOWER VASCULAR LEFT POPLITEAL PHASIC: NORMAL
BH CV LOWER VASCULAR LEFT POPLITEAL SPONT: NORMAL
BH CV LOWER VASCULAR LEFT POSTERIOR TIBIAL COMPRESS: NORMAL
BH CV LOWER VASCULAR LEFT PROFUNDA FEMORAL COMPRESS: NORMAL
BH CV LOWER VASCULAR LEFT PROXIMAL FEMORAL COMPRESS: NORMAL
BH CV LOWER VASCULAR LEFT SAPHENOFEMORAL JUNCTION COMPRESS: NORMAL
BH CV LOWER VASCULAR LEFT SOLEAL COMPRESS: NORMAL
BH CV LOWER VASCULAR RIGHT COMMON FEMORAL AUGMENT: NORMAL
BH CV LOWER VASCULAR RIGHT COMMON FEMORAL COMPETENT: NORMAL
BH CV LOWER VASCULAR RIGHT COMMON FEMORAL COMPRESS: NORMAL
BH CV LOWER VASCULAR RIGHT COMMON FEMORAL PHASIC: NORMAL
BH CV LOWER VASCULAR RIGHT COMMON FEMORAL SPONT: NORMAL
BH CV LOWER VASCULAR RIGHT DISTAL FEMORAL COMPRESS: NORMAL
BH CV LOWER VASCULAR RIGHT GASTRONEMIUS COMPRESS: NORMAL
BH CV LOWER VASCULAR RIGHT GREATER SAPH AK COMPRESS: NORMAL
BH CV LOWER VASCULAR RIGHT GREATER SAPH BK COMPRESS: NORMAL
BH CV LOWER VASCULAR RIGHT LESSER SAPH COMPRESS: NORMAL
BH CV LOWER VASCULAR RIGHT MID FEMORAL AUGMENT: NORMAL
BH CV LOWER VASCULAR RIGHT MID FEMORAL COMPETENT: NORMAL
BH CV LOWER VASCULAR RIGHT MID FEMORAL COMPRESS: NORMAL
BH CV LOWER VASCULAR RIGHT MID FEMORAL PHASIC: NORMAL
BH CV LOWER VASCULAR RIGHT MID FEMORAL SPONT: NORMAL
BH CV LOWER VASCULAR RIGHT PERONEAL COMPRESS: NORMAL
BH CV LOWER VASCULAR RIGHT POPLITEAL AUGMENT: NORMAL
BH CV LOWER VASCULAR RIGHT POPLITEAL COMPETENT: NORMAL
BH CV LOWER VASCULAR RIGHT POPLITEAL COMPRESS: NORMAL
BH CV LOWER VASCULAR RIGHT POPLITEAL PHASIC: NORMAL
BH CV LOWER VASCULAR RIGHT POPLITEAL SPONT: NORMAL
BH CV LOWER VASCULAR RIGHT POSTERIOR TIBIAL COMPRESS: NORMAL
BH CV LOWER VASCULAR RIGHT PROFUNDA FEMORAL COMPRESS: NORMAL
BH CV LOWER VASCULAR RIGHT PROXIMAL FEMORAL COMPRESS: NORMAL
BH CV LOWER VASCULAR RIGHT SAPHENOFEMORAL JUNCTION COMPRESS: NORMAL
BH CV LOWER VASCULAR RIGHT SOLEAL COMPRESS: NORMAL
BH CV REST NUCLEAR ISOTOPE DOSE: 10.4 MCI
BH CV STRESS COMMENTS STAGE 1: NORMAL
BH CV STRESS DOSE REGADENOSON STAGE 1: 0.4
BH CV STRESS DURATION MIN STAGE 1: 0
BH CV STRESS DURATION SEC STAGE 1: 10
BH CV STRESS NUCLEAR ISOTOPE DOSE: 34.3 MCI
BH CV STRESS PROTOCOL 1: NORMAL
BH CV STRESS RECOVERY BP: NORMAL MMHG
BH CV STRESS RECOVERY HR: 71 BPM
BH CV STRESS STAGE 1: 1
BH CV XLRA - RV BASE: 3.1 CM
BH CV XLRA - RV BASE: 3.2 CM
BH CV XLRA - RV LENGTH: 5.7 CM
BH CV XLRA - RV LENGTH: 7.3 CM
BH CV XLRA - RV MID: 2.7 CM
BH CV XLRA - RV MID: 2.7 CM
BH CV XLRA - TDI S': 11.9 CM/SEC
BH CV XLRA - TDI S': 12.1 CM/SEC
BILIRUB CONJ SERPL-MCNC: 0.2 MG/DL (ref 0–0.3)
BILIRUB CONJ SERPL-MCNC: <0.2 MG/DL (ref 0–0.3)
BILIRUB CONJ SERPL-MCNC: <0.2 MG/DL (ref 0–0.3)
BILIRUB INDIRECT SERPL-MCNC: 0.8 MG/DL
BILIRUB INDIRECT SERPL-MCNC: ABNORMAL MG/DL
BILIRUB INDIRECT SERPL-MCNC: ABNORMAL MG/DL
BILIRUB SERPL-MCNC: 0.5 MG/DL (ref 0–1.2)
BILIRUB SERPL-MCNC: 0.6 MG/DL (ref 0–1.2)
BILIRUB SERPL-MCNC: 0.6 MG/DL (ref 0–1.2)
BILIRUB SERPL-MCNC: 0.7 MG/DL (ref 0–1.2)
BILIRUB SERPL-MCNC: 0.7 MG/DL (ref 0–1.2)
BILIRUB SERPL-MCNC: 0.8 MG/DL (ref 0–1.2)
BILIRUB SERPL-MCNC: 0.9 MG/DL (ref 0–1.2)
BILIRUB SERPL-MCNC: 1 MG/DL (ref 0–1.2)
BILIRUB SERPL-MCNC: 1 MG/DL (ref 0–1.2)
BILIRUB UR QL STRIP: NEGATIVE
BILIRUB UR QL STRIP: NEGATIVE
BLD GP AB SCN SERPL QL: NEGATIVE
BUN SERPL-MCNC: 17 MG/DL (ref 8–23)
BUN SERPL-MCNC: 18 MG/DL (ref 8–23)
BUN SERPL-MCNC: 19 MG/DL (ref 8–23)
BUN SERPL-MCNC: 20 MG/DL (ref 8–23)
BUN SERPL-MCNC: 21 MG/DL (ref 8–23)
BUN SERPL-MCNC: 22 MG/DL (ref 8–23)
BUN SERPL-MCNC: 23 MG/DL (ref 8–23)
BUN SERPL-MCNC: 24 MG/DL (ref 8–23)
BUN SERPL-MCNC: 26 MG/DL (ref 8–23)
BUN SERPL-MCNC: 31 MG/DL (ref 8–23)
BUN SERPL-MCNC: 32 MG/DL (ref 8–23)
BUN SERPL-MCNC: 33 MG/DL (ref 8–23)
BUN SERPL-MCNC: 38 MG/DL (ref 8–23)
BUN SERPL-MCNC: 42 MG/DL (ref 8–23)
BUN SERPL-MCNC: 61 MG/DL (ref 8–23)
BUN/CREAT SERPL: 13.6 (ref 7–25)
BUN/CREAT SERPL: 15.4 (ref 7–25)
BUN/CREAT SERPL: 15.9 (ref 7–25)
BUN/CREAT SERPL: 16.1 (ref 7–25)
BUN/CREAT SERPL: 16.5 (ref 7–25)
BUN/CREAT SERPL: 17.3 (ref 7–25)
BUN/CREAT SERPL: 17.8 (ref 7–25)
BUN/CREAT SERPL: 18 (ref 7–25)
BUN/CREAT SERPL: 18.6 (ref 7–25)
BUN/CREAT SERPL: 19 (ref 7–25)
BUN/CREAT SERPL: 19.5 (ref 7–25)
BUN/CREAT SERPL: 19.8 (ref 7–25)
BUN/CREAT SERPL: 20 (ref 7–25)
BUN/CREAT SERPL: 21.2 (ref 7–25)
BUN/CREAT SERPL: 22.3 (ref 7–25)
BUN/CREAT SERPL: 23.7 (ref 7–25)
BUN/CREAT SERPL: 27.5 (ref 7–25)
BUN/CREAT SERPL: 28.1 (ref 7–25)
BUN/CREAT SERPL: 37 (ref 7–25)
C PNEUM DNA NPH QL NAA+NON-PROBE: NOT DETECTED
CALCIUM SPEC-SCNC: 10 MG/DL (ref 8.6–10.5)
CALCIUM SPEC-SCNC: 10.1 MG/DL (ref 8.6–10.5)
CALCIUM SPEC-SCNC: 10.2 MG/DL (ref 8.6–10.5)
CALCIUM SPEC-SCNC: 8.4 MG/DL (ref 8.6–10.5)
CALCIUM SPEC-SCNC: 8.5 MG/DL (ref 8.6–10.5)
CALCIUM SPEC-SCNC: 8.5 MG/DL (ref 8.6–10.5)
CALCIUM SPEC-SCNC: 8.7 MG/DL (ref 8.6–10.5)
CALCIUM SPEC-SCNC: 8.8 MG/DL (ref 8.6–10.5)
CALCIUM SPEC-SCNC: 8.9 MG/DL (ref 8.6–10.5)
CALCIUM SPEC-SCNC: 9.1 MG/DL (ref 8.6–10.5)
CALCIUM SPEC-SCNC: 9.1 MG/DL (ref 8.6–10.5)
CALCIUM SPEC-SCNC: 9.2 MG/DL (ref 8.6–10.5)
CALCIUM SPEC-SCNC: 9.3 MG/DL (ref 8.6–10.5)
CALCIUM SPEC-SCNC: 9.3 MG/DL (ref 8.6–10.5)
CALCIUM SPEC-SCNC: 9.4 MG/DL (ref 8.6–10.5)
CALCIUM SPEC-SCNC: 9.4 MG/DL (ref 8.6–10.5)
CALCIUM SPEC-SCNC: 9.5 MG/DL (ref 8.6–10.5)
CALCIUM SPEC-SCNC: 9.5 MG/DL (ref 8.6–10.5)
CALCIUM SPEC-SCNC: 9.8 MG/DL (ref 8.6–10.5)
CHLORIDE SERPL-SCNC: 101 MMOL/L (ref 98–107)
CHLORIDE SERPL-SCNC: 102 MMOL/L (ref 98–107)
CHLORIDE SERPL-SCNC: 103 MMOL/L (ref 98–107)
CHLORIDE SERPL-SCNC: 103 MMOL/L (ref 98–107)
CHLORIDE SERPL-SCNC: 104 MMOL/L (ref 98–107)
CHLORIDE SERPL-SCNC: 104 MMOL/L (ref 98–107)
CHLORIDE SERPL-SCNC: 105 MMOL/L (ref 98–107)
CHLORIDE SERPL-SCNC: 106 MMOL/L (ref 98–107)
CHLORIDE SERPL-SCNC: 107 MMOL/L (ref 98–107)
CHLORIDE SERPL-SCNC: 108 MMOL/L (ref 98–107)
CHLORIDE SERPL-SCNC: 108 MMOL/L (ref 98–107)
CHLORIDE SERPL-SCNC: 109 MMOL/L (ref 98–107)
CHLORIDE SERPL-SCNC: 114 MMOL/L (ref 98–107)
CHOLEST SERPL-MCNC: 98 MG/DL (ref 0–200)
CLARITY UR: CLEAR
CLARITY UR: CLEAR
CO2 SERPL-SCNC: 17 MMOL/L (ref 22–29)
CO2 SERPL-SCNC: 18.6 MMOL/L (ref 22–29)
CO2 SERPL-SCNC: 19 MMOL/L (ref 22–29)
CO2 SERPL-SCNC: 19.7 MMOL/L (ref 22–29)
CO2 SERPL-SCNC: 20 MMOL/L (ref 22–29)
CO2 SERPL-SCNC: 20.1 MMOL/L (ref 22–29)
CO2 SERPL-SCNC: 20.5 MMOL/L (ref 22–29)
CO2 SERPL-SCNC: 20.8 MMOL/L (ref 22–29)
CO2 SERPL-SCNC: 21 MMOL/L (ref 22–29)
CO2 SERPL-SCNC: 21.2 MMOL/L (ref 22–29)
CO2 SERPL-SCNC: 21.3 MMOL/L (ref 22–29)
CO2 SERPL-SCNC: 22 MMOL/L (ref 22–29)
CO2 SERPL-SCNC: 22 MMOL/L (ref 22–29)
CO2 SERPL-SCNC: 22.2 MMOL/L (ref 22–29)
CO2 SERPL-SCNC: 22.9 MMOL/L (ref 22–29)
CO2 SERPL-SCNC: 23.8 MMOL/L (ref 22–29)
CO2 SERPL-SCNC: 23.8 MMOL/L (ref 22–29)
CO2 SERPL-SCNC: 24.5 MMOL/L (ref 22–29)
CO2 SERPL-SCNC: 26.2 MMOL/L (ref 22–29)
COLOR UR: YELLOW
COLOR UR: YELLOW
CREAT BLDA-MCNC: 1.2 MG/DL (ref 0.6–1.3)
CREAT SERPL-MCNC: 1 MG/DL (ref 0.76–1.27)
CREAT SERPL-MCNC: 1.12 MG/DL (ref 0.76–1.27)
CREAT SERPL-MCNC: 1.12 MG/DL (ref 0.76–1.27)
CREAT SERPL-MCNC: 1.13 MG/DL (ref 0.76–1.27)
CREAT SERPL-MCNC: 1.13 MG/DL (ref 0.76–1.27)
CREAT SERPL-MCNC: 1.14 MG/DL (ref 0.76–1.27)
CREAT SERPL-MCNC: 1.15 MG/DL (ref 0.76–1.27)
CREAT SERPL-MCNC: 1.18 MG/DL (ref 0.76–1.27)
CREAT SERPL-MCNC: 1.18 MG/DL (ref 0.76–1.27)
CREAT SERPL-MCNC: 1.21 MG/DL (ref 0.76–1.27)
CREAT SERPL-MCNC: 1.23 MG/DL (ref 0.76–1.27)
CREAT SERPL-MCNC: 1.25 MG/DL (ref 0.76–1.27)
CREAT SERPL-MCNC: 1.26 MG/DL (ref 0.76–1.27)
CREAT SERPL-MCNC: 1.27 MG/DL (ref 0.76–1.27)
CREAT SERPL-MCNC: 1.31 MG/DL (ref 0.76–1.27)
CREAT SERPL-MCNC: 1.38 MG/DL (ref 0.76–1.27)
CREAT SERPL-MCNC: 1.39 MG/DL (ref 0.76–1.27)
CREAT SERPL-MCNC: 1.46 MG/DL (ref 0.76–1.27)
CREAT SERPL-MCNC: 1.65 MG/DL (ref 0.76–1.27)
CREAT SERPL-MCNC: 1.88 MG/DL (ref 0.76–1.27)
CREAT UR-MCNC: 143.7 MG/DL
CRP SERPL-MCNC: 5.37 MG/DL (ref 0–0.5)
D DIMER PPP FEU-MCNC: 0.68 MCGFEU/ML (ref 0–0.49)
D-LACTATE SERPL-SCNC: 1.5 MMOL/L (ref 0.5–2)
D-LACTATE SERPL-SCNC: 2.2 MMOL/L (ref 0.5–2)
DEPRECATED RDW RBC AUTO: 42.2 FL (ref 37–54)
DEPRECATED RDW RBC AUTO: 42.8 FL (ref 37–54)
DEPRECATED RDW RBC AUTO: 42.9 FL (ref 37–54)
DEPRECATED RDW RBC AUTO: 43.4 FL (ref 37–54)
DEPRECATED RDW RBC AUTO: 43.7 FL (ref 37–54)
DEPRECATED RDW RBC AUTO: 43.8 FL (ref 37–54)
DEPRECATED RDW RBC AUTO: 44.3 FL (ref 37–54)
DEPRECATED RDW RBC AUTO: 44.5 FL (ref 37–54)
DEPRECATED RDW RBC AUTO: 45 FL (ref 37–54)
DEPRECATED RDW RBC AUTO: 45.2 FL (ref 37–54)
DEPRECATED RDW RBC AUTO: 45.8 FL (ref 37–54)
DEPRECATED RDW RBC AUTO: 47.1 FL (ref 37–54)
DEPRECATED RDW RBC AUTO: 47.6 FL (ref 37–54)
DEPRECATED RDW RBC AUTO: 48.3 FL (ref 37–54)
EGFRCR SERPLBLD CKD-EPI 2021: 35.2 ML/MIN/1.73
EGFRCR SERPLBLD CKD-EPI 2021: 41.2 ML/MIN/1.73
EGFRCR SERPLBLD CKD-EPI 2021: 47.7 ML/MIN/1.73
EGFRCR SERPLBLD CKD-EPI 2021: 50.6 ML/MIN/1.73
EGFRCR SERPLBLD CKD-EPI 2021: 51.1 ML/MIN/1.73
EGFRCR SERPLBLD CKD-EPI 2021: 54.3 ML/MIN/1.73
EGFRCR SERPLBLD CKD-EPI 2021: 56.4 ML/MIN/1.73
EGFRCR SERPLBLD CKD-EPI 2021: 56.9 ML/MIN/1.73
EGFRCR SERPLBLD CKD-EPI 2021: 57.5 ML/MIN/1.73
EGFRCR SERPLBLD CKD-EPI 2021: 58.6 ML/MIN/1.73
EGFRCR SERPLBLD CKD-EPI 2021: 59.8 ML/MIN/1.73
EGFRCR SERPLBLD CKD-EPI 2021: 61.6 ML/MIN/1.73
EGFRCR SERPLBLD CKD-EPI 2021: 61.6 ML/MIN/1.73
EGFRCR SERPLBLD CKD-EPI 2021: 63.5 ML/MIN/1.73
EGFRCR SERPLBLD CKD-EPI 2021: 64.2 ML/MIN/1.73
EGFRCR SERPLBLD CKD-EPI 2021: 64.9 ML/MIN/1.73
EGFRCR SERPLBLD CKD-EPI 2021: 64.9 ML/MIN/1.73
EGFRCR SERPLBLD CKD-EPI 2021: 65.6 ML/MIN/1.73
EGFRCR SERPLBLD CKD-EPI 2021: 65.6 ML/MIN/1.73
EGFRCR SERPLBLD CKD-EPI 2021: 75.1 ML/MIN/1.73
EOSINOPHIL # BLD AUTO: 0.18 10*3/MM3 (ref 0–0.4)
EOSINOPHIL # BLD AUTO: 0.21 10*3/MM3 (ref 0–0.4)
EOSINOPHIL # BLD AUTO: 0.22 10*3/MM3 (ref 0–0.4)
EOSINOPHIL # BLD AUTO: 0.23 10*3/MM3 (ref 0–0.4)
EOSINOPHIL # BLD AUTO: 0.26 10*3/MM3 (ref 0–0.4)
EOSINOPHIL # BLD AUTO: 0.28 10*3/MM3 (ref 0–0.4)
EOSINOPHIL NFR BLD AUTO: 1.8 % (ref 0.3–6.2)
EOSINOPHIL NFR BLD AUTO: 2.7 % (ref 0.3–6.2)
EOSINOPHIL NFR BLD AUTO: 2.8 % (ref 0.3–6.2)
EOSINOPHIL NFR BLD AUTO: 2.9 % (ref 0.3–6.2)
EOSINOPHIL NFR BLD AUTO: 3.7 % (ref 0.3–6.2)
EOSINOPHIL NFR BLD AUTO: 3.7 % (ref 0.3–6.2)
ERYTHROCYTE [DISTWIDTH] IN BLOOD BY AUTOMATED COUNT: 12.4 % (ref 12.3–15.4)
ERYTHROCYTE [DISTWIDTH] IN BLOOD BY AUTOMATED COUNT: 12.9 % (ref 12.3–15.4)
ERYTHROCYTE [DISTWIDTH] IN BLOOD BY AUTOMATED COUNT: 13 % (ref 12.3–15.4)
ERYTHROCYTE [DISTWIDTH] IN BLOOD BY AUTOMATED COUNT: 13.1 % (ref 12.3–15.4)
ERYTHROCYTE [DISTWIDTH] IN BLOOD BY AUTOMATED COUNT: 13.2 % (ref 12.3–15.4)
ERYTHROCYTE [DISTWIDTH] IN BLOOD BY AUTOMATED COUNT: 13.5 % (ref 12.3–15.4)
ERYTHROCYTE [DISTWIDTH] IN BLOOD BY AUTOMATED COUNT: 13.6 % (ref 12.3–15.4)
ERYTHROCYTE [DISTWIDTH] IN BLOOD BY AUTOMATED COUNT: 13.6 % (ref 12.3–15.4)
ERYTHROCYTE [DISTWIDTH] IN BLOOD BY AUTOMATED COUNT: 13.7 % (ref 12.3–15.4)
FERRITIN SERPL-MCNC: 177 NG/ML (ref 30–400)
FLUAV H1 2009 PAND RNA NPH QL NAA+PROBE: NOT DETECTED
FLUAV H1 HA GENE NPH QL NAA+PROBE: NOT DETECTED
FLUAV H3 RNA NPH QL NAA+PROBE: NOT DETECTED
FLUAV SUBTYP SPEC NAA+PROBE: NOT DETECTED
FLUBV RNA ISLT QL NAA+PROBE: NOT DETECTED
GLOBULIN UR ELPH-MCNC: 2.7 GM/DL
GLOBULIN UR ELPH-MCNC: 2.8 GM/DL
GLOBULIN UR ELPH-MCNC: 2.8 GM/DL
GLOBULIN UR ELPH-MCNC: 2.9 GM/DL
GLOBULIN UR ELPH-MCNC: 2.9 GM/DL
GLOBULIN UR ELPH-MCNC: 3 GM/DL
GLOBULIN UR ELPH-MCNC: 3.2 GM/DL
GLUCOSE BLDC GLUCOMTR-MCNC: 131 MG/DL (ref 70–130)
GLUCOSE BLDC GLUCOMTR-MCNC: 132 MG/DL (ref 70–130)
GLUCOSE BLDC GLUCOMTR-MCNC: 143 MG/DL (ref 70–130)
GLUCOSE BLDC GLUCOMTR-MCNC: 146 MG/DL (ref 70–130)
GLUCOSE BLDC GLUCOMTR-MCNC: 146 MG/DL (ref 70–130)
GLUCOSE BLDC GLUCOMTR-MCNC: 148 MG/DL (ref 70–130)
GLUCOSE BLDC GLUCOMTR-MCNC: 151 MG/DL (ref 70–130)
GLUCOSE BLDC GLUCOMTR-MCNC: 151 MG/DL (ref 70–130)
GLUCOSE BLDC GLUCOMTR-MCNC: 152 MG/DL (ref 70–130)
GLUCOSE BLDC GLUCOMTR-MCNC: 153 MG/DL (ref 70–130)
GLUCOSE BLDC GLUCOMTR-MCNC: 154 MG/DL (ref 70–130)
GLUCOSE BLDC GLUCOMTR-MCNC: 155 MG/DL (ref 70–130)
GLUCOSE BLDC GLUCOMTR-MCNC: 157 MG/DL (ref 70–130)
GLUCOSE BLDC GLUCOMTR-MCNC: 159 MG/DL (ref 70–130)
GLUCOSE BLDC GLUCOMTR-MCNC: 159 MG/DL (ref 70–130)
GLUCOSE BLDC GLUCOMTR-MCNC: 160 MG/DL (ref 70–130)
GLUCOSE BLDC GLUCOMTR-MCNC: 163 MG/DL (ref 70–130)
GLUCOSE BLDC GLUCOMTR-MCNC: 163 MG/DL (ref 70–130)
GLUCOSE BLDC GLUCOMTR-MCNC: 169 MG/DL (ref 70–130)
GLUCOSE BLDC GLUCOMTR-MCNC: 169 MG/DL (ref 70–130)
GLUCOSE BLDC GLUCOMTR-MCNC: 170 MG/DL (ref 70–130)
GLUCOSE BLDC GLUCOMTR-MCNC: 170 MG/DL (ref 70–130)
GLUCOSE BLDC GLUCOMTR-MCNC: 171 MG/DL (ref 70–130)
GLUCOSE BLDC GLUCOMTR-MCNC: 174 MG/DL (ref 70–130)
GLUCOSE BLDC GLUCOMTR-MCNC: 179 MG/DL (ref 70–130)
GLUCOSE BLDC GLUCOMTR-MCNC: 180 MG/DL (ref 70–130)
GLUCOSE BLDC GLUCOMTR-MCNC: 180 MG/DL (ref 70–130)
GLUCOSE BLDC GLUCOMTR-MCNC: 181 MG/DL (ref 70–130)
GLUCOSE BLDC GLUCOMTR-MCNC: 186 MG/DL (ref 70–130)
GLUCOSE BLDC GLUCOMTR-MCNC: 187 MG/DL (ref 70–130)
GLUCOSE BLDC GLUCOMTR-MCNC: 191 MG/DL (ref 70–130)
GLUCOSE BLDC GLUCOMTR-MCNC: 197 MG/DL (ref 70–130)
GLUCOSE BLDC GLUCOMTR-MCNC: 198 MG/DL (ref 70–130)
GLUCOSE BLDC GLUCOMTR-MCNC: 198 MG/DL (ref 70–130)
GLUCOSE BLDC GLUCOMTR-MCNC: 199 MG/DL (ref 70–130)
GLUCOSE BLDC GLUCOMTR-MCNC: 200 MG/DL (ref 70–130)
GLUCOSE BLDC GLUCOMTR-MCNC: 209 MG/DL (ref 70–130)
GLUCOSE BLDC GLUCOMTR-MCNC: 210 MG/DL (ref 70–130)
GLUCOSE BLDC GLUCOMTR-MCNC: 220 MG/DL (ref 70–130)
GLUCOSE BLDC GLUCOMTR-MCNC: 232 MG/DL (ref 70–130)
GLUCOSE BLDC GLUCOMTR-MCNC: 248 MG/DL (ref 70–130)
GLUCOSE BLDC GLUCOMTR-MCNC: 251 MG/DL (ref 70–130)
GLUCOSE BLDC GLUCOMTR-MCNC: 253 MG/DL (ref 70–130)
GLUCOSE BLDC GLUCOMTR-MCNC: 253 MG/DL (ref 70–130)
GLUCOSE BLDC GLUCOMTR-MCNC: 283 MG/DL (ref 70–130)
GLUCOSE BLDC GLUCOMTR-MCNC: 284 MG/DL (ref 70–130)
GLUCOSE BLDC GLUCOMTR-MCNC: 298 MG/DL (ref 70–130)
GLUCOSE SERPL-MCNC: 139 MG/DL (ref 65–99)
GLUCOSE SERPL-MCNC: 146 MG/DL (ref 65–99)
GLUCOSE SERPL-MCNC: 151 MG/DL (ref 65–99)
GLUCOSE SERPL-MCNC: 155 MG/DL (ref 65–99)
GLUCOSE SERPL-MCNC: 157 MG/DL (ref 65–99)
GLUCOSE SERPL-MCNC: 158 MG/DL (ref 65–99)
GLUCOSE SERPL-MCNC: 160 MG/DL (ref 65–99)
GLUCOSE SERPL-MCNC: 162 MG/DL (ref 65–99)
GLUCOSE SERPL-MCNC: 169 MG/DL (ref 65–99)
GLUCOSE SERPL-MCNC: 173 MG/DL (ref 65–99)
GLUCOSE SERPL-MCNC: 176 MG/DL (ref 65–99)
GLUCOSE SERPL-MCNC: 185 MG/DL (ref 65–99)
GLUCOSE SERPL-MCNC: 186 MG/DL (ref 65–99)
GLUCOSE SERPL-MCNC: 193 MG/DL (ref 65–99)
GLUCOSE SERPL-MCNC: 195 MG/DL (ref 65–99)
GLUCOSE SERPL-MCNC: 199 MG/DL (ref 65–99)
GLUCOSE SERPL-MCNC: 248 MG/DL (ref 65–99)
GLUCOSE SERPL-MCNC: 362 MG/DL (ref 65–99)
GLUCOSE SERPL-MCNC: 381 MG/DL (ref 65–99)
GLUCOSE UR STRIP-MCNC: NEGATIVE MG/DL
GLUCOSE UR STRIP-MCNC: NEGATIVE MG/DL
HADV DNA SPEC NAA+PROBE: NOT DETECTED
HBA1C MFR BLD: 7.4 % (ref 4.8–5.6)
HBA1C MFR BLD: 7.6 % (ref 4.8–5.6)
HCOV 229E RNA SPEC QL NAA+PROBE: NOT DETECTED
HCOV HKU1 RNA SPEC QL NAA+PROBE: NOT DETECTED
HCOV NL63 RNA SPEC QL NAA+PROBE: NOT DETECTED
HCOV OC43 RNA SPEC QL NAA+PROBE: NOT DETECTED
HCT VFR BLD AUTO: 33.2 % (ref 37.5–51)
HCT VFR BLD AUTO: 33.5 % (ref 37.5–51)
HCT VFR BLD AUTO: 33.7 % (ref 37.5–51)
HCT VFR BLD AUTO: 33.7 % (ref 37.5–51)
HCT VFR BLD AUTO: 33.8 % (ref 37.5–51)
HCT VFR BLD AUTO: 34.4 % (ref 37.5–51)
HCT VFR BLD AUTO: 34.5 % (ref 37.5–51)
HCT VFR BLD AUTO: 34.7 % (ref 37.5–51)
HCT VFR BLD AUTO: 36.5 % (ref 37.5–51)
HCT VFR BLD AUTO: 36.7 % (ref 37.5–51)
HCT VFR BLD AUTO: 37 % (ref 37.5–51)
HCT VFR BLD AUTO: 37.3 % (ref 37.5–51)
HCT VFR BLD AUTO: 38.7 % (ref 37.5–51)
HCT VFR BLD AUTO: 39.1 % (ref 37.5–51)
HDLC SERPL-MCNC: 34 MG/DL (ref 40–60)
HGB BLD-MCNC: 11 G/DL (ref 13–17.7)
HGB BLD-MCNC: 11.2 G/DL (ref 13–17.7)
HGB BLD-MCNC: 11.2 G/DL (ref 13–17.7)
HGB BLD-MCNC: 11.3 G/DL (ref 13–17.7)
HGB BLD-MCNC: 11.3 G/DL (ref 13–17.7)
HGB BLD-MCNC: 11.6 G/DL (ref 13–17.7)
HGB BLD-MCNC: 11.8 G/DL (ref 13–17.7)
HGB BLD-MCNC: 11.9 G/DL (ref 13–17.7)
HGB BLD-MCNC: 12.2 G/DL (ref 13–17.7)
HGB BLD-MCNC: 12.2 G/DL (ref 13–17.7)
HGB BLD-MCNC: 12.4 G/DL (ref 13–17.7)
HGB BLD-MCNC: 12.4 G/DL (ref 13–17.7)
HGB BLD-MCNC: 13 G/DL (ref 13–17.7)
HGB BLD-MCNC: 13.1 G/DL (ref 13–17.7)
HGB UR QL STRIP.AUTO: NEGATIVE
HGB UR QL STRIP.AUTO: NEGATIVE
HMPV RNA NPH QL NAA+NON-PROBE: NOT DETECTED
HOLD SPECIMEN: NORMAL
HPIV1 RNA ISLT QL NAA+PROBE: NOT DETECTED
HPIV2 RNA SPEC QL NAA+PROBE: NOT DETECTED
HPIV3 RNA NPH QL NAA+PROBE: NOT DETECTED
HPIV4 P GENE NPH QL NAA+PROBE: NOT DETECTED
HYALINE CASTS UR QL AUTO: NORMAL /LPF
IMM GRANULOCYTES # BLD AUTO: 0.01 10*3/MM3 (ref 0–0.05)
IMM GRANULOCYTES # BLD AUTO: 0.02 10*3/MM3 (ref 0–0.05)
IMM GRANULOCYTES # BLD AUTO: 0.04 10*3/MM3 (ref 0–0.05)
IMM GRANULOCYTES NFR BLD AUTO: 0.1 % (ref 0–0.5)
IMM GRANULOCYTES NFR BLD AUTO: 0.2 % (ref 0–0.5)
IMM GRANULOCYTES NFR BLD AUTO: 0.3 % (ref 0–0.5)
IMM GRANULOCYTES NFR BLD AUTO: 0.3 % (ref 0–0.5)
IMM GRANULOCYTES NFR BLD AUTO: 0.4 % (ref 0–0.5)
INR PPP: 0.98 (ref 0.9–1.1)
INR PPP: 1.04 (ref 0.9–1.1)
KETONES UR QL STRIP: NEGATIVE
KETONES UR QL STRIP: NEGATIVE
LDH SERPL-CCNC: 158 U/L (ref 135–225)
LDLC SERPL CALC-MCNC: 38 MG/DL (ref 0–100)
LDLC/HDLC SERPL: 0.98 {RATIO}
LEFT ATRIUM VOLUME INDEX: 31.9 ML/M2
LEFT ATRIUM VOLUME INDEX: 37 ML/M2
LEUKOCYTE ESTERASE UR QL STRIP.AUTO: NEGATIVE
LEUKOCYTE ESTERASE UR QL STRIP.AUTO: NEGATIVE
LV EF 2D ECHO EST: 55 %
LV EF NUC BP: 42 %
LYMPHOCYTES # BLD AUTO: 1.44 10*3/MM3 (ref 0.7–3.1)
LYMPHOCYTES # BLD AUTO: 1.44 10*3/MM3 (ref 0.7–3.1)
LYMPHOCYTES # BLD AUTO: 1.54 10*3/MM3 (ref 0.7–3.1)
LYMPHOCYTES # BLD AUTO: 1.7 10*3/MM3 (ref 0.7–3.1)
LYMPHOCYTES # BLD AUTO: 1.71 10*3/MM3 (ref 0.7–3.1)
LYMPHOCYTES # BLD AUTO: 1.91 10*3/MM3 (ref 0.7–3.1)
LYMPHOCYTES NFR BLD AUTO: 16.8 % (ref 19.6–45.3)
LYMPHOCYTES NFR BLD AUTO: 17.5 % (ref 19.6–45.3)
LYMPHOCYTES NFR BLD AUTO: 19 % (ref 19.6–45.3)
LYMPHOCYTES NFR BLD AUTO: 19.9 % (ref 19.6–45.3)
LYMPHOCYTES NFR BLD AUTO: 24.4 % (ref 19.6–45.3)
LYMPHOCYTES NFR BLD AUTO: 25 % (ref 19.6–45.3)
M PNEUMO IGG SER IA-ACNC: NOT DETECTED
MAGNESIUM SERPL-MCNC: 1.1 MG/DL (ref 1.6–2.4)
MAGNESIUM SERPL-MCNC: 1.6 MG/DL (ref 1.6–2.4)
MAGNESIUM SERPL-MCNC: 1.7 MG/DL (ref 1.6–2.4)
MAGNESIUM SERPL-MCNC: 2.1 MG/DL (ref 1.6–2.4)
MAXIMAL PREDICTED HEART RATE: 138 BPM
MCH RBC QN AUTO: 30.7 PG (ref 26.6–33)
MCH RBC QN AUTO: 30.9 PG (ref 26.6–33)
MCH RBC QN AUTO: 31.2 PG (ref 26.6–33)
MCH RBC QN AUTO: 31.3 PG (ref 26.6–33)
MCH RBC QN AUTO: 31.4 PG (ref 26.6–33)
MCH RBC QN AUTO: 31.5 PG (ref 26.6–33)
MCH RBC QN AUTO: 31.6 PG (ref 26.6–33)
MCH RBC QN AUTO: 31.6 PG (ref 26.6–33)
MCH RBC QN AUTO: 31.7 PG (ref 26.6–33)
MCH RBC QN AUTO: 31.7 PG (ref 26.6–33)
MCH RBC QN AUTO: 31.9 PG (ref 26.6–33)
MCH RBC QN AUTO: 32.2 PG (ref 26.6–33)
MCHC RBC AUTO-ENTMCNC: 32.6 G/DL (ref 31.5–35.7)
MCHC RBC AUTO-ENTMCNC: 32.7 G/DL (ref 31.5–35.7)
MCHC RBC AUTO-ENTMCNC: 32.8 G/DL (ref 31.5–35.7)
MCHC RBC AUTO-ENTMCNC: 33 G/DL (ref 31.5–35.7)
MCHC RBC AUTO-ENTMCNC: 33.4 G/DL (ref 31.5–35.7)
MCHC RBC AUTO-ENTMCNC: 33.5 G/DL (ref 31.5–35.7)
MCHC RBC AUTO-ENTMCNC: 33.6 G/DL (ref 31.5–35.7)
MCHC RBC AUTO-ENTMCNC: 33.7 G/DL (ref 31.5–35.7)
MCHC RBC AUTO-ENTMCNC: 33.8 G/DL (ref 31.5–35.7)
MCHC RBC AUTO-ENTMCNC: 34 G/DL (ref 31.5–35.7)
MCHC RBC AUTO-ENTMCNC: 34.5 G/DL (ref 31.5–35.7)
MCHC RBC AUTO-ENTMCNC: 34.9 G/DL (ref 31.5–35.7)
MCV RBC AUTO: 91.5 FL (ref 79–97)
MCV RBC AUTO: 91.6 FL (ref 79–97)
MCV RBC AUTO: 91.7 FL (ref 79–97)
MCV RBC AUTO: 92.1 FL (ref 79–97)
MCV RBC AUTO: 93.5 FL (ref 79–97)
MCV RBC AUTO: 93.6 FL (ref 79–97)
MCV RBC AUTO: 93.7 FL (ref 79–97)
MCV RBC AUTO: 93.9 FL (ref 79–97)
MCV RBC AUTO: 94.1 FL (ref 79–97)
MCV RBC AUTO: 94.6 FL (ref 79–97)
MCV RBC AUTO: 94.8 FL (ref 79–97)
MCV RBC AUTO: 95.6 FL (ref 79–97)
MCV RBC AUTO: 96 FL (ref 79–97)
MCV RBC AUTO: 96.9 FL (ref 79–97)
MONOCYTES # BLD AUTO: 0.5 10*3/MM3 (ref 0.1–0.9)
MONOCYTES # BLD AUTO: 0.53 10*3/MM3 (ref 0.1–0.9)
MONOCYTES # BLD AUTO: 0.55 10*3/MM3 (ref 0.1–0.9)
MONOCYTES # BLD AUTO: 0.64 10*3/MM3 (ref 0.1–0.9)
MONOCYTES # BLD AUTO: 0.69 10*3/MM3 (ref 0.1–0.9)
MONOCYTES # BLD AUTO: 0.94 10*3/MM3 (ref 0.1–0.9)
MONOCYTES NFR BLD AUTO: 6.2 % (ref 5–12)
MONOCYTES NFR BLD AUTO: 6.4 % (ref 5–12)
MONOCYTES NFR BLD AUTO: 7.6 % (ref 5–12)
MONOCYTES NFR BLD AUTO: 8.4 % (ref 5–12)
MONOCYTES NFR BLD AUTO: 9.2 % (ref 5–12)
MONOCYTES NFR BLD AUTO: 9.9 % (ref 5–12)
NEUTROPHILS NFR BLD AUTO: 4.25 10*3/MM3 (ref 1.7–7)
NEUTROPHILS NFR BLD AUTO: 4.76 10*3/MM3 (ref 1.7–7)
NEUTROPHILS NFR BLD AUTO: 4.99 10*3/MM3 (ref 1.7–7)
NEUTROPHILS NFR BLD AUTO: 5.77 10*3/MM3 (ref 1.7–7)
NEUTROPHILS NFR BLD AUTO: 5.98 10*3/MM3 (ref 1.7–7)
NEUTROPHILS NFR BLD AUTO: 61.1 % (ref 42.7–76)
NEUTROPHILS NFR BLD AUTO: 62.1 % (ref 42.7–76)
NEUTROPHILS NFR BLD AUTO: 68.7 % (ref 42.7–76)
NEUTROPHILS NFR BLD AUTO: 7.24 10*3/MM3 (ref 1.7–7)
NEUTROPHILS NFR BLD AUTO: 71.2 % (ref 42.7–76)
NEUTROPHILS NFR BLD AUTO: 71.3 % (ref 42.7–76)
NEUTROPHILS NFR BLD AUTO: 72.7 % (ref 42.7–76)
NITRITE UR QL STRIP: NEGATIVE
NITRITE UR QL STRIP: NEGATIVE
NRBC BLD AUTO-RTO: 0 /100 WBC (ref 0–0.2)
NT-PROBNP SERPL-MCNC: 1189 PG/ML (ref 0–1800)
NT-PROBNP SERPL-MCNC: 1564 PG/ML (ref 0–1800)
NT-PROBNP SERPL-MCNC: 276 PG/ML (ref 0–1800)
NT-PROBNP SERPL-MCNC: 292 PG/ML (ref 0–1800)
PERCENT MAX PREDICTED HR: 59.42 %
PH UR STRIP.AUTO: 5.5 [PH] (ref 5–8)
PH UR STRIP.AUTO: <=5 [PH] (ref 5–8)
PHOSPHATE SERPL-MCNC: 2.8 MG/DL (ref 2.5–4.5)
PHOSPHATE SERPL-MCNC: 2.9 MG/DL (ref 2.5–4.5)
PHOSPHATE SERPL-MCNC: 3.6 MG/DL (ref 2.5–4.5)
PLATELET # BLD AUTO: 123 10*3/MM3 (ref 140–450)
PLATELET # BLD AUTO: 145 10*3/MM3 (ref 140–450)
PLATELET # BLD AUTO: 147 10*3/MM3 (ref 140–450)
PLATELET # BLD AUTO: 157 10*3/MM3 (ref 140–450)
PLATELET # BLD AUTO: 158 10*3/MM3 (ref 140–450)
PLATELET # BLD AUTO: 170 10*3/MM3 (ref 140–450)
PLATELET # BLD AUTO: 175 10*3/MM3 (ref 140–450)
PLATELET # BLD AUTO: 181 10*3/MM3 (ref 140–450)
PLATELET # BLD AUTO: 181 10*3/MM3 (ref 140–450)
PLATELET # BLD AUTO: 182 10*3/MM3 (ref 140–450)
PLATELET # BLD AUTO: 184 10*3/MM3 (ref 140–450)
PLATELET # BLD AUTO: 191 10*3/MM3 (ref 140–450)
PLATELET # BLD AUTO: 192 10*3/MM3 (ref 140–450)
PLATELET # BLD AUTO: 205 10*3/MM3 (ref 140–450)
PMV BLD AUTO: 10.1 FL (ref 6–12)
PMV BLD AUTO: 10.2 FL (ref 6–12)
PMV BLD AUTO: 10.6 FL (ref 6–12)
PMV BLD AUTO: 10.6 FL (ref 6–12)
PMV BLD AUTO: 10.7 FL (ref 6–12)
PMV BLD AUTO: 10.8 FL (ref 6–12)
PMV BLD AUTO: 10.9 FL (ref 6–12)
PMV BLD AUTO: 10.9 FL (ref 6–12)
PMV BLD AUTO: 11.1 FL (ref 6–12)
PMV BLD AUTO: 11.1 FL (ref 6–12)
PMV BLD AUTO: 11.3 FL (ref 6–12)
PMV BLD AUTO: 11.4 FL (ref 6–12)
POTASSIUM SERPL-SCNC: 3.5 MMOL/L (ref 3.5–5.2)
POTASSIUM SERPL-SCNC: 3.7 MMOL/L (ref 3.5–5.2)
POTASSIUM SERPL-SCNC: 3.9 MMOL/L (ref 3.5–5.2)
POTASSIUM SERPL-SCNC: 3.9 MMOL/L (ref 3.5–5.2)
POTASSIUM SERPL-SCNC: 4 MMOL/L (ref 3.5–5.2)
POTASSIUM SERPL-SCNC: 4 MMOL/L (ref 3.5–5.2)
POTASSIUM SERPL-SCNC: 4.1 MMOL/L (ref 3.5–5.2)
POTASSIUM SERPL-SCNC: 4.2 MMOL/L (ref 3.5–5.2)
POTASSIUM SERPL-SCNC: 4.3 MMOL/L (ref 3.5–5.2)
POTASSIUM SERPL-SCNC: 4.3 MMOL/L (ref 3.5–5.2)
POTASSIUM SERPL-SCNC: 4.4 MMOL/L (ref 3.5–5.2)
POTASSIUM SERPL-SCNC: 4.4 MMOL/L (ref 3.5–5.2)
POTASSIUM SERPL-SCNC: 4.5 MMOL/L (ref 3.5–5.2)
POTASSIUM SERPL-SCNC: 4.9 MMOL/L (ref 3.5–5.2)
POTASSIUM SERPL-SCNC: 5.1 MMOL/L (ref 3.5–5.2)
POTASSIUM SERPL-SCNC: 5.3 MMOL/L (ref 3.5–5.2)
POTASSIUM SERPL-SCNC: 5.5 MMOL/L (ref 3.5–5.2)
POTASSIUM SERPL-SCNC: 5.8 MMOL/L (ref 3.5–5.2)
PROCALCITONIN SERPL-MCNC: 0.04 NG/ML (ref 0–0.25)
PROCALCITONIN SERPL-MCNC: 0.07 NG/ML (ref 0–0.25)
PROT ?TM UR-MCNC: 27.2 MG/DL
PROT SERPL-MCNC: 5.7 G/DL (ref 6–8.5)
PROT SERPL-MCNC: 5.9 G/DL (ref 6–8.5)
PROT SERPL-MCNC: 5.9 G/DL (ref 6–8.5)
PROT SERPL-MCNC: 6.1 G/DL (ref 6–8.5)
PROT SERPL-MCNC: 6.2 G/DL (ref 6–8.5)
PROT SERPL-MCNC: 6.2 G/DL (ref 6–8.5)
PROT SERPL-MCNC: 6.3 G/DL (ref 6–8.5)
PROT SERPL-MCNC: 6.4 G/DL (ref 6–8.5)
PROT SERPL-MCNC: 6.5 G/DL (ref 6–8.5)
PROT SERPL-MCNC: 6.6 G/DL (ref 6–8.5)
PROT SERPL-MCNC: 6.7 G/DL (ref 6–8.5)
PROT SERPL-MCNC: 6.8 G/DL (ref 6–8.5)
PROT UR QL STRIP: ABNORMAL
PROT UR QL STRIP: ABNORMAL
PROT/CREAT UR: 189.3 MG/G CREA (ref 0–200)
PROTHROMBIN TIME: 12.9 SECONDS (ref 11.7–14.2)
PROTHROMBIN TIME: 13.7 SECONDS (ref 11.7–14.2)
QT INTERVAL: 320 MS
QT INTERVAL: 382 MS
QT INTERVAL: 382 MS
QT INTERVAL: 393 MS
RBC # BLD AUTO: 3.49 10*6/MM3 (ref 4.14–5.8)
RBC # BLD AUTO: 3.51 10*6/MM3 (ref 4.14–5.8)
RBC # BLD AUTO: 3.58 10*6/MM3 (ref 4.14–5.8)
RBC # BLD AUTO: 3.6 10*6/MM3 (ref 4.14–5.8)
RBC # BLD AUTO: 3.63 10*6/MM3 (ref 4.14–5.8)
RBC # BLD AUTO: 3.67 10*6/MM3 (ref 4.14–5.8)
RBC # BLD AUTO: 3.71 10*6/MM3 (ref 4.14–5.8)
RBC # BLD AUTO: 3.77 10*6/MM3 (ref 4.14–5.8)
RBC # BLD AUTO: 3.85 10*6/MM3 (ref 4.14–5.8)
RBC # BLD AUTO: 3.87 10*6/MM3 (ref 4.14–5.8)
RBC # BLD AUTO: 3.91 10*6/MM3 (ref 4.14–5.8)
RBC # BLD AUTO: 3.98 10*6/MM3 (ref 4.14–5.8)
RBC # BLD AUTO: 4.13 10*6/MM3 (ref 4.14–5.8)
RBC # BLD AUTO: 4.27 10*6/MM3 (ref 4.14–5.8)
RBC # UR STRIP: NORMAL /HPF
REF LAB TEST METHOD: NORMAL
RH BLD: POSITIVE
RHINOVIRUS RNA SPEC NAA+PROBE: NOT DETECTED
RSV RNA NPH QL NAA+NON-PROBE: NOT DETECTED
SARS-COV-2 ORF1AB RESP QL NAA+PROBE: NOT DETECTED
SARS-COV-2 RNA NPH QL NAA+NON-PROBE: DETECTED
SINUS: 3.3 CM
SINUS: 3.5 CM
SODIUM SERPL-SCNC: 134 MMOL/L (ref 136–145)
SODIUM SERPL-SCNC: 135 MMOL/L (ref 136–145)
SODIUM SERPL-SCNC: 135 MMOL/L (ref 136–145)
SODIUM SERPL-SCNC: 137 MMOL/L (ref 136–145)
SODIUM SERPL-SCNC: 139 MMOL/L (ref 136–145)
SODIUM SERPL-SCNC: 140 MMOL/L (ref 136–145)
SODIUM SERPL-SCNC: 140 MMOL/L (ref 136–145)
SODIUM SERPL-SCNC: 141 MMOL/L (ref 136–145)
SODIUM SERPL-SCNC: 143 MMOL/L (ref 136–145)
SODIUM SERPL-SCNC: 143 MMOL/L (ref 136–145)
SODIUM SERPL-SCNC: 147 MMOL/L (ref 136–145)
SP GR UR STRIP: 1.01 (ref 1–1.03)
SP GR UR STRIP: 1.02 (ref 1–1.03)
SQUAMOUS #/AREA URNS HPF: NORMAL /HPF
STRESS BASELINE BP: NORMAL MMHG
STRESS BASELINE HR: 66 BPM
STRESS PERCENT HR: 70 %
STRESS POST PEAK BP: NORMAL MMHG
STRESS POST PEAK HR: 82 BPM
STRESS TARGET HR: 117 BPM
T&S EXPIRATION DATE: NORMAL
TRIGL SERPL-MCNC: 154 MG/DL (ref 0–150)
TROPONIN T SERPL-MCNC: 0.03 NG/ML (ref 0–0.03)
TROPONIN T SERPL-MCNC: 0.04 NG/ML (ref 0–0.03)
TROPONIN T SERPL-MCNC: 0.04 NG/ML (ref 0–0.03)
TROPONIN T SERPL-MCNC: <0.01 NG/ML (ref 0–0.03)
TROPONIN T SERPL-MCNC: <0.01 NG/ML (ref 0–0.03)
UROBILINOGEN UR QL STRIP: ABNORMAL
UROBILINOGEN UR QL STRIP: ABNORMAL
VLDLC SERPL-MCNC: 26 MG/DL (ref 5–40)
WBC # UR STRIP: NORMAL /HPF
WBC NRBC COR # BLD: 10.17 10*3/MM3 (ref 3.4–10.8)
WBC NRBC COR # BLD: 11.46 10*3/MM3 (ref 3.4–10.8)
WBC NRBC COR # BLD: 6.9 10*3/MM3 (ref 3.4–10.8)
WBC NRBC COR # BLD: 6.96 10*3/MM3 (ref 3.4–10.8)
WBC NRBC COR # BLD: 7.25 10*3/MM3 (ref 3.4–10.8)
WBC NRBC COR # BLD: 7.27 10*3/MM3 (ref 3.4–10.8)
WBC NRBC COR # BLD: 7.29 10*3/MM3 (ref 3.4–10.8)
WBC NRBC COR # BLD: 7.65 10*3/MM3 (ref 3.4–10.8)
WBC NRBC COR # BLD: 7.99 10*3/MM3 (ref 3.4–10.8)
WBC NRBC COR # BLD: 8.1 10*3/MM3 (ref 3.4–10.8)
WBC NRBC COR # BLD: 8.23 10*3/MM3 (ref 3.4–10.8)
WBC NRBC COR # BLD: 8.57 10*3/MM3 (ref 3.4–10.8)
WBC NRBC COR # BLD: 8.65 10*3/MM3 (ref 3.4–10.8)
WBC NRBC COR # BLD: 9.1 10*3/MM3 (ref 3.4–10.8)
WHOLE BLOOD HOLD COAG: NORMAL
WHOLE BLOOD HOLD SPECIMEN: NORMAL

## 2022-01-01 PROCEDURE — 85025 COMPLETE CBC W/AUTO DIFF WBC: CPT | Performed by: EMERGENCY MEDICINE

## 2022-01-01 PROCEDURE — 25010000002 HYDROMORPHONE 1 MG/ML SOLUTION: Performed by: INTERNAL MEDICINE

## 2022-01-01 PROCEDURE — 85025 COMPLETE CBC W/AUTO DIFF WBC: CPT | Performed by: PHYSICIAN ASSISTANT

## 2022-01-01 PROCEDURE — 99214 OFFICE O/P EST MOD 30 MIN: CPT | Performed by: INTERNAL MEDICINE

## 2022-01-01 PROCEDURE — 25010000002 FUROSEMIDE PER 20 MG: Performed by: STUDENT IN AN ORGANIZED HEALTH CARE EDUCATION/TRAINING PROGRAM

## 2022-01-01 PROCEDURE — 25010000002 MORPHINE PER 10 MG: Performed by: INTERNAL MEDICINE

## 2022-01-01 PROCEDURE — 80069 RENAL FUNCTION PANEL: CPT | Performed by: INTERNAL MEDICINE

## 2022-01-01 PROCEDURE — 97166 OT EVAL MOD COMPLEX 45 MIN: CPT

## 2022-01-01 PROCEDURE — 83735 ASSAY OF MAGNESIUM: CPT | Performed by: INTERNAL MEDICINE

## 2022-01-01 PROCEDURE — 25010000002 ENOXAPARIN PER 10 MG: Performed by: NURSE PRACTITIONER

## 2022-01-01 PROCEDURE — 97530 THERAPEUTIC ACTIVITIES: CPT

## 2022-01-01 PROCEDURE — 82962 GLUCOSE BLOOD TEST: CPT

## 2022-01-01 PROCEDURE — 86850 RBC ANTIBODY SCREEN: CPT | Performed by: PHYSICIAN ASSISTANT

## 2022-01-01 PROCEDURE — 85025 COMPLETE CBC W/AUTO DIFF WBC: CPT | Performed by: STUDENT IN AN ORGANIZED HEALTH CARE EDUCATION/TRAINING PROGRAM

## 2022-01-01 PROCEDURE — 83735 ASSAY OF MAGNESIUM: CPT | Performed by: HOSPITALIST

## 2022-01-01 PROCEDURE — 93010 ELECTROCARDIOGRAM REPORT: CPT | Performed by: INTERNAL MEDICINE

## 2022-01-01 PROCEDURE — 63710000001 INSULIN LISPRO (HUMAN) PER 5 UNITS: Performed by: INTERNAL MEDICINE

## 2022-01-01 PROCEDURE — 85610 PROTHROMBIN TIME: CPT | Performed by: PHYSICIAN ASSISTANT

## 2022-01-01 PROCEDURE — 97530 THERAPEUTIC ACTIVITIES: CPT | Performed by: PHYSICAL THERAPIST

## 2022-01-01 PROCEDURE — 93000 ELECTROCARDIOGRAM COMPLETE: CPT | Performed by: INTERNAL MEDICINE

## 2022-01-01 PROCEDURE — 93016 CV STRESS TEST SUPVJ ONLY: CPT | Performed by: INTERNAL MEDICINE

## 2022-01-01 PROCEDURE — 36415 COLL VENOUS BLD VENIPUNCTURE: CPT | Performed by: HOSPITALIST

## 2022-01-01 PROCEDURE — 83880 ASSAY OF NATRIURETIC PEPTIDE: CPT

## 2022-01-01 PROCEDURE — 93306 TTE W/DOPPLER COMPLETE: CPT

## 2022-01-01 PROCEDURE — 63710000001 INSULIN LISPRO (HUMAN) PER 5 UNITS: Performed by: NURSE PRACTITIONER

## 2022-01-01 PROCEDURE — 78452 HT MUSCLE IMAGE SPECT MULT: CPT | Performed by: INTERNAL MEDICINE

## 2022-01-01 PROCEDURE — 70450 CT HEAD/BRAIN W/O DYE: CPT

## 2022-01-01 PROCEDURE — 85730 THROMBOPLASTIN TIME PARTIAL: CPT | Performed by: PHYSICIAN ASSISTANT

## 2022-01-01 PROCEDURE — 84484 ASSAY OF TROPONIN QUANT: CPT | Performed by: STUDENT IN AN ORGANIZED HEALTH CARE EDUCATION/TRAINING PROGRAM

## 2022-01-01 PROCEDURE — 84156 ASSAY OF PROTEIN URINE: CPT | Performed by: STUDENT IN AN ORGANIZED HEALTH CARE EDUCATION/TRAINING PROGRAM

## 2022-01-01 PROCEDURE — 80048 BASIC METABOLIC PNL TOTAL CA: CPT

## 2022-01-01 PROCEDURE — 73562 X-RAY EXAM OF KNEE 3: CPT | Performed by: ORTHOPAEDIC SURGERY

## 2022-01-01 PROCEDURE — 36415 COLL VENOUS BLD VENIPUNCTURE: CPT

## 2022-01-01 PROCEDURE — 85027 COMPLETE CBC AUTOMATED: CPT | Performed by: INTERNAL MEDICINE

## 2022-01-01 PROCEDURE — 80076 HEPATIC FUNCTION PANEL: CPT | Performed by: NURSE PRACTITIONER

## 2022-01-01 PROCEDURE — 92526 ORAL FUNCTION THERAPY: CPT | Performed by: SPEECH-LANGUAGE PATHOLOGIST

## 2022-01-01 PROCEDURE — 92610 EVALUATE SWALLOWING FUNCTION: CPT | Performed by: SPEECH-LANGUAGE PATHOLOGIST

## 2022-01-01 PROCEDURE — 0 IOPAMIDOL PER 1 ML: Performed by: INTERNAL MEDICINE

## 2022-01-01 PROCEDURE — 84484 ASSAY OF TROPONIN QUANT: CPT

## 2022-01-01 PROCEDURE — 99214 OFFICE O/P EST MOD 30 MIN: CPT | Performed by: NURSE PRACTITIONER

## 2022-01-01 PROCEDURE — 97162 PT EVAL MOD COMPLEX 30 MIN: CPT | Performed by: PHYSICAL THERAPIST

## 2022-01-01 PROCEDURE — 99222 1ST HOSP IP/OBS MODERATE 55: CPT | Performed by: INTERNAL MEDICINE

## 2022-01-01 PROCEDURE — 94799 UNLISTED PULMONARY SVC/PX: CPT

## 2022-01-01 PROCEDURE — 99285 EMERGENCY DEPT VISIT HI MDM: CPT

## 2022-01-01 PROCEDURE — 80048 BASIC METABOLIC PNL TOTAL CA: CPT | Performed by: NURSE PRACTITIONER

## 2022-01-01 PROCEDURE — 71045 X-RAY EXAM CHEST 1 VIEW: CPT

## 2022-01-01 PROCEDURE — 84100 ASSAY OF PHOSPHORUS: CPT | Performed by: HOSPITALIST

## 2022-01-01 PROCEDURE — 92610 EVALUATE SWALLOWING FUNCTION: CPT

## 2022-01-01 PROCEDURE — 97535 SELF CARE MNGMENT TRAINING: CPT

## 2022-01-01 PROCEDURE — 80053 COMPREHEN METABOLIC PANEL: CPT | Performed by: PHYSICIAN ASSISTANT

## 2022-01-01 PROCEDURE — 0 IOPAMIDOL PER 1 ML: Performed by: EMERGENCY MEDICINE

## 2022-01-01 PROCEDURE — 93005 ELECTROCARDIOGRAM TRACING: CPT | Performed by: EMERGENCY MEDICINE

## 2022-01-01 PROCEDURE — 85007 BL SMEAR W/DIFF WBC COUNT: CPT | Performed by: PHYSICIAN ASSISTANT

## 2022-01-01 PROCEDURE — U0005 INFEC AGEN DETEC AMPLI PROBE: HCPCS | Performed by: NURSE PRACTITIONER

## 2022-01-01 PROCEDURE — 25010000002 FUROSEMIDE PER 20 MG: Performed by: EMERGENCY MEDICINE

## 2022-01-01 PROCEDURE — 71275 CT ANGIOGRAPHY CHEST: CPT

## 2022-01-01 PROCEDURE — 25010000002 REMDESIVIR 100 MG/20ML SOLUTION 1 EACH VIAL: Performed by: NURSE PRACTITIONER

## 2022-01-01 PROCEDURE — 71046 X-RAY EXAM CHEST 2 VIEWS: CPT

## 2022-01-01 PROCEDURE — 83036 HEMOGLOBIN GLYCOSYLATED A1C: CPT | Performed by: NURSE PRACTITIONER

## 2022-01-01 PROCEDURE — 80053 COMPREHEN METABOLIC PANEL: CPT | Performed by: HOSPITALIST

## 2022-01-01 PROCEDURE — 84484 ASSAY OF TROPONIN QUANT: CPT | Performed by: PHYSICIAN ASSISTANT

## 2022-01-01 PROCEDURE — 99284 EMERGENCY DEPT VISIT MOD MDM: CPT

## 2022-01-01 PROCEDURE — G0008 ADMIN INFLUENZA VIRUS VAC: HCPCS | Performed by: INTERNAL MEDICINE

## 2022-01-01 PROCEDURE — G0378 HOSPITAL OBSERVATION PER HR: HCPCS

## 2022-01-01 PROCEDURE — 78452 HT MUSCLE IMAGE SPECT MULT: CPT

## 2022-01-01 PROCEDURE — 25010000002 INFLUENZA VAC HIGH-DOSE QUAD 0.7 ML SUSPENSION PREFILLED SYRINGE: Performed by: INTERNAL MEDICINE

## 2022-01-01 PROCEDURE — 80053 COMPREHEN METABOLIC PANEL: CPT | Performed by: EMERGENCY MEDICINE

## 2022-01-01 PROCEDURE — 99232 SBSQ HOSP IP/OBS MODERATE 35: CPT | Performed by: NURSE PRACTITIONER

## 2022-01-01 PROCEDURE — 93018 CV STRESS TEST I&R ONLY: CPT | Performed by: INTERNAL MEDICINE

## 2022-01-01 PROCEDURE — 25010000002 ONDANSETRON PER 1 MG: Performed by: INTERNAL MEDICINE

## 2022-01-01 PROCEDURE — 0 TECHNETIUM SESTAMIBI: Performed by: INTERNAL MEDICINE

## 2022-01-01 PROCEDURE — 25010000002 ENOXAPARIN PER 10 MG: Performed by: STUDENT IN AN ORGANIZED HEALTH CARE EDUCATION/TRAINING PROGRAM

## 2022-01-01 PROCEDURE — 80048 BASIC METABOLIC PNL TOTAL CA: CPT | Performed by: STUDENT IN AN ORGANIZED HEALTH CARE EDUCATION/TRAINING PROGRAM

## 2022-01-01 PROCEDURE — 92611 MOTION FLUOROSCOPY/SWALLOW: CPT

## 2022-01-01 PROCEDURE — 99213 OFFICE O/P EST LOW 20 MIN: CPT | Performed by: ORTHOPAEDIC SURGERY

## 2022-01-01 PROCEDURE — 99221 1ST HOSP IP/OBS SF/LOW 40: CPT | Performed by: STUDENT IN AN ORGANIZED HEALTH CARE EDUCATION/TRAINING PROGRAM

## 2022-01-01 PROCEDURE — 80053 COMPREHEN METABOLIC PANEL: CPT | Performed by: INTERNAL MEDICINE

## 2022-01-01 PROCEDURE — 99215 OFFICE O/P EST HI 40 MIN: CPT | Performed by: PHYSICIAN ASSISTANT

## 2022-01-01 PROCEDURE — 93306 TTE W/DOPPLER COMPLETE: CPT | Performed by: INTERNAL MEDICINE

## 2022-01-01 PROCEDURE — 80048 BASIC METABOLIC PNL TOTAL CA: CPT | Performed by: HOSPITALIST

## 2022-01-01 PROCEDURE — 36415 COLL VENOUS BLD VENIPUNCTURE: CPT | Performed by: STUDENT IN AN ORGANIZED HEALTH CARE EDUCATION/TRAINING PROGRAM

## 2022-01-01 PROCEDURE — 90662 IIV NO PRSV INCREASED AG IM: CPT | Performed by: INTERNAL MEDICINE

## 2022-01-01 PROCEDURE — 74230 X-RAY XM SWLNG FUNCJ C+: CPT

## 2022-01-01 PROCEDURE — 25010000002 LORAZEPAM PER 2 MG: Performed by: INTERNAL MEDICINE

## 2022-01-01 PROCEDURE — 85025 COMPLETE CBC W/AUTO DIFF WBC: CPT | Performed by: HOSPITALIST

## 2022-01-01 PROCEDURE — 80061 LIPID PANEL: CPT | Performed by: INTERNAL MEDICINE

## 2022-01-01 PROCEDURE — 94640 AIRWAY INHALATION TREATMENT: CPT

## 2022-01-01 PROCEDURE — 71250 CT THORAX DX C-: CPT

## 2022-01-01 PROCEDURE — 85027 COMPLETE CBC AUTOMATED: CPT | Performed by: NURSE PRACTITIONER

## 2022-01-01 PROCEDURE — 92612 ENDOSCOPY SWALLOW (FEES) VID: CPT

## 2022-01-01 PROCEDURE — 93005 ELECTROCARDIOGRAM TRACING: CPT | Performed by: PHYSICIAN ASSISTANT

## 2022-01-01 PROCEDURE — 93005 ELECTROCARDIOGRAM TRACING: CPT | Performed by: NURSE PRACTITIONER

## 2022-01-01 PROCEDURE — 36415 COLL VENOUS BLD VENIPUNCTURE: CPT | Performed by: NURSE PRACTITIONER

## 2022-01-01 PROCEDURE — 93970 EXTREMITY STUDY: CPT

## 2022-01-01 PROCEDURE — 86140 C-REACTIVE PROTEIN: CPT | Performed by: NURSE PRACTITIONER

## 2022-01-01 PROCEDURE — 84484 ASSAY OF TROPONIN QUANT: CPT | Performed by: EMERGENCY MEDICINE

## 2022-01-01 PROCEDURE — 99232 SBSQ HOSP IP/OBS MODERATE 35: CPT | Performed by: INTERNAL MEDICINE

## 2022-01-01 PROCEDURE — 0202U NFCT DS 22 TRGT SARS-COV-2: CPT | Performed by: EMERGENCY MEDICINE

## 2022-01-01 PROCEDURE — 86901 BLOOD TYPING SEROLOGIC RH(D): CPT | Performed by: PHYSICIAN ASSISTANT

## 2022-01-01 PROCEDURE — XW033E5 INTRODUCTION OF REMDESIVIR ANTI-INFECTIVE INTO PERIPHERAL VEIN, PERCUTANEOUS APPROACH, NEW TECHNOLOGY GROUP 5: ICD-10-PCS | Performed by: HOSPITALIST

## 2022-01-01 PROCEDURE — 80053 COMPREHEN METABOLIC PANEL: CPT

## 2022-01-01 PROCEDURE — 83735 ASSAY OF MAGNESIUM: CPT | Performed by: EMERGENCY MEDICINE

## 2022-01-01 PROCEDURE — 99231 SBSQ HOSP IP/OBS SF/LOW 25: CPT | Performed by: NURSE PRACTITIONER

## 2022-01-01 PROCEDURE — 83036 HEMOGLOBIN GLYCOSYLATED A1C: CPT | Performed by: INTERNAL MEDICINE

## 2022-01-01 PROCEDURE — 81003 URINALYSIS AUTO W/O SCOPE: CPT | Performed by: STUDENT IN AN ORGANIZED HEALTH CARE EDUCATION/TRAINING PROGRAM

## 2022-01-01 PROCEDURE — 84132 ASSAY OF SERUM POTASSIUM: CPT | Performed by: INTERNAL MEDICINE

## 2022-01-01 PROCEDURE — 97162 PT EVAL MOD COMPLEX 30 MIN: CPT

## 2022-01-01 PROCEDURE — 25010000002 MAGNESIUM SULFATE 2 GM/50ML SOLUTION: Performed by: INTERNAL MEDICINE

## 2022-01-01 PROCEDURE — U0004 COV-19 TEST NON-CDC HGH THRU: HCPCS | Performed by: NURSE PRACTITIONER

## 2022-01-01 PROCEDURE — 82565 ASSAY OF CREATININE: CPT

## 2022-01-01 PROCEDURE — 25010000002 PERFLUTREN (DEFINITY) 8.476 MG IN SODIUM CHLORIDE (PF) 0.9 % 10 ML INJECTION: Performed by: HOSPITALIST

## 2022-01-01 PROCEDURE — 93000 ELECTROCARDIOGRAM COMPLETE: CPT | Performed by: NURSE PRACTITIONER

## 2022-01-01 PROCEDURE — 84145 PROCALCITONIN (PCT): CPT | Performed by: EMERGENCY MEDICINE

## 2022-01-01 PROCEDURE — 99231 SBSQ HOSP IP/OBS SF/LOW 25: CPT | Performed by: STUDENT IN AN ORGANIZED HEALTH CARE EDUCATION/TRAINING PROGRAM

## 2022-01-01 PROCEDURE — 70496 CT ANGIOGRAPHY HEAD: CPT

## 2022-01-01 PROCEDURE — 25010000002 PERFLUTREN (DEFINITY) 8.476 MG IN SODIUM CHLORIDE (PF) 0.9 % 10 ML INJECTION: Performed by: STUDENT IN AN ORGANIZED HEALTH CARE EDUCATION/TRAINING PROGRAM

## 2022-01-01 PROCEDURE — 82570 ASSAY OF URINE CREATININE: CPT | Performed by: STUDENT IN AN ORGANIZED HEALTH CARE EDUCATION/TRAINING PROGRAM

## 2022-01-01 PROCEDURE — 85379 FIBRIN DEGRADATION QUANT: CPT | Performed by: NURSE PRACTITIONER

## 2022-01-01 PROCEDURE — 99214 OFFICE O/P EST MOD 30 MIN: CPT | Performed by: PHYSICIAN ASSISTANT

## 2022-01-01 PROCEDURE — 82248 BILIRUBIN DIRECT: CPT | Performed by: EMERGENCY MEDICINE

## 2022-01-01 PROCEDURE — 85610 PROTHROMBIN TIME: CPT | Performed by: EMERGENCY MEDICINE

## 2022-01-01 PROCEDURE — 83880 ASSAY OF NATRIURETIC PEPTIDE: CPT | Performed by: EMERGENCY MEDICINE

## 2022-01-01 PROCEDURE — 83615 LACTATE (LD) (LDH) ENZYME: CPT | Performed by: NURSE PRACTITIONER

## 2022-01-01 PROCEDURE — 86900 BLOOD TYPING SEROLOGIC ABO: CPT | Performed by: PHYSICIAN ASSISTANT

## 2022-01-01 PROCEDURE — 82728 ASSAY OF FERRITIN: CPT | Performed by: NURSE PRACTITIONER

## 2022-01-01 PROCEDURE — 25010000002 REGADENOSON 0.4 MG/5ML SOLUTION: Performed by: INTERNAL MEDICINE

## 2022-01-01 PROCEDURE — 94618 PULMONARY STRESS TESTING: CPT

## 2022-01-01 PROCEDURE — 83605 ASSAY OF LACTIC ACID: CPT | Performed by: EMERGENCY MEDICINE

## 2022-01-01 PROCEDURE — 81001 URINALYSIS AUTO W/SCOPE: CPT | Performed by: EMERGENCY MEDICINE

## 2022-01-01 PROCEDURE — 80053 COMPREHEN METABOLIC PANEL: CPT | Performed by: NURSE PRACTITIONER

## 2022-01-01 PROCEDURE — 84145 PROCALCITONIN (PCT): CPT | Performed by: NURSE PRACTITIONER

## 2022-01-01 PROCEDURE — 93017 CV STRESS TEST TRACING ONLY: CPT

## 2022-01-01 PROCEDURE — 82565 ASSAY OF CREATININE: CPT | Performed by: NURSE PRACTITIONER

## 2022-01-01 PROCEDURE — A9500 TC99M SESTAMIBI: HCPCS | Performed by: INTERNAL MEDICINE

## 2022-01-01 RX ORDER — KETOROLAC TROMETHAMINE 30 MG/ML
15 INJECTION, SOLUTION INTRAMUSCULAR; INTRAVENOUS EVERY 6 HOURS PRN
Status: DISCONTINUED | OUTPATIENT
Start: 2022-01-01 | End: 2022-01-01 | Stop reason: HOSPADM

## 2022-01-01 RX ORDER — ACETAMINOPHEN 325 MG/1
650 TABLET ORAL EVERY 4 HOURS PRN
Status: DISCONTINUED | OUTPATIENT
Start: 2022-01-01 | End: 2022-01-01 | Stop reason: HOSPADM

## 2022-01-01 RX ORDER — MAGNESIUM SULFATE HEPTAHYDRATE 40 MG/ML
4 INJECTION, SOLUTION INTRAVENOUS AS NEEDED
Status: DISCONTINUED | OUTPATIENT
Start: 2022-01-01 | End: 2022-01-01 | Stop reason: HOSPADM

## 2022-01-01 RX ORDER — CLOPIDOGREL BISULFATE 75 MG/1
75 TABLET ORAL DAILY
Status: DISCONTINUED | OUTPATIENT
Start: 2022-01-01 | End: 2022-01-01 | Stop reason: HOSPADM

## 2022-01-01 RX ORDER — LISINOPRIL 20 MG/1
20 TABLET ORAL ONCE
Status: COMPLETED | OUTPATIENT
Start: 2022-01-01 | End: 2022-01-01

## 2022-01-01 RX ORDER — LORAZEPAM 2 MG/ML
0.5 CONCENTRATE ORAL
Status: DISCONTINUED | OUTPATIENT
Start: 2022-01-01 | End: 2022-01-01 | Stop reason: HOSPADM

## 2022-01-01 RX ORDER — LORAZEPAM 2 MG/ML
1 CONCENTRATE ORAL
Status: CANCELLED | OUTPATIENT
Start: 2022-01-01 | End: 2022-11-16

## 2022-01-01 RX ORDER — LORAZEPAM 2 MG/ML
1 INJECTION INTRAMUSCULAR
Status: CANCELLED | OUTPATIENT
Start: 2022-01-01 | End: 2022-11-16

## 2022-01-01 RX ORDER — CARVEDILOL 6.25 MG/1
6.25 TABLET ORAL 2 TIMES DAILY WITH MEALS
Status: DISCONTINUED | OUTPATIENT
Start: 2022-01-01 | End: 2022-01-01

## 2022-01-01 RX ORDER — MORPHINE SULFATE 2 MG/ML
2 INJECTION, SOLUTION INTRAMUSCULAR; INTRAVENOUS
Status: DISCONTINUED | OUTPATIENT
Start: 2022-01-01 | End: 2022-01-01 | Stop reason: HOSPADM

## 2022-01-01 RX ORDER — GLIPIZIDE 5 MG/1
5 TABLET ORAL
Status: DISCONTINUED | OUTPATIENT
Start: 2022-01-01 | End: 2022-01-01 | Stop reason: HOSPADM

## 2022-01-01 RX ORDER — ACETAMINOPHEN 160 MG/5ML
650 SOLUTION ORAL EVERY 4 HOURS PRN
Status: DISCONTINUED | OUTPATIENT
Start: 2022-01-01 | End: 2022-01-01 | Stop reason: HOSPADM

## 2022-01-01 RX ORDER — GUAIFENESIN 600 MG/1
1200 TABLET, EXTENDED RELEASE ORAL EVERY 12 HOURS SCHEDULED
Status: DISCONTINUED | OUTPATIENT
Start: 2022-01-01 | End: 2022-01-01 | Stop reason: HOSPADM

## 2022-01-01 RX ORDER — SODIUM CHLORIDE 0.9 % (FLUSH) 0.9 %
10 SYRINGE (ML) INJECTION EVERY 12 HOURS SCHEDULED
Status: DISCONTINUED | OUTPATIENT
Start: 2022-01-01 | End: 2022-01-01

## 2022-01-01 RX ORDER — MORPHINE SULFATE 2 MG/ML
2 INJECTION, SOLUTION INTRAMUSCULAR; INTRAVENOUS
Status: CANCELLED | OUTPATIENT
Start: 2022-01-01 | End: 2022-11-14

## 2022-01-01 RX ORDER — MORPHINE SULFATE 20 MG/ML
20 SOLUTION ORAL
Status: CANCELLED | OUTPATIENT
Start: 2022-01-01 | End: 2022-11-16

## 2022-01-01 RX ORDER — SODIUM CHLORIDE 0.9 % (FLUSH) 0.9 %
10 SYRINGE (ML) INJECTION AS NEEDED
Status: DISCONTINUED | OUTPATIENT
Start: 2022-01-01 | End: 2022-01-01 | Stop reason: HOSPADM

## 2022-01-01 RX ORDER — LORAZEPAM 2 MG/ML
2 INJECTION INTRAMUSCULAR
Status: DISCONTINUED | OUTPATIENT
Start: 2022-01-01 | End: 2022-01-01 | Stop reason: HOSPADM

## 2022-01-01 RX ORDER — MAGNESIUM SULFATE HEPTAHYDRATE 40 MG/ML
2 INJECTION, SOLUTION INTRAVENOUS AS NEEDED
Status: DISCONTINUED | OUTPATIENT
Start: 2022-01-01 | End: 2022-01-01 | Stop reason: HOSPADM

## 2022-01-01 RX ORDER — MORPHINE SULFATE 2 MG/ML
4 INJECTION, SOLUTION INTRAMUSCULAR; INTRAVENOUS
Status: DISCONTINUED | OUTPATIENT
Start: 2022-01-01 | End: 2022-01-01 | Stop reason: HOSPADM

## 2022-01-01 RX ORDER — ACETAMINOPHEN 650 MG/1
650 SUPPOSITORY RECTAL EVERY 4 HOURS PRN
Status: DISCONTINUED | OUTPATIENT
Start: 2022-01-01 | End: 2022-01-01 | Stop reason: HOSPADM

## 2022-01-01 RX ORDER — HYDROMORPHONE HYDROCHLORIDE 1 MG/ML
0.5 INJECTION, SOLUTION INTRAMUSCULAR; INTRAVENOUS; SUBCUTANEOUS
Status: DISCONTINUED | OUTPATIENT
Start: 2022-01-01 | End: 2022-01-01 | Stop reason: HOSPADM

## 2022-01-01 RX ORDER — LISINOPRIL 20 MG/1
20 TABLET ORAL 2 TIMES DAILY
Status: DISCONTINUED | OUTPATIENT
Start: 2022-01-01 | End: 2022-01-01 | Stop reason: HOSPADM

## 2022-01-01 RX ORDER — ONDANSETRON 2 MG/ML
4 INJECTION INTRAMUSCULAR; INTRAVENOUS EVERY 6 HOURS PRN
Status: DISCONTINUED | OUTPATIENT
Start: 2022-01-01 | End: 2022-01-01 | Stop reason: HOSPADM

## 2022-01-01 RX ORDER — LORAZEPAM 2 MG/ML
2 INJECTION INTRAMUSCULAR
Status: CANCELLED | OUTPATIENT
Start: 2022-01-01 | End: 2022-11-16

## 2022-01-01 RX ORDER — SODIUM CHLORIDE 0.9 % (FLUSH) 0.9 %
10 SYRINGE (ML) INJECTION EVERY 12 HOURS SCHEDULED
Status: DISCONTINUED | OUTPATIENT
Start: 2022-01-01 | End: 2022-01-01 | Stop reason: HOSPADM

## 2022-01-01 RX ORDER — GUAIFENESIN 600 MG/1
1200 TABLET, EXTENDED RELEASE ORAL EVERY 12 HOURS SCHEDULED
Qty: 12 TABLET | Refills: 0
Start: 2022-01-01 | End: 2022-01-01

## 2022-01-01 RX ORDER — FUROSEMIDE 10 MG/ML
40 INJECTION INTRAMUSCULAR; INTRAVENOUS ONCE
Status: COMPLETED | OUTPATIENT
Start: 2022-01-01 | End: 2022-01-01

## 2022-01-01 RX ORDER — ALUMINA, MAGNESIA, AND SIMETHICONE 2400; 2400; 240 MG/30ML; MG/30ML; MG/30ML
7.5 SUSPENSION ORAL EVERY 4 HOURS PRN
Status: CANCELLED | OUTPATIENT
Start: 2022-01-01

## 2022-01-01 RX ORDER — MORPHINE SULFATE 20 MG/ML
5 SOLUTION ORAL
Status: CANCELLED | OUTPATIENT
Start: 2022-01-01 | End: 2022-11-14

## 2022-01-01 RX ORDER — LISINOPRIL 20 MG/1
TABLET ORAL
Qty: 180 TABLET | Refills: 3 | Status: SHIPPED | OUTPATIENT
Start: 2022-01-01 | End: 2022-01-01

## 2022-01-01 RX ORDER — MAGNESIUM SULFATE HEPTAHYDRATE 40 MG/ML
2 INJECTION, SOLUTION INTRAVENOUS AS NEEDED
Status: DISCONTINUED | OUTPATIENT
Start: 2022-01-01 | End: 2022-01-01

## 2022-01-01 RX ORDER — LORAZEPAM 2 MG/ML
1 INJECTION INTRAMUSCULAR
Status: DISCONTINUED | OUTPATIENT
Start: 2022-01-01 | End: 2022-01-01 | Stop reason: HOSPADM

## 2022-01-01 RX ORDER — MORPHINE SULFATE 4 MG/ML
4 INJECTION, SOLUTION INTRAMUSCULAR; INTRAVENOUS
Status: DISCONTINUED | OUTPATIENT
Start: 2022-01-01 | End: 2022-01-01 | Stop reason: HOSPADM

## 2022-01-01 RX ORDER — INSULIN LISPRO 100 [IU]/ML
0-7 INJECTION, SOLUTION INTRAVENOUS; SUBCUTANEOUS
Status: DISCONTINUED | OUTPATIENT
Start: 2022-01-01 | End: 2022-01-01 | Stop reason: HOSPADM

## 2022-01-01 RX ORDER — ALLOPURINOL 300 MG/1
300 TABLET ORAL DAILY
Status: DISCONTINUED | OUTPATIENT
Start: 2022-01-01 | End: 2022-01-01 | Stop reason: HOSPADM

## 2022-01-01 RX ORDER — AMOXICILLIN AND CLAVULANATE POTASSIUM 875; 125 MG/1; MG/1
1 TABLET, FILM COATED ORAL 2 TIMES DAILY
Qty: 14 TABLET | Refills: 0 | Status: SHIPPED | OUTPATIENT
Start: 2022-01-01 | End: 2022-01-01

## 2022-01-01 RX ORDER — CALCIUM GLUCONATE 20 MG/ML
1 INJECTION, SOLUTION INTRAVENOUS AS NEEDED
Status: DISCONTINUED | OUTPATIENT
Start: 2022-01-01 | End: 2022-01-01

## 2022-01-01 RX ORDER — LORAZEPAM 2 MG/ML
0.5 INJECTION INTRAMUSCULAR
Status: DISCONTINUED | OUTPATIENT
Start: 2022-01-01 | End: 2022-01-01 | Stop reason: HOSPADM

## 2022-01-01 RX ORDER — MORPHINE SULFATE 20 MG/ML
10 SOLUTION ORAL
Status: DISCONTINUED | OUTPATIENT
Start: 2022-01-01 | End: 2022-01-01 | Stop reason: HOSPADM

## 2022-01-01 RX ORDER — ATORVASTATIN CALCIUM 20 MG/1
40 TABLET, FILM COATED ORAL NIGHTLY
Status: DISCONTINUED | OUTPATIENT
Start: 2022-01-01 | End: 2022-01-01 | Stop reason: HOSPADM

## 2022-01-01 RX ORDER — BISACODYL 10 MG
10 SUPPOSITORY, RECTAL RECTAL DAILY PRN
Status: CANCELLED | OUTPATIENT
Start: 2022-01-01

## 2022-01-01 RX ORDER — LORAZEPAM 2 MG/ML
1 CONCENTRATE ORAL
Status: DISCONTINUED | OUTPATIENT
Start: 2022-01-01 | End: 2022-01-01 | Stop reason: HOSPADM

## 2022-01-01 RX ORDER — PANTOPRAZOLE SODIUM 40 MG/1
40 TABLET, DELAYED RELEASE ORAL
Status: DISCONTINUED | OUTPATIENT
Start: 2022-01-01 | End: 2022-01-01 | Stop reason: HOSPADM

## 2022-01-01 RX ORDER — FUROSEMIDE 20 MG/1
20 TABLET ORAL DAILY
Start: 2022-01-01 | End: 2022-01-01

## 2022-01-01 RX ORDER — ENOXAPARIN SODIUM 100 MG/ML
40 INJECTION SUBCUTANEOUS DAILY
Status: DISCONTINUED | OUTPATIENT
Start: 2022-01-01 | End: 2022-01-01 | Stop reason: HOSPADM

## 2022-01-01 RX ORDER — MAGNESIUM SULFATE HEPTAHYDRATE 40 MG/ML
4 INJECTION, SOLUTION INTRAVENOUS AS NEEDED
Status: DISCONTINUED | OUTPATIENT
Start: 2022-01-01 | End: 2022-01-01

## 2022-01-01 RX ORDER — ALBUTEROL SULFATE 90 UG/1
2 AEROSOL, METERED RESPIRATORY (INHALATION) EVERY 4 HOURS PRN
Status: DISCONTINUED | OUTPATIENT
Start: 2022-01-01 | End: 2022-01-01 | Stop reason: HOSPADM

## 2022-01-01 RX ORDER — ACETAMINOPHEN 325 MG/1
650 TABLET ORAL EVERY 4 HOURS PRN
Status: CANCELLED | OUTPATIENT
Start: 2022-01-01

## 2022-01-01 RX ORDER — MORPHINE SULFATE 10 MG/ML
6 INJECTION INTRAMUSCULAR; INTRAVENOUS; SUBCUTANEOUS
Status: DISCONTINUED | OUTPATIENT
Start: 2022-01-01 | End: 2022-01-01 | Stop reason: HOSPADM

## 2022-01-01 RX ORDER — SODIUM CHLORIDE 0.9 % (FLUSH) 0.9 %
10 SYRINGE (ML) INJECTION AS NEEDED
Status: CANCELLED | OUTPATIENT
Start: 2022-01-01

## 2022-01-01 RX ORDER — POTASSIUM CHLORIDE 750 MG/1
40 TABLET, FILM COATED, EXTENDED RELEASE ORAL AS NEEDED
Status: DISCONTINUED | OUTPATIENT
Start: 2022-01-01 | End: 2022-01-01 | Stop reason: HOSPADM

## 2022-01-01 RX ORDER — FUROSEMIDE 10 MG/ML
40 INJECTION INTRAMUSCULAR; INTRAVENOUS
Status: DISCONTINUED | OUTPATIENT
Start: 2022-01-01 | End: 2022-01-01

## 2022-01-01 RX ORDER — NITROGLYCERIN 0.4 MG/1
0.4 TABLET SUBLINGUAL
Status: DISCONTINUED | OUTPATIENT
Start: 2022-01-01 | End: 2022-01-01 | Stop reason: HOSPADM

## 2022-01-01 RX ORDER — FUROSEMIDE 40 MG/1
40 TABLET ORAL DAILY
Qty: 30 TABLET | Refills: 0 | Status: SHIPPED | OUTPATIENT
Start: 2022-01-01 | End: 2022-01-01 | Stop reason: SDUPTHER

## 2022-01-01 RX ORDER — HYDROMORPHONE HYDROCHLORIDE 1 MG/ML
0.5 INJECTION, SOLUTION INTRAMUSCULAR; INTRAVENOUS; SUBCUTANEOUS
Status: CANCELLED | OUTPATIENT
Start: 2022-01-01 | End: 2022-11-14

## 2022-01-01 RX ORDER — CALCIUM CARBONATE 200(500)MG
2 TABLET,CHEWABLE ORAL 2 TIMES DAILY PRN
Status: DISCONTINUED | OUTPATIENT
Start: 2022-01-01 | End: 2022-01-01 | Stop reason: HOSPADM

## 2022-01-01 RX ORDER — MELATONIN
2000 EVERY MORNING
Status: DISCONTINUED | OUTPATIENT
Start: 2022-01-01 | End: 2022-01-01 | Stop reason: HOSPADM

## 2022-01-01 RX ORDER — KETOROLAC TROMETHAMINE 15 MG/ML
15 INJECTION, SOLUTION INTRAMUSCULAR; INTRAVENOUS EVERY 6 HOURS PRN
Status: CANCELLED | OUTPATIENT
Start: 2022-01-01 | End: 2022-11-14

## 2022-01-01 RX ORDER — HYDROCODONE BITARTRATE AND ACETAMINOPHEN 5; 325 MG/1; MG/1
1 TABLET ORAL EVERY 4 HOURS PRN
Status: CANCELLED | OUTPATIENT
Start: 2022-01-01 | End: 2022-11-16

## 2022-01-01 RX ORDER — SODIUM CHLORIDE 0.9 % (FLUSH) 0.9 %
10 SYRINGE (ML) INJECTION AS NEEDED
Status: DISCONTINUED | OUTPATIENT
Start: 2022-01-01 | End: 2022-01-01

## 2022-01-01 RX ORDER — MORPHINE SULFATE 10 MG/ML
6 INJECTION INTRAMUSCULAR; INTRAVENOUS; SUBCUTANEOUS
Status: CANCELLED | OUTPATIENT
Start: 2022-01-01 | End: 2022-11-16

## 2022-01-01 RX ORDER — ONDANSETRON 2 MG/ML
4 INJECTION INTRAMUSCULAR; INTRAVENOUS EVERY 6 HOURS PRN
Status: CANCELLED | OUTPATIENT
Start: 2022-01-01

## 2022-01-01 RX ORDER — SODIUM CHLORIDE 9 MG/ML
75 INJECTION, SOLUTION INTRAVENOUS CONTINUOUS
Status: DISCONTINUED | OUTPATIENT
Start: 2022-01-01 | End: 2022-01-01

## 2022-01-01 RX ORDER — MULTIPLE VITAMINS W/ MINERALS TAB 9MG-400MCG
1 TAB ORAL DAILY
Status: DISCONTINUED | OUTPATIENT
Start: 2022-01-01 | End: 2022-01-01 | Stop reason: HOSPADM

## 2022-01-01 RX ORDER — ALBUTEROL SULFATE 2.5 MG/3ML
2.5 SOLUTION RESPIRATORY (INHALATION) EVERY 6 HOURS PRN
Status: DISCONTINUED | OUTPATIENT
Start: 2022-01-01 | End: 2022-01-01 | Stop reason: HOSPADM

## 2022-01-01 RX ORDER — MORPHINE SULFATE 4 MG/ML
4 INJECTION, SOLUTION INTRAMUSCULAR; INTRAVENOUS
Status: CANCELLED | OUTPATIENT
Start: 2022-01-01 | End: 2022-11-16

## 2022-01-01 RX ORDER — LORAZEPAM 2 MG/ML
0.5 INJECTION INTRAMUSCULAR
Status: CANCELLED | OUTPATIENT
Start: 2022-01-01 | End: 2022-11-16

## 2022-01-01 RX ORDER — NICOTINE POLACRILEX 4 MG
15 LOZENGE BUCCAL
Status: DISCONTINUED | OUTPATIENT
Start: 2022-01-01 | End: 2022-01-01 | Stop reason: HOSPADM

## 2022-01-01 RX ORDER — CHOLECALCIFEROL (VITAMIN D3) 125 MCG
5 CAPSULE ORAL NIGHTLY PRN
Status: DISCONTINUED | OUTPATIENT
Start: 2022-01-01 | End: 2022-01-01 | Stop reason: HOSPADM

## 2022-01-01 RX ORDER — TERAZOSIN 1 MG/1
1 CAPSULE ORAL NIGHTLY
Status: DISCONTINUED | OUTPATIENT
Start: 2022-01-01 | End: 2022-01-01 | Stop reason: HOSPADM

## 2022-01-01 RX ORDER — POTASSIUM CHLORIDE 7.45 MG/ML
10 INJECTION INTRAVENOUS
Status: DISCONTINUED | OUTPATIENT
Start: 2022-01-01 | End: 2022-01-01 | Stop reason: HOSPADM

## 2022-01-01 RX ORDER — CARVEDILOL 6.25 MG/1
6.25 TABLET ORAL 2 TIMES DAILY WITH MEALS
Status: DISCONTINUED | OUTPATIENT
Start: 2022-01-01 | End: 2022-01-01 | Stop reason: HOSPADM

## 2022-01-01 RX ORDER — POTASSIUM CHLORIDE 7.45 MG/ML
10 INJECTION INTRAVENOUS
Status: DISCONTINUED | OUTPATIENT
Start: 2022-01-01 | End: 2022-01-01

## 2022-01-01 RX ORDER — POTASSIUM CHLORIDE 750 MG/1
40 TABLET, FILM COATED, EXTENDED RELEASE ORAL AS NEEDED
Status: DISCONTINUED | OUTPATIENT
Start: 2022-01-01 | End: 2022-01-01

## 2022-01-01 RX ORDER — LORAZEPAM 2 MG/ML
2 CONCENTRATE ORAL
Status: CANCELLED | OUTPATIENT
Start: 2022-01-01 | End: 2022-11-16

## 2022-01-01 RX ORDER — POTASSIUM CHLORIDE 1.5 G/1.77G
40 POWDER, FOR SOLUTION ORAL AS NEEDED
Status: DISCONTINUED | OUTPATIENT
Start: 2022-01-01 | End: 2022-01-01 | Stop reason: HOSPADM

## 2022-01-01 RX ORDER — CARVEDILOL 12.5 MG/1
12.5 TABLET ORAL EVERY 12 HOURS SCHEDULED
Status: DISCONTINUED | OUTPATIENT
Start: 2022-01-01 | End: 2022-01-01 | Stop reason: HOSPADM

## 2022-01-01 RX ORDER — CARVEDILOL 6.25 MG/1
6.25 TABLET ORAL 2 TIMES DAILY WITH MEALS
Qty: 90 TABLET | Refills: 1 | Status: SHIPPED | OUTPATIENT
Start: 2022-01-01 | End: 2022-01-01 | Stop reason: HOSPADM

## 2022-01-01 RX ORDER — ACETAMINOPHEN 650 MG/1
650 SUPPOSITORY RECTAL EVERY 4 HOURS PRN
Status: CANCELLED | OUTPATIENT
Start: 2022-01-01

## 2022-01-01 RX ORDER — KETOROLAC TROMETHAMINE 15 MG/ML
15 INJECTION, SOLUTION INTRAMUSCULAR; INTRAVENOUS EVERY 6 HOURS PRN
Status: DISCONTINUED | OUTPATIENT
Start: 2022-01-01 | End: 2022-01-01 | Stop reason: HOSPADM

## 2022-01-01 RX ORDER — LORAZEPAM 2 MG/ML
2 CONCENTRATE ORAL
Status: DISCONTINUED | OUTPATIENT
Start: 2022-01-01 | End: 2022-01-01 | Stop reason: HOSPADM

## 2022-01-01 RX ORDER — DIPHENOXYLATE HYDROCHLORIDE AND ATROPINE SULFATE 2.5; .025 MG/1; MG/1
1 TABLET ORAL
Status: DISCONTINUED | OUTPATIENT
Start: 2022-01-01 | End: 2022-01-01

## 2022-01-01 RX ORDER — MORPHINE SULFATE 20 MG/ML
20 SOLUTION ORAL
Status: DISCONTINUED | OUTPATIENT
Start: 2022-01-01 | End: 2022-01-01 | Stop reason: HOSPADM

## 2022-01-01 RX ORDER — FUROSEMIDE 20 MG/1
TABLET ORAL
COMMUNITY
Start: 2022-01-01 | End: 2022-01-01 | Stop reason: HOSPADM

## 2022-01-01 RX ORDER — NICOTINE POLACRILEX 4 MG
15 LOZENGE BUCCAL
Status: DISCONTINUED | OUTPATIENT
Start: 2022-01-01 | End: 2022-01-01

## 2022-01-01 RX ORDER — MORPHINE SULFATE 20 MG/ML
5 SOLUTION ORAL
Status: DISCONTINUED | OUTPATIENT
Start: 2022-01-01 | End: 2022-01-01 | Stop reason: HOSPADM

## 2022-01-01 RX ORDER — ASPIRIN 81 MG/1
81 TABLET, CHEWABLE ORAL DAILY
Status: DISCONTINUED | OUTPATIENT
Start: 2022-01-01 | End: 2022-01-01 | Stop reason: HOSPADM

## 2022-01-01 RX ORDER — DOCUSATE SODIUM 100 MG/1
100 CAPSULE, LIQUID FILLED ORAL DAILY
Status: DISCONTINUED | OUTPATIENT
Start: 2022-01-01 | End: 2022-01-01

## 2022-01-01 RX ORDER — DEXTROSE MONOHYDRATE 25 G/50ML
25 INJECTION, SOLUTION INTRAVENOUS
Status: DISCONTINUED | OUTPATIENT
Start: 2022-01-01 | End: 2022-01-01

## 2022-01-01 RX ORDER — DEXTROSE MONOHYDRATE 25 G/50ML
25 INJECTION, SOLUTION INTRAVENOUS
Status: DISCONTINUED | OUTPATIENT
Start: 2022-01-01 | End: 2022-01-01 | Stop reason: HOSPADM

## 2022-01-01 RX ORDER — LORAZEPAM 2 MG/ML
0.5 CONCENTRATE ORAL
Status: CANCELLED | OUTPATIENT
Start: 2022-01-01 | End: 2022-11-16

## 2022-01-01 RX ORDER — ALUMINA, MAGNESIA, AND SIMETHICONE 2400; 2400; 240 MG/30ML; MG/30ML; MG/30ML
7.5 SUSPENSION ORAL EVERY 4 HOURS PRN
Status: DISCONTINUED | OUTPATIENT
Start: 2022-01-01 | End: 2022-01-01 | Stop reason: HOSPADM

## 2022-01-01 RX ORDER — ACETAMINOPHEN 160 MG/5ML
650 SOLUTION ORAL EVERY 4 HOURS PRN
Status: CANCELLED | OUTPATIENT
Start: 2022-01-01

## 2022-01-01 RX ORDER — MORPHINE SULFATE 20 MG/ML
10 SOLUTION ORAL
Status: CANCELLED | OUTPATIENT
Start: 2022-01-01 | End: 2022-11-16

## 2022-01-01 RX ORDER — INSULIN LISPRO 100 [IU]/ML
0-14 INJECTION, SOLUTION INTRAVENOUS; SUBCUTANEOUS EVERY 4 HOURS
Status: DISCONTINUED | OUTPATIENT
Start: 2022-01-01 | End: 2022-01-01

## 2022-01-01 RX ORDER — CARVEDILOL 6.25 MG/1
6.25 TABLET ORAL 2 TIMES DAILY WITH MEALS
Qty: 60 TABLET | Refills: 0 | Status: SHIPPED | OUTPATIENT
Start: 2022-01-01 | End: 2022-01-01 | Stop reason: SDUPTHER

## 2022-01-01 RX ORDER — HYDROCODONE BITARTRATE AND ACETAMINOPHEN 5; 325 MG/1; MG/1
1 TABLET ORAL EVERY 4 HOURS PRN
Status: DISCONTINUED | OUTPATIENT
Start: 2022-01-01 | End: 2022-01-01 | Stop reason: HOSPADM

## 2022-01-01 RX ORDER — CARVEDILOL 6.25 MG/1
6.25 TABLET ORAL 2 TIMES DAILY WITH MEALS
Qty: 60 TABLET | Refills: 0 | Status: SHIPPED | OUTPATIENT
Start: 2022-01-01 | End: 2022-01-01

## 2022-01-01 RX ORDER — FENTANYL CITRATE 50 UG/ML
25 INJECTION, SOLUTION INTRAMUSCULAR; INTRAVENOUS
Status: DISCONTINUED | OUTPATIENT
Start: 2022-01-01 | End: 2022-01-01

## 2022-01-01 RX ORDER — CARVEDILOL 6.25 MG/1
TABLET ORAL
Qty: 60 TABLET | Refills: 0 | Status: SHIPPED | OUTPATIENT
Start: 2022-01-01 | End: 2022-01-01 | Stop reason: SDUPTHER

## 2022-01-01 RX ORDER — MAGNESIUM GLUCONATE 27 MG(500)
27 TABLET ORAL
COMMUNITY

## 2022-01-01 RX ORDER — BISACODYL 10 MG
10 SUPPOSITORY, RECTAL RECTAL DAILY PRN
Status: DISCONTINUED | OUTPATIENT
Start: 2022-01-01 | End: 2022-01-01 | Stop reason: HOSPADM

## 2022-01-01 RX ORDER — FUROSEMIDE 40 MG/1
40 TABLET ORAL DAILY
Status: DISCONTINUED | OUTPATIENT
Start: 2022-01-01 | End: 2022-01-01 | Stop reason: HOSPADM

## 2022-01-01 RX ORDER — HYDRALAZINE HYDROCHLORIDE 20 MG/ML
10 INJECTION INTRAMUSCULAR; INTRAVENOUS EVERY 6 HOURS PRN
Status: DISCONTINUED | OUTPATIENT
Start: 2022-01-01 | End: 2022-01-01 | Stop reason: HOSPADM

## 2022-01-01 RX ORDER — CARVEDILOL 12.5 MG/1
12.5 TABLET ORAL EVERY 12 HOURS SCHEDULED
Start: 2022-01-01

## 2022-01-01 RX ORDER — POTASSIUM CHLORIDE 1.5 G/1.77G
40 POWDER, FOR SOLUTION ORAL AS NEEDED
Status: DISCONTINUED | OUTPATIENT
Start: 2022-01-01 | End: 2022-01-01

## 2022-01-01 RX ADMIN — SODIUM CHLORIDE 5 MG/HR: 9 INJECTION, SOLUTION INTRAVENOUS at 20:10

## 2022-01-01 RX ADMIN — PERFLUTREN 3 ML: 6.52 INJECTION, SUSPENSION INTRAVENOUS at 15:02

## 2022-01-01 RX ADMIN — ASPIRIN 81 MG: 81 TABLET, CHEWABLE ORAL at 08:47

## 2022-01-01 RX ADMIN — INSULIN LISPRO 4 UNITS: 100 INJECTION, SOLUTION INTRAVENOUS; SUBCUTANEOUS at 17:13

## 2022-01-01 RX ADMIN — REMDESIVIR 200 MG: 100 INJECTION, POWDER, LYOPHILIZED, FOR SOLUTION INTRAVENOUS at 16:34

## 2022-01-01 RX ADMIN — SODIUM CHLORIDE 15 MG/HR: 9 INJECTION, SOLUTION INTRAVENOUS at 07:25

## 2022-01-01 RX ADMIN — ONDANSETRON 4 MG: 2 INJECTION INTRAMUSCULAR; INTRAVENOUS at 17:15

## 2022-01-01 RX ADMIN — Medication 10 ML: at 20:57

## 2022-01-01 RX ADMIN — SODIUM CHLORIDE 7.5 MG/HR: 9 INJECTION, SOLUTION INTRAVENOUS at 15:42

## 2022-01-01 RX ADMIN — MULTIPLE VITAMINS W/ MINERALS TAB 1 TABLET: TAB at 08:47

## 2022-01-01 RX ADMIN — BARIUM SULFATE 4 ML: 980 POWDER, FOR SUSPENSION ORAL at 09:43

## 2022-01-01 RX ADMIN — SODIUM CHLORIDE 5 MG/HR: 9 INJECTION, SOLUTION INTRAVENOUS at 09:29

## 2022-01-01 RX ADMIN — INSULIN LISPRO 2 UNITS: 100 INJECTION, SOLUTION INTRAVENOUS; SUBCUTANEOUS at 09:01

## 2022-01-01 RX ADMIN — SODIUM CHLORIDE 10 MG/HR: 9 INJECTION, SOLUTION INTRAVENOUS at 12:39

## 2022-01-01 RX ADMIN — ATORVASTATIN CALCIUM 40 MG: 20 TABLET, FILM COATED ORAL at 20:02

## 2022-01-01 RX ADMIN — LISINOPRIL 20 MG: 20 TABLET ORAL at 09:00

## 2022-01-01 RX ADMIN — ATORVASTATIN CALCIUM 40 MG: 20 TABLET, FILM COATED ORAL at 20:54

## 2022-01-01 RX ADMIN — Medication 10 ML: at 21:01

## 2022-01-01 RX ADMIN — POTASSIUM CHLORIDE 40 MEQ: 750 TABLET, EXTENDED RELEASE ORAL at 17:04

## 2022-01-01 RX ADMIN — LORAZEPAM 2 MG: 2 INJECTION INTRAMUSCULAR; INTRAVENOUS at 14:59

## 2022-01-01 RX ADMIN — Medication 2000 UNITS: at 06:56

## 2022-01-01 RX ADMIN — SODIUM CHLORIDE 12.5 MG/HR: 9 INJECTION, SOLUTION INTRAVENOUS at 12:00

## 2022-01-01 RX ADMIN — Medication 10 ML: at 20:53

## 2022-01-01 RX ADMIN — MORPHINE SULFATE 4 MG: 4 INJECTION, SOLUTION INTRAMUSCULAR; INTRAVENOUS at 09:45

## 2022-01-01 RX ADMIN — METFORMIN HYDROCHLORIDE 500 MG: 500 TABLET ORAL at 08:21

## 2022-01-01 RX ADMIN — BARIUM SULFATE 4 ML: 980 POWDER, FOR SUSPENSION ORAL at 11:06

## 2022-01-01 RX ADMIN — INSULIN LISPRO 3 UNITS: 100 INJECTION, SOLUTION INTRAVENOUS; SUBCUTANEOUS at 17:56

## 2022-01-01 RX ADMIN — ENOXAPARIN SODIUM 40 MG: 100 INJECTION SUBCUTANEOUS at 14:32

## 2022-01-01 RX ADMIN — Medication 10 ML: at 08:41

## 2022-01-01 RX ADMIN — METOPROLOL TARTRATE 5 MG: 1 INJECTION, SOLUTION INTRAVENOUS at 18:24

## 2022-01-01 RX ADMIN — ALLOPURINOL 300 MG: 300 TABLET ORAL at 09:33

## 2022-01-01 RX ADMIN — MULTIPLE VITAMINS W/ MINERALS TAB 1 TABLET: TAB at 08:55

## 2022-01-01 RX ADMIN — ASPIRIN 81 MG: 81 TABLET, CHEWABLE ORAL at 08:41

## 2022-01-01 RX ADMIN — FUROSEMIDE 40 MG: 40 TABLET ORAL at 09:35

## 2022-01-01 RX ADMIN — GUAIFENESIN 1200 MG: 600 TABLET, EXTENDED RELEASE ORAL at 20:54

## 2022-01-01 RX ADMIN — TERAZOSIN 1 MG: 1 CAPSULE ORAL at 21:01

## 2022-01-01 RX ADMIN — INSULIN LISPRO 3 UNITS: 100 INJECTION, SOLUTION INTRAVENOUS; SUBCUTANEOUS at 12:00

## 2022-01-01 RX ADMIN — INSULIN LISPRO 2 UNITS: 100 INJECTION, SOLUTION INTRAVENOUS; SUBCUTANEOUS at 18:05

## 2022-01-01 RX ADMIN — Medication 10 ML: at 09:36

## 2022-01-01 RX ADMIN — TERAZOSIN 1 MG: 1 CAPSULE ORAL at 20:57

## 2022-01-01 RX ADMIN — CARVEDILOL 12.5 MG: 12.5 TABLET, FILM COATED ORAL at 20:53

## 2022-01-01 RX ADMIN — TERAZOSIN 1 MG: 1 CAPSULE ORAL at 02:37

## 2022-01-01 RX ADMIN — TERAZOSIN 1 MG: 1 CAPSULE ORAL at 20:02

## 2022-01-01 RX ADMIN — POTASSIUM CHLORIDE 40 MEQ: 750 TABLET, EXTENDED RELEASE ORAL at 21:10

## 2022-01-01 RX ADMIN — CLOPIDOGREL 75 MG: 75 TABLET, FILM COATED ORAL at 09:33

## 2022-01-01 RX ADMIN — MAGNESIUM SULFATE HEPTAHYDRATE 2 G: 2 INJECTION, SOLUTION INTRAVENOUS at 16:53

## 2022-01-01 RX ADMIN — ALLOPURINOL 300 MG: 300 TABLET ORAL at 08:38

## 2022-01-01 RX ADMIN — SODIUM CHLORIDE 75 ML/HR: 9 INJECTION, SOLUTION INTRAVENOUS at 16:34

## 2022-01-01 RX ADMIN — SODIUM CHLORIDE 15 MG/HR: 9 INJECTION, SOLUTION INTRAVENOUS at 05:28

## 2022-01-01 RX ADMIN — ALLOPURINOL 300 MG: 300 TABLET ORAL at 08:21

## 2022-01-01 RX ADMIN — INSULIN LISPRO 2 UNITS: 100 INJECTION, SOLUTION INTRAVENOUS; SUBCUTANEOUS at 22:58

## 2022-01-01 RX ADMIN — ENOXAPARIN SODIUM 40 MG: 100 INJECTION SUBCUTANEOUS at 08:41

## 2022-01-01 RX ADMIN — BARIUM SULFATE 55 ML: 0.81 POWDER, FOR SUSPENSION ORAL at 11:06

## 2022-01-01 RX ADMIN — CARVEDILOL 12.5 MG: 12.5 TABLET, FILM COATED ORAL at 20:14

## 2022-01-01 RX ADMIN — INSULIN LISPRO 2 UNITS: 100 INJECTION, SOLUTION INTRAVENOUS; SUBCUTANEOUS at 18:00

## 2022-01-01 RX ADMIN — SODIUM CHLORIDE 5 MG/HR: 9 INJECTION, SOLUTION INTRAVENOUS at 05:59

## 2022-01-01 RX ADMIN — FUROSEMIDE 40 MG: 10 INJECTION, SOLUTION INTRAMUSCULAR; INTRAVENOUS at 20:01

## 2022-01-01 RX ADMIN — LORAZEPAM 1 MG: 2 INJECTION INTRAMUSCULAR; INTRAVENOUS at 04:32

## 2022-01-01 RX ADMIN — INSULIN LISPRO 3 UNITS: 100 INJECTION, SOLUTION INTRAVENOUS; SUBCUTANEOUS at 11:51

## 2022-01-01 RX ADMIN — PANTOPRAZOLE SODIUM 40 MG: 40 TABLET, DELAYED RELEASE ORAL at 06:09

## 2022-01-01 RX ADMIN — ASPIRIN 81 MG: 81 TABLET, CHEWABLE ORAL at 08:53

## 2022-01-01 RX ADMIN — GUAIFENESIN 1200 MG: 600 TABLET, EXTENDED RELEASE ORAL at 08:21

## 2022-01-01 RX ADMIN — GUAIFENESIN 1200 MG: 600 TABLET, EXTENDED RELEASE ORAL at 21:23

## 2022-01-01 RX ADMIN — TECHNETIUM TC 99M SESTAMIBI 1 DOSE: 1 INJECTION INTRAVENOUS at 11:10

## 2022-01-01 RX ADMIN — ALLOPURINOL 300 MG: 300 TABLET ORAL at 20:54

## 2022-01-01 RX ADMIN — INSULIN LISPRO 2 UNITS: 100 INJECTION, SOLUTION INTRAVENOUS; SUBCUTANEOUS at 08:41

## 2022-01-01 RX ADMIN — ASPIRIN 81 MG: 81 TABLET, CHEWABLE ORAL at 09:33

## 2022-01-01 RX ADMIN — INSULIN LISPRO 2 UNITS: 100 INJECTION, SOLUTION INTRAVENOUS; SUBCUTANEOUS at 08:47

## 2022-01-01 RX ADMIN — ALLOPURINOL 300 MG: 300 TABLET ORAL at 08:55

## 2022-01-01 RX ADMIN — ALLOPURINOL 300 MG: 300 TABLET ORAL at 08:58

## 2022-01-01 RX ADMIN — SODIUM CHLORIDE 15 MG/HR: 9 INJECTION, SOLUTION INTRAVENOUS at 14:29

## 2022-01-01 RX ADMIN — PANTOPRAZOLE SODIUM 40 MG: 40 TABLET, DELAYED RELEASE ORAL at 06:01

## 2022-01-01 RX ADMIN — PANTOPRAZOLE SODIUM 40 MG: 40 TABLET, DELAYED RELEASE ORAL at 06:36

## 2022-01-01 RX ADMIN — INSULIN LISPRO 2 UNITS: 100 INJECTION, SOLUTION INTRAVENOUS; SUBCUTANEOUS at 09:40

## 2022-01-01 RX ADMIN — ATORVASTATIN CALCIUM 40 MG: 20 TABLET, FILM COATED ORAL at 21:00

## 2022-01-01 RX ADMIN — INSULIN LISPRO 2 UNITS: 100 INJECTION, SOLUTION INTRAVENOUS; SUBCUTANEOUS at 08:38

## 2022-01-01 RX ADMIN — CARVEDILOL 6.25 MG: 6.25 TABLET, FILM COATED ORAL at 17:05

## 2022-01-01 RX ADMIN — LORAZEPAM 1 MG: 2 INJECTION INTRAMUSCULAR; INTRAVENOUS at 18:32

## 2022-01-01 RX ADMIN — METOPROLOL TARTRATE 5 MG: 1 INJECTION, SOLUTION INTRAVENOUS at 11:57

## 2022-01-01 RX ADMIN — GUAIFENESIN 1200 MG: 600 TABLET, EXTENDED RELEASE ORAL at 08:41

## 2022-01-01 RX ADMIN — INSULIN LISPRO 3 UNITS: 100 INJECTION, SOLUTION INTRAVENOUS; SUBCUTANEOUS at 18:24

## 2022-01-01 RX ADMIN — CARVEDILOL 12.5 MG: 12.5 TABLET, FILM COATED ORAL at 12:34

## 2022-01-01 RX ADMIN — MORPHINE SULFATE 2 MG: 2 INJECTION, SOLUTION INTRAMUSCULAR; INTRAVENOUS at 20:47

## 2022-01-01 RX ADMIN — Medication 10 ML: at 21:24

## 2022-01-01 RX ADMIN — INFLUENZA A VIRUS A/VICTORIA/2570/2019 IVR-215 (H1N1) ANTIGEN (FORMALDEHYDE INACTIVATED), INFLUENZA A VIRUS A/DARWIN/9/2021 SAN-010 (H3N2) ANTIGEN (FORMALDEHYDE INACTIVATED), INFLUENZA B VIRUS B/PHUKET/3073/2013 ANTIGEN (FORMALDEHYDE INACTIVATED), AND INFLUENZA B VIRUS B/MICHIGAN/01/2021 ANTIGEN (FORMALDEHYDE INACTIVATED) 0.7 ML: 60; 60; 60; 60 INJECTION, SUSPENSION INTRAMUSCULAR at 12:58

## 2022-01-01 RX ADMIN — INSULIN LISPRO 2 UNITS: 100 INJECTION, SOLUTION INTRAVENOUS; SUBCUTANEOUS at 12:03

## 2022-01-01 RX ADMIN — CARVEDILOL 6.25 MG: 6.25 TABLET, FILM COATED ORAL at 18:05

## 2022-01-01 RX ADMIN — LORAZEPAM 0.5 MG: 2 INJECTION INTRAMUSCULAR; INTRAVENOUS at 20:47

## 2022-01-01 RX ADMIN — METOPROLOL TARTRATE 5 MG: 1 INJECTION, SOLUTION INTRAVENOUS at 05:45

## 2022-01-01 RX ADMIN — Medication 10 ML: at 11:30

## 2022-01-01 RX ADMIN — INSULIN LISPRO 4 UNITS: 100 INJECTION, SOLUTION INTRAVENOUS; SUBCUTANEOUS at 20:02

## 2022-01-01 RX ADMIN — PANTOPRAZOLE SODIUM 40 MG: 40 TABLET, DELAYED RELEASE ORAL at 06:06

## 2022-01-01 RX ADMIN — ASPIRIN 81 MG: 81 TABLET, CHEWABLE ORAL at 16:33

## 2022-01-01 RX ADMIN — LISINOPRIL 20 MG: 20 TABLET ORAL at 22:35

## 2022-01-01 RX ADMIN — FUROSEMIDE 40 MG: 40 TABLET ORAL at 14:31

## 2022-01-01 RX ADMIN — REGADENOSON 0.4 MG: 0.08 INJECTION, SOLUTION INTRAVENOUS at 13:10

## 2022-01-01 RX ADMIN — ENOXAPARIN SODIUM 40 MG: 100 INJECTION SUBCUTANEOUS at 08:21

## 2022-01-01 RX ADMIN — REMDESIVIR 100 MG: 100 INJECTION, POWDER, LYOPHILIZED, FOR SOLUTION INTRAVENOUS at 14:21

## 2022-01-01 RX ADMIN — SODIUM CHLORIDE 7.5 MG/HR: 9 INJECTION, SOLUTION INTRAVENOUS at 09:51

## 2022-01-01 RX ADMIN — INSULIN LISPRO 5 UNITS: 100 INJECTION, SOLUTION INTRAVENOUS; SUBCUTANEOUS at 11:57

## 2022-01-01 RX ADMIN — MORPHINE SULFATE 4 MG: 4 INJECTION, SOLUTION INTRAMUSCULAR; INTRAVENOUS at 04:32

## 2022-01-01 RX ADMIN — TERAZOSIN 1 MG: 1 CAPSULE ORAL at 20:54

## 2022-01-01 RX ADMIN — GUAIFENESIN 1200 MG: 600 TABLET, EXTENDED RELEASE ORAL at 20:57

## 2022-01-01 RX ADMIN — MORPHINE SULFATE 4 MG: 2 INJECTION, SOLUTION INTRAMUSCULAR; INTRAVENOUS at 18:23

## 2022-01-01 RX ADMIN — BARIUM SULFATE 1 TEASPOON(S): 0.6 CREAM ORAL at 11:06

## 2022-01-01 RX ADMIN — INSULIN LISPRO 2 UNITS: 100 INJECTION, SOLUTION INTRAVENOUS; SUBCUTANEOUS at 08:55

## 2022-01-01 RX ADMIN — FUROSEMIDE 40 MG: 10 INJECTION, SOLUTION INTRAMUSCULAR; INTRAVENOUS at 08:53

## 2022-01-01 RX ADMIN — Medication 10 ML: at 20:05

## 2022-01-01 RX ADMIN — Medication 10 ML: at 08:54

## 2022-01-01 RX ADMIN — SODIUM CHLORIDE 7.5 MG/HR: 9 INJECTION, SOLUTION INTRAVENOUS at 08:21

## 2022-01-01 RX ADMIN — REMDESIVIR 100 MG: 100 INJECTION, POWDER, LYOPHILIZED, FOR SOLUTION INTRAVENOUS at 14:59

## 2022-01-01 RX ADMIN — MORPHINE SULFATE 2 MG: 2 INJECTION, SOLUTION INTRAMUSCULAR; INTRAVENOUS at 11:23

## 2022-01-01 RX ADMIN — LISINOPRIL 20 MG: 20 TABLET ORAL at 08:52

## 2022-01-01 RX ADMIN — Medication 10 ML: at 20:15

## 2022-01-01 RX ADMIN — CLOPIDOGREL 75 MG: 75 TABLET, FILM COATED ORAL at 08:47

## 2022-01-01 RX ADMIN — CARVEDILOL 12.5 MG: 12.5 TABLET, FILM COATED ORAL at 08:47

## 2022-01-01 RX ADMIN — Medication 10 ML: at 08:55

## 2022-01-01 RX ADMIN — SODIUM CHLORIDE 15 MG/HR: 9 INJECTION, SOLUTION INTRAVENOUS at 23:01

## 2022-01-01 RX ADMIN — INSULIN LISPRO 4 UNITS: 100 INJECTION, SOLUTION INTRAVENOUS; SUBCUTANEOUS at 21:01

## 2022-01-01 RX ADMIN — MORPHINE SULFATE 4 MG: 2 INJECTION, SOLUTION INTRAMUSCULAR; INTRAVENOUS at 05:05

## 2022-01-01 RX ADMIN — TERAZOSIN 1 MG: 1 CAPSULE ORAL at 20:53

## 2022-01-01 RX ADMIN — BARIUM SULFATE 50 ML: 400 SUSPENSION ORAL at 09:44

## 2022-01-01 RX ADMIN — ASPIRIN 81 MG: 81 TABLET, CHEWABLE ORAL at 08:21

## 2022-01-01 RX ADMIN — Medication 10 ML: at 08:56

## 2022-01-01 RX ADMIN — INSULIN LISPRO 3 UNITS: 100 INJECTION, SOLUTION INTRAVENOUS; SUBCUTANEOUS at 21:01

## 2022-01-01 RX ADMIN — INSULIN LISPRO 3 UNITS: 100 INJECTION, SOLUTION INTRAVENOUS; SUBCUTANEOUS at 08:30

## 2022-01-01 RX ADMIN — Medication 10 ML: at 20:55

## 2022-01-01 RX ADMIN — BARIUM SULFATE 50 ML: 400 SUSPENSION ORAL at 11:06

## 2022-01-01 RX ADMIN — Medication 10 ML: at 20:12

## 2022-01-01 RX ADMIN — PANTOPRAZOLE SODIUM 40 MG: 40 TABLET, DELAYED RELEASE ORAL at 05:33

## 2022-01-01 RX ADMIN — ATORVASTATIN CALCIUM 40 MG: 20 TABLET, FILM COATED ORAL at 20:53

## 2022-01-01 RX ADMIN — CARVEDILOL 12.5 MG: 12.5 TABLET, FILM COATED ORAL at 20:57

## 2022-01-01 RX ADMIN — MAGNESIUM SULFATE HEPTAHYDRATE 2 G: 2 INJECTION, SOLUTION INTRAVENOUS at 15:25

## 2022-01-01 RX ADMIN — ENOXAPARIN SODIUM 40 MG: 100 INJECTION SUBCUTANEOUS at 08:47

## 2022-01-01 RX ADMIN — SODIUM CHLORIDE 10 MG/HR: 9 INJECTION, SOLUTION INTRAVENOUS at 10:14

## 2022-01-01 RX ADMIN — SODIUM CHLORIDE 15 MG/HR: 9 INJECTION, SOLUTION INTRAVENOUS at 16:38

## 2022-01-01 RX ADMIN — LISINOPRIL 20 MG: 20 TABLET ORAL at 20:02

## 2022-01-01 RX ADMIN — MULTIPLE VITAMINS W/ MINERALS TAB 1 TABLET: TAB at 08:41

## 2022-01-01 RX ADMIN — INSULIN LISPRO 3 UNITS: 100 INJECTION, SOLUTION INTRAVENOUS; SUBCUTANEOUS at 03:42

## 2022-01-01 RX ADMIN — INSULIN LISPRO 2 UNITS: 100 INJECTION, SOLUTION INTRAVENOUS; SUBCUTANEOUS at 17:38

## 2022-01-01 RX ADMIN — INSULIN LISPRO 2 UNITS: 100 INJECTION, SOLUTION INTRAVENOUS; SUBCUTANEOUS at 21:28

## 2022-01-01 RX ADMIN — INSULIN LISPRO 3 UNITS: 100 INJECTION, SOLUTION INTRAVENOUS; SUBCUTANEOUS at 23:43

## 2022-01-01 RX ADMIN — Medication 10 ML: at 08:38

## 2022-01-01 RX ADMIN — LORAZEPAM 2 MG: 2 INJECTION INTRAMUSCULAR; INTRAVENOUS at 09:44

## 2022-01-01 RX ADMIN — CLOPIDOGREL 75 MG: 75 TABLET, FILM COATED ORAL at 08:55

## 2022-01-01 RX ADMIN — Medication 10 ML: at 08:21

## 2022-01-01 RX ADMIN — ALLOPURINOL 300 MG: 300 TABLET ORAL at 08:53

## 2022-01-01 RX ADMIN — GUAIFENESIN 1200 MG: 600 TABLET, EXTENDED RELEASE ORAL at 08:55

## 2022-01-01 RX ADMIN — CLOPIDOGREL 75 MG: 75 TABLET, FILM COATED ORAL at 08:21

## 2022-01-01 RX ADMIN — ENOXAPARIN SODIUM 40 MG: 100 INJECTION SUBCUTANEOUS at 08:38

## 2022-01-01 RX ADMIN — GUAIFENESIN 1200 MG: 600 TABLET, EXTENDED RELEASE ORAL at 21:01

## 2022-01-01 RX ADMIN — CLOPIDOGREL 75 MG: 75 TABLET, FILM COATED ORAL at 16:33

## 2022-01-01 RX ADMIN — ENOXAPARIN SODIUM 40 MG: 100 INJECTION SUBCUTANEOUS at 16:33

## 2022-01-01 RX ADMIN — SODIUM CHLORIDE 15 MG/HR: 9 INJECTION, SOLUTION INTRAVENOUS at 18:27

## 2022-01-01 RX ADMIN — INSULIN LISPRO 3 UNITS: 100 INJECTION, SOLUTION INTRAVENOUS; SUBCUTANEOUS at 20:05

## 2022-01-01 RX ADMIN — INSULIN LISPRO 3 UNITS: 100 INJECTION, SOLUTION INTRAVENOUS; SUBCUTANEOUS at 00:30

## 2022-01-01 RX ADMIN — CARVEDILOL 12.5 MG: 12.5 TABLET, FILM COATED ORAL at 21:23

## 2022-01-01 RX ADMIN — ASPIRIN 81 MG: 81 TABLET, CHEWABLE ORAL at 08:58

## 2022-01-01 RX ADMIN — CLOPIDOGREL 75 MG: 75 TABLET, FILM COATED ORAL at 08:38

## 2022-01-01 RX ADMIN — INSULIN LISPRO 2 UNITS: 100 INJECTION, SOLUTION INTRAVENOUS; SUBCUTANEOUS at 08:21

## 2022-01-01 RX ADMIN — TERAZOSIN 1 MG: 1 CAPSULE ORAL at 21:23

## 2022-01-01 RX ADMIN — SODIUM CHLORIDE 6 MG/HR: 9 INJECTION, SOLUTION INTRAVENOUS at 12:53

## 2022-01-01 RX ADMIN — MULTIPLE VITAMINS W/ MINERALS TAB 1 TABLET: TAB at 16:33

## 2022-01-01 RX ADMIN — LISINOPRIL 20 MG: 20 TABLET ORAL at 09:33

## 2022-01-01 RX ADMIN — IOPAMIDOL 85 ML: 755 INJECTION, SOLUTION INTRAVENOUS at 18:56

## 2022-01-01 RX ADMIN — ALLOPURINOL 300 MG: 300 TABLET ORAL at 08:47

## 2022-01-01 RX ADMIN — GUAIFENESIN 1200 MG: 600 TABLET, EXTENDED RELEASE ORAL at 08:47

## 2022-01-01 RX ADMIN — GUAIFENESIN 1200 MG: 600 TABLET, EXTENDED RELEASE ORAL at 08:38

## 2022-01-01 RX ADMIN — GUAIFENESIN 1200 MG: 600 TABLET, EXTENDED RELEASE ORAL at 20:14

## 2022-01-01 RX ADMIN — SODIUM CHLORIDE 15 MG/HR: 9 INJECTION, SOLUTION INTRAVENOUS at 21:20

## 2022-01-01 RX ADMIN — Medication 10 ML: at 20:02

## 2022-01-01 RX ADMIN — PERFLUTREN 2 ML: 6.52 INJECTION, SUSPENSION INTRAVENOUS at 12:29

## 2022-01-01 RX ADMIN — ATORVASTATIN CALCIUM 40 MG: 20 TABLET, FILM COATED ORAL at 02:37

## 2022-01-01 RX ADMIN — CLOPIDOGREL 75 MG: 75 TABLET, FILM COATED ORAL at 08:58

## 2022-01-01 RX ADMIN — MORPHINE SULFATE 4 MG: 4 INJECTION, SOLUTION INTRAMUSCULAR; INTRAVENOUS at 12:38

## 2022-01-01 RX ADMIN — IOPAMIDOL 100 ML: 755 INJECTION, SOLUTION INTRAVENOUS at 22:16

## 2022-01-01 RX ADMIN — INSULIN LISPRO 2 UNITS: 100 INJECTION, SOLUTION INTRAVENOUS; SUBCUTANEOUS at 11:45

## 2022-01-01 RX ADMIN — SODIUM CHLORIDE 10 MG/HR: 9 INJECTION, SOLUTION INTRAVENOUS at 10:30

## 2022-01-01 RX ADMIN — INSULIN LISPRO 3 UNITS: 100 INJECTION, SOLUTION INTRAVENOUS; SUBCUTANEOUS at 08:55

## 2022-01-01 RX ADMIN — LISINOPRIL 20 MG: 20 TABLET ORAL at 21:01

## 2022-01-01 RX ADMIN — GLIPIZIDE 5 MG: 5 TABLET ORAL at 06:06

## 2022-01-01 RX ADMIN — MULTIPLE VITAMINS W/ MINERALS TAB 1 TABLET: TAB at 08:21

## 2022-01-01 RX ADMIN — ALBUTEROL SULFATE 2 PUFF: 90 AEROSOL, METERED RESPIRATORY (INHALATION) at 09:09

## 2022-01-01 RX ADMIN — GLIPIZIDE 5 MG: 5 TABLET ORAL at 17:13

## 2022-01-01 RX ADMIN — HYDROMORPHONE HYDROCHLORIDE 1 MG: 1 INJECTION, SOLUTION INTRAMUSCULAR; INTRAVENOUS; SUBCUTANEOUS at 14:58

## 2022-01-01 RX ADMIN — INSULIN LISPRO 3 UNITS: 100 INJECTION, SOLUTION INTRAVENOUS; SUBCUTANEOUS at 21:48

## 2022-01-01 RX ADMIN — CLOPIDOGREL 75 MG: 75 TABLET, FILM COATED ORAL at 08:52

## 2022-01-01 RX ADMIN — SODIUM CHLORIDE 10 MG/HR: 9 INJECTION, SOLUTION INTRAVENOUS at 01:00

## 2022-01-01 RX ADMIN — CARVEDILOL 6.25 MG: 6.25 TABLET, FILM COATED ORAL at 20:02

## 2022-01-01 RX ADMIN — CARVEDILOL 6.25 MG: 6.25 TABLET, FILM COATED ORAL at 08:41

## 2022-01-01 RX ADMIN — INSULIN LISPRO 4 UNITS: 100 INJECTION, SOLUTION INTRAVENOUS; SUBCUTANEOUS at 11:39

## 2022-01-01 RX ADMIN — ATORVASTATIN CALCIUM 40 MG: 20 TABLET, FILM COATED ORAL at 20:57

## 2022-01-01 RX ADMIN — PANTOPRAZOLE SODIUM 40 MG: 40 TABLET, DELAYED RELEASE ORAL at 06:56

## 2022-01-01 RX ADMIN — HYDROMORPHONE HYDROCHLORIDE 1 MG: 1 INJECTION, SOLUTION INTRAMUSCULAR; INTRAVENOUS; SUBCUTANEOUS at 16:45

## 2022-01-01 RX ADMIN — METOPROLOL TARTRATE 5 MG: 1 INJECTION, SOLUTION INTRAVENOUS at 00:30

## 2022-01-01 RX ADMIN — Medication 10 ML: at 21:32

## 2022-01-01 RX ADMIN — CARVEDILOL 6.25 MG: 6.25 TABLET, FILM COATED ORAL at 08:53

## 2022-01-01 RX ADMIN — FUROSEMIDE 40 MG: 10 INJECTION, SOLUTION INTRAMUSCULAR; INTRAVENOUS at 16:47

## 2022-01-01 RX ADMIN — LORAZEPAM 2 MG: 2 INJECTION INTRAMUSCULAR; INTRAVENOUS at 16:45

## 2022-01-01 RX ADMIN — CLOPIDOGREL 75 MG: 75 TABLET, FILM COATED ORAL at 08:41

## 2022-01-01 RX ADMIN — LORAZEPAM 2 MG: 2 INJECTION INTRAMUSCULAR; INTRAVENOUS at 12:38

## 2022-01-01 RX ADMIN — TECHNETIUM TC 99M SESTAMIBI 1 DOSE: 1 INJECTION INTRAVENOUS at 13:10

## 2022-01-01 RX ADMIN — INSULIN LISPRO 2 UNITS: 100 INJECTION, SOLUTION INTRAVENOUS; SUBCUTANEOUS at 12:34

## 2022-01-01 RX ADMIN — ASPIRIN 81 MG: 81 TABLET, CHEWABLE ORAL at 08:38

## 2022-01-01 RX ADMIN — Medication 10 ML: at 22:58

## 2022-01-01 RX ADMIN — ATORVASTATIN CALCIUM 40 MG: 20 TABLET, FILM COATED ORAL at 20:14

## 2022-01-01 RX ADMIN — FUROSEMIDE 40 MG: 10 INJECTION, SOLUTION INTRAMUSCULAR; INTRAVENOUS at 08:59

## 2022-01-01 RX ADMIN — INSULIN LISPRO 3 UNITS: 100 INJECTION, SOLUTION INTRAVENOUS; SUBCUTANEOUS at 11:26

## 2022-01-01 RX ADMIN — ASPIRIN 81 MG: 81 TABLET, CHEWABLE ORAL at 08:55

## 2022-01-01 RX ADMIN — INSULIN LISPRO 4 UNITS: 100 INJECTION, SOLUTION INTRAVENOUS; SUBCUTANEOUS at 13:20

## 2022-01-01 RX ADMIN — ENOXAPARIN SODIUM 40 MG: 100 INJECTION SUBCUTANEOUS at 08:55

## 2022-01-01 RX ADMIN — TERAZOSIN 1 MG: 1 CAPSULE ORAL at 20:14

## 2022-01-01 RX ADMIN — MULTIPLE VITAMINS W/ MINERALS TAB 1 TABLET: TAB at 08:38

## 2022-01-01 RX ADMIN — INSULIN LISPRO 2 UNITS: 100 INJECTION, SOLUTION INTRAVENOUS; SUBCUTANEOUS at 16:48

## 2022-01-01 RX ADMIN — ATORVASTATIN CALCIUM 40 MG: 20 TABLET, FILM COATED ORAL at 21:22

## 2022-01-01 RX ADMIN — Medication 10 ML: at 08:48

## 2022-01-01 RX ADMIN — Medication 10 ML: at 09:01

## 2022-01-01 RX ADMIN — INSULIN LISPRO 4 UNITS: 100 INJECTION, SOLUTION INTRAVENOUS; SUBCUTANEOUS at 17:05

## 2022-01-01 RX ADMIN — CARVEDILOL 6.25 MG: 6.25 TABLET, FILM COATED ORAL at 09:34

## 2022-01-01 RX ADMIN — MAGNESIUM SULFATE HEPTAHYDRATE 2 G: 2 INJECTION, SOLUTION INTRAVENOUS at 18:45

## 2022-01-01 RX ADMIN — CARVEDILOL 12.5 MG: 12.5 TABLET, FILM COATED ORAL at 08:55

## 2022-01-01 RX ADMIN — ENOXAPARIN SODIUM 40 MG: 100 INJECTION SUBCUTANEOUS at 08:59

## 2022-01-01 RX ADMIN — Medication 10 ML: at 09:59

## 2022-01-01 RX ADMIN — SODIUM CHLORIDE 12.5 MG/HR: 9 INJECTION, SOLUTION INTRAVENOUS at 03:39

## 2022-01-01 RX ADMIN — ENOXAPARIN SODIUM 40 MG: 100 INJECTION SUBCUTANEOUS at 09:35

## 2022-01-18 NOTE — PROGRESS NOTES
willowPatient: Eugenio Joe  YOB: 1940 81 y.o. male  Medical Record Number: [id number]    Chief Complaints:   Chief Complaint   Patient presents with   • Right Knee - new patient, Pain       History of Present Illness:Eugenio Joe is a 81 y.o. male who presents with complaints of right knee weakness and instability.  He has a history of normal pressure hydrocephalus.  He uses a standing walker.  At times he feels as though his leg is weak and wants to give way on him.  Its not painful per se.  He has a history of multi ligamentous knee injury reportedly about 20 years ago.  At times he has some left leg instability but much worse on the right    Allergies:   Allergies   Allergen Reactions   • Other Mental Status Change and Hallucinations     Oxy drugs   • Oxycodone Mental Status Change       Medications:   Current Outpatient Medications   Medication Sig Dispense Refill   • Accu-Chek FastClix Lancets misc TEST ONCE TO BID PRN     • acetaminophen (TYLENOL) 500 MG tablet Take 2 tablets by mouth Every 8 (Eight) Hours As Needed for Mild Pain . 30 tablet 0   • allopurinol (ZYLOPRIM) 300 MG tablet Take 300 mg by mouth daily.     • amLODIPine (NORVASC) 5 MG tablet Take 1 tablet by mouth Daily. 90 tablet 3   • aspirin 81 MG chewable tablet Chew 81 mg Daily. WILL VERIFY STOP DATE  WITH MD     • atorvastatin (LIPITOR) 40 MG tablet Take 1 tablet by mouth Every Night.     • betamethasone dipropionate (DIPROLENE) 0.05 % lotion Apply 1 application topically to the appropriate area as directed As Needed for Irritation.     • Cholecalciferol (Vitamin D3) 50 MCG (2000 UT) tablet Take 50 mcg by mouth Every Morning.     • clopidogrel (PLAVIX) 75 MG tablet Take 1 tablet by mouth Daily. 30 tablet    • doxazosin (CARDURA) 1 MG tablet Take 1 mg by mouth Every Morning.     • glipizide (GLUCOTROL) 5 MG tablet Take 5 mg by mouth Daily With Dinner.     • glucose blood (FREESTYLE LITE) test strip 1 each by Other route See  "Admin Instructions. Use 1 to 2 times daily as instructed     • lisinopril (PRINIVIL,ZESTRIL) 20 MG tablet Take 1 tablet by mouth 2 (Two) Times a Day. 180 tablet 2   • metFORMIN (GLUCOPHAGE) 500 MG tablet Take 500 mg by mouth Daily With Breakfast.     • Multiple Vitamins-Minerals (MULTIVITAMIN ADULT PO) Take 1 tablet by mouth Daily.     • ondansetron (ZOFRAN) 4 MG tablet Take 1 tablet by mouth Every 6 (Six) Hours As Needed for Nausea or Vomiting.     • pantoprazole (PROTONIX) 40 MG EC tablet      • enoxaparin (LOVENOX) 40 MG/0.4ML solution syringe INJECT 40 MG SUBCUTANEOUSLY DAILY ASK ORTHO DOCTOR IF NEED TO CONTINUE THIS MED     • metFORMIN (GLUCOPHAGE) 850 MG tablet Take 850 mg by mouth 2 (Two) Times a Day With Meals.     • PROAIR  (90 BASE) MCG/ACT inhaler Inhale 2 puffs Every 4 (Four) Hours As Needed.       No current facility-administered medications for this visit.         The following portions of the patient's history were reviewed and updated as appropriate: allergies, current medications, past family history, past medical history, past social history, past surgical history and problem list.    Review of Systems:   A 14 point review of systems was performed. All systems negative except pertinent positives/negative listed in HPI above    Physical Exam:   Vitals:    01/18/22 1417   Temp: 97.8 °F (36.6 °C)   Weight: 83.9 kg (185 lb)   Height: 175.3 cm (69\")       General: A and O x 3, ASA, NAD    SCLERA:    Normal    DENTITION:   Normal   Walks with a slow short stride length gait.  Nonantalgic.  There is no joint effusion no varus valgus instability in extension no anterior posterior instability in flexion.  The patella tracks midline skin is normal he is intact to light touch palpable distal pulses      Radiology:  Xrays 3views bilateral knee (ap,lateral, sunrise) were ordered and reviewed for evaluation of knee pain demonstrating no abnormality.  There are no previous films or " comparison    Assessment/Plan: Right leg weakness and instability -  I think more likely due to CNS rather than organic knee problems - il send to PT, RTO PRN      Hemanth Maher MD  1/18/2022

## 2022-01-19 NOTE — PROGRESS NOTES
January 19, 2022    A [employer] message has been sent to the patient for [place] [facility]

## 2022-01-31 NOTE — PROGRESS NOTES
Case discussed.  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  Alternatively could be a new finding.  This is to be reviewed again on short follow-up.    JAGUAR

## 2022-01-31 NOTE — PROGRESS NOTES
CC: Follow-up: Previous strokes, probable NPH    HPI:  Eugenio Joe is a  81 y.o.  right-handed male who I am seeing in follow-up regarding history of strokes as well as probable NPH.  He is accompanied by his wife today.  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 Patient was last seen in the office by Dr. Campo on 7/16/2021, summary of his condition is taken from previous note with movements and additions as needed:     Stroke  In June 2019 he was found to have a tiny acute left occipital infarct.  Vascular studies showed atherosclerotic changes without clear-cut hemodynamically significant stenoses.  The origins of the vertebral arteries were however somewhat obscured.  He has no history of atrial fibrillation.  He is on blood pressure and diabetes medications.  He quit smoking in his early 20s.  He was treated medically.  Then in September 2020 he presented to the ED with some speech changes and trouble swallowing, he was found to have an acute lacunar infarct in the left parietal area and a tiny 1 in the right frontal area. Further evaluation demonstrated that he had a PFO which was subsequently closed in November 2020 with no side effects.  He remains on aspirin and Plavix plus atorvastatin 40 mg daily for stroke prevention.     NPH  The patient has had a progressive gait disturbance which dates back several years.  He was seen by Dr. Polanco and evaluated in 2017 for NPH.  Work-up has included MRI scans which have been reviewed per Dr. Campo:  He has mild diffuse atrophy.  The ventricles are larger than expected but the temporal tips are not clearly as ballooned as expected.  The cerebral aqueduct is not significantly enlarged but there is flow void.  Some turbulent flow is seen in the third ventricle.  There is notable chronic white matter  "changes which are moderate in severity.  There is mild cortical atrophy which looks consistent with age.  A radionucleotide cisternogram was obtained which I also reviewed demonstrating ventricular reflux within 4 hours with persistent tracer at 48 hours consistent with NPH.  A lumbar puncture was obtained and the patient's gait was perhaps a bit better for a couple of days and then 3 days out he had sudden transient hearing loss and 2 to 3 days later he had a TIA or stroke.  Shunting was not undertaken.  He sought a second opinion at the [facility] in [place] and they had the same opinion.  She states the term \"possible NPH\" was given.  They did not recommend shunting.     In February 2021 he had an unfortunate accident where he fell apparently due to a rumba robotic vacuum, he sustained a tibial fracture relatively distally in the left leg requiring open reduction internal fixation.  He did have school therapy and it was at this time that he moved from using a cane for ambulation to a stand-up walker, which he is still currently using.  He has some short-term memory loss as his wife would say, though the patient does not seem to notice much memory trouble.  He does not really notice numbness in the feet but his wife indicates that when he saw his primary physician recently he had some patchy numbness in the feet.  He uses depends due to bladder function issues -reports high-volume incontinence.    Interim history  Patient reports in terms of his gait things are more or less unchanged.  He is continuing to use the standup walker.  Incidentally he just recently resumed physical therapy this time for some symptoms he is having involving his right knee, he describes some instability and perceived weakness.  Apparently he saw his orthopedic Dr. Hemanth Maher who took some x-rays and sent him back to PT. He just started this week.  Patient denies any numbness and tingling in his legs or feet.  No significant back " pain though does admit he gets a little bit with prolonged sitting.    In terms of his memory his wife feels like this is slightly improved.  Since the PFO closure patient has stayed out of the hospital, has not had any further strokelike episodes.  She feels like he is done better this past year -seems to be reading more/more engaged with the family.  Today his MMSE score is 28/30.       Past Medical History:   Diagnosis Date   • Arthritis    • Asthma    • Brain abscess     at about age 44   • Bruise of face    • Bursitis of left hip    • Cancer ([facility])     PT STATES IN HIS SWEAT GLAND    • Cardiac disease    • Coronary artery disease     CABG 2012   • Diabetes mellitus ([facility])    • Difficulty in swallowing     LIQUIDS   • Difficulty walking    • Diverticulitis    • DM2 (diabetes mellitus, type 2) ([facility]) 10/12/2016   • Fracture, foot 2015    Dr Pisano   • Fracture, stress, metatarsal    • Fracture, tibia and fibula Feb 2021    Dr Pisano   • Gout several years ago    medication  working   • Hearing aid worn     bilateral   • Hx of appendectomy    • Hyperlipemia    • Hypertension    • Joint pain     or swelling   • Low back pain several years ago   • Metatarsal stress fracture with nonunion    • Normal pressure hydrocephalus ([facility])    • Orbit fracture ([facility])    • Pulmonary embolism ([facility])     OCT 2016 - AFTER FOOT SURGERY   • Rash     ARM-    • Spinal headache     AFTER SPINAL TAP - NO BLOOD PATCH   • Stroke (cerebrum) ([facility]) 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   • Syndesmotic ankle sprain, left, subsequent encounter    • Tear of meniscus of knee torn ligaments   • TIA (transient ischemic attack)     MULTIPLE         Past Surgical History:   Procedure Laterality Date   • ANKLE OPEN REDUCTION INTERNAL FIXATION Left 2/16/2021    Procedure: Left ankle open reduction internal fixation with implants as needed;  Surgeon: Man Jenkins MD;  Location: [facility] OR [facility];  Service: Orthopedics;  Laterality: Left;   •  APPENDECTOMY N/A 7/18/2019    Procedure: APPENDECTOMY LAPAROSCOPIC;  Surgeon: Luis Carols Rick Jr., MD;  Location: [facility] MAIN OR;  Service: General   • BRAIN SURGERY      BRAIN ABCESS 30 YR AGO   • CARDIAC CATHETERIZATION N/A 12/4/2020    Procedure: Patent foramen ovale closure ABBOTT;  Surgeon: Frank Pablo MD;  Location: [facility] CATH INVASIVE LOCATION;  Service: Cardiology;  Laterality: N/A;   • CARDIAC CATHETERIZATION N/A 12/4/2020    Procedure: Intracardiac echocardiogram;  Surgeon: Frank Pablo MD;  Location: [facility] CATH INVASIVE LOCATION;  Service: Cardiology;  Laterality: N/A;   • CARDIAC SURGERY     • COLONOSCOPY  2012    Ciciliano- normal per pt, no polyps    • CORONARY ARTERY BYPASS GRAFT      3 VESSEL   • ENDOSCOPY N/A 3/9/2017    Schatzki ring, dilated, LA Grade B esophagitis, HH, acquired duodenal stenosis   • ENDOSCOPY N/A 4/6/2018    candida, HH, torts, Schatzki ring, duodenal stenosis, dilatation perforemed, chronic inflammation   • ENDOSCOPY N/A 10/9/2019    White nummular lesions in esophageal mucosa, mild Schatzki ring, acquired duodenal stenosis, Path: Terrazas's, candida   • ENDOSCOPY N/A 1/8/2020    Procedure: ESOPHAGOGASTRODUODENOSCOPY with biopsies;  Surgeon: Rigoberto Walker MD;  Location: [facility] ENDOSCOPY;  Service: Gastroenterology   • FACIAL FRACTURE SURGERY      to repair 6 broken bones   • HAND SURGERY     • ORBITAL FRACTURE OPEN REDUCTION INTERNAL FIXATION Right 1/13/2017    Procedure: RT ORBIT FLOOR FRACTURE REPAIR RIGHT [facility] FRACTURE REPAIR, RIGHT NASAL BONE FRACTURE CLOSED REDUCTION;  Surgeon: Edil Lester MD;  Location: [facility] [place] [facility] [place];  Service:    • ORIF FOOT FRACTURE Right 9/9/2016    Procedure: RIGHT SECOND METATARSAL OPEN REDUCTION INTERNAL FIXATION WITh GRAFT FROM HEEL ;  Surgeon: Man Jenkins MD;  Location: [facility] OR [facility] [place];  Service:    • PATENT FORAMEN OVALE CLOSURE     • SEPTOPLASTY     • SKIN CANCER EXCISION     • TONSILLECTOMY     •  TRANSESOPHAGEAL ECHOCARDIOGRAM (STELLA)             Current Outpatient Medications:   •  acetaminophen (TYLENOL) 500 MG tablet, Take 2 tablets by mouth Every 8 (Eight) Hours As Needed for Mild Pain ., Disp: 30 tablet, Rfl: 0  •  allopurinol (ZYLOPRIM) 300 MG tablet, Take 300 mg by mouth daily., Disp: , Rfl:   •  amLODIPine (NORVASC) 5 MG tablet, Take 1 tablet by mouth Daily., Disp: 90 tablet, Rfl: 3  •  aspirin 81 MG chewable tablet, Chew 81 mg Daily. WILL VERIFY STOP DATE WITH MD, Disp: , Rfl:   •  atorvastatin (LIPITOR) 40 MG tablet, Take 1 tablet by mouth Every Night., Disp: , Rfl:   •  betamethasone dipropionate (DIPROLENE) 0.05 % lotion, Apply 1 application topically to the appropriate area as directed As Needed for Irritation., Disp: , Rfl:   •  Cholecalciferol (Vitamin D3) 50 MCG (2000 UT) tablet, Take 50 mcg by mouth Every Morning., Disp: , Rfl:   •  clopidogrel (PLAVIX) 75 MG tablet, Take 1 tablet by mouth Daily., Disp: 30 tablet, Rfl:   •  doxazosin (CARDURA) 1 MG tablet, Take 1 mg by mouth Every Morning., Disp: , Rfl:   •  glipizide (GLUCOTROL) 5 MG tablet, Take 5 mg by mouth Daily With Dinner., Disp: , Rfl:   •  metFORMIN (GLUCOPHAGE) 500 MG tablet, Take 500 mg by mouth Daily With Breakfast., Disp: , Rfl:   •  Multiple Vitamins-Minerals (MULTIVITAMIN ADULT PO), Take 1 tablet by mouth Daily., Disp: , Rfl:   •  pantoprazole (PROTONIX) 40 MG EC tablet, , Disp: , Rfl:   •  PROAIR  (90 BASE) MCG/ACT inhaler, Inhale 2 puffs Every 4 (Four) Hours As Needed., Disp: , Rfl:   •  Accu-Chek FastClix Lancets misc, TEST ONCE TO BID PRN, Disp: , Rfl:   •  enoxaparin (LOVENOX) 40 MG/0.4ML solution syringe, INJECT 40 MG SUBCUTANEOUSLY DAILY ASK ORTHO DOCTOR IF NEED TO CONTINUE THIS MED, Disp: , Rfl:   •  glucose blood (FREESTYLE LITE) test strip, 1 each by Other route See Admin Instructions. Use 1 to 2 times daily as instructed, Disp: , Rfl:   •  lisinopril (PRINIVIL,ZESTRIL) 20 MG tablet, TAKE ONE TABLET BY MOUTH  TWICE A DAY, Disp: 180 tablet, Rfl: 3  •  metFORMIN (GLUCOPHAGE) 850 MG tablet, Take 850 mg by mouth 2 (Two) Times a Day With Meals., Disp: , Rfl:   •  ondansetron (ZOFRAN) 4 MG tablet, Take 1 tablet by mouth Every 6 (Six) Hours As Needed for Nausea or Vomiting., Disp:  , Rfl:       Family History   Problem Relation Age of Onset   • Heart disease Mother    • Hypertension Mother    • Stroke Mother    • Diverticulitis Mother    • Heart disease Father    • Hypertension Father    • Malig Hyperthermia Neg Hx          Social History     Socioeconomic History   • Marital status:    • Number of children: 2   • Years of education: 16   Tobacco Use   • Smoking status: Former Smoker     Packs/day: 3.00     Years: 5.00     Pack years: 15.00     Types: Cigarettes     Start date:      Quit date:      Years since quittin.1   • Smokeless tobacco: Never Used   • Tobacco comment: Quit herson turkey   Substance and Sexual Activity   • Alcohol use: Yes     Alcohol/week: 2.0 standard drinks     Types: 1 Cans of beer, 1 Shots of liquor per week     Comment: RARELY    • Drug use: No   • Sexual activity: Not Currently     Partners: Female     Birth control/protection: Surgical         Allergies   Allergen Reactions   • Other Mental Status Change and Hallucinations     Oxy drugs   • Oxycodone Mental Status Change       ROS:  Review of Systems   Constitutional: Negative for activity change, appetite change and fatigue.   Eyes: Negative for pain, redness and itching.   Respiratory: Negative for cough, choking and shortness of breath.    Allergic/Immunologic: Negative for environmental allergies and food allergies.   Neurological: Positive for tremors. Negative for dizziness, seizures, syncope, facial asymmetry, speech difficulty, weakness, light-headedness, numbness and headaches.   Psychiatric/Behavioral: Negative for agitation, behavioral problems, confusion, decreased concentration, dysphoric mood, hallucinations,  "self-injury, sleep disturbance and suicidal ideas. The patient is not nervous/anxious and is not hyperactive.      I have reviewed the above ROS put in by the medical assistant and am in agreement.     Physical Exam:  Vitals:    01/31/22 1259   BP: 134/58   Pulse: 67   SpO2: 98%   Weight: 89.4 kg (197 lb)   Height: 175.3 cm (69\")       Body mass index is 29.09 kg/m².    Physical Exam  General:  Elderly white male no acute distress  HEENT: Normocephalic, atraumatic  Neck: Supple no masses, full range of motion  Heart: Regular rate and rhythm with systolic murmur  Extremities: No pedal edema, distal pulses are palpable and equal     Neurological Exam:   Mental Status: Awake, alert, oriented 8/10.  Conversant without evidence of an affective disorder, thought disorder, delusions or hallucinations.  Attention span and concentration are normal.  HCF: No aphasia, apraxia or dysarthria.   Total MMSE score today was 28/30.  Delayed recall was 3/3 at 3 minutes.  Clock drawing was perfect 4/4, intersecting pentagons also perfect 1/1.  Patient name 16 animals in 1 minute.  Knowledge of recent events intact.  CN:      I:              II: Visual fields full without left inattention              III, IV, VI: Eye movements intact without nystagmus or ptosis.  Pupils equal round and reactive to light.              V,VII: Light touch and pinprick intact all 3 divisions of V.  Facial muscles symmetrical.              VIII: Hearing decreased to finger rub bilaterally              IX,X: Soft palate elevates symmetrically              XI: Sternomastoid and trapezius are strong.              XII: Tongue midline without atrophy or fasciculations  Motor:  Tone is pretty good today, normal bulk in the upper and lower extremities              Power testing:  There is mild weakness of interosseous muscles bilaterally as well as APBs but otherwise proximally he is quite strong in his upper extremities.  In his lower extremities strength is " fairly intact throughout, though I do detect maybe some weakness with knee flexion and extension on the right side  Reflexes: Upper extremities: +1 diffusely                   Lower extremities: Trace at knees, absent at ankles                   Toe signs: Downgoing bilaterally  Sensory: Light touch: Diffusely intact in upper and lower extremities today                   Pinprick: Diffusely intact in lower extremities today.                   Vibration: Absent at right ankle, intact at right knee; intact at left ankle                   Position: 2/5 on right, 5/5 on left  Cerebellar: Finger-to-nose:  Mild endpoint tremor worse on the left otherwise intact                      Rapid movement: Intact                      Heel-to-shin: Intact  Gait and Station:  Patient comes to stand slowly pushing off with his arms.  Posture is kyphotic, patient appears flexed at the neck and slightly at the hips.  He is very unstable without his standing rolling walker, though he can take a few steps by himself.  Step length is short but not shuffling.  There is no activation of tremor with ambulation.    Results:      Lab Results   Component Value Date    GLUCOSE 129 (H) 03/02/2021    BUN 25 (H) 03/02/2021    CREATININE 1.24 03/02/2021    EGFRIFNONA 56 (L) 03/02/2021    EGFRIFAFRI  09/02/2016      Comment:      <15 Indicative of kidney failure.    BCR 20.2 03/02/2021    CO2 24.0 03/02/2021    CALCIUM 9.4 03/02/2021    PROTENTOTREF 6.5 01/02/2020    ALBUMIN 3.00 (L) 02/19/2021    LABIL2 1.2 01/02/2020    AST 13 02/19/2021    ALT 11 02/19/2021       Lab Results   Component Value Date    WBC 5.50 03/02/2021    HGB 11.8 (L) 03/02/2021    HCT 34.1 (L) 03/02/2021    MCV 91.0 03/02/2021     03/02/2021         .  Lab Results   Component Value Date    RPR Non-Reactive 06/23/2019         Lab Results   Component Value Date    TSH 0.988 04/27/2020         Lab Results   Component Value Date    ACGOYDUV34 580 06/23/2019         Lab  Results   Component Value Date    FOLATE >20.00 06/23/2019         Lab Results   Component Value Date    HGBA1C 7.81 (H) 09/06/2020         Lab Results   Component Value Date    GLUCOSE 129 (H) 03/02/2021    BUN 25 (H) 03/02/2021    CREATININE 1.24 03/02/2021    EGFRIFNONA 56 (L) 03/02/2021    EGFRIFAFRI  09/02/2016      Comment:      <15 Indicative of kidney failure.    BCR 20.2 03/02/2021    K 4.4 03/02/2021    CO2 24.0 03/02/2021    CALCIUM 9.4 03/02/2021    PROTENTOTREF 6.5 01/02/2020    ALBUMIN 3.00 (L) 02/19/2021    LABIL2 1.2 01/02/2020    AST 13 02/19/2021    ALT 11 02/19/2021         Lab Results   Component Value Date    WBC 5.50 03/02/2021    HGB 11.8 (L) 03/02/2021    HCT 34.1 (L) 03/02/2021    MCV 91.0 03/02/2021     03/02/2021             Assessment:   1.  NPH, with significant gait dysfunction, urinary incontinence and only minimal cognitive symptoms   2.  Cerebrovascular disease with history of previous strokes  3.  Parkinsonian features previously detectable on exam, not really present today  4.  Newly detected sensory loss of right lower extremity today: Proprioception and vibration      Plan:  1.  In terms of patient's NPH, symptoms appear stable.  In terms of the cognitive impairment he actually has been doing better according to himself and his wife.  Today his MMSE score is 28/30.  Patient has already had consults with 2 different neurosurgeons, both conceded that patient is not a good candidate for shunting.  For now we will continue to monitor    2.  Patient reports since he had the PFO closure in December 2020 he has had no further strokelike symptoms.  He remains on aspirin and Plavix.  Discussed major modifiable risk factors for stroke including:   - Hypertension, goal blood pressure <130/80 mmHg  - Diabetes mellitus, target A1C value of ?7%; a fasting glucose of 80 to 130 mg/dL; a postprandial glucose (90 to 120 minutes after a meal) less than 180 mg/dL  - Smoking, cessation  encouraged if applicable   - Dyslipidemia, recommend statin therapy as tolerated with target LDL-C level ?70 mg/dL  - Physical inactivity, suggest moderate to vigorous intensity physical exercise most days of the week for at least 40 minutes    3.  His Parkinson symptoms which were previously observed on physical exam are really quite minimal today.  Specifically his tone is quite normal, no detectable cogwheeling.  His gait is mildly typical does not look shuffling in quality, there is no festination or freezing.  Only mild postural tremor noted, no resting tremor.     4.  Finally, I do detect some new sensory exam findings today -patient's proprioception in his great toe is quite impaired on the right side, additionally vibratory sense was absent at the right ankle (intact the right knee).  Patient is also been seen recently by orthopedic for some right lower extremity instability/perceived weakness, is very minimally noted on exam today.  He just started working with PT. No sure if this is some sort of hemicord syndrome but will continue to follow closely.  Would consider imaging of his spine in the future.  Note, case was discussed with Dr. Campo he is in agreement with plan of care      Plan for follow-up in 3 months or sooner should need arise    Time 40 minutes      Dictated utilizing Dragon dictation.

## 2022-04-11 PROBLEM — N18.31 STAGE 3A CHRONIC KIDNEY DISEASE (HCC): Status: ACTIVE | Noted: 2022-01-01

## 2022-04-11 PROBLEM — R06.00 ACUTE DYSPNEA: Status: ACTIVE | Noted: 2022-01-01

## 2022-04-11 NOTE — ED NOTES
Pt arrives from MD Gomez's office. Pt reports SOA x3 days. Pt a&ox4, abc's intact, NAD noted.    Patient wearing mask during triage. RN wearing appropriate PPE during triage. Hand hygiene performed.

## 2022-04-11 NOTE — ED PROVIDER NOTES
EMERGENCY DEPARTMENT ENCOUNTER    Room Number:  26/26  Date of encounter:  4/11/2022  PCP: Adeline Onofre MD  Historian: Patient and spouse      HPI:  Chief Complaint: Shortness of breath  A complete HPI/ROS/PMH/PSH/SH/FH are unobtainable due to: Not applicable  Context: Eugenio Joe is a 81 y.o. male who presents to the ED c/o shortness of breath it has been occurring for 3 to 4 days.  It is worse with exertion.  At baseline this gentleman does not exert much.  Also is worse with laying flat.  Last night he was not able to sleep lying flat and had to sleep sitting up.  There is also been report of some increased swelling to his bilateral lower extremities.  He has had swelling since he started Norvasc at the end of November 2021.  He denies any chest pain.  He has had a mild cough.  There is some mild clear phlegm.  There is no report of any fever.  He denies any chest pain, abdominal pain, headache, any focal weakness to arms or legs.  He saw Dr. Pritchard still his primary care physician today.  She wanted the patient admitted to the hospital but there was no beds so the patient was sent here to the emergency department.  (At this time when I am evaluating him I cannot see any note from Dr. Deal in epic).  The wife also reports since his stroke in 2020 she thinks he is having problems swallowing.  After eating he will sometimes choke and cough a little bit.  Not so much after drinking fluids.  The patient and the wife is unaware of any definitive diagnosis of aspiration pneumonia.        Previous Episodes: Uncertain if is exactly the same as this in the past.  Current Symptoms: Currently having shortness of breath sitting still but is better at rest and sitting up    MEDICAL HISTORY REVIEWED  Patient has a history of normal pressure hydrocephalus, coronary artery disease, type 2 diabetes, hypertension history of PE in the past.  He also has a history of PFO and difficult to control hypertension.  I  reviewed the cardiologist note from Dr. Ford from November 23, 2021    I looked at the echo from January 11, 2021.  Left atrial cavity is dilated negative saline test PFO closure device in place.  This was a limited study..  I did review the echo in October 2020 as well.    PAST MEDICAL HISTORY  Active Ambulatory Problems     Diagnosis Date Noted   • Quadriceps weakness 04/08/2016   • ACL tear 04/08/2016   • Knee pain, right 04/08/2016   • S/P CABG x 3 04/18/2016   • Essential hypertension 04/18/2016   • Hyperlipemia 04/18/2016   • Hallux rigidus 07/11/2016   • Fracture, stress, metatarsal 07/11/2016   • Osteopenia determined by x-ray 07/11/2016   • Metatarsal stress fracture with nonunion 08/10/2016   • Left hip pain 08/26/2016   • Bursitis of left hip 08/26/2016   • Pulmonary embolism ([facility]) 10/12/2016   • DALILA (acute kidney injury) ([facility]) 10/12/2016   • Type 2 diabetes mellitus with hyperglycemia, without long-term current use of insulin ([facility]) 10/12/2016   • Ascending aorta dilatation ([facility]) 10/12/2016   • Pulmonary nodule, left 10/12/2016   • Right leg DVT ([facility]) 10/13/2016   • Acute renal failure ([facility]) 01/18/2017   • Hypotension 01/18/2017   • Dehydration 01/18/2017   • Hypercalcemia 01/18/2017   • Dysphagia 01/21/2017   • Syncope and collapse 02/24/2017   • Coronary artery disease involving native coronary artery of native heart without angina pectoris 04/10/2017   • Normal pressure hydrocephalus ([facility]) 11/09/2017   • Headache 11/15/2017   • Impaired mobility 11/15/2017   • Nausea & vomiting 11/15/2017   • Fall at home 11/15/2017   • Transient cerebral ischemia 11/20/2017   • PFO (patent foramen ovale) 11/21/2017   • Esophageal dysphagia 03/20/2018   • TIA (transient ischemic attack) 06/23/2019   • Acute appendicitis with localized peritonitis, without perforation or abscess 07/18/2019   • Right lower quadrant abdominal pain 07/18/2019   • History of CVA (cerebrovascular accident) 07/18/2019   • On statin therapy  11/04/2019   • 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 11/26/2019   • Diverticulitis 01/28/2020   • Hx of appendectomy 01/28/2020   • Acute abdominal pain 01/28/2020   • Gait abnormality 01/29/2020   • Weakness 04/27/2020   • CVA (cerebral vascular accident) ([facility]) 09/05/2020   • Acute CVA (cerebrovascular accident) ([facility]) 09/07/2020   • Cerebrovascular accident (CVA) due to embolism of cerebral artery ([facility]) 11/11/2020   • Closed displaced fracture of lateral malleolus of left fibula 02/10/2021   • Pain 03/08/2021   • Syndesmotic ankle sprain, left, subsequent encounter 03/08/2021     Resolved Ambulatory Problems     Diagnosis Date Noted   • Change in hearing 11/15/2017   • Hypomagnesemia 04/28/2020   • Hyponatremia 04/28/2020     Past Medical History:   Diagnosis Date   • Arthritis    • Asthma    • Brain abscess    • Bruise of face    • Cancer ([facility])    • Cardiac disease    • Coronary artery disease    • Diabetes mellitus ([facility])    • Difficulty in swallowing    • Difficulty walking    • DM2 (diabetes mellitus, type 2) ([facility]) 10/12/2016   • Fracture, foot 2015   • Fracture, tibia and fibula Feb 2021   • Gout several years ago   • Hearing aid worn    • Hypertension    • Joint pain    • Low back pain several years ago   • Orbit fracture ([facility])    • Rash    • Spinal headache    • Stroke (cerebrum) ([facility]) 09/05/2020   • Tear of meniscus of knee torn ligaments         PAST SURGICAL HISTORY  Past Surgical History:   Procedure Laterality Date   • ANKLE OPEN REDUCTION INTERNAL FIXATION Left 2/16/2021    Procedure: Left ankle open reduction internal fixation with implants as needed;  Surgeon: Man Jenkins MD;  Location: [facility];  Service: Orthopedics;  Laterality: Left;   • APPENDECTOMY N/A 7/18/2019    Procedure: APPENDECTOMY LAPAROSCOPIC;  Surgeon: Luis Carlos Rick Jr., MD;  Location: [facility] OR;  Service: General   • BRAIN SURGERY      BRAIN ABCESS 30 YR AGO   • CARDIAC CATHETERIZATION N/A 12/4/2020     Procedure: Patent foramen ovale closure ABBOTT;  Surgeon: Frank Pablo MD;  Location: [facility] CATH INVASIVE LOCATION;  Service: Cardiology;  Laterality: N/A;   • CARDIAC CATHETERIZATION N/A 12/4/2020    Procedure: Intracardiac echocardiogram;  Surgeon: Frank Pablo MD;  Location: [facility] CATH INVASIVE LOCATION;  Service: Cardiology;  Laterality: N/A;   • CARDIAC SURGERY     • COLONOSCOPY  2012    Ciciliano- normal per pt, no polyps    • CORONARY ARTERY BYPASS GRAFT      3 VESSEL   • ENDOSCOPY N/A 3/9/2017    Schatzki ring, dilated, LA Grade B esophagitis, HH, acquired duodenal stenosis   • ENDOSCOPY N/A 4/6/2018    candida, HH, torts, Schatzki ring, duodenal stenosis, dilatation perforemed, chronic inflammation   • ENDOSCOPY N/A 10/9/2019    White nummular lesions in esophageal mucosa, mild Schatzki ring, acquired duodenal stenosis, Path: Terrazas's, candida   • ENDOSCOPY N/A 1/8/2020    Procedure: ESOPHAGOGASTRODUODENOSCOPY with biopsies;  Surgeon: Rigoberto Walker MD;  Location: [facility] ENDOSCOPY;  Service: Gastroenterology   • FACIAL FRACTURE SURGERY      to repair 6 broken bones   • HAND SURGERY     • ORBITAL FRACTURE OPEN REDUCTION INTERNAL FIXATION Right 1/13/2017    Procedure: RT ORBIT FLOOR FRACTURE REPAIR RIGHT ZMC FRACTURE REPAIR, RIGHT NASAL BONE FRACTURE CLOSED REDUCTION;  Surgeon: Edil Lester MD;  Location: [facility] OR [facility] [place];  Service:    • ORIF FOOT FRACTURE Right 9/9/2016    Procedure: RIGHT SECOND METATARSAL OPEN REDUCTION INTERNAL FIXATION WITh GRAFT FROM HEEL ;  Surgeon: Man Jenkins MD;  Location: [facility] OR [facility] [place];  Service:    • PATENT FORAMEN OVALE CLOSURE     • SEPTOPLASTY     • SKIN CANCER EXCISION     • TONSILLECTOMY     • TRANSESOPHAGEAL ECHOCARDIOGRAM (STELLA)           FAMILY HISTORY  Family History   Problem Relation Age of Onset   • Heart disease Mother    • Hypertension Mother    • Stroke Mother    • Diverticulitis Mother    • Heart disease Father    •  Hypertension Father    • Malig Hyperthermia Neg Hx          SOCIAL HISTORY  Social History     Socioeconomic History   • Marital status:    • Number of children: 2   • Years of education: 16   Tobacco Use   • Smoking status: Former Smoker     Packs/day: 3.00     Years: 5.00     Pack years: 15.00     Types: Cigarettes     Start date:      Quit date:      Years since quittin.3   • Smokeless tobacco: Never Used   • Tobacco comment: Quit herson turkey   Substance and Sexual Activity   • Alcohol use: Yes     Alcohol/week: 2.0 standard drinks     Types: 1 Cans of beer, 1 Shots of liquor per week     Comment: RARELY    • Drug use: No   • Sexual activity: Not Currently     Partners: Female     Birth control/protection: Surgical         ALLERGIES  Other and Oxycodone        REVIEW OF SYSTEMS  Review of Systems     All systems reviewed and negative except for those discussed in HPI.       PHYSICAL EXAM    I have reviewed the triage vital signs and nursing notes.    ED Triage Vitals [22 1609]   Temp Heart Rate Resp BP SpO2   98.1 °F (36.7 °C) (!) 43 18 -- 95 %      Temp src Heart Rate Source Patient Position BP Location FiO2 (%)   Tympanic Monitor -- -- --       GENERAL: Elderly male that is frail and chronically ill.  Has some mild tachypnea and mild shortness of breath on exam.  The patient's oxygen saturation is 96% on room air.  Vital signs on my initial evaluation have been reviewed.  HENT: nares patent  Head/neck/ face are symmetric without gross deformity, signs of trauma, or swelling  EYES: no scleral icterus, no conjunctival pallor.  NECK: Supple, no meningismus  CV: regular rhythm, regular rate with intact distal pulses.  No murmur rub  RESPIRATORY: Mild respiratory distress with tachypnea.  Patient has rales in his posterior lung fields bilaterally.  ABDOMEN: soft and nontender.  Morbidly obese.  MUSCULOSKELETAL: no deformity.  2+ edema to bilateral lower extremities that are equal and  symmetric.  Intact distal pulses to upper and lower extremities bilaterally.  NEURO: alert and appropriate, moves all extremities, follows commands.  No focal motor or sensory changes.  SKIN: warm, dry    Vital signs and nursing notes reviewed.        LAB RESULTS  Recent Results (from the past 24 hour(s))   Comprehensive Metabolic Panel    Collection Time: 04/11/22  5:00 PM    Specimen: Arm, Right; Blood   Result Value Ref Range    Glucose 186 (H) 65 - 99 mg/dL    BUN 19 8 - 23 mg/dL    Creatinine 1.23 0.76 - 1.27 mg/dL    Sodium 143 136 - 145 mmol/L    Potassium 4.1 3.5 - 5.2 mmol/L    Chloride 108 (H) 98 - 107 mmol/L    CO2 20.1 (L) 22.0 - 29.0 mmol/L    Calcium 9.5 8.6 - 10.5 mg/dL    Total Protein 6.8 6.0 - 8.5 g/dL    Albumin 3.80 3.50 - 5.20 g/dL    ALT (SGPT) 12 1 - 41 U/L    AST (SGOT) 13 1 - 40 U/L    Alkaline Phosphatase 71 39 - 117 U/L    Total Bilirubin 0.8 0.0 - 1.2 mg/dL    Globulin 3.0 gm/dL    A/G Ratio 1.3 g/dL    BUN/Creatinine Ratio 15.4 7.0 - 25.0    Anion Gap 14.9 5.0 - 15.0 mmol/L    eGFR 58.6 (L) >60.0 mL/min/1.73   BNP    Collection Time: 04/11/22  5:00 PM    Specimen: Arm, Right; Blood   Result Value Ref Range    proBNP 1,564.0 0.0 - 1,800.0 pg/mL   Troponin    Collection Time: 04/11/22  5:00 PM    Specimen: Arm, Right; Blood   Result Value Ref Range    Troponin T 0.033 (C) 0.000 - 0.030 ng/mL   Green Top (Gel)    Collection Time: 04/11/22  5:00 PM   Result Value Ref Range    Extra Tube Hold for add-ons.    Lavender Top    Collection Time: 04/11/22  5:00 PM   Result Value Ref Range    Extra Tube hold for add-on    Gold Top - SST    Collection Time: 04/11/22  5:00 PM   Result Value Ref Range    Extra Tube Hold for add-ons.    Light Blue Top    Collection Time: 04/11/22  5:00 PM   Result Value Ref Range    Extra Tube hold for add-on    Procalcitonin    Collection Time: 04/11/22  5:00 PM    Specimen: Arm, Right; Blood   Result Value Ref Range    Procalcitonin 0.04 0.00 - 0.25 ng/mL    Protime-INR    Collection Time: 04/11/22  5:00 PM    Specimen: Arm, Right; Blood   Result Value Ref Range    Protime 12.9 11.7 - 14.2 Seconds    INR 0.98 0.90 - 1.10   ECG 12 Lead    Collection Time: 04/11/22  5:58 PM   Result Value Ref Range    QT Interval 382 ms   CBC Auto Differential    Collection Time: 04/11/22  6:40 PM    Specimen: Arm, Right; Blood   Result Value Ref Range    WBC 8.23 3.40 - 10.80 10*3/mm3    RBC 3.67 (L) 4.14 - 5.80 10*6/mm3    Hemoglobin 11.8 (L) 13.0 - 17.7 g/dL    Hematocrit 33.8 (L) 37.5 - 51.0 %    MCV 92.1 79.0 - 97.0 fL    MCH 32.2 26.6 - 33.0 pg    MCHC 34.9 31.5 - 35.7 g/dL    RDW 13.0 12.3 - 15.4 %    RDW-SD 42.9 37.0 - 54.0 fl    MPV 10.7 6.0 - 12.0 fL    Platelets 184 140 - 450 10*3/mm3    Neutrophil % 72.7 42.7 - 76.0 %    Lymphocyte % 17.5 (L) 19.6 - 45.3 %    Monocyte % 6.4 5.0 - 12.0 %    Eosinophil % 2.7 0.3 - 6.2 %    Basophil % 0.5 0.0 - 1.5 %    Immature Grans % 0.2 0.0 - 0.5 %    Neutrophils, Absolute 5.98 1.70 - 7.00 10*3/mm3    Lymphocytes, Absolute 1.44 0.70 - 3.10 10*3/mm3    Monocytes, Absolute 0.53 0.10 - 0.90 10*3/mm3    Eosinophils, Absolute 0.22 0.00 - 0.40 10*3/mm3    Basophils, Absolute 0.04 0.00 - 0.20 10*3/mm3    Immature Grans, Absolute 0.02 0.00 - 0.05 10*3/mm3    nRBC 0.0 0.0 - 0.2 /100 WBC   Lactic Acid, Plasma    Collection Time: 04/11/22  6:40 PM    Specimen: Blood   Result Value Ref Range    Lactate 2.2 (C) 0.5 - 2.0 mmol/L       Ordered the above labs and independently reviewed the results.        RADIOLOGY  XR Chest 2 View    Result Date: 4/11/2022  TWO-VIEW CHEST  HISTORY: Shortness of breath.  FINDINGS: There is mild cardiomegaly with sternal wires from previous cardiac surgery. There is mild to moderate vascular congestion were probable very tiny pleural effusions and suspicious for changes of mild CHF. The patient had a very similar appearance on an earlier chest x-ray dated 04/27/2020.  This report was finalized on 4/11/2022 4:40 PM by   Andrew Omalley M.D.      CT Angiogram Chest    Result Date: 4/11/2022  CT ANGIOGRAPHY OF THE CHEST WITH INTRAVENOUS CONTRAST AND 3-D RECONSTRUCTIONS  HISTORY: Shortness of breath.  TECHNIQUE:The CT scan  was performed as an emergency procedure with CT angiography protocol using intervenous contrast and 3-D reconstructions.   FINDINGS: The following findings are present: 1. The pulmonary arteries are well-opacified and there is no evidence of pulmonary embolus. The thoracic aorta shows no evidence of aneurysm or dissection. 2. There are very tiny bilateral pleural effusions layering posteriorly. There is some slight interstitial prominence and minimal scattered ground glass opacity which is nonspecific but likely relates to an element of very mild CHF as also suggested on the portable chest x-ray. 3. There are calcified left hilar lymph nodes. There is no mediastinal or hilar or axillary adenopathy. There is no pericardial effusion. The CT images through the upper liver, spleen, and both adrenal glands were unremarkable.      Radiation dose reduction techniques were utilized, including automated exposure control and exposure modulation based on body size.  This report was finalized on 4/11/2022 8:48 PM by Dr. Andrew Omalley M.D.        I ordered the above noted radiological studies. Reviewed by me and discussed with radiologist.  See dictation for official radiology interpretation.      PROCEDURES    Procedures      MEDICATIONS GIVEN IN ER    Medications   sodium chloride 0.9 % flush 10 mL (has no administration in time range)   lisinopril (PRINIVIL,ZESTRIL) tablet 20 mg (has no administration in time range)   sodium chloride 0.9 % flush 10 mL (has no administration in time range)   sodium chloride 0.9 % flush 10 mL (has no administration in time range)   enoxaparin (LOVENOX) syringe 40 mg (has no administration in time range)   nitroglycerin (NITROSTAT) SL tablet 0.4 mg (has no administration in time range)    acetaminophen (TYLENOL) tablet 650 mg (has no administration in time range)   melatonin tablet 5 mg (has no administration in time range)   ondansetron (ZOFRAN) injection 4 mg (has no administration in time range)   iopamidol (ISOVUE-370) 76 % injection 100 mL (85 mL Intravenous Given by Other 4/11/22 1856)   furosemide (LASIX) injection 40 mg (40 mg Intravenous Given 4/11/22 2001)         PROGRESS, DATA ANALYSIS, CONSULTS, AND MEDICAL DECISION MAKING    DDx includes CHF, acute coronary syndrome, pulmonary embolism, pneumothorax, pneumonia, asthma/COPD,aspiration,  pulmonary hypertension, metabolic acidosis, deconditioning, anemia, other hematologic etiologies such as CO poisoning, anxiety.     I have looked at the chest x-ray in this gentleman's EKG.  I am concerned this gentleman is having congestive heart failure.  I will do a CT angiogram of his chest.  He does have an elevation in his troponin with normal renal function.  He is not very mobile.  He does have some risk factors because of the immobility for PE.  All questions answered at this time.    We are currently under a pandemic from the COVID19 infection.  The patient presented to the emergency department by ambulance or personal vehicle. I followed the current protocols required by Infection Control at [facility] in my evaluation and treatment of the patient. The patient was wearing a face mask during my evaluation and throughout my encounter. During my whole encounter with this patient I used appropriate personal protective equipment.  This equipment consisted of eye protection, facemask, gown, and gloves.  I applied this equipment before entering the room.      All labs have been independently reviewed by me.  All radiology studies have been reviewed by me and discussed with radiologist dictating the report.   EKG's independently viewed and interpreted by me.  Discussion below represents my analysis of pertinent findings related to  patient's condition, differential diagnosis, treatment plan and final disposition.      ED Course as of 04/11/22 2112 Mon Apr 11, 2022 1822 Patient has some cardiomegaly with signs of mild to moderate vascular congestion with small bilateral tiny pleural effusions.  Has appearance of congestive heart failure.  I looked at the chest x-ray and reviewed the radiologist report. [MM]   1829 EKG reading  EKG was done at 1758  Rate is 87 it is sinus rhythm with frequent PVCs he has narrow complex with normal axis  MD interval appears to be prolonged  I do not see any definitive acute injury pattern  QT looks normal  I compared to the previous EKG from 9/5/2020 and it looks fairly similar.  Had PVCs present at that time as well. [MM]   1830 Troponin T(!!): 0.033  Patient also has normal renal function. [MM]   1857 proBNP: 1,564.0 [MM]   1947 I spoke with the radiologist, Dr. Omalley concerning the CT scan of the chest.  There is no pulmonary embolism.  Patient has some small bilateral pleural effusions and signs of some mild congestive heart failure.  Please see complete dictated report from the radiologist. [MM]   2001 I reevaluated the patient.  Vital signs have remained unchanged other than an increase in his blood pressure.  Has not taken any of his evening medicine.  I have ordered Lasix for him. [MM]   2006 I discussed the case with Dr. Rick who is on for A.  Agrees to admit the patient to the hospital.  Informed her of the patient's initial presenting symptoms and results of test.  My initial treatment plan. [MM]   2006 She has not taken his evening dose of his blood pressure medicine.  He is on lisinopril.  He has taken his Norvasc and lisinopril this morning.  I will give him his dose of lisinopril and he is getting Lasix as well. [MM]   2010 Lactate(!!): 2.2  I do not suspect this gentleman has an infection.  He is afebrile and he has a normal white blood cell count.  We will continue to trend his  lactate with that repeat in 4 hours.  Continue to watch his temperature curve. [MM]   2028 I spoke with the cardiologist Dr. Gleason.  Informed him of the patient's presenting symptoms and results of test and my treatment plan.  He will consult. [MM]      ED Course User Index  [MM] Casa Moran MD       AS OF 21:12 EDT VITALS:    BP - (!) 182/81  HR - 89  TEMP - 98.1 °F (36.7 °C) (Tympanic)  02 SATS - 95%        DIAGNOSIS  Final diagnoses:   Acute dyspnea   Acute congestive heart failure, unspecified heart failure type (HCC)   Hypertensive urgency   Elevated troponin         DISPOSITION  I have reviewed the test results with my patient and explained the current treatment plan.  I answered all of the patient's questions.  The patient will be admitted to monitor bed at this time.  The patient is not hypotensive and is tolerating their current disease condition well enough for a monitored bed at this time.  The patient's current condition does not require intensive care treatment at this time.            DICTATED UTILIZING DRAGON DICTATION     Casa Moran MD  04/11/22 2119

## 2022-04-12 NOTE — PLAN OF CARE
Goal Outcome Evaluation:  Plan of Care Reviewed With: patient, spouse    Outcome Evaluation: Pt is an 80 y/o male who presents to [facility] with SOB, acute dyspnea, elevated troponin, hypertensive urgency. Hx of NPH, CAD , CVA, CABG. He lives with his wife and is indep with ADLs but incontinent at baseline. He uses a upright walker but has been trialing use of a cane during OP PT sessions. He req's SBA for bed mobility, brief soiled in urine. MaxA for brief change and set-up for estela hygiene. LB dressing with SBA to don socks. STS with CGA-Car with use of upright walker. He performs fxl ambulation around bed to chair with Car and cues for navigation using walker. Set-up for grooming tasks. Pt will benefit from OT services in the acute care setting, anticipate d/c home with continued OP services.    Therapist used appropriate personal protective equipment including mask, gloves, and eye protection. Hand hygiene was completed before and after therapy session.

## 2022-04-12 NOTE — THERAPY EVALUATION
Patient Name: Eugenio Joe  : 1940    MRN: [id number]                              Today's Date: 2022       Admit Date: 2022    Visit Dx:     ICD-10-CM ICD-9-CM   1. Acute dyspnea  R06.00 786.09   2. Acute congestive heart failure, unspecified heart failure type ([facility])  I50.9 428.0   3. Hypertensive urgency  I16.0 401.9   4. Elevated troponin  R77.8 790.6     Patient Active Problem List   Diagnosis   • Quadriceps weakness   • ACL tear   • Knee pain, right   • S/P CABG x 3   • Essential hypertension   • Hyperlipemia   • Hallux rigidus   • Fracture, stress, metatarsal   • Osteopenia determined by x-ray   • Metatarsal stress fracture with nonunion   • Left hip pain   • Bursitis of left hip   • Pulmonary embolism ([facility])   • DALILA (acute kidney injury) ([facility])   • Type 2 diabetes mellitus with hyperglycemia, without long-term current use of insulin ([facility])   • Ascending aorta dilatation ([facility])   • Pulmonary nodule, left   • Right leg DVT ([facility])   • Acute renal failure ([facility])   • Hypotension   • Dehydration   • Hypercalcemia   • Dysphagia   • Syncope and collapse   • Coronary artery disease involving native coronary artery of native heart without angina pectoris   • Normal pressure hydrocephalus ([facility])   • Headache   • Impaired mobility   • Nausea & vomiting   • Fall at home   • Transient cerebral ischemia   • PFO (patent foramen ovale)   • Esophageal dysphagia   • TIA (transient ischemic attack)   • Acute appendicitis with localized peritonitis, without perforation or abscess   • Right lower quadrant abdominal pain   • History of CVA (cerebrovascular accident)   • On statin therapy   • 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   • Diverticulitis   • Hx of appendectomy   • Acute abdominal pain   • Gait abnormality   • Weakness   • CVA (cerebral vascular accident) ([facility])   • Acute CVA (cerebrovascular accident) ([facility])   • Cerebrovascular accident (CVA) due to embolism of cerebral artery ([facility])   • Closed displaced  fracture of lateral malleolus of left fibula   • Pain   • Syndesmotic ankle sprain, left, subsequent encounter   • Acute dyspnea   • Stage 3a chronic kidney disease ([facility])     Past Medical History:   Diagnosis Date   • Arthritis    • Asthma    • Brain abscess     at about age 44   • Bruise of face    • Bursitis of left hip    • Cancer ([facility])     PT STATES IN HIS SWEAT GLAND    • Cardiac disease    • Coronary artery disease     CABG 2012   • Diabetes mellitus ([facility])    • Difficulty in swallowing     LIQUIDS   • Difficulty walking    • Diverticulitis    • DM2 (diabetes mellitus, type 2) ([facility]) 10/12/2016   • Fracture, foot 2015    Dr Pisano   • Fracture, stress, metatarsal    • Fracture, tibia and fibula Feb 2021    Dr Pisano   • Gout several years ago    medication  working   • Hearing aid worn     bilateral   • Hx of appendectomy    • Hyperlipemia    • Hypertension    • Joint pain     or swelling   • Low back pain several years ago   • Metatarsal stress fracture with nonunion    • Normal pressure hydrocephalus ([facility])    • Orbit fracture ([facility])    • Pulmonary embolism ([facility])     OCT 2016 - AFTER FOOT SURGERY   • Rash     ARM-    • Spinal headache     AFTER SPINAL TAP - NO BLOOD PATCH   • Stroke (cerebrum) ([facility]) 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   • Syndesmotic ankle sprain, left, subsequent encounter    • Tear of meniscus of knee torn ligaments   • TIA (transient ischemic attack)     MULTIPLE     Past Surgical History:   Procedure Laterality Date   • ANKLE OPEN REDUCTION INTERNAL FIXATION Left 2/16/2021    Procedure: Left ankle open reduction internal fixation with implants as needed;  Surgeon: Man Jenkins MD;  Location: [facility];  Service: Orthopedics;  Laterality: Left;   • APPENDECTOMY N/A 7/18/2019    Procedure: APPENDECTOMY LAPAROSCOPIC;  Surgeon: Luis Carlos Rick Jr., MD;  Location: [facility] OR;  Service: General   • BRAIN SURGERY      BRAIN ABCESS 30 YR AGO   • CARDIAC CATHETERIZATION N/A  12/4/2020    Procedure: Patent foramen ovale closure ABBOTT;  Surgeon: Frank Pablo MD;  Location: [facility] CATH INVASIVE LOCATION;  Service: Cardiology;  Laterality: N/A;   • CARDIAC CATHETERIZATION N/A 12/4/2020    Procedure: Intracardiac echocardiogram;  Surgeon: Frank Pablo MD;  Location: [facility] CATH INVASIVE LOCATION;  Service: Cardiology;  Laterality: N/A;   • CARDIAC SURGERY     • COLONOSCOPY  2012    Ciciliano- normal per pt, no polyps    • CORONARY ARTERY BYPASS GRAFT      3 VESSEL   • ENDOSCOPY N/A 3/9/2017    Schatzki ring, dilated, LA Grade B esophagitis, HH, acquired duodenal stenosis   • ENDOSCOPY N/A 4/6/2018    candida, HH, torts, Schatzki ring, duodenal stenosis, dilatation perforemed, chronic inflammation   • ENDOSCOPY N/A 10/9/2019    White nummular lesions in esophageal mucosa, mild Schatzki ring, acquired duodenal stenosis, Path: Terrazas's, candida   • ENDOSCOPY N/A 1/8/2020    Procedure: ESOPHAGOGASTRODUODENOSCOPY with biopsies;  Surgeon: Rigoberto Walker MD;  Location: [facility] ENDOSCOPY;  Service: Gastroenterology   • FACIAL FRACTURE SURGERY      to repair 6 broken bones   • HAND SURGERY     • ORBITAL FRACTURE OPEN REDUCTION INTERNAL FIXATION Right 1/13/2017    Procedure: RT ORBIT FLOOR FRACTURE REPAIR RIGHT [facility] FRACTURE REPAIR, RIGHT NASAL BONE FRACTURE CLOSED REDUCTION;  Surgeon: Edil Lester MD;  Location: [facility] OR [facility];  Service:    • ORIF FOOT FRACTURE Right 9/9/2016    Procedure: RIGHT SECOND METATARSAL OPEN REDUCTION INTERNAL FIXATION WITh GRAFT FROM HEEL ;  Surgeon: Man Jenkins MD;  Location: [facility] OR [facility];  Service:    • PATENT FORAMEN OVALE CLOSURE     • SEPTOPLASTY     • SKIN CANCER EXCISION     • TONSILLECTOMY     • TRANSESOPHAGEAL ECHOCARDIOGRAM (STELLA)        General Information     Row Name 04/12/22 1517          Physical Therapy Time and Intention    Document Type evaluation  -CF     Mode of Treatment co-treatment;physical therapy  -CF      Row Name 04/12/22 1517          General Information    Prior Level of Function independent:;transfer;all household mobility;bed mobility  Utilizes upright walker for mobility, current with OP PT and working on use of cane  -CF     Existing Precautions/Restrictions fall  -CF     Barriers to Rehab previous functional deficit  -CF     Row Name 04/12/22 1517          Living Environment    People in Home spouse  -CF     Row Name 04/12/22 1517          Cognition    Orientation Status (Cognition) oriented x 3  -CF     Row Name 04/12/22 1517          Safety Issues, Functional Mobility    Safety Issues Affecting Function (Mobility) insight into deficits/self-awareness  -CF     Impairments Affecting Function (Mobility) balance;endurance/activity tolerance;strength  -CF           User Key  (r) = Recorded By, (t) = Taken By, (c) = Cosigned By    Initials Name Provider Type    CF Erica Abrams, PT Physical Therapist               Mobility     Row Name 04/12/22 1518          Bed Mobility    Bed Mobility supine-sit  -CF     Supine-Sit [place] (Bed Mobility) standby assist  -CF     Assistive Device (Bed Mobility) bed rails;head of bed elevated  -[facility] Name 04/12/22 1518          Bed-Chair Transfer    Bed-Chair [place] (Transfers) minimum assist (75% patient effort)  -CF     Assistive Device (Bed-Chair Transfers) walker, 4-wheeled  -CF     [facility] Name 04/12/22 1518          Sit-Stand Transfer    Sit-Stand [place] (Transfers) contact guard;minimum assist (75% patient effort)  -CF     Assistive Device (Sit-Stand Transfers) walker, 4-wheeled  -CF     [facility] Name 04/12/22 1518          Gait/Stairs (Locomotion)    [place] Level (Gait) minimum assist (75% patient effort);verbal cues  -CF     Assistive Device (Gait) walker, 4-wheeled  -CF     Distance in Feet (Gait) 15'  -CF     Deviations/Abnormal Patterns (Gait) nenita decreased;gait speed decreased  -CF     Comment, (Gait/Stairs) Difficulty navigating his  upright walker in small spaces, around turns.  -           User Key  (r) = Recorded By, (t) = Taken By, (c) = Cosigned By    Initials Name Provider Type     Erica Abrams PT Physical Therapist               Obj/Interventions     [facility] Name 04/12/22 1519          Range of Motion Comprehensive    General Range of Motion bilateral lower extremity ROM WFL  -[facility] Name 04/12/22 1519          Strength Comprehensive (MMT)    Comment, General Manual Muscle Testing (MMT) Assessment BLE WFL  -[facility] Name 04/12/22 1519          Balance    Balance Assessment sitting static balance;sitting dynamic balance;standing static balance;standing dynamic balance  -CF     Static Sitting Balance modified independence  -CF     Dynamic Sitting Balance supervision  -CF     Position, Sitting Balance unsupported;sitting edge of bed  -CF     Static Standing Balance standby assist  -CF     Dynamic Standing Balance minimal assist  -CF     Position/Device Used, Standing Balance supported;walker, 4-wheeled  -CF           User Key  (r) = Recorded By, (t) = Taken By, (c) = Cosigned By    Initials Name Provider Type     Erica Abrams PT Physical Therapist               Goals/Plan     Row Name 04/12/22 1524          Bed Mobility Goal 1 (PT)    Activity/Assistive Device (Bed Mobility Goal 1, PT) bed mobility activities, all  -CF     [place] Level/Cues Needed (Bed Mobility Goal 1, PT) modified independence  -CF     Time Frame (Bed Mobility Goal 1, PT) 2 weeks  -CF     Row Name 04/12/22 1524          Transfer Goal 1 (PT)    Activity/Assistive Device (Transfer Goal 1, PT) sit-to-stand/stand-to-sit;bed-to-chair/chair-to-bed;walker, rolling  -CF     [place] Level/Cues Needed (Transfer Goal 1, PT) supervision required  -     Time Frame (Transfer Goal 1, PT) 2 weeks  -CF     Row Name 04/12/22 1524          Gait Training Goal 1 (PT)    Activity/Assistive Device (Gait Training Goal 1, PT) gait (walking locomotion)  -      [place] Level (Gait Training Goal 1, PT) supervision required  -CF     Distance (Gait Training Goal 1, PT) 100'  -CF     Time Frame (Gait Training Goal 1, PT) 2 weeks  -CF     Row Name 04/12/22 1524          Therapy Assessment/Plan (PT)    Planned Therapy Interventions (PT) balance training;bed mobility training;gait training;ROM (range of motion);strengthening;patient/family education;transfer training  -CF           User Key  (r) = Recorded By, (t) = Taken By, (c) = Cosigned By    Initials Name Provider Type    CF Erica Abrams, PT Physical Therapist               Clinical Impression     Row Name 04/12/22 1520          Pain    Pretreatment Pain Rating 0/10 - no pain  -CF     Posttreatment Pain Rating 0/10 - no pain  -CF     Row Name 04/12/22 1520          Plan of Care Review    Plan of Care Reviewed With patient  -CF     Outcome Evaluation Pt is an 82 yo male admitted with SOA. Pt with PMH including but not limited to CVA, CABG, NPH. Pt lives with supportive spouse, reports independence with transfers and ambulation with us of upright walker. Pt is current with OP PT and working on use of a cane. Pt required cga/min A for STS, min A to ambulate 15' around bed to the chair. Demo difficulty navigating walker around obstacles and in smaller spaces. Pt may benefit from skilled PT services to address strenght, balance and general functional mobility deficits. Anticipate return home with wife assist and ongoing PT services.  -CF     Row Name 04/12/22 152          Therapy Assessment/Plan (PT)    Rehab Potential (PT) good, to achieve stated therapy goals  -CF     Criteria for Skilled Interventions Met (PT) yes  -CF     Therapy Frequency (PT) 5 times/wk  -CF     Row Name 04/12/22 1520          Vital Signs    O2 Delivery Pre Treatment room air  -CF     O2 Delivery Intra Treatment room air  -CF     O2 Delivery Post Treatment room air  -CF     Row Name 04/12/22 1520          Positioning and Restraints     Pre-Treatment Position in bed  -CF     Post Treatment Position chair  -CF     In Chair reclined;call light within reach;exit alarm on;encouraged to call for assist;notified nsg;legs elevated  -CF           User Key  (r) = Recorded By, (t) = Taken By, (c) = Cosigned By    Initials Name Provider Type    Erica Dobson, PT Physical Therapist               Outcome Measures     Row Name 04/12/22 1524          How much help from another person do you currently need...    Turning from your back to your side while in flat bed without using bedrails? 3  -CF     Moving from lying on back to sitting on the side of a flat bed without bedrails? 3  -CF     Moving to and from a bed to a chair (including a wheelchair)? 3  -CF     Standing up from a chair using your arms (e.g., wheelchair, bedside chair)? 3  -CF     Climbing 3-5 steps with a railing? 3  -CF     To walk in hospital room? 3  -CF     AM-PAC 6 Clicks Score (PT) 18  -CF     Row Name 04/12/22 1524 04/12/22 1100       Functional Assessment    Outcome Measure Options AM-PAC 6 Clicks Basic Mobility (PT)  -CF AM-PAC 6 Clicks Daily Activity (OT)  -JW          User Key  (r) = Recorded By, (t) = Taken By, (c) = Cosigned By    Initials Name Provider Type    CF Erica Abrams, PT Physical Therapist    Katie David OT Occupational Therapist                             Physical Therapy Education                 Title: PT OT SLP Therapies (In Progress)     Topic: Physical Therapy (Done)     Point: Mobility training (Done)     Learning Progress Summary           Patient Acceptance, E, NR,VU by CF at 4/12/2022 1525                   Point: Home exercise program (Done)     Learning Progress Summary           Patient Acceptance, E, NR,VU by CF at 4/12/2022 1525                   Point: Body mechanics (Done)     Learning Progress Summary           Patient Acceptance, E, NR,VU by CF at 4/12/2022 1525                   Point: Precautions (Done)     Learning Progress Summary            Patient Acceptance, E, NR,VU by CF at 4/12/2022 1525                               User Key     Initials Effective Dates Name Provider Type Discipline     06/16/21 -  Erica Abrams, PT Physical Therapist PT              PT Recommendation and Plan  Planned Therapy Interventions (PT): balance training, bed mobility training, gait training, ROM (range of motion), strengthening, patient/family education, transfer training  Plan of Care Reviewed With: patient  Outcome Evaluation: Pt is an 82 yo male admitted with SOA. Pt with PMH including but not limited to CVA, CABG, NPH. Pt lives with supportive spouse, reports independence with transfers and ambulation with us of upright walker. Pt is current with OP PT and working on use of a cane. Pt required cga/min A for STS, min A to ambulate 15' around bed to the chair. Demo difficulty navigating walker around obstacles and in smaller spaces. Pt may benefit from skilled PT services to address strenght, balance and general functional mobility deficits. Anticipate return home with wife assist and ongoing PT services.     Time Calculation:    PT Charges     Row Name 04/12/22 1517             Time Calculation    Start Time 1012  -CF      Stop Time 1037  -CF      Time Calculation (min) 25 min  -CF      PT Received On 04/12/22  -CF      PT - Next Appointment 04/13/22  -CF      PT Goal Re-Cert Due Date 04/26/22  -CF              Time Calculation- PT    Total Timed Code Minutes- PT 15 minute(s)  -CF              Timed Charges    39295 - PT Therapeutic Activity Minutes 15  -CF              Total Minutes    Timed Charges Total Minutes 15  -CF       Total Minutes 15  -CF            User Key  (r) = Recorded By, (t) = Taken By, (c) = Cosigned By    Initials Name Provider Type    CF Erica Abrams, PT Physical Therapist              Therapy Charges for Today     Code Description Service Date Service Provider Modifiers Qty    28161595313 HC PT EVAL MOD COMPLEXITY 3 4/12/2022  Erica Abrams, PT GP 1    [id number]  PT THERAPEUTIC ACT EA 15 MIN 4/12/2022 Erica Abrams, PT GP 1          PT G-Codes  Outcome Measure Options: AM-PAC 6 Clicks Basic Mobility (PT)  AM-PAC 6 Clicks Score (PT): 18  AM-PAC 6 Clicks Score (OT): 17    Erica Abrams, COLEEN  4/12/2022

## 2022-04-12 NOTE — ED NOTES
Patient given Lasix earlier and when he was using the urinal he dropped it on the floor and spilled all of the urine onto the floor.

## 2022-04-12 NOTE — CASE MANAGEMENT/SOCIAL WORK
Discharge Planning Assessment  [facility]     Patient Name: Eugenio Joe  MRN: [id number]  Today's Date: 4/12/2022    Admit Date: 4/11/2022     Discharge Needs Assessment     Row Name 04/12/22 1427       Living Environment    People in Home spouse    Name(s) of People in Home Annette Joe wife [phone]    Current Living Arrangements home    Primary Care Provided by self    Provides Primary Care For no one    Family Caregiver if Needed spouse    Family Caregiver Names Annette Joe wife [phone]    Quality of Family Relationships supportive    Able to Return to Prior Arrangements yes       Resource/Environmental Concerns    Resource/Environmental Concerns none    Transportation Concerns none       Transition Planning    Patient/Family Anticipates Transition to home with family    Patient/Family Anticipated Services at Transition none    Transportation Anticipated family or friend will provide       Discharge Needs Assessment    Equipment Currently Used at Home glucometer;rollator;lift device    Concerns to be Addressed discharge planning    Provided Post Acute Provider List? N/A    N/A Provider List Comment Will continue sessions at [facility] outpatient therapies.    Provided Post Acute Provider Quality & Resource List? N/A               Discharge Plan     Row Name 04/12/22 1432       Plan    Plan Home with family support    Patient/Family in Agreement with Plan yes    Plan Comments IMM 4/12/2022. Met with patient at bedside. Face sheet verified. Prior to admission patient was living at home with his wife and he was able to perform his own ADL’s.  He has a stand-up Rollator, glucometer, stair lift, elevators.  Patient uses the [facility] in [facility] [place], denies any issues affording or taking his medications.  Patient is agreeable to using [facility]. Patient states Annette Joe wife [phone] is his health care surrogate. Discharge plans discussed, plan is to return home with family support and  to complete outpatient rehab sessions at [facility]. No additional needs identified at this time. Family will transport. Will continue to monitor for new or changing discharge needs.  Nurys MARIANO RN CCP              Continued Care and Services - Admitted Since 4/11/2022    Coordination has not been started for this encounter.       Expected Discharge Date and Time     Expected Discharge Date Expected Discharge Time    Apr 14, 2022          Demographic Summary     Row Name 04/12/22 1426       General Information    Admission Type inpatient    Arrived From emergency department    Required Notices Provided Important Message from Medicare    Referral Source admission list    Reason for Consult discharge planning    Preferred Language English       Contact Information    Permission Granted to Share Info With family/designee  Annette Joe wife [phone]               Functional Status     Row Name 04/12/22 1427       Functional Status    Usual Activity Tolerance moderate    Current Activity Tolerance moderate       Functional Status, IADL    Medications independent    Meal Preparation assistive person    Housekeeping assistive person    Laundry assistive person    Shopping assistive person       Mental Status    General Appearance WDL WDL       Mental Status Summary    Recent Changes in Mental Status/Cognitive Functioning no changes       Employment/    Employment Status retired               Psychosocial    No documentation.                Abuse/Neglect    No documentation.                Legal    No documentation.                Substance Abuse    No documentation.                Patient Forms    No documentation.                   Nurys Lynn RN

## 2022-04-12 NOTE — PLAN OF CARE
Goal Outcome Evaluation:              Outcome Evaluation: Clinical swallowing evaluation completed. Pt presents with suspected at least mild pharyngeal dysphagia. Oral phase appeared functional with adequate mastication, bolus formation, and transit of p.o. without presence of oral residue. Pharyngeal phase characterized by suspected impaired laryngeal elevation, airway closure, and pharyngeal contraction (as noted on previous VFSS in 2020) indicated by inconsistent throat clear with thin via cup 1x, puree x1, and overt cough with mixed consistency peaches. Pt directed to take small drinks and small bites during evaluation and pt reported he did not eat/drink using these strategies at home (eats big bites and consecutive drinks). Pt with hx of silent aspiration. Pt and pt's wife reports he usually coughs more than present on evaluation this date at home. Thickened liquids not assessed due to pt stating he would not drink them at home. Extensively discussed previous dysphagia hx and suspected dysphagia at this time with pt and pt's wife. Pt reports he wishes to remain on regular diet and thin liquids at this time knowing risk of aspiration, but does want to complete VFSS to further evaluate. Recommend diet of regular solids/no mixed and thin liquids with use of small bites/sips, no straw, and fully upright posture.

## 2022-04-12 NOTE — PLAN OF CARE
Problem: Adult Inpatient Plan of Care  Goal: Plan of Care Review  Flowsheets (Taken 4/12/2022 1347)  Outcome Evaluation: VSS, possble video swallow study tomorrow, Mag replaced, Assist x 1 w/ walker   Goal Outcome Evaluation:              Outcome Evaluation: VSS, possble video swallow study tomorrow, Mag replaced, Assist x 1 w/ walker

## 2022-04-12 NOTE — THERAPY EVALUATION
Patient Name: Eugenio Joe  : 1940    MRN: [id number]                              Today's Date: 2022       Admit Date: 2022    Visit Dx:     ICD-10-CM ICD-9-CM   1. Acute dyspnea  R06.00 786.09   2. Acute congestive heart failure, unspecified heart failure type ([facility])  I50.9 428.0   3. Hypertensive urgency  I16.0 401.9   4. Elevated troponin  R77.8 790.6     Patient Active Problem List   Diagnosis   • Quadriceps weakness   • ACL tear   • Knee pain, right   • S/P CABG x 3   • Essential hypertension   • Hyperlipemia   • Hallux rigidus   • Fracture, stress, metatarsal   • Osteopenia determined by x-ray   • Metatarsal stress fracture with nonunion   • Left hip pain   • Bursitis of left hip   • Pulmonary embolism ([facility])   • DALILA (acute kidney injury) ([facility])   • Type 2 diabetes mellitus with hyperglycemia, without long-term current use of insulin ([facility])   • Ascending aorta dilatation ([facility])   • Pulmonary nodule, left   • Right leg DVT ([facility])   • Acute renal failure ([facility])   • Hypotension   • Dehydration   • Hypercalcemia   • Dysphagia   • Syncope and collapse   • Coronary artery disease involving native coronary artery of native heart without angina pectoris   • Normal pressure hydrocephalus ([facility])   • Headache   • Impaired mobility   • Nausea & vomiting   • Fall at home   • Transient cerebral ischemia   • PFO (patent foramen ovale)   • Esophageal dysphagia   • TIA (transient ischemic attack)   • Acute appendicitis with localized peritonitis, without perforation or abscess   • Right lower quadrant abdominal pain   • History of CVA (cerebrovascular accident)   • On statin therapy   • 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   • Diverticulitis   • Hx of appendectomy   • Acute abdominal pain   • Gait abnormality   • Weakness   • CVA (cerebral vascular accident) ([facility])   • Acute CVA (cerebrovascular accident) ([facility])   • Cerebrovascular accident (CVA) due to embolism of cerebral artery ([facility])   • Closed displaced  fracture of lateral malleolus of left fibula   • Pain   • Syndesmotic ankle sprain, left, subsequent encounter   • Acute dyspnea   • Stage 3a chronic kidney disease ([facility])     Past Medical History:   Diagnosis Date   • Arthritis    • Asthma    • Brain abscess     at about age 44   • Bruise of face    • Bursitis of left hip    • Cancer ([facility])     PT STATES IN HIS SWEAT GLAND    • Cardiac disease    • Coronary artery disease     CABG 2012   • Diabetes mellitus ([facility])    • Difficulty in swallowing     LIQUIDS   • Difficulty walking    • Diverticulitis    • DM2 (diabetes mellitus, type 2) ([facility]) 10/12/2016   • Fracture, foot 2015    Dr Pisano   • Fracture, stress, metatarsal    • Fracture, tibia and fibula Feb 2021    Dr Pisano   • Gout several years ago    medication  working   • Hearing aid worn     bilateral   • Hx of appendectomy    • Hyperlipemia    • Hypertension    • Joint pain     or swelling   • Low back pain several years ago   • Metatarsal stress fracture with nonunion    • Normal pressure hydrocephalus ([facility])    • Orbit fracture ([facility])    • Pulmonary embolism ([facility])     OCT 2016 - AFTER FOOT SURGERY   • Rash     ARM-    • Spinal headache     AFTER SPINAL TAP - NO BLOOD PATCH   • Stroke (cerebrum) ([facility]) 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   • Syndesmotic ankle sprain, left, subsequent encounter    • Tear of meniscus of knee torn ligaments   • TIA (transient ischemic attack)     MULTIPLE     Past Surgical History:   Procedure Laterality Date   • ANKLE OPEN REDUCTION INTERNAL FIXATION Left 2/16/2021    Procedure: Left ankle open reduction internal fixation with implants as needed;  Surgeon: Man Jenkins MD;  Location: [facility];  Service: Orthopedics;  Laterality: Left;   • APPENDECTOMY N/A 7/18/2019    Procedure: APPENDECTOMY LAPAROSCOPIC;  Surgeon: Luis Carlos Rick Jr., MD;  Location: [facility] OR;  Service: General   • BRAIN SURGERY      BRAIN ABCESS 30 YR AGO   • CARDIAC CATHETERIZATION N/A  12/4/2020    Procedure: Patent foramen ovale closure ABBOTT;  Surgeon: Frank Pablo MD;  Location: [facility] CATH INVASIVE LOCATION;  Service: Cardiology;  Laterality: N/A;   • CARDIAC CATHETERIZATION N/A 12/4/2020    Procedure: Intracardiac echocardiogram;  Surgeon: Frank Pablo MD;  Location: [facility] CATH INVASIVE LOCATION;  Service: Cardiology;  Laterality: N/A;   • CARDIAC SURGERY     • COLONOSCOPY  2012    Ciciliano- normal per pt, no polyps    • CORONARY ARTERY BYPASS GRAFT      3 VESSEL   • ENDOSCOPY N/A 3/9/2017    Schatzki ring, dilated, LA Grade B esophagitis, HH, acquired duodenal stenosis   • ENDOSCOPY N/A 4/6/2018    candida, HH, torts, Schatzki ring, duodenal stenosis, dilatation perforemed, chronic inflammation   • ENDOSCOPY N/A 10/9/2019    White nummular lesions in esophageal mucosa, mild Schatzki ring, acquired duodenal stenosis, Path: Terrazas's, candida   • ENDOSCOPY N/A 1/8/2020    Procedure: ESOPHAGOGASTRODUODENOSCOPY with biopsies;  Surgeon: Rigoberto Walker MD;  Location: [facility] ENDOSCOPY;  Service: Gastroenterology   • FACIAL FRACTURE SURGERY      to repair 6 broken bones   • HAND SURGERY     • ORBITAL FRACTURE OPEN REDUCTION INTERNAL FIXATION Right 1/13/2017    Procedure: RT ORBIT FLOOR FRACTURE REPAIR RIGHT ZMC FRACTURE REPAIR, RIGHT NASAL BONE FRACTURE CLOSED REDUCTION;  Surgeon: Edil Lester MD;  Location: [facility] OR [facility];  Service:    • ORIF FOOT FRACTURE Right 9/9/2016    Procedure: RIGHT SECOND METATARSAL OPEN REDUCTION INTERNAL FIXATION WITh GRAFT FROM HEEL ;  Surgeon: Man Jenkins MD;  Location: [facility] OR [facility];  Service:    • PATENT FORAMEN OVALE CLOSURE     • SEPTOPLASTY     • SKIN CANCER EXCISION     • TONSILLECTOMY     • TRANSESOPHAGEAL ECHOCARDIOGRAM (STELLA)        General Information     Row Name 04/12/22 1049          OT Time and Intention    Document Type evaluation  -JW     Mode of Treatment co-treatment;occupational therapy;physical  therapy  -[facility] Name 04/12/22 1049          General Information    Patient Profile Reviewed yes  -     Prior Level of Function independent:;ADL's;all household mobility  indep with ADLs but incontinent at baseline. Use of upright walker for household mobility. Has been trialing a cane during OP PT sessions.  -     Existing Precautions/Restrictions fall  -     Barriers to Rehab previous functional deficit  -[facility] Name 04/12/22 1049          Living Environment    People in Home spouse  -[facility] Name 04/12/22 1049          Cognition    Orientation Status (Cognition) oriented x 3  -[facility] Name 04/12/22 1049          Safety Issues, Functional Mobility    Impairments Affecting Function (Mobility) balance;endurance/activity tolerance;strength  -           User Key  (r) = Recorded By, (t) = Taken By, (c) = Cosigned By    Initials Name Provider Type     Katie Canales OT Occupational Therapist                 Mobility/ADL's     [facility] Name 04/12/22 1051          Bed Mobility    Bed Mobility supine-sit  -     Supine-Sit [place] (Bed Mobility) standby assist  -     Assistive Device (Bed Mobility) bed rails;head of bed elevated  -JW     Row Name 04/12/22 1051          Transfers    Transfers sit-stand transfer;bed-chair transfer  -     Bed-Chair [place] (Transfers) minimum assist (75% patient effort)  -     Assistive Device (Bed-Chair Transfers) walker, 4-wheeled  -     Sit-Stand [place] (Transfers) contact guard;minimum assist (75% patient effort)  -JW     Row Name 04/12/22 1051          Sit-Stand Transfer    Assistive Device (Sit-Stand Transfers) walker, 4-wheeled  -JW     Row Name 04/12/22 1051          Functional Mobility    Functional Mobility- Ind. Level contact guard assist;minimum assist (75% patient effort);verbal cues required  -     Functional Mobility- Device walker, 4-wheeled  -     Functional Mobility- Comment fxl ambulation around bed to chair with CGA-Car and  use of upright walker, cues for navigation  -JW     Row Name 04/12/22 1051          Activities of Daily Living    BADL Assessment/Intervention lower body dressing;grooming;toileting  -JW     Row Name 04/12/22 1051          Lower Body Dressing Assessment/Training    [place] Level (Lower Body Dressing) doff;don;socks;standby assist  -     Position (Lower Body Dressing) edge of bed sitting  -JW     Row Name 04/12/22 1051          Grooming Assessment/Training    [place] Level (Grooming) grooming skills;wash face, hands;set up  -     Position (Grooming) supported sitting  -JW     Row Name 04/12/22 1051          Toileting Assessment/Training    [place] Level (Toileting) dependent (less than 25% patient effort)  -     Comment, (Toileting) incontinent. MaxA  for brief change, pt is able to complete estela hygiene with set-upA  -           User Key  (r) = Recorded By, (t) = Taken By, (c) = Cosigned By    Initials Name Provider Type     Katie Canales OT Occupational Therapist               Obj/Interventions     Row Name 04/12/22 1054          Vision Assessment/Intervention    Visual Impairment/Limitations WNL  -JW     Row Name 04/12/22 1054          Range of Motion Comprehensive    General Range of Motion bilateral upper extremity ROM WNL  -JW     Row Name 04/12/22 1054          Strength Comprehensive (MMT)    Comment, General Manual Muscle Testing (MMT) Assessment UB strength WFL  -JW     Row Name 04/12/22 1054          Motor Skills    Motor Skills functional endurance  -     Functional Endurance fair, ambulates short distances at baseline  -JW     Row Name 04/12/22 1054          Balance    Balance Assessment sitting static balance;sitting dynamic balance;standing static balance;standing dynamic balance  -     Static Sitting Balance modified independence  -     Dynamic Sitting Balance supervision  -     Position, Sitting Balance unsupported;sitting edge of bed  -     Static Standing Balance  standby assist  -     Dynamic Standing Balance contact guard;minimal assist  -     Position/Device Used, Standing Balance supported;walker, 4-wheeled  -     Balance Interventions sitting;standing;occupation based/functional task  -           User Key  (r) = Recorded By, (t) = Taken By, (c) = Cosigned By    Initials Name Provider Type    JW Katie Canales OT Occupational Therapist               Goals/Plan     Row Name 04/12/22 1059          Transfer Goal 1 (OT)    Activity/Assistive Device (Transfer Goal 1, OT) transfers, all;bed-to-chair/chair-to-bed;toilet;shower chair  -     [place] Level/Cues Needed (Transfer Goal 1, OT) modified independence  -     Time Frame (Transfer Goal 1, OT) short term goal (STG);2 weeks  -     Progress/Outcome (Transfer Goal 1, OT) goal ongoing  -     Row Name 04/12/22 1059          Toileting Goal 1 (OT)    Activity/Device (Toileting Goal 1, OT) toileting skills, all  -     [place] Level/Cues Needed (Toileting Goal 1, OT) standby assist  -     Time Frame (Toileting Goal 1, OT) short term goal (STG);2 weeks  -     Progress/Outcome (Toileting Goal 1, OT) goal ongoing  -     Row Name 04/12/22 1059          Grooming Goal 1 (OT)    Activity/Device (Grooming Goal 1, OT) grooming skills, all  -     [place] (Grooming Goal 1, OT) independent  -     Time Frame (Grooming Goal 1, OT) short term goal (STG);2 weeks  -     Progress/Outcome (Grooming Goal 1, OT) goal ongoing  -     Row Name 04/12/22 1059          Problem Specific Goal 1 (OT)    Problem Specific Goal 1 (OT) Pt will increase endurance to fair+ for increased participation in ADLs and fxl mobility.  -     Time Frame (Problem Specific Goal 1, OT) short term goal (STG);2 weeks  -     Progress/Outcome (Problem Specific Goal 1, OT) goal ongoing  -     Row Name 04/12/22 1059          Therapy Assessment/Plan (OT)    Planned Therapy Interventions (OT) activity tolerance training;BADL  retraining;functional balance retraining;occupation/activity based interventions;patient/caregiver education/training;strengthening exercise;transfer/mobility retraining  -           User Key  (r) = Recorded By, (t) = Taken By, (c) = Cosigned By    Initials Name Provider Type    Katie David, ULICES Occupational Therapist               Clinical Impression     Row Name 04/12/22 1055          Pain Assessment    Pretreatment Pain Rating 0/10 - no pain  -     Posttreatment Pain Rating 0/10 - no pain  -     Row Name 04/12/22 1055          Plan of Care Review    Plan of Care Reviewed With patient;spouse  -     Outcome Evaluation Pt is an 82 y/o male who presents to [facility] with SOB, acute dyspnea, elevated troponin, hypertensive urgency. Hx of NPH, CAD , CVA, CABG. He lives with his wife and is indep with ADLs but incontinent at baseline. He uses a upright walker but has been trialing use of a cane during OP PT sessions. He req's SBA for bed mobility, brief soiled in urine. MaxA for brief change and set-up for estela hygiene. LB dressing with SBA to don socks. STS with CGA-Car with use of upright walker. He performs fxl ambulation around bed to chair with Car and cues for navigation using walker. Set-up for grooming tasks. Pt will benefit from OT services in the acute care setting, anticipate d/c home with continued OP services.  -     Row Name 04/12/22 1055          Therapy Assessment/Plan (OT)    Rehab Potential (OT) good, to achieve stated therapy goals  -     Criteria for Skilled Therapeutic Interventions Met (OT) yes;skilled treatment is necessary  -     Therapy Frequency (OT) 3 times/wk  -     Row Name 04/12/22 1050          Therapy Plan Review/Discharge Plan (OT)    Anticipated Discharge Disposition (OT) home;home with assist  -     Row Name 04/12/22 1055          Positioning and Restraints    Pre-Treatment Position in bed  -     Post Treatment Position chair  -JW     In Chair sitting;call light  within reach;encouraged to call for assist;exit alarm on;legs elevated  -           User Key  (r) = Recorded By, (t) = Taken By, (c) = Cosigned By    Initials Name Provider Type    Katie David OT Occupational Therapist               Outcome Measures     Row Name 04/12/22 1100          How much help from another is currently needed...    Putting on and taking off regular lower body clothing? 3  -JW     Bathing (including washing, rinsing, and drying) 3  -JW     Toileting (which includes using toilet bed pan or urinal) 1  -JW     Putting on and taking off regular upper body clothing 3  -JW     Taking care of personal grooming (such as brushing teeth) 3  -JW     Eating meals 4  -     AM-PAC 6 Clicks Score (OT) 17  -     Row Name 04/12/22 1100          Functional Assessment    Outcome Measure Options AM-PAC 6 Clicks Daily Activity (OT)  -           User Key  (r) = Recorded By, (t) = Taken By, (c) = Cosigned By    Initials Name Provider Type    Katie David OT Occupational Therapist                Occupational Therapy Education                 Title: PT OT SLP Therapies (In Progress)     Topic: Occupational Therapy (In Progress)     Point: ADL training (Done)     Description:   Instruct learner(s) on proper safety adaptation and remediation techniques during self care or transfers.   Instruct in proper use of assistive devices.              Learning Progress Summary           Patient Acceptance, E, VU by ALVERTO at 4/12/2022 1101    Comment: role of OT, plan of care, safety                   Point: Home exercise program (Not Started)     Description:   Instruct learner(s) on appropriate technique for monitoring, assisting and/or progressing therapeutic exercises/activities.              Learner Progress:  Not documented in this visit.          Point: Precautions (Done)     Description:   Instruct learner(s) on prescribed precautions during self-care and functional transfers.              Learning Progress  Summary           Patient Acceptance, E, VU by  at 4/12/2022 1101    Comment: role of OT, plan of care, safety                   Point: Body mechanics (Done)     Description:   Instruct learner(s) on proper positioning and spine alignment during self-care, functional mobility activities and/or exercises.              Learning Progress Summary           Patient Acceptance, E, VU by  at 4/12/2022 1101    Comment: role of OT, plan of care, safety                               User Key     Initials Effective Dates Name Provider Type Discipline     06/10/21 -  Katie Canales OT Occupational Therapist OT              OT Recommendation and Plan  Planned Therapy Interventions (OT): activity tolerance training, BADL retraining, functional balance retraining, occupation/activity based interventions, patient/caregiver education/training, strengthening exercise, transfer/mobility retraining  Therapy Frequency (OT): 3 times/wk  Plan of Care Review  Plan of Care Reviewed With: patient, spouse  Outcome Evaluation: Pt is an 80 y/o male who presents to [facility] with SOB, acute dyspnea, elevated troponin, hypertensive urgency. Hx of NPH, CAD , CVA, CABG. He lives with his wife and is indep with ADLs but incontinent at baseline. He uses a upright walker but has been trialing use of a cane during OP PT sessions. He req's SBA for bed mobility, brief soiled in urine. MaxA for brief change and set-up for estela hygiene. LB dressing with SBA to don socks. STS with CGA-Car with use of upright walker. He performs fxl ambulation around bed to chair with Car and cues for navigation using walker. Set-up for grooming tasks. Pt will benefit from OT services in the acute care setting, anticipate d/c home with continued OP services.     Time Calculation:    Time Calculation- OT     Row Name 04/12/22 1102             Time Calculation- OT    OT Start Time 1012  -      OT Stop Time 1037  -      OT Time Calculation (min) 25 min  -      Total Timed  Code Minutes- OT 15 minute(s)  -      OT Received On 04/12/22  -      OT - Next Appointment 04/14/22  -      OT Goal Re-Cert Due Date 04/26/22  -              Timed Charges    50395 - OT Self Care/Mgmt Minutes 15  -JW              Untimed Charges    OT Eval/Re-eval Minutes 10  -JW              Total Minutes    Timed Charges Total Minutes 15  -JW      Untimed Charges Total Minutes 10  -JW       Total Minutes 25  -JW            User Key  (r) = Recorded By, (t) = Taken By, (c) = Cosigned By    Initials Name Provider Type     Katie Canales OT Occupational Therapist              Therapy Charges for Today     Code Description Service Date Service Provider Modifiers Qty    08342255716 HC OT SELF CARE/MGMT/TRAIN EA 15 MIN 4/12/2022 Katie Canales OT GO 1    29032501540 HC OT EVAL MOD COMPLEXITY 3 4/12/2022 Katie Canales OT GO 1               Katie Canales OT  4/12/2022

## 2022-04-12 NOTE — CONSULTS
[facility] Consult Note       Encounter Date:22  Patient:Eugenio Joe  :1940  MRN:[id number]    Date of Admission: 2022  Date of Encounter Visit: 22  Encounter Provider: Rigoberto Wilde MD  Referring Provider: Nellie Rick DO  Place of Service: [facility]  Patient Care Team:  Adeline Onofre MD as PCP - General  Adeline Onofre MD as PCP - Family Medicine  Jean Ford MD as Consulting Physician (Cardiology)      Consulted for: Shortness of breath and elevated troponin    Chief Complaint: Shortness of breath      History of Presenting Illness:      Mr. Joe is a 81 y.o. gentleman who follows with Dr. Ford with a past medical history notable for coronary artery disease status post CABG, history of TIA/stroke, history of PE, normal pressure hydrocephalus, hypertension, diabetes, and PFO status post closure who presents to the emergency room with worsening dyspnea exertion over the last couple of days.  Patient states that over the last 3 to 4 days that he had worsening shortness of breath and dyspnea exertion.  His shortness of breath is worse with laying down and trying to sleep.  He has had worsening issues with leg swelling since November after starting amlodipine thought this was more related to medication issue however symptoms continued to progress and become more severe.  He was seen in his primary care physician's office yesterday who tried to directly admit him to the hospital however there is no beds and thus sent him to the emergency room.    In the emergency room it was felt that he was dealing with congestive heart failure was given IV Lasix and has diuresed well and feeling much better.  He was noted to have frequent PVCs.  In addition she did have an elevated proBNP level as well as mildly elevated troponins.  He has denied any associated chest pains mainly just shortness of breath.    He was also noted to be fairly  hypertensive.    Review of Systems:  Review of Systems   Constitutional: Positive for malaise/fatigue.   HENT: Negative.    Eyes: Negative.    Cardiovascular: Positive for dyspnea on exertion, leg swelling and palpitations.   Respiratory: Positive for shortness of breath.    Endocrine: Negative.    Hematologic/Lymphatic: Negative.    Skin: Negative.    Musculoskeletal: Negative.    Gastrointestinal: Negative.    Genitourinary: Negative.    Neurological: Negative.    Psychiatric/Behavioral: Negative.    Allergic/Immunologic: Negative.        Medications:    Current Facility-Administered Medications:   •  acetaminophen (TYLENOL) tablet 650 mg, 650 mg, Oral, Q4H PRN, Nellie Rick DO  •  albuterol (PROVENTIL) nebulizer solution 0.083% 2.5 mg/3mL, 2.5 mg, Nebulization, Q6H PRN, Nellie Rick DO  •  allopurinol (ZYLOPRIM) tablet 300 mg, 300 mg, Oral, Daily, Nellie Rick DO, 300 mg at 04/12/22 0858  •  aspirin chewable tablet 81 mg, 81 mg, Oral, Daily, Nellie Rick DO, 81 mg at 04/12/22 0858  •  atorvastatin (LIPITOR) tablet 40 mg, 40 mg, Oral, Nightly, Nellie Rick DO, 40 mg at 04/12/22 0237  •  carvedilol (COREG) tablet 6.25 mg, 6.25 mg, Oral, BID With Meals, Rigoberto Wilde MD  •  cholecalciferol (VITAMIN D3) tablet 2,000 Units, 2,000 Units, Oral, QAM, Nellie Rick DO  •  clopidogrel (PLAVIX) tablet 75 mg, 75 mg, Oral, Daily, Nellie Rick DO, 75 mg at 04/12/22 0858  •  dextrose (D50W) (25 g/50 mL) IV injection 25 g, 25 g, Intravenous, Q15 Min PRN, Breanna Dixon APRN  •  dextrose (GLUTOSE) oral gel 15 g, 15 g, Oral, Q15 Min PRN, Breanna Dixon APRN  •  enoxaparin (LOVENOX) syringe 40 mg, 40 mg, Subcutaneous, Daily, Nellie Rick DO, 40 mg at 04/12/22 0859  •  furosemide (LASIX) injection 40 mg, 40 mg, Intravenous, BID, Nellie Rick DO, 40 mg at 04/12/22 1647  •  glucagon (human recombinant) (GLUCAGEN DIAGNOSTIC) injection 1 mg, 1 mg, Intramuscular, Q15  Min PRN, Breanna Dixon, APRN  •  hydrALAZINE (APRESOLINE) injection 10 mg, 10 mg, Intravenous, Q6H PRN, Nellie Rick DO  •  insulin lispro (ADMELOG) injection 0-7 Units, 0-7 Units, Subcutaneous, 4x Daily With Meals & Nightly, Breanna Dixon, APRN, 2 Units at 04/12/22 1648  •  lisinopril (PRINIVIL,ZESTRIL) tablet 20 mg, 20 mg, Oral, BID, Nellie Rick DO, 20 mg at 04/12/22 0900  •  Magnesium Sulfate 2 gram Bolus, followed by 8 gram infusion (total Mg dose 10 grams)- Mg less than or equal to 1mg/dL, 2 g, Intravenous, PRN **OR** Magnesium Sulfate 2 gram / 50mL Infusion (GIVE X 3 BAGS TO EQUAL 6GM TOTAL DOSE) - Mg 1.1 - 1.5 mg/dl, 2 g, Intravenous, PRN, Last Rate: 25 mL/hr at 04/12/22 1845, 2 g at 04/12/22 1845 **OR** Magnesium Sulfate 4 gram infusion- Mg 1.6-1.9 mg/dL, 4 g, Intravenous, PRN, Christiano Cantor, DO  •  melatonin tablet 5 mg, 5 mg, Oral, Nightly PRN, Nellie Rick DO  •  nitroglycerin (NITROSTAT) SL tablet 0.4 mg, 0.4 mg, Sublingual, Q5 Min PRN, Nellie Rick DO  •  ondansetron (ZOFRAN) injection 4 mg, 4 mg, Intravenous, Q6H PRN, Nellie Rick DO  •  pantoprazole (PROTONIX) EC tablet 40 mg, 40 mg, Oral, QAM AC, Nellie Rick DO  •  potassium & sodium phosphates (PHOS-NAK) 280-160-250 MG packet - for Phosphorus less than 1.25 mg/dL, 2 packet, Oral, Q6H PRN **OR** potassium & sodium phosphates (PHOS-NAK) 280-160-250 MG packet - for Phosphorus 1.25 - 2.5 mg/dL, 2 packet, Oral, Q6H PRN, Christiano Cantor, DO  •  potassium chloride (K-DUR,KLOR-CON) ER tablet 40 mEq, 40 mEq, Oral, PRN **OR** potassium chloride (KLOR-CON) packet 40 mEq, 40 mEq, Oral, PRN **OR** potassium chloride 10 mEq in 100 mL IVPB, 10 mEq, Intravenous, Q1H PRN, Christiano Cantor, DO  •  sodium chloride 0.9 % flush 10 mL, 10 mL, Intravenous, PRN, Casa Moran MD  •  sodium chloride 0.9 % flush 10 mL, 10 mL, Intravenous, Q12H, Nellie Rick DO, 10 mL at 04/12/22 0901  •  sodium chloride  0.9 % flush 10 mL, 10 mL, Intravenous, PRN, Nellie Rick DO  •  terazosin (HYTRIN) capsule 1 mg, 1 mg, Oral, Nightly, Nellie Rick DO, 1 mg at 04/12/22 0237    Allergies   Allergen Reactions   • Other Mental Status Change and Hallucinations     Oxy drugs   • Oxycodone Mental Status Change       Past Medical History:   Diagnosis Date   • Arthritis    • Asthma    • Brain abscess     at about age 44   • Bruise of face    • Bursitis of left hip    • Cancer ([facility])     PT STATES IN HIS SWEAT GLAND    • Cardiac disease    • Coronary artery disease     CABG 2012   • Diabetes mellitus ([facility])    • Difficulty in swallowing     LIQUIDS   • Difficulty walking    • Diverticulitis    • DM2 (diabetes mellitus, type 2) ([facility]) 10/12/2016   • Fracture, foot 2015    Dr Pisano   • Fracture, stress, metatarsal    • Fracture, tibia and fibula Feb 2021    Dr Pisano   • Gout several years ago    medication  working   • Hearing aid worn     bilateral   • Hx of appendectomy    • Hyperlipemia    • Hypertension    • Joint pain     or swelling   • Low back pain several years ago   • Metatarsal stress fracture with nonunion    • Normal pressure hydrocephalus ([facility])    • Orbit fracture ([facility])    • Pulmonary embolism ([facility])     OCT 2016 - AFTER FOOT SURGERY   • Rash     ARM-    • Spinal headache     AFTER SPINAL TAP - NO BLOOD PATCH   • Stroke (cerebrum) ([facility]) 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   • Syndesmotic ankle sprain, left, subsequent encounter    • Tear of meniscus of knee torn ligaments   • TIA (transient ischemic attack)     MULTIPLE       Past Surgical History:   Procedure Laterality Date   • ANKLE OPEN REDUCTION INTERNAL FIXATION Left 2/16/2021    Procedure: Left ankle open reduction internal fixation with implants as needed;  Surgeon: Man Jenkins MD;  Location: [facility] OR [facility] [place];  Service: Orthopedics;  Laterality: Left;   • APPENDECTOMY N/A 7/18/2019    Procedure: APPENDECTOMY LAPAROSCOPIC;  Surgeon: Prabhjot  Luis Carlos Pathak MD;  Location: [facility] MAIN OR;  Service: General   • BRAIN SURGERY      BRAIN ABCESS 30 YR AGO   • CARDIAC CATHETERIZATION N/A 12/4/2020    Procedure: Patent foramen ovale closure ABBOTT;  Surgeon: Frank Pablo MD;  Location: [facility] CATH INVASIVE LOCATION;  Service: Cardiology;  Laterality: N/A;   • CARDIAC CATHETERIZATION N/A 12/4/2020    Procedure: Intracardiac echocardiogram;  Surgeon: Frank Pablo MD;  Location: [facility] CATH INVASIVE LOCATION;  Service: Cardiology;  Laterality: N/A;   • CARDIAC SURGERY     • COLONOSCOPY  2012    Ciciliano- normal per pt, no polyps    • CORONARY ARTERY BYPASS GRAFT      3 VESSEL   • ENDOSCOPY N/A 3/9/2017    Schatzki ring, dilated, LA Grade B esophagitis, HH, acquired duodenal stenosis   • ENDOSCOPY N/A 4/6/2018    candida, HH, torts, Schatzki ring, duodenal stenosis, dilatation perforemed, chronic inflammation   • ENDOSCOPY N/A 10/9/2019    White nummular lesions in esophageal mucosa, mild Schatzki ring, acquired duodenal stenosis, Path: Terrazas's, candida   • ENDOSCOPY N/A 1/8/2020    Procedure: ESOPHAGOGASTRODUODENOSCOPY with biopsies;  Surgeon: Rigoberto Walker MD;  Location: [facility] ENDOSCOPY;  Service: Gastroenterology   • FACIAL FRACTURE SURGERY      to repair 6 broken bones   • HAND SURGERY     • ORBITAL FRACTURE OPEN REDUCTION INTERNAL FIXATION Right 1/13/2017    Procedure: RT ORBIT FLOOR FRACTURE REPAIR RIGHT ZMC FRACTURE REPAIR, RIGHT NASAL BONE FRACTURE CLOSED REDUCTION;  Surgeon: Edil Lester MD;  [place] [facility] [place] [facility] [place]:    • ORIF FOOT FRACTURE Right 9/9/2016    Procedure: RIGHT SECOND METATARSAL OPEN REDUCTION INTERNAL FIXATION WITh GRAFT FROM HEEL ;  Surgeon: Man Jenkins MD;  [place] [facility] [place] [facility] [place]  Service:    • PATENT FORAMEN OVALE CLOSURE     • SEPTOPLASTY     • SKIN CANCER EXCISION     • TONSILLECTOMY     • TRANSESOPHAGEAL ECHOCARDIOGRAM (STELLA)         Social History     Socioeconomic History    • Marital status:    • Number of children: 2   • Years of education: 16   Tobacco Use   • Smoking status: Former Smoker     Packs/day: 3.00     Years: 5.00     Pack years: 15.00     Types: Cigarettes     Start date:      Quit date:      Years since quittin.3   • Smokeless tobacco: Never Used   • Tobacco comment: Quit herson turkey   Substance and Sexual Activity   • Alcohol use: Yes     Alcohol/week: 2.0 standard drinks     Types: 1 Cans of beer, 1 Shots of liquor per week     Comment: RARELY    • Drug use: No   • Sexual activity: Not Currently     Partners: Female     Birth control/protection: Surgical       Family History   Problem Relation Age of Onset   • Heart disease Mother    • Hypertension Mother    • Stroke Mother    • Diverticulitis Mother    • Heart disease Father    • Hypertension Father    • Malig Hyperthermia Neg Hx        The following portions of the patient's history were reviewed and updated as appropriate: allergies, current medications, past family history, past medical history, past social history, past surgical history and problem list.         Objective:      Temp:  [97.6 °F (36.4 °C)-98.9 °F (37.2 °C)] 97.6 °F (36.4 °C)  Heart Rate:  [69-96] 79  Resp:  [18] 18  BP: (103-191)/(67-94) 158/94     Intake/Output Summary (Last 24 hours) at 2022 1847  Last data filed at 2022 1823  Gross per 24 hour   Intake 590 ml   Output --   Net 590 ml     Body mass index is 29.56 kg/m².      22  2210 22  0336 22  1409   Weight: 93.7 kg (206 lb 8 oz) 93.8 kg (206 lb 12.8 oz) 93.4 kg (206 lb)           Physical Exam:  Constitutional: Well appearing, well developed, no acute distress   HENT: Oropharynx clear and membrane moist  Eyes: Normal conjunctiva, no sclera icterus.  Neck: Supple, no carotid bruit bilaterally.  Cardiovascular: Regular rate and rhythm, frequent ectopic beats, Early peaking systolic murmur over the right upper sternal border, No bilateral lower  extremity edema.  Pulmonary: Normal respiratory effort, normal lung sounds, no wheezing.  Abdominal: Soft, nontender, no hepatosplenomegaly, liver is non-pulsatile.  Neurological: Alert and orient x 3.   Skin: Warm, dry, no ecchymosis, no rash.  Psych: Appropriate mood and affect. Normal judgment and insight.         Lab Review:   Results from last 7 days   Lab Units 04/12/22  1136 04/11/22  1700   SODIUM mmol/L 141 143   POTASSIUM mmol/L 3.7 4.1   CHLORIDE mmol/L 105 108*   CO2 mmol/L 21.3* 20.1*   BUN mg/dL 17 19   CREATININE mg/dL 1.25 1.23   GLUCOSE mg/dL 185* 186*   CALCIUM mg/dL 9.3 9.5   AST (SGOT) U/L  --  13   ALT (SGPT) U/L  --  12     Results from last 7 days   Lab Units 04/12/22  1136 04/11/22  2259 04/11/22  1700   TROPONIN T ng/mL 0.040* 0.042* 0.033*     Results from last 7 days   Lab Units 04/12/22  1136 04/11/22  1840   WBC 10*3/mm3 7.25 8.23   HEMOGLOBIN g/dL 11.2* 11.8*   HEMATOCRIT % 33.2* 33.8*   PLATELETS 10*3/mm3 181 184     Results from last 7 days   Lab Units 04/11/22  1700   INR  0.98     Results from last 7 days   Lab Units 04/12/22  1136   MAGNESIUM mg/dL 1.1*           Invalid input(s): LDLCALC  The ASCVD Risk score (Kimberly JEREMY Jr., et al., 2013) failed to calculate for the following reasons:    The 2013 ASCVD risk score is only valid for ages 40 to 79    The patient has a prior MI or stroke diagnosis     Results from last 7 days   Lab Units 04/11/22  1700   PROBNP pg/mL 1,564.0           Telemetry          EKG this AM    EKG on admission    I have reviewed the EKGs above with no change from prior.      Previous Cardiac Testing     Echocardiogram 11/11/2021:  · The left atrial cavity is dilated. Saline test results are negative. ASD or PFO closure device In interatrial septum.  · This was a limited study.    Stress Test 4/29/2019:  · Myocardial perfusion imaging indicates a normal myocardial perfusion study with no evidence of ischemia.  · Left ventricular ejection fraction is normal  (Calculated EF = 64%)  · GI artifact is present.  · Impressions are consistent with a low risk study    Holter Monitor 9/8/2020:  · The predominant rhythm noted during the testing period was sinus rhythm.   · Premature atrial contractions occured occasionally.   · There were 18 episodes of nonsustained supraventricular tachycardia the longest lasting 12 seconds and the fastest with a rate of 152 bpm.   · Premature ventricular contractions occured occasionally. Ventricular couplets, bigeminy and trigeminy.   · There were no episodes of ventricular tachycardia.   · Sinoatrial node conduction was normal. 1st degree atrioventricular block noted.          Assessment:           Acute dyspnea    S/P CABG x 3    Essential hypertension    Hyperlipemia    Type 2 diabetes mellitus with hyperglycemia, without long-term current use of insulin (HCC)    Normal pressure hydrocephalus (HCC)    CVA (cerebral vascular accident) ([facility])    Stage 3a chronic kidney disease ([facility])       Plan:       Mr. Joe is a 81 y.o. gentleman who follows with Dr. Ford with a past medical history notable for coronary artery disease status post CABG, history of TIA/stroke, history of PE, normal pressure hydrocephalus, hypertension, diabetes, and PFO status post closure who presents to the emergency room with worsening dyspnea exertion over the last couple of days.  He is doing better with IV diuresis and would continue to diuresis him today with IV diuresis.  I had also like to add on carvedilol to his regimen given his PVCs and hypertensive presentation.  Does have a mildly elevated troponin but relatively flat and really not having a whole lot in the way of chest pain.  Would not be unreasonable to be thinking about an ischemic evaluation at some point but would like to further diurese him and reassess how he is doing after optimizing his blood pressure and volume status will get an echocardiogram today to make sure no significant changes as far as  overall heart function or any wall motion abnormality.      Acute on chronic diastolic heart failure:  · Continue IV diuresis  · We will follow up on echocardiogram    Elevated troponin:  · Not related to ACS likely related to hypertensive urgency and chronic kidney disease  · We will follow up on echocardiogram to assess LV function and wall motion    Premature ventricular contractions:  · Will start beta blocker    Coronary artery disease without angina:  · Continue aspirin and plavix  · Will start carvedilol  · Continue statin        Thank you for allowing me to participate in the care of Eugenio Joe. Feel free to contact me directly with any further questions or concerns.    Rigoberto Wilde MD  [facility]  04/12/22  18:47 EDT

## 2022-04-12 NOTE — PROGRESS NOTES
[facility]    PROGRESS [facility]    Name:  Eugenio Joe   Age:  81 y.o.  Sex:  male  :  1940  MRN:  [id number]   Visit Number:  [id number]  Admission Date:  2022  Date Of Service:  22  Primary Care Physician:  Adeline Onofre MD     LOS: 1 day :  Patient Care Team:  Adeline Onofre MD as PCP - General  Adeline Onofre MD as PCP - Family Medicine  Jean Ford MD as Consulting Physician (Cardiology):    History taken from:     patient chart    Chief Complaint:      Dyspnea    Subjective     Interval History:     Patient seen and examined today.  Reviewed history and physical, lab work, x-rays, chart.    81-year-old with history of hypertension, coronary artery disease, CABG, type 2 diabetes, chronic kidney disease stage III, chronic dysphagia came with shortness of breath.  CT of the chest showed mild vascular congestion.  Patient was placed on IV Lasix and cardiology was consulted.  Speech therapy was consulted due to patient choking on food frequently.  Patient and wife agreed to VFSS, however they refused any adjustment in diet.  Patient feels mildly better at this point.  Denies any chest pressure, nausea, vomiting, pain.  He states he has shortness of breath if he lays on his right side or ambulates.  Troponin was mildly elevated at 0.04, repeat has been the same.    Review of Systems:     All systems were reviewed and negative except for:  Respiratory: positive for  shortness of air  Cardiovascular: positive for  orthopnea    Objective     Vital Signs:    Temp:  [97.6 °F (36.4 °C)-98.9 °F (37.2 °C)] 97.6 °F (36.4 °C)  Heart Rate:  [43-96] 79  Resp:  [18] 18  BP: (103-191)/(65-94) 158/94    Physical Exam:    General: Alert and oriented x4, mild distress.  Heart: Regular rate and rhythm without murmur rub or thrill.  Lungs: Clear to auscultation bilaterally without use of accessory muscles or respiration.  Abdomen: Soft/nontender/nondistended.  No HSM  noted.  MSK: 4/5 strength in upper/lower extremities bilaterally.     Results Review:      I reviewed the patient's new clinical results.  I reviewed the patient's new imaging results and agree with the interpretation.  I reviewed the patient's other test results and agree with the interpretation  I personally viewed and interpreted the patient's EKG/Telemetry data    Labs:    Lab Results (last 24 hours)     Procedure Component Value Units Date/Time    Basic Metabolic Panel [[id number]]  (Abnormal) Collected: 04/12/22 1136    Specimen: Blood Updated: 04/12/22 1247     Glucose 185 mg/dL      BUN 17 mg/dL      Creatinine 1.25 mg/dL      Sodium 141 mmol/L      Potassium 3.7 mmol/L      Comment: Slight hemolysis detected by analyzer. Results may be affected.        Chloride 105 mmol/L      CO2 21.3 mmol/L      Calcium 9.3 mg/dL      BUN/Creatinine Ratio 13.6     Anion Gap 14.7 mmol/L      eGFR 57.5 mL/min/1.73      Comment: National Kidney Foundation and American Society of Nephrology (ASN) Task Force recommended calculation based on the Chronic Kidney Disease Epidemiology Collaboration (CKD-EPI) equation refit without adjustment for race.       Narrative:      GFR Normal >60  Chronic Kidney Disease <60  Kidney Failure <15      Magnesium [[id number]]  (Abnormal) Collected: 04/12/22 1136    Specimen: Blood Updated: 04/12/22 1247     Magnesium 1.1 mg/dL     Troponin [[id number]]  (Abnormal) Collected: 04/12/22 1136    Specimen: Blood Updated: 04/12/22 1239     Troponin T 0.040 ng/mL     Narrative:      Troponin T Reference Range:  <= 0.03 ng/mL-   Negative for AMI  >0.03 ng/mL-     Abnormal for myocardial necrosis.  Clinicians would have to utilize clinical acumen, EKG, Troponin and serial changes to determine if it is an Acute Myocardial Infarction or myocardial injury due to an underlying chronic condition.       Results may be falsely decreased if patient taking Biotin.      CBC & Differential [[id number]]  (Abnormal)  Collected: 04/12/22 1136    Specimen: Blood Updated: 04/12/22 1212    Narrative:      The following orders were created for panel order CBC & Differential.  Procedure                               Abnormality         Status                     ---------                               -----------         ------                     CBC Auto Differential[[id number]]        Abnormal            Final result                 Please view results for these tests on the individual orders.    CBC Auto Differential [[id number]]  (Abnormal) Collected: 04/12/22 1136    Specimen: Blood Updated: 04/12/22 1212     WBC 7.25 10*3/mm3      RBC 3.51 10*6/mm3      Hemoglobin 11.2 g/dL      Hematocrit 33.2 %      MCV 94.6 fL      MCH 31.9 pg      MCHC 33.7 g/dL      RDW 13.0 %      RDW-SD 44.3 fl      MPV 10.8 fL      Platelets 181 10*3/mm3      Neutrophil % 68.7 %      Lymphocyte % 19.9 %      Monocyte % 7.6 %      Eosinophil % 2.9 %      Basophil % 0.6 %      Immature Grans % 0.3 %      Neutrophils, Absolute 4.99 10*3/mm3      Lymphocytes, Absolute 1.44 10*3/mm3      Monocytes, Absolute 0.55 10*3/mm3      Eosinophils, Absolute 0.21 10*3/mm3      Basophils, Absolute 0.04 10*3/mm3      Immature Grans, Absolute 0.02 10*3/mm3      nRBC 0.0 /100 WBC     POC Glucose Once [[id number]]  (Abnormal) Collected: 04/12/22 1121    Specimen: Blood Updated: 04/12/22 1125     Glucose 199 mg/dL      Comment: Meter: [id number] : [id number] Darron BAKER       POC Glucose Once [[id number]]  (Abnormal) Collected: 04/12/22 0556    Specimen: Blood Updated: 04/12/22 0608     Glucose 170 mg/dL      Comment: Meter: [id number] : [id number] Kamlesh BAKER       COVID PRE-OP / PRE-PROCEDURE SCREENING ORDER (NO ISOLATION) - Swab, Nasopharynx [[id number]]  (Normal) Collected: 04/11/22 2302    Specimen: Swab from Nasopharynx Updated: 04/12/22 5055    Narrative:      The following orders were created for panel order COVID PRE-OP / PRE-PROCEDURE SCREENING ORDER  (NO ISOLATION) - Swab, Nasopharynx.  Procedure                               Abnormality         Status                     ---------                               -----------         ------                     COVID-19,APTIMA PANTHER(...[[id number]]  Normal              Final result                 Please view results for these tests on the individual orders.    COVID-19,APTIMA PANTHER(REBECA),[facility]/ ALEX, NP/OP SWAB IN UTM/VTM/SALINE TRANSPORT MEDIA,24 HR TAT - Swab, Nasopharynx [[id number]]  (Normal) Collected: 04/11/22 2302    Specimen: Swab from Nasopharynx Updated: 04/12/22 0435     COVID19 Not Detected    Narrative:      Fact sheet for providers: https://www.fda.gov/media/826022/download     Fact sheet for patients: https://www.fda.gov/media/665895/download    Test performed by RT PCR.    Troponin [[id number]]  (Abnormal) Collected: 04/11/22 2259    Specimen: Blood Updated: 04/12/22 0129     Troponin T 0.042 ng/mL     Narrative:      Troponin T Reference Range:  <= 0.03 ng/mL-   Negative for AMI  >0.03 ng/mL-     Abnormal for myocardial necrosis.  Clinicians would have to utilize clinical acumen, EKG, Troponin and serial changes to determine if it is an Acute Myocardial Infarction or myocardial injury due to an underlying chronic condition.       Results may be falsely decreased if patient taking Biotin.      STAT Lactic Acid, Reflex [[id number]]  (Normal) Collected: 04/11/22 2259    Specimen: Blood Updated: 04/11/22 2331     Lactate 1.5 mmol/L     POC Glucose Once [[id number]]  (Abnormal) Collected: 04/11/22 2243    Specimen: Blood Updated: 04/11/22 2245     Glucose 180 mg/dL      Comment: Meter: [id number] : [id number] Sesar Navarro RN       Procalcitonin [[id number]]  (Normal) Collected: 04/11/22 1700    Specimen: Blood from Arm, Right Updated: 04/11/22 2033     Procalcitonin 0.04 ng/mL     Narrative:      As a Marker for Sepsis (Non-Neonates):    1. <0.5 ng/mL represents a low risk of severe sepsis and/or  "septic shock.  2. >2 ng/mL represents a high risk of severe sepsis and/or septic shock.    As a Marker for Lower Respiratory Tract Infections that require antibiotic therapy:    PCT on Admission    Antibiotic Therapy       6-12 Hrs later    >0.5                Strongly Recommended  >0.25 - <0.5        Recommended   0.1 - 0.25          Discouraged              Remeasure/reassess PCT  <0.1                Strongly Discouraged     Remeasure/reassess PCT    As 28 day mortality risk marker: \"Change in Procalcitonin Result\" (>80% or <=80%) if Day 0 (or Day 1) and Day 4 values are available. Refer to http://[email or web address]-pct-[email or web address]    Change in PCT <=80%  A decrease of PCT levels below or equal to 80% defines a positive change in PCT test result representing a higher risk for 28-day all-cause mortality of patients diagnosed with severe sepsis for septic shock.    Change in PCT >80%  A decrease of PCT levels of more than 80% defines a negative change in PCT result representing a lower risk for 28-day all-cause mortality of patients diagnosed with severe sepsis or septic shock.       Lactic Acid, Plasma [[id number]]  (Abnormal) Collected: 04/11/22 1840    Specimen: Blood Updated: 04/11/22 1914     Lactate 2.2 mmol/L     CBC & Differential [[id number]]  (Abnormal) Collected: 04/11/22 1840    Specimen: Blood from Arm, Right Updated: 04/11/22 1901    Narrative:      The following orders were created for panel order CBC & Differential.  Procedure                               Abnormality         Status                     ---------                               -----------         ------                     CBC Auto Differential[[id number]]        Abnormal            Final result                 Please view results for these tests on the individual orders.    CBC Auto Differential [[id number]]  (Abnormal) Collected: 04/11/22 1840    Specimen: Blood from Arm, Right Updated: 04/11/22 1901     WBC 8.23 10*3/mm3      RBC 3.67 " 10*6/mm3      Hemoglobin 11.8 g/dL      Hematocrit 33.8 %      MCV 92.1 fL      MCH 32.2 pg      MCHC 34.9 g/dL      RDW 13.0 %      RDW-SD 42.9 fl      MPV 10.7 fL      Platelets 184 10*3/mm3      Neutrophil % 72.7 %      Lymphocyte % 17.5 %      Monocyte % 6.4 %      Eosinophil % 2.7 %      Basophil % 0.5 %      Immature Grans % 0.2 %      Neutrophils, Absolute 5.98 10*3/mm3      Lymphocytes, Absolute 1.44 10*3/mm3      Monocytes, Absolute 0.53 10*3/mm3      Eosinophils, Absolute 0.22 10*3/mm3      Basophils, Absolute 0.04 10*3/mm3      Immature Grans, Absolute 0.02 10*3/mm3      nRBC 0.0 /100 WBC     Protime-INR [[id number]]  (Normal) Collected: 04/11/22 1700    Specimen: Blood from Arm, Right Updated: 04/11/22 1843     Protime 12.9 Seconds      INR 0.98    [facility] Draw [[id number]] Collected: 04/11/22 1700    Specimen: Blood from Arm, Right Updated: 04/11/22 1803    Narrative:      The following orders were created for panel order [facility] [facility].  Procedure                               Abnormality         Status                     ---------                               -----------         ------                     Green Top (Gel)[[id number]]                                  Final result               Lavender Top[[id number]]                                     Final result               Gold Top - SST[[id number]]                                   Final result               Light Blue Top[[id number]]                                   Final result                 Please view results for these tests on the individual orders.    Lavender Top [[id number]] Collected: 04/11/22 1700    Specimen: Blood from Arm, Right Updated: 04/11/22 1803     Extra Tube hold for add-on     Comment: Auto resulted       Green Top (Gel) [[id number]] Collected: 04/11/22 1700    Specimen: Blood from Arm, Right Updated: 04/11/22 1803     Extra Tube Hold for add-ons.     Comment: Auto resulted.       Gold Top - SST [[id number]] Collected: 04/11/22  1700    Specimen: Blood from Arm, Right Updated: 04/11/22 1803     Extra Tube Hold for add-ons.     Comment: Auto resulted.       Light Blue Top [[id number]] Collected: 04/11/22 1700    Specimen: Blood from Arm, Right Updated: 04/11/22 1803     Extra Tube hold for add-on     Comment: Auto resulted       Troponin [[id number]]  (Abnormal) Collected: 04/11/22 1700    Specimen: Blood from Arm, Right Updated: 04/11/22 1739     Troponin T 0.033 ng/mL     Narrative:      Troponin T Reference Range:  <= 0.03 ng/mL-   Negative for AMI  >0.03 ng/mL-     Abnormal for myocardial necrosis.  Clinicians would have to utilize clinical acumen, EKG, Troponin and serial changes to determine if it is an Acute Myocardial Infarction or myocardial injury due to an underlying chronic condition.       Results may be falsely decreased if patient taking Biotin.      Comprehensive Metabolic Panel [[id number]]  (Abnormal) Collected: 04/11/22 1700    Specimen: Blood from Arm, Right Updated: 04/11/22 1736     Glucose 186 mg/dL      BUN 19 mg/dL      Creatinine 1.23 mg/dL      Sodium 143 mmol/L      Potassium 4.1 mmol/L      Chloride 108 mmol/L      CO2 20.1 mmol/L      Calcium 9.5 mg/dL      Total Protein 6.8 g/dL      Albumin 3.80 g/dL      ALT (SGPT) 12 U/L      AST (SGOT) 13 U/L      Alkaline Phosphatase 71 U/L      Total Bilirubin 0.8 mg/dL      Globulin 3.0 gm/dL      A/G Ratio 1.3 g/dL      BUN/Creatinine Ratio 15.4     Anion Gap 14.9 mmol/L      eGFR 58.6 mL/min/1.73      Comment: National Kidney Foundation and American Society of Nephrology (ASN) Task Force recommended calculation based on the Chronic Kidney Disease Epidemiology Collaboration (CKD-EPI) equation refit without adjustment for race.       Narrative:      GFR Normal >60  Chronic Kidney Disease <60  Kidney Failure <15      BNP [[id number]]  (Normal) Collected: 04/11/22 1700    Specimen: Blood from Arm, Right Updated: 04/11/22 1734     proBNP 1,564.0 pg/mL     Narrative:      Among  patients with dyspnea, NT-proBNP is highly sensitive for the detection of acute congestive heart failure. In addition NT-proBNP of <300 pg/ml effectively rules out acute congestive heart failure with 99% negative predictive value.    Results may be falsely decreased if patient taking Biotin.             Radiology:    Imaging Results (Last 24 Hours)     Procedure Component Value Units Date/Time    CT Angiogram Chest [[id number]] Collected: 04/11/22 2035     Updated: 04/11/22 2051    Narrative:      CT ANGIOGRAPHY OF THE CHEST WITH INTRAVENOUS CONTRAST AND 3-D  RECONSTRUCTIONS     HISTORY: Shortness of breath.     TECHNIQUE:The CT scan  was performed as an emergency procedure with CT  angiography protocol using intervenous contrast and 3-D reconstructions.        FINDINGS: The following findings are present:  1. The pulmonary arteries are well-opacified and there is no evidence of  pulmonary embolus. The thoracic aorta shows no evidence of aneurysm or  dissection.  2. There are very tiny bilateral pleural effusions layering posteriorly.  There is some slight interstitial prominence and minimal scattered  ground glass opacity which is nonspecific but likely relates to an  element of very mild CHF as also suggested on the portable chest x-ray.  3. There are calcified left hilar lymph nodes. There is no mediastinal  or hilar or axillary adenopathy. There is no pericardial effusion. The  CT images through the upper liver, spleen, and both adrenal glands were  unremarkable.                 Radiation dose reduction techniques were utilized, including automated  exposure control and exposure modulation based on body size.     This report was finalized on 4/11/2022 8:48 PM by Dr. Andrew Omalley M.D.       XR Chest 2 View [[id number]] Collected: 04/11/22 1639     Updated: 04/11/22 1643    Narrative:      TWO-VIEW CHEST     HISTORY: Shortness of breath.     FINDINGS: There is mild cardiomegaly with sternal wires from  previous  cardiac surgery. There is mild to moderate vascular congestion were  probable very tiny pleural effusions and suspicious for changes of mild  CHF. The patient had a very similar appearance on an earlier chest x-ray  dated 04/27/2020.     This report was finalized on 4/11/2022 4:40 PM by Dr. Andrew Omalley M.D.             Medication Review:     allopurinol, 300 mg, Oral, Daily  aspirin, 81 mg, Oral, Daily  atorvastatin, 40 mg, Oral, Nightly  cholecalciferol, 2,000 Units, Oral, QAM  clopidogrel, 75 mg, Oral, Daily  enoxaparin, 40 mg, Subcutaneous, Daily  furosemide, 40 mg, Intravenous, BID  insulin lispro, 0-7 Units, Subcutaneous, 4x Daily With Meals & Nightly  lisinopril, 20 mg, Oral, BID  pantoprazole, 40 mg, Oral, QAM AC  sodium chloride, 10 mL, Intravenous, Q12H  terazosin, 1 mg, Oral, Nightly             Assessment/Plan     Principal Problem:    Acute dyspnea  Active Problems:    S/P CABG x 3      Overview: Description: 9/10:with a LIMA to his LAD, a vein to a ramus, and a vein to       the OM-2    Essential hypertension    Hyperlipemia    Type 2 diabetes mellitus with hyperglycemia, without long-term current use of insulin (HCC)    Normal pressure hydrocephalus (HCC)    CVA (cerebral vascular accident) (HCC)    Stage 3a chronic kidney disease (HCC)      1.  Acute congestive heart failure unknown type.  Suspect  diastolic.  2.  Diabetes mellitus type 2  3.  Essential hypertension  4.  Hyperlipidemia  5.  Coronary artery disease  6.  Mildly elevated troponin  7.  Dysphagia  8.  Hypomagnesemia  9.  History of NPH, CVA      Plan:    We will diurese.  Cardiology consulted.  Await input.  Await echocardiogram results.  Monitor blood sugar.  Replace magnesium.  VFSS ordered.  Patient refuses adjustment in diet.  Check lab work in the a.m. Monitor blood sugar.  Insulin sliding scale.  PT and OT to work with.  Replace magnesium per protocol.  Further recommendations to follow clinical course.    Christiano CHEN  DO Devaughn  04/12/22  13:39 EDT

## 2022-04-12 NOTE — THERAPY EVALUATION
Acute Care - Speech Language Pathology   Swallow Initial Evaluation [facility]     Patient Name: Eugenio Joe  : 1940  MRN: [id number]  Today's Date: 2022               Admit Date: 2022    Visit Dx:     ICD-10-CM ICD-9-CM   1. Acute dyspnea  R06.00 786.09   2. Acute congestive heart failure, unspecified heart failure type (Formerly [facility])  I50.9 428.0   3. Hypertensive urgency  I16.0 401.9   4. Elevated troponin  R77.8 790.6     Patient Active Problem List   Diagnosis   • Quadriceps weakness   • ACL tear   • Knee pain, right   • S/P CABG x 3   • Essential hypertension   • Hyperlipemia   • Hallux rigidus   • Fracture, stress, metatarsal   • Osteopenia determined by x-ray   • Metatarsal stress fracture with nonunion   • Left hip pain   • Bursitis of left hip   • Pulmonary embolism (Formerly [facility])   • DALILA (acute kidney injury) (Formerly [facility])   • Type 2 diabetes mellitus with hyperglycemia, without long-term current use of insulin (Formerly [facility])   • Ascending aorta dilatation (Formerly [facility])   • Pulmonary nodule, left   • Right leg DVT ([facility]ly [facility])   • Acute renal failure (Formerly [facility])   • Hypotension   • Dehydration   • Hypercalcemia   • Dysphagia   • Syncope and collapse   • Coronary artery disease involving native coronary artery of native heart without angina pectoris   • Normal pressure hydrocephalus ([facility]ly [facility])   • Headache   • Impaired mobility   • Nausea & vomiting   • Fall at home   • Transient cerebral ischemia   • PFO (patent foramen ovale)   • Esophageal dysphagia   • TIA (transient ischemic attack)   • Acute appendicitis with localized peritonitis, without perforation or abscess   • Right lower quadrant abdominal pain   • History of CVA (cerebrovascular accident)   • On statin therapy   • 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   • Diverticulitis   • Hx of appendectomy   • Acute abdominal pain   • Gait abnormality   • Weakness   • CVA (cerebral vascular accident) ([facility])   • Acute CVA (cerebrovascular accident) (Formerly [facility])   • Cerebrovascular accident (CVA) due  to embolism of cerebral artery ([facility])   • Closed displaced fracture of lateral malleolus of left fibula   • Pain   • Syndesmotic ankle sprain, left, subsequent encounter   • Acute dyspnea   • Stage 3a chronic kidney disease ([facility])     Past Medical History:   Diagnosis Date   • Arthritis    • Asthma    • Brain abscess     at about age 44   • Bruise of face    • Bursitis of left hip    • Cancer ([facility])     PT STATES IN HIS SWEAT GLAND    • Cardiac disease    • Coronary artery disease     CABG 2012   • Diabetes mellitus ([facility])    • Difficulty in swallowing     LIQUIDS   • Difficulty walking    • Diverticulitis    • DM2 (diabetes mellitus, type 2) ([facility]) 10/12/2016   • Fracture, foot 2015    Dr Pisano   • Fracture, stress, metatarsal    • Fracture, tibia and fibula Feb 2021    Dr Pisano   • Gout several years ago    medication  working   • Hearing aid worn     bilateral   • Hx of appendectomy    • Hyperlipemia    • Hypertension    • Joint pain     or swelling   • Low back pain several years ago   • Metatarsal stress fracture with nonunion    • Normal pressure hydrocephalus ([facility])    • Orbit fracture ([facility])    • Pulmonary embolism ([facility])     OCT 2016 - AFTER FOOT SURGERY   • Rash     ARM-    • Spinal headache     AFTER SPINAL TAP - NO BLOOD PATCH   • Stroke (cerebrum) ([facility]) 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   • Syndesmotic ankle sprain, left, subsequent encounter    • Tear of meniscus of knee torn ligaments   • TIA (transient ischemic attack)     MULTIPLE     Past Surgical History:   Procedure Laterality Date   • ANKLE OPEN REDUCTION INTERNAL FIXATION Left 2/16/2021    Procedure: Left ankle open reduction internal fixation with implants as needed;  Surgeon: Man Jenkins MD;  Location[facility] [place] [facility] [place];  Service: Orthopedics;  Laterality: Left;   • APPENDECTOMY N/A 7/18/2019    Procedure: APPENDECTOMY LAPAROSCOPIC;  Surgeon: Luis Carlos Rick Jr., MD;  Location: [facility] OR;  Service: General   • BRAIN SURGERY       BRAIN ABCESS 30 YR AGO   • CARDIAC CATHETERIZATION N/A 12/4/2020    Procedure: Patent foramen ovale closure ABBOTT;  Surgeon: Frank Pablo MD;  Location: [facility] CATH INVASIVE LOCATION;  Service: Cardiology;  Laterality: N/A;   • CARDIAC CATHETERIZATION N/A 12/4/2020    Procedure: Intracardiac echocardiogram;  Surgeon: Frank Pablo MD;  Location[facility] CATH INVASIVE LOC[facility];  Service: Cardiology;  Laterality: N/A;   • CARDIAC SURGERY     • COLONOSCOPY  2012    Ciciliano- normal per pt, no polyps    • CORONARY ARTERY BYPASS GRAFT      3 VESSEL   • ENDOSCOPY N/A 3/9/2017    Schatzki ring, dilated, LA Grade B esophagitis, HH, acquired duodenal stenosis   • ENDOSCOPY N/A 4/6/2018    candida, HH, torts, Schatzki ring, duodenal stenosis, dilatation perforemed, chronic inflammation   • ENDOSCOPY N/A 10/9/2019    White nummular lesions in esophageal mucosa, mild Schatzki ring, acquired duodenal stenosis, Path: Terrazas's, candida   • ENDOSCOPY N/A 1/8/2020    Procedure: ESOPHAGOGASTRODUODENOSCOPY with biopsies;  Surgeon: Rigoberto Walker MD;  Location: [facility] ENDOSCOPY;  Service: Gastroenterology   • FACIAL FRACTURE SURGERY      to repair 6 broken bones   • HAND SURGERY     • ORBITAL FRACTURE OPEN REDUCTION INTERNAL FIXATION Right 1/13/2017    Procedure: RT ORBIT FLOOR FRACTURE REPAIR RIGHT [facility] FRACTURE REPAIR, RIGHT NASAL BONE FRACTURE CLOSED REDUCTION;  Surgeon: Edil Lester MD;  [place] [facility] [place] [facility] [place]  Service:    • ORIF FOOT FRACTURE Right 9/9/2016    Procedure: RIGHT SECOND METATARSAL OPEN REDUCTION INTERNAL FIXATION WITh GRAFT FROM HEEL ;  Surgeon: Man Jenikns MD;  [place] [facility] [place] [facility] [place];  Service:    • PATENT FORAMEN OVALE CLOSURE     • SEPTOPLASTY     • SKIN CANCER EXCISION     • TONSILLECTOMY     • TRANSESOPHAGEAL ECHOCARDIOGRAM (STELLA)         SLP Recommendation and Plan  SLP Swallowing Diagnosis: suspected pharyngeal dysphagia (04/12/22 0900)  SLP Diet  Recommendation: regular textures, thin liquids (no mixed consistencies) (04/12/22 0900)  Recommended Precautions and Strategies: upright posture during/after eating, small bites of food and sips of liquid, no straw (04/12/22 0900)  SLP Rec. for Method of Medication Administration: meds whole, with pudding or applesauce (04/12/22 0900)     Monitor for Signs of Aspiration: notify SLP if any concerns, yes (04/12/22 0900)  Recommended Diagnostics: VFSS (MBS) (04/12/22 0900)  Swallow Criteria for Skilled Therapeutic Interventions Met: demonstrates skilled criteria (04/12/22 0900)  Anticipated Discharge Disposition (SLP): unknown (04/12/22 0900)  Rehab Potential/Prognosis, Swallowing: good, to achieve stated therapy goals (04/12/22 0900)  Therapy Frequency (Swallow): PRN (04/12/22 0900)  Predicted Duration Therapy Intervention (Days): until discharge (04/12/22 0900)                                  Outcome Evaluation: Clinical swallowing evaluation completed. Pt presents with suspected at least mild pharyngeal dysphagia. Oral phase appeared functional with adequate mastication, bolus formation, and transit of p.o. without presence of oral residue. Pharyngeal phase characterized by suspected impaired laryngeal elevation, airway closure, and pharyngeal contraction (as noted on previous VFSS in 2020) indicated by inconsistent throat clear with thin via cup 1x, puree x1, and overt cough with mixed consistency peaches. Pt directed to take small drinks and small bites during evaluation and pt reported he did not eat/drink using these strategies at home (eats big bites and consecutive drinks). Pt with hx of silent aspiration. Pt and pt's wife reports he usually coughs more than present on evaluation this date at home. Thickened liquids not assessed due to pt stating he would not drink them at home. Extensively discussed previous dysphagia hx and suspected dysphagia at this time with pt and pt's wife. Pt reports he wishes to  "remain on regular diet and thin liquids at this time knowing risk of aspiration, but does want to complete VFSS to further evaluate.      SWALLOW EVALUATION (last 72 hours)     SLP Adult Swallow Evaluation     Row Name 04/12/22 0900          Document Type evaluation  -ML    Subjective Information no complaints  -ML    Patient Observations alert;cooperative  -ML    Patient/Family/Caregiver Comments/Observations Pt's wife at bedside  -ML    Patient Effort excellent  -ML    Symptoms Noted During/After Treatment none  -ML               Patient Profile Reviewed yes  -ML    Pertinent History Of Current Problem Pt admitted with worsening SOB. Pt's wife reports that pt often coughs and at home with eating/drinking. Documented that pt failed swallow screen. Pt has hx of dysphagia s/p CVA in 2020. Last VFSS was completed in october 2020 which recommend regular solid diet/no mixed and honey thick liquids. Pt with silent aspiration of thin and nectar thick liquids. Per pt and pt's wife, pt used honey thick liquids for a couple months and then consumed regular solids and thin liquids. Pt and pt's wife deny any PNA in the last few years. Per CT angiogram chest, \"There is some slight interstitial prominence and minimal scattered ground glass opacity which is nonspecific but likely relates to an element of very mild CHF as also suggested on the portable chest x-ray.\"  -ML    Current Method of Nutrition NPO  due to failed screen  -ML    Precautions/Limitations, Vision WFL;for purposes of eval  -ML    Precautions/Limitations, Hearing WFL;for purposes of eval  -ML    Prior Level of Function-Swallowing --  see above  -ML    Plans/Goals Discussed with patient;spouse/S.O.;agreed upon  -ML    Barriers to Rehab previous functional deficit  -ML    Patient's Goals for Discharge patient did not state  -ML               Additional Documentation Pain Scale: Numbers Pre/Post-Treatment (Group)  -ML               Pretreatment Pain Rating 0/10 - no " pain  -ML    Posttreatment Pain Rating 0/10 - no pain  -ML               Dentition Assessment natural, present and adequate  -ML    Secretion Management WNL/WFL  -ML    Mucosal Quality moist, healthy  -ML    Volitional Cough WFL  -ML               Oral Motor General Assessment WFL  -ML               Respiratory Support Currently in Use room air  -ML    Eating/Swallowing Skills self-fed  -ML    Positioning During Eating upright 90 degree;upright in bed  -ML    Utensils Used spoon;cup  -ML    Consistencies Trialed regular textures;soft textures;mechanical soft, no mixed consistencies;mixed consistency;pureed;ice chips;thin liquids  -ML               Clinical Swallow Evaluation Summary Clinical swallowing evaluation completed. Pt presents with suspected at least mild pharyngeal dysphagia. Oral phase appeared functional with adequate mastication, bolus formation, and transit of p.o. without presence of oral residue. Pharyngeal phase characterized by suspected impaired laryngeal elevation, airway closure, and pharyngeal contraction (as noted on previous VFSS in 2020) indicated by inconsistent throat clear with thin via cup 1x, puree x1, and overt cough with mixed consistency peaches. Pt directed to take small drinks and small bites during evaluation and pt reported he did not eat/drink using these strategies at home (eats big bites and consecutive drinks). Pt with hx of silent aspiration. Pt and pt's wife reports he usually coughs more than present on evaluation this date at home. Thickened liquids not assessed due to pt stating he would not drink them at home. Extensively discussed previous dysphagia hx and suspected dysphagia at this time with pt and pt's wife. Pt reports he wishes to remain on regular diet and thin liquids at this time knowing risk of aspiration, but does want to complete VFSS to further evaluate.  -ML               SLP Swallowing Diagnosis suspected pharyngeal dysphagia  -ML    Functional Impact risk  of aspiration/pneumonia  -ML    Rehab Potential/Prognosis, Swallowing good, to achieve stated therapy goals  -ML    Swallow Criteria for Skilled Therapeutic Interventions Met demonstrates skilled criteria  -ML               Therapy Frequency (Swallow) PRN  -ML    Predicted Duration Therapy Intervention (Days) until discharge  -ML    SLP Diet Recommendation regular textures;thin liquids  no mixed consistencies  -ML    Recommended Diagnostics VFSS (MBS)  -ML    Recommended Precautions and Strategies upright posture during/after eating;small bites of food and sips of liquid;no straw  -ML    Oral Care Recommendations Oral Care BID/PRN  -ML    SLP Rec. for Method of Medication Administration meds whole;with pudding or applesauce  -ML    Monitor for Signs of Aspiration notify SLP if any concerns;yes  -ML    Anticipated Discharge Disposition (SLP) unknown  -ML               Oral Nutrition/Hydration Goal Selection (SLP) oral nutrition/hydration, SLP goal 1  -ML               Oral Nutrition/Hydration Goal 1, SLP Pt will utilize swallowing strategies to safely swallow least restrictive diet without overt s/s of penetration/aspiration or negative respiratory effects.  -ML          User Key  (r) = Recorded By, (t) = Taken By, (c) = Cosigned By    Initials Name Effective Dates    Renetta Bryan MS CCC-SLP 06/16/21 -                 EDUCATION  The patient has been educated in the following areas:   Dysphagia (Swallowing Impairment).        SLP GOALS     Row Name 04/12/22 0900             Oral Nutrition/Hydration Goal 1 (SLP)    Oral Nutrition/Hydration Goal 1, SLP Pt will utilize swallowing strategies to safely swallow least restrictive diet without overt s/s of penetration/aspiration or negative respiratory effects.  -ML            User Key  (r) = Recorded By, (t) = Taken By, (c) = Cosigned By    Initials Name Provider Type    Renetta Bryan MS CCC-SLP Speech and Language Pathologist              SLP Outcome  Measures (last 72 hours)     SLP Outcome Measures     Row Name 04/12/22 1200             SLP Outcome Measures    Outcome Measure Used? Adult NOMS  -ML              Adult FCM Scores    FCM Chosen Swallowing  -ML      Swallowing FCM Score 6  -ML            User Key  (r) = Recorded By, (t) = Taken By, (c) = Cosigned By    Initials Name Effective Dates    Renetta Bryan MS CCC-SLP 06/16/21 -                  Time Calculation:    Time Calculation- SLP     Row Name 04/12/22 1217             Time Calculation- SLP    SLP Start Time 0900  -ML      SLP Received On 04/12/22  -ML              Untimed Charges    SLP Eval/Re-eval  ST Eval Oral Pharyng Swallow - 10590  -ML      [id number] Eval Oral Pharyng Swallow Minutes 75  -ML              Total Minutes    Untimed Charges Total Minutes 75  -ML       Total Minutes 75  -ML            User Key  (r) = Recorded By, (t) = Taken By, (c) = Cosigned By    Initials Name Provider Type    Renetta Bryan MS CCC-SLP Speech and Language Pathologist                Therapy Charges for Today     Code Description Service Date Service Provider Modifiers Qty    [id number] HC ST EVAL ORAL PHARYNG SWALLOW 5 4/12/2022 Renetta Bray MS CCC-SLP GN 1               Renetta Bray MS CCC-SLP  4/12/2022

## 2022-04-12 NOTE — H&P
Patient Name:  Eugenio Joe  YOB: 1940  MRN:  [id number]  Admit Date:  4/11/2022  Patient Care Team:  Adeline Onofre MD as PCP - General  Adeline Onofre MD as PCP - Family Medicine  Jean Ford MD as Consulting Physician (Cardiology)      Subjective   History Present Illness     Chief Complaint   Patient presents with   • Shortness of Breath     History of Present Illness   Mr. Joe is a 81-year-old male with history of hypertension, hyperlipidemia, type 2 diabetes, NPH, history of CVA, CKD stage III, status post CABG who presents to the emergency room with shortness of breath.  Patient states had some shortness of breath on and off for the past 3 to 4 days, it is worse with exertion, patient is not very active at baseline, but states the last few days any exertion has been worse with the shortness of breath.  He also states that he started having difficulty last night lying down to sleep, he actually had to sit up last night to be able to sleep, he also reports some swelling of his bilateral lower extremities.  He states since he started a new medication, Norvasc at the end of last year has had some mild swelling but has noticed some increase over the last few days to week.  He denies any chest pain, when he rests his shortness of breath does improve, he does have a mild cough that is nonproductive.  He denies any fever, chills, abdominal pain, nausea or vomiting.  He was seen by his primary care physician earlier today and reportedly he she was trying to get him admitted to the hospital but there were no beds so he was sent to the emergency room for further evaluation.  Patient does have history of CVA and states he has had some increasing difficulty with swallowing food at times and coughs after eating, does not seem to have any trouble with liquids.  Patient's last 2D echo was in January 2021 showed a PFO closure device in place, left ventricular systolic function was  normal, normal right ventricular cavity, normal right atrial cavity, saline test results are negative.  In the emergency room patient's troponin is slightly elevated 0.033, again he denies any chest pain, EKG shows sinus rhythm with a rate of 87 with multiform ventricular premature complexes, borderline prolonged CA interval.  Patient does have history of difficult to control hypertension but is very fragile with medications, he is followed by cardiology, Dr. Ford.  His glucose in the emergency room was 186, potassium 4.1, creatinine 1.2, BUN 19, lactate 2.2, pro-Jonah 0.04, white blood cell count 8.23, hemoglobin 11.8 which is baseline, hematocrit 33.8.  Chest x-ray shows mild cardiomegaly with sternal wires from previous cardiac surgery, mild to moderate vascular congestion were probably very tiny pleural effusions and suspicious for changes of mild CHF.  Patient was given 40 mg of Lasix in the emergency room as well as 20 mg of lisinopril.  CTA of his chest showed no evidence of pulmonary embolus, tiny bilateral pleural effusions scattered groundglass opacity with a nonspecific but likely relates to an element of very mild CHF.    Review of Systems     Personal History     Past Medical History:   Diagnosis Date   • Arthritis    • Asthma    • Brain abscess     at about age 44   • Bruise of face    • Bursitis of left hip    • Cancer ([facility])     PT STATES IN HIS SWEAT GLAND    • Cardiac disease    • Coronary artery disease     CABG 2012   • Diabetes mellitus ([facility])    • Difficulty in swallowing     LIQUIDS   • Difficulty walking    • Diverticulitis    • DM2 (diabetes mellitus, type 2) ([facility]) 10/12/2016   • Fracture, foot 2015    Dr Pisano   • Fracture, stress, metatarsal    • Fracture, tibia and fibula Feb 2021    Dr Pisano   • Gout several years ago    medication  working   • Hearing aid worn     bilateral   • Hx of appendectomy    • Hyperlipemia    • Hypertension    • Joint pain     or swelling   • Low back pain  several years ago   • Metatarsal stress fracture with nonunion    • Normal pressure hydrocephalus (HCC)    • Orbit fracture ([facility])    • Pulmonary embolism ([facility])     OCT 2016 - AFTER FOOT SURGERY   • Rash     ARM-    • Spinal headache     AFTER SPINAL TAP - NO BLOOD PATCH   • Stroke (cerebrum) ([facility]) 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   • Syndesmotic ankle sprain, left, subsequent encounter    • Tear of meniscus of knee torn ligaments   • TIA (transient ischemic attack)     MULTIPLE     Past Surgical History:   Procedure Laterality Date   • ANKLE OPEN REDUCTION INTERNAL FIXATION Left 2/16/2021    Procedure: Left ankle open reduction internal fixation with implants as needed;  Surgeon: Man Jenkins MD;  Location: [facility] OR OSC;  Service: Orthopedics;  Laterality: Left;   • APPENDECTOMY N/A 7/18/2019    Procedure: APPENDECTOMY LAPAROSCOPIC;  Surgeon: Luis Carlos Rick Jr., MD;  Location: [facility] MAIN OR;  Service: General   • BRAIN SURGERY      BRAIN ABCESS 30 YR AGO   • CARDIAC CATHETERIZATION N/A 12/4/2020    Procedure: Patent foramen ovale closure ABBOTT;  Surgeon: Frank Pablo MD;  Location: [facility] CATH INVASIVE LOCATION;  Service: Cardiology;  Laterality: N/A;   • CARDIAC CATHETERIZATION N/A 12/4/2020    Procedure: Intracardiac echocardiogram;  Surgeon: Frank Pablo MD;  Location: [facility] CATH INVASIVE LOCATION;  Service: Cardiology;  Laterality: N/A;   • CARDIAC SURGERY     • COLONOSCOPY  2012    Ciciliano- normal per pt, no polyps    • CORONARY ARTERY BYPASS GRAFT      3 VESSEL   • ENDOSCOPY N/A 3/9/2017    Schatzki ring, dilated, LA Grade B esophagitis, HH, acquired duodenal stenosis   • ENDOSCOPY N/A 4/6/2018    candida, HH, torts, Schatzki ring, duodenal stenosis, dilatation perforemed, chronic inflammation   • ENDOSCOPY N/A 10/9/2019    White nummular lesions in esophageal mucosa, mild Schatzki ring, acquired duodenal stenosis, Path: Terrazas's, candida   • ENDOSCOPY N/A 1/8/2020     Procedure: ESOPHAGOGASTRODUODENOSCOPY with biopsies;  Surgeon: Rigoberto Walker MD;  Location: [facility] ENDOSCOPY;  Service: Gastroenterology   • FACIAL FRACTURE SURGERY      to repair 6 broken bones   • HAND SURGERY     • ORBITAL FRACTURE OPEN REDUCTION INTERNAL FIXATION Right 2017    Procedure: RT ORBIT FLOOR FRACTURE REPAIR RIGHT [facility] FRACTURE REPAIR, RIGHT NASAL BONE FRACTURE CLOSED REDUCTION;  Surgeon: Edil Lester MD;  [place] [facility] [place] [facility] [place]  Service:    • ORIF FOOT FRACTURE Right 2016    Procedure: RIGHT SECOND METATARSAL OPEN REDUCTION INTERNAL FIXATION WITh GRAFT FROM HEEL ;  Surgeon: Man Jenkins MD;  [place] [facility] [place] [facility] [place]  Service:    • PATENT FORAMEN OVALE CLOSURE     • SEPTOPLASTY     • SKIN CANCER EXCISION     • TONSILLECTOMY     • TRANSESOPHAGEAL ECHOCARDIOGRAM (STELLA)       Family History   Problem Relation Age of Onset   • Heart disease Mother    • Hypertension Mother    • Stroke Mother    • Diverticulitis Mother    • Heart disease Father    • Hypertension Father    • Malig Hyperthermia Neg Hx      Social History     Tobacco Use   • Smoking status: Former Smoker     Packs/day: 3.00     Years: 5.00     Pack years: 15.00     Types: Cigarettes     Start date:      Quit date:      Years since quittin.3   • Smokeless tobacco: Never Used   • Tobacco comment: Quit herson turkey   Substance Use Topics   • Alcohol use: Yes     Alcohol/week: 2.0 standard drinks     Types: 1 Cans of beer, 1 Shots of liquor per week     Comment: RARELY    • Drug use: No     No current facility-administered medications on file prior to encounter.     Current Outpatient Medications on File Prior to Encounter   Medication Sig Dispense Refill   • allopurinol (ZYLOPRIM) 300 MG tablet Take 300 mg by mouth daily.     • amLODIPine (NORVASC) 5 MG tablet Take 1 tablet by mouth Daily. 90 tablet 3   • aspirin 81 MG chewable tablet Chew 81 mg Daily. WILL VERIFY STOP DATE  WITH MD     •  atorvastatin (LIPITOR) 40 MG tablet Take 1 tablet by mouth Every Night.     • betamethasone dipropionate (DIPROLENE) 0.05 % lotion Apply 1 application topically to the appropriate area as directed As Needed for Irritation.     • Cholecalciferol (Vitamin D3) 50 MCG (2000 UT) tablet Take 50 mcg by mouth Every Morning.     • clopidogrel (PLAVIX) 75 MG tablet Take 1 tablet by mouth Daily. 30 tablet    • doxazosin (CARDURA) 1 MG tablet Take 1 mg by mouth Every Morning.     • glipizide (GLUCOTROL) 5 MG tablet Take 5 mg by mouth 2 (Two) Times a Day Before Meals.     • lisinopril (PRINIVIL,ZESTRIL) 20 MG tablet TAKE ONE TABLET BY MOUTH TWICE A  tablet 3   • metFORMIN (GLUCOPHAGE) 500 MG tablet Take 500 mg by mouth Daily With Breakfast.     • Multiple Vitamins-Minerals (MULTIVITAMIN ADULT PO) Take 1 tablet by mouth Daily.     • ondansetron (ZOFRAN) 4 MG tablet Take 1 tablet by mouth Every 6 (Six) Hours As Needed for Nausea or Vomiting.     • pantoprazole (PROTONIX) 40 MG EC tablet Take 40 mg by mouth every night at bedtime.     • PROAIR  (90 BASE) MCG/ACT inhaler Inhale 2 puffs Every 4 (Four) Hours As Needed.     • Accu-Chek FastClix Lancets misc TEST ONCE TO BID PRN     • acetaminophen (TYLENOL) 500 MG tablet Take 2 tablets by mouth Every 8 (Eight) Hours As Needed for Mild Pain . 30 tablet 0   • enoxaparin (LOVENOX) 40 MG/0.4ML solution syringe INJECT 40 MG SUBCUTANEOUSLY DAILY ASK ORTHO DOCTOR IF NEED TO CONTINUE THIS MED     • glucose blood (FREESTYLE LITE) test strip 1 each by Other route See Admin Instructions. Use 1 to 2 times daily as instructed     • metFORMIN (GLUCOPHAGE) 850 MG tablet Take 850 mg by mouth 2 (Two) Times a Day With Meals.       Allergies   Allergen Reactions   • Other Mental Status Change and Hallucinations     Oxy drugs   • Oxycodone Mental Status Change       Objective    Objective     Vital Signs  Temp:  [98.1 °F (36.7 °C)] 98.1 °F (36.7 °C)  Heart Rate:  [43-96] 89  Resp:  [18]  18  BP: (167-191)/(65-84) 182/81  SpO2:  [95 %-99 %] 95 %  on   ;   Device (Oxygen Therapy): room air  Body mass index is 27.98 kg/m².    Physical Exam    Results Review:  I reviewed the patient's new clinical results.  I reviewed the patient's new imaging results and agree with the interpretation.  I reviewed the patient's other test results and agree with the interpretation  I personally viewed and interpreted the patient's EKG/Telemetry data  Discussed with ED provider.    Lab Results (last 24 hours)     Procedure Component Value Units Date/Time    Comprehensive Metabolic Panel [[id number]]  (Abnormal) Collected: 04/11/22 1700    Specimen: Blood from Arm, Right Updated: 04/11/22 1736     Glucose 186 mg/dL      BUN 19 mg/dL      Creatinine 1.23 mg/dL      Sodium 143 mmol/L      Potassium 4.1 mmol/L      Chloride 108 mmol/L      CO2 20.1 mmol/L      Calcium 9.5 mg/dL      Total Protein 6.8 g/dL      Albumin 3.80 g/dL      ALT (SGPT) 12 U/L      AST (SGOT) 13 U/L      Alkaline Phosphatase 71 U/L      Total Bilirubin 0.8 mg/dL      Globulin 3.0 gm/dL      A/G Ratio 1.3 g/dL      BUN/Creatinine Ratio 15.4     Anion Gap 14.9 mmol/L      eGFR 58.6 mL/min/1.73      Comment: National Kidney Foundation and American Society of Nephrology (ASN) Task Force recommended calculation based on the Chronic Kidney Disease Epidemiology Collaboration (CKD-EPI) equation refit without adjustment for race.       Narrative:      GFR Normal >60  Chronic Kidney Disease <60  Kidney Failure <15      BNP [[id number]]  (Normal) Collected: 04/11/22 1700    Specimen: Blood from Arm, Right Updated: 04/11/22 1734     proBNP 1,564.0 pg/mL     Narrative:      Among patients with dyspnea, NT-proBNP is highly sensitive for the detection of acute congestive heart failure. In addition NT-proBNP of <300 pg/ml effectively rules out acute congestive heart failure with 99% negative predictive value.    Results may be falsely decreased if patient taking  "Biotin.      Troponin [[id number]]  (Abnormal) Collected: 04/11/22 1700    Specimen: Blood from Arm, Right Updated: 04/11/22 1739     Troponin T 0.033 ng/mL     Narrative:      Troponin T Reference Range:  <= 0.03 ng/mL-   Negative for AMI  >0.03 ng/mL-     Abnormal for myocardial necrosis.  Clinicians would have to utilize clinical acumen, EKG, Troponin and serial changes to determine if it is an Acute Myocardial Infarction or myocardial injury due to an underlying chronic condition.       Results may be falsely decreased if patient taking Biotin.      Procalcitonin [[id number]]  (Normal) Collected: 04/11/22 1700    Specimen: Blood from Arm, Right Updated: 04/11/22 2033     Procalcitonin 0.04 ng/mL     Narrative:      As a Marker for Sepsis (Non-Neonates):    1. <0.5 ng/mL represents a low risk of severe sepsis and/or septic shock.  2. >2 ng/mL represents a high risk of severe sepsis and/or septic shock.    As a Marker for Lower Respiratory Tract Infections that require antibiotic therapy:    PCT on Admission    Antibiotic Therapy       6-12 Hrs later    >0.5                Strongly Recommended  >0.25 - <0.5        Recommended   0.1 - 0.25          Discouraged              Remeasure/reassess PCT  <0.1                Strongly Discouraged     Remeasure/reassess PCT    As 28 day mortality risk marker: \"Change in Procalcitonin Result\" (>80% or <=80%) if Day 0 (or Day 1) and Day 4 values are available. Refer to http://www.[facility]-pct-calculator.com    Change in PCT <=80%  A decrease of PCT levels below or equal to 80% defines a positive change in PCT test result representing a higher risk for 28-day all-cause mortality of patients diagnosed with severe sepsis for septic shock.    Change in PCT >80%  A decrease of PCT levels of more than 80% defines a negative change in PCT result representing a lower risk for 28-day all-cause mortality of patients diagnosed with severe sepsis or septic shock.       Protime-INR [[id number]]  " (Normal) Collected: 04/11/22 1700    Specimen: Blood from Arm, Right Updated: 04/11/22 1843     Protime 12.9 Seconds      INR 0.98    CBC & Differential [[id number]]  (Abnormal) Collected: 04/11/22 1840    Specimen: Blood from Arm, Right Updated: 04/11/22 1901    Narrative:      The following orders were created for panel order CBC & Differential.  Procedure                               Abnormality         Status                     ---------                               -----------         ------                     CBC Auto Differential[[id number]]        Abnormal            Final result                 Please view results for these tests on the individual orders.    CBC Auto Differential [[id number]]  (Abnormal) Collected: 04/11/22 1840    Specimen: Blood from Arm, Right Updated: 04/11/22 1901     WBC 8.23 10*3/mm3      RBC 3.67 10*6/mm3      Hemoglobin 11.8 g/dL      Hematocrit 33.8 %      MCV 92.1 fL      MCH 32.2 pg      MCHC 34.9 g/dL      RDW 13.0 %      RDW-SD 42.9 fl      MPV 10.7 fL      Platelets 184 10*3/mm3      Neutrophil % 72.7 %      Lymphocyte % 17.5 %      Monocyte % 6.4 %      Eosinophil % 2.7 %      Basophil % 0.5 %      Immature Grans % 0.2 %      Neutrophils, Absolute 5.98 10*3/mm3      Lymphocytes, Absolute 1.44 10*3/mm3      Monocytes, Absolute 0.53 10*3/mm3      Eosinophils, Absolute 0.22 10*3/mm3      Basophils, Absolute 0.04 10*3/mm3      Immature Grans, Absolute 0.02 10*3/mm3      nRBC 0.0 /100 WBC     Lactic Acid, Plasma [[id number]]  (Abnormal) Collected: 04/11/22 1840    Specimen: Blood Updated: 04/11/22 1914     Lactate 2.2 mmol/L           Imaging Results (Last 24 Hours)     Procedure Component Value Units Date/Time    CT Angiogram Chest [[id number]] Collected: 04/11/22 2035     Updated: 04/11/22 2051    Narrative:      CT ANGIOGRAPHY OF THE CHEST WITH INTRAVENOUS CONTRAST AND 3-D  RECONSTRUCTIONS     HISTORY: Shortness of breath.     TECHNIQUE:The CT scan  was performed as an  emergency procedure with CT  angiography protocol using intervenous contrast and 3-D reconstructions.        FINDINGS: The following findings are present:  1. The pulmonary arteries are well-opacified and there is no evidence of  pulmonary embolus. The thoracic aorta shows no evidence of aneurysm or  dissection.  2. There are very tiny bilateral pleural effusions layering posteriorly.  There is some slight interstitial prominence and minimal scattered  ground glass opacity which is nonspecific but likely relates to an  element of very mild CHF as also suggested on the portable chest x-ray.  3. There are calcified left hilar lymph nodes. There is no mediastinal  or hilar or axillary adenopathy. There is no pericardial effusion. The  CT images through the upper liver, spleen, and both adrenal glands were  unremarkable.                 Radiation dose reduction techniques were utilized, including automated  exposure control and exposure modulation based on body size.     This report was finalized on 4/11/2022 8:48 PM by Dr. Andrew Omalley M.D.       XR Chest 2 View [[id number]] Collected: 04/11/22 1639     Updated: 04/11/22 1643    Narrative:      TWO-VIEW CHEST     HISTORY: Shortness of breath.     FINDINGS: There is mild cardiomegaly with sternal wires from previous  cardiac surgery. There is mild to moderate vascular congestion were  probable very tiny pleural effusions and suspicious for changes of mild  CHF. The patient had a very similar appearance on an earlier chest x-ray  dated 04/27/2020.     This report was finalized on 4/11/2022 4:40 PM by Dr. Andrew Omalley M.D.             Results for orders placed during the hospital encounter of 01/11/21    Adult Transthoracic Echo Limited W/ Cont if Necessary Per Protocol    Interpretation Summary  · The left atrial cavity is dilated. Saline test results are negative. ASD or PFO closure device in interatrial septum.  · This was a limited study.      ECG 12 Lead    Preliminary Result   HEART RATE= 87  bpm   RR Interval= 720  ms   MT Interval= 215  ms   P Horizontal Axis= -50  deg   P Front Axis= 9  deg   QRSD Interval= 98  ms   QT Interval= 382  ms   QRS Axis= 4  deg   T Wave Axis= 73  deg   - ABNORMAL ECG -   Sinus rhythm   Multiform ventricular premature complexes   Borderline prolonged MT interval   Electronically Signed By:    Date and Time of Study: 2022-04-11 17:58:13           Assessment/Plan     Active Hospital Problems    Diagnosis  POA   • **Acute dyspnea [R06.00]  Yes   • Stage 3a chronic kidney disease (HCC) [N18.31]  Yes   • CVA (cerebral vascular accident) ([facility]) [I63.9]  Yes   • Normal pressure hydrocephalus ([facility]) [G91.2]  Yes   • Type 2 diabetes mellitus with hyperglycemia, without long-term current use of insulin ([facility]) [E11.65]  Yes   • Essential hypertension [I10]  Yes   • 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]  Not Applicable     Description: 9/10:with a LIMA to his LAD, a vein to a ramus, and a vein to the OM-2     • Hyperlipemia [E78.5]  Yes     Mr. Joe is a 81-year-old male with history of hypertension, hyperlipidemia, type 2 diabetes, NPH, history of CVA, CKD stage III, status post CABG who presents to the emergency room with shortness of breath.    Acute dyspnea/elevated troponin  -Telemetry unit for monitoring  -O2 to keep sats above 90%  -2D echo in a.m.  -Trend troponin  -Repeat EKG in a.m.  -Consult cardiology, likely mild CHF  -We will defer further diuresis to cardiology as well as changes to cardiac medications, continue home meds when med rec is complete  -Strict I&O  -COVID-19 test pending, patient has had no exposure, no fever    Type 2 diabetes  -Accu-Cheks before meals and at bedtime with correctional dose insulin  -Hold oral diabetic medications when med rec is complete    Hypertension/status post CABG/hyperlipidemia  -Cardiology has been consulted  -Continue home medications when med rec is complete  -Telemetry unit for monitoring    · I discussed the  patient's findings and my recommendations with patient and ED provider.    VTE Prophylaxis - SCDs.  Code Status - Full code.       JULISA Miramontes  [facility]  04/11/22  21:43 EDT

## 2022-04-12 NOTE — PLAN OF CARE
Goal Outcome Evaluation:  Plan of Care Reviewed With: patient           Outcome Evaluation: Pt is an 82 yo male admitted with SOA. Pt with PMH including but not limited to CVA, CABG, NPH. Pt lives with supportive spouse, reports independence with transfers and ambulation with us of upright walker. Pt is current with OP PT and working on use of a cane. Pt required cga/min A for STS, min A to ambulate 15' around bed to the chair. Demo difficulty navigating walker around obstacles and in smaller spaces. Pt may benefit from skilled PT services to address strenght, balance and general functional mobility deficits. Anticipate return home with wife assist and ongoing PT services.

## 2022-04-12 NOTE — PLAN OF CARE
Problem: Adult Inpatient Plan of Care  Goal: Plan of Care Review  Flowsheets (Taken 4/12/2022 0400)  Plan of Care Reviewed With: patient  Outcome Evaluation: New admission to [address] [place] [address]. Room air. NSR with frequent PVCs. Blanchable redness to coccyx. Q2 turns encouraged. Brief in place, urinal at bedside. Bed alarm on. Patient choked on water, failed dysphagia screen, NPO and speech consult placed. Still awaiting sample for clean catch urine. Elevated troponins, [facility] called and made aware of most recent one, no new orders at this time. Will continue to monitor.   Goal Outcome Evaluation:  Plan of Care Reviewed With: patient           Outcome Evaluation: New admission to [address] [place] [address]. Room air. NSR with frequent PVCs. Blanchable redness to coccyx. Q2 turns encouraged. Brief in place, urinal at bedside. Bed alarm on. Patient choked on water, failed dysphagia screen, NPO and speech consult placed. Still awaiting sample for clean catch urine. Elevated troponins, [facility] called and made aware of most recent one, no new orders at this time. Will continue to monitor.   Statement Selected

## 2022-04-12 NOTE — ED NOTES
"Nursing report ED to floor  Eugenio Joe  81 y.o.  male    HPI :   Chief Complaint   Patient presents with   • Shortness of Breath       Admitting doctor:   Nellie Rick DO    Admitting diagnosis:   The primary encounter diagnosis was Acute dyspnea. Diagnoses of Acute congestive heart failure, unspecified heart failure type (HCC), Hypertensive urgency, and Elevated troponin were also pertinent to this visit.    Code status:   Current Code Status     Date Active Code Status Order ID Comments User Context       4/11/2022 2032 CPR (Attempt to Resuscitate) [id number]  Nellie Rick DO ED     Advance Care Planning Activity      Questions for Current Code Status     Question Answer    Code Status (Patient has no pulse and is not breathing) CPR (Attempt to Resuscitate)    Medical Interventions (Patient has pulse or is breathing) Full Support          Allergies:   Other and Oxycodone    Intake and Output  No intake or output data in the 24 hours ending 04/11/22 2110    Weight:       04/11/22 1842   Weight: 88.5 kg (195 lb)       Most recent vitals:   Vitals:    04/11/22 1609 04/11/22 1842 04/11/22 1936 04/11/22 2036   BP:  167/65 (!) 191/84 (!) 182/81   Pulse: (!) 43 89 96 89   Resp: 18      Temp: 98.1 °F (36.7 °C)      TempSrc: Tympanic      SpO2: 95% 99% 96% 95%   Weight:  88.5 kg (195 lb)     Height:  177.8 cm (70\")         Active LDAs/IV Access:   Lines, Drains & Airways     Active LDAs     Name Placement date Placement time Site Days    Peripheral IV 04/11/22 1841 Right;Posterior;Proximal Forearm 04/11/22 1841  Forearm  less than 1                Labs (abnormal labs have a star):   Labs Reviewed   COMPREHENSIVE METABOLIC PANEL - Abnormal; Notable for the following components:       Result Value    Glucose 186 (*)     Chloride 108 (*)     CO2 20.1 (*)     eGFR 58.6 (*)     All other components within normal limits    Narrative:     GFR Normal >60  Chronic Kidney Disease <60  Kidney Failure <15     TROPONIN " "(IN-HOUSE) - Abnormal; Notable for the following components:    Troponin T 0.033 (*)     All other components within normal limits    Narrative:     Troponin T Reference Range:  <= 0.03 ng/mL-   Negative for AMI  >0.03 ng/mL-     Abnormal for myocardial necrosis.  Clinicians would have to utilize clinical acumen, EKG, Troponin and serial changes to determine if it is an Acute Myocardial Infarction or myocardial injury due to an underlying chronic condition.       Results may be falsely decreased if patient taking Biotin.     CBC WITH AUTO DIFFERENTIAL - Abnormal; Notable for the following components:    RBC 3.67 (*)     Hemoglobin 11.8 (*)     Hematocrit 33.8 (*)     Lymphocyte % 17.5 (*)     All other components within normal limits   LACTIC ACID, PLASMA - Abnormal; Notable for the following components:    Lactate 2.2 (*)     All other components within normal limits   BNP (IN-HOUSE) - Normal    Narrative:     Among patients with dyspnea, NT-proBNP is highly sensitive for the detection of acute congestive heart failure. In addition NT-proBNP of <300 pg/ml effectively rules out acute congestive heart failure with 99% negative predictive value.    Results may be falsely decreased if patient taking Biotin.     PROCALCITONIN - Normal    Narrative:     As a Marker for Sepsis (Non-Neonates):    1. <0.5 ng/mL represents a low risk of severe sepsis and/or septic shock.  2. >2 ng/mL represents a high risk of severe sepsis and/or septic shock.    As a Marker for Lower Respiratory Tract Infections that require antibiotic therapy:    PCT on Admission    Antibiotic Therapy       6-12 Hrs later    >0.5                Strongly Recommended  >0.25 - <0.5        Recommended   0.1 - 0.25          Discouraged              Remeasure/reassess PCT  <0.1                Strongly Discouraged     Remeasure/reassess PCT    As 28 day mortality risk marker: \"Change in Procalcitonin Result\" (>80% or <=80%) if Day 0 (or Day 1) and Day 4 values " are available. Refer to [email or web address]    Change in PCT <=80%  A decrease of PCT levels below or equal to 80% defines a positive change in PCT test result representing a higher risk for 28-day all-cause mortality of patients diagnosed with severe sepsis for septic shock.    Change in PCT >80%  A decrease of PCT levels of more than 80% defines a negative change in PCT result representing a lower risk for 28-day all-cause mortality of patients diagnosed with severe sepsis or septic shock.      PROTIME-INR - Normal   RAINBOW DRAW    Narrative:     The following orders were created for panel order [place] Draw.  Procedure                               Abnormality         Status                     ---------                               -----------         ------                     Green Top (Gel)[[id number]]                                  Final result               Lavender Top[[id number]]                                     Final result               Gold Top - SST[[id number]]                                   Final result               Light Blue Top[[id number]]                                   Final result                 Please view results for these tests on the individual orders.   LACTIC ACID, REFLEX   TROPONIN (IN-HOUSE)   TROPONIN (IN-HOUSE)   CBC AND DIFFERENTIAL    Narrative:     The following orders were created for panel order CBC & Differential.  Procedure                               Abnormality         Status                     ---------                               -----------         ------                     CBC Auto Differential[[id number]]        Abnormal            Final result                 Please view results for these tests on the individual orders.   GREEN TOP   LAVENDER TOP   GOLD TOP - SST   LIGHT BLUE TOP       EKG:   ECG 12 Lead   Preliminary Result   HEART RATE= 87  bpm   RR Interval= 720  ms   TN Interval= 215  ms   P Horizontal Axis= -50  deg   P Front Axis= 9   deg   QRSD Interval= 98  ms   QT Interval= 382  ms   QRS Axis= 4  deg   T Wave Axis= 73  deg   - ABNORMAL ECG -   Sinus rhythm   Multiform ventricular premature complexes   Borderline prolonged AK interval   Electronically Signed By:    Date and Time of Study: 2022 17:58:13          Meds given in ED:   Medications   sodium chloride 0.9 % flush 10 mL (has no administration in time range)   lisinopril (PRINIVIL,ZESTRIL) tablet 20 mg (has no administration in time range)   sodium chloride 0.9 % flush 10 mL (has no administration in time range)   sodium chloride 0.9 % flush 10 mL (has no administration in time range)   enoxaparin (LOVENOX) syringe 40 mg (has no administration in time range)   nitroglycerin (NITROSTAT) SL tablet 0.4 mg (has no administration in time range)   acetaminophen (TYLENOL) tablet 650 mg (has no administration in time range)   melatonin tablet 5 mg (has no administration in time range)   ondansetron (ZOFRAN) injection 4 mg (has no administration in time range)   iopamidol (ISOVUE-370) 76 % injection 100 mL (85 mL Intravenous Given by Other 22)   furosemide (LASIX) injection 40 mg (40 mg Intravenous Given 22)       Imaging results:  No radiology results for the last day    Ambulatory status:   -     Social issues:   Social History     Socioeconomic History   • Marital status:    • Number of children: 2   • Years of education: 16   Tobacco Use   • Smoking status: Former Smoker     Packs/day: 3.00     Years: 5.00     Pack years: 15.00     Types: Cigarettes     Start date:      Quit date:      Years since quittin.3   • Smokeless tobacco: Never Used   • Tobacco comment: Quit herson turkey   Substance and Sexual Activity   • Alcohol use: Yes     Alcohol/week: 2.0 standard drinks     Types: 1 Cans of beer, 1 Shots of liquor per week     Comment: RARELY    • Drug use: No   • Sexual activity: Not Currently     Partners: Female     Birth control/protection:  Surgical       NIH Stroke Scale:        Nursing report ED to floor:

## 2022-04-13 NOTE — PROGRESS NOTES
[facility]    [facility]    Name:  Eugenio Joe   Age:  81 y.o.  Sex:  male  :  1940  MRN:  [id number]   Visit Number:  [id number]  Admission Date:  2022  Date Of Service:  22  Primary Care Physician:  Adeline Onofre MD     LOS: 2 days :  Patient Care Team:  Adeline Onofre MD as PCP - General  Adeline Onofre MD as PCP - Family Medicine  Jean Ford MD as Consulting Physician (Cardiology):    History taken from:     patient chart    Chief Complaint:      Dyspnea    Subjective     Interval History:     Patient seen and examined again today.    81-year-old with history of hypertension, coronary artery disease, CABG, type 2 diabetes, chronic kidney disease stage III, chronic dysphagia came with shortness of breath.  CT of the chest showed mild vascular congestion.  Patient was placed on IV Lasix and cardiology was consulted.    Cardiology has seen the patient they recommend stress test given his elevated troponin and his CHF.  Left heart catheterization will be done if this is positive.  Stress test is to be done today.  Echocardiogram showed ejection fraction 53%.  Mild pulmonary hypertension is noted as well.    Speech therapy was consulted due to patient choking on food frequently.  Patient and wife agreed to VFSS, and they agreed to adjustment of diet if this is warranted.    Patient feels mildly better at this point.  Denies any chest pressure, nausea, vomiting, pain.  He is ambulating around the room.  He has a previous recent CVA in 2020, in which he has had some trouble swallowing food since then.    Review of Systems:     All systems were reviewed and negative except for:  Respiratory: positive for  shortness of air  Cardiovascular: positive for  orthopnea    Objective     Vital Signs:    Temp:  [98 °F (36.7 °C)-98.3 °F (36.8 °C)] 98 °F (36.7 °C)  Heart Rate:  [72-81] 72  Resp:  [16-20] 16  BP: (132-152)/(72-75) 152/73    Physical  Exam:    General: Alert and oriented x4, mild distress.  Heart: Regular rate and rhythm without murmur rub or thrill.  Lungs: Clear to auscultation bilaterally without use of accessory muscles or respiration.  Abdomen: Soft/nontender/nondistended.  No HSM noted.  MSK: 4/5 strength in upper/lower extremities bilaterally.     Results Review:      I reviewed the patient's new clinical results.  I reviewed the patient's new imaging results and agree with the interpretation.  I reviewed the patient's other test results and agree with the interpretation  I personally viewed and interpreted the patient's EKG/Telemetry data    Labs:    Lab Results (last 24 hours)     Procedure Component Value Units Date/Time    POC Glucose Once [[id number]]  (Abnormal) Collected: 04/13/22 1112    Specimen: Blood Updated: 04/13/22 1114     Glucose 197 mg/dL      Comment: Meter: [id number] : [id number] [place] SAMUEL       Magnesium [[id number]]  (Normal) Collected: 04/13/22 0639    Specimen: Blood Updated: 04/13/22 0904     Magnesium 2.1 mg/dL     Renal Function Panel [[id number]]  (Abnormal) Collected: 04/13/22 0639    Specimen: Blood Updated: 04/13/22 0750     Glucose 176 mg/dL      BUN 18 mg/dL      Creatinine 1.00 mg/dL      Sodium 143 mmol/L      Potassium 3.5 mmol/L      Chloride 106 mmol/L      CO2 26.2 mmol/L      Calcium 9.2 mg/dL      Albumin 3.60 g/dL      Phosphorus 3.6 mg/dL      Anion Gap 10.8 mmol/L      BUN/Creatinine Ratio 18.0     eGFR 75.1 mL/min/1.73      Comment: National Kidney Foundation and American Society of Nephrology (ASN) Task Force recommended calculation based on the Chronic Kidney Disease Epidemiology Collaboration (CKD-EPI) equation refit without adjustment for race.       Narrative:      GFR Normal >60  Chronic Kidney Disease <60  Kidney Failure <15      CBC (No Diff) [[id number]]  (Abnormal) Collected: 04/13/22 0639    Specimen: Blood Updated: 04/13/22 0715     WBC 6.90 10*3/mm3      RBC 3.58 10*6/mm3       Hemoglobin 11.3 g/dL      Hematocrit 33.7 %      MCV 94.1 fL      MCH 31.6 pg      MCHC 33.5 g/dL      RDW 13.2 %      RDW-SD 45.0 fl      MPV 10.6 fL      Platelets 191 10*3/mm3     Protein / Creatinine Ratio, Urine - Urine, Clean Catch [[id number]] Collected: 04/13/22 0521    Specimen: Urine, Clean Catch Updated: 04/13/22 0643     Protein/Creatinine Ratio, Urine 189.3 mg/G Crea      Creatinine, Urine 143.7 mg/dL      Total Protein, Urine 27.2 mg/dL     POC Glucose Once [[id number]]  (Abnormal) Collected: 04/13/22 0616    Specimen: Blood Updated: 04/13/22 0618     Glucose 181 mg/dL      Comment: Meter: [id number] : [id number] BAKER       Urinalysis With Microscopic If Indicated (No Culture) - Urine, Clean Catch [[id number]]  (Abnormal) Collected: 04/13/22 0521    Specimen: Urine, Clean Catch Updated: 04/13/22 0614     Color, UA Yellow     Appearance, UA Clear     pH, UA <=5.0     Specific Gravity, UA 1.018     Glucose, UA Negative     Ketones, UA Negative     Bilirubin, UA Negative     Blood, UA Negative     Protein, UA Trace     Leuk Esterase, UA Negative     Nitrite, UA Negative     Urobilinogen, UA 0.2 E.U./dL    Narrative:      Urine microscopic not indicated.    POC Glucose Once [[id number]]  (Abnormal) Collected: 04/12/22 1917    Specimen: Blood Updated: 04/12/22 1919     Glucose 251 mg/dL      Comment: Meter: [id number] : [id number] BAKER       POC Glucose Once [[id number]]  (Abnormal) Collected: 04/12/22 1626    Specimen: Blood Updated: 04/12/22 1628     Glucose 154 mg/dL      Comment: Meter: [id number] : [id number] Darron BAKER              Radiology:    Imaging Results (Last 24 Hours)     ** No results found for the last 24 hours. **          Medication Review:     allopurinol, 300 mg, Oral, Daily  aspirin, 81 mg, Oral, Daily  atorvastatin, 40 mg, Oral, Nightly  carvedilol, 6.25 mg, Oral, BID With Meals  cholecalciferol, 2,000 Units, Oral, QAM  clopidogrel, 75 mg, Oral,  Daily  enoxaparin, 40 mg, Subcutaneous, Daily  furosemide, 40 mg, Oral, Daily  insulin lispro, 0-7 Units, Subcutaneous, 4x Daily With Meals & Nightly  lisinopril, 20 mg, Oral, BID  pantoprazole, 40 mg, Oral, QAM AC  sodium chloride, 10 mL, Intravenous, Q12H  terazosin, 1 mg, Oral, Nightly             Assessment/Plan     Principal Problem:    Acute dyspnea  Active Problems:    S/P CABG x 3      Overview: Description: 9/10:with a LIMA to his LAD, a vein to a ramus, and a vein to       the OM-2    Essential hypertension    Hyperlipemia    Type 2 diabetes mellitus with hyperglycemia, without long-term current use of insulin (HCC)    Normal pressure hydrocephalus (HCC)    CVA (cerebral vascular accident) (HCC)    Stage 3a chronic kidney disease (HCC)      1.  Acute congestive heart failure unknown type.  Acute on chronic diastolic.  2.  Diabetes mellitus type 2  3.  Essential hypertension  4.  Hyperlipidemia  5.  Coronary artery disease  6.  Mildly elevated troponin  7.  Dysphagia  8.  Hypomagnesemia  9.  History of NPH, CVA  10.  Mild pulmonary hypertension    Plan:    Continue diuresis.  Stress test today.  Cardiology following.  Monitor blood sugar.  Replace magnesium.  VFSS ordered.   Check lab work in the a.m. Monitor blood sugar.  Insulin sliding scale.  PT and OT to work with.  Home in the next 1-2 days.  Further recommendations to follow clinical course.    Christiano Cantor DO  04/13/22  14:03 EDT

## 2022-04-13 NOTE — PLAN OF CARE
Problem: Adult Inpatient Plan of Care  Goal: Plan of Care Review  Flowsheets (Taken 4/13/2022 7717)  Plan of Care Reviewed With: patient  Outcome Evaluation: AOx4. NSR on monitor, 1st degree AV block. Room air. NPO since midnight per cardiology order. Changing position independently, encouraged to turn Q2 hours. Still waiting for urine sample, patient tried several times to use urinal but stated he was unable to urinate. Was incontinent in brief several times. Male purewick in place, patient has not urinated since that has been hooked up, will continue to monitor.   Goal Outcome Evaluation:  Plan of Care Reviewed With: patient           Outcome Evaluation: AOx4. NSR on monitor, 1st degree AV block. Room air. NPO since midnight per cardiology order. Changing position independently, encouraged to turn Q2 hours. Still waiting for urine sample, patient tried several times to use urinal but stated he was unable to urinate. Was incontinent in brief several times. Male purewick in place, patient has not urinated since that has been hooked up, will continue to monitor.

## 2022-04-13 NOTE — PLAN OF CARE
Goal Outcome Evaluation:   Outcome Evaluation: NPO after MN per cardiology. Will plan to schedule VFSS next date or as able.

## 2022-04-13 NOTE — PROGRESS NOTES
"Eugenio Joe  1940 81 y.o.  [id number]      Patient Care Team:  Adeline Onofre MD as PCP - General  Adeline Onofre MD as PCP - Family Medicine  Jean Ford MD as Consulting Physician (Cardiology)    CC: Prior CABG hydrocephalus elderly gentleman history of a TIA and a pulmonary embolus diabetes    Interval History: Came in with shortness of breath symptoms suggestive of heart failure not really had chest discomfort      Objective   Vital Signs  Temp:  [98 °F (36.7 °C)-98.3 °F (36.8 °C)] 98 °F (36.7 °C)  Heart Rate:  [72-81] 72  Resp:  [16-20] 16  BP: (132-152)/(72-75) 152/73    Intake/Output Summary (Last 24 hours) at 4/13/2022 1234  Last data filed at 4/13/2022 1114  Gross per 24 hour   Intake 360 ml   Output 475 ml   Net -115 ml     Flowsheet Rows    Flowsheet Row First Filed Value   Admission Height 177.8 cm (70\") Documented at 04/11/2022 1842   Admission Weight 88.5 kg (195 lb) Documented at 04/11/2022 1842          Physical Exam:   General Appearance:    Alert,oriented, in no acute distress   Lungs:     Clear to auscultation,BS are equal    Heart:    Normal S1 and S2, RRR without murmur, gallop or rub   HEENT:    Sclerae are clear, no JVD or adenopathy   Abdomen:     Normal bowel sounds, soft nontender, nondistended, no HSM   Extremities:   Moves all extremities well, no edema, no cyanosis, no             Redness, no rash     Medication Review:      allopurinol, 300 mg, Oral, Daily  aspirin, 81 mg, Oral, Daily  atorvastatin, 40 mg, Oral, Nightly  carvedilol, 6.25 mg, Oral, BID With Meals  cholecalciferol, 2,000 Units, Oral, QAM  clopidogrel, 75 mg, Oral, Daily  enoxaparin, 40 mg, Subcutaneous, Daily  furosemide, 40 mg, Intravenous, BID  insulin lispro, 0-7 Units, Subcutaneous, 4x Daily With Meals & Nightly  lisinopril, 20 mg, Oral, BID  pantoprazole, 40 mg, Oral, QAM AC  sodium chloride, 10 mL, Intravenous, Q12H  terazosin, 1 mg, Oral, Nightly             I reviewed the patient's new clinical " results.  I personally viewed and interpreted the patient's EKG/Telemetry data    Assessment/Plan  Active Hospital Problems    Diagnosis  POA   • **Acute dyspnea [R06.00]  Yes   • Stage 3a chronic kidney disease (HCC) [N18.31]  Yes   • CVA (cerebral vascular accident) ([facility]) [I63.9]  Yes   • Normal pressure hydrocephalus (HCC) [G91.2]  Yes   • Type 2 diabetes mellitus with hyperglycemia, without long-term current use of insulin ([facility]) [E11.65]  Yes   • Essential hypertension [I10]  Yes   • 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]  Not Applicable   • Hyperlipemia [E78.5]  Yes      Resolved Hospital Problems   No resolved problems to display.       You know when we last saw him in the office I was talking about conservative therapy for him now he has come in symptoms consistent with heart failure a little bit of an abnormality on his chest x-ray proBNP was really not elevated but his troponins are elevated but flat not in a typical ACS pattern.  I am going to get a stress test on him if it were to show a large area of ischemia we have to consider a cath    Jean Ford MD  04/13/22  12:34 EDT

## 2022-04-14 PROBLEM — I50.23 ACUTE ON CHRONIC SYSTOLIC HEART FAILURE (HCC): Status: ACTIVE | Noted: 2022-01-01

## 2022-04-14 PROBLEM — I50.21 ACUTE SYSTOLIC HEART FAILURE: Status: ACTIVE | Noted: 2022-01-01

## 2022-04-14 NOTE — PROGRESS NOTES
Hospital Follow Up    LOS:  LOS: 3 days   Patient Name: Eugenio Joe  Age/Sex: 81 y.o. male  : 1940  MRN: [id number]    Day of Service: 22   Length of Stay: 3  Encounter Provider: JULISA Blake  Place of Service: [facility] CARDIOLOGY  Patient Care Team:  Adeline Onofre MD as PCP - General  Adeline Onofre MD as PCP - Family Medicine  Jean Ford MD as Consulting Physician (Cardiology)    Subjective:     Chief Complaint: CHF    Interval History: , Breathing better. Had swallow study today    Objective:     Objective:  Temp:  [98.1 °F (36.7 °C)-98.3 °F (36.8 °C)] 98.1 °F (36.7 °C)  Heart Rate:  [65-73] 70  Resp:  [16-20] 20  BP: (142-148)/(58-99) 148/73     Intake/Output Summary (Last 24 hours) at 2022 1608  Last data filed at 2022 0817  Gross per 24 hour   Intake --   Output 150 ml   Net -150 ml     Body mass index is 27.32 kg/m².      22  1409 22  0209 22  0503   Weight: 93.4 kg (206 lb) 90.7 kg (199 lb 14.4 oz) 86.4 kg (190 lb 6.4 oz)     Weight change: -7.076 kg (-15 lb 9.6 oz)    Physical Exam:   General Appearance:    Awake alert and oriented in no acute distress.   Color:  Skin:  Neuro:  HEENT:    Lungs:     Pink  Warm and dry  No focal, motor or sensory deficits  Neck supple, pupils equal, round and reactive. No JVD, No Bruit  Clear to auscultation,respirations regular, even and                  unlabored    Heart:    Regular rate and rhythm, S1 and S2, no murmur, no gallop, no rub. No edema, DP/PT pulses are 2+   Chest Wall:    No abnormalities observed   Abdomen:     Normal bowel sounds, no masses, no organomegaly, soft        non-tender, non-distended, no guarding, no ascites noted   Extremities:   Moves all extremities well, no edema, no cyanosis, no redness       Lab Review:   Results from last 7 days   Lab Units 22  0814 22  2334 22  0639 22  1136 22  1700   SODIUM mmol/L 141   --  143   < > 143   POTASSIUM mmol/L 4.3 4.3 3.5   < > 4.1   CHLORIDE mmol/L 104  --  106   < > 108*   CO2 mmol/L 24.5  --  26.2   < > 20.1*   BUN mg/dL 23  --  18   < > 19   CREATININE mg/dL 1.15  --  1.00   < > 1.23   GLUCOSE mg/dL 195*  --  176*   < > 186*   CALCIUM mg/dL 9.4  --  9.2   < > 9.5   AST (SGOT) U/L  --   --   --   --  13   ALT (SGPT) U/L  --   --   --   --  12    < > = values in this interval not displayed.     Results from last 7 days   Lab Units 04/12/22  1136 04/11/22  2259 04/11/22  1700   TROPONIN T ng/mL 0.040* 0.042* 0.033*     Results from last 7 days   Lab Units 04/14/22  0814 04/13/22  0639   WBC 10*3/mm3 7.27 6.90   HEMOGLOBIN g/dL 12.2* 11.3*   HEMATOCRIT % 37.3* 33.7*   PLATELETS 10*3/mm3 205 191     Results from last 7 days   Lab Units 04/11/22  1700   INR  0.98     Results from last 7 days   Lab Units 04/13/22  0639 04/12/22  1136   MAGNESIUM mg/dL 2.1 1.1*           Invalid input(s): LDLCALC  Results from last 7 days   Lab Units 04/11/22  1700   PROBNP pg/mL 1,564.0         I reviewed the patient's new clinical results.  I personally viewed and interpreted the patient's EKG  Current Medications:   Scheduled Meds:allopurinol, 300 mg, Oral, Daily  aspirin, 81 mg, Oral, Daily  atorvastatin, 40 mg, Oral, Nightly  carvedilol, 6.25 mg, Oral, BID With Meals  cholecalciferol, 2,000 Units, Oral, QAM  clopidogrel, 75 mg, Oral, Daily  enoxaparin, 40 mg, Subcutaneous, Daily  furosemide, 40 mg, Oral, Daily  insulin lispro, 0-7 Units, Subcutaneous, 4x Daily With Meals & Nightly  lisinopril, 20 mg, Oral, BID  pantoprazole, 40 mg, Oral, QAM AC  sodium chloride, 10 mL, Intravenous, Q12H  terazosin, 1 mg, Oral, Nightly      Continuous Infusions:     Allergies:  Allergies   Allergen Reactions   • Other Mental Status Change and Hallucinations     Oxy drugs   • Oxycodone Mental Status Change       Assessment:       Acute dyspnea    S/P CABG x 3    Essential hypertension    Hyperlipemia    Type 2 diabetes  mellitus with hyperglycemia, without long-term current use of insulin (HCC)    Normal pressure hydrocephalus (HCC)    CVA (cerebral vascular accident) (HCC)    Stage 3a chronic kidney disease (HCC)    Acute systolic heart failure (CMS/HCC)    Acute on chronic systolic heart failure (CMS/HCC)        Plan:         Patient doing well. He is actually being dc'd today. I told him to watch his weight at home and if he feels that he is gaining 2-3 lbs a day to call us. We will follow up with him in 1-2 weeks.  JULISA Blaek  04/14/22  16:08 EDT  Electronically signed by JULISA Blake, 04/14/22, 4:08 PM EDT.

## 2022-04-14 NOTE — THERAPY TREATMENT NOTE
Acute Care - Physical Therapy Treatment Note  [facility]     Patient Name: Eugenio Joe  : 1940  MRN: [id number]  Today's Date: 2022      Visit Dx:     ICD-10-CM ICD-9-CM   1. Acute dyspnea  R06.00 786.09   2. Acute congestive heart failure, unspecified heart failure type ([facility])  I50.9 428.0   3. Hypertensive urgency  I16.0 401.9   4. Elevated troponin  R77.8 790.6     Patient Active Problem List   Diagnosis   • Quadriceps weakness   • ACL tear   • Knee pain, right   • S/P CABG x 3   • Essential hypertension   • Hyperlipemia   • Hallux rigidus   • Fracture, stress, metatarsal   • Osteopenia determined by x-ray   • Metatarsal stress fracture with nonunion   • Left hip pain   • Bursitis of left hip   • Pulmonary embolism ([facility])   • DALILA (acute kidney injury) ([facility])   • Type 2 diabetes mellitus with hyperglycemia, without long-term current use of insulin ([facility])   • Ascending aorta dilatation ([facility])   • Pulmonary nodule, left   • Right leg DVT ([facility])   • Acute renal failure ([facility])   • Hypotension   • Dehydration   • Hypercalcemia   • Dysphagia   • Syncope and collapse   • Coronary artery disease involving native coronary artery of native heart without angina pectoris   • Normal pressure hydrocephalus ([facility])   • Headache   • Impaired mobility   • Nausea & vomiting   • Fall at home   • Transient cerebral ischemia   • PFO (patent foramen ovale)   • Esophageal dysphagia   • TIA (transient ischemic attack)   • Acute appendicitis with localized peritonitis, without perforation or abscess   • Right lower quadrant abdominal pain   • History of CVA (cerebrovascular accident)   • On statin therapy   • 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   • Diverticulitis   • Hx of appendectomy   • Acute abdominal pain   • Gait abnormality   • Weakness   • CVA (cerebral vascular accident) ([facility])   • Acute CVA (cerebrovascular accident) ([facility])   • Cerebrovascular accident (CVA) due to embolism of cerebral artery ([facility])   • Closed  displaced fracture of lateral malleolus of left fibula   • Pain   • Syndesmotic ankle sprain, left, subsequent encounter   • Acute dyspnea   • Stage 3a chronic kidney disease ([facility])     Past Medical History:   Diagnosis Date   • Arthritis    • Asthma    • Brain abscess     at about age 44   • Bruise of face    • Bursitis of left hip    • Cancer ([facility])     PT STATES IN HIS SWEAT GLAND    • Cardiac disease    • Coronary artery disease     CABG 2012   • Diabetes mellitus ([facility])    • Difficulty in swallowing     LIQUIDS   • Difficulty walking    • Diverticulitis    • DM2 (diabetes mellitus, type 2) ([facility]) 10/12/2016   • Fracture, foot 2015    Dr Pisano   • Fracture, stress, metatarsal    • Fracture, tibia and fibula Feb 2021    Dr Pisano   • Gout several years ago    medication  working   • Hearing aid worn     bilateral   • Hx of appendectomy    • Hyperlipemia    • Hypertension    • Joint pain     or swelling   • Low back pain several years ago   • Metatarsal stress fracture with nonunion    • Normal pressure hydrocephalus ([facility])    • Orbit fracture ([facility])    • Pulmonary embolism ([facility])     OCT 2016 - AFTER FOOT SURGERY   • Rash     ARM-    • Spinal headache     AFTER SPINAL TAP - NO BLOOD PATCH   • Stroke (cerebrum) ([facility]) 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   • Syndesmotic ankle sprain, left, subsequent encounter    • Tear of meniscus of knee torn ligaments   • TIA (transient ischemic attack)     MULTIPLE     Past Surgical History:   Procedure Laterality Date   • ANKLE OPEN REDUCTION INTERNAL FIXATION Left 2/16/2021    Procedure: Left ankle open reduction internal fixation with implants as needed;  Surgeon: Man Jenkins MD;  Location: [facility];  Service: Orthopedics;  Laterality: Left;   • APPENDECTOMY N/A 7/18/2019    Procedure: APPENDECTOMY LAPAROSCOPIC;  Surgeon: Luis Carlos Rick Jr., MD;  Location: [facility] OR;  Service: General   • BRAIN SURGERY      BRAIN ABCESS 30 YR AGO   • CARDIAC  CATHETERIZATION N/A 12/4/2020    Procedure: Patent foramen ovale closure ABBOTT;  Surgeon: Frank Pablo MD;  Location: [facility] CATH INVASIVE LOCATION;  Service: Cardiology;  Laterality: N/A;   • CARDIAC CATHETERIZATION N/A 12/4/2020    Procedure: Intracardiac echocardiogram;  Surgeon: Frank Pablo MD;  Location: [facility] CATH INVASIVE LOCATION;  Service: Cardiology;  Laterality: N/A;   • CARDIAC SURGERY     • COLONOSCOPY  2012    Ciciliano- normal per pt, no polyps    • CORONARY ARTERY BYPASS GRAFT      3 VESSEL   • ENDOSCOPY N/A 3/9/2017    Schatzki ring, dilated, LA Grade B esophagitis, HH, acquired duodenal stenosis   • ENDOSCOPY N/A 4/6/2018    candida, HH, torts, Schatzki ring, duodenal stenosis, dilatation perforemed, chronic inflammation   • ENDOSCOPY N/A 10/9/2019    White nummular lesions in esophageal mucosa, mild Schatzki ring, acquired duodenal stenosis, Path: Terrazas's, candida   • ENDOSCOPY N/A 1/8/2020    Procedure: ESOPHAGOGASTRODUODENOSCOPY with biopsies;  Surgeon: Rigoberto Walker MD;  Location: [facility] ENDOSCOPY;  Service: Gastroenterology   • FACIAL FRACTURE SURGERY      to repair 6 broken bones   • HAND SURGERY     • ORBITAL FRACTURE OPEN REDUCTION INTERNAL FIXATION Right 1/13/2017    Procedure: RT ORBIT FLOOR FRACTURE REPAIR RIGHT [facility] FRACTURE REPAIR, RIGHT NASAL BONE FRACTURE CLOSED REDUCTION;  Surgeon: Edil Lester MD;  Location: [facility] OR [facility];  Service:    • ORIF FOOT FRACTURE Right 9/9/2016    Procedure: RIGHT SECOND METATARSAL OPEN REDUCTION INTERNAL FIXATION WITh GRAFT FROM HEEL ;  Surgeon: Man Jenkins MD;  Location: [facility] OR [facility];  Service:    • PATENT FORAMEN OVALE CLOSURE     • SEPTOPLASTY     • SKIN CANCER EXCISION     • TONSILLECTOMY     • TRANSESOPHAGEAL ECHOCARDIOGRAM (STELLA)       PT Assessment (last 12 hours)     PT Evaluation and Treatment     Row Name 04/14/22 0918          Physical Therapy Time and Intention    Subjective Information no  complaints  -     Document Type therapy note (daily note)  -     Mode of Treatment individual therapy;physical therapy  -     Patient Effort excellent  -formerly [facility] Name 04/14/22 0918          General Information    Patient Profile Reviewed yes  -     Patient Observations alert;cooperative;agree to therapy  -     Existing Precautions/Restrictions fall  -     Row Name 04/14/22 0918          Pain    Pretreatment Pain Rating 0/10 - no pain  -     Posttreatment Pain Rating 0/10 - no pain  -formerly [facility] Name 04/14/22 0918          Cognition    Orientation Status (Cognition) oriented x 3  -formerly [facility] Name 04/14/22 0918          Bed Mobility    Bed Mobility supine-sit;sit-supine  -     Supine-Sit [place] (Bed Mobility) standby assist  -     Sit-Supine [place] (Bed Mobility) standby assist  -     Assistive Device (Bed Mobility) bed rails  -formerly [facility] Name 04/14/22 0918          Transfers    Sit-Stand [place] (Transfers) standby assist;contact guard  -formerly [facility] Name 04/14/22 0918          Sit-Stand Transfer    Assistive Device (Sit-Stand Transfers) walker, 4-wheeled  -formerly [facility] Name 04/14/22 0918          Gait/Stairs (Locomotion)    [place] Level (Gait) contact guard;standby assist  -     Assistive Device (Gait) walker, 4-wheeled  -     Distance in Feet (Gait) 200  -     Pattern (Gait) step-through  -formerly [facility] Name 04/14/22 0918          Plan of Care Review    Plan of Care Reviewed With patient  -     Progress improving  -     Outcome Evaluation Pt w muh improved mobilization, safely ambulated household distances 200ft SBA upright rollator. Pt hopeful to DC home soon, pt reports he is current w OP PT= recommending resuming at [place].  -     Row Name 04/14/22 0918          Positioning and Restraints    Pre-Treatment Position in bed  -     Post Treatment Position bed  -     In Bed notified nsg;fowlers;call light within reach;encouraged to call for assist;exit alarm on  -            User Key  (r) = Recorded By, (t) = Taken By, (c) = Cosigned By    Initials Name Provider Type     Chayito Toro, PT Physical Therapist                Physical Therapy Education                 Title: PT OT SLP Therapies (In Progress)     Topic: Physical Therapy (Done)     Point: Mobility training (Done)     Learning Progress Summary           Patient Acceptance, E, VU,DU,NR by  at 4/14/2022 0924    Acceptance, E, NR,VU by  at 4/12/2022 1525                   Point: Home exercise program (Done)     Learning Progress Summary           Patient Acceptance, E, VU,DU,NR by  at 4/14/2022 0924    Acceptance, E, NR,VU by  at 4/12/2022 1525                   Point: Body mechanics (Done)     Learning Progress Summary           Patient Acceptance, E, VU,DU,NR by  at 4/14/2022 0924    Acceptance, E, NR,VU by  at 4/12/2022 1525                   Point: Precautions (Done)     Learning Progress Summary           Patient Acceptance, E, VU,DU,NR by  at 4/14/2022 0924    Acceptance, E, NR,VU by  at 4/12/2022 1525                               User Key     Initials Effective Dates Name Provider Type Discipline     06/16/21 -  Chayito Toro, PT Physical Therapist PT     06/16/21 -  Erica Abrams PT Physical Therapist PT              PT Recommendation and Plan     Plan of Care Reviewed With: patient  Progress: improving  Outcome Evaluation: Pt w muh improved mobilization, safely ambulated household distances 200ft SBA upright rollator. Pt hopeful to DC home soon, pt reports he is current w OP PT= recommending resuming at [place].   Outcome Measures     Row Name 04/14/22 0900             How much help from another person do you currently need...    Turning from your back to your side while in flat bed without using bedrails? 4  -LH      Moving from lying on back to sitting on the side of a flat bed without bedrails? 4  -LH      Moving to and from a bed to a chair (including a wheelchair)? 3  -LH       Standing up from a chair using your arms (e.g., wheelchair, bedside chair)? 3  -      Climbing 3-5 steps with a railing? 3  -      To walk in hospital room? 3  -      AM-PAC 6 Clicks Score (PT) 20  -              Functional Assessment    Outcome Measure Options AM-PAC 6 Clicks Basic Mobility (PT)  -            User Key  (r) = Recorded By, (t) = Taken By, (c) = Cosigned By    Initials Name Provider Type     Chayito Toro, PT Physical Therapist                 Time Calculation:    PT Charges     Row Name 04/14/22 0926             Time Calculation    Start Time 0840  -      Stop Time 0855  -      Time Calculation (min) 15 min  -      PT Received On 04/14/22  -      PT - Next Appointment 04/15/22  -            User Key  (r) = Recorded By, (t) = Taken By, (c) = Cosigned By    Initials Name Provider Type     Chaytio Toro, PT Physical Therapist              Therapy Charges for Today     Code Description Service Date Service Provider Modifiers Qty    [id number]  PT THERAPEUTIC ACT EA 15 MIN 4/14/2022 Chayito Toro, PT GP 1          PT G-Codes  Outcome Measure Options: AM-PAC 6 Clicks Basic Mobility (PT)  AM-PAC 6 Clicks Score (PT): 20  AM-PAC 6 Clicks Score (OT): 17    Chayito Toro PT  4/14/2022

## 2022-04-14 NOTE — MBS/VFSS/FEES
Acute Care - Speech Language Pathology   Swallow Initial Evaluation [facility]     Patient Name: Eugenio Joe  : 1940  MRN: [id number]  Today's Date: 2022               Admit Date: 2022    Visit Dx:     ICD-10-CM ICD-9-CM   1. Acute dyspnea  R06.00 786.09   2. Acute congestive heart failure, unspecified heart failure type ([facility])  I50.9 428.0   3. Hypertensive urgency  I16.0 401.9   4. Elevated troponin  R77.8 790.6     Patient Active Problem List   Diagnosis   • Quadriceps weakness   • ACL tear   • Knee pain, right   • S/P CABG x 3   • Essential hypertension   • Hyperlipemia   • Hallux rigidus   • Fracture, stress, metatarsal   • Osteopenia determined by x-ray   • Metatarsal stress fracture with nonunion   • Left hip pain   • Bursitis of left hip   • Pulmonary embolism ([facility])   • DALILA (acute kidney injury) ([facility])   • Type 2 diabetes mellitus with hyperglycemia, without long-term current use of insulin ([facility])   • Ascending aorta dilatation ([facility])   • Pulmonary nodule, left   • Right leg DVT ([facility])   • Acute renal failure ([facility])   • Hypotension   • Dehydration   • Hypercalcemia   • Dysphagia   • Syncope and collapse   • Coronary artery disease involving native coronary artery of native heart without angina pectoris   • Normal pressure hydrocephalus ([facility])   • Headache   • Impaired mobility   • Nausea & vomiting   • Fall at home   • Transient cerebral ischemia   • PFO (patent foramen ovale)   • Esophageal dysphagia   • TIA (transient ischemic attack)   • Acute appendicitis with localized peritonitis, without perforation or abscess   • Right lower quadrant abdominal pain   • History of CVA (cerebrovascular accident)   • On statin therapy   • 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   • Diverticulitis   • Hx of appendectomy   • Acute abdominal pain   • Gait abnormality   • Weakness   • CVA (cerebral vascular accident) ([facility])   • Acute CVA (cerebrovascular accident) ([facility])   • Cerebrovascular accident (CVA) due  to embolism of cerebral artery ([facility])   • Closed displaced fracture of lateral malleolus of left fibula   • Pain   • Syndesmotic ankle sprain, left, subsequent encounter   • Acute dyspnea   • Stage 3a chronic kidney disease ([facility])   • Acute systolic heart failure (CMS/HCC)   • Acute on chronic systolic heart failure (CMS/[facility])     Past Medical History:   Diagnosis Date   • Arthritis    • Asthma    • Brain abscess     at about age 44   • Bruise of face    • Bursitis of left hip    • Cancer ([facility])     PT STATES IN HIS SWEAT GLAND    • Cardiac disease    • Coronary artery disease     CABG 2012   • Diabetes mellitus ([facility])    • Difficulty in swallowing     LIQUIDS   • Difficulty walking    • Diverticulitis    • DM2 (diabetes mellitus, type 2) ([facility]) 10/12/2016   • Fracture, foot 2015    Dr Pisano   • Fracture, stress, metatarsal    • Fracture, tibia and fibula Feb 2021    Dr Pisano   • Gout several years ago    medication  working   • Hearing aid worn     bilateral   • Hx of appendectomy    • Hyperlipemia    • Hypertension    • Joint pain     or swelling   • Low back pain several years ago   • Metatarsal stress fracture with nonunion    • Normal pressure hydrocephalus ([facility])    • Orbit fracture ([facility])    • Pulmonary embolism ([facility])     OCT 2016 - AFTER FOOT SURGERY   • Rash     ARM-    • Spinal headache     AFTER SPINAL TAP - NO BLOOD PATCH   • Stroke (cerebrum) ([facility]) 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   • Syndesmotic ankle sprain, left, subsequent encounter    • Tear of meniscus of knee torn ligaments   • TIA (transient ischemic attack)     MULTIPLE     Past Surgical History:   Procedure Laterality Date   • ANKLE OPEN REDUCTION INTERNAL FIXATION Left 2/16/2021    Procedure: Left ankle open reduction internal fixation with implants as needed;  Surgeon: Man Jenkins MD;  Location: [facility] OR [facility] [place];  Service: Orthopedics;  Laterality: Left;   • APPENDECTOMY N/A 7/18/2019    Procedure: APPENDECTOMY LAPAROSCOPIC;   Surgeon: Luis Carlos Rick Jr., MD;  Location: [facility] MAIN OR;  Service: General   • BRAIN SURGERY      BRAIN ABCESS 30 YR AGO   • CARDIAC CATHETERIZATION N/A 12/4/2020    Procedure: Patent foramen ovale closure ABBOTT;  Surgeon: Frank Pablo MD;  Location: [facility] CATH INVASIVE LOCATION;  Service: Cardiology;  Laterality: N/A;   • CARDIAC CATHETERIZATION N/A 12/4/2020    Procedure: Intracardiac echocardiogram;  Surgeon: Frank Pablo MD;  Location: [facility] CATH INVASIVE LOCATION;  Service: Cardiology;  Laterality: N/A;   • CARDIAC SURGERY     • COLONOSCOPY  2012    Ciciliano- normal per pt, no polyps    • CORONARY ARTERY BYPASS GRAFT      3 VESSEL   • ENDOSCOPY N/A 3/9/2017    Schatzki ring, dilated, LA Grade B esophagitis, HH, acquired duodenal stenosis   • ENDOSCOPY N/A 4/6/2018    candida, HH, torts, Schatzki ring, duodenal stenosis, dilatation perforemed, chronic inflammation   • ENDOSCOPY N/A 10/9/2019    White nummular lesions in esophageal mucosa, mild Schatzki ring, acquired duodenal stenosis, Path: Terrazas's, candida   • ENDOSCOPY N/A 1/8/2020    Procedure: ESOPHAGOGASTRODUODENOSCOPY with biopsies;  Surgeon: Rigoberto Walker MD;  Location: [facility] ENDOSCOPY;  Service: Gastroenterology   • FACIAL FRACTURE SURGERY      to repair 6 broken bones   • HAND SURGERY     • ORBITAL FRACTURE OPEN REDUCTION INTERNAL FIXATION Right 1/13/2017    Procedure: RT ORBIT FLOOR FRACTURE REPAIR RIGHT ZMC FRACTURE REPAIR, RIGHT NASAL BONE FRACTURE CLOSED REDUCTION;  Surgeon: Edil Lester MD;  [place] [facility] [place] [facility]  Service:    • ORIF FOOT FRACTURE Right 9/9/2016    Procedure: RIGHT SECOND METATARSAL OPEN REDUCTION INTERNAL FIXATION WITh GRAFT FROM HEEL ;  Surgeon: Man Jenkins MD;  [place] [facility] [place] [facility]  Service:    • PATENT FORAMEN OVALE CLOSURE     • SEPTOPLASTY     • SKIN CANCER EXCISION     • TONSILLECTOMY     • TRANSESOPHAGEAL ECHOCARDIOGRAM (STELLA)         SLP Recommendation and  Plan  SLP Swallowing Diagnosis: severe, pharyngeal dysphagia (04/14/22 1100)  SLP Diet Recommendation: NPO (clinically, if wishing to reduce risk of aspiration) (04/14/22 1100)  Recommended Precautions and Strategies: upright posture during/after eating, small bites of food and sips of liquid (04/14/22 1100)  SLP Rec. for Method of Medication Administration: meds whole, with pudding or applesauce (04/14/22 1100)     Monitor for Signs of Aspiration: notify SLP if any concerns (04/14/22 1100)     Swallow Criteria for Skilled Therapeutic Interventions Met: demonstrates skilled criteria (04/14/22 1100)  Anticipated Discharge Disposition (SLP): home with OP services (04/14/22 1100)  Rehab Potential/Prognosis, Swallowing: adequate, monitor progress closely (04/14/22 1100)  Therapy Frequency (Swallow): PRN (04/14/22 1100)  Predicted Duration Therapy Intervention (Days): until discharge (04/14/22 1100)                                  Plan of Care Reviewed With: patient  Outcome Evaluation: VFSS completed. Pt presents with severe pharyngeal dysphagia characterized by delayed swallow initiation, abesent epiglottic inversion and impaired airway closure at the entrance to laryngeal vestibule. Impaired epiglottic inversion appears to be negatively impacted by anterior protrusion of posterior pharyngeal wall from cervical spine. Pharyngeal deficits causing penetration and/or aspiration of almost all consistencies. Deep penetration to the cords occurs during the swallow during trials of thin liquids via cup and nectar thick liquids via cup and then frankly aspirated after the swallow during consecutive swallows or trials due to inability to clear with cue. Aspiration was initially silent. Pt exhibited penetation to mid laryngeal vestibule or slightly lower during the swallow during trials of honey thick liquids by cup, puree, mechanical soft/mixed consistecy, and regular solids. Pt unable to clear material from each swallow  despite cued cough and pt eventually aspirated each conistency. Pt exhibited no penetration or aspiration during single trial of mechanical soft. Pt intermittently exhibited spontanous cough during later part of study but not effective to clear. Swallow strategies of small sips, chin tuck, and effortful swallow were not effective in eliminating penetration/aspiration. Pt exhibited mild-moderate vallecular residue after initial swallow with majority of trials impacted by absent epiglottic inversion and possible mild impaired pharyngeal contraction, but cleared with either spontaneous or cued second swallow. Pt reports no hx of pneumonia since CVA. Discussed with MD, Dr. Cantor, who reports current SOB symptoms suspect to be related to CHF and pt has no other findings consistent with pnuemonia. In discussion with pt and pt's wife afterwards during VFSS follow up. If goals of care are to reduce risk of aspiration, would recommend NPO with non-oral meds. Met with pt and pt's wife in afternoon after VFSS completed to provided education regarding results, recommendations, and determine plan of care. Pt's wife reports that she thinks his SOB is related to choking event prior to hospitalization. Discussed options of continueing to eat vs NPO with PEG placement. Pt reports he does not want to aspirate, but cannot make decision for PEG placement at this time. Pt to descharge today per pt and pt's wife. Pt wishes to continue to eat/drink at this time and to complete outpatient SLP therapy to attempt to improve swallowing function.      SWALLOW EVALUATION (last 72 hours)     SLP Adult Swallow Evaluation     Row Name 04/14/22 1100 04/12/22 0900          Document Type evaluation  -ML evaluation  -ML    Subjective Information no complaints  -ML no complaints  -ML    Patient Observations alert;cooperative  -ML alert;cooperative  -ML    Patient/Family/Caregiver Comments/Observations -- Pt's wife at bedside  -ML    Patient Effort  "excellent  -ML excellent  -ML    Symptoms Noted During/After Treatment none  -ML none  -ML                Patient Profile Reviewed yes  -ML yes  -ML    Pertinent History Of Current Problem -- Pt admitted with worsening SOB. Pt's wife reports that pt often coughs and at home with eating/drinking. Documented that pt failed swallow screen. Pt has hx of dysphagia s/p CVA in 2020. Last VFSS was completed in october 2020 which recommend regular solid diet/no mixed and honey thick liquids. Pt with silent aspiration of thin and nectar thick liquids. Per pt and pt's wife, pt used honey thick liquids for a couple months and then consumed regular solids and thin liquids. Pt and pt's wife deny any PNA in the last few years. Per CT angiogram chest, \"There is some slight interstitial prominence and minimal scattered ground glass opacity which is nonspecific but likely relates to an element of very mild CHF as also suggested on the portable chest x-ray.\"  -ML    Current Method of Nutrition -- NPO  due to failed screen  -ML    Precautions/Limitations, Vision -- WFL;for purposes of eval  -ML    Precautions/Limitations, Hearing -- WFL;for purposes of eval  -ML    Prior Level of Function-Swallowing -- --  see above  -ML    Plans/Goals Discussed with -- patient;spouse/S.O.;agreed upon  -ML    Barriers to Rehab -- previous functional deficit  -ML    Patient's Goals for Discharge -- patient did not state  -ML                Additional Documentation -- Pain Scale: Numbers Pre/Post-Treatment (Group)  -ML                Pretreatment Pain Rating -- 0/10 - no pain  -ML    Posttreatment Pain Rating -- 0/10 - no pain  -ML                Dentition Assessment -- natural, present and adequate  -ML    Secretion Management -- WNL/WFL  -ML    Mucosal Quality -- moist, healthy  -ML    Volitional Cough -- WFL  -ML                Oral Motor General Assessment -- WFL  -ML                Respiratory Support Currently in Use -- room air  -ML    " Eating/Swallowing Skills -- self-fed  -ML    Positioning During Eating -- upright 90 degree;upright in bed  -ML    Utensils Used -- spoon;cup  -ML    Consistencies Trialed -- regular textures;soft textures;mechanical soft, no mixed consistencies;mixed consistency;pureed;ice chips;thin liquids  -ML                Clinical Swallow Evaluation Summary -- Clinical swallowing evaluation completed. Pt presents with suspected at least mild pharyngeal dysphagia. Oral phase appeared functional with adequate mastication, bolus formation, and transit of p.o. without presence of oral residue. Pharyngeal phase characterized by suspected impaired laryngeal elevation, airway closure, and pharyngeal contraction (as noted on previous VFSS in 2020) indicated by inconsistent throat clear with thin via cup 1x, puree x1, and overt cough with mixed consistency peaches. Pt directed to take small drinks and small bites during evaluation and pt reported he did not eat/drink using these strategies at home (eats big bites and consecutive drinks). Pt with hx of silent aspiration. Pt and pt's wife reports he usually coughs more than present on evaluation this date at home. Thickened liquids not assessed due to pt stating he would not drink them at home. Extensively discussed previous dysphagia hx and suspected dysphagia at this time with pt and pt's wife. Pt reports he wishes to remain on regular diet and thin liquids at this time knowing risk of aspiration, but does want to complete VFSS to further evaluate.  -ML                Utensils Used spoon;cup  -ML --    Consistencies Trialed regular textures;soft textures;mechanical soft, no mixed consistencies;mixed consistency;pureed;thin liquids;nectar/syrup-thick liquids;honey-thick liquids  -ML --                VFSS Summary VFSS completed. Pt presents with severe pharyngeal dysphagia characterized by delayed swallow initiation, abesent epiglottic inversion and impaired airway closure at the  entrance to laryngeal vestibule. Impaired epiglottic inversion appears to be negatively impacted by anterior protrusion of posterior pharyngeal wall from cervical spine. Pharyngeal deficits causing penetration and/or aspiration of almost all consistencies. Deep penetration to the cords occurs during the swallow during trials of thin liquids via cup and nectar thick liquids via cup and then frankly aspirated after the swallow during consecutive swallows or trials due to inability to clear with cue. Aspiration was initially silent. Pt exhibited penetation to mid laryngeal vestibule or slightly lower during the swallow during trials of honey thick liquids by cup, puree, mechanical soft/mixed consistecy, and regular solids. Pt unable to clear material from each swallow despite cued cough and pt eventually aspirated each conistency. Pt exhibited no penetration or aspiration during single trial of mechanical soft. Pt intermittently exhibited spontanous cough during later part of study but not effective to clear. Swallow strategies of small sips, chin tuck, and effortful swallow were not effective in eliminating penetration/aspiration. Pt exhibited mild-moderate vallecular residue after initial swallow with majority of trials impacted by absent epiglottic inversion and possible mild impaired pharyngeal contraction, but cleared with either spontaneous or cued second swallow. Pt reports no hx of pneumonia since CVA. Discussed with MD, Dr. Cantor, who reports current SOB symptoms suspect to be related to CHF and pt has no other findings consistent with pnuemonia. In discussion with pt and pt's wife afterwards during VFSS follow up. If goals of care are to reduce risk of aspiration, would recommend NPO with non-oral meds. Met with pt and pt's wife in afternoon after VFSS completed to provided education regarding results, recommendations, and determine plan of care. Pt's wife reports that she thinks his SOB is related to  choking event prior to hospitalization. Discussed options of continueing to eat vs NPO with PEG placement. Pt reports he does not want to aspirate, but cannot make decision for PEG placement at this time. Pt to descharge today per pt and pt's wife. Pt wishes to continue to eat/drink at this time and to complete outpatient SLP therapy to attempt to improve swallowing function.  -ML --                Summary Statement VFSS completed in conjuction with Dr. Maciel. Pt presents with severe pharyngeal dysphagia characterized by delayed swallow initiation, abesent epiglottic inversion and impaired airway closure at the entrance to laryngeal vestibule. Impaired epiglottic inversion appears to be negatively impacted by anterior protrusion of posterior pharyngeal wall from cervical spine. Pharyngeal deficits causing penetration and/or aspiration of almost all consistencies. Deep penetration to the cords occurs during the swallow during trials of thin liquids via cup and nectar thick liquids via cup and then frankly aspirated after the swallow during consecutive swallows or trials due to inability to clear with cue. Aspiration was initially silent. Pt exhibited penetation to mid laryngeal vestibule or slightly lower during the swallow during trials of honey thick liquids by cup, puree, mechanical soft/mixed consistecy, and regular solids. Pt unable to clear material from each swallow despite cued cough and pt eventually aspirated each conistency. Pt exhibited no penetration or aspiration during single trial of mechanical soft. Pt intermittently exhibited spontanous cough during later part of study but not effective to clear. Pt exhibited mild-moderate vallecular residue after initial swallow with majority of trials impacted by absent epiglottic inversion and possible mild impaired pharyngeal contraction, but cleared with either spontaneous or cued second swallow.  -ML --                SLP Swallowing Diagnosis  severe;pharyngeal dysphagia  -ML suspected pharyngeal dysphagia  -ML    Functional Impact risk of aspiration/pneumonia  -ML risk of aspiration/pneumonia  -ML    Rehab Potential/Prognosis, Swallowing adequate, monitor progress closely  -ML good, to achieve stated therapy goals  -ML    Swallow Criteria for Skilled Therapeutic Interventions Met demonstrates skilled criteria  -ML demonstrates skilled criteria  -ML                Therapy Frequency (Swallow) PRN  -ML PRN  -ML    Predicted Duration Therapy Intervention (Days) until discharge  -ML until discharge  -ML    SLP Diet Recommendation NPO  clinically, if wishing to reduce risk of aspiration  -ML regular textures;thin liquids  no mixed consistencies  -ML    Recommended Diagnostics -- VFSS (MBS)  -ML    Recommended Precautions and Strategies upright posture during/after eating;small bites of food and sips of liquid  -ML upright posture during/after eating;small bites of food and sips of liquid;no straw  -ML    Oral Care Recommendations Oral Care BID/PRN;Oral Care before breakfast, after meals and PRN  -ML Oral Care BID/PRN  -ML    SLP Rec. for Method of Medication Administration meds whole;with pudding or applesauce  -ML meds whole;with pudding or applesauce  -ML    Monitor for Signs of Aspiration notify SLP if any concerns  -ML notify SLP if any concerns;yes  -ML    Anticipated Discharge Disposition (SLP) home with OP services  -ML unknown  -ML                Oral Nutrition/Hydration Goal Selection (SLP) oral nutrition/hydration, SLP goal 1  -ML oral nutrition/hydration, SLP goal 1  -ML    Pharyngeal Strengthening Exercise Goal Selection (SLP) pharyngeal strengthening exercise, SLP goal 1  -ML --    Additional Documentation pharyngeal strengthening exercise goal selection (SLP)  -ML --                Oral Nutrition/Hydration Goal 1, SLP -- Pt will utilize swallowing strategies to safely swallow least restrictive diet without overt s/s of penetration/aspiration or  negative respiratory effects.  -ML                Activity (Pharyngeal Strengthening Goal 1, SLP) increase closure at entrance to airway/closure of airway at glottis;increase epiglottic inversion and retroflexion  -ML --    Time Frame (Pharyngeal Strengthening Goal 1, SLP) short term goal (STG)  -ML --          User Key  (r) = Recorded By, (t) = Taken By, (c) = Cosigned By    Initials Name Effective Dates    Renetta Bryan MS CCC-SLP 06/16/21 -                 EDUCATION  The patient has been educated in the following areas:   Dysphagia (Swallowing Impairment).        SLP GOALS     Row Name 04/14/22 1100 04/12/22 0900          Oral Nutrition/Hydration Goal 1 (SLP)    Oral Nutrition/Hydration Goal 1, SLP -- Pt will utilize swallowing strategies to safely swallow least restrictive diet without overt s/s of penetration/aspiration or negative respiratory effects.  -ML            Pharyngeal Strengthening Exercise Goal 1 (SLP)    Activity (Pharyngeal Strengthening Goal 1, SLP) increase closure at entrance to airway/closure of airway at glottis;increase epiglottic inversion and retroflexion  -ML --     Time Frame (Pharyngeal Strengthening Goal 1, SLP) short term goal (STG)  -ML --           User Key  (r) = Recorded By, (t) = Taken By, (c) = Cosigned By    Initials Name Provider Type    Renetta Bryan MS CCC-SLP Speech and Language Pathologist              SLP Outcome Measures (last 72 hours)     SLP Outcome Measures     Row Name 04/14/22 1400 04/12/22 1200          SLP Outcome Measures    Outcome Measure Used? Adult NOMS  -ML Adult NOMS  -ML            Adult FCM Scores    FCM Chosen Swallowing  -ML Swallowing  -ML     Swallowing FCM Score 1  -ML 6  -ML           User Key  (r) = Recorded By, (t) = Taken By, (c) = Cosigned By    Initials Name Effective Dates    Renetta Bryan MS CCC-SLP 06/16/21 -                  Time Calculation:    Time Calculation- SLP     Row Name 04/14/22 1436             Time  Calculation- SLP    SLP Start Time 1100  -ML      SLP Received On 04/14/22  -ML              Untimed Charges    SLP Eval/Re-eval  ST Eval Oral Pharyng Swallow - 11452  -ML      52647-XA Motion Fluoro Eval Swallow Minutes 120  -ML              Total Minutes    Untimed Charges Total Minutes 120  -ML       Total Minutes 120  -ML            User Key  (r) = Recorded By, (t) = Taken By, (c) = Cosigned By    Initials Name Provider Type     Renetta Bray MS CCC-SLP Speech and Language Pathologist                Therapy Charges for Today     Code Description Service Date Service Provider Modifiers Qty    [id number] HC ST FIBEROPTIC ENDO EVAL SWALL 8 4/14/2022 Renetta Bray MS CCC-SLP GN 1               Renetta Bray MS CCC-VILMA  4/14/2022

## 2022-04-14 NOTE — PLAN OF CARE
Goal Outcome Evaluation:  Plan of Care Reviewed With: patient        Progress: improving  Outcome Evaluation: Pt w muh improved mobilization, safely ambulated household distances 200ft SBA upright rollator. Pt hopeful to DC home soon, pt reports he is current w OP PT= recommending resuming at DC.    .Patient was wearing a face mask during this therapy encounter. Therapist used appropriate personal protective equipment including eye protection, mask, and gloves.  Mask used was standard procedure mask. Appropriate PPE was worn during the entire therapy session. Hand hygiene was completed before and after therapy session. Patient is not in enhanced droplet precautions.

## 2022-04-14 NOTE — PROGRESS NOTES
Discharge Planning Assessment  [facility]     Patient Name: Eugenio Joe  MRN: [id number]  Today's Date: 4/14/2022    Admit Date: 4/11/2022     Discharge Needs Assessment    No documentation.                Discharge Plan     Row Name 04/14/22 1647       Plan    Plan Home    Final Discharge Disposition Code 01 - home or self-care    Final Note Home              Continued Care and Services - Discharged on 4/14/2022 Admission date: 4/11/2022 - Discharge disposition: Home-Health Care Svc   Coordination has not been started for this encounter.       Expected Discharge Date and Time     Expected Discharge Date Expected Discharge Time    Apr 14, 2022          Demographic Summary    No documentation.                Functional Status    No documentation.                Psychosocial    No documentation.                Abuse/Neglect    No documentation.                Legal    No documentation.                Substance Abuse    No documentation.                Patient Forms    No documentation.                   Maricel Canales RN

## 2022-04-14 NOTE — PROGRESS NOTES
Exercise Oximetry    Patient Name:Eugenio Joe   MRN: [id number]   Date: 04/14/22             ROOM AIR BASELINE   SpO2% 93   Heart Rate 75   Blood Pressure      EXERCISE ON ROOM AIR SpO2% EXERCISE ON O2 @ LPM SpO2%   1 MINUTE 94 1 MINUTE    2 MINUTES 94 2 MINUTES    3 MINUTES 95 3 MINUTES    4 MINUTES 93 4 MINUTES    5 MINUTES 92 5 MINUTES    6 MINUTES 94 6 MINUTES               Distance Walked  Around nursing station Distance Walked   Dyspnea (Hayder Scale)   Dyspnea (Hayedr Scale)   Fatigue (Hayder Scale)   Fatigue (Hayder Scale)   SpO2% Post Exercise  94 SpO2% Post Exercise   HR Post Exercise  81 HR Post Exercise   Time to Recovery 3 minutes Time to Recovery     Comments: Patient walked with home walker. Patient did not show any signs of SOB or pain during this encounter.

## 2022-04-14 NOTE — PLAN OF CARE
Problem: Adult Inpatient Plan of Care  Goal: Plan of Care Review  Flowsheets (Taken 4/14/2022 0654)  Progress: improving  Plan of Care Reviewed With: patient  Outcome Evaluation: AOx4. NSR 1st degree AV block on monitor. 1 L nasal cannula at night for extra support. Potassium replaced, recheck within normal limits. Position changed independently, encouraged to turn frequently. Able to sleep most of the night, will continue to monitor.   Goal Outcome Evaluation:  Plan of Care Reviewed With: patient        Progress: improving  Outcome Evaluation: AOx4. NSR 1st degree AV block on monitor. 1 L nasal cannula at night for extra support. Potassium replaced, recheck within normal limits. Position changed independently, encouraged to turn frequently. Able to sleep most of the night, will continue to monitor.

## 2022-04-14 NOTE — PLAN OF CARE
Goal Outcome Evaluation:  Plan of Care Reviewed With: patient, spouse   Pt aaox4, room air,in NSR, denies SOA with activity, has had walking oximetry completed did well no need for O2. Uses walker in room. Spouse at bedside, assist pt's needs.

## 2022-04-14 NOTE — DISCHARGE SUMMARY
[facility]  [facility]      Name:  Eugenio Joe   Age:  81 y.o.  Sex:  male  :  1940  MRN:  [id number]   Visit Number:  [id number]  Primary Care Physician:  Adeline Onofre MD  Date of Discharge:  2022  Admission Date:  2022      Discharge Diagnosis:     1.  Acute congestive heart failure systolic congestive heart failure.  2.  Diabetes mellitus type 2  3.  Essential hypertension  4.  Hyperlipidemia  5.  Coronary artery disease  6.  Mildly elevated troponin  7.  Dysphagia, with high risk for aspiration per speech therapy.  Possible aspiration pneumonia.  8.  Hypomagnesemia  9.  History of NPH, CVA  10.  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         Presenting Problem/History of Present Illness:    Elevated troponin [R77.8]  Hypertensive urgency [I16.0]  Acute dyspnea [R06.00]  Acute congestive heart failure, unspecified heart failure type (HCC) [I50.9]         Hospital Course:    81-year-old with history of hypertension, coronary artery disease, CABG, type 2 diabetes, chronic kidney disease stage III, chronic dysphagia came with shortness of breath.  CT of the chest showed mild vascular congestion.  He had recent CVA in 2020. Patient was placed on IV Lasix and cardiology was consulted.     Cardiology has  seen the patient they recommend stress test given his elevated troponin and his CHF.  Left heart catheterization will be done if this is positive.  Stress test is to be done today.  Echocardiogram showed ejection fraction 42%.  Mild pulmonary hypertension is noted as well.  Coreg has been added to his regimen as well as Lasix.  He is to follow-up with cardiology within a couple of weeks.     Speech therapy was consulted due to patient choking on food frequently.  Patient and wife agreed to VFSS, and they agreed to adjustment of diet if this is warranted.  Speech therapy states that he is aspirating and is high risk for aspiration at this point.  Speech therapy thought he could possibly have pneumonia, however patient does not have elevated WBC count, procalcitonin was negative, and he has been afebrile.  He also has improved with diuresis, and he is not requiring oxygen.  However will empirically place him on a week of Augmentin and consult outpatient speech therapy.    Patient was offered alternative form of feeding by speech therapy including n.p.o. and possible PEG tube placement.  He will consider it, however does not want this at this time.  This could be revisited with his PCP.     Patient feels much better at this point.  Denies any chest pressure, nausea, vomiting, pain.  He is ambulating around the room.    Spoke to his wife, answered all questions.  Walking pulse oximetry showed his lowest value to be 93%.    He will be discharged home today.  Follow-up with cardiology Dr. Ford within a couple of weeks.  Follow-up with PCP next week.  Follow cardiac diet with 1500 cc fluid restriction.  Speech therapy to adjust diet at home, encourage patient to follow-up.  Patient is to return to the hospital if he has worsening shortness of breath.  He is otherwise stable for discharge today.    Procedures Performed:           Consults:     Consults     Date and Time Order Name Status Description    4/11/2022  9:39 PM  Inpatient Cardiology Consult Completed     4/11/2022  7:59 PM [facility] (on-call MD unless specified)      4/11/2022  7:59 PM [facility] (on-call MD unless specified) Details      4/11/2022  7:48 PM [facility] (on-call MD unless specified) Details            Pertinent Test Results:         Results from last 7 days   Lab Units 04/14/22  0814 04/13/22  2334 04/13/22  0639 04/12/22  1136 04/11/22  1840 04/11/22  1700   WBC 10*3/mm3 7.27  --  6.90 7.25 8.23  --    HEMOGLOBIN g/dL 12.2*  --  11.3* 11.2* 11.8*  --    HEMATOCRIT % 37.3*  --  33.7* 33.2* 33.8*  --    PLATELETS 10*3/mm3 205  --  191 181 184  --    MCV fL 96.9  --  94.1 94.6 92.1  --    SODIUM mmol/L 141  --  143 141  --  143   POTASSIUM mmol/L 4.3 4.3 3.5 3.7  --  4.1   CHLORIDE mmol/L 104  --  106 105  --  108*   CO2 mmol/L 24.5  --  26.2 21.3*  --  20.1*   BUN mg/dL 23  --  18 17  --  19   CREATININE mg/dL 1.15  --  1.00 1.25  --  1.23   GLUCOSE mg/dL 195*  --  176* 185*  --  186*   CALCIUM mg/dL 9.4  --  9.2 9.3  --  9.5        Results from last 7 days   Lab Units 04/12/22  1136 04/11/22 2259 04/11/22  1700   TROPONIN T ng/mL 0.040* 0.042* 0.033*   PROBNP pg/mL  --   --  1,564.0     Results from last 7 days   Lab Units 04/14/22  0814 04/13/22  0639 04/12/22  1136 04/11/22 2259 04/11/22  1840   LACTATE mmol/L  --   --   --  1.5 2.2*   WBC 10*3/mm3 7.27 6.90 7.25  --  8.23     Results from last 7 days   Lab Units 04/11/22  1700   BILIRUBIN mg/dL 0.8   ALK PHOS U/L 71   ALT (SGPT) U/L 12   AST (SGOT) U/L 13   PROTIME Seconds 12.9   INR  0.98           Invalid input(s): TG, LDLCALC, LDLREALC      Brief Urine Lab Results  (Last result in the past 365 days)      Color   Clarity   Blood   Leuk Est   Nitrite   Protein   CREAT   Urine HCG        04/13/22 0521             143.7         04/13/22 0521 Yellow   Clear   Negative   Negative   Negative   Trace                   Results for orders placed during the hospital encounter of 09/05/20    Duplex Carotid Ultrasound  "CAR    Interpretation Summary  · Right internal carotid artery mild stenosis.  · Left internal carotid artery is normal.      Results for orders placed during the hospital encounter of 04/11/22    Adult Transthoracic Echo Complete W/ Cont if Necessary Per Protocol    Interpretation Summary  · Left ventricular wall thickness is consistent with mild to moderate concentric hypertrophy.  · Calculated left ventricular EF = 53.2% Estimated left ventricular EF was in agreement with the calculated left ventricular EF. Left ventricular systolic function is normal.  · Left ventricular diastolic function is consistent with (grade II w/high LAP) pseudonormalization.  · Estimated right ventricular systolic pressure from tricuspid regurgitation is mildly elevated (35-45 mmHg).  · Mild pulmonary hypertension is present.  · The aortic valve is abnormal in structure. A bicuspid aortic valve. There is calcification of the aortic valve. Trace aortic valve regurgitation is present. No aortic valve stenosis is present. There is fusion between the noncoronary cusp and left coronary cusp        Condition on Discharge:      Stable    Vital Signs:    /73   Pulse 70   Temp 98.1 °F (36.7 °C) (Oral)   Resp 20   Ht 177.8 cm (70\")   Wt 86.4 kg (190 lb 6.4 oz)   SpO2 92%   BMI 27.32 kg/m²     Physical Exam:    General: Alert and oriented x4, mild distress.  Heart: Regular rate and rhythm without murmur rub or thrill.  Lungs: Clear to auscultation bilaterally without use of accessory muscles or respiration.  Abdomen: Soft/nontender/nondistended.  No HSM noted.  MSK: 4/5 strength in upper/lower extremities bilaterally.                    Discharge Disposition:    Home-Health Care [facility]    Discharge Medication:       Discharge Medications      New Medications      Instructions Start Date   amoxicillin-clavulanate 875-125 MG per tablet  Commonly known as: Augmentin   1 tablet, Oral, 2 Times Daily      carvedilol 6.25 MG tablet  Commonly " known as: COREG   6.25 mg, Oral, 2 Times Daily With Meals      furosemide 40 MG tablet  Commonly known as: LASIX   40 mg, Oral, Daily   Start Date: April 15, 2022        Continue These Medications      Instructions Start Date   Accu-Chek FastClix Lancets misc   TEST ONCE TO BID PRN      acetaminophen 500 MG tablet  Commonly known as: TYLENOL   1,000 mg, Oral, Every 8 Hours PRN      allopurinol 300 MG tablet  Commonly known as: ZYLOPRIM   300 mg, Oral, Daily      aspirin 81 MG chewable tablet   81 mg, Oral, Daily, WILL VERIFY STOP DATE WITH MD      atorvastatin 40 MG tablet  Commonly known as: LIPITOR   40 mg, Oral, Nightly      clopidogrel 75 MG tablet  Commonly known as: PLAVIX   75 mg, Oral, Daily      doxazosin 1 MG tablet  Commonly known as: CARDURA   1 mg, Oral, Every Morning      glipizide 5 MG tablet  Commonly known as: GLUCOTROL   5 mg, Oral, 2 Times Daily Before Meals      lisinopril 20 MG tablet  Commonly known as: PRINIVIL,ZESTRIL   TAKE ONE TABLET BY MOUTH TWICE A DAY      metFORMIN 500 MG tablet  Commonly known as: GLUCOPHAGE   500 mg, Oral, Daily With Breakfast      metFORMIN 850 MG tablet  Commonly known as: GLUCOPHAGE   850 mg, Oral, 2 Times Daily With Meals      multivitamin with minerals tablet tablet   1 tablet, Oral, Daily      ondansetron 4 MG tablet  Commonly known as: ZOFRAN   4 mg, Oral, Every 6 Hours PRN      pantoprazole 40 MG EC tablet  Commonly known as: PROTONIX   40 mg, Oral, Every Night at Bedtime      ProAir  (90 Base) MCG/ACT inhaler  Generic drug: albuterol sulfate HFA   2 puffs, Inhalation, Every 4 Hours PRN      Vitamin D3 50 MCG (2000 UT) tablet   50 mcg, Oral, Every Morning         Stop These Medications    amLODIPine 5 MG tablet  Commonly known as: NORVASC     betamethasone dipropionate 0.05 % lotion  Commonly known as: DIPROLENE     enoxaparin 40 MG/0.4ML solution syringe  Commonly known as: LOVENOX     glucose blood test strip            Discharge Diet:     Diet  Instructions     Diet: Cardiac      Discharge Diet: Cardiac    Fluid Restriction per day: 1500 mL Fluid          Activity at Discharge:     Activity Instructions     Activity as Tolerated            Follow-up Appointments:    Future Appointments   Date Time Provider Department Center   7/28/2022  3:00 PM Shantell Curtis PA-C [facility]   11/29/2022  9:00 AM Nae Guillen APRN [facility]     Additional Instructions for the Follow-ups that You Need to Schedule     Ambulatory Referral to Speech Therapy   As directed      Discharge Follow-up with PCP   As directed       Currently Documented PCP:    Adeline Onofre MD    PCP Phone Number:    [phone]     Follow Up Details: 1 week         Discharge Follow-up with Specified Provider: Dr Ford; 2 Weeks   As directed      To: Dr Ford    Follow Up: 2 Weeks               Test Results Pending at Discharge:    Discharge took 35 minutes including review of chart, charting, medication reconciliation, discussion with patient, discussion with wife, examination of patient, discussion with case management and nursing, arrangement of follow-up, with greater than 50% of time spent in coordination of care.       Christiano Cantor DO  04/14/22  15:31 EDT

## 2022-04-14 NOTE — PLAN OF CARE
Goal Outcome Evaluation:  Plan of Care Reviewed With: patient           Outcome Evaluation: VFSS completed. Pt presents with severe pharyngeal dysphagia characterized by delayed swallow initiation, abesent epiglottic inversion and impaired airway closure at the entrance to laryngeal vestibule. Impaired epiglottic inversion appears to be negatively impacted by anterior protrusion of posterior pharyngeal wall from cervical spine. Pharyngeal deficits causing penetration and/or aspiration of almost all consistencies. Deep penetration to the cords occurs during the swallow during trials of thin liquids via cup and nectar thick liquids via cup and then frankly aspirated after the swallow during consecutive swallows or trials due to inability to clear with cue. Aspiration was initially silent. Pt exhibited penetation to mid laryngeal vestibule or slightly lower during the swallow during trials of honey thick liquids by cup, puree, mechanical soft/mixed consistecy, and regular solids. Pt unable to clear material from each swallow despite cued cough and pt eventually aspirated each conistency. Pt exhibited no penetration or aspiration during single trial of mechanical soft. Pt intermittently exhibited spontanous cough during later part of study but not effective to clear. Swallow strategies of small sips, chin tuck, and effortful swallow were not effective in eliminating penetration/aspiration. Pt exhibited mild-moderate vallecular residue after initial swallow with majority of trials impacted by absent epiglottic inversion and possible mild impaired pharyngeal contraction, but cleared with either spontaneous or cued second swallow. Pt reports no hx of pneumonia since CVA. Discussed with MD, Dr. Cantor, who reports current SOB symptoms suspect to be related to CHF and pt has no other findings consistent with pnuemonia. In discussion with pt and pt's wife afterwards during VFSS follow up. If goals of care are to reduce risk  of aspiration, would recommend NPO with non-oral meds. Met with pt and pt's wife in afternoon after VFSS completed to provided education regarding results, recommendations, and determine plan of care. Pt's wife reports that she thinks his SOB is related to choking event prior to hospitalization. Discussed options of continueing to eat vs NPO with PEG placement. Pt reports he does not want to aspirate, but cannot make decision for PEG placement at this time. Pt to descharge today per pt and pt's wife. Pt wishes to continue to eat/drink at this time and to complete outpatient SLP therapy to attempt to improve swallowing function. Recommend to utilize strategies such as small bites and small sips with fully upright posture.

## 2022-04-14 NOTE — PLAN OF CARE
Problem: Adult Inpatient Plan of Care  Goal: Plan of Care Review  4/14/2022 1438 by Frank Cabral RN  Outcome: Met  Flowsheets (Taken 4/14/2022 1438)  Plan of Care Reviewed With: patient  4/14/2022 1435 by Frank Cabral RN  Outcome: Ongoing, Progressing  Flowsheets (Taken 4/14/2022 1435)  Plan of Care Reviewed With:   patient   spouse  Goal: Patient-Specific Goal (Individualized)  4/14/2022 1438 by Frank Cabral RN  Outcome: Met  4/14/2022 1435 by Frank Cabral RN  Outcome: Ongoing, Progressing  Goal: Absence of Hospital-Acquired Illness or Injury  4/14/2022 1438 by Frank Cabral RN  Outcome: Met  4/14/2022 1435 by Frank Cabral RN  Outcome: Ongoing, Progressing  Intervention: Identify and Manage Fall Risk  Recent Flowsheet Documentation  Taken 4/14/2022 1157 by Frank Cabral RN  Safety Promotion/Fall Prevention:   activity supervised   safety round/check completed  Taken 4/14/2022 1000 by Frank Cabral RN  Safety Promotion/Fall Prevention: patient off unit  Taken 4/14/2022 0940 by Frank Cabral RN  Safety Promotion/Fall Prevention:   safety round/check completed   activity supervised  Intervention: Prevent Skin Injury  Recent Flowsheet Documentation  Taken 4/14/2022 1157 by Frank Cabral RN  Body Position: position changed independently  Taken 4/14/2022 0940 by Frank Cabral RN  Body Position: position changed independently  Intervention: Prevent and Manage VTE (Venous Thromboembolism) Risk  Recent Flowsheet Documentation  Taken 4/14/2022 1157 by Frank Cabral RN  Activity Management: activity adjusted per tolerance  Taken 4/14/2022 0940 by Frank Cabral RN  Activity Management: activity adjusted per tolerance  VTE Prevention/Management:   bilateral   dorsiflexion/plantar flexion performed  Range of Motion: ROM (range of motion) performed  Intervention: Prevent Infection  Recent Flowsheet Documentation  Taken 4/14/2022 1157 by Misha  ENEDELIA Marie  Infection Prevention: hand hygiene promoted  Taken 4/14/2022 0940 by Frank Cabral RN  Infection Prevention: hand hygiene promoted  Goal: Optimal Comfort and Wellbeing  4/14/2022 1438 by Frank Cabral RN  Outcome: Met  4/14/2022 1435 by Frank Cabral RN  Outcome: Ongoing, Progressing  Intervention: Provide Person-Centered Care  Recent Flowsheet Documentation  Taken 4/14/2022 0940 by Frank Cabral RN  Trust Relationship/Rapport: care explained  Goal: Readiness for Transition of Care  4/14/2022 1438 by Frank Cabral RN  Outcome: Met  4/14/2022 1435 by Frank Cabral RN  Outcome: Ongoing, Progressing     Problem: Asthma Comorbidity  Goal: Maintenance of Asthma Control  4/14/2022 1438 by Frank Cabral RN  Outcome: Met  4/14/2022 1435 by Frank Cabral RN  Outcome: Ongoing, Progressing     Problem: Diabetes Comorbidity  Goal: Blood Glucose Level Within Targeted Range  4/14/2022 1438 by Frank Cabral RN  Outcome: Met  4/14/2022 1435 by Frank Cabral RN  Outcome: Ongoing, Progressing     Problem: Heart Failure Comorbidity  Goal: Maintenance of Heart Failure Symptom Control  4/14/2022 1438 by Frank Cabral RN  Outcome: Met  4/14/2022 1435 by Frank Cabral RN  Outcome: Ongoing, Progressing     Problem: Hypertension Comorbidity  Goal: Blood Pressure in Desired Range  4/14/2022 1438 by Frank Cabral RN  Outcome: Met  4/14/2022 1435 by Frank Cabral RN  Outcome: Ongoing, Progressing     Problem: Skin Injury Risk Increased  Goal: Skin Health and Integrity  4/14/2022 1438 by Frank Cabral RN  Outcome: Met  4/14/2022 1435 by Frank Cabral RN  Outcome: Ongoing, Progressing  Intervention: Optimize Skin Protection  Recent Flowsheet Documentation  Taken 4/14/2022 1157 by Frank Cabral RN  Head of Bed (HOB) Positioning: HOB elevated  Taken 4/14/2022 0940 by Frank Cabral RN  Pressure Reduction Techniques: frequent  weight shift encouraged  Head of Bed (HOB) Positioning: HOB elevated  Pressure Reduction Devices: alternating pressure pump (ADD)     Problem: Fall Injury Risk  Goal: Absence of Fall and Fall-Related Injury  4/14/2022 1438 by Frank Cabral RN  Outcome: Met  4/14/2022 1435 by Frank Cabral RN  Outcome: Ongoing, Progressing  Intervention: Promote Injury-Free Environment  Recent Flowsheet Documentation  Taken 4/14/2022 1157 by Frank Cabral RN  Safety Promotion/Fall Prevention:   activity supervised   safety round/check completed  Taken 4/14/2022 1000 by Frank Cabral RN  Safety Promotion/Fall Prevention: patient off unit  Taken 4/14/2022 0940 by Frank Cabral RN  Safety Promotion/Fall Prevention:   safety round/check completed   activity supervised   Goal Outcome Evaluation:  Plan of Care Reviewed With: patient

## 2022-04-15 NOTE — OUTREACH NOTE
Prep Survey    Flowsheet Row Responses   [facility] patient discharged from? [place]   Is LACE score < 7 ? No   Emergency Room discharge w/ pulse ox? No   Eligibility Readm Mgmt   Discharge diagnosis acute CHF, Acute dyspnea    Does the patient have one of the following disease processes/diagnoses(primary or secondary)? CHF   Does the patient have Home health ordered? No   Is there a DME ordered? No   Prep survey completed? Yes          PILO GRIFFIN - Registered Nurse

## 2022-04-19 NOTE — OUTREACH NOTE
CHF Week 1 Survey    Flowsheet Row Responses   [facility] patient discharged from? [place]   Does the patient have one of the following disease processes/diagnoses(primary or secondary)? CHF   CHF Week 1 attempt successful? Yes   Call start time 1035   Call end time 1043   Discharge diagnosis acute CHF, Acute dyspnea    Is patient permission given to speak with other caregiver? No   Person spoke with today (if not patient) and relationship patient   Meds reviewed with patient/caregiver? Yes   Does the patient have all medications ordered at discharge? Yes   Is the patient taking all medications as directed (includes completed medication regime)? Yes   Comments regarding appointments CARDIOLOGY Hospital Follow Up with Nae Guillen, APRN Friday Apr 22, 2022 10:30 AM   Does the patient have a primary care provider?  Yes   Does the patient have an appointment with their PCP within 7 days of discharge? Yes   Comments regarding PCP Adeline Onofre MD PCP.    Has the patient kept scheduled appointments due by today? Yes   Has home health visited the patient within 72 hours of discharge? N/A   Pulse Ox monitoring None   Psychosocial issues? No   Did the patient receive a copy of their discharge instructions? Yes   Nursing interventions Reviewed instructions with patient   What is the patient's perception of their health status since discharge? Improving   Nursing interventions Nurse provided patient education   Is the patient weighing daily? Yes   Does the patient have scales? Yes   Daily weight interventions Education provided on importance of daily weight   Is the patient able to teach back Heart Failure diet management? Yes  [1500ml/day fluid restriction]   Is the patient able to teach back signs and symptoms of worsening condition? (i.e. weight gain, shortness of air, etc.) Yes   If the patient is a current smoker, are they able to teach back resources for cessation? Not a smoker   Is the patient/caregiver  able to teach back the hierarchy of who to call/visit for symptoms/problems? PCP, Specialist, Home health nurse, Urgent Care, ED, 911 Yes    CHF Week 1 call completed? Yes          EYAD ZENDEJAS - Registered Nurse

## 2022-04-22 PROBLEM — I50.33 ACUTE ON CHRONIC DIASTOLIC HEART FAILURE (HCC): Status: ACTIVE | Noted: 2022-01-01

## 2022-04-22 NOTE — TELEPHONE ENCOUNTER
I spoke with him regarding the BMP results.  I advised him to reduce the Lasix to 20 mg.  He will have a repeat BMP in 1 week.

## 2022-04-22 NOTE — PROGRESS NOTES
Date of Office Visit: 2022  Encounter Provider: JULISA Brandt  Place of Service: [facility] CARDIOLOGY  Patient Name: Eugenio Joe  :1940    Chief Complaint   Patient presents with   • Coronary Artery Disease   :     HPI: Eugenio Joe is a 81 y.o. male.  He is a patient of Dr. Ford's whom we follow for the management of hypertension, hyperlipidemia, PFO, and coronary artery disease.  In , he underwent a CABG x 3.  He also has a history of pulmonary embolus, normal pressure hydrocephalus, and TIAs.  His TIAs have been attributed to small vessel disease.   He was last seen in the office by Dr. Ford in November which time he was struggling with high blood pressures for which Dr. Ford increased the amlodipine.  He was advised to follow-up in 1 year.   On , he presented with shortness of breath and hypertensive urgency.  He was also noted to have frequent PVCs and a mildly elevated troponin.  He was started on IV diuresis along with carvedilol.  Echocardiogram demonstrated normal LV systolic function, grade 2 diastolic dysfunction, and mild pulmonary hypertension.  Stress test was negative for ischemia.  The elevated troponin was attributed to hypertensive urgency and CKD.  On , he was stable for discharge.  He is here today for follow-up.   He feels much better.  His shortness of breath is back to baseline.  He denies any chest pain, edema, dizziness, syncope, bleeding difficulties or melena.  He and his wife are working on sodium discretion.    Past Medical History:   Diagnosis Date   • Arthritis    • Asthma    • Brain abscess     at about age 44   • Bruise of face    • Bursitis of left hip    • Cancer (HCC)     PT STATES IN HIS SWEAT GLAND    • Cardiac disease    • Coronary artery disease     CABG    • Diabetes mellitus (HCC)    • Difficulty in swallowing     LIQUIDS   • Difficulty walking    • Diverticulitis    • DM2 (diabetes mellitus,  type 2) ([facility]) 10/12/2016   • Fracture, foot 2015    Dr Pisano   • Fracture, stress, metatarsal    • Fracture, tibia and fibula Feb 2021    Dr Pisano   • Gout several years ago    medication  working   • Hearing aid worn     bilateral   • Hx of appendectomy    • Hyperlipemia    • Hypertension    • Joint pain     or swelling   • Low back pain several years ago   • Metatarsal stress fracture with nonunion    • Normal pressure hydrocephalus ([facility])    • Orbit fracture ([facility])    • Pulmonary embolism ([facility])     OCT 2016 - AFTER FOOT SURGERY   • Rash     ARM-    • Spinal headache     AFTER SPINAL TAP - NO BLOOD PATCH   • 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   • Syndesmotic ankle sprain, left, subsequent encounter    • Tear of meniscus of knee torn ligaments   • TIA (transient ischemic attack)     MULTIPLE       Past Surgical History:   Procedure Laterality Date   • ANKLE OPEN REDUCTION INTERNAL FIXATION Left 2/16/2021    Procedure: Left ankle open reduction internal fixation with implants as needed;  Surgeon: Man Jenkins MD;  Location: [facility] OR [facility];  Service: Orthopedics;  Laterality: Left;   • APPENDECTOMY N/A 7/18/2019    Procedure: APPENDECTOMY LAPAROSCOPIC;  Surgeon: Luis Carlos Rick Jr., MD;  Location: [facility] MAIN OR;  Service: General   • BRAIN SURGERY      BRAIN ABCESS 30 YR AGO   • CARDIAC CATHETERIZATION N/A 12/4/2020    Procedure: Patent foramen ovale closure ABBOTT;  Surgeon: Frank Pablo MD;  Location: [facility] CATH INVASIVE LOCATION;  Service: Cardiology;  Laterality: N/A;   • CARDIAC CATHETERIZATION N/A 12/4/2020    Procedure: Intracardiac echocardiogram;  Surgeon: Frank Pablo MD;  Location: [facility] CATH INVASIVE LOCATION;  Service: Cardiology;  Laterality: N/A;   • CARDIAC SURGERY     • COLONOSCOPY  2012    Ciciliano- normal per pt, no polyps    • CORONARY ARTERY BYPASS GRAFT      3 VESSEL   • ENDOSCOPY N/A 3/9/2017    Schatzki ring, dilated, LA Grade B  esophagitis, HH, acquired duodenal stenosis   • ENDOSCOPY N/A 2018    candida, HH, torts, Schatzki ring, duodenal stenosis, dilatation perforemed, chronic inflammation   • ENDOSCOPY N/A 10/9/2019    White nummular lesions in esophageal mucosa, mild Schatzki ring, acquired duodenal stenosis, Path: Terrazas's, candida   • ENDOSCOPY N/A 2020    Procedure: ESOPHAGOGASTRODUODENOSCOPY with biopsies;  Surgeon: Rigoberto Walker MD;  Location: [facility] ENDOSCOPY;  Service: Gastroenterology   • FACIAL FRACTURE SURGERY      to repair 6 broken bones   • HAND SURGERY     • ORBITAL FRACTURE OPEN REDUCTION INTERNAL FIXATION Right 2017    Procedure: RT ORBIT FLOOR FRACTURE REPAIR RIGHT ZMC FRACTURE REPAIR, RIGHT NASAL BONE FRACTURE CLOSED REDUCTION;  Surgeon: Edil Lester MD;  Location: [facility] OR [facility];  Service:    • ORIF FOOT FRACTURE Right 2016    Procedure: RIGHT SECOND METATARSAL OPEN REDUCTION INTERNAL FIXATION WITh GRAFT FROM HEEL ;  Surgeon: Man Jenkins MD;  Location: [facility] OR [facility];  Service:    • PATENT FORAMEN OVALE CLOSURE     • SEPTOPLASTY     • SKIN CANCER EXCISION     • TONSILLECTOMY     • TRANSESOPHAGEAL ECHOCARDIOGRAM (STELLA)         Social History     Socioeconomic History   • Marital status:    • Number of children: 2   • Years of education: 16   Tobacco Use   • Smoking status: Former Smoker     Packs/day: 3.00     Years: 5.00     Pack years: 15.00     Types: Cigarettes     Start date:      Quit date:      Years since quittin.3   • Smokeless tobacco: Never Used   • Tobacco comment: Quit herson turkey   Substance and Sexual Activity   • Alcohol use: Yes     Alcohol/week: 2.0 standard drinks     Types: 1 Cans of beer, 1 Shots of liquor per week     Comment: RARELY    • Drug use: No   • Sexual activity: Not Currently     Partners: Female     Birth control/protection: Surgical       Family History   Problem Relation Age of Onset   • Heart disease Mother    •  Hypertension Mother    • Stroke Mother    • Diverticulitis Mother    • Heart disease Father    • Hypertension Father    • Malig Hyperthermia Neg Hx        Review of Systems   Constitutional: Negative.   Cardiovascular: Positive for dyspnea on exertion. Negative for chest pain, leg swelling, orthopnea, paroxysmal nocturnal dyspnea and syncope.   Respiratory: Negative.    Hematologic/Lymphatic: Negative for bleeding problem.   Musculoskeletal: Negative for falls.   Gastrointestinal: Negative for melena.   Neurological: Negative for dizziness and light-headedness.       Allergies   Allergen Reactions   • Other Mental Status Change and Hallucinations     Oxy drugs   • Oxycodone Mental Status Change         Current Outpatient Medications:   •  Accu-Chek FastClix Lancets misc, TEST ONCE TO BID PRN, Disp: , Rfl:   •  acetaminophen (TYLENOL) 500 MG tablet, Take 2 tablets by mouth Every 8 (Eight) Hours As Needed for Mild Pain ., Disp: 30 tablet, Rfl: 0  •  allopurinol (ZYLOPRIM) 300 MG tablet, Take 300 mg by mouth daily., Disp: , Rfl:   •  aspirin 81 MG chewable tablet, Chew 81 mg Daily. WILL VERIFY STOP DATE WITH MD, Disp: , Rfl:   •  atorvastatin (LIPITOR) 40 MG tablet, Take 1 tablet by mouth Every Night., Disp: , Rfl:   •  carvedilol (COREG) 6.25 MG tablet, Take 1 tablet by mouth 2 (Two) Times a Day With Meals., Disp: 60 tablet, Rfl: 0  •  Cholecalciferol (Vitamin D3) 50 MCG (2000 UT) tablet, Take 50 mcg by mouth Every Morning., Disp: , Rfl:   •  clopidogrel (PLAVIX) 75 MG tablet, Take 1 tablet by mouth Daily., Disp: 30 tablet, Rfl:   •  doxazosin (CARDURA) 1 MG tablet, Take 1 mg by mouth Every Morning., Disp: , Rfl:   •  furosemide (LASIX) 40 MG tablet, Take 1 tablet by mouth Daily., Disp: 30 tablet, Rfl: 0  •  glipizide (GLUCOTROL) 5 MG tablet, Take 5 mg by mouth 2 (Two) Times a Day Before Meals., Disp: , Rfl:   •  lisinopril (PRINIVIL,ZESTRIL) 20 MG tablet, TAKE ONE TABLET BY MOUTH TWICE A DAY, Disp: 180 tablet, Rfl:  "3  •  metFORMIN (GLUCOPHAGE) 500 MG tablet, Take 500 mg by mouth Daily With Breakfast., Disp: , Rfl:   •  metFORMIN (GLUCOPHAGE) 850 MG tablet, Take 850 mg by mouth 2 (Two) Times a Day With Meals., Disp: , Rfl:   •  Multiple Vitamins-Minerals (MULTIVITAMIN ADULT PO), Take 1 tablet by mouth Daily., Disp: , Rfl:   •  ondansetron (ZOFRAN) 4 MG tablet, Take 1 tablet by mouth Every 6 (Six) Hours As Needed for Nausea or Vomiting., Disp:  , Rfl:   •  pantoprazole (PROTONIX) 40 MG EC tablet, Take 40 mg by mouth every night at bedtime., Disp: , Rfl:   •  PROAIR  (90 BASE) MCG/ACT inhaler, Inhale 2 puffs Every 4 (Four) Hours As Needed., Disp: , Rfl:       Objective:     Vitals:    04/22/22 1020   BP: 122/64   Pulse: 60   Weight: 89 kg (196 lb 3.2 oz)   Height: 175.3 cm (69\")     Body mass index is 28.97 kg/m².    PHYSICAL EXAM:    Neck:      Vascular: No JVD.   Pulmonary:      Effort: Pulmonary effort is normal.      Breath sounds: Normal breath sounds.   Cardiovascular:      Normal rate. Regular rhythm.      Murmurs: There is no murmur.      No gallop. No click. No rub.   Pulses:     Intact distal pulses.           ECG 12 Lead    Date/Time: 4/22/2022 10:33 AM  Performed by: Nae Guillen APRN  Authorized by: Nae Guillen APRN   Comparison: compared with previous ECG from 4/12/2022  Similar to previous ECG  Rhythm: sinus rhythm  Ectopy: atrial premature contractions  Rate: normal  BPM: 60  Conduction: 1st degree AV block  Comments: Indication: CAD              Assessment:       Diagnosis Plan   1. Coronary artery disease involving native coronary artery of native heart without angina pectoris  ECG 12 Lead   2. S/P CABG x 3     3. Acute on chronic diastolic heart failure (HCC)     4. Essential hypertension       Orders Placed This Encounter   Procedures   • ECG 12 Lead     This order was created via procedure documentation     Order Specific Question:   Release to patient     Answer:   Immediate        "   Plan:       1.  Coronary artery disease.  History of CABG x 3.  He denies any chest pain.  Recent stress test was negative for ischemia.  Continue aspirin, clopidogrel, atorvastatin, and carvedilol.      2.  Acute on chronic diastolic CHF.  He appears euvolemic today.  His shortness of breath is back to baseline.  Continue Lasix at current dose.  I will repeat a BMP.      3.  Hypertension.  His blood pressure is stable.  Continue carvedilol and lisinopril.      I think he is doing well.  I will call him with his lab results.  Otherwise, he will follow-up with Dr. Ford in 1 month.      As always, it has been a pleasure to participate in your patient's care.      Sincerely,         JULISA Le

## 2022-04-25 NOTE — THERAPY EVALUATION
Outpatient Speech Language Pathology   Adult Swallow Initial Evaluation  [facility]     Patient Name: Eugenio Joe  : 1940  MRN: [id number]  Today's Date: 2022         Visit Date: 2022   Patient Active Problem List   Diagnosis   • Quadriceps weakness   • ACL tear   • Knee pain, right   • S/P CABG x 3   • Essential hypertension   • Hyperlipemia   • Hallux rigidus   • Fracture, stress, metatarsal   • Osteopenia determined by x-ray   • Metatarsal stress fracture with nonunion   • Left hip pain   • Bursitis of left hip   • Pulmonary embolism ([facility])   • DALILA (acute kidney injury) ([facility])   • Type 2 diabetes mellitus with hyperglycemia, without long-term current use of insulin ([facility])   • Ascending aorta dilatation ([facility])   • Pulmonary nodule, left   • Right leg DVT ([facility])   • Acute renal failure ([facility])   • Hypotension   • Dehydration   • Hypercalcemia   • Dysphagia   • Syncope and collapse   • Coronary artery disease involving native coronary artery of native heart without angina pectoris   • Normal pressure hydrocephalus ([facility])   • Headache   • Impaired mobility   • Nausea & vomiting   • Fall at home   • Transient cerebral ischemia   • PFO (patent foramen ovale)   • Esophageal dysphagia   • TIA (transient ischemic attack)   • Acute appendicitis with localized peritonitis, without perforation or abscess   • Right lower quadrant abdominal pain   • History of CVA (cerebrovascular accident)   • On statin therapy   • 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   • Diverticulitis   • Hx of appendectomy   • Acute abdominal pain   • Gait abnormality   • Weakness   • CVA (cerebral vascular accident) ([facility])   • Acute CVA (cerebrovascular accident) ([facility])   • Cerebrovascular accident (CVA) due to embolism of cerebral artery ([facility])   • Closed displaced fracture of lateral malleolus of left fibula   • Pain   • Syndesmotic ankle sprain, left, subsequent encounter   • Acute dyspnea   • Stage 3a chronic kidney disease ([facility])   •  Acute systolic heart failure (CMS/HCC)   • Acute on chronic diastolic heart failure ([facility])        Past Medical History:   Diagnosis Date   • Arthritis    • Asthma    • Brain abscess     at about age 44   • Bruise of face    • Bursitis of left hip    • Cancer ([facility])     PT STATES IN HIS SWEAT GLAND    • Cardiac disease    • Coronary artery disease     CABG 2012   • Diabetes mellitus ([facility])    • Difficulty in swallowing     LIQUIDS   • Difficulty walking    • Diverticulitis    • DM2 (diabetes mellitus, type 2) ([facility]) 10/12/2016   • Fracture, foot 2015    Dr Pisano   • Fracture, stress, metatarsal    • Fracture, tibia and fibula Feb 2021    Dr Pisano   • Gout several years ago    medication  working   • Hearing aid worn     bilateral   • Hx of appendectomy    • Hyperlipemia    • Hypertension    • Joint pain     or swelling   • Low back pain several years ago   • Metatarsal stress fracture with nonunion    • Normal pressure hydrocephalus ([facility])    • Orbit fracture ([facility])    • Pulmonary embolism ([facility])     OCT 2016 - AFTER FOOT SURGERY   • Rash     ARM-    • Spinal headache     AFTER SPINAL TAP - NO BLOOD PATCH   • Stroke (cerebrum) ([facility]) 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   • Syndesmotic ankle sprain, left, subsequent encounter    • Tear of meniscus of knee torn ligaments   • TIA (transient ischemic attack)     MULTIPLE        Past Surgical History:   Procedure Laterality Date   • ANKLE OPEN REDUCTION INTERNAL FIXATION Left 2/16/2021    Procedure: Left ankle open reduction internal fixation with implants as needed;  Surgeon: Man Jenkins MD;  Location: [facility];  Service: Orthopedics;  Laterality: Left;   • APPENDECTOMY N/A 7/18/2019    Procedure: APPENDECTOMY LAPAROSCOPIC;  Surgeon: Luis Carlos Rick Jr., MD;  Location: [facility] OR;  Service: General   • BRAIN SURGERY      BRAIN ABCESS 30 YR AGO   • CARDIAC CATHETERIZATION N/A 12/4/2020    Procedure: Patent foramen ovale closure MATAMOROS;  Surgeon:  Frank Pablo MD;  Location: [facility] CATH INVASIVE LOCATION;  Service: Cardiology;  Laterality: N/A;   • CARDIAC CATHETERIZATION N/A 12/4/2020    Procedure: Intracardiac echocardiogram;  Surgeon: Frank Pablo MD;  Location: [facility] CATH INVASIVE LOCATION;  Service: Cardiology;  Laterality: N/A;   • CARDIAC SURGERY     • COLONOSCOPY  2012    Ciciliano- normal per pt, no polyps    • CORONARY ARTERY BYPASS GRAFT      3 VESSEL   • ENDOSCOPY N/A 3/9/2017    Schatzki ring, dilated, LA Grade B esophagitis, HH, acquired duodenal stenosis   • ENDOSCOPY N/A 4/6/2018    candida, HH, torts, Schatzki ring, duodenal stenosis, dilatation perforemed, chronic inflammation   • ENDOSCOPY N/A 10/9/2019    White nummular lesions in esophageal mucosa, mild Schatzki ring, acquired duodenal stenosis, Path: Terrazas's, candida   • ENDOSCOPY N/A 1/8/2020    Procedure: ESOPHAGOGASTRODUODENOSCOPY with biopsies;  Surgeon: Rigoberto Walker MD;  Location: [facility] ENDOSCOPY;  Service: Gastroenterology   • FACIAL FRACTURE SURGERY      to repair 6 broken bones   • HAND SURGERY     • ORBITAL FRACTURE OPEN REDUCTION INTERNAL FIXATION Right 1/13/2017    Procedure: RT ORBIT FLOOR FRACTURE REPAIR RIGHT ZMC FRACTURE REPAIR, RIGHT NASAL BONE FRACTURE CLOSED REDUCTION;  Surgeon: Edil Lester MD;  Location: [facility];  Service:    • ORIF FOOT FRACTURE Right 9/9/2016    Procedure: RIGHT SECOND METATARSAL OPEN REDUCTION INTERNAL FIXATION WITh GRAFT FROM HEEL ;  Surgeon: Man Jenkins MD;  Location: [facility] OR [facility];  Service:    • PATENT FORAMEN OVALE CLOSURE     • SEPTOPLASTY     • SKIN CANCER EXCISION     • TONSILLECTOMY     • TRANSESOPHAGEAL ECHOCARDIOGRAM (STELLA)           Visit Dx:     ICD-10-CM ICD-9-CM   1. Oropharyngeal dysphagia  R13.12 787.22     Patient was not wearing a face mask during this therapy encounter. Therapist used appropriate personal protective equipment including mask, eye protection and gloves.   Mask used was standard procedure mask. Appropriate PPE was worn during the entire therapy session. Hand hygiene was completed before and after therapy session. Patient is not in enhanced droplet precautions.                    OP SLP Assessment/Plan - 04/25/22 1611        SLP Assessment    Functional Problems Swallowing  -KA    Impact on Function: Swallowing Risk of aspiration;Risk of pneumonia  -KA    Clinical Impression: Swallowing Moderate-Severe:;pharyngeal phase dysphagia  -KA    Please refer to paper survey for additional self-reported information Yes  -KA    Please refer to items scanned into chart for additional diagnostic informaiton and handouts as provided by clinician Yes  -KA    Prognosis Good (comment)  -KA    Patient/caregiver participated in establishment of treatment plan and goals Yes  -KA    Patient would benefit from skilled therapy intervention Yes  -KA       SLP Plan    Frequency 1x a week  -KA    Duration 4 to 8 weeks  -KA    Planned CPT's? SLP SWALLOW THERAPY: 58031  -KA    Expected Duration of Therapy Session (SLP Eval) 45  -KA          User Key  (r) = Recorded By, (t) = Taken By, (c) = Cosigned By    Initials Name Provider Type    KA Ronald Alexander MA,[facility]-SLP Speech and Language Pathologist                 SLP Adult Swallow Evaluation     Row Name 04/25/22 1500       Rehab Evaluation    Document Type evaluation  -KA    Subjective Information no complaints  -KA    Patient Observations alert;cooperative  -KA    Patient Effort good  -KA    Symptoms Noted During/After Treatment none  -KA       General Information    Patient Profile Reviewed yes  -KA    Pertinent History Of Current Problem Patient is referred for dysphagia therapy. Patient has history of chronic dysphagia and recent hospitalization at [facility] from 4/11 to 4/14 for SOB and dx with CHF. Patient had VFSS on 4/14 due to patients complaints of dysphagia, coughing with PO and diagnosed with severe pharyngeal  dysphagia characterized by absent epiglottic deflection, impaired ariway closure. Patient demonstrated penetration during the swallow and aspiration after the swallow with thins and NTL, and penetration with puree, mechanical soft and regular solids and pt unable to clear material with cough and eventually aspirated each consistency. SLP recommended NPO with non oral meds and education was completed with patient and wife to determine goals of care. Options were discussed of continuing to eat vs NPO with PEG. Patient wished to continue to eat and drink and returns now for outpatient dysphagia therapy. Patient reported today he is trying to eat more slowly, take smaller bites and sips because he is aware of the importance, however does eat quickly and take larger bites at times. He reports he continues to intermittently cough during meals. Wife reports coughing is less. Patient is eating a regular diet with thin liquids, and avoids rice and some hard/crunchy foods. Patient has not had PNA and during his hospitalization he was not felt to have PNA, WBC was normal and was dx with CHF. Patient has history of dysphagia from CVA in 2020 and was on HTL in 2020. After several weeks of drinking HTL he returned to drinking thin liquids and has never had PNA per pt and wife.  -KA    Current Method of Nutrition regular textures;thin liquids  -KA    Precautions/Limitations, Vision WFL  -KA    Precautions/Limitations, Hearing WFL  -KA    Prior Level of Function-Swallowing regular textures;thin liquids;honey thick liquids;other (see comments)  see hx above  -KA    Plans/Goals Discussed with patient;spouse/S.O.;agreed upon  -KA    Barriers to Rehab medically complex  -KA    Patient's Goals for Discharge eat/drink without coughing/choking  -KA    Family Goals for Discharge patient able to eat/drink without coughing/choking  -KA       Pain    Additional Documentation Pain Scale: Numbers Pre/Post-Treatment (Group)  -KA       Pain  Scale: Numbers Pre/Post-Treatment    Pretreatment Pain Rating 0/10 - no pain  -KA    Posttreatment Pain Rating 0/10 - no pain  -KA       Oral Motor Structure and Function    Dentition Assessment natural, present and adequate  -KA    Secretion Management WNL/WFL  -KA    Mucosal Quality moist, healthy  -KA    Volitional Swallow delayed  -KA    Volitional Cough WFL  -KA       Oral Musculature and Cranial Nerve Assessment    Oral Motor General Assessment WFL  -KA       General Eating/Swallowing Observations    Eating/Swallowing Skills self-fed  -KA    Positioning During Eating upright in chair  -KA    Utensils Used cup  -KA    Consistencies Trialed thin liquids;honey-thick liquids  -KA       Clinical Swallow Eval    Clinical Swallow Evaluation Summary Patient demonstrated no immediate overt s/s of pen/asp with thins via cup and HTL via cup. Patient demonstrated delayed coughing several minutes later while SLP was educating pt and wife. Extensive education was completed today reviewing results of VFSS from 4/14 including showing patient and wife VFSS images, education included swallow anatomy and physiological function, risks for aspiration and PNA, importance of safe swallow precautions (small bites and sips, slow rate, double swallow, throat clears/coughs) during PO to reduce risk for aspiration. Also discussed HTL may be safer option due to depth of penetration less however pt did eventually aspirate all PO during VFSS and pt remains at risk for aspiration with all PO and with thickened liquids comes concerns for dehydration. Patient wishes to continue with regular solids and thin liquids with use of safe swallow precautions. SLP began education on swallow exercises including temitope and effortful swallow.  -KA       SLP Evaluation Clinical Impression    SLP Swallowing Diagnosis mod-severe;pharyngeal dysphagia;other (see comments)  per VFSS completed on 4/14  -KA    Functional Impact risk of aspiration/pneumonia  -KA     Rehab Potential/Prognosis, Swallowing good, to achieve stated therapy goals  -SUMA    Swallow Criteria for Skilled Therapeutic Interventions Met demonstrates skilled criteria  -SUMA       SLP Treatment Clinical Impressions    Care Plan Review evaluation/treatment results reviewed  -SUMA    Care Plan Review, Other Participant(s) spouse  -KA       Recommendations    Therapy Frequency (Swallow) 1 day per week  -SUMA    Predicted Duration Therapy Intervention (Days) until discharge  -SUMA    SLP Diet Recommendation other (see comments)  patient has chosen regular solids with thin liquids, see details above  -KA    Recommended Diagnostics other (see comments)  repeat VFSS as indicated  -    Recommended Precautions and Strategies upright posture during/after eating;small bites of food and sips of liquid;multiple swallows per bite of food;multiple swallows per sip of liquid;volitional throat clear;no straw  -SUMA    Oral Care Recommendations Oral Care BID/PRN  -SUMA    SLP Rec. for Method of Medication Administration meds whole;meds crushed;as tolerated;with pudding or applesauce  -SUMA    Monitor for Signs of Aspiration yes;notify SLP if any concerns  -SUMA    Anticipated Discharge Disposition (SLP) home with assist  -SUMA          User Key  (r) = Recorded By, (t) = Taken By, (c) = Cosigned By    Initials Name Provider Type    Ronald Gerber MA,CCC-SLP Speech and Language Pathologist                               OP SLP Education     Row Name 04/25/22 5642       Education    Barriers to Learning No barriers identified  -SUMA    Education Provided Described results of evaluation;Patient expressed understanding of evaluation;Family/caregivers expressed understanding of evaluation  -SUMA    Assessed Learning needs;Learning motivation  -SUMA    Learning Motivation Strong  -SUMA    Learning Method Explanation  -SUMA    Teaching Response Verbalized understanding  -SUMA          User Key  (r) = Recorded By, (t) = Taken By, (c) = Cosigned By     Initials Name Effective Dates    Ronald Gerber MA,CCC-SLP 06/16/21 -                SLP OP Goals     Row Name 04/25/22 1600          Goal Type Needed    Goal Type Needed Dysphagia  -KA            Dysphagia Goals    Dysphagia LTG's Patient will safely consume the recommended diet without complications such as aspiration pneumonia  -KA     Status: Patient will safely consume the recommended diet without complications such as aspiration pneumonia New  -KA     Dysphagia STG's Patient will increase laryngeal elevation to reduce residue that might fall into airway by completing;Patient will increase closure of larynx to keep food from falling into the airway by completing;Patient will increase strength of tongue base and posterior pharyngeal walls to reduce residue that might fall into airway by completing;Patient will compensate for oral/pharyngeal deficits and reduce risks while eating by utilizing  compensatory strategies  -KA     Patient will increase laryngeal elevation to reduce residue that might fall into airway by completing Mendelsohn maneuver;super-supraglottic swallow;without cues  -KA     Status: Patient will increase laryngeal elevation to reduce residue that might fall into airway by completing New  -KA     Patient will increase closure of larynx to keep food from falling into the airway by completing super-supraglottic swallow;without cues  -KA     Status: Patient will increase closure of larynx to keep food from falling into the airway by completing New  -KA     Patient will increase strength of tongue base and posterior pharyngeal walls to reduce residue that might fall into airway by completing effotful swallow;Miguelina (tongue hold);without cues  -KA     Status: Patient will increase strength of tongue base and posterior pharyngeal walls to reduce residue that might fall into airway by completing New  -KA     Patient will compensate for oral/pharyngeal deficits and reduce risks while eating by  utilizing  compensatory strategies multiple swallows;without cues;slow rate;controlled bolus size  throat clear  -KA     Status: Patient will compensate for oral/pharyngeal deficits and reduce risks while eating by utilizing  compensatory strategies New  -KA           User Key  (r) = Recorded By, (t) = Taken By, (c) = Cosigned By    Initials Name Provider Type    Ronald Gerber MA,CCC-SLP Speech and Language Pathologist              SLP Outcome Measures (last 72 hours)     SLP Outcome Measures     Row Name 04/25/22 1600             SLP Outcome Measures    Outcome Measure Used? Adult NOMS  -KA              Adult FCM Scores    FCM Chosen Swallowing  -KA      Swallowing FCM Score 5  -KA            User Key  (r) = Recorded By, (t) = Taken By, (c) = Cosigned By    Initials Name Effective Dates    Ronald Gerber MA,CCC-SLP 06/16/21 -                      Time Calculation:   SLP Start Time: 1300  SLP Stop Time: 1345  SLP Time Calculation (min): 45 min  Untimed Charges  SLP Eval/Re-eval : ST Eval Oral Pharyng Swallow - 98483  [id number]-[id number] Eval Oral Pharyng Swallow Minutes: 45  Total Minutes  Untimed Charges Total Minutes: 45   Total Minutes: 45    Therapy Charges for Today     Code Description Service Date Service Provider Modifiers Qty    24570083982 HC ST EVAL ORAL PHARYNG SWALLOW 3 4/25/2022 Ronald Alexander MA,CCC-SLP GN 1                   Ronald Alexander MA,CCC-SLP  4/25/2022

## 2022-05-02 NOTE — THERAPY TREATMENT NOTE
Outpatient Speech Language Pathology   Adult Swallow Treatment Note  [facility]     Patient Name: Eugenio Joe  : 1940  MRN: [id number]  Today's Date: 2022         Visit Date: 2022   Patient Active Problem List   Diagnosis   • Quadriceps weakness   • ACL tear   • Knee pain, right   • S/P CABG x 3   • Essential hypertension   • Hyperlipemia   • Hallux rigidus   • Fracture, stress, metatarsal   • Osteopenia determined by x-ray   • Metatarsal stress fracture with nonunion   • Left hip pain   • Bursitis of left hip   • Pulmonary embolism ([facility])   • DALILA (acute kidney injury) ([facility])   • Type 2 diabetes mellitus with hyperglycemia, without long-term current use of insulin ([facility])   • Ascending aorta dilatation ([facility])   • Pulmonary nodule, left   • Right leg DVT ([facility])   • Acute renal failure ([facility])   • Hypotension   • Dehydration   • Hypercalcemia   • Dysphagia   • Syncope and collapse   • Coronary artery disease involving native coronary artery of native heart without angina pectoris   • Normal pressure hydrocephalus ([facility])   • Headache   • Impaired mobility   • Nausea & vomiting   • Fall at home   • Transient cerebral ischemia   • PFO (patent foramen ovale)   • Esophageal dysphagia   • TIA (transient ischemic attack)   • Acute appendicitis with localized peritonitis, without perforation or abscess   • Right lower quadrant abdominal pain   • History of CVA (cerebrovascular accident)   • On statin therapy   • 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   • Diverticulitis   • Hx of appendectomy   • Acute abdominal pain   • Gait abnormality   • Weakness   • CVA (cerebral vascular accident) ([facility])   • Acute CVA (cerebrovascular accident) ([facility])   • Cerebrovascular accident (CVA) due to embolism of cerebral artery ([facility])   • Closed displaced fracture of lateral malleolus of left fibula   • Pain   • Syndesmotic ankle sprain, left, subsequent encounter   • Acute dyspnea   • Stage 3a chronic kidney disease ([facility])   • Acute  "systolic heart failure (CMS/[facility])   • Acute on chronic diastolic heart failure ([facility])        Visit Dx:    ICD-10-CM ICD-9-CM   1. Oropharyngeal dysphagia  R13.12 787.22                              SLP OP Goals     Row Name 05/02/22 1300          Subjective Comments    Subjective Comments Patient is pleasant and cooperative. Patient was SOB after walking up to outpatient clinic. Discussed one more therapy session next week. He reports she is tolerating his diet with no difficulties last week.  -KA            Subjective Pain    Able to rate subjective pain? yes  -KA     Pre-Treatment Pain Level 0  -KA     Post-Treatment Pain Level 0  -KA            Dysphagia Goals    Patient will increase closure of larynx to keep food from falling into the airway by completing super-supraglottic swallow;without cues  -KA     Status: Patient will increase closure of larynx to keep food from falling into the airway by completing New  -KA     Comments: Patient will increase closure of larynx to keep food from falling into the airway by completing SLP introduced and taught this exercise today. Patient unable to perform second swallow without taking a breath. Patient completed 5 repetitions with mod cues.  -KA     Patient will increase strength of tongue base and posterior pharyngeal walls to reduce residue that might fall into airway by completing effotful swallow;Miguelina (tongue hold);without cues  -KA     Status: Patient will increase strength of tongue base and posterior pharyngeal walls to reduce residue that might fall into airway by completing Progressing as expected  -KA     Comments: Patient will increase strength of tongue base and posterior pharyngeal walls to reduce residue that might fall into airway by completing SLP taught these exercises last week to patient, he reports he completes them \"multiple times a day.\" Patient completed x10 repetitions each today with rest breaks and drinks of water intermittently.  -KA     Patient " will compensate for oral/pharyngeal deficits and reduce risks while eating by utilizing  compensatory strategies multiple swallows;without cues;slow rate;controlled bolus size  -KA     Status: Patient will compensate for oral/pharyngeal deficits and reduce risks while eating by utilizing  compensatory strategies New  -KA     Comments: Patient will compensate for oral/pharyngeal deficits and reduce risks while eating by utilizing  compensatory strategies Patient reports to trying to eat slower and take smaller bites/sips, he reports this is a challenge he has always been a faster eater. He reports his safe swallow handouts are on the table for him to refer to. Patient demonstrated x1 overt s/s of pen/asp out of 10 cup sips of water today, on intial sip was cough.  -KA           User Key  (r) = Recorded By, (t) = Taken By, (c) = Cosigned By    Initials Name Provider Type    Ronald Gerber MA,CCC-SLP Speech and Language Pathologist                      Time Calculation:   SLP Start Time: 1307  SLP Stop Time: 1345  SLP Time Calculation (min): 38 min  Untimed Charges  17866-GK Treatment Swallow Minutes: 38  Total Minutes  Untimed Charges Total Minutes: 38   Total Minutes: 38    Therapy Charges for Today     Code Description Service Date Service Provider Modifiers Qty    74580441983 HC ST TREATMENT SWALLOW 3 5/2/2022 Ronald Alexander MA,CCC-SLP GN 1                   Ronald Alexander MA,CCC-SLP  5/2/2022

## 2022-05-02 NOTE — TELEPHONE ENCOUNTER
----- Message from Jean Ford MD sent at 5/2/2022  8:45 AM EDT -----  Have to stop the lisinopril and repeat in 1 week  ----- Message -----  From: Nae Guillen APRN  Sent: 5/2/2022   8:08 AM EDT  To: Jean Ford MD    This is after reducing the Lasix to 20 mg.  He's not on potassium supplementation but is on lisinopril.  Do you think we should stop the lisinopril and/or Lasix? Or just reduce the dose and recheck in another week?    ----- Message -----  From: Lab, Background User  Sent: 4/29/2022   5:16 PM EDT  To: JULISA Reynolds

## 2022-05-05 NOTE — TELEPHONE ENCOUNTER
I spoke with the wife.  Evidently there was some confusion after we spoke last week.  Instead of stopping lisinopril, he stopped the Lasix.  I clarified with the wife that it is the lisinopril he needs to discontinue.  He may resume the 20 mg of Lasix.  He is scheduled for an appt and repeat labs with Dr. Onofre on Monday.

## 2022-05-05 NOTE — TELEPHONE ENCOUNTER
The patient's wife called today regarding Lasix 20mg. Hs e stopped giving him the lasix, but she says the patient has been swelling recently. He consumes a lot of salt and has started a low sodium diet which is sort of helping. She would like to know if she needs to give him lasix when he swells or should she wait to see if swelling goes down. He has not gotten his labs yet, they are going to Dr. Onofre next week for labs. Please advise

## 2022-05-05 NOTE — TELEPHONE ENCOUNTER
Just to clarify, why did she stop the Lasix?  I did not advise stopping the Lasix.  Only advised stopping the lisinopril.  He should resume 20 mg of Lasix daily.  In addition, he should elevate his legs, wear compression stockings, limit salt intake.

## 2022-05-05 NOTE — TELEPHONE ENCOUNTER
She said you called and told them earlier this week, the note says decrease lasix 20mg but doesn't not specify the dose or to stop.

## 2022-05-10 NOTE — TELEPHONE ENCOUNTER
I spoke with the patient's wife regarding his BMP.  His potassium is higher and his renal function is worse.  He has already stopped lisinopril.  Evidently they stopped Lasix a few days ago.  I advised him to remain off both medications.  He has seen Dr. Cristopher Beal in the past, and I have placed an urgent referral.  I also recommended he follow-up with Dr. Onofre.

## 2022-05-10 NOTE — THERAPY DISCHARGE NOTE
Outpatient Speech Language Pathology   Adult Swallow Treatment Note/Discharge Summary  [facility]     Patient Name: Eugenio Joe  : 1940  MRN: [id number]  Today's Date: 5/10/2022         Visit Date: 05/10/2022   Patient Active Problem List   Diagnosis   • Quadriceps weakness   • ACL tear   • Knee pain, right   • S/P CABG x 3   • Essential hypertension   • Hyperlipemia   • Hallux rigidus   • Fracture, stress, metatarsal   • Osteopenia determined by x-ray   • Metatarsal stress fracture with nonunion   • Left hip pain   • Bursitis of left hip   • Pulmonary embolism ([facility])   • DALILA (acute kidney injury) ([facility])   • Type 2 diabetes mellitus with hyperglycemia, without long-term current use of insulin ([facility])   • Ascending aorta dilatation ([facility])   • Pulmonary nodule, left   • Right leg DVT ([facility])   • Acute renal failure ([facility])   • Hypotension   • Dehydration   • Hypercalcemia   • Dysphagia   • Syncope and collapse   • Coronary artery disease involving native coronary artery of native heart without angina pectoris   • Normal pressure hydrocephalus ([facility])   • Headache   • Impaired mobility   • Nausea & vomiting   • Fall at home   • Transient cerebral ischemia   • PFO (patent foramen ovale)   • Esophageal dysphagia   • TIA (transient ischemic attack)   • Acute appendicitis with localized peritonitis, without perforation or abscess   • Right lower quadrant abdominal pain   • History of CVA (cerebrovascular accident)   • On statin therapy   • 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   • Diverticulitis   • Hx of appendectomy   • Acute abdominal pain   • Gait abnormality   • Weakness   • CVA (cerebral vascular accident) ([facility])   • Acute CVA (cerebrovascular accident) ([facility])   • Cerebrovascular accident (CVA) due to embolism of cerebral artery ([facility])   • Closed displaced fracture of lateral malleolus of left fibula   • Pain   • Syndesmotic ankle sprain, left, subsequent encounter   • Acute dyspnea   • Stage 3a chronic kidney  disease ([facility])   • Acute systolic heart failure ([facility])   • Acute on chronic diastolic heart failure ([facility])        Visit Dx:    ICD-10-CM ICD-9-CM   1. Oropharyngeal dysphagia  R13.12 787.22                              SLP OP Goals     Row Name 05/10/22 1100          Subjective Comments    Subjective Comments Patient puts forth adequate effort with therapy and exercises. Planned d/c today (pt reports difficulty getting to therapy) and all education has been completed. He and wife report improvement in swallowing, less coughing episodes. He reports he is eating slower with smaller bites and sips. He reports he completes his swallow exercises throughout all day.  -KA            Subjective Pain    Able to rate subjective pain? yes  -KA     Pre-Treatment Pain Level 0  -KA     Post-Treatment Pain Level 0  -KA            Dysphagia Goals    Status: Patient will safely consume the recommended diet without complications such as aspiration pneumonia Achieved  -KA     Comments: Patient will safely consume the recommended diet without complications such as aspiration pneumonia Patient is tolerating regular solids with thin liquids without negative lung changes. Today he demonstrated throat clearing prior to any water. He demonstrated x3 coughs out of 20 sips of thin liquids (water).  -KA     Patient will increase laryngeal elevation to reduce residue that might fall into airway by completing super-supraglottic swallow  -KA     Status: Patient will increase laryngeal elevation to reduce residue that might fall into airway by completing Achieved  -KA     Comments: Patient will increase laryngeal elevation to reduce residue that might fall into airway by completing Difficulty maintaining breath hold and initiating second swallow, pt verbalized he is more successful completing at home when not completing exercises back to back as he does them throughout the day, he completed x20 reps with min cues and difficulty initiating second  swallow.  -KA     Patient will increase closure of larynx to keep food from falling into the airway by completing super-supraglottic swallow;without cues  -KA     Status: Patient will increase closure of larynx to keep food from falling into the airway by completing Achieved  -KA     Comments: Patient will increase closure of larynx to keep food from falling into the airway by completing see above  -KA     Patient will increase strength of tongue base and posterior pharyngeal walls to reduce residue that might fall into airway by completing effotful swallow;Miguelina (tongue hold);without cues  -KA     Status: Patient will increase strength of tongue base and posterior pharyngeal walls to reduce residue that might fall into airway by completing Achieved  -KA     Comments: Patient will increase strength of tongue base and posterior pharyngeal walls to reduce residue that might fall into airway by completing x20 reps each without cues, sips of water at times to help initiate swallow.  -KA     Patient will compensate for oral/pharyngeal deficits and reduce risks while eating by utilizing  compensatory strategies multiple swallows;without cues;slow rate;controlled bolus size  -KA     Status: Patient will compensate for oral/pharyngeal deficits and reduce risks while eating by utilizing  compensatory strategies Achieved  -KA     Comments: Patient will compensate for oral/pharyngeal deficits and reduce risks while eating by utilizing  compensatory strategies He is able to verbalize his safe swallow precautions and reports he is doing better with utlizing them and swallowing better  -KA           User Key  (r) = Recorded By, (t) = Taken By, (c) = Cosigned By    Initials Name Provider Type    Ronald Gerber MA,CCC-SLP Speech and Language Pathologist                          Time Calculation:   SLP Start Time: 1015  SLP Stop Time: 1055  SLP Time Calculation (min): 40 min  Untimed Charges  57075-CZ Treatment Swallow Minutes:  40  Total Minutes  Untimed Charges Total Minutes: 40   Total Minutes: 40    Therapy Charges for Today     Code Description Service Date Service Provider Modifiers Qty    [id number] HC ST TREATMENT SWALLOW 3 5/10/2022 Ronald Alexander MA,CCC-SLP GN 1               OP SLP Discharge Summary  Date of Discharge: 05/10/22  Reason for Discharge: all goals and outcomes met, no further needs identified  Progress Toward Achieving Short/long Term Goals: all goals met within established timelines  Discharge Destination: home w/ assist  Discharge Instructions: Reviewed swallow precautions with wife, pt to continue his exercises three times a day and continue utilizing safe swallow precautions. Patient to call with any questions or concerns, can be referred back to outpatient speech therapy in the future if indicated.      Ronald Alexander MA,CCC-SLP  5/10/2022

## 2022-05-31 NOTE — PROGRESS NOTES
Date of Office Visit: 22  Encounter Provider: Jean Ford MD  Place of Service: [facility] CARDIOLOGY  Patient Name: Eugenio Joe  :1940  [id number]    Chief Complaint   Patient presents with   • 1 month hospital f/u   :     HPI: Eugenio Joe is a 81 y.o. male he had a three-vessel CABG in  with a LIMA to the LAD and vein to the ramus and a vein to an OM1 she had normal LV function.  Pulmonary embolus at one time he has had normal pressure hydrocephalus.  he has had a couple of TIAs was found to have a large PFO he has been managed medically recently was in the hospital for TIA-like symptoms but it was felt that it was due to small vessel disease consistent with hypertension he had his aspirin increased to a full aspirin a day.  we follow for the management of hypertension, hyperlipidemia, PFO, and coronary artery disease.                 On 22, he presented with shortness of breath and hypertensive urgency.  He was also noted to have frequent PVCs and a mildly elevated troponin.  He was started on IV diuresis along with carvedilol.  Echocardiogram demonstrated normal LV systolic function, grade 2 diastolic dysfunction, and mild pulmonary hypertension.  Stress test was negative for ischemia.  The elevated troponin was attributed to hypertensive urgency and CKD.  He is here for follow-up but we found was his kidney function and going up and so we ended up taking him off diuretic his weights been stable he has been doing well and no PND orthopnea edema weight is down in the 190s which is really great blood pressures have been good in general has been doing well is not eating as much salt    Past Medical History:   Diagnosis Date   • Arthritis    • Asthma    • Brain abscess     at about age 44   • Bruise of face    • Bursitis of left hip    • Cancer (HCC)     PT STATES IN HIS SWEAT GLAND    • Cardiac disease    • Coronary artery disease     CABG    •  Diabetes mellitus ([facility])    • Difficulty in swallowing     LIQUIDS   • Difficulty walking    • Diverticulitis    • DM2 (diabetes mellitus, type 2) ([facility]) 10/12/2016   • Fracture, foot 2015    Dr Pisano   • Fracture, stress, metatarsal    • Fracture, tibia and fibula Feb 2021    Dr Pisano   • Gout several years ago    medication  working   • Hearing aid worn     bilateral   • Hx of appendectomy    • Hyperlipemia    • Hypertension    • Joint pain     or swelling   • Low back pain several years ago   • Metatarsal stress fracture with nonunion    • Normal pressure hydrocephalus ([facility])    • Orbit fracture ([facility])    • Pulmonary embolism ([facility])     OCT 2016 - AFTER FOOT SURGERY   • Rash     ARM-    • Spinal headache     AFTER SPINAL TAP - NO BLOOD PATCH   • Stroke (cerebrum) ([facility]) 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   • Syndesmotic ankle sprain, left, subsequent encounter    • Tear of meniscus of knee torn ligaments   • TIA (transient ischemic attack)     MULTIPLE       Past Surgical History:   Procedure Laterality Date   • ANKLE OPEN REDUCTION INTERNAL FIXATION Left 2/16/2021    Procedure: Left ankle open reduction internal fixation with implants as needed;  Surgeon: Man Jenkins MD;  Location: [facility];  Service: Orthopedics;  Laterality: Left;   • APPENDECTOMY N/A 7/18/2019    Procedure: APPENDECTOMY LAPAROSCOPIC;  Surgeon: Luis Carlos Rick Jr., MD;  Location: [facility] OR;  Service: General   • BRAIN SURGERY      BRAIN ABCESS 30 YR AGO   • CARDIAC CATHETERIZATION N/A 12/4/2020    Procedure: Patent foramen ovale closure ABBOTT;  Surgeon: Frank Pablo MD;  Location: [facility] CATH INVASIVE LOCATION;  Service: Cardiology;  Laterality: N/A;   • CARDIAC CATHETERIZATION N/A 12/4/2020    Procedure: Intracardiac echocardiogram;  Surgeon: Frank Pablo MD;  Location: [facility] CATH INVASIVE LOCATION;  Service: Cardiology;  Laterality: N/A;   • CARDIAC SURGERY     • COLONOSCOPY  2012    [facility]  normal per pt, no polyps    • CORONARY ARTERY BYPASS GRAFT      3 VESSEL   • ENDOSCOPY N/A 3/9/2017    Schatzki ring, dilated, LA Grade B esophagitis, HH, acquired duodenal stenosis   • ENDOSCOPY N/A 2018    candida, HH, torts, Schatzki ring, duodenal stenosis, dilatation perforemed, chronic inflammation   • ENDOSCOPY N/A 10/9/2019    White nummular lesions in esophageal mucosa, mild Schatzki ring, acquired duodenal stenosis, Path: Terrazas's, candida   • ENDOSCOPY N/A 2020    Procedure: ESOPHAGOGASTRODUODENOSCOPY with biopsies;  Surgeon: Rigoberto Walker MD;  Location: [facility] ENDOSCOPY;  Service: Gastroenterology   • FACIAL FRACTURE SURGERY      to repair 6 broken bones   • HAND SURGERY     • ORBITAL FRACTURE OPEN REDUCTION INTERNAL FIXATION Right 2017    Procedure: RT ORBIT FLOOR FRACTURE REPAIR RIGHT ZMC FRACTURE REPAIR, RIGHT NASAL BONE FRACTURE CLOSED REDUCTION;  Surgeon: Edil Lester MD;  Location: [facility] OR [facility];  Service:    • ORIF FOOT FRACTURE Right 2016    Procedure: RIGHT SECOND METATARSAL OPEN REDUCTION INTERNAL FIXATION WITh GRAFT FROM HEEL ;  Surgeon: Man Jenkins MD;  Location: [facility] OR [facility];  Service:    • PATENT FORAMEN OVALE CLOSURE     • SEPTOPLASTY     • SKIN CANCER EXCISION     • TONSILLECTOMY     • TRANSESOPHAGEAL ECHOCARDIOGRAM (STELLA)         Social History     Socioeconomic History   • Marital status:    • Number of children: 2   • Years of education: 16   Tobacco Use   • Smoking status: Former Smoker     Packs/day: 3.00     Years: 5.00     Pack years: 15.00     Types: Cigarettes     Start date:      Quit date:      Years since quittin.4   • Smokeless tobacco: Never Used   • Tobacco comment: Quit herson turkey   Substance and Sexual Activity   • Alcohol use: Yes     Alcohol/week: 2.0 standard drinks     Types: 1 Cans of beer, 1 Shots of liquor per week     Comment: RARELY    • Drug use: No   • Sexual activity: Not Currently      Partners: Female     Birth control/protection: Surgical       Family History   Problem Relation Age of Onset   • Heart disease Mother    • Hypertension Mother    • Stroke Mother    • Diverticulitis Mother    • Heart disease Father    • Hypertension Father    • Malig Hyperthermia Neg Hx        Review of Systems   Constitutional: Negative for decreased appetite, fever, malaise/fatigue and weight loss.   HENT: Negative for nosebleeds.    Eyes: Negative for double vision.   Cardiovascular: Negative for chest pain, claudication, cyanosis, dyspnea on exertion, irregular heartbeat, leg swelling, near-syncope, orthopnea, palpitations, paroxysmal nocturnal dyspnea and syncope.   Respiratory: Negative for cough, hemoptysis and shortness of breath.    Hematologic/Lymphatic: Negative for bleeding problem.   Skin: Negative for rash.   Musculoskeletal: Negative for falls and myalgias.   Gastrointestinal: Negative for hematochezia, jaundice, melena, nausea and vomiting.   Genitourinary: Negative for hematuria.   Neurological: Negative for dizziness and seizures.   Psychiatric/Behavioral: Negative for altered mental status and memory loss.       Allergies   Allergen Reactions   • Other Mental Status Change and Hallucinations     Oxy drugs   • Oxycodone Mental Status Change         Current Outpatient Medications:   •  Accu-Chek FastClix Lancets misc, TEST ONCE TO BID PRN, Disp: , Rfl:   •  acetaminophen (TYLENOL) 500 MG tablet, Take 2 tablets by mouth Every 8 (Eight) Hours As Needed for Mild Pain ., Disp: 30 tablet, Rfl: 0  •  allopurinol (ZYLOPRIM) 300 MG tablet, Take 300 mg by mouth daily., Disp: , Rfl:   •  aspirin 81 MG chewable tablet, Chew 81 mg Daily. WILL VERIFY STOP DATE WITH MD, Disp: , Rfl:   •  atorvastatin (LIPITOR) 40 MG tablet, Take 1 tablet by mouth Every Night., Disp: , Rfl:   •  carvedilol (COREG) 6.25 MG tablet, Take 1 tablet by mouth 2 (Two) Times a Day With Meals., Disp: 60 tablet, Rfl: 0  •  Cholecalciferol  "(Vitamin D3) 50 MCG (2000 UT) tablet, Take 50 mcg by mouth Every Morning., Disp: , Rfl:   •  clopidogrel (PLAVIX) 75 MG tablet, Take 1 tablet by mouth Daily., Disp: 30 tablet, Rfl:   •  doxazosin (CARDURA) 1 MG tablet, Take 4 mg by mouth Every Morning., Disp: , Rfl:   •  glipizide (GLUCOTROL) 5 MG tablet, Take 5 mg by mouth 2 (Two) Times a Day Before Meals., Disp: , Rfl:   •  metFORMIN (GLUCOPHAGE) 500 MG tablet, Take 500 mg by mouth Daily With Breakfast., Disp: , Rfl:   •  Multiple Vitamins-Minerals (MULTIVITAMIN ADULT PO), Take 1 tablet by mouth Daily., Disp: , Rfl:   •  ondansetron (ZOFRAN) 4 MG tablet, Take 1 tablet by mouth Every 6 (Six) Hours As Needed for Nausea or Vomiting., Disp:  , Rfl:   •  pantoprazole (PROTONIX) 40 MG EC tablet, Take 40 mg by mouth every night at bedtime., Disp: , Rfl:   •  PROAIR  (90 BASE) MCG/ACT inhaler, Inhale 2 puffs Every 4 (Four) Hours As Needed., Disp: , Rfl:       Objective:     Vitals:    05/31/22 1438   BP: 128/64   BP Location: Left arm   Patient Position: Sitting   Pulse: 71   Weight: 86.5 kg (190 lb 9.6 oz)   Height: 175.3 cm (69\")     Body mass index is 28.15 kg/m².    Physical Exam  Constitutional:       Appearance: He is well-developed.   HENT:      Head: Normocephalic.   Eyes:      General: No scleral icterus.  Neck:      Thyroid: No thyromegaly.      Vascular: No JVD.   Cardiovascular:      Rate and Rhythm: Normal rate and regular rhythm.      Heart sounds: Normal heart sounds. No murmur heard.    No friction rub. No gallop.   Pulmonary:      Effort: Pulmonary effort is normal.      Breath sounds: Normal breath sounds. No wheezing or rales.   Abdominal:      Palpations: Abdomen is soft.      Tenderness: There is no abdominal tenderness.   Musculoskeletal:         General: Normal range of motion.   Lymphadenopathy:      Cervical: No cervical adenopathy.   Skin:     General: Skin is warm and dry.      Findings: No rash.   Neurological:      Mental Status: He is " alert and oriented to person, place, and time.           ECG 12 Lead    Date/Time: 5/31/2022 3:11 PM  Performed by: Jean Ford MD  Authorized by: Jean Ford MD   Rhythm: sinus rhythm  Ectopy: unifocal PVCs    Clinical impression: abnormal EKG             Assessment:       Diagnosis Plan   1. S/P CABG x 3     2. PFO (patent foramen ovale)     3. Essential hypertension     4. Other hyperlipidemia     5. Acute on chronic diastolic heart failure (HCC)     6. Ascending aorta dilatation (HCC)            Plan:       Think he is doing well his current weight himself every day check his blood pressures periodically if his weight starts going up we have to get him on some diuretic quickly because he is not on anything.  I am not can make any other changes I will have him come see Nae in 6 months and then see me in a year    As always, it has been a pleasure to participate in your patient's care.      Sincerely,       Jean Ford MD      The MRI on September 2020 shows 2 acute strokes in 2 different distributions suggestive of a cardioembolic source.

## 2022-07-15 NOTE — DISCHARGE INSTRUCTIONS
Arrived to [facility]. VSS. Pt reporting pain, repositioned for comfort. Awaiting orders.  Mauri Mattson RN
Dr Carmelita Alfonso at the bedside at this time evaluating the pt. Drain removed.
Home to rest  Drink plenty of fluids  Antibiotics as directed   Follow up with your doctor  Return if worse or new concerns   Continue care with your primary care physician and have your blood pressure regularly checked and managed. Normal blood pressure is 120/80.     
Pt reporting a consistency in pain. Ambulated to the bathroom with walker, reporting a lot of pain with ambulation. IR Nurse notified of current symptoms. Awaiting further instruction.  Mikel Michaels RN
Pt reports a dramatic decrease in pain following drain removal. Denies needs at this time. Tolerating PO. Able to ambulate with minimal pain. Discharge instructions given to pt and , both verbalize understanding. PIV removed.  Karen Laurent RN
8

## 2022-07-28 NOTE — PROGRESS NOTES
CC: Follow-up: NPH, history of stroke    HPI:  Eugenio Joe is a  81 y.o.  right-handed male who I am seeing in follow-up regarding history of stroke as well as NPH.  He is again accompanied by his wife today.  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  Additionally patient was recently diagnosed with some newer onset diastolic heart failure - he was actually hospitalized for exacerbation of this in April 2022.  Patient was last seen in the office  on 1/31/2021, summary of his condition is taken from previous note with movements and additions as needed:      Stroke  In June 2019 he was found to have a tiny acute left occipital infarct.  Vascular studies showed atherosclerotic changes without clear-cut hemodynamically significant stenoses.  The origins of the vertebral arteries were however somewhat obscured.  He has no history of atrial fibrillation.  He is on blood pressure and diabetes medications.  He quit smoking in his early 20s.  He was treated medically.  Then in September 2020 he presented to the ED with some speech changes and trouble swallowing, he was found to have an acute lacunar infarct in the left parietal area and a tiny 1 in the right frontal area. Further evaluation demonstrated that he had a PFO which was subsequently closed in November 2020 with no side effects.  He remains on aspirin and Plavix plus atorvastatin 40 mg daily for stroke prevention.       NPH  The patient has had a progressive gait disturbance which dates back several years.  He was seen by Dr. Polanco and evaluated in 2017 for NPH.  Work-up has included MRI scans which have been reviewed per Dr. Campo:  He has mild diffuse atrophy.  The ventricles are larger than expected but the temporal tips are not clearly as ballooned as expected.  The cerebral aqueduct is  "not significantly enlarged but there is flow void. Some turbulent flow is seen in the third ventricle.  There is notable chronic white matter changes which are moderate in severity.  There is mild cortical atrophy which looks consistent with age.  A radionucleotide cisternogram was obtained which demonstrated ventricular reflux within 4 hours with persistent tracer at 48 hours consistent with NPH.  A lumbar puncture was obtained and the patient's gait was perhaps a bit better for a couple of days and then 3 days out he had sudden transient hearing loss and 2 to 3 days later he had a TIA or stroke.  Shunting was not undertaken.  He sought a second opinion at the [facility] in [place] and they had the same opinion.  He was given diagnosis of \"possible NPH\" but did not recommend shunting.    Patient has been ambulating with a stand-up walker since February 2021 - he made the transition from the cane at this time actually due to an injury he sustained he fell over his [employer] robotic vacuum and sustained a tibial fracture relatively distally in the left leg requiring open reduction internal fixation.  He states he basically uses the walker continuously, he has made accommodations to his home to allow for to use.  Additionally he complains of some short-term memory loss, but really has seen very little progression of this since the onset. He uses depends due to bladder function issues - reports high-volume incontinence.     Interim history  Since have seen him last patient reports he has noticed some very mild progression in terms of gait and balance difficulties.  Nevertheless he still ambulates fairly well with his stand up rolling walker.  There have been no falls.  He still works out at the [facility] about 3 days a week.  Noted the patient has a caregiver which comes out to his house about 5 days a week and stays for several hours, she drives him to the [facility] and also will do at home workouts with him on days they do " not go to the [facility].  He can do most of his ADLs independently though the caregiver does supervise his showers just in case he were to fall, thankfully thus far he is not.     He also endorses some tremor, states this has been present for some time now.  Mostly involves his left hand, states he notices it both with action and at rest.  It is not really intrusive.  Additionally he does not really report any significant bradykinesia or rigidity.  In terms of cognition and memory both him and his wife report symptoms are more or less stable, they continue to endorse some recent recall difficulties and word finding problems but otherwise no change.  Today his MMSE score was 27/30, 6 months ago was 28/30.         Past Medical History:   Diagnosis Date   • Arthritis    • Asthma    • Brain abscess     at about age 44   • Bruise of face    • Bursitis of left hip    • Cancer ([facility])     PT STATES IN HIS SWEAT GLAND    • Cardiac disease    • Coronary artery disease     CABG 2012   • Diabetes mellitus ([facility])    • Difficulty in swallowing     LIQUIDS   • Difficulty walking    • Diverticulitis    • DM2 (diabetes mellitus, type 2) ([facility]) 10/12/2016   • Fracture, foot 2015    Dr Pisano   • Fracture, stress, metatarsal    • Fracture, tibia and fibula Feb 2021    Dr Pisano   • Gout several years ago    medication  working   • Hearing aid worn     bilateral   • Hx of appendectomy    • Hyperlipemia    • Hypertension    • Joint pain     or swelling   • Low back pain several years ago   • Metatarsal stress fracture with nonunion    • Normal pressure hydrocephalus ([facility])    • Orbit fracture ([facility])    • Pulmonary embolism ([facility])     OCT 2016 - AFTER FOOT SURGERY   • Rash     ARM-    • Spinal headache     AFTER SPINAL TAP - NO BLOOD PATCH   • Stroke (cerebrum) ([facility]) 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   • Syndesmotic ankle sprain, left, subsequent encounter    • Tear of meniscus of knee torn ligaments   • TIA (transient ischemic attack)      MULTIPLE         Past Surgical History:   Procedure Laterality Date   • ANKLE OPEN REDUCTION INTERNAL FIXATION Left 2/16/2021    Procedure: Left ankle open reduction internal fixation with implants as needed;  Surgeon: aMn Jenkins MD;  Location: [facility] OR OSC;  Service: Orthopedics;  Laterality: Left;   • APPENDECTOMY N/A 7/18/2019    Procedure: APPENDECTOMY LAPAROSCOPIC;  Surgeon: Luis Carlos Rick Jr., MD;  Location: [facility] MAIN OR;  Service: General   • BRAIN SURGERY      BRAIN ABCESS 30 YR AGO   • CARDIAC CATHETERIZATION N/A 12/4/2020    Procedure: Patent foramen ovale closure ABBOTT;  Surgeon: Frank Pablo MD;  Location: [facility] CATH INVASIVE LOCATION;  Service: Cardiology;  Laterality: N/A;   • CARDIAC CATHETERIZATION N/A 12/4/2020    Procedure: Intracardiac echocardiogram;  Surgeon: Frank Pablo MD;  Location: [facility] CATH INVASIVE LOCATION;  Service: Cardiology;  Laterality: N/A;   • CARDIAC SURGERY     • COLONOSCOPY  2012    Ciciliano- normal per pt, no polyps    • CORONARY ARTERY BYPASS GRAFT      3 VESSEL   • ENDOSCOPY N/A 3/9/2017    Schatzki ring, dilated, LA Grade B esophagitis, HH, acquired duodenal stenosis   • ENDOSCOPY N/A 4/6/2018    candida, HH, torts, Schatzki ring, duodenal stenosis, dilatation perforemed, chronic inflammation   • ENDOSCOPY N/A 10/9/2019    White nummular lesions in esophageal mucosa, mild Schatzki ring, acquired duodenal stenosis, Path: Terrazas's, candida   • ENDOSCOPY N/A 1/8/2020    Procedure: ESOPHAGOGASTRODUODENOSCOPY with biopsies;  Surgeon: Rigoberto Walker MD;  Location: [facility] ENDOSCOPY;  Service: Gastroenterology   • FACIAL FRACTURE SURGERY      to repair 6 broken bones   • HAND SURGERY     • ORBITAL FRACTURE OPEN REDUCTION INTERNAL FIXATION Right 1/13/2017    Procedure: RT ORBIT FLOOR FRACTURE REPAIR RIGHT ZMC FRACTURE REPAIR, RIGHT NASAL BONE FRACTURE CLOSED REDUCTION;  Surgeon: Edil Lester MD;  Location[place] [facility] [place] [facility];   Service:    • ORIF FOOT FRACTURE Right 9/9/2016    Procedure: RIGHT SECOND METATARSAL OPEN REDUCTION INTERNAL FIXATION WITh GRAFT FROM HEEL ;  Surgeon: Man Jenkins MD;  Location: [facility] OR [facility] [place];  Service:    • PATENT FORAMEN OVALE CLOSURE     • SEPTOPLASTY     • SKIN CANCER EXCISION     • TONSILLECTOMY     • TRANSESOPHAGEAL ECHOCARDIOGRAM (STELLA)             Current Outpatient Medications:   •  acetaminophen (TYLENOL) 500 MG tablet, Take 2 tablets by mouth Every 8 (Eight) Hours As Needed for Mild Pain ., Disp: 30 tablet, Rfl: 0  •  allopurinol (ZYLOPRIM) 300 MG tablet, Take 300 mg by mouth daily., Disp: , Rfl:   •  aspirin 81 MG chewable tablet, Chew 81 mg Daily. WILL VERIFY STOP DATE WITH MD, Disp: , Rfl:   •  atorvastatin (LIPITOR) 40 MG tablet, Take 1 tablet by mouth Every Night., Disp: , Rfl:   •  carvedilol (COREG) 6.25 MG tablet, Take 1 tablet by mouth 2 (Two) Times a Day With Meals., Disp: 90 tablet, Rfl: 1  •  clopidogrel (PLAVIX) 75 MG tablet, Take 1 tablet by mouth Daily., Disp: 30 tablet, Rfl:   •  doxazosin (CARDURA) 1 MG tablet, Take 4 mg by mouth Every Morning., Disp: , Rfl:   •  glipizide (GLUCOTROL) 5 MG tablet, Take 5 mg by mouth 2 (Two) Times a Day Before Meals., Disp: , Rfl:   •  metFORMIN (GLUCOPHAGE) 500 MG tablet, Take 500 mg by mouth Daily With Breakfast., Disp: , Rfl:   •  Multiple Vitamins-Minerals (MULTIVITAMIN ADULT PO), Take 1 tablet by mouth Daily., Disp: , Rfl:   •  pantoprazole (PROTONIX) 40 MG EC tablet, Take 40 mg by mouth every night at bedtime., Disp: , Rfl:   •  PROAIR  (90 BASE) MCG/ACT inhaler, Inhale 2 puffs Every 4 (Four) Hours As Needed., Disp: , Rfl:   •  Accu-Chek FastClix Lancets misc, TEST ONCE TO BID PRN, Disp: , Rfl:   •  Cholecalciferol (Vitamin D3) 50 MCG (2000 UT) tablet, Take 50 mcg by mouth Every Morning., Disp: , Rfl:   •  furosemide (LASIX) 20 MG tablet, , Disp: , Rfl:   •  ondansetron (ZOFRAN) 4 MG tablet, Take 1 tablet by mouth Every 6 (Six)  "Hours As Needed for Nausea or Vomiting., Disp:  , Rfl:       Family History   Problem Relation Age of Onset   • Heart disease Mother    • Hypertension Mother    • Stroke Mother    • Diverticulitis Mother    • Heart disease Father    • Hypertension Father    • Malig Hyperthermia Neg Hx          Social History     Socioeconomic History   • Marital status:    • Number of children: 2   • Years of education: 16   Tobacco Use   • Smoking status: Former Smoker     Packs/day: 3.00     Years: 5.00     Pack years: 15.00     Types: Cigarettes     Start date:      Quit date:      Years since quittin.6   • Smokeless tobacco: Never Used   • Tobacco comment: Quit herson turkey   Substance and Sexual Activity   • Alcohol use: Yes     Alcohol/week: 2.0 standard drinks     Types: 1 Cans of beer, 1 Shots of liquor per week     Comment: RARELY    • Drug use: No   • Sexual activity: Not Currently     Partners: Female     Birth control/protection: Surgical         Allergies   Allergen Reactions   • Other Mental Status Change and Hallucinations     Oxy drugs   • Oxycodone Mental Status Change         ROS:  Review of Systems   Constitutional: Positive for fatigue. Negative for activity change, appetite change, chills and fever.   Eyes: Negative for visual disturbance.   Musculoskeletal: Positive for back pain and gait problem. Negative for neck pain and neck stiffness.   Neurological: Positive for tremors and memory problem. Negative for dizziness, seizures, syncope, weakness, light-headedness, numbness and confusion.   Psychiatric/Behavioral: Negative for decreased concentration, dysphoric mood, hallucinations and depressed mood.      I have reviewed the above ROS put in by the medical assistant and am in agreement.       Physical Exam:  Vitals:    22 1454   BP: 124/74   Pulse: 74   SpO2: 99%   Weight: 87.4 kg (192 lb 9.6 oz)   Height: 175.3 cm (69.02\")       Body mass index is 28.43 kg/m².    Physical " Exam  General: Well-developed white male, no acute distress.  I do not detect any significant facial hypermimia   HEENT: Head is normocephalic, atraumatic.  Neck: Neck is supple with no masses.  No cervical bruits  Heart: Regular rate and rhythm, there is a diastolic murmur heard  Extremities: Distal pulses are palpable and equal.  There is no pedal edema noted today.      Neurologic Exam :   Mental Status: Awake, alert, oriented 9/10.  Conversant without evidence of an affective disorder, thought disorder, delusions or hallucinations.  Attention span and concentration are normal.  HCF: No aphasia, apraxia or dysarthria.   Total MMSE score today was 27/30.  Delayed recall was 1/3 at 3 minutes.  Clock drawing was perfect 4/4, intersecting pentagons also perfect 1/1.   Knowledge of recent events intact.  CN:      I:              II: Visual fields full without left inattention              III, IV, VI: Eye movements intact without nystagmus or ptosis.  Pupils equal round and reactive to light.              V,VII: Light touch and pinprick intact all 3 divisions of V.  Facial muscles symmetrical.              VIII: Hearing decreased to finger rub bilaterally              IX,X: Soft palate elevates symmetrically              XI: Sternomastoid and trapezius are strong.              XII: Tongue midline without atrophy or fasciculations  Motor:  Tone is pretty good today, normal bulk in the upper and lower extremities              Power testing:   Minimal weakness appreciated in interosseous muscles bilaterally and at the APBs, left greater than right but otherwise strength is intact in his upper extremities.  Normal strength in lower extremities today  Reflexes: Upper extremities: +1 diffusely                   Lower extremities: Trace at knees, absent at ankles  Sensory: Light touch:                    Pinprick:   Cerebellar: Finger-to-nose:  Mild endpoint tremor worse on the left otherwise  intact                      Rapid movement: Intact                      Heel-to-shin: Intact  Gait and Station:  Patient comes to stand slowly pushing off with his arms.  Posture is kyphotic, patient appears flexed at the neck and slightly at the hips.  He has a standing walker with him today and actually looks quite stable while using it.  His step length is a little sore but not shuffling at all.  There is no freezing or festination noted.    Results:      Lab Results   Component Value Date    GLUCOSE 199 (H) 06/02/2022    BUN 32 (H) 06/02/2022    CREATININE 1.14 06/02/2022    EGFRIFNONA 56 (L) 03/02/2021    EGFRIFAFRI  09/02/2016      Comment:      <15 Indicative of kidney failure.    BCR 28.1 (H) 06/02/2022    CO2 20.0 (L) 06/02/2022    CALCIUM 9.8 06/02/2022    PROTENTOTREF 6.5 01/02/2020    ALBUMIN 3.60 04/14/2022    LABIL2 1.2 01/02/2020    AST 13 04/11/2022    ALT 12 04/11/2022       Lab Results   Component Value Date    WBC 7.27 04/14/2022    HGB 12.2 (L) 04/14/2022    HCT 37.3 (L) 04/14/2022    MCV 96.9 04/14/2022     04/14/2022         .  Lab Results   Component Value Date    RPR Non-Reactive 06/23/2019         Lab Results   Component Value Date    TSH 0.988 04/27/2020         Lab Results   Component Value Date    TSBKAJDW65 580 06/23/2019         Lab Results   Component Value Date    FOLATE >20.00 06/23/2019         Lab Results   Component Value Date    HGBA1C 7.81 (H) 09/06/2020         Lab Results   Component Value Date    GLUCOSE 199 (H) 06/02/2022    BUN 32 (H) 06/02/2022    CREATININE 1.14 06/02/2022    EGFRIFNONA 56 (L) 03/02/2021    EGFRIFAFRI  09/02/2016      Comment:      <15 Indicative of kidney failure.    BCR 28.1 (H) 06/02/2022    K 4.4 06/02/2022    CO2 20.0 (L) 06/02/2022    CALCIUM 9.8 06/02/2022    PROTENTOTREF 6.5 01/02/2020    ALBUMIN 3.60 04/14/2022    LABIL2 1.2 01/02/2020    AST 13 04/11/2022    ALT 12 04/11/2022         Lab Results   Component Value Date    WBC 7.27  04/14/2022    HGB 12.2 (L) 04/14/2022    HCT 37.3 (L) 04/14/2022    MCV 96.9 04/14/2022     04/14/2022           Assessment:   1.  NPH, with significant gait dysfunction, urinary incontinence and only minimal cognitive symptoms; stable   2.  Cerebrovascular disease with history of previous strokes; stable      Plan:  1.  Despite not having any treatment for his NPH his symptoms really continue to remain very stable.  Patient and his wife both feel like since they have seen us last there has been some progression with his gait and imbalance -but overall he looks very much unchanged on his exam.   - He continues to workout regularly at the [facility] and also does some home exercises almost every day.  I think this is the key to his success and I told him so, there must be a training effect which is why he is able to preserve the mobility and strength that he has.  I encouraged him to continue to work out focusing on movements which are gentle but still aerobic and trying to use machines which are more stationary and offer more stability (these would be less risky for him)  - In terms of cognition his MMSE was pretty stable compared with 6 months ago, he scored a 27/30 today, scored 28/30 last time.  I do not think any therapy is indicated for him at this point.  We will continue to monitor    2.  In terms of stroke, there have been no recurrence of symptoms.  Certainly continue to work on good risk factor control. Discussed major modifiable risk factors for stroke including:   - Hypertension, goal blood pressure <130/80 mmHg  - Diabetes mellitus, target A1C value of ?7%; a fasting glucose of 80 to 130 mg/dL; a postprandial glucose (90 to 120 minutes after a meal) less than 180 mg/dL  - Dyslipidemia, recommend statin therapy as tolerated with target LDL-C level ?70 mg/dL  - Physical inactivity, suggest moderate to vigorous intensity physical exercise most days of the week for at least 40 minutes    We will plan on  seeing patient back in 1 year or sooner should need arise.    I spent 30 minutes face-to-face with patient/family today, 20 minutes of that time was counseling patient on disease course and plan of care and answering any questions that they had.       Dictated utilizing Dragon dictation.

## 2022-10-16 PROBLEM — J12.82 PNEUMONIA DUE TO COVID-19 VIRUS: Status: ACTIVE | Noted: 2022-01-01

## 2022-10-16 PROBLEM — U07.1 PNEUMONIA DUE TO COVID-19 VIRUS: Status: ACTIVE | Noted: 2022-01-01

## 2022-10-17 PROBLEM — I50.42 CHRONIC COMBINED SYSTOLIC (CONGESTIVE) AND DIASTOLIC (CONGESTIVE) HEART FAILURE (HCC): Status: ACTIVE | Noted: 2022-01-01

## 2022-10-26 NOTE — TELEPHONE ENCOUNTER
Patient's wife called and said that the patient was just discharged from Rehab today after being in the hospital. She was a little confused about his coreg. She said before he was taking 6.25mg BID, now it looks like he is taking 12.5mg BID. She was wondering if our office were the ones that changed it. Per the hospital notes it looks like Dr. Welch was the one who increased it to 12.5mg BID.     The wife also stated that when the patient was discharged from Rehab today they said they held his AM dose of coreg because his BP was a little low. They do not remember how low they said it was. Currently BP is 142/65 HR 64. The patient denies any dizziness or lightheadedness. I advised the wife to just resume with 12.5mg of coreg this evening. I also told them to start tracking his BP/HR about 1-2 hours after AM meds. I told her that if SBP is less than <100 or HR <60 or if patient has any dizziness/lightheadedness then call our office for further recommendations. I also stated that if she would like she could call us in a week with some BP/HR readings and we can let her know if they are good or if further changes need to be made.    She also said that she just got a new prescription for the patient's old coreg dose of 6.25mg BID. I told her that she could give the patient 2 pills in the AM and 2 pills in the PM until that prescription runs out.     Patient is scheduled to follow-up with you on 11/29. I told the wife I would relay all this information to you and call her back if you had anything further to add.    Mi Gordon RN  Triage [facility] [place]

## 2022-10-27 NOTE — TELEPHONE ENCOUNTER
Annette, Eugenio Joe's spouse, called to report patient is fatigued, grumpy, and moving slower than usual today.  Annette stated patient is not confused, weakness in legs/feet is at baseline, shortness of breath is at baseline.  Annette had patient smile and reports this is at baseline and denies unilateral weakness.  Annette reports the following BP readings:    Time BP HR   830a 95/54 79   1015a 120/63 64   1115a 130/49 62     (Patient took coreg about an hour before morning BP reading)    Requested Annette help patient keep BP/HR log (twice daily, 1-2 hours after BP meds, when symptomatic).  Annette stated she will do this.  Annette expressed concern that symptoms might be related to the increased dosage of coreg.    Patient was d/c from hospital on 10/21.  Next OV: 11/29.  Should I move follow up appointment to next week given recent hospital stay?    Please let me know how you would like to proceed.    Thank you,  Viri Toro, RN  Triage Nurse [facility]

## 2022-10-28 NOTE — TELEPHONE ENCOUNTER
Reviewed recommendations with Eugenio Joe and the patient verbalized understanding of the recommendations, with repeat back of medication change.    Patient stated Annette handles his appointments but that she is not home currently.  Provided office phone number for Annette to return call.    Thank you,  Viri Toro RN  Triage Nurse [facility] [place]

## 2022-10-28 NOTE — TELEPHONE ENCOUNTER
Yes I would move up the follow up appt.  He may also resume his previous dose of 6.25 mg twice daily to see if there's any improvement.

## 2022-11-06 PROBLEM — I61.9 INTRAPARENCHYMAL HEMORRHAGE OF BRAIN (HCC): Status: ACTIVE | Noted: 2022-01-01

## 2022-11-06 NOTE — NURSING NOTE
Updated Dr. Sanchez on history of Covid + and admission from 10/16/22-10/21/22. Patient is asymptomatic. No need to place in isolation or retest at this time.

## 2022-11-06 NOTE — CONSULTS
[facility]   Consult Note    Patient Name: Eugenio Joe  : 1940  MRN: [id number]  Primary Care Physician:  Adeline Onofre MD  Referring Physician: Casa Sanchez MD  Date of admission: 2022    Subjective   Subjective     Reason for Consult/ Chief Complaint: Thalamic hemorrhage    HPI:  Eugenio Joe is a 81 y.o. male with multiple medical comorbidities including history of stroke on Plavix presented to the ER with left-sided weakness, confusion and difficulty with speech.  Patient has significantly elevated blood pressure on arrival.  Patient complains of left-sided weakness and some difficulty with speech.  Complains of mild headache.    Review of Systems   All systems were reviewed and negative except for: Headache, left-sided weakness    Personal History     Past Medical History:   Diagnosis Date   • Arthritis    • Asthma    • Brain abscess     at about age 44   • Bruise of face    • Bursitis of left hip    • Cancer ([facility])     PT STATES IN HIS SWEAT GLAND    • Cardiac disease    • Coronary artery disease     CABG    • Diabetes mellitus ([facility])    • Difficulty in swallowing     LIQUIDS   • Difficulty walking    • Diverticulitis    • DM2 (diabetes mellitus, type 2) ([facility]) 10/12/2016   • Fracture, foot     Dr Pisano   • Fracture, stress, metatarsal    • Fracture, tibia and fibula 2021    Dr Pisano   • Gout several years ago    medication  working   • Hearing aid worn     bilateral   • Hx of appendectomy    • Hyperlipemia    • Hypertension    • Joint pain     or swelling   • Low back pain several years ago   • Metatarsal stress fracture with nonunion    • Normal pressure hydrocephalus ([facility])    • Orbit fracture ([facility])    • Pulmonary embolism ([facility])     OCT 2016 - AFTER FOOT SURGERY   • Rash     ARM-    • Spinal headache     AFTER SPINAL TAP - NO BLOOD PATCH   • Stroke (cerebrum) ([facility]) 2020    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   • Syndesmotic ankle sprain, left, subsequent encounter     • Tear of meniscus of knee torn ligaments   • TIA (transient ischemic attack)     MULTIPLE       Past Surgical History:   Procedure Laterality Date   • ANKLE OPEN REDUCTION INTERNAL FIXATION Left 2/16/2021    Procedure: Left ankle open reduction internal fixation with implants as needed;  Surgeon: Man Jenkins MD;  Location:  [facility];  Service: Orthopedics;  Laterality: Left;   • APPENDECTOMY N/A 7/18/2019    Procedure: APPENDECTOMY LAPAROSCOPIC;  Surgeon: Luis Carlos Rick Jr., MD;  [place] [facility] [place];  Service: General   • BRAIN SURGERY      BRAIN ABCESS 30 YR AGO   • CARDIAC CATHETERIZATION N/A 12/4/2020    Procedure: Patent foramen ovale closure ABBOTT;  Surgeon: Frank Pablo MD;  [place] [facility] [facility];  Service: Cardiology;  Laterality: N/A;   • CARDIAC CATHETERIZATION N/A 12/4/2020    Procedure: Intracardiac echocardiogram;  Surgeon: Frank Pablo MD;  Location: [facility]IVE LOCATION;  Service: Cardiology;  Laterality: N/A;   • CARDIAC SURGERY     • COLONOSCOPY  2012    Ciciliano- normal per pt, no polyps    • CORONARY ARTERY BYPASS GRAFT      3 VESSEL   • ENDOSCOPY N/A 3/9/2017    Schatzki ring, dilated, LA Grade B esophagitis, HH, acquired duodenal stenosis   • ENDOSCOPY N/A 4/6/2018    candida, HH, torts, Schatzki ring, duodenal stenosis, dilatation perforemed, chronic inflammation   • ENDOSCOPY N/A 10/9/2019    White nummular lesions in esophageal mucosa, mild Schatzki ring, acquired duodenal stenosis, Path: Terrazas's, candida   • ENDOSCOPY N/A 1/8/2020    Procedure: ESOPHAGOGASTRODUODENOSCOPY with biopsies;  Surgeon: Rigoberto Walker MD;  Location: [facility];  Service: Gastroenterology   • FACIAL FRACTURE SURGERY      to repair 6 broken bones   • HAND SURGERY     • ORBITAL FRACTURE OPEN REDUCTION INTERNAL FIXATION Right 1/13/2017    Procedure: RT ORBIT FLOOR FRACTURE REPAIR RIGHT [facility]MC FRACTURE REPAIR, RIGHT NASAL BONE  FRACTURE CLOSED REDUCTION;  Surgeon: Edil Lester MD;  [place] [facility] [place] [facility]  Service:    • ORIF FOOT FRACTURE Right 9/9/2016    Procedure: RIGHT SECOND METATARSAL OPEN REDUCTION INTERNAL FIXATION WITh GRAFT FROM HEEL ;  Surgeon: Man Jenkins MD;  [place] [facility] [place] [facility] [place]  Service:    • PATENT FORAMEN OVALE CLOSURE     • SEPTOPLASTY     • SKIN CANCER EXCISION     • TONSILLECTOMY     • TRANSESOPHAGEAL ECHOCARDIOGRAM (STELLA)         Family History: family history includes Diverticulitis in his mother; Heart disease in his father and mother; Hypertension in his father and mother; Stroke in his mother. Otherwise pertinent FHx was reviewed and not pertinent to current issue.    Social History:  reports that he quit smoking about 61 years ago. His smoking use included cigarettes. He started smoking about 65 years ago. He has a 15.00 pack-year smoking history. He has never used smokeless tobacco. He reports current alcohol use of about 2.0 standard drinks per week. He reports that he does not use drugs.    Home Medications:  Accu-Chek FastClix Lancets, Flaxseed (Linseed), acetaminophen, albuterol sulfate HFA, allopurinol, aspirin, atorvastatin, carvedilol, clopidogrel, doxazosin, glipizide, magnesium gluconate, metFORMIN, multivitamin with minerals, pantoprazole, and vitamin D3    Allergies:  Allergies   Allergen Reactions   • Other Mental Status Change and Hallucinations     Oxy drugs   • Oxycodone Mental Status Change       Objective    Objective     Vitals:   Temp:  [98.7 °F (37.1 °C)] 98.7 °F (37.1 °C)  Heart Rate:  [68-85] 72  Resp:  [16-18] 18  BP: (141-225)/(68-97) 141/70    Physical Exam:  Awake and alert, dysarthric  Extraocular movements intact, PERRL, face equal  Left hemiparesis  Patchy sensory loss over left upper and lower extremity  Mild lower extremity edema  No respiratory distress  Skin intact      Result Review    Result Review:  I have personally reviewed the results from the time  of this admission to 11/6/2022 11:08 EST and agree with these findings:  []  Laboratory list / accordion  []  Microbiology  [x]  Radiology  []  EKG/Telemetry   []  Cardiology/Vascular   []  Pathology  []  Old records  []  Other:  Most notable findings include: CT brain demonstrates a right thalamic hemorrhage with some extension into the right lateral ventricle with no evidence of hydrocephalus.      Assessment & Plan   Assessment / Plan     Brief Patient Summary:  Eugenio Joe is a 81 y.o. male who presents with right thalamic hemorrhage and associated neurological deficits    Active Hospital Problems:  Active Hospital Problems    Diagnosis    • **Intraparenchymal hemorrhage of brain (HCC)      Plan:   -No surgical intervention indicated at this time  -Repeat CT scan in 6 hours  -Hold aspirin and Plavix.   -ICU admission  -Blood pressure management per ICU team  -We will follow      Eugenio Mendez IV, MD

## 2022-11-06 NOTE — CONSULTS
Called for left sided weakness. CT shows right thalamic ICH with some IVH. Recommended aggressive BP lowering with target SBP <150, stat MARIELA consult. Will defer further care to neurosurgery for now.

## 2022-11-06 NOTE — H&P
[facility]  HISTORY AND PHYSICAL   [facility]        Patient Identification:  Name: Eugenio Joe  Age: 81 y.o.  Sex: male  :  1940  MRN: [id number]                     Primary Care Physician: Adeline Onofre MD    Chief Complaint: Intracranial hemorrhage with left-sided weakness    History of Present Illness:     81-year-old male went to bed around 10:00 and woke up this morning with weakness.  CT head shows intracranial hemorrhage.  Patient is awake and alert and verbal.  Has a previous history of mini strokes in the past.  Has left-sided weakness.  On Cardene drip for blood pressure goal systolic less than 140.  Neurosurgery has already been consulted.  Will require admission to intensive care for close monitoring.  Patient is on aspirin and Plavix at home which is being held.        Past Medical History:  Past Medical History:   Diagnosis Date   • Arthritis    • Asthma    • Brain abscess     at about age 44   • Bruise of face    • Bursitis of left hip    • Cancer ([facility])     PT STATES IN HIS SWEAT GLAND    • Cardiac disease    • Coronary artery disease     CABG    • Diabetes mellitus ([facility])    • Difficulty in swallowing     LIQUIDS   • Difficulty walking    • Diverticulitis    • DM2 (diabetes mellitus, type 2) ([facility]) 10/12/2016   • Fracture, foot     Dr Pisano   • Fracture, stress, metatarsal    • Fracture, tibia and fibula 2021    Dr Pisano   • Gout several years ago    medication  working   • Hearing aid worn     bilateral   • Hx of appendectomy    • Hyperlipemia    • Hypertension    • Joint pain     or swelling   • Low back pain several years ago   • Metatarsal stress fracture with nonunion    • Normal pressure hydrocephalus ([facility])    • Orbit fracture ([facility])    • Pulmonary embolism ([facility])     OCT 2016 - AFTER FOOT SURGERY   • Rash     ARM-    • Spinal headache     AFTER SPINAL TAP - NO BLOOD PATCH   • Stroke (cerebrum) ([facility]) 2020    effected his  speech   • Syndesmotic ankle sprain, left, subsequent encounter    • Tear of meniscus of knee torn ligaments   • TIA (transient ischemic attack)     MULTIPLE     Past Surgical History:  Past Surgical History:   Procedure Laterality Date   • ANKLE OPEN REDUCTION INTERNAL FIXATION Left 2/16/2021    Procedure: Left ankle open reduction internal fixation with implants as needed;  Surgeon: Man Jenkins MD;  Location: [facility] OR [facility];  Service: Orthopedics;  Laterality: Left;   • APPENDECTOMY N/A 7/18/2019    Procedure: APPENDECTOMY LAPAROSCOPIC;  Surgeon: Luis Carlos Rick Jr., MD;  Location: [facility] MAIN OR;  Service: General   • BRAIN SURGERY      BRAIN ABCESS 30 YR AGO   • CARDIAC CATHETERIZATION N/A 12/4/2020    Procedure: Patent foramen ovale closure ABBOTT;  Surgeon: Frank Pablo MD;  Location: [facility] CATH INVASIVE LOCATION;  Service: Cardiology;  Laterality: N/A;   • CARDIAC CATHETERIZATION N/A 12/4/2020    Procedure: Intracardiac echocardiogram;  Surgeon: Frank Pablo MD;  Location: [facility] CATH INVASIVE LOCATION;  Service: Cardiology;  Laterality: N/A;   • CARDIAC SURGERY     • COLONOSCOPY  2012    Ciciliano- normal per pt, no polyps    • CORONARY ARTERY BYPASS GRAFT      3 VESSEL   • ENDOSCOPY N/A 3/9/2017    Schatzki ring, dilated, LA Grade B esophagitis, HH, acquired duodenal stenosis   • ENDOSCOPY N/A 4/6/2018    candida, HH, torts, Schatzki ring, duodenal stenosis, dilatation perforemed, chronic inflammation   • ENDOSCOPY N/A 10/9/2019    White nummular lesions in esophageal mucosa, mild Schatzki ring, acquired duodenal stenosis, Path: Terrazas's, candida   • ENDOSCOPY N/A 1/8/2020    Procedure: ESOPHAGOGASTRODUODENOSCOPY with biopsies;  Surgeon: Rigoberto Walker MD;  Location: [facility] ENDOSCOPY;  Service: Gastroenterology   • FACIAL FRACTURE SURGERY      to repair 6 broken bones   • HAND SURGERY     • ORBITAL FRACTURE OPEN REDUCTION INTERNAL FIXATION Right 1/13/2017     Procedure: RT ORBIT FLOOR FRACTURE REPAIR RIGHT [facility] FRACTURE REPAIR, RIGHT NASAL BONE FRACTURE CLOSED REDUCTION;  Surgeon: Edil Lester MD;  Location: [facility] OR [facility] [place];  Service:    • ORIF FOOT FRACTURE Right 2016    Procedure: RIGHT SECOND METATARSAL OPEN REDUCTION INTERNAL FIXATION WITh GRAFT FROM HEEL ;  Surgeon: Man Jenkins MD;  Location: [facility];  Service:    • PATENT FORAMEN OVALE CLOSURE     • SEPTOPLASTY     • SKIN CANCER EXCISION     • TONSILLECTOMY     • TRANSESOPHAGEAL ECHOCARDIOGRAM (STELLA)        Home Meds:  (Not in a hospital admission)      Allergies:  Allergies   Allergen Reactions   • Other Mental Status Change and Hallucinations     Oxy drugs   • Oxycodone Mental Status Change     Immunizations:  Immunization History   Administered Date(s) Administered   • Fluzone High Dose =>65 Years ([employer] ONLY) 2012, 10/08/2013, 2015, 10/07/2016, 2017, 2018, 10/14/2019   • Fluzone High-Dose 65+yrs 10/21/2022   • H1N1 Inj 2009   • Shingrix 2018, 2018   • Zostavax 2014     Social History:   Social History     Social History Narrative   • Not on file     Social History     Tobacco Use   • Smoking status: Former     Packs/day: 3.00     Years: 5.00     Pack years: 15.00     Types: Cigarettes     Start date:      Quit date:      Years since quittin.8   • Smokeless tobacco: Never   • Tobacco comments:     Quit herson turkey   Substance Use Topics   • Alcohol use: Yes     Alcohol/week: 2.0 standard drinks     Types: 1 Cans of beer, 1 Shots of liquor per week     Comment: RARELY      Family History:  Family History   Problem Relation Age of Onset   • Heart disease Mother    • Hypertension Mother    • Stroke Mother    • Diverticulitis Mother    • Heart disease Father    • Hypertension Father    • Malig Hyperthermia Neg Hx         Review of Systems  Denies fevers or chills  Denies nausea or vomiting  No new vision or hearing changes  No  "chest pain  No productive cough or shortness of breath  No diarrhea, hematemesis or hematochezia, no dysuria or frequency  Left-sided weakness  No heat or cold intolerance  No skin rashes  No dizziness or confusion.  No seizure activity  No new anxiety or depression  12 system review of systems performed and all else negative    Objective:  tMax 24 hrs: Temp (24hrs), Av.7 °F (37.1 °C), Min:98.7 °F (37.1 °C), Max:98.7 °F (37.1 °C)    Vitals Ranges:   Temp:  [98.7 °F (37.1 °C)] 98.7 °F (37.1 °C)  Heart Rate:  [68-85] 70  Resp:  [16-18] 18  BP: (153-225)/(68-97) 153/68      Exam:  /68 (BP Location: Left arm, Patient Position: Lying)   Pulse 70   Temp 98.7 °F (37.1 °C)   Resp 18   Ht 177.8 cm (70\")   Wt 88.4 kg (194 lb 12.8 oz)   SpO2 94%   BMI 27.95 kg/m²     GENERAL APPEARANCE:   · Well developed  · Well nourished  · No acute distress   EYES:    · PERRL                                                                           · Conjunctiva normal  · Sclera non-icteric.  HENT:   · Atraumatic, normocephalic  · External ears and nose normal  · Moist mucous membranes and no ulcers  NECK:  · Thyroid not enlarged  · Trachea midline   RESPIRATORY:    · Nonlabored breathing   · Normal breath sounds  · No rales. No wheezing  · No dullness  CARDIOVASCULAR:    · RRR  · Normal S1, S2  · No murmur  · Lower extremity edema: none    · Peripheral pulses present.    GI:   · Bowel sounds normal  · Abdomen soft , non-distended, non-tender  · No abdominal masses.    MUSCULOSKELETAL:  · Left-sided weakness  · No tenderness, no deformities  · No clubbing or cyanosis   Skin:    · No visible rashes  · No palpable nodules.  Cap refill normal.  No mottling.   PSYCHIATRIC:  · Speech and behavior appropriate  · Normal mood and affect  · Oriented to person, place and time  NEUROLOGIC:   Cranial nerves II through XII grossly intact.  Sensation intact.  .     Data Review:  Labs reviewed  Results    Collected Updated Procedure " Result Status    11/06/2022 0902 11/06/2022 1015 [place] Draw [[id number]]    Blood    Final result Component Value   No component results          11/06/2022 0902 11/06/2022 0945 Comprehensive Metabolic Panel [[id number]]    (Abnormal)   Blood    Final result Component Value Units   Glucose 193 High  mg/dL   BUN 20 mg/dL   Creatinine 1.21 mg/dL   Sodium 140 mmol/L   Potassium 5.1  mmol/L   Chloride 106 mmol/L   CO2 23.8 mmol/L   Calcium 9.5 mg/dL   Total Protein 6.5 g/dL   Albumin 3.80 g/dL   ALT (SGPT) 14  U/L   AST (SGOT) 26  U/L   Alkaline Phosphatase 84 U/L   Total Bilirubin 0.7 mg/dL   Globulin 2.7 gm/dL   A/G Ratio 1.4 g/dL   BUN/Creatinine Ratio 16.5    Anion Gap 10.2 mmol/L   eGFR 59.8 Low   mL/min/1.73          11/06/2022 0902 11/06/2022 0922 Protime-INR [[id number]]   Blood    Final result Component Value Units   Protime 13.7 Seconds   INR 1.04           11/06/2022 0902 11/06/2022 0941 aPTT [[id number]]   (Abnormal)   Blood    Final result Component Value Units   PTT <20.0 Low   seconds          11/06/2022 0902 11/06/2022 0945 Troponin [[id number]]    Blood    Final result Component Value Units   Troponin T <0.010  ng/mL          11/06/2022 0902 11/06/2022 1010 Type & Screen [[id number]]   Blood    Edited Result - FINAL Component Value   ABO Type A   RH type Positive   Antibody Screen Negative   T&S Expiration Date 11/9/2022 11:59:59 PM          11/06/2022 0902 11/06/2022 0948 CBC Auto Differential [[id number]]   (Abnormal)   Blood    Final result Component Value Units   WBC 8.10 10*3/mm3   RBC 4.27 10*6/mm3   Hemoglobin 13.1 g/dL   Hematocrit 39.1 %   MCV 91.6 fL   MCH 30.7 pg   MCHC 33.5 g/dL   RDW 13.5 %   RDW-SD 45.2 fl   MPV 11.1 fL   Platelets 182 10*3/mm3   Neutrophil % 71.3 %   Lymphocyte % 19.0 Low  %   Monocyte % 6.2 %   Eosinophil % 2.8 %   Basophil % 0.6 %   Neutrophils, Absolute 5.77 10*3/mm3   Lymphocytes, Absolute 1.54 10*3/mm3   Monocytes, Absolute 0.50 10*3/mm3   Eosinophils, Absolute 0.23  10*3/mm3   Basophils, Absolute 0.05 10*3/mm3            Imaging reviewed  Chest x-ray reviewed      CT head reviewed          Assessment:    Right thalamic intracranial hemorrhage with some intraventricular hemorrhage  Hypertensive emergency  History of previous stroke  History of brain abscess age 44  CAD status post CABG 2012  Type 2 diabetes  Asthma/COPD: Currently without exacerbation          Plan:    Admit to the intensive care unit.  Neurochecks per protocol.  Holding aspirin and Plavix.  Neurology and neurosurgery consultation.  Repeat imaging for follow-up deferred to neurology service.  Control blood pressure with goal systolic less than 140.  Control glucose.  Mechanical DVT prophylaxis.          Casa Sanchez MD  [facility], [facility]  Pulmonary and Critical Care Medicine    11/6/2022  10:32 EST

## 2022-11-06 NOTE — ED TRIAGE NOTES
"Patient to ER via ems from home for l extermity weakness.  Patient woke up like this    Patient does have a hx of \"krishan issues\" that family states mimic strokes, family states fluid builds up in the brain  And a hx of stroke    Family states that patient was face down in bed this morning and not able to move himself over which is not patients baseline  Patient unable to lift l arm and leg at time of triage which patient states is not normal for him  Patient went to bed around 10pm    Patient wearing mask this RN in PPE  "

## 2022-11-06 NOTE — ED PROVIDER NOTES
Pt presents to the ED c/o  waking up this morning with speech deficits and left-sided weakness, last known normal was last night.  Initially did not have speech deficits but did have left-sided weakness, slurring of the speech started afterwards.  Does have a prior history of CVA.  He is on aspirin and Plavix.     On exam,   General: Ill-appearing, nontoxic, nondiaphoretic  HEENT: Mucous membranes moist, atraumatic, EOMI, PERRL  Neck: Full ROM  Pulm: Symmetric chest rise, nonlabored, lungs CTAB  Cardiovascular: Regular rate and rhythm, intact distal pulses  GI: Soft, nontender, nondistended, no rebound, no guarding, bowel sounds present  MSK: Full ROM, no deformity  Skin: Warm, dry  Neuro: Awake, alert, oriented x 4, GCS 15, no facial droop, does have dysarthria but no aphasia, weakness noted in left upper and left lower extremities compared to the right but is able to raise arm and leg up against gravity, moving all extremities  Psych: Calm, cooperative    NIH 5    N95, protective eye goggles, and gloves used during this encounter. Patient in surgical mask.      Plan:   ED Course as of 11/06/22 1547   Sun Nov 06, 2022   0901 Dr. Doty and I have viewed the patient's CT images on PACs which show a right-sided intra cranial hemorrhage.  Call out to neurosurgery, Martinezene ordered for hypertension [KA]   0904 I discussed the patient with Dr. Ba, stroke neurology.  We discussed the patient's history presentation, NIH of 6.  We will perform CTA head neck and CT perfusion for further evaluation of acute stroke symptoms.  Patient not a tPA candidate due to last known well 12 hours ago but may be a candidate for intervention [KA]   0909 . [KA]   0936 I discussed the patient with Dr. Mendez, neurosurgery.  He has reviewed the patient's images.  No acute neurosurgical intervention at this time.  He recommends admission to the ICU, is agreeable with initiation of Cardene, discontinue aspirin and Plavix.  He will see  patient. [KA]   1024 Dr. Mendez has evaluated the patient at the bedside [KA]   1029 I discussed the patient with Dr. Sanchez, intensivist in the ICU.  We discussed patient's history presentation labs and imaging vital signs interventions thus far, he agrees to admit to the ICU for further evaluation and treatment. [KA]   1046 Glucose(!): 193 [KA]   1046 Creatinine: 1.21 [KA]   1046 Troponin T: <0.010 [KA]   1046 INR: 1.04 [KA]   1046 Glucose(!): 174 [KA]   1046 WBC: 8.10 [KA]   1046 Hemoglobin: 13.1 [KA]   1048 I reassessed the patient.  He is still awake and alert, answering questions and following commands.  I counseled him and family member at the bedside about his work-up thus far including all of his labs chest x-ray and EKG. [KA]      ED Course User Index  [KA] Joanna To PA     Patient with right thalamic intraparenchymal hemorrhage with intraventricular extension.  Neurosurgery consulted and have evaluated at bedside, patient to be hospitalized to the ICU.  Patient is not a TNKase candidate due to the intracerebral hemorrhage.  No plans for acute emergent surgical intervention with repeat head CT in 6 hours, holding aspirin and Plavix.  Blood pressure management with IV Cardene immediately on diagnosis and this has improved significantly.  Patient and family at bedside have been updated on the course and plan and findings today and need for intensive care unit stay with neurosurgery consult.  All questions and concerns addressed.     Attestation:  The KIM and I have discussed this patient's history, physical exam, and treatment plan.  I have reviewed the documentation and personally had a face to face interaction with the patient. I affirm the documentation and agree with the treatment and plan.  The attached note describes my personal findings.          Neo Doty MD  11/06/22 3077

## 2022-11-06 NOTE — ED NOTES
.Nursing report ED to floor  Eugenio Joe  81 y.o.  male    HPI :   Chief Complaint   Patient presents with    Extremity Weakness       Admitting doctor:   Casa Sanchez MD    Admitting diagnosis:   The primary encounter diagnosis was Intraparenchymal hemorrhage of brain (HCC). A diagnosis of Hypertensive emergency was also pertinent to this visit.    Code status:   Current Code Status       Date Active Code Status Order ID Comments User Context       11/6/2022 1031 CPR (Attempt to Resuscitate) [id number]  Casa Sanchez MD ED        Question Answer    Code Status (Patient has no pulse and is not breathing) CPR (Attempt to Resuscitate)    Medical Interventions (Patient has pulse or is breathing) Full                    Allergies:   Other and Oxycodone    Isolation:   No active isolations    Intake and Output  No intake or output data in the 24 hours ending 11/06/22 1102    Weight:       11/06/22  0900   Weight: 88.4 kg (194 lb 12.8 oz)       Most recent vitals:   Vitals:    11/06/22 0943 11/06/22 0951 11/06/22 1001 11/06/22 1031   BP: 161/79 169/69 153/68 141/70   BP Location: Left arm Left arm Left arm Left arm   Patient Position: Lying Lying Lying Lying   Pulse: 74 68 70 72   Resp: 18 18 18 18   Temp:       SpO2: 95% 95% 94% 95%   Weight:       Height:           Active LDAs/IV Access:   Lines, Drains & Airways       Active LDAs       Name Placement date Placement time Site Days    Peripheral IV 11/06/22 0904 Right Antecubital 11/06/22  0904  Antecubital  less than 1    Peripheral IV 11/06/22 1021 Anterior;Left Forearm 11/06/22  1021  Forearm  less than 1                    Labs (abnormal labs have a star):   Labs Reviewed   COMPREHENSIVE METABOLIC PANEL - Abnormal; Notable for the following components:       Result Value    Glucose 193 (*)     eGFR 59.8 (*)     All other components within normal limits    Narrative:     GFR Normal >60  Chronic Kidney Disease <60  Kidney Failure <15    The GFR formula  is only valid for adults with stable renal function between ages 18 and 70.   APTT - Abnormal; Notable for the following components:    PTT <20.0 (*)     All other components within normal limits   CBC WITH AUTO DIFFERENTIAL - Abnormal; Notable for the following components:    Lymphocyte % 19.0 (*)     All other components within normal limits   POCT GLUCOSE FINGERSTICK - Abnormal; Notable for the following components:    Glucose 174 (*)     All other components within normal limits   PROTIME-INR - Normal   TROPONIN (IN-HOUSE) - Normal    Narrative:     Troponin T Reference Range:  <= 0.03 ng/mL-   Negative for AMI  >0.03 ng/mL-     Abnormal for myocardial necrosis.  Clinicians would have to utilize clinical acumen, EKG, Troponin and serial changes to determine if it is an Acute Myocardial Infarction or myocardial injury due to an underlying chronic condition.       Results may be falsely decreased if patient taking Biotin.     RAINBOW DRAW    Narrative:     The following orders were created for panel order Bud Draw.  Procedure                               Abnormality         Status                     ---------                               -----------         ------                     Green Top (Gel)[[id number]]                                  Final result               Lavender Top[[id number]]                                     Final result               Gold Top - SST[[id number]]                                   Final result               Light Blue Top[[id number]]                                   Final result                 Please view results for these tests on the individual orders.   SCAN SLIDE   CBC (NO DIFF)   COMPREHENSIVE METABOLIC PANEL   POCT GLUCOSE FINGERSTICK   POCT GLUCOSE FINGERSTICK   POCT GLUCOSE FINGERSTICK   POCT GLUCOSE FINGERSTICK   POCT GLUCOSE FINGERSTICK   POCT GLUCOSE FINGERSTICK   POCT GLUCOSE FINGERSTICK   POCT GLUCOSE FINGERSTICK   POCT GLUCOSE FINGERSTICK   TYPE AND SCREEN    CBC AND DIFFERENTIAL    Narrative:     The following orders were created for panel order CBC & Differential.  Procedure                               Abnormality         Status                     ---------                               -----------         ------                     CBC Auto Differential[[id number]]        Abnormal            Final result                 Please view results for these tests on the individual orders.   GREEN TOP   LAVENDER TOP   GOLD TOP - SST   LIGHT BLUE TOP       EKG:   ECG 12 Lead Stroke Evaluation   Preliminary Result   HEART RATE= 79  bpm   RR Interval= 759  ms   MD Interval= 205  ms   P Horizontal Axis= -3  deg   P Front Axis= -2  deg   QRSD Interval= 144  ms   QT Interval= 382  ms   QRS Axis= -16  deg   T Wave Axis= 39  deg   - ABNORMAL ECG -   Sinus rhythm   Multiple premature complexes, vent & supraven   Left ventricular hypertrophy   Electronically Signed By:    Date and Time of Study: 2022-11-06 09:57:26          Meds given in ED:   Medications   sodium chloride 0.9 % flush 10 mL (has no administration in time range)   niCARdipine (CARDENE) 25 mg in 250 mL NS infusion kit (10 mg/hr Intravenous New Bag 11/6/22 1014)   sodium chloride 0.9 % flush 10 mL (has no administration in time range)   sodium chloride 0.9 % flush 10 mL (has no administration in time range)   acetaminophen (TYLENOL) tablet 650 mg (has no administration in time range)     Or   acetaminophen (TYLENOL) suppository 650 mg (has no administration in time range)   HYDROcodone-acetaminophen (NORCO) 5-325 MG per tablet 1 tablet (has no administration in time range)   fentaNYL citrate (PF) (SUBLIMAZE) injection 25 mcg (has no administration in time range)   ondansetron (ZOFRAN) injection 4 mg (has no administration in time range)   bisacodyl (DULCOLAX) suppository 10 mg (has no administration in time range)   aluminum-magnesium hydroxide-simethicone (MAALOX MAX) 400-400-40 MG/5ML suspension 7.5 mL (has no  administration in time range)   docusate sodium (COLACE) capsule 100 mg (has no administration in time range)   dextrose (GLUTOSE) oral gel 15 g (has no administration in time range)   dextrose (D50W) (25 g/50 mL) IV injection 25 g (has no administration in time range)   glucagon (human recombinant) (GLUCAGEN DIAGNOSTIC) injection 1 mg (has no administration in time range)   insulin lispro (ADMELOG) injection 0-14 Units (has no administration in time range)   potassium chloride (K-DUR,KLOR-CON) ER tablet 40 mEq (has no administration in time range)     Or   potassium chloride (KLOR-CON) packet 40 mEq (has no administration in time range)     Or   potassium chloride 10 mEq in 100 mL IVPB (has no administration in time range)   Magnesium Sulfate 2 gram Bolus, followed by 8 gram infusion (total Mg dose 10 grams)- Mg less than or equal to 1mg/dL (has no administration in time range)     Or   Magnesium Sulfate 2 gram / 50mL Infusion (GIVE X 3 BAGS TO EQUAL 6GM TOTAL DOSE) - Mg 1.1 - 1.5 mg/dl (has no administration in time range)     Or   Magnesium Sulfate 4 gram infusion- Mg 1.6-1.9 mg/dL (has no administration in time range)   potassium & sodium phosphates (PHOS-NAK) 280-160-250 MG packet - for Phosphorus less than 1.25 mg/dL (has no administration in time range)     Or   potassium & sodium phosphates (PHOS-NAK) 280-160-250 MG packet - for Phosphorus 1.25 - 2.5 mg/dL (has no administration in time range)   calcium gluconate 1g/50ml 0.675% NaCl IV SOLN (has no administration in time range)     And   calcium gluconate 6 g in sodium chloride 0.9 % 500 mL IVPB (has no administration in time range)       Imaging results:  XR Chest 1 View    Result Date: 11/6/2022  No focal pulmonary consolidation. Borderline heart size. Follow-up as clinical indications persist.  This report was finalized on 11/6/2022 10:22 AM by Dr. Prabhjot Mcarthur M.D.      CT Head Without Contrast Stroke Protocol    Result Date: 11/6/2022   Critical  test result. Intraparenchymal hemorrhage involving the right thalamus, with extension to the right lateral ventricle. Close follow-up recommended to characterize change. Prominent periventricular white matter chronic small vessel ischemic change.  Paranasal sinus disease with fluid level in the right maxillary sinus that may reflect acute sinusitis.  Discussed by telephone with Dr. Ba at time of interpretation, 0915, 2022.  This report was finalized on 2022 9:24 AM by Dr. Prabhjot Mcarthur M.D.       Ambulatory status:   - assist x 2    Social issues:   Social History     Socioeconomic History    Marital status:     Number of children: 2    Years of education: 16   Tobacco Use    Smoking status: Former     Packs/day: 3.00     Years: 5.00     Pack years: 15.00     Types: Cigarettes     Start date:      Quit date:      Years since quittin.8    Smokeless tobacco: Never    Tobacco comments:     Quit herson turkey   Substance and Sexual Activity    Alcohol use: Yes     Alcohol/week: 2.0 standard drinks     Types: 1 Cans of beer, 1 Shots of liquor per week     Comment: RARELY     Drug use: No    Sexual activity: Not Currently     Partners: Female     Birth control/protection: Surgical       NIH Stroke Scale:  Interval: baseline      Maggie Brown RN  22 11:02 EST

## 2022-11-06 NOTE — PROGRESS NOTES
[facility] Acute Inpt Rehab Note     Referral received via stroke order set.  Please note this is a screening only, rehab admissions will not actively be evaluating this patient.  If felt patient is appropriate for our services once therapies begin, please call our office at [phone], to initiate a full referral.    Thank you,  Janine Phan RN  Rehab Admission Nurse

## 2022-11-06 NOTE — ED PROVIDER NOTES
EMERGENCY DEPARTMENT ENCOUNTER    Room Number:  07/07  Date seen:  11/6/2022  Time seen: 08:59 EST  PCP: Adeline Onofre MD  Historian: Patient and wife      HPI:  Chief Complaint: Left-sided weakness, slurred speech    A complete HPI/ROS/PMH/PSH/SH/FH are unobtainable due to: None     Context: Eugenio Joe is a 81 y.o. male who presents to the ED for evaluation of left-sided weakness and slurred speech noted upon waking this morning.  Last known well 10 PM last night.  When he woke up this morning he was lying prone and was too weak to roll over, wife called EMS, noted the speech abnormality and weakness.  He was brought to the ER for further evaluation and treatment.  He states he also has some decreased sensation in the left arm and leg.  He denies any chest pain shortness of breath nausea vomiting vision changes headache.  He takes aspirin and Plavix.        PAST MEDICAL HISTORY  Active Ambulatory Problems     Diagnosis Date Noted   • Quadriceps weakness 04/08/2016   • ACL tear 04/08/2016   • Knee pain, right 04/08/2016   • S/P CABG x 3 04/18/2016   • Essential hypertension 04/18/2016   • Hyperlipemia 04/18/2016   • Hallux rigidus 07/11/2016   • Fracture, stress, metatarsal 07/11/2016   • Osteopenia determined by x-ray 07/11/2016   • Metatarsal stress fracture with nonunion 08/10/2016   • Left hip pain 08/26/2016   • Bursitis of left hip 08/26/2016   • Pulmonary embolism ([facility]) 10/12/2016   • DALILA (acute kidney injury) ([facility]) 10/12/2016   • Type 2 diabetes mellitus with hyperglycemia, without long-term current use of insulin ([facility]) 10/12/2016   • Ascending aorta dilatation ([facility]) 10/12/2016   • Pulmonary nodule, left 10/12/2016   • Right leg DVT ([facility]) 10/13/2016   • Acute renal failure ([facility]) 01/18/2017   • Hypotension 01/18/2017   • Dehydration 01/18/2017   • Hypercalcemia 01/18/2017   • Dysphagia 01/21/2017   • Syncope and collapse 02/24/2017   • Coronary artery disease involving native coronary artery of  native heart without angina pectoris 04/10/2017   • Normal pressure hydrocephalus (HCC) 11/09/2017   • Headache 11/15/2017   • Impaired mobility 11/15/2017   • Nausea & vomiting 11/15/2017   • Fall at home 11/15/2017   • Transient cerebral ischemia 11/20/2017   • PFO (patent foramen ovale) 11/21/2017   • Esophageal dysphagia 03/20/2018   • TIA (transient ischemic attack) 06/23/2019   • Acute appendicitis with localized peritonitis, without perforation or abscess 07/18/2019   • Right lower quadrant abdominal pain 07/18/2019   • History of CVA (cerebrovascular accident) 07/18/2019   • On statin therapy 11/04/2019   • 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 11/26/2019   • Diverticulitis 01/28/2020   • Hx of appendectomy 01/28/2020   • Acute abdominal pain 01/28/2020   • Gait abnormality 01/29/2020   • Weakness 04/27/2020   • CVA (cerebral vascular accident) ([facility]) 09/05/2020   • Acute CVA (cerebrovascular accident) ([facility]) 09/07/2020   • Cerebrovascular accident (CVA) due to embolism of cerebral artery ([facility]) 11/11/2020   • Closed displaced fracture of lateral malleolus of left fibula 02/10/2021   • Pain 03/08/2021   • Syndesmotic ankle sprain, left, subsequent encounter 03/08/2021   • Acute dyspnea 04/11/2022   • Stage 3a chronic kidney disease ([facility]) 04/11/2022   • Acute systolic heart failure (CMS/[facility]) 04/14/2022   • Acute on chronic diastolic heart failure ([facility]) 04/14/2022   • Pneumonia due to COVID-19 virus 10/16/2022   • Chronic combined systolic (congestive) and diastolic (congestive) heart failure ([facility]) 10/17/2022     Resolved Ambulatory Problems     Diagnosis Date Noted   • Change in hearing 11/15/2017   • Hypomagnesemia 04/28/2020   • Hyponatremia 04/28/2020     Past Medical History:   Diagnosis Date   • Arthritis    • Asthma    • Brain abscess    • Bruise of face    • Cancer ([facility])    • Cardiac disease    • Coronary artery disease    • Diabetes mellitus ([facility])    • Difficulty in swallowing    • Difficulty walking     • DM2 (diabetes mellitus, type 2) ([facility]) 10/12/2016   • Fracture, foot 2015   • Fracture, tibia and fibula Feb 2021   • Gout several years ago   • Hearing aid worn    • Hypertension    • Joint pain    • Low back pain several years ago   • Orbit fracture ([facility])    • Rash    • Spinal headache    • Stroke (cerebrum) ([facility]) 09/05/2020   • Tear of meniscus of knee torn ligaments         PAST SURGICAL HISTORY  Past Surgical History:   Procedure Laterality Date   • ANKLE OPEN REDUCTION INTERNAL FIXATION Left 2/16/2021    Procedure: Left ankle open reduction internal fixation with implants as needed;  Surgeon: Man Jenkins MD;  Location:  [facility];  Service: Orthopedics;  Laterality: Left;   • APPENDECTOMY N/A 7/18/2019    Procedure: APPENDECTOMY LAPAROSCOPIC;  Surgeon: Luis Carlos Rick Jr., MD;  Location: [facility] MAIN OR;  Service: General   • BRAIN SURGERY      BRAIN ABCESS 30 YR AGO   • CARDIAC CATHETERIZATION N/A 12/4/2020    Procedure: Patent foramen ovale closure ABBOTT;  Surgeon: Frank Pablo MD;  Location: [facility] CATH INVASIVE LOCATION;  Service: Cardiology;  Laterality: N/A;   • CARDIAC CATHETERIZATION N/A 12/4/2020    Procedure: Intracardiac echocardiogram;  Surgeon: Frank Pablo MD;  Location: [facility] CATH INVASIVE LOCATION;  Service: Cardiology;  Laterality: N/A;   • CARDIAC SURGERY     • COLONOSCOPY  2012    Ciciliano- normal per pt, no polyps    • CORONARY ARTERY BYPASS GRAFT      3 VESSEL   • ENDOSCOPY N/A 3/9/2017    Schatzki ring, dilated, LA Grade B esophagitis, HH, acquired duodenal stenosis   • ENDOSCOPY N/A 4/6/2018    candida, HH, torts, Schatzki ring, duodenal stenosis, dilatation perforemed, chronic inflammation   • ENDOSCOPY N/A 10/9/2019    White nummular lesions in esophageal mucosa, mild Schatzki ring, acquired duodenal stenosis, Path: Terrazas's, candida   • ENDOSCOPY N/A 1/8/2020    Procedure: ESOPHAGOGASTRODUODENOSCOPY with biopsies;  Surgeon: Rigoberto Walker  MD Jermain;  Location: [facility] ENDOSCOPY;  Service: Gastroenterology   • FACIAL FRACTURE SURGERY      to repair 6 broken bones   • HAND SURGERY     • ORBITAL FRACTURE OPEN REDUCTION INTERNAL FIXATION Right 2017    Procedure: RT ORBIT FLOOR FRACTURE REPAIR RIGHT [facility] FRACTURE REPAIR, RIGHT NASAL BONE FRACTURE CLOSED REDUCTION;  Surgeon: Edil Lester MD;  Location: [facility];  Service:    • ORIF FOOT FRACTURE Right 2016    Procedure: RIGHT SECOND METATARSAL OPEN REDUCTION INTERNAL FIXATION WITh GRAFT FROM HEEL ;  Surgeon: aMn Jenkins MD;  Location: [facility] OR [facility];  Service:    • PATENT FORAMEN OVALE CLOSURE     • SEPTOPLASTY     • SKIN CANCER EXCISION     • TONSILLECTOMY     • TRANSESOPHAGEAL ECHOCARDIOGRAM (STELLA)           FAMILY HISTORY  Family History   Problem Relation Age of Onset   • Heart disease Mother    • Hypertension Mother    • Stroke Mother    • Diverticulitis Mother    • Heart disease Father    • Hypertension Father    • Malig Hyperthermia Neg Hx          SOCIAL HISTORY  Social History     Socioeconomic History   • Marital status:    • Number of children: 2   • Years of education: 16   Tobacco Use   • Smoking status: Former     Packs/day: 3.00     Years: 5.00     Pack years: 15.00     Types: Cigarettes     Start date:      Quit date:      Years since quittin.8   • Smokeless tobacco: Never   • Tobacco comments:     Quit herson turkey   Substance and Sexual Activity   • Alcohol use: Yes     Alcohol/week: 2.0 standard drinks     Types: 1 Cans of beer, 1 Shots of liquor per week     Comment: RARELY    • Drug use: No   • Sexual activity: Not Currently     Partners: Female     Birth control/protection: Surgical         ALLERGIES  Other and Oxycodone        REVIEW OF SYSTEMS  Review of Systems     All systems reviewed and negative except for those discussed in HPI.       PHYSICAL EXAM  ED Triage Vitals [22 0846]   Temp Heart Rate Resp BP SpO2   98.7 °F (37.1  °C) 78 16 (!) 184/88 98 %      Temp src Heart Rate Source Patient Position BP Location FiO2 (%)   -- -- -- -- --         GENERAL: not distressed  HENT: atraumatic  EYES: no scleral icterus, PERRL, extract movements intact  CV: regular rhythm, regular rate  RESPIRATORY: normal effort CTA B  ABDOMEN: soft, nontender  MUSCULOSKELETAL: no deformity  NEURO: NIHSS:  0-->Alert: keenly responsive  0-->Answers both questions correctly  0-->Performs both tasks correctly  0=normal  0=No visual loss  0=Normal symmetric movement  2-->Some effort against gravity: limb cannot get to or maintain (if cued) 90 (or 45) degrees, drifts down to bed, but has some effort against gravity  0-->No drift: limb holds 90 (or 45) degrees for full 10 secs  2-->Some effort against gravity: limb cannot get to or maintain (if cued) 90 (or 45) degrees, drifts down to bed, but has some effort against gravity  0-->No drift: limb holds 90 (or 45) degrees for full 10 secs  0=Absent  1=Mild to moderate sensory loss; patient feels pinprick is less sharp or is dull on the affected side; there is a loss of superficial pain with pinprick but patient is aware He is being touched  0=No aphasia, normal  1=Mild to moderate, patient slurs at least some words and at worst, can be understood with some difficulty  0=No abnormality  Total score: 6    SKIN: warm, dry    Vital signs and nursing notes reviewed.          LAB RESULTS  Recent Results (from the past 24 hour(s))   Comprehensive Metabolic Panel    Collection Time: 11/06/22  9:02 AM    Specimen: Blood   Result Value Ref Range    Glucose 193 (H) 65 - 99 mg/dL    BUN 20 8 - 23 mg/dL    Creatinine 1.21 0.76 - 1.27 mg/dL    Sodium 140 136 - 145 mmol/L    Potassium 5.1 3.5 - 5.2 mmol/L    Chloride 106 98 - 107 mmol/L    CO2 23.8 22.0 - 29.0 mmol/L    Calcium 9.5 8.6 - 10.5 mg/dL    Total Protein 6.5 6.0 - 8.5 g/dL    Albumin 3.80 3.50 - 5.20 g/dL    ALT (SGPT) 14 1 - 41 U/L    AST (SGOT) 26 1 - 40 U/L    Alkaline  Phosphatase 84 39 - 117 U/L    Total Bilirubin 0.7 0.0 - 1.2 mg/dL    Globulin 2.7 gm/dL    A/G Ratio 1.4 g/dL    BUN/Creatinine Ratio 16.5 7.0 - 25.0    Anion Gap 10.2 5.0 - 15.0 mmol/L    eGFR 59.8 (L) >60.0 mL/min/1.73   Protime-INR    Collection Time: 11/06/22  9:02 AM    Specimen: Blood   Result Value Ref Range    Protime 13.7 11.7 - 14.2 Seconds    INR 1.04 0.90 - 1.10   aPTT    Collection Time: 11/06/22  9:02 AM    Specimen: Blood   Result Value Ref Range    PTT <20.0 (L) 22.7 - 35.4 seconds   Troponin    Collection Time: 11/06/22  9:02 AM    Specimen: Blood   Result Value Ref Range    Troponin T <0.010 0.000 - 0.030 ng/mL   Type & Screen    Collection Time: 11/06/22  9:02 AM    Specimen: Blood   Result Value Ref Range    ABO Type A     RH type Positive     Antibody Screen Negative     T&S Expiration Date 11/9/2022 11:59:59 PM    Green Top (Gel)    Collection Time: 11/06/22  9:02 AM   Result Value Ref Range    Extra Tube Hold for add-ons.    Lavender Top    Collection Time: 11/06/22  9:02 AM   Result Value Ref Range    Extra Tube hold for add-on    Gold Top - SST    Collection Time: 11/06/22  9:02 AM   Result Value Ref Range    Extra Tube Hold for add-ons.    Light Blue Top    Collection Time: 11/06/22  9:02 AM   Result Value Ref Range    Extra Tube Hold for add-ons.    CBC Auto Differential    Collection Time: 11/06/22  9:02 AM    Specimen: Blood   Result Value Ref Range    WBC 8.10 3.40 - 10.80 10*3/mm3    RBC 4.27 4.14 - 5.80 10*6/mm3    Hemoglobin 13.1 13.0 - 17.7 g/dL    Hematocrit 39.1 37.5 - 51.0 %    MCV 91.6 79.0 - 97.0 fL    MCH 30.7 26.6 - 33.0 pg    MCHC 33.5 31.5 - 35.7 g/dL    RDW 13.5 12.3 - 15.4 %    RDW-SD 45.2 37.0 - 54.0 fl    MPV 11.1 6.0 - 12.0 fL    Platelets 182 140 - 450 10*3/mm3    Neutrophil % 71.3 42.7 - 76.0 %    Lymphocyte % 19.0 (L) 19.6 - 45.3 %    Monocyte % 6.2 5.0 - 12.0 %    Eosinophil % 2.8 0.3 - 6.2 %    Basophil % 0.6 0.0 - 1.5 %    Neutrophils, Absolute 5.77 1.70 - 7.00  10*3/mm3    Lymphocytes, Absolute 1.54 0.70 - 3.10 10*3/mm3    Monocytes, Absolute 0.50 0.10 - 0.90 10*3/mm3    Eosinophils, Absolute 0.23 0.00 - 0.40 10*3/mm3    Basophils, Absolute 0.05 0.00 - 0.20 10*3/mm3   POC Glucose Once    Collection Time: 11/06/22  9:05 AM    Specimen: Blood   Result Value Ref Range    Glucose 174 (H) 70 - 130 mg/dL   ECG 12 Lead Stroke Evaluation    Collection Time: 11/06/22  9:57 AM   Result Value Ref Range    QT Interval 382 ms       Ordered the above labs and independently reviewed the results.        RADIOLOGY  XR Chest 1 View   Final Result   No focal pulmonary consolidation. Borderline heart size.   Follow-up as clinical indications persist.       This report was finalized on 11/6/2022 10:22 AM by Dr. Prabhjot Mcarthur M.D.          CT Head Without Contrast Stroke Protocol   Final Result       Critical test result. Intraparenchymal hemorrhage involving the right   thalamus, with extension to the right lateral ventricle. Close follow-up   recommended to characterize change. Prominent periventricular white   matter chronic small vessel ischemic change.       Paranasal sinus disease with fluid level in the right maxillary sinus   that may reflect acute sinusitis.       Discussed by telephone with Dr. Ba at time of interpretation,   0915, 11/06/2022.       This report was finalized on 11/6/2022 9:24 AM by Dr. Prabhjot Mcarthur M.D.              I ordered the above noted radiological studies. Reviewed by me and discussed with radiologist.  See dictation for official radiology interpretation.    PROCEDURES  Critical Care  Performed by: Joanna To PA  Authorized by: Neo Doty MD     Critical care provider statement:     Critical care time (minutes):  48    Critical care time was exclusive of:  Separately billable procedures and treating other patients and teaching time    Critical care was necessary to treat or prevent imminent or life-threatening deterioration of  the following conditions:  CNS failure or compromise    Critical care was time spent personally by me on the following activities:  Development of treatment plan with patient or surrogate, discussions with consultants, evaluation of patient's response to treatment, examination of patient, obtaining history from patient or surrogate, review of old charts, re-evaluation of patient's condition, pulse oximetry, ordering and review of radiographic studies, ordering and review of laboratory studies and ordering and performing treatments and interventions            MEDICATIONS GIVEN IN ER  Medications   sodium chloride 0.9 % flush 10 mL (has no administration in time range)   niCARdipine (CARDENE) 25 mg in 250 mL NS infusion kit (10 mg/hr Intravenous New Bag 11/6/22 1014)   sodium chloride 0.9 % flush 10 mL (has no administration in time range)   sodium chloride 0.9 % flush 10 mL (has no administration in time range)   acetaminophen (TYLENOL) tablet 650 mg (has no administration in time range)     Or   acetaminophen (TYLENOL) suppository 650 mg (has no administration in time range)   HYDROcodone-acetaminophen (NORCO) 5-325 MG per tablet 1 tablet (has no administration in time range)   fentaNYL citrate (PF) (SUBLIMAZE) injection 25 mcg (has no administration in time range)   ondansetron (ZOFRAN) injection 4 mg (has no administration in time range)   bisacodyl (DULCOLAX) suppository 10 mg (has no administration in time range)   aluminum-magnesium hydroxide-simethicone (MAALOX MAX) 400-400-40 MG/5ML suspension 7.5 mL (has no administration in time range)   docusate sodium (COLACE) capsule 100 mg (has no administration in time range)   dextrose (GLUTOSE) oral gel 15 g (has no administration in time range)   dextrose (D50W) (25 g/50 mL) IV injection 25 g (has no administration in time range)   glucagon (human recombinant) (GLUCAGEN DIAGNOSTIC) injection 1 mg (has no administration in time range)   insulin lispro (ADMELOG)  injection 0-14 Units (has no administration in time range)   potassium chloride (K-DUR,KLOR-CON) ER tablet 40 mEq (has no administration in time range)     Or   potassium chloride (KLOR-CON) packet 40 mEq (has no administration in time range)     Or   potassium chloride 10 mEq in 100 mL IVPB (has no administration in time range)   Magnesium Sulfate 2 gram Bolus, followed by 8 gram infusion (total Mg dose 10 grams)- Mg less than or equal to 1mg/dL (has no administration in time range)     Or   Magnesium Sulfate 2 gram / 50mL Infusion (GIVE X 3 BAGS TO EQUAL 6GM TOTAL DOSE) - Mg 1.1 - 1.5 mg/dl (has no administration in time range)     Or   Magnesium Sulfate 4 gram infusion- Mg 1.6-1.9 mg/dL (has no administration in time range)   potassium & sodium phosphates (PHOS-NAK) 280-160-250 MG packet - for Phosphorus less than 1.25 mg/dL (has no administration in time range)     Or   potassium & sodium phosphates (PHOS-NAK) 280-160-250 MG packet - for Phosphorus 1.25 - 2.5 mg/dL (has no administration in time range)   calcium gluconate 1g/50ml 0.675% NaCl IV SOLN (has no administration in time range)     And   calcium gluconate 6 g in sodium chloride 0.9 % 500 mL IVPB (has no administration in time range)             PROGRESS AND CONSULTS    DDX includes but not limited to acute CVA, TIA, intracranial hemorrhage, metabolic encephalopathy    ED Course as of 11/06/22 1056   Sun Nov 06, 2022   0901 Dr. Doty and I have viewed the patient's CT images on PACs which show a right-sided intra cranial hemorrhage.  Call out to neurosurgery, Bryn ordered for hypertension [KA]   0904 I discussed the patient with Dr. Ba, stroke neurology.  We discussed the patient's history presentation, NIH of 6.  We will perform CTA head neck and CT perfusion for further evaluation of acute stroke symptoms.  Patient not a tPA candidate due to last known well 12 hours ago but may be a candidate for intervention [KA]   0909 . [KA]   0936 I  discussed the patient with Dr. Mendez, neurosurgery.  He has reviewed the patient's images.  No acute neurosurgical intervention at this time.  He recommends admission to the ICU, is agreeable with initiation of Cardene, discontinue aspirin and Plavix.  He will see patient. [KA]   1024 Dr. Mendez has evaluated the patient at the bedside [KA]   1029 I discussed the patient with Dr. Sanchez, intensivist in the ICU.  We discussed patient's history presentation labs and imaging vital signs interventions thus far, he agrees to admit to the ICU for further evaluation and treatment. [KA]   1046 Glucose(!): 193 [KA]   1046 Creatinine: 1.21 [KA]   1046 Troponin T: <0.010 [KA]   1046 INR: 1.04 [KA]   1046 Glucose(!): 174 [KA]   1046 WBC: 8.10 [KA]   1046 Hemoglobin: 13.1 [KA]   1048 I reassessed the patient.  He is still awake and alert, answering questions and following commands.  I counseled him and family member at the bedside about his work-up thus far including all of his labs chest x-ray and EKG. [KA]      ED Course User Index  [KA] Joanna To PA             Patient was placed in face mask in first look. Patient was wearing facemask each time I entered the room and throughout our encounter. I wore protective equipment throughout this patient encounter including a face mask, eye shield and gloves. Hand hygiene was performed before donning protective equipment and after removal when leaving the room.        DIAGNOSIS  Final diagnoses:   Intraparenchymal hemorrhage of brain (HCC)   Hypertensive emergency         Latest Documented Vital Signs:  As of 10:56 EST  BP- 153/68 HR- 70 Temp- 98.7 °F (37.1 °C) O2 sat- 94%       Joanna To PA  11/06/22 1053

## 2022-11-07 NOTE — PLAN OF CARE
Goal Outcome Evaluation:  Plan of Care Reviewed With: patient           Outcome Evaluation: Pt admit from home when found face down on couch. Per chart history of brain events and pt reports mini strokes.  Pt reports compeles ADL on own until last week. Pt seen by OT and is assist of 2 to move to sit EOB.  Pt with impaired L UE AROM, coordination, sensation and strength.  Assist with most ADL at this time.  Will cont skilled OT and anticipate rehab at d/c

## 2022-11-07 NOTE — THERAPY EVALUATION
Acute Care - Speech Language Pathology   Swallow Initial Evaluation [facility]     Patient Name: Eugenio Joe  : 1940  MRN: [id number]  Today's Date: 2022               Admit Date: 2022    Visit Dx:     ICD-10-CM ICD-9-CM   1. Intraparenchymal hemorrhage of brain ([facility])  I61.9 431   2. Hypertensive emergency  I16.1 401.9     Patient Active Problem List   Diagnosis   • Quadriceps weakness   • ACL tear   • Knee pain, right   • S/P CABG x 3   • Essential hypertension   • Hyperlipemia   • Hallux rigidus   • Fracture, stress, metatarsal   • Osteopenia determined by x-ray   • Metatarsal stress fracture with nonunion   • Left hip pain   • Bursitis of left hip   • Pulmonary embolism ([facility])   • DALILA (acute kidney injury) ([facility])   • Type 2 diabetes mellitus with hyperglycemia, without long-term current use of insulin ([facility])   • Ascending aorta dilatation ([facility])   • Pulmonary nodule, left   • Right leg DVT ([facility])   • Acute renal failure ([facility])   • Hypotension   • Dehydration   • Hypercalcemia   • Dysphagia   • Syncope and collapse   • Coronary artery disease involving native coronary artery of native heart without angina pectoris   • Normal pressure hydrocephalus ([facility])   • Headache   • Impaired mobility   • Nausea & vomiting   • Fall at home   • Transient cerebral ischemia   • PFO (patent foramen ovale)   • Esophageal dysphagia   • TIA (transient ischemic attack)   • Acute appendicitis with localized peritonitis, without perforation or abscess   • Right lower quadrant abdominal pain   • History of CVA (cerebrovascular accident)   • On statin therapy   • 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   • Diverticulitis   • Hx of appendectomy   • Acute abdominal pain   • Gait abnormality   • Weakness   • CVA (cerebral vascular accident) ([facility])   • Acute CVA (cerebrovascular accident) ([facility])   • Cerebrovascular accident (CVA) due to embolism of cerebral artery ([facility])   • Closed displaced fracture of lateral malleolus of left  fibula   • Pain   • Syndesmotic ankle sprain, left, subsequent encounter   • Acute dyspnea   • Stage 3a chronic kidney disease (HCC)   • Acute systolic heart failure (CMS/HCC)   • Acute on chronic diastolic heart failure (HCC)   • Pneumonia due to COVID-19 virus   • Chronic combined systolic (congestive) and diastolic (congestive) heart failure (HCC)   • Intraparenchymal hemorrhage of brain (HCC)     Past Medical History:   Diagnosis Date   • Arthritis    • Asthma    • Brain abscess     at about age 44   • Bruise of face    • Bursitis of left hip    • Cancer ([facility])     PT STATES IN HIS SWEAT GLAND    • Cardiac disease    • Coronary artery disease     CABG 2012   • Diabetes mellitus ([facility])    • Difficulty in swallowing     LIQUIDS   • Difficulty walking    • Diverticulitis    • DM2 (diabetes mellitus, type 2) ([facility]) 10/12/2016   • Fracture, foot 2015    Dr Pisano   • Fracture, stress, metatarsal    • Fracture, tibia and fibula Feb 2021    Dr Pisano   • Gout several years ago    medication  working   • Hearing aid worn     bilateral   • Hx of appendectomy    • Hyperlipemia    • Hypertension    • Joint pain     or swelling   • Low back pain several years ago   • Metatarsal stress fracture with nonunion    • Normal pressure hydrocephalus ([facility])    • Orbit fracture ([facility])    • Pulmonary embolism ([facility])     OCT 2016 - AFTER FOOT SURGERY   • Rash     ARM-    • Spinal headache     AFTER SPINAL TAP - NO BLOOD PATCH   • Stroke (cerebrum) ([facility]) 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   • Syndesmotic ankle sprain, left, subsequent encounter    • Tear of meniscus of knee torn ligaments   • TIA (transient ischemic attack)     MULTIPLE     Past Surgical History:   Procedure Laterality Date   • ANKLE OPEN REDUCTION INTERNAL FIXATION Left 2/16/2021    Procedure: Left ankle open reduction internal fixation with implants as needed;  Surgeon: Man Jenkins MD;  Location: [facility] OR [facility] [place] [facility];  Service: Orthopedics;  Laterality: Left;    • APPENDECTOMY N/A 7/18/2019    Procedure: APPENDECTOMY LAPAROSCOPIC;  Surgeon: Luis Carlos Rick Jr., MD;  Location: [facility] MAIN OR;  Service: General   • BRAIN SURGERY      BRAIN ABCESS 30 YR AGO   • CARDIAC CATHETERIZATION N/A 12/4/2020    Procedure: Patent foramen ovale closure ABBOTT;  Surgeon: Frank Pablo MD;  Location: [facility] CATH INVASIVE LOCATION;  Service: Cardiology;  Laterality: N/A;   • CARDIAC CATHETERIZATION N/A 12/4/2020    Procedure: Intracardiac echocardiogram;  Surgeon: Frank Pablo MD;  Location: [facility] CATH INVASIVE LOCATION;  Service: Cardiology;  Laterality: N/A;   • CARDIAC SURGERY     • COLONOSCOPY  2012    Ciciliano- normal per pt, no polyps    • CORONARY ARTERY BYPASS GRAFT      3 VESSEL   • ENDOSCOPY N/A 3/9/2017    Schatzki ring, dilated, LA Grade B esophagitis, HH, acquired duodenal stenosis   • ENDOSCOPY N/A 4/6/2018    candida, HH, torts, Schatzki ring, duodenal stenosis, dilatation perforemed, chronic inflammation   • ENDOSCOPY N/A 10/9/2019    White nummular lesions in esophageal mucosa, mild Schatzki ring, acquired duodenal stenosis, Path: Terrazas's, candida   • ENDOSCOPY N/A 1/8/2020    Procedure: ESOPHAGOGASTRODUODENOSCOPY with biopsies;  Surgeon: Rigoberto Walker MD;  Location: [facility] ENDOSCOPY;  Service: Gastroenterology   • FACIAL FRACTURE SURGERY      to repair 6 broken bones   • HAND SURGERY     • ORBITAL FRACTURE OPEN REDUCTION INTERNAL FIXATION Right 1/13/2017    Procedure: RT ORBIT FLOOR FRACTURE REPAIR RIGHT Z[facility] FRACTURE REPAIR, RIGHT NASAL BONE FRACTURE CLOSED REDUCTION;  Surgeon: Edil Lester MD;  Location: [facility] OR [facility];  Service:    • ORIF FOOT FRACTURE Right 9/9/2016    Procedure: RIGHT SECOND METATARSAL OPEN REDUCTION INTERNAL FIXATION WITh GRAFT FROM HEEL ;  Surgeon: Man Jenkins MD;  Location: [facility] OR [facility];  Service:    • PATENT FORAMEN OVALE CLOSURE     • SEPTOPLASTY     • SKIN CANCER EXCISION     • TONSILLECTOMY      • TRANSESOPHAGEAL ECHOCARDIOGRAM (STELLA)         SLP Recommendation and Plan  SLP Swallowing Diagnosis: suspected pharyngeal dysphagia (11/07/22 1000)  SLP Diet Recommendation: NPO (11/07/22 1000)     SLP Rec. for Method of Medication Administration: meds via alternate route (11/07/22 1000)     Monitor for Signs of Aspiration: yes, notify SLP if any concerns (11/07/22 1000)  Recommended Diagnostics: reassess via VFSS ([facility]) (11/07/22 1000)  Swallow Criteria for Skilled Therapeutic Interventions Met: demonstrates skilled criteria (11/07/22 1000)        Therapy Frequency (Swallow): PRN (11/07/22 1000)                                           Plan of Care Reviewed With: patient  Outcome Evaluation: Patient seen for clinical swallow assessment. Pt with hx of pharyngeal dysphagia with varying levels of severity. VFSS completed 4/22 with recs for NPO, but patient selected a modified PO diet with known aspiration risks. He attended OP dysphagia therapy and it was noted that he eventually upgraded self to thins without SLP guidance. Clinical swallow assessment 10/17 recommended continuing with regular and thins per patient wishes. Pt now admitted with acute R thalamic hemorrhage. Voice weak. Oral mech exam was otherwise unremarkable. Pt c/o neck pain 3/10, will track to L, but won't move his head. Reduce laryngeal elevation upon palpation. Wet cough with ice and puree after multiple swallows. Breathing appeared more congested after ice chip. SLP recs NPO, will proceed with instrumental assessment, but suspect severe pharyngeal dysphagia. Will need to discuss GOC with patient and family.      SWALLOW EVALUATION (last 72 hours)     SLP Adult Swallow Evaluation     Row Name 11/07/22 1000                   Rehab Evaluation    Document Type evaluation  -SH        Patient Effort fair  -SH           General Information    Patient Profile Reviewed yes  -SH        Pertinent History Of Current Problem Hx dysphagia. Acute R thalamic  bleed.  -        Current Method of Nutrition NPO  -        Precautions/Limitations, Vision neglect, left  -        Precautions/Limitations, Hearing WFL;for purposes of eval  -        Prior Level of Function-Communication unknown  -        Prior Level of Function-Swallowing other (see comments);NPO  Pt ate for QofL  -        Plans/Goals Discussed with patient  -        Barriers to Rehab medically complex;cognitive status  -           Pain    Additional Documentation Pain Scale: Numbers Pre/Post-Treatment (Group)  -           Pain Scale: Numbers Pre/Post-Treatment    Pretreatment Pain Rating 3/10  -        Pain Location - neck  -           Oral Motor Structure and Function    Dentition Assessment natural, present and adequate  -           Oral Musculature and Cranial Nerve Assessment    Oral Motor General Assessment WFL;vocal impairment  -           Clinical Swallow Eval    Clinical Swallow Evaluation Summary Patient seen for clinical swallow assessment. Pt with hx of pharyngeal dysphagia with varying levels of severity. VFSS completed 4/22 with recs for NPO, but patient selected a modified PO diet with known aspiration risks. He attended OP dysphagia therapy and it was noted that he eventually upgraded self to thins without SLP guidance. Clinical swallow assessment 10/17/22 recommended continuing with regular and thins per patient wishes. Pt now admitted with acute R thalamic hemorrhage. Voice weak. Oral mech exam was otherwise unremarkable. Pt c/o neck pain 3/10, will track to L, but won't move his head. Reduce laryngeal elevation upon palpation. Wet cough with ice and puree after multiple swallows. Breathing appeared more congested after ice chip. SLP recs NPO, will proceed with instrumental assessment, but suspect severe pharyngeal dysphagia. Will need to discuss GOC with patient and family.  -           SLP Evaluation Clinical Impression    SLP Swallowing Diagnosis suspected pharyngeal  dysphagia  -        Swallow Criteria for Skilled Therapeutic Interventions Met demonstrates skilled criteria  -           Recommendations    Therapy Frequency (Swallow) PRN  -        SLP Diet Recommendation NPO  -        Recommended Diagnostics reassess via VFSS ([facility])  -        Oral Care Recommendations Oral Care BID/PRN  -        SLP Rec. for Method of Medication Administration meds via alternate route  -        Monitor for Signs of Aspiration yes;notify SLP if any concerns  -           Swallow Goals (SLP)    Swallow STGs diet tolerance goal selection (SLP)  -        Diet Tolerance Goal Selection (SLP) Patient will tolerate trials of  -SH           (STG) Patient will tolerate trials of    Consistencies Trialed (Tolerate trials) thin liquids  ICE  -SH        Desired Outcome (Tolerate trials) without signs/symptoms of aspiration  -        [place] (Tolerate trials) independently (over 90% accuracy)  -              User Key  (r) = Recorded By, (t) = Taken By, (c) = Cosigned By    Initials Name Effective Dates    Sasha Michelle MS CCC-SLP 06/16/21 -                 EDUCATION  The patient has been educated in the following areas:   Dysphagia (Swallowing Impairment).        SLP GOALS     Row Name 11/07/22 1000             (STG) Patient will tolerate trials of    Consistencies Trialed (Tolerate trials) thin liquids  ICE  -SH      Desired Outcome (Tolerate trials) without signs/symptoms of aspiration  -      [place] (Tolerate trials) independently (over 90% accuracy)  -            User Key  (r) = Recorded By, (t) = Taken By, (c) = Cosigned By    Initials Name Provider Type    Sasha Michelle MS CCC-SLP Speech and Language Pathologist                   Time Calculation:    Time Calculation- SLP     Row Name 11/07/22 5949             Time Calculation- [facility]    SLP Start Time 0800  -      SLP Received On 11/07/22  -         Untimed Charges    [id number] Eval Oral Pharyng Swallow Minutes  75  -SH         Total Minutes    Untimed Charges Total Minutes 75  -SH       Total Minutes 75  -SH            User Key  (r) = Recorded By, (t) = Taken By, (c) = Cosigned By    Initials Name Provider Type    Sasha Michelle MS CCC-SLP Speech and Language Pathologist                Therapy Charges for Today     Code Description Service Date Service Provider Modifiers Qty    [id number] HC ST EVAL ORAL PHARYNG SWALLOW 5 11/7/2022 Sasha Bruno MS CCC-SLP GN 1               Sasha Bruno MS CCC-SLP  11/7/2022

## 2022-11-07 NOTE — THERAPY EVALUATION
Patient Name: Eugenio Joe  : 1940    MRN: [id number]                              Today's Date: 2022       Admit Date: 2022    Visit Dx:     ICD-10-CM ICD-9-CM   1. Intraparenchymal hemorrhage of brain ([facility])  I61.9 431   2. Hypertensive emergency  I16.1 401.9     Patient Active Problem List   Diagnosis   • Quadriceps weakness   • ACL tear   • Knee pain, right   • S/P CABG x 3   • Essential hypertension   • Hyperlipemia   • Hallux rigidus   • Fracture, stress, metatarsal   • Osteopenia determined by x-ray   • Metatarsal stress fracture with nonunion   • Left hip pain   • Bursitis of left hip   • Pulmonary embolism ([facility])   • DALILA (acute kidney injury) ([facility])   • Type 2 diabetes mellitus with hyperglycemia, without long-term current use of insulin ([facility])   • Ascending aorta dilatation ([facility])   • Pulmonary nodule, left   • Right leg DVT ([facility])   • Acute renal failure ([facility])   • Hypotension   • Dehydration   • Hypercalcemia   • Dysphagia   • Syncope and collapse   • Coronary artery disease involving native coronary artery of native heart without angina pectoris   • Normal pressure hydrocephalus ([facility])   • Headache   • Impaired mobility   • Nausea & vomiting   • Fall at home   • Transient cerebral ischemia   • PFO (patent foramen ovale)   • Esophageal dysphagia   • TIA (transient ischemic attack)   • Acute appendicitis with localized peritonitis, without perforation or abscess   • Right lower quadrant abdominal pain   • History of CVA (cerebrovascular accident)   • On statin therapy   • 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   • Diverticulitis   • Hx of appendectomy   • Acute abdominal pain   • Gait abnormality   • Weakness   • CVA (cerebral vascular accident) ([facility])   • Acute CVA (cerebrovascular accident) ([facility])   • Cerebrovascular accident (CVA) due to embolism of cerebral artery ([facility])   • Closed displaced fracture of lateral malleolus of left fibula   • Pain   • Syndesmotic ankle sprain, left, subsequent  encounter   • Acute dyspnea   • Stage 3a chronic kidney disease (HCC)   • Acute systolic heart failure (CMS/HCC)   • Acute on chronic diastolic heart failure (HCC)   • Pneumonia due to COVID-19 virus   • Chronic combined systolic (congestive) and diastolic (congestive) heart failure (HCC)   • Intraparenchymal hemorrhage of brain (HCC)     Past Medical History:   Diagnosis Date   • Arthritis    • Asthma    • Brain abscess     at about age 44   • Bruise of face    • Bursitis of left hip    • Cancer (HCC)     PT STATES IN HIS SWEAT GLAND    • Cardiac disease    • Coronary artery disease     CABG 2012   • Diabetes mellitus (HCC)    • Difficulty in swallowing     LIQUIDS   • Difficulty walking    • Diverticulitis    • DM2 (diabetes mellitus, type 2) (HCC) 10/12/2016   • Fracture, foot 2015    Dr Pisano   • Fracture, stress, metatarsal    • Fracture, tibia and fibula Feb 2021    Dr Pisano   • Gout several years ago    medication  working   • Hearing aid worn     bilateral   • Hx of appendectomy    • Hyperlipemia    • Hypertension    • Joint pain     or swelling   • Low back pain several years ago   • Metatarsal stress fracture with nonunion    • Normal pressure hydrocephalus (HCC)    • Orbit fracture (HCC)    • Pulmonary embolism (HCC)     OCT 2016 - AFTER FOOT SURGERY   • Rash     ARM-    • Spinal headache     AFTER SPINAL TAP - NO BLOOD PATCH   • Stroke (cerebrum) ([facility]) 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   • Syndesmotic ankle sprain, left, subsequent encounter    • Tear of meniscus of knee torn ligaments   • TIA (transient ischemic attack)     MULTIPLE     Past Surgical History:   Procedure Laterality Date   • ANKLE OPEN REDUCTION INTERNAL FIXATION Left 2/16/2021    Procedure: Left ankle open reduction internal fixation with implants as needed;  Surgeon: Man Jenkins MD;  [place] [facility] [place] [facility] [place]  Service: Orthopedics;  Laterality: Left;   • APPENDECTOMY N/A 7/18/2019    Procedure: APPENDECTOMY  LAPAROSCOPIC;  Surgeon: Luis Carlos Rick Jr., MD;  Location: [facility] MAIN OR;  Service: General   • BRAIN SURGERY      BRAIN ABCESS 30 YR AGO   • CARDIAC CATHETERIZATION N/A 12/4/2020    Procedure: Patent foramen ovale closure ABBOTT;  Surgeon: Frank Pablo MD;  Location: [facility] CATH INVASIVE LOCATION;  Service: Cardiology;  Laterality: N/A;   • CARDIAC CATHETERIZATION N/A 12/4/2020    Procedure: Intracardiac echocardiogram;  Surgeon: Frank Pablo MD;  Location: [facility] CATH INVASIVE LOCATION;  Service: Cardiology;  Laterality: N/A;   • CARDIAC SURGERY     • COLONOSCOPY  2012    Ciciliano- normal per pt, no polyps    • CORONARY ARTERY BYPASS GRAFT      3 VESSEL   • ENDOSCOPY N/A 3/9/2017    Schatzki ring, dilated, LA Grade B esophagitis, HH, acquired duodenal stenosis   • ENDOSCOPY N/A 4/6/2018    candida, HH, torts, Schatzki ring, duodenal stenosis, dilatation perforemed, chronic inflammation   • ENDOSCOPY N/A 10/9/2019    White nummular lesions in esophageal mucosa, mild Schatzki ring, acquired duodenal stenosis, Path: Terrazas's, candida   • ENDOSCOPY N/A 1/8/2020    Procedure: ESOPHAGOGASTRODUODENOSCOPY with biopsies;  Surgeon: Rigoberto Walker MD;  Location: [facility] ENDOSCOPY;  Service: Gastroenterology   • FACIAL FRACTURE SURGERY      to repair 6 broken bones   • HAND SURGERY     • ORBITAL FRACTURE OPEN REDUCTION INTERNAL FIXATION Right 1/13/2017    Procedure: RT ORBIT FLOOR FRACTURE REPAIR RIGHT [facility] FRACTURE REPAIR, RIGHT NASAL BONE FRACTURE CLOSED REDUCTION;  Surgeon: Edil Lester MD;  Location: [facility] OR [facility];  Service:    • ORIF FOOT FRACTURE Right 9/9/2016    Procedure: RIGHT SECOND METATARSAL OPEN REDUCTION INTERNAL FIXATION WITh GRAFT FROM HEEL ;  Surgeon: Man Jenkins MD;  [place] [facility] [place] [facility] [place]  [place]:    • PATENT FORAMEN OVALE CLOSURE     • SEPTOPLASTY     • SKIN CANCER EXCISION     • TONSILLECTOMY     • TRANSESOPHAGEAL ECHOCARDIOGRAM (STELLA)        General  Information     Row Name 11/07/22 1127          OT Time and Intention    Document Type evaluation;therapy note (daily note)  -LE     Mode of Treatment occupational therapy;physical therapy;co-treatment  level of assist, activity tolerance.  -     Row Name 11/07/22 1127          General Information    Patient Profile Reviewed yes  -LE     Prior Level of Function independent:;ADL's;transfer  -LE     Existing Precautions/Restrictions fall;NPO;seizures  -     Row Name 11/07/22 1127          Living Environment    People in Home spouse  -     Row Name 11/07/22 1127          Cognition    Orientation Status (Cognition) oriented to;person;place  pt using humor and partial smile during session  -     Row Name 11/07/22 1127          Safety Issues, Functional Mobility    Impairments Affecting Function (Mobility) balance;cognition;postural/trunk control;range of motion (ROM)  -LE           User Key  (r) = Recorded By, (t) = Taken By, (c) = Cosigned By    Initials Name Provider Type    Chayito Cho OTR Occupational Therapist                 Mobility/ADL's     Row Name 11/07/22 1129          Bed Mobility    Bed Mobility scooting/bridging  -LE     Scooting/Bridging [place] (Bed Mobility) maximum assist (25% patient effort);2 person assist;nonverbal cues (demo/gesture);verbal cues  -LE     Supine-Sit [place] (Bed Mobility) maximum assist (25% patient effort);2 person assist;verbal cues;nonverbal cues (demo/gesture)  -LE     Sit-Supine [place] (Bed Mobility) maximum assist (25% patient effort);2 person assist;nonverbal cues (demo/gesture);verbal cues  -LE     Bed Mobility, Safety Issues decreased use of arms for pushing/pulling;decreased use of legs for bridging/pushing;impaired trunk control for bed mobility  -LE     Assistive Device (Bed Mobility) bed rails;head of bed elevated;draw sheet  -LE     Comment, (Bed Mobility) non skid socks.  -     Row Name 11/07/22 1129          Transfers    Comment,  (Transfers) NT due to sitting balance.  -[facility] Name 11/07/22 1129          Functional Mobility    Functional Mobility- Comment NT  -[facility] Name 11/07/22 1129          Activities of Daily Living    BADL Assessment/Intervention toileting;feeding;lower body dressing  -[facility] Name 11/07/22 1129          Toileting Assessment/Training    [place] Level (Toileting) dependent (less than 25% patient effort)  -[facility] Name 11/07/22 1129          Self-Feeding Assessment/Training    Comment, (Feeding) NPO  -[facility] Name 11/07/22 1129          Lower Body Dressing Assessment/Training    [place] Level (Lower Body Dressing) doff;don;socks;dependent (less than 25% patient effort)  -     Position (Lower Body Dressing) supine  -           User Key  (r) = Recorded By, (t) = Taken By, (c) = Cosigned By    Initials Name Provider Type    Chayito Cho OTR Occupational Therapist               Obj/Interventions     [facility] Name 11/07/22 1131          Sensory Assessment (Somatosensory)    Sensory Subjective Reports burning;hypersensitivity;numbness;tingling  -     Sensory Assessment L UE  -LE     Row Name 11/07/22 1131          Vision Assessment/Intervention    Vision Assessment Comment denies blurred or diplopia  -LE     Row Name 11/07/22 1131          Range of Motion Comprehensive    Comment, General Range of Motion R UE moving freely.  L UE AROM: shld trace, elbow less than 1/4, wrist 1/4, supination 1/2, pronation 2/3, fingers 2/3.  light grasp. pt pulls with L UE to hold self up at EOB.  -[facility] Name 11/07/22 1131          Balance    Balance Assessment sitting static balance  -LE     Static Sitting Balance moderate assist;2-person assist;non-verbal cues (demo/gesture);verbal cues  -LE     Position, Sitting Balance sitting edge of bed;supported  -LE           User Key  (r) = Recorded By, (t) = Taken By, (c) = Cosigned By    Initials Name Provider Type    Chayito Cho OTR Occupational Therapist                Goals/Plan     Row Name 11/07/22 1136          Transfer Goal 1 (OT)    Activity/Assistive Device (Transfer Goal 1, OT) sit-to-stand/stand-to-sit;bed-to-chair/chair-to-bed;toilet  -LE     [place] Level/Cues Needed (Transfer Goal 1, OT) maximum assist (25-49% patient effort)  -LE     Time Frame (Transfer Goal 1, OT) 2 weeks  -LE     Progress/Outcome (Transfer Goal 1, OT) goal ongoing  -LE     Row Name 11/07/22 1136          Grooming Goal 1 (OT)    Activity/Device (Grooming Goal 1, OT) oral care;wash face, hands  -LE     [place] (Grooming Goal 1, OT) set-up required;verbal cues required;minimum assist (75% or more patient effort)  -LE     Time Frame (Grooming Goal 1, OT) 2 weeks  -LE     Progress/Outcome (Grooming Goal 1, OT) goal ongoing  -LE     Row Name 11/07/22 1136          ROM Goal 1 (OT)    ROM Goal 1 (OT) L UE AAROM 2 sets of 10 and ed on sensation awareness to increase use of L UE as gross active assist during ADL.  -LE     Time Frame (ROM Goal 1, OT) 2 weeks  -LE     Progress/Outcome (ROM Goal 1, OT) goal ongoing  -LE     Row Name 11/07/22 1136          Therapy Assessment/Plan (OT)    Planned Therapy Interventions (OT) activity tolerance training;adaptive equipment training;BADL retraining;functional balance retraining;neuromuscular control/coordination retraining;occupation/activity based interventions;patient/caregiver education/training;ROM/therapeutic exercise;strengthening exercise;transfer/mobility retraining  -LE           User Key  (r) = Recorded By, (t) = Taken By, (c) = Cosigned By    Initials Name Provider Type    Chayito Cho OTR Occupational Therapist               Clinical Impression     Row Name 11/07/22 1133          Pain Assessment    Pretreatment Pain Rating 0/10 - no pain  -LE     Row Name 11/07/22 1133          Plan of Care Review    Plan of Care Reviewed With patient  -LE     Outcome Evaluation Pt admit from home when found face down on couch. Per chart history of  brain events and pt reports mini strokes.  Pt reports compeles ADL on own until last week. Pt seen by OT and is assist of 2 to move to sit EOB.  Pt with impaired L UE AROM, coordination, sensation and strength.  Assist with most ADL at this time.  Will cont skilled OT and anticipate rehab at d/c  -     Row Name 11/07/22 1133          Therapy Assessment/Plan (OT)    Patient/Family Therapy Goal Statement (OT) return to prior level of assist.  -LE     Rehab Potential (OT) good, to achieve stated therapy goals  -LE     Criteria for Skilled Therapeutic Interventions Met (OT) meets criteria;yes  -LE     Therapy Frequency (OT) 5 times/wk  -     Row Name 11/07/22 1133          Therapy Plan Review/Discharge Plan (OT)    Equipment Needs Upon Discharge (OT) --  anticpate walker, BSC.  -LE     Anticipated Discharge Disposition (OT) inpatient rehabilitation facility  -     Row Name 11/07/22 1133          Vital Signs    Pre SpO2 (%) 96  -LE     O2 Delivery Pre Treatment room air  -LE     Post SpO2 (%) 95  -LE     O2 Delivery Post Treatment room air  -LE     Pre Patient Position Supine  -LE     Intra Patient Position Sitting  -LE     Post Patient Position Supine  -LE     Row Name 11/07/22 1133          Positioning and Restraints    Pre-Treatment Position in bed  -LE     Post Treatment Position bed  -LE     In Bed notified nsg;fowlers;call light within reach;encouraged to call for assist;exit alarm on;SCD pump applied;heels elevated  -LE           User Key  (r) = Recorded By, (t) = Taken By, (c) = Cosigned By    Initials Name Provider Type    Chayito Cho OTR Occupational Therapist               Outcome Measures     Row Name 11/07/22 1137          How much help from another is currently needed...    Putting on and taking off regular lower body clothing? 1  -LE     Bathing (including washing, rinsing, and drying) 2  -LE     Toileting (which includes using toilet bed pan or urinal) 1  -LE     Putting on and taking off  regular upper body clothing 2  -LE     Taking care of personal grooming (such as brushing teeth) 2  -LE     Eating meals 1  -LE     AM-PAC 6 Clicks Score (OT) 9  -HUNTER     Row Name 11/07/22 1120          How much help from another person do you currently need...    Turning from your back to your side while in flat bed without using bedrails? 2  -MD     Moving from lying on back to sitting on the side of a flat bed without bedrails? 2  -MD     Moving to and from a bed to a chair (including a wheelchair)? 2  -MD     Standing up from a chair using your arms (e.g., wheelchair, bedside chair)? 2  -MD     Climbing 3-5 steps with a railing? 1  -MD     To walk in hospital room? 1  -MD     AM-PAC 6 Clicks Score (PT) 10  -MD     Highest level of mobility 4 --> Transferred to chair/commode  -MD     Row Name 11/07/22 1137          Modified Dupont Scale    Modified Dupont Scale 5 - Severe disability.  Bedridden, incontinent, and requiring constant nursing care and attention.  -HUNTER     Row Name 11/07/22 1137 11/07/22 1120       Functional Assessment    Outcome Measure Options AM-PAC 6 Clicks Daily Activity (OT);Modified Dupont  -LE AM-PAC 6 Clicks Basic Mobility (PT)  -MD          User Key  (r) = Recorded By, (t) = Taken By, (c) = Cosigned By    Initials Name Provider Type    Chayito Cho, OTR Occupational Therapist    Mimi Bernardo, PT Physical Therapist                Occupational Therapy Education     Title: PT OT SLP Therapies (In Progress)     Topic: Occupational Therapy (Done)     Point: ADL training (Done)     Description:   Instruct learner(s) on proper safety adaptation and remediation techniques during self care or transfers.   Instruct in proper use of assistive devices.              Learning Progress Summary           Patient Acceptance, E, Bed IU by HUNTER at 11/7/2022 1138                   Point: Precautions (Done)     Description:   Instruct learner(s) on prescribed precautions during self-care and functional  transfers.              Learning Progress Summary           Patient Acceptance, E, Bed IU by  at 11/7/2022 1138                   Point: Body mechanics (Done)     Description:   Instruct learner(s) on proper positioning and spine alignment during self-care, functional mobility activities and/or exercises.              Learning Progress Summary           Patient Acceptance, E, Bed IU by  at 11/7/2022 1138                               User Key     Initials Effective Dates Name Provider Type Discipline     06/16/21 -  Chayito Boyer, OTR Occupational Therapist OT              OT Recommendation and Plan  Planned Therapy Interventions (OT): activity tolerance training, adaptive equipment training, BADL retraining, functional balance retraining, neuromuscular control/coordination retraining, occupation/activity based interventions, patient/caregiver education/training, ROM/therapeutic exercise, strengthening exercise, transfer/mobility retraining  Therapy Frequency (OT): 5 times/wk  Plan of Care Review  Plan of Care Reviewed With: patient  Outcome Evaluation: Pt admit from home when found face down on couch. Per chart history of brain events and pt reports mini strokes.  Pt reports compeles ADL on own until last week. Pt seen by OT and is assist of 2 to move to sit EOB.  Pt with impaired L UE AROM, coordination, sensation and strength.  Assist with most ADL at this time.  Will cont skilled OT and anticipate rehab at d/c     Time Calculation:    Time Calculation- OT     Row Name 11/07/22 1138             Time Calculation- OT    OT Start Time 0933  -LE      OT Stop Time 0952  -LE      OT Time Calculation (min) 19 min  -LE      Total Timed Code Minutes- OT 9 minute(s)  -LE      OT Received On 11/07/22  -LE      OT - Next Appointment 11/08/22  -LE      OT Goal Re-Cert Due Date 11/21/22  -LE         Timed Charges    46857 - OT Therapeutic Activity Minutes 9  -LE         Untimed Charges    OT Eval/Re-eval Minutes 10  -LE          Total Minutes    Timed Charges Total Minutes 9  -LE      Untimed Charges Total Minutes 10  -LE       Total Minutes 19  -LE            User Key  (r) = Recorded By, (t) = Taken By, (c) = Cosigned By    Initials Name Provider Type    Chayito Cho OTR Occupational Therapist              Therapy Charges for Today     Code Description Service Date Service Provider Modifiers Qty    25489690868  OT EVAL MOD COMPLEXITY 2 11/7/2022 Chayito Boyer OTR GO 1    [id number]  OT THERAPEUTIC ACT EA 15 MIN 11/7/2022 Chayito Boyer OTR GO 1               ARLENE Khan  11/7/2022

## 2022-11-07 NOTE — DISCHARGE PLACEMENT REQUEST
"Christopher Brown (81 y.o. Male)     Date of Birth   1940    Social Security Number       Address   [address] [place] [address] [place] [address]    Home Phone   [phone]    MRN   [id number]       Sikhism   [employer]    Marital Status                               Admission Date   11/6/22    Admission Type   Emergency    Admitting Provider   Casa Sanchez MD    Attending Provider   Casa Sanchez MD    Department, Room/Bed   [facility] INTENSIVE CARE, I374/1       Discharge Date       Discharge Disposition       Discharge Destination                               Attending Provider: Casa Sanchez MD    Allergies: Other, Oxycodone    Isolation: None   Infection: COVID (History) (11/07/22)   Code Status: CPR    Ht: 177.8 cm (70\")   Wt: 88.4 kg (194 lb 12.8 oz)    Admission Cmt: None   Principal Problem: Intraparenchymal hemorrhage of brain (HCC) [I61.9]                 Active Insurance as of 11/6/2022     Primary Coverage     Payor Plan Insurance Group Employer/Plan Group    MEDICARE MEDICARE A & B      Payor Plan Address Payor Plan Phone Number Payor Plan Fax Number Effective Dates    [address] [phone]  12/1/2004 - None Entered    [facility] [id number]       Subscriber Name Subscriber Birth Date Member ID       CHRISTOPHER BROWN 1940 [id number]           Secondary Coverage     Payor Plan Insurance Group Employer/Plan Group    [employer] COMM[employer]CIAL [employer]  [id number]             Payor Plan Address Payor Plan Phone Number Payor Plan Fax Number Effective Dates    [address] [address]   6/1/2013 - None Entered    [facility] [id number]       Subscriber Name Subscriber Birth Date Member ID       CHRISTOPHER BROWN 1940 [id number]                 Emergency Contacts      (Rel.) Home Phone Work Phone Mobile Phone    Annette Brown (Spouse) [phone] -- [phone]    Jose Brown (Son) [phone] -- [phone]    Francisca Brown " (Other) -- -- [phone]

## 2022-11-07 NOTE — THERAPY EVALUATION
Patient Name: Eugenio Joe  : 1940    MRN: [id number]                              Today's Date: 2022       Admit Date: 2022    Visit Dx:     ICD-10-CM ICD-9-CM   1. Intraparenchymal hemorrhage of brain ([facility])  I61.9 431   2. Hypertensive emergency  I16.1 401.9     Patient Active Problem List   Diagnosis   • Quadriceps weakness   • ACL tear   • Knee pain, right   • S/P CABG x 3   • Essential hypertension   • Hyperlipemia   • Hallux rigidus   • Fracture, stress, metatarsal   • Osteopenia determined by x-ray   • Metatarsal stress fracture with nonunion   • Left hip pain   • Bursitis of left hip   • Pulmonary embolism ([facility])   • DALILA (acute kidney injury) ([facility])   • Type 2 diabetes mellitus with hyperglycemia, without long-term current use of insulin ([facility])   • Ascending aorta dilatation ([facility])   • Pulmonary nodule, left   • Right leg DVT ([facility])   • Acute renal failure ([facility])   • Hypotension   • Dehydration   • Hypercalcemia   • Dysphagia   • Syncope and collapse   • Coronary artery disease involving native coronary artery of native heart without angina pectoris   • Normal pressure hydrocephalus (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)   • Acute CVA (cerebrovascular accident) ([facility])   • Cerebrovascular accident (CVA) due to embolism of cerebral artery ([facility])   • Closed displaced fracture of lateral malleolus of left fibula   • Pain   • Syndesmotic ankle sprain, left, subsequent  encounter   • Acute dyspnea   • Stage 3a chronic kidney disease (HCC)   • Acute systolic heart failure (CMS/HCC)   • Acute on chronic diastolic heart failure (HCC)   • Pneumonia due to COVID-19 virus   • Chronic combined systolic (congestive) and diastolic (congestive) heart failure (HCC)   • Intraparenchymal hemorrhage of brain (HCC)     Past Medical History:   Diagnosis Date   • Arthritis    • Asthma    • Brain abscess     at about age 44   • Bruise of face    • Bursitis of left hip    • Cancer (HCC)     PT STATES IN HIS SWEAT GLAND    • Cardiac disease    • Coronary artery disease     CABG 2012   • Diabetes mellitus (HCC)    • Difficulty in swallowing     LIQUIDS   • Difficulty walking    • Diverticulitis    • DM2 (diabetes mellitus, type 2) (HCC) 10/12/2016   • Fracture, foot 2015    Dr Pisano   • Fracture, stress, metatarsal    • Fracture, tibia and fibula Feb 2021    Dr Pisano   • Gout several years ago    medication  working   • Hearing aid worn     bilateral   • Hx of appendectomy    • Hyperlipemia    • Hypertension    • Joint pain     or swelling   • Low back pain several years ago   • Metatarsal stress fracture with nonunion    • Normal pressure hydrocephalus (HCC)    • Orbit fracture (HCC)    • Pulmonary embolism (HCC)     OCT 2016 - AFTER FOOT SURGERY   • Rash     ARM-    • Spinal headache     AFTER SPINAL TAP - NO BLOOD PATCH   • Stroke (cerebrum) ([facility]) 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   • Syndesmotic ankle sprain, left, subsequent encounter    • Tear of meniscus of knee torn ligaments   • TIA (transient ischemic attack)     MULTIPLE     Past Surgical History:   Procedure Laterality Date   • ANKLE OPEN REDUCTION INTERNAL FIXATION Left 2/16/2021    Procedure: Left ankle open reduction internal fixation with implants as needed;  Surgeon: Man Jenkins MD;  Location: [facility] OR [facility] [place];  Service: Orthopedics;  Laterality: Left;   • APPENDECTOMY N/A 7/18/2019    Procedure: APPENDECTOMY  LAPAROSCOPIC;  Surgeon: Luis Carlos Rick Jr., MD;  Location: [facility] MAIN OR;  Service: General   • BRAIN SURGERY      BRAIN ABCESS 30 YR AGO   • CARDIAC CATHETERIZATION N/A 12/4/2020    Procedure: Patent foramen ovale closure ABBOTT;  Surgeon: Frank Pablo MD;  Location: [facility] CATH INVASIVE LOCATION;  Service: Cardiology;  Laterality: N/A;   • CARDIAC CATHETERIZATION N/A 12/4/2020    Procedure: Intracardiac echocardiogram;  Surgeon: Frank Pablo MD;  Location: [facility] CATH INVASIVE LOCATION;  Service: Cardiology;  Laterality: N/A;   • CARDIAC SURGERY     • COLONOSCOPY  2012    Ciciliano- normal per pt, no polyps    • CORONARY ARTERY BYPASS GRAFT      3 VESSEL   • ENDOSCOPY N/A 3/9/2017    Schatzki ring, dilated, LA Grade B esophagitis, HH, acquired duodenal stenosis   • ENDOSCOPY N/A 4/6/2018    candida, HH, torts, Schatzki ring, duodenal stenosis, dilatation perforemed, chronic inflammation   • ENDOSCOPY N/A 10/9/2019    White nummular lesions in esophageal mucosa, mild Schatzki ring, acquired duodenal stenosis, Path: Terrazas's, candida   • ENDOSCOPY N/A 1/8/2020    Procedure: ESOPHAGOGASTRODUODENOSCOPY with biopsies;  Surgeon: Rigoberto Walker MD;  Location: [facility] ENDOSCOPY;  Service: Gastroenterology   • FACIAL FRACTURE SURGERY      to repair 6 broken bones   • HAND SURGERY     • ORBITAL FRACTURE OPEN REDUCTION INTERNAL FIXATION Right 1/13/2017    Procedure: RT ORBIT FLOOR FRACTURE REPAIR RIGHT [facility] FRACTURE REPAIR, RIGHT NASAL BONE FRACTURE CLOSED REDUCTION;  Surgeon: Edil Lester MD;  Location: [facility] OR [facility];  Service:    • ORIF FOOT FRACTURE Right 9/9/2016    Procedure: RIGHT SECOND METATARSAL OPEN REDUCTION INTERNAL FIXATION WITh GRAFT FROM HEEL ;  Surgeon: Man Jenkins MD;  [place] [facility] [place] [facility] [place]  Service:    • PATENT FORAMEN OVALE CLOSURE     • SEPTOPLASTY     • SKIN CANCER EXCISION     • TONSILLECTOMY     • TRANSESOPHAGEAL ECHOCARDIOGRAM (STELLA)        General  Information     Row Name 11/07/22 1112          Physical Therapy Time and Intention    Document Type evaluation  -MD     Mode of Treatment physical therapy  -MD     Row Name 11/07/22 1112          General Information    Patient Profile Reviewed yes  -MD     Prior Level of Function independent:  -MD     Existing Precautions/Restrictions fall  -MD     Barriers to Rehab medically complex  -MD     Row Name 11/07/22 1112          Living Environment    People in Home spouse  -MD     Row Name 11/07/22 1112          Home Main Entrance    Number of Stairs, Main Entrance --  pt states steps to enter but he has an elevator  -MD     Row Name 11/07/22 1112          Stairs Within Home, Primary    Number of Stairs, Within Home, Primary --  stairs w/in home however pt has chair lift  -MD     Row Name 11/07/22 1112          Cognition    Orientation Status (Cognition) oriented x 3  -MD           User Key  (r) = Recorded By, (t) = Taken By, (c) = Cosigned By    Initials Name Provider Type    Mimi Bernardo, PT Physical Therapist               Mobility     Row Name 11/07/22 1113          Bed Mobility    Bed Mobility supine-sit;sit-supine  -MD     Supine-Sit [place] (Bed Mobility) maximum assist (25% patient effort);2 person assist;verbal cues  -MD     Sit-Supine [place] (Bed Mobility) maximum assist (25% patient effort);2 person assist;verbal cues  -MD     Assistive Device (Bed Mobility) head of bed elevated;draw sheet  -MD     Row Name 11/07/22 1113          Transfers    Comment, (Transfers) standing deferred this date due to poor static sitting balance and weakness  -MD           User Key  (r) = Recorded By, (t) = Taken By, (c) = Cosigned By    Initials Name Provider Type    Mimi Bernardo, PT Physical Therapist               Obj/Interventions     Row Name 11/07/22 1114          Range of Motion Comprehensive    General Range of Motion lower extremity range of motion deficits identified  -MD     Comment, General Range of  Motion R LE WFL.  L LE 75% impaired  -MD     Row Name 11/07/22 1114          Strength Comprehensive (MMT)    General Manual Muscle Testing (MMT) Assessment lower extremity strength deficits identified  -MD     Comment, General Manual Muscle Testing (MMT) Assessment R LE 3/5 grossly.  L LE 1/5 grossly  -MD           User Key  (r) = Recorded By, (t) = Taken By, (c) = Cosigned By    Initials Name Provider Type    Mimi Bernardo, PT Physical Therapist               Goals/Plan     Row Name 11/07/22 1119          Bed Mobility Goal 1 (PT)    Activity/Assistive Device (Bed Mobility Goal 1, PT) sit to supine/supine to sit  -MD     [place] Level/Cues Needed (Bed Mobility Goal 1, PT) moderate assist (50-74% patient effort)  -MD     Time Frame (Bed Mobility Goal 1, PT) 2 weeks  -MD     Row Name 11/07/22 1119          Transfer Goal 1 (PT)    Activity/Assistive Device (Transfer Goal 1, PT) sit-to-stand/stand-to-sit;bed-to-chair/chair-to-bed  -MD     [place] Level/Cues Needed (Transfer Goal 1, PT) moderate assist (50-74% patient effort)  -MD     Time Frame (Transfer Goal 1, PT) 2 weeks  -MD     Row Name 11/07/22 1119          Gait Training Goal 1 (PT)    Activity/Assistive Device (Gait Training Goal 1, PT) gait (walking locomotion);other (see comments)  HHA  -MD     [place] Level (Gait Training Goal 1, PT) moderate assist (50-74% patient effort)  x2  -MD     Distance (Gait Training Goal 1, PT) 15'  -MD     Time Frame (Gait Training Goal 1, PT) 2 weeks  -MD           User Key  (r) = Recorded By, (t) = Taken By, (c) = Cosigned By    Initials Name Provider Type    Mimi Bernardo, PT Physical Therapist               Clinical Impression     Row Name 11/07/22 1116          Pain    Pretreatment Pain Rating 0/10 - no pain  -MD     Row Name 11/07/22 1116          Plan of Care Review    Outcome Evaluation Pt 82 yo male presenting with L sided weakness and decreased speech.  P s/p CVA.  Prior to admission pt was independent w  mobility using rollator walker.  Pt lives in 2 story home w steps to enter w spouse.  Pt states he has elevator for entry steps and chair lift for interior steps.  At time of PT eval pt required Max A x2 for bed mobility and Mod A x2 for sitting balance.  Pt was able to maintain sitting w Mod A x2 for approx 5 min this date.  Pt will benifit from skilled PT to address mobility deficits and increase safey and independence w functional mobility.  -MD     Row Name 11/07/22 1116          Therapy Assessment/Plan (PT)    Rehab Potential (PT) good, to achieve stated therapy goals  -MD     Criteria for Skilled Interventions Met (PT) yes;meets criteria  -MD     Therapy Frequency (PT) 6 times/wk  -MD     Row Name 11/07/22 1116          Positioning and Restraints    Pre-Treatment Position in bed  -MD     Post Treatment Position bed  -MD     In Bed supine;call light within reach;exit alarm on  -MD           User Key  (r) = Recorded By, (t) = Taken By, (c) = Cosigned By    Initials Name Provider Type    Mimi Bernardo, PT Physical Therapist               Outcome Measures     Row Name 11/07/22 1120          How much help from another person do you currently need...    Turning from your back to your side while in flat bed without using bedrails? 2  -MD     Moving from lying on back to sitting on the side of a flat bed without bedrails? 2  -MD     Moving to and from a bed to a chair (including a wheelchair)? 2  -MD     Standing up from a chair using your arms (e.g., wheelchair, bedside chair)? 2  -MD     Climbing 3-5 steps with a railing? 1  -MD     To walk in hospital room? 1  -MD     AM-PAC 6 Clicks Score (PT) 10  -MD     Highest level of mobility 4 --> Transferred to chair/commode  -MD     Row Name 11/07/22 1120          Functional Assessment    Outcome Measure Options AM-PAC 6 Clicks Basic Mobility (PT)  -MD           User Key  (r) = Recorded By, (t) = Taken By, (c) = Cosigned By    Initials Name Provider Type    Mmii Bernardo,  PT Physical Therapist                             Physical Therapy Education     Title: PT OT SLP Therapies (In Progress)     Topic: Physical Therapy (In Progress)     Point: Mobility training (Not Started)     Learner Progress:  Not documented in this visit.          Point: Home exercise program (Not Started)     Learner Progress:  Not documented in this visit.          Point: Body mechanics (Not Started)     Learner Progress:  Not documented in this visit.          Point: Precautions (Done)     Learning Progress Summary           Patient Acceptance, E, VU by MD at 11/7/2022 1121                               User Key     Initials Effective Dates Name Provider Type Discipline    MD 06/16/21 -  Mimi Mina PT Physical Therapist PT              PT Recommendation and Plan     Outcome Evaluation: Pt 80 yo male presenting with L sided weakness and decreased speech.  P s/p CVA.  Prior to admission pt was independent w mobility using rollator walker.  Pt lives in 2 story home w steps to enter w spouse.  Pt states he has elevator for entry steps and chair lift for interior steps.  At time of PT eval pt required Max A x2 for bed mobility and Mod A x2 for sitting balance.  Pt was able to maintain sitting w Mod A x2 for approx 5 min this date.  Pt will benifit from skilled PT to address mobility deficits and increase safey and independence w functional mobility.     Time Calculation:    PT Charges     Row Name 11/07/22 1111             Time Calculation    Start Time 0932  -MD      Stop Time 0953  -MD      Time Calculation (min) 21 min  -MD      PT Received On 11/07/22  -MD      PT - Next Appointment 11/08/22  -MD      PT Goal Re-Cert Due Date 11/21/22  -MD            User Key  (r) = Recorded By, (t) = Taken By, (c) = Cosigned By    Initials Name Provider Type    Mimi Bernardo PT Physical Therapist              Therapy Charges for Today     Code Description Service Date Service Provider Modifiers Qty    55902181035 HC PT EVAL  MOD COMPLEXITY 2 11/7/2022 Mimi Mina, PT GP 1    [id number]  PT THERAPEUTIC ACT EA 15 MIN 11/7/2022 Mimi Mina, PT GP 1    13937244921 HC PT THER SUPP EA 15 MIN 11/7/2022 Mimi Mina, PT GP 1        Patient was not wearing a face mask during this therapy encounter. Therapist used appropriate personal protective equipment including mask and gloves.  Mask used was standard procedure mask. Appropriate PPE was worn during the entire therapy session. Hand hygiene was completed before and after therapy session. Patient is not in enhanced droplet precautions.       PT G-Codes  Outcome Measure Options: AM-PAC 6 Clicks Basic Mobility (PT)  AM-PAC 6 Clicks Score (PT): 10    Mimi Mina, COLEEN  11/7/2022

## 2022-11-07 NOTE — PROGRESS NOTES
Discharge Planning Assessment  [facility]     Patient Name: Eugenio Joe  MRN: [id number]  Today's Date: 11/7/2022    Admit Date: 11/6/2022    Plan: LTC facility vs sub aute rehab   Discharge Needs Assessment     Row Name 11/07/22 1312       Living Environment    People in Home spouse    Current Living Arrangements home    Potentially Unsafe Housing Conditions unable to assess    Provides Primary Care For no one    Family Caregiver if Needed spouse    Quality of Family Relationships supportive;involved;helpful    Able to Return to Prior Arrangements yes       Resource/Environmental Concerns    Resource/Environmental Concerns none    Transportation Concerns none       Transition Planning    Patient/Family Anticipates Transition to long-term care facility;inpatient rehabilitation facility    Patient/Family Anticipated Services at Transition skilled nursing       Discharge Needs Assessment    Current Outpatient/Agency/Support Group skilled nursing facility    Equipment Currently Used at Home walker, standard    Concerns to be Addressed discharge planning    Anticipated Changes Related to Illness inability to care for self    Equipment Needed After Discharge none               Discharge Plan     Row Name 11/07/22 1313       Plan    Plan LTC facility vs sub aute rehab    Plan Comments IMM noted.  CCP spoke to patient at bed side.   CCP role explained.  Discharge planning discussed. Face sheet verified.   Pt emergency contact is his wife Annette, [phone].   Pt  obtains his medications from [facility]’s pharmacy .   Pt   PCP  is Dr. Mago Onofre.  Pt lives in a house with his wife.  Pt is independent with ADL’s.  He uses a walker to ambulate.    He has no past history of rehab.   He has no history of HH.  Plan is LTC vs sub-acute.   R2R given to pt’s wife  She reports she does not feel she can care for him at home and thinks she would like LTC  R2R and CMS information given  Following PT OT evaluations   CCP  following.              Continued Care and Services - Admitted Since 11/6/2022    Coordination has not been started for this encounter.          Demographic Summary    No documentation.                Functional Status    No documentation.                Psychosocial    No documentation.                Abuse/Neglect    No documentation.                Legal    No documentation.                Substance Abuse    No documentation.                Patient Forms    No documentation.                   Vickie Morejon RN

## 2022-11-07 NOTE — PLAN OF CARE
Goal Outcome Evaluation:  Plan of Care Reviewed With: patient           Outcome Evaluation: Patient seen for clinical swallow assessment. Pt with hx of pharyngeal dysphagia with varying levels of severity. VFSS completed 4/22 with recs for NPO, but patient selected a modified PO diet with known aspiration risks. He attended OP dysphagia therapy and it was noted that he eventually upgraded self to thins without SLP guidance. Clinical swallow assessment 10/17/22 recommended continuing with regular and thins per patient wishes. Pt now admitted with acute R thalamic hemorrhage. Voice weak. Oral mech exam was otherwise unremarkable. Pt c/o neck pain 3/10, will track to L, but won't move his head. Reduce laryngeal elevation upon palpation. Wet cough with ice and puree after multiple swallows. Breathing appeared more congested after ice chip. SLP recs NPO, will proceed with instrumental assessment, but suspect severe pharyngeal dysphagia. Will need to discuss GOC with patient and family.      Patient was not wearing a face mask during this therapy encounter. Therapist used appropriate personal protective equipment including mask, eye protection and gloves.  Mask used was standard procedure mask. Appropriate PPE was worn during the entire therapy session. Hand hygiene was completed before and after therapy session. Patient is not in enhanced droplet precautions.

## 2022-11-07 NOTE — PROGRESS NOTES
[facility] NEUROSURGERY PROGRESS NOTE    PATIENT IDENTIFICATION:   Name:  Eugenio Joe    MRN:  [id number]     81 y.o.  male               CC: Right thalamic hemorrhage      Subjective     Interval History: No new complaints or events overnight.  Patient resting in bed.    ROS:  Constitutional: No fever, chills  HEENT: No headache, no vision changes, no hearing difficulties  GI: No nausea, vomiting, no swallow difficulties  Neuro: No numbness, tingling, left-sided weakness, no speech difficulties, + balance difficulties    Objective     Vital signs in last 24 hours:  Temp:  [97.7 °F (36.5 °C)-98.6 °F (37 °C)] 97.7 °F (36.5 °C)  Heart Rate:  [] 84  Resp:  [18-22] 22  BP: (100-154)/(47-77) 154/61    Intake/Output this shift:  No intake/output data recorded.    Intake/Output last 3 shifts:  I/O last 3 completed shifts:  In: 703 [I.V.:703]  Out: 400 [Urine:400]    LABS:  Results from last 7 days   Lab Units 11/07/22  0341 11/06/22  1130 11/06/22  0902   WBC 10*3/mm3 9.10 7.99 8.10   HEMOGLOBIN g/dL 12.2* 13.0 13.1   HEMATOCRIT % 37.0* 38.7 39.1   PLATELETS 10*3/mm3 175 181 182       Results from last 7 days   Lab Units 11/07/22  0341 11/06/22  1130 11/06/22  1009 11/06/22  0902   SODIUM mmol/L 140 141  --  140   POTASSIUM mmol/L 4.4 4.2  --  5.1   CHLORIDE mmol/L 106 107  --  106   CO2 mmol/L 21.2* 22.9  --  23.8   BUN mg/dL 22 18  --  20   CREATININE mg/dL 1.18 1.12 1.20 1.21   GLUCOSE mg/dL 160* 173*  --  193*   CALCIUM mg/dL 9.1 8.9  --  9.5          IMAGING STUDIES:  CT Head Without Contrast    Result Date: 11/7/2022  An intraparenchymal hemorrhage involving the posterior lateral aspect of the thalamus on the right is noted with intraventricular extension all of which appears similar to the study from 0913 hours. There is no evidence of new hemorrhage, hydrocephalus or of acute infarction.  The above information was called to the patient's nurse at the time of the dictation.   Radiation dose reduction  "techniques were utilized, including automated exposure control and exposure modulation based on body size.  This report was finalized on 11/7/2022 6:48 AM by Dr. Baldo Spencer M.D.      XR Chest 1 View    Result Date: 11/6/2022  No focal pulmonary consolidation. Borderline heart size. Follow-up as clinical indications persist.  This report was finalized on 11/6/2022 10:22 AM by Dr. Prabhjot Mcarthur M.D.      CT Head Without Contrast Stroke Protocol    Result Date: 11/6/2022   Critical test result. Intraparenchymal hemorrhage involving the right thalamus, with extension to the right lateral ventricle. Close follow-up recommended to characterize change. Prominent periventricular white matter chronic small vessel ischemic change.  Paranasal sinus disease with fluid level in the right maxillary sinus that may reflect acute sinusitis.  Discussed by telephone with Dr. Ba at time of interpretation, 0915, 11/06/2022.  This report was finalized on 11/6/2022 9:24 AM by Dr. Prabhjot Mcarthur M.D.          I personally viewed and interpreted the patient's chart.    Meds reviewed/changed: Yes      Physical Exam:  Awake, alert, oriented x3.  Speech clear with no aphasia.  Extraocular movements are full  Pupils equal round reactive to light  Normal facial sensation  Of facial movements are symmetric, no weakness  5/5 strength on right side, left-sided hemiparesis.  Sensation normal to light touch  Wearing SCDs in bed      Assessment & Plan     ASSESSMENT:      Intraparenchymal hemorrhage of brain (HCC)    Patient admitted for right thalamic intracranial hemorrhage with some IVH.  He was evaluated by neurology who recommends diagnostic angiogram.    PLAN:    · Check CTA head  · Keep systolic blood pressure less than 150 mmHg    I discussed the patient's findings and my recommendations with Dr. Rodriguez, patient and nursing staff       LOS: 1 day       Nellie Benson, APRN  11/7/2022  11:23 EST    \"Dictated utilizing " "Dragon dictation\".      "

## 2022-11-07 NOTE — PLAN OF CARE
Goal Outcome Evaluation:              Outcome Evaluation: Pt 80 yo male presenting with L sided weakness and decreased speech.  P s/p CVA.  Prior to admission pt was independent w mobility using rollator walker.  Pt lives in 2 story home w steps to enter w spouse.  Pt states he has elevator for entry steps and chair lift for interior steps.  At time of PT eval pt required Max A x2 for bed mobility and Mod A x2 for sitting balance.  Pt was able to maintain sitting w Mod A x2 for approx 5 min this date.  Pt will benifit from skilled PT to address mobility deficits and increase safey and independence w functional mobility.

## 2022-11-07 NOTE — PROGRESS NOTES
LPC INPATIENT PROGRESS NOTE         [facility] INTENSIVE CARE    2022      PATIENT IDENTIFICATION:  Name: Eugenio Joe ADMIT: 2022   : 1940  PCP: Adeline Onofre MD    MRN: [id number] LOS: 1 days   AGE/SEX: 81 y.o. male  ROOM: [facility]                     LOS 1    Reason for visit: Intracranial hemorrhage      SUBJECTIVE:      Soft and mildly garbled voice.  Moderate cough.  Clear breath sounds.  2 out of 6 systolic murmur.    Objective   OBJECTIVE:    Vital Sign Min/Max for last 24 hours  Temp  Min: 97.8 °F (36.6 °C)  Max: 98.7 °F (37.1 °C)   BP  Min: 100/47  Max: 225/97   Pulse  Min: 68  Max: 105   Resp  Min: 16  Max: 22   SpO2  Min: 90 %  Max: 98 %   No data recorded   Weight  Min: 88.4 kg (194 lb 12.8 oz)  Max: 88.4 kg (194 lb 12.8 oz)                         Body mass index is 27.95 kg/m².    Intake/Output Summary (Last 24 hours) at 2022 0841  Last data filed at 2022 0000  Gross per 24 hour   Intake 703 ml   Output 400 ml   Net 303 ml         Exam:  GEN:  No distress, appears stated age  EYES:   PERRL, anicteric sclerae  ENT:    External ears/nose normal, OP clear  NECK:  No adenopathy, midline trachea  LUNGS: Normal chest on inspection, palpation and auscultation  CV:  Normal S1S2, without murmur  ABD:  Nontender, nondistended, no hepatosplenomegaly, +BS  EXT:  No edema.  No cyanosis or clubbing.  No mottling and normal cap refill.    Assessment     Scheduled meds:  docusate sodium, 100 mg, Oral, Daily  insulin lispro, 0-14 Units, Subcutaneous, Q4H  sodium chloride, 10 mL, Intravenous, Q12H      IV meds:                      niCARdipine, 5-15 mg/hr, Last Rate: 7.5 mg/hr (22 0821)      Data Review:  Results from last 7 days   Lab Units 22  0341 22  1130 22  1009 22  0902   SODIUM mmol/L 140 141  --  140   POTASSIUM mmol/L 4.4 4.2  --  5.1   CHLORIDE mmol/L 106 107  --  106   CO2 mmol/L 21.2* 22.9  --  23.8   BUN mg/dL 22 18  --  20    CREATININE mg/dL 1.18 1.12 1.20 1.21   GLUCOSE mg/dL 160* 173*  --  193*   CALCIUM mg/dL 9.1 8.9  --  9.5         Estimated Creatinine Clearance: 54.1 mL/min (by C-G formula based on SCr of 1.18 mg/dL).  Results from last 7 days   Lab Units 11/07/22  0341 11/06/22  1130 11/06/22  0902   WBC 10*3/mm3 9.10 7.99 8.10   HEMOGLOBIN g/dL 12.2* 13.0 13.1   PLATELETS 10*3/mm3 175 181 182     Results from last 7 days   Lab Units 11/06/22  0902   INR  1.04     Results from last 7 days   Lab Units 11/07/22  0341 11/06/22  1130 11/06/22  0902   ALT (SGPT) U/L 11 11 14   AST (SGOT) U/L 13 17 26                 Hemoglobin A1C   Date/Time Value Ref Range Status   11/07/2022 0341 7.60 (H) 4.80 - 5.60 % Final     Glucose   Date/Time Value Ref Range Status   11/07/2022 0728 131 (H) 70 - 130 mg/dL Final     Comment:     Meter: [id number] : [id number] EssexChanteEddie Danielle MILLER   11/07/2022 0304 157 (H) 70 - 130 mg/dL Final     Comment:     Meter: [id number] : [id number] Roger Shafer NA   11/06/2022 2319 148 (H) 70 - 130 mg/dL Final     Comment:     Meter: [id number] : [id number] Roger Shafer NA   11/06/2022 1929 146 (H) 70 - 130 mg/dL Final     Comment:     Meter: [id number] : [id number] Roger Shafer NA   11/06/2022 0905 174 (H) 70 - 130 mg/dL Final     Comment:     Meter: [id number] : [id number] Deandre Garcia     Repeat CT head reviewed shows intraparenchymal hemorrhage involving the posterior lateral aspect of the thalamus on the right with intraventricular extension appears similar to previous study.  No evidence of new hemorrhage, hydrocephalus or acute infarction.          Active Hospital Problems    Diagnosis  POA   • **Intraparenchymal hemorrhage of brain (HCC) [I61.9]  Yes      Resolved Hospital Problems   No resolved problems to display.         ASSESSMENT:    Right thalamic intracranial hemorrhage with some intraventricular hemorrhage  Hypertensive emergency  History of previous  stroke  History of brain abscess age 44  CAD status post CABG 2012  Type 2 diabetes  Asthma/COPD: Currently without exacerbation          PLAN:    Monitor closely in intensive care unit.  Neurochecks per protocol.  Holding patient's aspirin and Plavix.  Neurology and neurosurgery following.  Repeat CT head appears stable from previous.  Control blood pressure with goal systolic less than 140.  Control glucose.  Mechanical DVT prophylaxis.    Discussed with multidisciplinary ICU team on rounds this morning.      CCT: 34 min    Casa Sanchez MD  Pulmonary and Critical Care Medicine  [facility], [facility]  11/7/2022    08:41 EST

## 2022-11-08 NOTE — PLAN OF CARE
Goal Outcome Evaluation:  Plan of Care Reviewed With: patient         A/Ox4, pt converted to new onset Afib around 0500, Dr. Hoyos paged and notified (no new orders), pt rate controlled and asymptomatic. SBP goal managed with PAVITHRA [place] 8 (see charting), see AM labs, see CTA results

## 2022-11-08 NOTE — PROGRESS NOTES
Continued Stay Note  [facility]     Patient Name: Eugenio Joe  MRN: [id number]  Today's Date: 11/8/2022    Admit Date: 11/6/2022    Plan: Undetermined   Discharge Plan     Row Name 11/08/22 1256       Plan    Plan Undetermined    Plan Comments Dr Deal and pt wife approached CCP requesting referral for Hospice eval and Goals of care discussion  CCP following               Discharge Codes    No documentation.                     Vickie Morejon RN

## 2022-11-08 NOTE — TELEPHONE ENCOUNTER
I spoke to patient's wife gave follow up information. He will need to have CT head prior to follow up with Dr. Rodriguez.

## 2022-11-08 NOTE — MBS/VFSS/FEES
Acute Care - Speech Language Pathology   Swallow Initial Evaluation [facility]     Patient Name: Eugenio Joe  : 1940  MRN: [id number]  Today's Date: 2022               Admit Date: 2022    Visit Dx:     ICD-10-CM ICD-9-CM   1. Intraparenchymal hemorrhage of brain ([facility])  I61.9 431   2. Hypertensive emergency  I16.1 401.9     Patient Active Problem List   Diagnosis   • Quadriceps weakness   • ACL tear   • Knee pain, right   • S/P CABG x 3   • Essential hypertension   • Hyperlipemia   • Hallux rigidus   • Fracture, stress, metatarsal   • Osteopenia determined by x-ray   • Metatarsal stress fracture with nonunion   • Left hip pain   • Bursitis of left hip   • Pulmonary embolism ([facility])   • DALILA (acute kidney injury) ([facility])   • Type 2 diabetes mellitus with hyperglycemia, without long-term current use of insulin ([facility])   • Ascending aorta dilatation ([facility])   • Pulmonary nodule, left   • Right leg DVT ([facility])   • Acute renal failure ([facility])   • Hypotension   • Dehydration   • Hypercalcemia   • Dysphagia   • Syncope and collapse   • Coronary artery disease involving native coronary artery of native heart without angina pectoris   • Normal pressure hydrocephalus ([facility])   • Headache   • Impaired mobility   • Nausea & vomiting   • Fall at home   • Transient cerebral ischemia   • PFO (patent foramen ovale)   • Esophageal dysphagia   • TIA (transient ischemic attack)   • Acute appendicitis with localized peritonitis, without perforation or abscess   • Right lower quadrant abdominal pain   • History of CVA (cerebrovascular accident)   • On statin therapy   • 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   • Diverticulitis   • Hx of appendectomy   • Acute abdominal pain   • Gait abnormality   • Weakness   • CVA (cerebral vascular accident) ([facility])   • Acute CVA (cerebrovascular accident) ([facility])   • Cerebrovascular accident (CVA) due to embolism of cerebral artery ([facility])   • Closed displaced fracture of lateral malleolus of left  fibula   • Pain   • Syndesmotic ankle sprain, left, subsequent encounter   • Acute dyspnea   • Stage 3a chronic kidney disease (HCC)   • Acute systolic heart failure (CMS/HCC)   • Acute on chronic diastolic heart failure (HCC)   • Pneumonia due to COVID-19 virus   • Chronic combined systolic (congestive) and diastolic (congestive) heart failure (HCC)   • Intraparenchymal hemorrhage of brain (HCC)     Past Medical History:   Diagnosis Date   • Arthritis    • Asthma    • Brain abscess     at about age 44   • Bruise of face    • Bursitis of left hip    • Cancer ([facility])     PT STATES IN HIS SWEAT GLAND    • Cardiac disease    • Coronary artery disease     CABG 2012   • Diabetes mellitus ([facility])    • Difficulty in swallowing     LIQUIDS   • Difficulty walking    • Diverticulitis    • DM2 (diabetes mellitus, type 2) ([facility]) 10/12/2016   • Fracture, foot 2015    Dr Pisano   • Fracture, stress, metatarsal    • Fracture, tibia and fibula Feb 2021    Dr Pisano   • Gout several years ago    medication  working   • Hearing aid worn     bilateral   • Hx of appendectomy    • Hyperlipemia    • Hypertension    • Joint pain     or swelling   • Low back pain several years ago   • Metatarsal stress fracture with nonunion    • Normal pressure hydrocephalus ([facility])    • Orbit fracture ([facility])    • Pulmonary embolism ([facility])     OCT 2016 - AFTER FOOT SURGERY   • Rash     ARM-    • Spinal headache     AFTER SPINAL TAP - NO BLOOD PATCH   • Stroke (cerebrum) ([facility]) 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   • Syndesmotic ankle sprain, left, subsequent encounter    • Tear of meniscus of knee torn ligaments   • TIA (transient ischemic attack)     MULTIPLE     Past Surgical History:   Procedure Laterality Date   • ANKLE OPEN REDUCTION INTERNAL FIXATION Left 2/16/2021    Procedure: Left ankle open reduction internal fixation with implants as needed;  Surgeon: Man Jenkins MD;  [facility] OR [facility];  Service: Orthopedics;  Laterality: Left;    • APPENDECTOMY N/A 7/18/2019    Procedure: APPENDECTOMY LAPAROSCOPIC;  Surgeon: Luis Carlos Rick Jr., MD;  Location: [facility] MAIN OR;  Service: General   • BRAIN SURGERY      BRAIN ABCESS 30 YR AGO   • CARDIAC CATHETERIZATION N/A 12/4/2020    Procedure: Patent foramen ovale closure ABBOTT;  Surgeon: Frank Pablo MD;  Location: [facility] CATH INVASIVE LOCATION;  Service: Cardiology;  Laterality: N/A;   • CARDIAC CATHETERIZATION N/A 12/4/2020    Procedure: Intracardiac echocardiogram;  Surgeon: Frank Pablo MD;  Location: [facility] CATH INVASIVE LOCATION;  Service: Cardiology;  Laterality: N/A;   • CARDIAC SURGERY     • COLONOSCOPY  2012    Ciciliano- normal per pt, no polyps    • CORONARY ARTERY BYPASS GRAFT      3 VESSEL   • ENDOSCOPY N/A 3/9/2017    Schatzki ring, dilated, LA Grade B esophagitis, HH, acquired duodenal stenosis   • ENDOSCOPY N/A 4/6/2018    candida, HH, torts, Schatzki ring, duodenal stenosis, dilatation perforemed, chronic inflammation   • ENDOSCOPY N/A 10/9/2019    White nummular lesions in esophageal mucosa, mild Schatzki ring, acquired duodenal stenosis, Path: Terrazas's, candida   • ENDOSCOPY N/A 1/8/2020    Procedure: ESOPHAGOGASTRODUODENOSCOPY with biopsies;  Surgeon: Rigoberto Walker MD;  Location: [facility] ENDOSCOPY;  Service: Gastroenterology   • FACIAL FRACTURE SURGERY      to repair 6 broken bones   • HAND SURGERY     • ORBITAL FRACTURE OPEN REDUCTION INTERNAL FIXATION Right 1/13/2017    Procedure: RT ORBIT FLOOR FRACTURE REPAIR RIGHT [facility] FRACTURE REPAIR, RIGHT NASAL BONE FRACTURE CLOSED REDUCTION;  Surgeon: Edil Lester MD;  [facility] [place] [facility];  Service:    • ORIF FOOT FRACTURE Right 9/9/2016    Procedure: RIGHT SECOND METATARSAL OPEN REDUCTION INTERNAL FIXATION WITh GRAFT FROM HEEL ;  Surgeon: Man Jenkins MD;  [facility] [place] [facility];  Service:    • PATENT FORAMEN OVALE CLOSURE     • SEPTOPLASTY     • SKIN CANCER EXCISION     • TONSILLECTOMY      • TRANSESOPHAGEAL ECHOCARDIOGRAM (STELLA)         SLP Recommendation and Plan  SLP Swallowing Diagnosis: severe, oral dysphagia, pharyngeal dysphagia (11/08/22 1000)  SLP Diet Recommendation: NPO, ice chips between meals after oral care, with supervision, other (see comments), long term alternate methods of nutrition/hydration (small ice chips sparingly or wet swabs to moisten mouth) (11/08/22 1000)     SLP Rec. for Method of Medication Administration: meds via alternate route (11/08/22 1000)     Monitor for Signs of Aspiration: yes (11/08/22 1000)     Swallow Criteria for Skilled Therapeutic Interventions Met: demonstrates skilled criteria (11/08/22 1000)  Anticipated Discharge Disposition (SLP): unknown (11/08/22 1000)  Rehab Potential/Prognosis, Swallowing: re-evaluate goals as necessary (11/08/22 1000)  Therapy Frequency (Swallow): PRN (11/08/22 1000)                     Patient/Family Concerns, Anticipated Discharge Disposition (SLP): Education completed with pt and daughter in law after the study. (11/08/22 1000)                     Plan of Care Reviewed With: patient, other (see comments)  Outcome Evaluation: VFSS completed. Recommend NPO w/ alternative means for nutrition and meds; frequent oral care; wet swab or small ice chips sparingly to moisten oral cavity. Results were discussed with the patient and his daughter in law in pt room after the study. Explained results of the study, NPO recommendation, importance of oral care, and the risks of aspiration (including pneumonia, other medical complications, and death). Pt and daughter in law verbalized understanding. Pt stated that he did not want an alternative means of nutrition and wants to eat for quality of life despite risks. Advised daugter in law to discuss further with pt and family. SLP available for education if needed. MD please advise.      SWALLOW EVALUATION (last 72 hours)     SLP Adult Swallow Evaluation     Row Name 11/08/22 1000 11/07/22 1000                 Rehab Evaluation    Document Type evaluation  -AW evaluation  -       Subjective Information no complaints  -AW --       Patient Observations alert;cooperative;agree to therapy  -AW --       Patient Effort good  -AW fair  -SH       Symptoms Noted During/After Treatment fatigue  -AW --          General Information    Patient Profile Reviewed yes  -AW yes  -SH       Pertinent History Of Current Problem Pt admitted with an acute R thalamic hemorrhage. PMH: COPD, asthma, CABG 2012, dysphagia. Pt has a long h/o dysphagia and has refused the recommendation for a PEG in the past, eating for QOL.  -AW Hx dysphagia. Acute R thalamic bleed.  -SH       Current Method of Nutrition NPO  -AW NPO  -SH       Precautions/Limitations, Vision WFL;for purposes of eval  -AW neglect, left  -SH       Precautions/Limitations, Hearing WFL;for purposes of eval  -AW WFL;for purposes of eval  -SH       Prior Level of Function-Communication unknown  -AW unknown  -SH       Prior Level of Function-Swallowing other (see comments)  ate for QOL, despite risks  -AW other (see comments);NPO  Pt ate for QofL  -SH       Plans/Goals Discussed with patient;agreed upon  -AW patient  -SH       Barriers to Rehab medically complex;previous functional deficit  -AW medically complex;cognitive status  -SH       Family Goals for Discharge patient able to return to PO diet  -AW --          Pain    Additional Documentation Pain Scale: Numbers Pre/Post-Treatment (Group)  -AW Pain Scale: Numbers Pre/Post-Treatment (Group)  -SH          Pain Scale: Numbers Pre/Post-Treatment    Pretreatment Pain Rating 5/10  -AW 3/10  -SH       Posttreatment Pain Rating 5/10  -AW --       Pain Location - neck  -AW neck  -SH       Pain Intervention(s) Repositioned  -AW --          Oral Motor Structure and Function    Dentition Assessment natural, present and adequate  -AW natural, present and adequate  -SH       Secretion Management WNL/WFL  -AW --          Oral  Musculature and Cranial Nerve Assessment    Oral Motor General Assessment generalized oral motor weakness  -AW WFL;vocal impairment  -SH       Vocal Impairment, Detail. Cranial Nerve X (Vagus) vocal quality abnormality (see comments);other (see comments)  voice weak  -AW --          General Eating/Swallowing Observations    Respiratory Support Currently in Use room air;other (see comments)  wide open mouth breathing  -AW --       Eating/Swallowing Skills self-fed;fed by SLP  -AW --       Positioning During Eating upright in bed  -AW --          Clinical Swallow Eval    Clinical Swallow Evaluation Summary -- Patient seen for clinical swallow assessment. Pt with hx of pharyngeal dysphagia with varying levels of severity. VFSS completed 4/22 with recs for NPO, but patient selected a modified PO diet with known aspiration risks. He attended OP dysphagia therapy and it was noted that he eventually upgraded self to thins without SLP guidance. Clinical swallow assessment 10/17/22 recommended continuing with regular and thins per patient wishes. Pt now admitted with acute R thalamic hemorrhage. Voice weak. Oral mech exam was otherwise unremarkable. Pt c/o neck pain 3/10, will track to L, but won't move his head. Reduce laryngeal elevation upon palpation. Wet cough with ice and puree after multiple swallows. Breathing appeared more congested after ice chip. SLP recs NPO, will proceed with instrumental assessment, but suspect severe pharyngeal dysphagia. Will need to discuss GOC with patient and family.  -SH          MBS/VFSS    Utensils Used spoon;cup  -AW --       Consistencies Trialed honey-thick liquids;pureed  -AW --          MBS/VFSS Interpretation    VFSS Summary VFSS completed with Dr. Boyer. Pt exhibited severe oropharyngeal dysphagia characterized by a delayed swallow, decreased tongue base strength, decreased hyolaryngeal excursion, and decreased epiglottic deflection. Pt's inward curvature of cervical spine  along with weakness impacted epiglottic deflection. Pt had kristal silent aspiration with cup sip of honey thick liquid during the swallow. Silent penetration occurred during the swallow with a teaspoon sip of honey thick with silent aspiration after the swallow from residuals. Pt's cued cough was not effective in clearing material. Moderate residuals were noted with pureed. Pt did not sense and needed cues to swallow again to assist in clearing. Pt began coughing significantly, suspect penetration/aspiration. The study was ended due to risk and pt's safety. Recommend NPO w/ alternative means for nutrition and meds; frequent oral care; wet swab or small ice chips sparingly to moisten oral cavity. Results were discussed with the patient and his daughter in law in pt room after the study. Explained results of the study, NPO recommendation, importance of oral care, and the risks of aspiration (including pneumonia, other medical complications, and death). Pt and daughter in law verbalized understanding. Pt stated that he did not want an alternative means of nutrition and wants to eat despite risks. Advised daugter in law to discuss further with pt and family. SLP available for education if needed. MD please advise.  -AW --          SLP Communication to Radiology    Summary Statement VFSS completed with Dr. Boyer. Pt exhibited severe oropharyngeal dysphagia characterized by a delayed swallow, decreased tongue base strength, decreased hyolaryngeal excursion, and decreased epiglottic deflection. Pt's inward curvature of cervical spine along with weakness impacted epiglottic deflection. Pt had kristal silent aspiration with cup sip of honey thick liquid during the swallow. Silent penetration occurred during the swallow with a teaspoon sip of honey thick with silent aspiration after the swallow from residuals. Pt's cued cough was not effective in clearing material. Moderate residuals were noted with pureed. Pt did not sense  and needed cues to swallow again to assist in clearing. Pt began coughing significantly, suspect penetration/aspiration. The study was ended due to risk and pt's safety  -AW --          SLP Evaluation Clinical Impression    SLP Swallowing Diagnosis severe;oral dysphagia;pharyngeal dysphagia  -AW suspected pharyngeal dysphagia  -       Functional Impact risk of aspiration/pneumonia  -AW --       Rehab Potential/Prognosis, Swallowing re-evaluate goals as necessary  -AW --       Swallow Criteria for Skilled Therapeutic Interventions Met demonstrates skilled criteria  -AW demonstrates skilled criteria  -          Recommendations    Therapy Frequency (Swallow) PRN  -AW PRN  -SH       SLP Diet Recommendation NPO;ice chips between meals after oral care, with supervision;other (see comments);long term alternate methods of nutrition/hydration  small ice chips sparingly or wet swabs to moisten mouth  -AW NPO  -       Recommended Diagnostics -- reassess via VFSS ([facility])  -       Oral Care Recommendations Oral Care BID/PRN  -AW Oral Care BID/PRN  -SH       SLP Rec. for Method of Medication Administration meds via alternate route  - meds via alternate route  -       Monitor for Signs of Aspiration yes  -AW yes;notify SLP if any concerns  -       Anticipated Discharge Disposition (SLP) unknown  -AW --       Patient/Family Concerns, Anticipated Discharge Disposition (SLP) Education completed with pt and daughter in law after the study.  -AW --          Swallow Goals (SLP)    Swallow STGs -- diet tolerance goal selection (SLP)  -       Diet Tolerance Goal Selection (SLP) -- Patient will tolerate trials of  -          (STG) Patient will tolerate trials of    Consistencies Trialed (Tolerate trials) -- thin liquids  ICE  -       Desired Outcome (Tolerate trials) -- without signs/symptoms of aspiration  -       [place] (Tolerate trials) -- independently (over 90% accuracy)  -             User Key  (r) =  Recorded By, (t) = Taken By, (c) = Cosigned By    Initials Name Effective Dates    Yamilex Nunez MS CCC-SLP 06/16/21 -      Sasha Bruno MS CCC-VILMA 06/16/21 -                 EDUCATION  The patient has been educated in the following areas:   Dysphagia (Swallowing Impairment) Oral Care/Hydration NPO rationale.        SLP GOALS     Row Name 11/07/22 1000             (STG) Patient will tolerate trials of    Consistencies Trialed (Tolerate trials) thin liquids  ICE  -      Desired Outcome (Tolerate trials) without signs/symptoms of aspiration  -      [place] (Tolerate trials) independently (over 90% accuracy)  -            User Key  (r) = Recorded By, (t) = Taken By, (c) = Cosigned By    Initials Name Provider Type    Sasha Michelle MS CCC-SLP Speech and Language Pathologist                   Time Calculation:    Time Calculation- SLP     Row Name 11/08/22 1137             Time Calculation- SLP    SLP Start Time 0830  -AW      SLP Received On 11/08/22  -            User Key  (r) = Recorded By, (t) = Taken By, (c) = Cosigned By    Initials Name Provider Type    Yamilex Nunez MS CCC-SLP Speech and Language Pathologist                Therapy Charges for Today     Code Description Service Date Service Provider Modifiers Qty    94271477506 HC ST MOTION FLUORO EVAL SWALLOW 6 11/8/2022 Yamilex Jones MS CCC-SLP GN 1               Yamilex Jones MS CCC-VILMA  11/8/2022

## 2022-11-08 NOTE — CONSULTS
Date of Consultation: 22    Referral Provider: Casa Sanchez MD     Reason for Consultation: Stroke, new paroxysmal atrial fibrillation    Encounter Provider: Mic Pace MD    Group of Service: [facility]     Patient Name: Eugenio Joe    :1940    Chief complaint: Left-sided weakness and speech deficits.    History of Present Illness:    Eugenio Joe is an 80 y/o male with a history of CAD s/p CABG x3 in , CHF, type 2 DM, PFO s/p closure w/ 25mm amplatzer 2020, brain abscess s/p brain surgery at age 44, CVA in  (takes aspirin 81 and plavix at home), PE, CKD stage III, HLD, HTN, asthma, and diverticulitis    Mr. Joe presented to [facility] ED vis EMS on 2022 with complaints of left-sided weakness and slurred speech. He woke up in his bed in a prone position and was too weak to roll over and wife called EMS. After testing, patient was found to have intraparenchymal hemorrhage and is being followed by neurosurgery.    We are being consulted for atrial fibrillation.  His blood pressure is currently being controlled on a Cardene drip.  However, he has had multiple episodes of atrial fibrillation with RVR which are new.  When I saw him in the ICU, he was responding, although obviously fairly ill.      Previous Testing:  TTE 10/17/2022:  Interpretation Summary       •  Estimated left ventricular EF = 55% Left ventricular systolic function is normal.  •  Left ventricular diastolic function is consistent with (grade Ia w/high LAP) impaired relaxation.  •  There is moderate calcification of the aortic valve.  •  Estimated right ventricular systolic pressure from tricuspid regurgitation is normal (<35 mmHg).     Stress test 2022:  Interpretation Summary    • Myocardial perfusion imaging indicates a normal myocardial perfusion study with no evidence of ischemia.  • Impressions are consistent with a low risk study.  • PVCs noted  during stress.  • Left ventricular ejection fraction is mildly reduced. (Calculated EF = 42%). I believe EF is artifically decreased due to PVC's      Holter 09/08/2020:  Study Findings    Patient diary submitted.There was 1 patient triggered episode with no recorded symptoms was reported during the monitoring period. There was 1 patient triggered episode with no recorded symptoms There was 1 patient triggered episode with no recorded symptoms.  Correlated with episodes of premature atrial contractions and premature ventricular contractions. Patient reported rare episodes. No complications noted. The predominant rhythm noted during the testing period was sinus rhythm. Premature atrial contractions occured occasionally. There were 18 episodes of nonsustained supraventricular tachycardia the longest lasting 12 seconds and the fastest with a rate of 152 bpm. Premature ventricular contractions occured occasionally. Ventricular couplets, bigeminy and trigeminy. There were no episodes of ventricular tachycardia. Sinoatrial node conduction was normal. 1st degree atrioventricular block noted.     Study Impressions    A relatively benign monitor study.       Past Medical History:   Diagnosis Date   • Arthritis    • Asthma    • Brain abscess     at about age 44   • Bruise of face    • Bursitis of left hip    • Cancer ([facility])     PT STATES IN HIS SWEAT GLAND    • Cardiac disease    • Coronary artery disease     CABG 2012   • Diabetes mellitus ([facility])    • Difficulty in swallowing     LIQUIDS   • Difficulty walking    • Diverticulitis    • DM2 (diabetes mellitus, type 2) ([facility]) 10/12/2016   • Fracture, foot 2015    Dr Pisano   • Fracture, stress, metatarsal    • Fracture, tibia and fibula Feb 2021    Dr Pisano   • Gout several years ago    medication  working   • Hearing aid worn     bilateral   • Hx of appendectomy    • Hyperlipemia    • Hypertension    • Joint pain     or swelling   • Low back pain several years ago   •  Metatarsal stress fracture with nonunion    • Normal pressure hydrocephalus ([facility])    • Orbit fracture ([facility])    • Pulmonary embolism ([facility])     OCT 2016 - AFTER FOOT SURGERY   • Rash     ARM-    • Spinal headache     AFTER SPINAL TAP - NO BLOOD PATCH   • Stroke (cerebrum) ([facility]) 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   • Syndesmotic ankle sprain, left, subsequent encounter    • Tear of meniscus of knee torn ligaments   • TIA (transient ischemic attack)     MULTIPLE         Past Surgical History:   Procedure Laterality Date   • ANKLE OPEN REDUCTION INTERNAL FIXATION Left 2/16/2021    Procedure: Left ankle open reduction internal fixation with implants as needed;  Surgeon: Man Jenkins MD;  Location:  [facility];  Service: Orthopedics;  Laterality: Left;   • APPENDECTOMY N/A 7/18/2019    Procedure: APPENDECTOMY LAPAROSCOPIC;  Surgeon: Luis Carlos Rick Jr., MD;  Location: [facility] MAIN OR;  Service: General   • BRAIN SURGERY      BRAIN ABCESS 30 YR AGO   • CARDIAC CATHETERIZATION N/A 12/4/2020    Procedure: Patent foramen ovale closure ABBOTT;  Surgeon: Frank Pablo MD;  Location: [facility] CATH INVASIVE LOCATION;  Service: Cardiology;  Laterality: N/A;   • CARDIAC CATHETERIZATION N/A 12/4/2020    Procedure: Intracardiac echocardiogram;  Surgeon: Frank Pablo MD;  Location: [facility] CATH INVASIVE LOCATION;  Service: Cardiology;  Laterality: N/A;   • CARDIAC SURGERY     • COLONOSCOPY  2012    Ciciliano- normal per pt, no polyps    • CORONARY ARTERY BYPASS GRAFT      3 VESSEL   • ENDOSCOPY N/A 3/9/2017    Schatzki ring, dilated, LA Grade B esophagitis, HH, acquired duodenal stenosis   • ENDOSCOPY N/A 4/6/2018    candida, HH, torts, Schatzki ring, duodenal stenosis, dilatation perforemed, chronic inflammation   • ENDOSCOPY N/A 10/9/2019    White nummular lesions in esophageal mucosa, mild Schatzki ring, acquired duodenal stenosis, Path: Terrazas's, candida   • ENDOSCOPY N/A 1/8/2020    Procedure:  ESOPHAGOGASTRODUODENOSCOPY with biopsies;  Surgeon: Rigoberto Walker MD;  Location: [facility] ENDOSCOPY;  Service: Gastroenterology   • FACIAL FRACTURE SURGERY      to repair 6 broken bones   • HAND SURGERY     • ORBITAL FRACTURE OPEN REDUCTION INTERNAL FIXATION Right 1/13/2017    Procedure: RT ORBIT FLOOR FRACTURE REPAIR RIGHT ZMC FRACTURE REPAIR, RIGHT NASAL BONE FRACTURE CLOSED REDUCTION;  Surgeon: Edil Lester MD;  Location: [facility];  Service:    • ORIF FOOT FRACTURE Right 9/9/2016    Procedure: RIGHT SECOND METATARSAL OPEN REDUCTION INTERNAL FIXATION WITh GRAFT FROM HEEL ;  Surgeon: Man Jenkins MD;  Location: [facility] OR [facility];  Service:    • PATENT FORAMEN OVALE CLOSURE     • SEPTOPLASTY     • SKIN CANCER EXCISION     • TONSILLECTOMY     • TRANSESOPHAGEAL ECHOCARDIOGRAM (STELLA)           Allergies   Allergen Reactions   • Other Mental Status Change and Hallucinations     Oxy drugs   • Oxycodone Mental Status Change         No current facility-administered medications on file prior to encounter.     Current Outpatient Medications on File Prior to Encounter   Medication Sig Dispense Refill   • Accu-Chek FastClix Lancets misc TEST ONCE TO BID PRN     • acetaminophen (TYLENOL) 500 MG tablet Take 2 tablets by mouth Every 8 (Eight) Hours As Needed for Mild Pain . 30 tablet 0   • allopurinol (ZYLOPRIM) 300 MG tablet Take 300 mg by mouth daily.     • aspirin 81 MG chewable tablet Chew 81 mg Daily. WILL VERIFY STOP DATE  WITH MD     • atorvastatin (LIPITOR) 40 MG tablet Take 1 tablet by mouth Every Night.     • carvedilol (COREG) 12.5 MG tablet Take 1 tablet by mouth Every 12 (Twelve) Hours. (Patient taking differently: Take 6.25 mg by mouth Every 12 (Twelve) Hours.)     • clopidogrel (PLAVIX) 75 MG tablet Take 1 tablet by mouth Daily. 30 tablet    • Flaxseed, Linseed, (FLAXSEED OIL PO) Take 1,000 mg by mouth.     • glipizide (GLUCOTROL) 5 MG tablet Take 5 mg by mouth 2 (Two) Times a Day Before  Meals.     • magnesium gluconate (MAGONATE) 500 MG tablet Take 1 tablet by mouth.     • metFORMIN (GLUCOPHAGE) 500 MG tablet Take 500 mg by mouth Daily With Breakfast.     • Multiple Vitamins-Minerals (MULTIVITAMIN ADULT PO) Take 1 tablet by mouth Daily.     • pantoprazole (PROTONIX) 40 MG EC tablet Take 40 mg by mouth every night at bedtime.     • PROAIR  (90 BASE) MCG/ACT inhaler Inhale 2 puffs Every 4 (Four) Hours As Needed.     • vitamin D3 125 MCG (5000 UT) capsule capsule Take 1 capsule by mouth Daily.     • doxazosin (CARDURA) 1 MG tablet Take 4 mg by mouth Every Morning.           Social History     Socioeconomic History   • Marital status:    • Number of children: 2   • Years of education: 16   Tobacco Use   • Smoking status: Former     Packs/day: 3.00     Years: 5.00     Pack years: 15.00     Types: Cigarettes     Start date:      Quit date:      Years since quittin.8   • Smokeless tobacco: Never   • Tobacco comments:     Quit herson turkey   Substance and Sexual Activity   • Alcohol use: Yes     Alcohol/week: 2.0 standard drinks     Types: 1 Cans of beer, 1 Shots of liquor per week     Comment: RARELY    • Drug use: No   • Sexual activity: Not Currently     Partners: Female     Birth control/protection: Surgical         Family History   Problem Relation Age of Onset   • Heart disease Mother    • Hypertension Mother    • Stroke Mother    • Diverticulitis Mother    • Heart disease Father    • Hypertension Father    • Malig Hyperthermia Neg Hx        REVIEW OF SYSTEMS:   The patient just recently suffered a significant stroke and brain bleed, and a complete review of systems is not possible.     Objective:     Vitals:    22 1500 22 1553 22 1700 22 1730   BP: 148/60  151/63 147/75   Pulse: 86  96 100   Resp:       Temp:  98.6 °F (37 °C)     TempSrc:  Oral     SpO2: 95%  94% 94%   Weight:       Height:         Body mass index is 27.95 kg/m².  Flowsheet Rows   "  Flowsheet Row First Filed Value   Admission Height 177.8 cm (70\") Documented at 11/06/2022 0900   Admission Weight 88.4 kg (194 lb 12.8 oz) Documented at 11/06/2022 0900           General:    No acute distress, alert and responds reasonably appropriately with slurred speech                   Head:    Normocephalic, atraumatic.   Eyes:          Conjunctivae and sclerae normal, no icterus, PERRLA   Throat:   No oral lesions, no thrush, oral mucosa moist.    Neck:   Supple, trachea midline.   Lungs:     Clear to auscultation bilaterally     Heart:    Regular rhythm and normal rate.  II/VI SM    Abdomen:     Soft, non-tender, non-distended, positive bowel sounds.    Extremities:   No clubbing, cyanosis, or edema.     Pulses:   Pulses palpable and equal bilaterally.    Skin:   No bleeding or rash.   Neuro:  Left-sided weakness   Psychiatric:  Normal mood with slightly flat affect related to his stroke                 Lab Review:                Results from last 7 days   Lab Units 11/08/22  0309   SODIUM mmol/L 141   POTASSIUM mmol/L 3.9   CHLORIDE mmol/L 109*   CO2 mmol/L 19.7*   BUN mg/dL 26*   CREATININE mg/dL 1.31*   GLUCOSE mg/dL 139*   CALCIUM mg/dL 8.4*     Results from last 7 days   Lab Units 11/06/22  0902   TROPONIN T ng/mL <0.010     Results from last 7 days   Lab Units 11/08/22  0309   WBC 10*3/mm3 8.57   HEMOGLOBIN g/dL 11.9*   HEMATOCRIT % 34.5*   PLATELETS 10*3/mm3 170     Results from last 7 days   Lab Units 11/06/22  0902   INR  1.04   APTT seconds <20.0*     Results from last 7 days   Lab Units 11/07/22  0341   CHOLESTEROL mg/dL 98         Results from last 7 days   Lab Units 11/07/22  0341   CHOLESTEROL mg/dL 98   TRIGLYCERIDES mg/dL 154*   HDL CHOL mg/dL 34*   LDL CHOL mg/dL 38       EKG (reviewed by me personally):      EKG 11/06/2022 @ 0957:        11/08/2022 @ 1106      11/08/2022 @ 0844      11/08/2022 @ 0527     Assessment:   1.  Right thalamic intracranial hemorrhage and intraventricular " hemorrhage 2.  Hypertensive emergency  3.  Paroxysmal atrial fibrillation with RVR (new  4.  History of prior CVA in 2020  5.  History of brain abscess surgery at age 44  6.  Coronary artery disease, status post CABG 2012  7.  COPD without acute exacerbation  8.  History of PFO closure  9.  Chronic diastolic CHF  10.  Stage III chronic kidney disease  11.  Diabetes    Plan:       I discussed the patient with his daughter at bedside.  Unfortunately, he has had a fairly significant event.  It is unclear as to whether this was a pure or intracranial hemorrhage based on the hypertensive emergency.  He certainly could have had an embolic event which then turned into a hemorrhagic stroke as well, especially given the new atrial fibrillation.  However, he is certainly not a candidate for any type of anticoagulation at this point.      He did fail his swallow study and is unable to take oral medications.  I am going to start him on metoprolol 5 mg IV every 6 hours.  He is currently in sinus rhythm, although more atrial fibrillation certainly could be expected.  His recent echocardiogram on 10/17/2022 showed an ejection fraction of 55% and moderate calcification of the aortic valve.  Overall, his prognosis is very guarded at this point, especially given his age.    Thank you very much for this consult.    Ez Pace MD

## 2022-11-08 NOTE — TELEPHONE ENCOUNTER
----- Message from JULISA Whitaker sent at 11/8/2022 11:13 AM EST -----  Regarding: Follow-up  Please have patient follow-up in 4 weeks with repeat CT head    Thank you

## 2022-11-08 NOTE — PLAN OF CARE
Goal Outcome Evaluation:  Plan of Care Reviewed With: patient, other (see comments)           Outcome Evaluation: VFSS completed. Recommend NPO w/ alternative means for nutrition and meds; frequent oral care; wet swab or small ice chips sparingly to moisten oral cavity. Results were discussed with the patient and his daughter in law in pt room after the study. Explained results of the study, NPO recommendation, importance of oral care, and the risks of aspiration (including pneumonia, other medical complications, and death). Pt and daughter in law verbalized understanding. Pt stated that he did not want an alternative means of nutrition and wants to eat for quality of life despite risks. Advised daugter in law to discuss further with pt and family. SLP available for education if needed. MD please advise.

## 2022-11-08 NOTE — CONSULTS
"Purpose of the visit was to evaluate for: goals of care/advanced care planning and support for patient/family. Spoke with MD, RN and CCP as well as patient, family and HCS and discussed palliative care, goals of care, care options, resuscitation status, [facility] scattered bed status and discharge options.      Assessment:  Patient is palliative care appropriate for inpatient care given (list diagnosis/symptoms):  Intraparenchymal hemorrhage, history of stroke, CAD, DM, COPD, dysphagia. Patient alert and seems oriented to situation. When asked what brought him into the hospital he answered \"brain fart\". He denied pain, shortness of breath, nausea, anxiety and depression. Does have some audile congestion and cough. Currently NPO. PPS 20%    Recommendations/Plan: Change code status to DNR/DNI. Family considering transfer to inpatient palliative care unit but would like the night to discuss as a family. Palliative care team will follow up tomorrow.     Other Comments: Spoke with patient briefly, he gave me permission to discuss his care with his family. I spoke with his wife Annette and daughter in law Francisca at bedside. We discussed goals of care, treatment options, and code status. Patient is declining feeding tube. He does understand implications of his refusal. Therefore family is interested in hospice care, but is hesitant to send to him to a facility. We discussed inpatient palliative care unit in detail. Answered all questions and provided support, advised of availability.   "

## 2022-11-08 NOTE — PROGRESS NOTES
[facility] NEUROSURGERY PROGRESS NOTE    PATIENT IDENTIFICATION:   Name:  Eugenio Joe   MRN:  [id number]     81 y.o.  male               CC: Right thalamic hemorrhage post bleed day #2.      Subjective     Interval History: No new complaints or events overnight.  Patient resting in bed, drowsy but arousable.  Daughter-in-law present at bedside.    ROS: limited  Constitutional: No fever, chills  HEENT: No headache, no vision changes, no new hearing difficulties  GI: No nausea, vomiting, no swallow difficulties  Neuro: left-sided weakness, no speech difficulties    Objective     Vital signs in last 24 hours:  Temp:  [97.7 °F (36.5 °C)-98.8 °F (37.1 °C)] 98.8 °F (37.1 °C)  Heart Rate:  [] 130  BP: (108-154)/(45-94) 108/94    Intake/Output this shift:  No intake/output data recorded.    Intake/Output last 3 shifts:  I/O last 3 completed shifts:  In: 3130 [I.V.:3130]  Out: 1225 [Urine:1225]    LABS:  Results from last 7 days   Lab Units 11/08/22  0309 11/07/22  0341 11/06/22  1130   WBC 10*3/mm3 8.57 9.10 7.99   HEMOGLOBIN g/dL 11.9* 12.2* 13.0   HEMATOCRIT % 34.5* 37.0* 38.7   PLATELETS 10*3/mm3 170 175 181       Results from last 7 days   Lab Units 11/08/22  0309 11/07/22  0341 11/06/22  1130   SODIUM mmol/L 141 140 141   POTASSIUM mmol/L 3.9 4.4 4.2   CHLORIDE mmol/L 109* 106 107   CO2 mmol/L 19.7* 21.2* 22.9   BUN mg/dL 26* 22 18   CREATININE mg/dL 1.31* 1.18 1.12   GLUCOSE mg/dL 139* 160* 173*   CALCIUM mg/dL 8.4* 9.1 8.9          IMAGING STUDIES:  CT Head Without Contrast    Result Date: 11/7/2022  An intraparenchymal hemorrhage involving the posterior lateral aspect of the thalamus on the right is noted with intraventricular extension all of which appears similar to the study from 0913 hours. There is no evidence of new hemorrhage, hydrocephalus or of acute infarction.  The above information was called to the patient's nurse at the time of the dictation.   Radiation dose reduction techniques were  "utilized, including automated exposure control and exposure modulation based on body size.  This report was finalized on 11/7/2022 6:48 AM by Dr. Baldo Spencer M.D.      CT Angiogram Head    Result Date: 11/7/2022  Electronically signed by Ariadne Hernandez M.D. on 11-07-22 at 2350        I personally viewed and interpreted the patient's chart.    Meds reviewed/changed: Yes      Physical Exam:  Awake, alert, oriented x3.  Speech clear with no aphasia.  Extraocular movements are full  Pupils equal round reactive to light  Normal facial sensation  facial movements are symmetric, no weakness  5/5 strength on right side, left-sided hemiparesis.  Sensation normal to light touch all 4 extremities  Wearing SCDs in bed      Assessment & Plan     ASSESSMENT:      Intraparenchymal hemorrhage of brain (HCC)    The patient is an 81-year-old male with a history of stroke on Plavix who presented to the ED with left-sided weakness, confusion and difficulty with speech.  On arrival his blood pressure was significantly elevated, corrected on Cardene gtt.  CT showed a right thalamic ICH with some IVH.  He has a reported history of NPH but is not a candidate for a shunt as he developed hearing loss and neurological deficits after a lumbar puncture in the past.     PLAN:    · CTA stable, no AVM, aneurysm, spot sign or large vessel occlusion.  We recommend following up with us in 4 weeks with CT head to reassess for hydrocephalus.    I discussed the patient's findings and my recommendations with Dr. Rodriguez, patient and nursing staff       LOS: 2 days       Nellie Benson, APRN  11/8/2022  10:54 EST    \"Dictated utilizing Dragon dictation\".      "

## 2022-11-08 NOTE — PROGRESS NOTES
LPC INPATIENT PROGRESS NOTE         [facility] INTENSIVE CARE    2022      PATIENT IDENTIFICATION:  Name: Eugenio Joe ADMIT: 2022   : 1940  PCP: Adeline Onofre MD    MRN: [id number] LOS: 2 days   AGE/SEX: 81 y.o. male  ROOM: [facility]                     LOS 2    Reason for visit: Intracranial hemorrhage      SUBJECTIVE:      Soft and mildly garbled voice.  Moderate cough.  Clear breath sounds.     Objective   OBJECTIVE:    Vital Sign Min/Max for last 24 hours  Temp  Min: 97.7 °F (36.5 °C)  Max: 98.8 °F (37.1 °C)   BP  Min: 119/45  Max: 154/86   Pulse  Min: 78  Max: 128   No data recorded   SpO2  Min: 85 %  Max: 98 %   No data recorded   No data recorded                         Body mass index is 27.95 kg/m².    Intake/Output Summary (Last 24 hours) at 2022 0905  Last data filed at 2022 0600  Gross per 24 hour   Intake 2915 ml   Output 825 ml   Net 2090 ml         Exam:  GEN:  No distress, appears stated age  EYES:   PERRL, anicteric sclerae  ENT:    External ears/nose normal, OP clear  NECK:  No adenopathy, midline trachea  LUNGS: Normal chest on inspection, palpation and auscultation  CV:  Normal S1S2, 2 out of 6 systolic murmur  ABD:  Nontender, nondistended, no hepatosplenomegaly, +BS  EXT:  No edema.  No cyanosis or clubbing.  No mottling and normal cap refill.    Assessment     Scheduled meds:  docusate sodium, 100 mg, Oral, Daily  insulin lispro, 0-14 Units, Subcutaneous, Q4H  sodium chloride, 10 mL, Intravenous, Q12H      IV meds:                      niCARdipine, 5-15 mg/hr, Last Rate: 15 mg/hr (22 0725)      Data Review:  Results from last 7 days   Lab Units 22  0309 22  0341 22  1130 22  1009 22  0902   SODIUM mmol/L 141 140 141  --  140   POTASSIUM mmol/L 3.9 4.4 4.2  --  5.1   CHLORIDE mmol/L 109* 106 107  --  106   CO2 mmol/L 19.7* 21.2* 22.9  --  23.8   BUN mg/dL 26* 22 18  --  20   CREATININE mg/dL 1.31* 1.18 1.12  1.20 1.21   GLUCOSE mg/dL 139* 160* 173*  --  193*   CALCIUM mg/dL 8.4* 9.1 8.9  --  9.5         Estimated Creatinine Clearance: 48.7 mL/min (A) (by C-G formula based on SCr of 1.31 mg/dL (H)).  Results from last 7 days   Lab Units 11/08/22  0309 11/07/22  0341 11/06/22  1130 11/06/22  0902   WBC 10*3/mm3 8.57 9.10 7.99 8.10   HEMOGLOBIN g/dL 11.9* 12.2* 13.0 13.1   PLATELETS 10*3/mm3 170 175 181 182     Results from last 7 days   Lab Units 11/06/22  0902   INR  1.04     Results from last 7 days   Lab Units 11/08/22  0309 11/07/22  0341 11/06/22  1130 11/06/22  0902   ALT (SGPT) U/L 8 11 11 14   AST (SGOT) U/L 12 13 17 26                 Hemoglobin A1C   Date/Time Value Ref Range Status   11/07/2022 0341 7.60 (H) 4.80 - 5.60 % Final     Glucose   Date/Time Value Ref Range Status   11/08/2022 0705 170 (H) 70 - 130 mg/dL Final     Comment:     Meter: [id number] : [id number] Essex-Eddie Lolis MILLER   11/08/2022 0441 143 (H) 70 - 130 mg/dL Final     Comment:     Meter: [id number] : [id number] Parker Thao RN   11/08/2022 0325 132 (H) 70 - 130 mg/dL Final     Comment:     Meter: [id number] : [id number] Jan Sterling NA   11/07/2022 2315 155 (H) 70 - 130 mg/dL Final     Comment:     Meter: [id number] : [id number] Jan Sterling NA   11/07/2022 2003 187 (H) 70 - 130 mg/dL Final     Comment:     Meter: [id number] : [id number] Parker Thao RN   11/07/2022 1451 146 (H) 70 - 130 mg/dL Final     Comment:     Meter: [id number] : [id number] Essex-Eddie Lolis MILLER   11/07/2022 1053 163 (H) 70 - 130 mg/dL Final     Comment:     Meter: [id number] : [id number] Essex-Curtis Nicole NAT     Repeat CT head 11/6 reviewed shows intraparenchymal hemorrhage involving the posterior lateral aspect of the thalamus on the right with intraventricular extension appears similar to previous study.  No evidence of new hemorrhage, hydrocephalus or acute infarction.    CT angio of the head 11/7 reviewed    Chest x-ray 11/6  reviewed          2D echo 10/16 reviewed: EF 55% with grade 1A diastolic dysfunction, moderate calcification of the aortic valve.            Active Hospital Problems    Diagnosis  POA   • **Intraparenchymal hemorrhage of brain (HCC) [I61.9]  Yes      Resolved Hospital Problems   No resolved problems to display.         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  Neurochecks per protocol.  Holding patient's aspirin and Plavix.  Neurology and neurosurgery following.  Repeat CT head appears stable from previous.  Control blood pressure with goal systolic less than 140.  On Cardene drip.  PT/ST/OT  Unable to take p.o. with dysphagia.  Further testing per speech therapy.  Control glucose.  Mechanical DVT prophylaxis.    Discussed with multidisciplinary ICU team on rounds this morning.      CCT: 32 min    Casa Sanchez MD  Pulmonary and Critical Care Medicine  [facility], [facility]  11/8/2022    09:05 EST

## 2022-11-09 NOTE — PROGRESS NOTES
LPC INPATIENT PROGRESS NOTE         [address]    2022      PATIENT IDENTIFICATION:  Name: Eugenio oJe ADMIT: 2022   : 1940  PCP: Adeline Onofre MD    MRN: [id number] LOS: 3 days   AGE/SEX: 81 y.o. male  ROOM: [facility]                     LOS 3    Reason for visit: Intracranial hemorrhage      SUBJECTIVE:      Family has decided to change to full comfort measures.    Objective   OBJECTIVE:    Vital Sign Min/Max for last 24 hours  Temp  Min: 97.1 °F (36.2 °C)  Max: 98.6 °F (37 °C)   BP  Min: 116/69  Max: 157/75   Pulse  Min: 75  Max: 100   Resp  Min: 10  Max: 18   SpO2  Min: 90 %  Max: 98 %   No data recorded   Weight  Min: 85.8 kg (189 lb 2.5 oz)  Max: 85.8 kg (189 lb 2.5 oz)                         Body mass index is 27.14 kg/m².    Intake/Output Summary (Last 24 hours) at 2022 1052  Last data filed at 2022 0550  Gross per 24 hour   Intake --   Output 1025 ml   Net -1025 ml         Exam:  GEN:  No distress, appears stated age  EYES:   PERRL, anicteric sclerae  ENT:    External ears/nose normal, OP clear  NECK:  No adenopathy, midline trachea  LUNGS: Normal chest on inspection, palpation and auscultation  CV:  Normal S1S2, 2 out of 6 systolic murmur  ABD:  Nontender, nondistended, no hepatosplenomegaly, +BS  EXT:  No edema.  No cyanosis or clubbing.  No mottling and normal cap refill.    Assessment     Scheduled meds:     IV meds:                         Data Review:  Results from last 7 days   Lab Units 22  0529 22  0309 22  0341 22  1130 22  1009 22  0902   SODIUM mmol/L 147* 141 140 141  --  140   POTASSIUM mmol/L 4.0 3.9 4.4 4.2  --  5.1   CHLORIDE mmol/L 114* 109* 106 107  --  106   CO2 mmol/L 20.5* 19.7* 21.2* 22.9  --  23.8   BUN mg/dL 33* 26* 22 18  --  20   CREATININE mg/dL 1.39* 1.31* 1.18 1.12 1.20 1.21   GLUCOSE mg/dL 157* 139* 160* 173*  --  193*   CALCIUM mg/dL 8.8 8.4* 9.1 8.9  --  9.5         Estimated  Creatinine Clearance: 49.7 mL/min (A) (by C-G formula based on SCr of 1.39 mg/dL (H)).  Results from last 7 days   Lab Units 11/09/22  0529 11/08/22  0309 11/07/22  0341 11/06/22  1130 11/06/22  0902   WBC 10*3/mm3 11.46* 8.57 9.10 7.99 8.10   HEMOGLOBIN g/dL 11.0* 11.9* 12.2* 13.0 13.1   PLATELETS 10*3/mm3 158 170 175 181 182     Results from last 7 days   Lab Units 11/06/22  0902   INR  1.04     Results from last 7 days   Lab Units 11/09/22  0529 11/08/22 0309 11/07/22 0341 11/06/22  1130 11/06/22  0902   ALT (SGPT) U/L 5 8 11 11 14   AST (SGOT) U/L 13 12 13 17 26                 Hemoglobin A1C   Date/Time Value Ref Range Status   11/07/2022 0341 7.60 (H) 4.80 - 5.60 % Final     Glucose   Date/Time Value Ref Range Status   11/09/2022 0733 159 (H) 70 - 130 mg/dL Final     Comment:     Meter: [id number] : [id number] River BAKER   11/09/2022 0511 148 (H) 70 - 130 mg/dL Final     Comment:     Meter: [id number] : [id number] Kevin Hay RN   11/09/2022 0024 159 (H) 70 - 130 mg/dL Final     Comment:     Meter: [id number] : [id number] Kevin Hay RN   11/08/2022 2130 148 (H) 70 - 130 mg/dL Final     Comment:     Meter: [id number] : [id number] Kevin Hay RN   11/08/2022 1746 169 (H) 70 - 130 mg/dL Final     Comment:     Meter: [id number] : omaira Galloway RN   11/08/2022 1550 153 (H) 70 - 130 mg/dL Final     Comment:     Meter: [id number] : [id number] Essex-Curtis Nicole NAT   11/08/2022 1126 232 (H) 70 - 130 mg/dL Final     Comment:     Meter: [id number] : omaira Galloway RN     Repeat CT head 11/6 reviewed shows intraparenchymal hemorrhage involving the posterior lateral aspect of the thalamus on the right with intraventricular extension appears similar to previous study.  No evidence of new hemorrhage, hydrocephalus or acute infarction.    CT angio of the head 11/7 reviewed    Chest x-ray 11/6 reviewed          2D  echo 10/16 reviewed: EF 55% with grade 1A diastolic dysfunction, moderate calcification of the aortic valve.            Active Hospital Problems    Diagnosis  POA   • **Intraparenchymal hemorrhage of brain (HCC) [I61.9]  Yes      Resolved Hospital Problems   No resolved problems to display.         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  Palliative order set put in and transferred to [facility].  Consult hospice for inpatient scatter bed.    Casa Sanchez MD  Pulmonary and Critical Care Medicine  [facility], [facility]  11/9/2022    10:52 EST

## 2022-11-09 NOTE — PLAN OF CARE
Goal Outcome Evaluation:           Progress: declining  Outcome Evaluation: PPS 20%. Pt transfer from ICU. Pt alert and oriented x4. Speech is garbled at times. x1 morphine given for c/o SOA. Pt appeared to be resting comfortably following med given. Left sided flaccidity. Turn Q4. Pt is able to express needs at this time. Family at bedside. Will cont comfort measures.

## 2022-11-09 NOTE — PROGRESS NOTES
This is been a longstanding patient of mine..  He has had multiple strokes TIAs we have had him on clopidogrel and baby aspirin and a full aspirin we have looked for A. fib never have found it is had hypertension now he comes in with his devastating intracerebral hemorrhage in his thalamus.  We found he is also having paroxysmal A. fib.  The stroke is probably from bleeding and sent a classic place for hypertensive bleed.  He cannot swallow and he has made a decision to go to palliative care and honestly I feel like that is the correct decision for him.  I spoke with him for a while and with his family and said goodbye to him.  We will see him as needed

## 2022-11-09 NOTE — PLAN OF CARE
Goal Outcome Evaluation:  Plan of Care Reviewed With: patient        Progress: no change  Outcome Evaluation: Pt will go palliative today per report. NAD. VSS. Cardene infusing to keep SBP<140. Alert and orientedx4. Left side weakness persists. NIH 9. Ext cath with 600ml urine. Turned and repositioned q2h. Will CTM.

## 2022-11-10 PROBLEM — I62.9 INTRACRANIAL HEMORRHAGE (HCC): Status: ACTIVE | Noted: 2022-01-01

## 2022-11-10 NOTE — PLAN OF CARE
Goal Outcome Evaluation:  Plan of Care Reviewed With: patient, daughter        Progress: declining  Outcome Evaluation: Pt alert throughout shift. Educated family members frequently on nonverbal signs of discomfort and how the patient doesnt appear to be resting comfortably. Pt appears to be restless at times. Pt is still able to make needs known and didn't want any meds this shift.. FC in place. Family at bedside. Will cont comfort measures.

## 2022-11-10 NOTE — PROGRESS NOTES
Discharge Planning Assessment  [facility]     Patient Name: Eugenio Joe  MRN: [id number]  Today's Date: 11/10/2022    Admit Date: 11/6/2022    Plan: Undetermined   Discharge Needs Assessment    No documentation.                Discharge Plan     Row Name 11/10/22 1533       Plan    Plan Comments The patient transferred to [facility] from ICU on 11/9/22. The patient is palliative. [facility] to see today. NEFTALY Berumen RN, CCP    Final Discharge Disposition Code 51 - hospice medical facility    Final Note Admitted to a [facility] scattered bed on 11/10/22. NEFTALY Berumen RN, CCP.              Continued Care and Services - Admitted Since 11/6/2022     Destination Coordination complete.    Service Provider Request Status Selected Services Address Phone Fax Patient Preferred    [facility]  Selected Inpatient Hospice [address] [place] [id number] [phone] --                 Demographic Summary    No documentation.                Functional Status    No documentation.                Psychosocial    No documentation.                Abuse/Neglect    No documentation.                Legal    No documentation.                Substance Abuse    No documentation.                Patient Forms    No documentation.                   Yadira Berumen RN

## 2022-11-10 NOTE — PLAN OF CARE
Goal Outcome Evaluation:  Plan of Care Reviewed With: patient, daughter        Progress: declining  Outcome Evaluation: Pt anxious at start of shift.  Medicated with 2mg morphine and 0.5mg ativan with good results.  Daughter at bedside.  Pt resting and appears comfortable at this time.  Will continue to monitor and provide comfort measures as needed.

## 2022-11-10 NOTE — CONSULTS
Visit with patient, spouse and son at bedside. When asked how he was feeling today he gave me a thumbs up. Patient is in good humor and understands why he is in the hospital. He told me that the care in the hospital has been excellent. Patient grew up [employer] orth[employer] and his best friend, who has since passed, was a [employer] . Family is aware that chaplains are available for support.

## 2022-11-10 NOTE — PROGRESS NOTES
[facility] INPATIENT PROGRESS NOTE         [address]    11/10/2022      PATIENT IDENTIFICATION:  Name: Eugenio Joe ADMIT: 2022   : 1940  PCP: Adeline Onofre MD    MRN: [id number] LOS: 4 days   AGE/SEX: 81 y.o. male  ROOM: [facility]                     LOS 4    Reason for visit: Intracranial hemorrhage      SUBJECTIVE:      No new issues overnight.  Noted issues of anxiety per nursing.    Objective   OBJECTIVE:    Vital Sign Min/Max for last 24 hours  Temp  Min: 97.1 °F (36.2 °C)  Max: 98.2 °F (36.8 °C)   BP  Min: 136/64  Max: 144/68   Pulse  Min: 83  Max: 124   Resp  Min: 12  Max: 16   SpO2  Min: 96 %  Max: 98 %   No data recorded   No data recorded                         Body mass index is 27.14 kg/m².    Intake/Output Summary (Last 24 hours) at 11/10/2022 0717  Last data filed at 11/10/2022 0600  Gross per 24 hour   Intake --   Output 3700 ml   Net -3700 ml         Exam:  GEN:  No distress, appears stated age  EYES:   PERRL, anicteric sclerae  ENT:    External ears/nose normal, OP clear  NECK:  No adenopathy, midline trachea  LUNGS: Normal chest on inspection, palpation and auscultation  CV:  Normal S1S2, 2 out of 6 systolic murmur  ABD:  Nontender, nondistended, no hepatosplenomegaly, +BS  EXT:  No edema.  No cyanosis or clubbing.  No mottling and normal cap refill.    Assessment     Scheduled meds:     IV meds:                         Data Review:  Results from last 7 days   Lab Units 22  0529 22  0309 22  0341 22  1130 22  1009 22  0902   SODIUM mmol/L 147* 141 140 141  --  140   POTASSIUM mmol/L 4.0 3.9 4.4 4.2  --  5.1   CHLORIDE mmol/L 114* 109* 106 107  --  106   CO2 mmol/L 20.5* 19.7* 21.2* 22.9  --  23.8   BUN mg/dL 33* 26* 22 18  --  20   CREATININE mg/dL 1.39* 1.31* 1.18 1.12 1.20 1.21   GLUCOSE mg/dL 157* 139* 160* 173*  --  193*   CALCIUM mg/dL 8.8 8.4* 9.1 8.9  --  9.5         Estimated Creatinine Clearance: 49.7 mL/min (A) (by  C-G formula based on SCr of 1.39 mg/dL (H)).  Results from last 7 days   Lab Units 11/09/22  0529 11/08/22  0309 11/07/22  0341 11/06/22  1130 11/06/22  0902   WBC 10*3/mm3 11.46* 8.57 9.10 7.99 8.10   HEMOGLOBIN g/dL 11.0* 11.9* 12.2* 13.0 13.1   PLATELETS 10*3/mm3 158 170 175 181 182     Results from last 7 days   Lab Units 11/06/22  0902   INR  1.04     Results from last 7 days   Lab Units 11/09/22  0529 11/08/22  0309 11/07/22  0341 11/06/22  1130 11/06/22  0902   ALT (SGPT) U/L 5 8 11 11 14   AST (SGOT) U/L 13 12 13 17 26                 Glucose   Date/Time Value Ref Range Status   11/09/2022 0733 159 (H) 70 - 130 mg/dL Final     Comment:     Meter: [id number] : [id number] River BAKER   11/09/2022 0511 148 (H) 70 - 130 mg/dL Final     Comment:     Meter: [id number] : [id number] Kevin Hay RN   11/09/2022 0024 159 (H) 70 - 130 mg/dL Final     Comment:     Meter: [id number] : [id number] Kevin Hay RN   11/08/2022 2130 148 (H) 70 - 130 mg/dL Final     Comment:     Meter: [id number] : [id number] Kevin Hay RN   11/08/2022 1746 169 (H) 70 - 130 mg/dL Final     Comment:     Meter: [id number] : omaira Galloway RN   11/08/2022 1550 153 (H) 70 - 130 mg/dL Final     Comment:     Meter: [id number] : [id number] Essex-Curtis Nicole NAT   11/08/2022 1126 232 (H) 70 - 130 mg/dL Final     Comment:     Meter: [id number] : omaira Galloway RN     Repeat CT head 11/6 reviewed shows intraparenchymal hemorrhage involving the posterior lateral aspect of the thalamus on the right with intraventricular extension appears similar to previous study.  No evidence of new hemorrhage, hydrocephalus or acute infarction.    CT angio of the head 11/7 reviewed    Chest x-ray 11/6 reviewed          2D echo 10/16 reviewed: EF 55% with grade 1A diastolic dysfunction, moderate calcification of the aortic valve.            Active Hospital Problems     Diagnosis  POA   • **Intraparenchymal hemorrhage of brain (HCC) [I61.9]  Yes      Resolved Hospital Problems   No resolved problems to display.         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  Hospice consulted for scatter bed.  We will consult Dr. Acharya to take over as palliative attending.    Casa Sanchez MD  Pulmonary and Critical Care Medicine  [facility], [facility]  11/10/2022    07:17 EST

## 2022-11-10 NOTE — NURSING NOTE
Met with patient and family at bedside. They confirm wishes for comfort measures only. Provided explanation of [facility] SB admission. Consents signed and admission pack reviewed.    Benefit letter completed, given to Lindsey, faxed to HIM.    Thank you for this referral. Our SB team will follow.    Keisha Vásquez RN  [facility] Referral and Admissions Coordinator  [phone]

## 2022-11-10 NOTE — PROGRESS NOTES
Case Management Discharge Note      Final Note: Admitted to a [facility] scattered bed on 11/10/22. NEFTALY Berumen RN, CCP.         Selected Continued Care - Admitted Since 11/6/2022     Destination Coordination complete.    Service Provider Selected Services Address Phone Fax Patient Preferred    [facility] [address], [facility] [address] [phone] --          Durable Medical Equipment    No services have been selected for the patient.              Dialysis/Infusion    No services have been selected for the patient.              Home Medical Care    No services have been selected for the patient.              Therapy    No services have been selected for the patient.              Community Resources    No services have been selected for the patient.              Community & DME    No services have been selected for the patient.                       Final Discharge Disposition Code: 51 - hospice medical facility

## 2022-11-10 NOTE — DISCHARGE SUMMARY
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  CT head shows intracranial hemorrhage.  Patient is awake and alert and verbal.  Has a previous history of mini strokes in the past.  Has left-sided weakness.  On Cardene drip for blood pressure goal systolic less than 140.  Neurosurgery has already been consulted.  Will require admission to intensive care for close monitoring.  Patient is on aspirin and Plavix at home which is being held.    Admitted with intracranial hemorrhage to intensive care.  Was seen by neurosurgery, neurology, cardiology and palliative care services while here.  Patient had significant dysphagia and unable to take p.o.  Patient and family met with palliative care service on 11/8/2022 and they decided to change to DNR/DNI and the following morning on the ninth decided to change to full palliative.  Now being discharged and readmitted to hospice scatter bed for end-of-life care.    Time spent with evaluation, chart review and discharge summary greater than 35 minutes    Electronically signed by Casa Sanchez MD, 11/10/22, 4:40 PM EST.

## 2022-11-11 PROBLEM — G31.9 CEREBRAL ATROPHY (HCC): Status: ACTIVE | Noted: 2022-01-01

## 2022-11-11 PROBLEM — I61.9 INTRAPARENCHYMAL HEMORRHAGE OF BRAIN (HCC): Status: ACTIVE | Noted: 2022-01-01

## 2022-11-11 PROBLEM — I61.5 INTRAVENTRICULAR HEMORRHAGE (HCC): Status: ACTIVE | Noted: 2022-01-01

## 2022-11-11 PROBLEM — Z51.5 HOSPICE CARE: Status: ACTIVE | Noted: 2022-01-01

## 2022-11-11 PROBLEM — I67.9 CEREBROVASCULAR SMALL VESSEL DISEASE: Status: ACTIVE | Noted: 2022-01-01

## 2022-11-11 PROBLEM — Z86.73 HISTORY OF CVA (CEREBROVASCULAR ACCIDENT): Status: ACTIVE | Noted: 2022-01-01

## 2022-11-11 PROBLEM — R13.12 OROPHARYNGEAL DYSPHAGIA: Status: ACTIVE | Noted: 2017-01-21

## 2022-11-11 PROBLEM — I51.9 LEFT VENTRICULAR DIASTOLIC DYSFUNCTION: Status: ACTIVE | Noted: 2022-01-01

## 2022-11-11 NOTE — PLAN OF CARE
Goal Outcome Evaluation:  Plan of Care Reviewed With: patient, daughter        Progress: declining  Outcome Evaluation: Pt medicated with 4mg morphine and 1mg ativan x 2.   Daughter at bedside all night.  Pt is resting comfortably at this time.  Will continue to monitor.

## 2022-11-11 NOTE — DISCHARGE SUMMARY
Discharge As      Date of Admisssion:  11/10/2022  Date of Death:  2022  Time of Death:  5:16 PM    Patient Care Team:  Adeline Onofre MD as PCP - Family Medicine  Jean Ford MD as Consulting Physician (Cardiology)  Marcus Acharya MD as Attending Provider (Hospice and Palliative Medicine)    Final Diagnosis:     Intraparenchymal hemorrhage of brain (HCC)    Hospice care    Intraventricular hemorrhage (HCC)    Type 2 diabetes mellitus with hyperglycemia, without long-term current use of insulin (HCC)    Oropharyngeal dysphagia    Stage 3a chronic kidney disease (HCC)    COVID-19 virus infection 10/16/2022    History of CVA (cerebrovascular accident)    Left ventricular diastolic dysfunction    Hypertriglyceridemia    Cerebrovascular small vessel disease    Cerebral atrophy (HCC)      Hospital Course  Patient was a 81 y.o. male who was admitted as a hospice scattered bed patient 11/10/2022.  I was asked to assume the patient's care.  Please see my history and physical dated 2022 at 1020 hrs.  Subsequently, I was called that the patient's respirations ceased and no pulse palpable. No heart sounds audible. I pronounced the patient at 1716 hours.    Time: I spent 65 minutes on the history and physical and discharge activity which included: face-to-face encounter with the patient and the patient's wife, reviewing the data in the system, coordination of the care with the nursing staff as well as documentation and entering orders.       Marcus Acharya MD  Hospice and Palliative Medicine  22  18:05 EST

## 2022-11-11 NOTE — PROGRESS NOTES
[facility] Visit Report    Eugenio Joe  [id number]  11/11/2022    Admission R/T [facility] Dx: Yes    Reason for [facility] Admission: Nontraumatic intracranial hemorrhage    Symptom  Management: pain, restlessness    Review of Visit:  Vital Signs:  101.2 92 16 144/76 93% RA    PPS:  10%    Medications in 24 hours:    - 4mg IV Morphine x 4  - 1mg IV Ativan x 1  - 2mg IV Ativan x 2    Assessment:  Collaborated with staff ENEDELIA Lopez and reviewed Epic chart prior to visit. Patient is in bed, unresponsive to touch and voice.  Skin is pale an warm with exception to knees which are mottling and cool to touch.  Breathing is even and unlabored with O2 sat 93% on RA.  FC is in place with dark yellow urine in BSD bag.  PIV in L AC is CDI.    Patient currently has no indicators of distress or discomfort.    Family:  Son Jose and daughter-in-law Francisca at bedside.  No questions or concerns at this time, but they were encouraged to call [facility] Customer Support with any needs.       [facility] RN will continue to see daily to assess needs and collaborate with [facility] staff to provide care which achieves optimal patient comfort during the end of life.        Nae Gaviria RN  [facility]  Scattered Bed Team  [phone]

## 2022-11-11 NOTE — H&P
Palliative Care/Hospice Admit/Consult Note     Referring Provider: Casa Sanchez MD   Reason for Consultation: Hospice Care  Date of Admission:  11/10/2022    Patient Care Team:  Adeline Onofre MD as PCP - Family Medicine  Jean Ford MD as Consulting Physician (Cardiology)  Marcus Acharya MD as Attending Provider (Hospice and Palliative Medicine)    Chief complaint:  ICH    History of present illness:  The patient is a 81 y.o. male who went to bed around 10:00 11/5/2022 and woke up the morning of 11/6/2022 with weakness.  CT head demonstrated intracranial hemorrhage.  Initially, patient was awake and alert and verbal. He has had previous history of mini strokes in the past.  Has left-sided weakness.  On Cardene drip for blood pressure goal systolic less than 140.  Neurosurgery was consulted and recommended medica; management.  He was initially admitted to the intensive care for monitoring.  Patient was on aspirin and Plavix at home which was held. Patient had significant dysphagia and was unable to take p.o.  Patient and family met with palliative care service on 11/8/2022 and they decided to change to DNR/DNI and the following morning on the 11/9/2022 decided to change to full palliative care.    The patient was evaluated by hospice and all agreed to discharge the patient from acute care and readmit him as a hospice scattered bed patient 11/10/2022.  I was asked to assume the patient's care.    At the time of my evaluation, the patient's wife was at bedside.  The patient was lying on his left side.  With medications previously administered, he appears comfortable.  The patient was not awake and no ROS obtainable.    Review of Systems  Review of systems could not be obtained due to   patient sedation status. patient unresponsive.    Palliative Performance Scale  Palliative Performance Scale Score: 20%  [place] Symptom Assessment System Revised  Pain Score: 1   ESAS Tiredness Score: 9  ESAS  Nausea Score: No nausea  ESAS Depression Score: unable to assess  ESAS Anxiety Score: No anxiety  ESAS Drowsiness Score: 8  ESAS Lack of Appetite Score: Worst lack of appetite  ESAS Wellbeing Score: 5  ESAS Dyspnea Score: No shortness of breath  ESAS Other Problem Score: Best possible response  ESAS Source of Information: healthcare professional caregiver  ESAS Intervention: medicated/see MAR  ESAS Intervention Response: tolerated    History  Past Medical History:   Diagnosis Date   • Arthritis    • Asthma    • Brain abscess     at about age 44   • Bruise of face    • Bursitis of left hip    • Cancer ([facility])     PT STATES IN HIS SWEAT GLAND    • Cardiac disease    • Coronary artery disease     CABG 2012   • Diabetes mellitus ([facility])    • Difficulty in swallowing     LIQUIDS   • Difficulty walking    • Diverticulitis    • DM2 (diabetes mellitus, type 2) ([facility]) 10/12/2016   • Fracture, foot 2015    Dr Pisano   • Fracture, stress, metatarsal    • Fracture, tibia and fibula Feb 2021    Dr Pisano   • Gout several years ago    medication  working   • Hearing aid worn     bilateral   • Hx of appendectomy    • Hyperlipemia    • Hypertension    • Joint pain     or swelling   • Low back pain several years ago   • Metatarsal stress fracture with nonunion    • Normal pressure hydrocephalus ([facility])    • Orbit fracture ([facility])    • Pulmonary embolism ([facility])     OCT 2016 - AFTER FOOT SURGERY   • Rash     ARM-    • Spinal headache     AFTER SPINAL TAP - NO BLOOD PATCH   • Stroke (cerebrum) ([facility]) 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   • Syndesmotic ankle sprain, left, subsequent encounter    • Tear of meniscus of knee torn ligaments   • TIA (transient ischemic attack)     MULTIPLE   ,   Past Surgical History:   Procedure Laterality Date   • ANKLE OPEN REDUCTION INTERNAL FIXATION Left 2/16/2021    Procedure: Left ankle open reduction internal fixation with implants as needed;  Surgeon: Man Jenkins MD;  Location: [facility] OR [facility];   Service: Orthopedics;  Laterality: Left;   • APPENDECTOMY N/A 7/18/2019    Procedure: APPENDECTOMY LAPAROSCOPIC;  Surgeon: Luis Carlos Rick Jr., MD;  Location: [facility] MAIN OR;  Service: General   • BRAIN SURGERY      BRAIN ABCESS 30 YR AGO   • CARDIAC CATHETERIZATION N/A 12/4/2020    Procedure: Patent foramen ovale closure ABBOTT;  Surgeon: Frank Pablo MD;  Location: [facility] CATH INVASIVE LOCATION;  Service: Cardiology;  Laterality: N/A;   • CARDIAC CATHETERIZATION N/A 12/4/2020    Procedure: Intracardiac echocardiogram;  Surgeon: Frank Pablo MD;  Location: [facility] CATH INVASIVE LOCATION;  Service: Cardiology;  Laterality: N/A;   • CARDIAC SURGERY     • COLONOSCOPY  2012    Ciciliano- normal per pt, no polyps    • CORONARY ARTERY BYPASS GRAFT      3 VESSEL   • ENDOSCOPY N/A 3/9/2017    Schatzki ring, dilated, LA Grade B esophagitis, HH, acquired duodenal stenosis   • ENDOSCOPY N/A 4/6/2018    candida, HH, torts, Schatzki ring, duodenal stenosis, dilatation perforemed, chronic inflammation   • ENDOSCOPY N/A 10/9/2019    White nummular lesions in esophageal mucosa, mild Schatzki ring, acquired duodenal stenosis, Path: Terrazas's, candida   • ENDOSCOPY N/A 1/8/2020    Procedure: ESOPHAGOGASTRODUODENOSCOPY with biopsies;  Surgeon: Rigoberto Walker MD;  Location: [facility] ENDOSCOPY;  Service: Gastroenterology   • FACIAL FRACTURE SURGERY      to repair 6 broken bones   • HAND SURGERY     • ORBITAL FRACTURE OPEN REDUCTION INTERNAL FIXATION Right 1/13/2017    Procedure: RT ORBIT FLOOR FRACTURE REPAIR RIGHT [facility] FRACTURE REPAIR, RIGHT NASAL BONE FRACTURE CLOSED REDUCTION;  Surgeon: Edil Lester MD;  Location: [facility] OR [facility];  Service:    • ORIF FOOT FRACTURE Right 9/9/2016    Procedure: RIGHT SECOND METATARSAL OPEN REDUCTION INTERNAL FIXATION WITh GRAFT FROM HEEL ;  Surgeon: Man Jenkins MD;  Location: [facility] OR [facility];  Service:    • PATENT FORAMEN OVALE CLOSURE     • SEPTOPLASTY     •  SKIN CANCER EXCISION     • TONSILLECTOMY     • TRANSESOPHAGEAL ECHOCARDIOGRAM (STELLA)     ,   Family History   Problem Relation Age of Onset   • Heart disease Mother    • Hypertension Mother    • Stroke Mother    • Diverticulitis Mother    • Heart disease Father    • Hypertension Father    • Malig Hyperthermia Neg Hx     and   Social History     Socioeconomic History   • Marital status:    • Number of children: 2   • Years of education: 16   Tobacco Use   • Smoking status: Former     Packs/day: 3.00     Years: 5.00     Pack years: 15.00     Types: Cigarettes     Start date:      Quit date:      Years since quittin.9   • Smokeless tobacco: Never   • Tobacco comments:     Quit herson turkey   Substance and Sexual Activity   • Alcohol use: Yes     Alcohol/week: 2.0 standard drinks     Types: 1 Cans of beer, 1 Shots of liquor per week     Comment: RARELY    • Drug use: No   • Sexual activity: Not Currently     Partners: Female     Birth control/protection: Surgical     E-cigarette/Vaping     E-cigarette/Vaping Substances     E-cigarette/Vaping Devices        Allergy Other and Oxycodone    Vital Signs   Temp:  [99.9 °F (37.7 °C)-101.2 °F (38.4 °C)] 99.9 °F (37.7 °C)  Heart Rate:  [] 109  Resp:  [16] 16  BP: (142-144)/(69-76) 142/69  Device (Oxygen Therapy): room air SpO2:  [90 %-93 %] 90 %    Physical Exam:  General Appearance:   Not awake and appears in no acute distress lying on his left side with head of bed elevated 10 degrees, chronically ill-appearing older appearing male   Head:    Normocephalic, without obvious abnormality, atraumatic   Eyes:            Lids and lashes normal, conjunctivae and sclerae normal, no icterus   Ears:    Ears appear intact with no abnormalities noted   Throat:   No oral lesions, oral mucosa somewhat moist   Neck:   No adenopathy, supple, trachea midline, no thyromegaly   Back:     No scoliosis present   Lungs:     Clear to auscultation with minimal scattered  crackle, respirations diminished with slight increase inspiratory effort     Heart:    Regular rhythm and tachycardia        Abdomen:   Occasional bowel sounds, soft and non-tender, non-distended   Genitalia:    Deferred   Extremities:   Trace pretibial edema, pale and ashen and no cyanosis    Pulses:  Radial pulses palpable and equal bilaterally   Skin:   No bleeding         Neurologic:  Not awake to test      Results Review:   I reviewed the patient's new clinical results.    Impression:      Intraparenchymal hemorrhage of brain (HCC)    Hospice care    Intraventricular hemorrhage (HCC)    Type 2 diabetes mellitus with hyperglycemia, without long-term current use of insulin (HCC)    Oropharyngeal dysphagia    Stage 3a chronic kidney disease (HCC)    COVID-19 virus infection 10/16/2022    History of CVA (cerebrovascular accident)    Left ventricular diastolic dysfunction    Hypertriglyceridemia    Cerebrovascular small vessel disease    Cerebral atrophy (HCC)      Plan:  I reviewed the patient's previous admission and medical records.  I reviewed CT images previously obtained.  I reviewed with the patient's wife at bedside.  I reviewed with the RN.  With medications previously administered, the patient appears comfortable.  All routine medications previously discontinued.  The patient has required 2 doses of 4 mg morphine and 1 dose of 1 mg and 1 dose of 2 mg Ativan thus far today.  Medications will be continued and adjusted as needed for symptom management for comfort.  No attempts at resuscitation will be made.  I answered all of the patient's wife's questions.  She understands the patient's terminal condition.  She understands that his respirations are presently inefficient.      Marcus Acharya MD  Hospice and Palliative Medicine  11/11/22  18:02 EST

## 2022-11-14 NOTE — CASE MANAGEMENT/SOCIAL WORK
Case Management Discharge Note      Final Note:  [facility] RN CCP         Selected Continued Care - Discharged on 2022 Admission date: 11/10/2022 - Discharge disposition:     Destination    No services have been selected for the patient.              Durable Medical Equipment    No services have been selected for the patient.              Dialysis/Infusion    No services have been selected for the patient.              Home Medical Care    No services have been selected for the patient.              Therapy    No services have been selected for the patient.              Community Resources    No services have been selected for the patient.              Community & DME    No services have been selected for the patient.                Selected Continued Care - Prior Encounters Includes continued care and service providers with selected services from prior encounters from 2022 to 2022    Discharged on 11/10/2022 Admission date: 2022 - Discharge disposition: Hospice/Medical Facility ([facility])    Destination     Service Provider Selected Services Address Phone Fax Patient Preferred    [facility] [address], [facility] [address] [phone] --                         Final Discharge Disposition Code: 41 -  in medical facility

## 2022-12-08 ENCOUNTER — APPOINTMENT (OUTPATIENT)
Dept: CT IMAGING | Facility: HOSPITAL | Age: 82
End: 2022-12-08

## (undated) DEVICE — TBG 02 CRUSH RESIST LF CLR 7FT

## (undated) DEVICE — SUT VIC 2/0 SH 27IN

## (undated) DEVICE — INTRO SHEATH ART/FEM ENGAGE .035 8F12CM

## (undated) DEVICE — GAUZE,SPONGE,FLUFF,6"X6.75",STRL,10/TRAY: Brand: MEDLINE

## (undated) DEVICE — STPLR SKIN VISISTAT WD 35CT

## (undated) DEVICE — PK CATH CARD 40

## (undated) DEVICE — FRCP BX RADJAW4 NDL 2.8 240CM LG OG BX40

## (undated) DEVICE — DILATOR CONTRL RADL 12/15MM 8CM BX5

## (undated) DEVICE — SUT MNCRYL PLS ANTIB UD 4/0 PS2 18IN

## (undated) DEVICE — BITEBLOCK OMNI BLOC

## (undated) DEVICE — MAGNETIC DRAPE: Brand: DEVON

## (undated) DEVICE — CROUCH CORNEAL PROTECTOR: Brand: BAUSCH + LOMB

## (undated) DEVICE — DRP C/ARM 41X74IN

## (undated) DEVICE — DEV INFL ALLIANCE2 SYS

## (undated) DEVICE — GLV SURG BIOGEL SENSR LTX PF SZ7.5

## (undated) DEVICE — DRP C/ARMOR

## (undated) DEVICE — ENDOPATH ETS-FLEX45 ARTICULATING ENDOSCOPIC LINEAR CUTTER, NO RELOAD: Brand: ENDOPATH

## (undated) DEVICE — BALN DIL ESOPH CRE CONTRL RADL 15/18MM 8CM BX5

## (undated) DEVICE — ENDOPATH PNEUMONEEDLE INSUFFLATION NEEDLES WITH LUER LOCK CONNECTORS 120MM: Brand: ENDOPATH

## (undated) DEVICE — IMMOB HD UNIV CLR DISP

## (undated) DEVICE — PINNACLE INTRODUCER SHEATH: Brand: PINNACLE

## (undated) DEVICE — GW EMR FIX EXCHG J STD .035 3MM 260CM

## (undated) DEVICE — SPLNT ORTHOGLASS 4INX15FT

## (undated) DEVICE — WIPE INST MEROCEL

## (undated) DEVICE — KT ORCA ORCAPOD DISP STRL

## (undated) DEVICE — SWABSTK SKINPREP PVPI LF PK/3

## (undated) DEVICE — 3M™ STERI-DRAPE™ U-DRAPE 1015: Brand: STERI-DRAPE™

## (undated) DEVICE — PIN FIX BB TAK THRD

## (undated) DEVICE — BALN SIZING AMPLATZER 2 7F 24MM

## (undated) DEVICE — GOWN,NON-REINFORCED,SIRUS,SET IN SLV,XXL: Brand: MEDLINE

## (undated) DEVICE — SENSR O2 OXIMAX FNGR A/ 18IN NONSTR

## (undated) DEVICE — PATIENT RETURN ELECTRODE, SINGLE-USE, CONTACT QUALITY MONITORING, ADULT, WITH 9FT CORD, FOR PATIENTS WEIGING OVER 33LBS. (15KG): Brand: MEGADYNE

## (undated) DEVICE — 3M™ STERI-STRIP™ REINFORCED ADHESIVE SKIN CLOSURES, R1547, 1/2 IN X 4 IN (12 MM X 100 MM), 6 STRIPS/ENVELOPE: Brand: 3M™ STERI-STRIP™

## (undated) DEVICE — 3M™ STERI-STRIP™ COMPOUND BENZOIN TINCTURE 40 BAGS/CARTON 4 CARTONS/CASE C1544: Brand: 3M™ STERI-STRIP™

## (undated) DEVICE — ENDOPATH XCEL UNIVERSAL TROCAR STABLILITY SLEEVES: Brand: ENDOPATH XCEL

## (undated) DEVICE — CANN NASL CO2 TRULINK W/O2 A/

## (undated) DEVICE — DISPOSABLE TOURNIQUET CUFF SINGLE BLADDER, SINGLE PORT AND QUICK CONNECT CONNECTOR: Brand: COLOR CUFF

## (undated) DEVICE — TOWEL,OR,DSP,ST,BLUE,STD,4/PK,20PK/CS: Brand: MEDLINE

## (undated) DEVICE — CATH ULTRASND ECHO ACUNAV FOR ACUSON 8F 90CM

## (undated) DEVICE — ENDOCUT SCISSOR TIP, DISPOSABLE: Brand: RENEW

## (undated) DEVICE — ENDOPATH XCEL BLADELESS TROCARS WITH STABILITY SLEEVES: Brand: ENDOPATH XCEL

## (undated) DEVICE — 3M™ IOBAN™ 2 ANTIMICROBIAL INCISE DRAPE 6640EZ: Brand: IOBAN™ 2

## (undated) DEVICE — APPL CHLORAPREP W/TINT 26ML ORNG

## (undated) DEVICE — PENCL E/S ULTRAVAC TELESCP NOSE HOLSTR 10FT

## (undated) DEVICE — TRAP FLD MINIVAC MEGADYNE 100ML

## (undated) DEVICE — Device

## (undated) DEVICE — SCRW LK LP SS 3.5X12MM
Type: IMPLANTABLE DEVICE | Site: ANKLE | Status: NON-FUNCTIONAL
Removed: 2021-02-16

## (undated) DEVICE — PREP SOL POVIDONE/IODINE BT 4OZ

## (undated) DEVICE — Device: Brand: DEFENDO AIR/WATER/SUCTION AND BIOPSY VALVE

## (undated) DEVICE — GLV SURG BIOGEL LTX PF 6 1/2

## (undated) DEVICE — KT VLV BIOGUARD SXN BIOP AIR/H20 CONN 4PC DISP

## (undated) DEVICE — ELECTRD BLD EZ CLN MOD 2.5IN

## (undated) DEVICE — SPLINT 1528138 5PK EXTERNAL NASAL LARGE

## (undated) DEVICE — TUBING, SUCTION, 1/4" X 10', STRAIGHT: Brand: MEDLINE

## (undated) DEVICE — APPL CHLORAPREP HI/LITE 26ML ORNG

## (undated) DEVICE — GLV SURG BIOGEL LTX PF 8

## (undated) DEVICE — GW AMPLATZER SS .035 1.5MM J 260CM

## (undated) DEVICE — UNDERCAST PADDING: Brand: DEROYAL

## (undated) DEVICE — SCRW CORT LP SS 3.5X14MM
Type: IMPLANTABLE DEVICE | Site: ANKLE | Status: NON-FUNCTIONAL
Removed: 2021-02-16

## (undated) DEVICE — ENDOPOUCH RETRIEVER SPECIMEN RETRIEVAL BAGS: Brand: ENDOPOUCH RETRIEVER

## (undated) DEVICE — SYS DEL TREVISIO 45DEG 8F 80CM

## (undated) DEVICE — FIRM 8CM: Brand: NASOPORE

## (undated) DEVICE — SKIN PREP TRAY W/CHG: Brand: MEDLINE INDUSTRIES, INC.

## (undated) DEVICE — STERILE COTTON TIP 6IN 10PK: Brand: MEDLINE

## (undated) DEVICE — PK ENT 40

## (undated) DEVICE — LOU LAP CHOLE: Brand: MEDLINE INDUSTRIES, INC.

## (undated) DEVICE — BIT DRL 2.7MM

## (undated) DEVICE — DEV INFL CRE STERIFLATE 60CC DISP

## (undated) DEVICE — SUT VIC 3/0 SH 27IN J416H

## (undated) DEVICE — HARMONIC ACE +7 LAPAROSCOPIC SHEARS ADVANCED HEMOSTASIS 5MM DIAMETER 36CM SHAFT LENGTH  FOR USE WITH GRAY HAND PIECE ONLY: Brand: HARMONIC ACE

## (undated) DEVICE — DRSNG GZ PETROLTM XEROFORM CURAD 1X8IN STRL

## (undated) DEVICE — CATH DIAG IMPULSE W/SH MPA2 6F125CM

## (undated) DEVICE — SPNG LAP 18X18IN LF STRL PK/5

## (undated) DEVICE — ELECTRD NDL EZ CLN MOD 2.75IN

## (undated) DEVICE — GLV SURG SIGNATURE ESSENTIAL PF LTX SZ8

## (undated) DEVICE — PAD,ABDOMINAL,8"X10",ST,LF: Brand: MEDLINE

## (undated) DEVICE — PK ORTHO MINOR TOWER 40

## (undated) DEVICE — ADAPT CLN BIOGUARD AIR/H2O DISP

## (undated) DEVICE — BNDG ELAS ELITE V/CLOSE 4IN 5YD LF STRL

## (undated) DEVICE — BIT DRL TI 2.5MM

## (undated) DEVICE — GLV SURG PREMIERPRO ORTHO LTX PF SZ7.5 BRN